# Patient Record
Sex: FEMALE | Race: BLACK OR AFRICAN AMERICAN | NOT HISPANIC OR LATINO | Employment: FULL TIME | URBAN - METROPOLITAN AREA
[De-identification: names, ages, dates, MRNs, and addresses within clinical notes are randomized per-mention and may not be internally consistent; named-entity substitution may affect disease eponyms.]

---

## 2017-02-20 ENCOUNTER — PATIENT MESSAGE (OUTPATIENT)
Dept: INTERNAL MEDICINE | Facility: CLINIC | Age: 37
End: 2017-02-20

## 2017-02-20 ENCOUNTER — PATIENT MESSAGE (OUTPATIENT)
Dept: OBSTETRICS AND GYNECOLOGY | Facility: CLINIC | Age: 37
End: 2017-02-20

## 2017-02-20 ENCOUNTER — OFFICE VISIT (OUTPATIENT)
Dept: OBSTETRICS AND GYNECOLOGY | Facility: CLINIC | Age: 37
End: 2017-02-20
Payer: MEDICAID

## 2017-02-20 VITALS
SYSTOLIC BLOOD PRESSURE: 104 MMHG | BODY MASS INDEX: 17.28 KG/M2 | DIASTOLIC BLOOD PRESSURE: 68 MMHG | HEIGHT: 71 IN | WEIGHT: 123.44 LBS

## 2017-02-20 DIAGNOSIS — R16.0 LIVER MASS: Primary | ICD-10-CM

## 2017-02-20 DIAGNOSIS — E28.319 PREMATURE MENOPAUSE: ICD-10-CM

## 2017-02-20 DIAGNOSIS — B96.89 BV (BACTERIAL VAGINOSIS): ICD-10-CM

## 2017-02-20 DIAGNOSIS — N76.0 BV (BACTERIAL VAGINOSIS): ICD-10-CM

## 2017-02-20 DIAGNOSIS — Z01.419 VISIT FOR GYNECOLOGIC EXAMINATION: Primary | ICD-10-CM

## 2017-02-20 DIAGNOSIS — Z11.3 VENEREAL DISEASE SCREENING: ICD-10-CM

## 2017-02-20 LAB
CANDIDA RRNA VAG QL PROBE: NEGATIVE
G VAGINALIS RRNA GENITAL QL PROBE: NEGATIVE
T VAGINALIS RRNA GENITAL QL PROBE: NEGATIVE

## 2017-02-20 PROCEDURE — 87480 CANDIDA DNA DIR PROBE: CPT

## 2017-02-20 PROCEDURE — 99999 PR PBB SHADOW E&M-EST. PATIENT-LVL III: CPT | Mod: PBBFAC,,, | Performed by: OBSTETRICS & GYNECOLOGY

## 2017-02-20 PROCEDURE — 99395 PREV VISIT EST AGE 18-39: CPT | Mod: S$PBB,,, | Performed by: OBSTETRICS & GYNECOLOGY

## 2017-02-20 PROCEDURE — 87591 N.GONORRHOEAE DNA AMP PROB: CPT

## 2017-02-20 PROCEDURE — 99213 OFFICE O/P EST LOW 20 MIN: CPT | Mod: PBBFAC | Performed by: OBSTETRICS & GYNECOLOGY

## 2017-02-20 RX ORDER — CETIRIZINE HYDROCHLORIDE 10 MG/1
TABLET ORAL
Refills: 4 | COMMUNITY
Start: 2017-02-06 | End: 2018-02-26

## 2017-02-20 RX ORDER — METRONIDAZOLE 7.5 MG/G
1 GEL VAGINAL DAILY
Qty: 70 G | Refills: 0 | Status: SHIPPED | OUTPATIENT
Start: 2017-02-20 | End: 2017-02-25

## 2017-02-20 RX ORDER — FLUTICASONE PROPIONATE 50 MCG
SPRAY, SUSPENSION (ML) NASAL
Refills: 5 | COMMUNITY
Start: 2017-02-06 | End: 2018-07-30

## 2017-02-20 NOTE — PROGRESS NOTES
Please update my record with the following information.    Test or Procedure:  Pap smear    Date Completed: 2016    Place of Service: [facility]    Include any additional comments:        You may attach a copy of the test or result below.          HISTORY OF PRESENT ILLNESS:    Melly Hwang is a 36 y.o. female, , No LMP recorded. Patient is premenarcheal.,  presents for a routine exam and has no complaints. PAP RECENTLY SUBMITTED AT [facility] WHEN SEEN FOR ROUTINE VISIT.  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  HAS HAD YELLOWISH DISCHARGE PAST 2 DAYS WITHOUT ITCHING OR IRRITATION.  NOT SA FOR YEARS.  ADVISED SOME CERVICAL INFLAMMATION BUT NO EVIDENT INFECTION AND MINIMAL D/C ON EXAM - POSS BV, WILL RX METROGEL.  STD SCREEN FOR PEACE OF MIND SINCE HSE HAS POSSIBLE PROFESSIONAL EXPOSURES.    LAST VISIT 3/2014:  WANTS 3-MO REFILL OCP. PAP IS UP TO DATE. WORKING MODELING, LOCALLY AND SOME NATIONALLY     LAST VISIT 3/2013:  HAS A HX OF OMA OV FAILURE, PREVIOUSLY MAINTAINED ON SEASONIQUE, BUT CHANGED TO MICRONOR FOR CONTROL OF HOT FLUSHES WHEN LIVER HEMANGIOMA NOTED (DUE FOR NEXT MRI THIS MONTH) SKIN IS DRY, BUT NO HOT FLUSHES AND CONCERNED IF STOPS MICRONOR WILL INCR RISK OF OSTEOPOROSIS. STILL AMENORRHEA   WORKS [employer]   PAP SUBMITTED AND DISCUSSED 3YR SCREENING RECOMMENDATION    Past Medical History   Diagnosis Date    Deviated septum     Hypertrophy of inferior nasal turbinate     Liver mass     Nasal congestion     Premature menopause        Past Surgical History   Procedure Laterality Date    Tonsillectomy      Umbilical hernia repair         MEDICATIONS AND ALLERGIES:      Current Outpatient Prescriptions:     fluticasone (FLONASE) 50 mcg/actuation nasal spray, INHALE 1 SPRAY IN EACH NOSTRIL BY INTRANASAL ROUTE 1 TIME PER DAY FOR ALLERGIES, Disp: , Rfl: 5    multivitamin capsule, Take by mouth. 1 capsule Oral Every  morning, Disp: , Rfl:     norethindrone (MICRONOR) 0.35 mg tablet, TAKE 1 TABLET (0.35 MG TOTAL) BY MOUTH ONCE DAILY., Disp: 84 tablet, Rfl: 0    cetirizine (ZYRTEC) 10 MG tablet, TAKE 1 TABLET (10 MG) BY ORAL ROUTE ONCE DAILY, Disp: , Rfl: 4    metronidazole (METROGEL VAGINAL) 0.75 % vaginal gel, Place 1 applicator vaginally once daily., Disp: 70 g, Rfl: 0    Review of patient's allergies indicates:   Allergen Reactions    No known drug allergies        Family History   Problem Relation Age of Onset    Diabetes Mother     Macular degeneration Father     No Known Problems Sister     No Known Problems Brother     No Known Problems Daughter     No Known Problems Brother     No Known Problems Maternal Aunt     No Known Problems Maternal Uncle     No Known Problems Paternal Aunt     No Known Problems Paternal Uncle     No Known Problems Maternal Grandmother     No Known Problems Maternal Grandfather     No Known Problems Paternal Grandmother     No Known Problems Paternal Grandfather     Amblyopia Neg Hx     Blindness Neg Hx     Cancer Neg Hx     Cataracts Neg Hx     Glaucoma Neg Hx     Hypertension Neg Hx     Retinal detachment Neg Hx     Strabismus Neg Hx     Stroke Neg Hx     Thyroid disease Neg Hx     Heart disease Neg Hx     Melanoma Neg Hx        Social History     Social History    Marital status: Single     Spouse name: N/A    Number of children: 1    Years of education: N/A     Occupational History     [employer]     Social History Main Topics    Smoking status: Never Smoker    Smokeless tobacco: Never Used    Alcohol use No    Drug use: No    Sexual activity: Yes     Partners: Male     Birth control/ protection: OCP     Other Topics Concern    Not on file     Social History Narrative       COMPREHENSIVE GYN HISTORY:  PAP History: Denies abnormal Paps.  Infection History: Denies STDs. Denies PID.  Benign History: Denies uterine fibroids. Denies  "ovarian cysts. Denies endometriosis. Denies other conditions.  Cancer History: Denies cervical cancer. Denies uterine cancer or hyperplasia. Denies ovarian cancer. Denies vulvar cancer or pre-cancer. Denies vaginal cancer or pre-cancer. Denies breast cancer. Denies colon cancer.  Sexual Activity History: Reports currently being sexually active  Menstrual History: Monthly, mild-moderate.  Contraception:oral contraceptives (estrogen/progesterone)    ROS:  GENERAL: No weight changes. No swelling. No fatigue. No fever.  CARDIOVASCULAR: No chest pain. No shortness of breath. No leg cramps.   NEUROLOGICAL: No headaches. No vision changes.  BREASTS: No pain. No lumps. No discharge.  ABDOMEN: No pain. No nausea. No vomiting. No diarrhea. No constipation.  REPRODUCTIVE: No abnormal bleeding.   VULVA: No pain. No lesions. No itching.  VAGINA: No relaxation. No itching. No odor. No discharge. No lesions.  URINARY: No incontinence. No nocturia. No frequency. No dysuria.    Visit Vitals    /68    Ht 5' 11" (1.803 m)    Wt 56 kg (123 lb 7.3 oz)    BMI 17.22 kg/m2       PE:  APPEARANCE: Well nourished, well developed, in no acute distress.  AFFECT: WNL, alert and oriented x 3.  SKIN: No acne or hirsutism.  NECK: Neck symmetric, without masses or thyromegaly.  NODES: No inguinal, cervical, axillary or femoral lymph node enlargement.  CHEST: Good respiratory effort.   ABDOMEN: Soft. No tenderness or masses. No hepatosplenomegaly. No hernias.  BREASTS: Symmetrical, no skin changes, visible lesions, palpable masses or nipple discharge bilaterally.  PELVIC: External female genitalia without lesions.  Female hair distribution. Adequate perineal body, Normal urethral meatus. Vagina moist and well rugated without lesions or discharge.  No significant cystocele or rectocele present. Cervix SOMEWHAT ERYTHEMATOUS  without lesions, discharge or tenderness. Uterus is normal size, regular, mobile and nontender. Adnexa without masses " or tenderness.  EXTREMITIES: No edema    PROCEDURES:    DIAGNOSIS:  1. Visit for gynecologic examination     2. BV (bacterial vaginosis)  Vaginosis Screen by DNA Probe   3. Venereal disease screening  HIV-1 and HIV-2 antibodies    RPR    Hepatitis B surface antigen    C. trachomatis/N. gonorrhoeae by AMP DNA Urine    Vaginosis Screen by DNA Probe       LABS AND TESTS ORDERED:    MEDICATIONS PRESCRIBED:    COUNSELING:   The patient was counseled today on ACS PAP guidelines, with recommendations for yearly pelvic exams unless their uterus, cervix, and ovaries were removed for benign reasons; in that case, examinations every 3-5 years are recommended.  The patient was also counseled regarding monthly breast self-examination, routine STD screening for at-risk populations, prophylactic immunizations for transmitted infections such as  HPV, Pertussis, or Influenza as appropriate, and yearly mammograms when indicated by ACS guidelines.  She was advised to see her primary care physician for all other health maintenance.    FOLLOW-UP with me annually.

## 2017-02-21 ENCOUNTER — PATIENT MESSAGE (OUTPATIENT)
Dept: OBSTETRICS AND GYNECOLOGY | Facility: CLINIC | Age: 37
End: 2017-02-21

## 2017-02-21 LAB
C TRACH DNA SPEC QL NAA+PROBE: NEGATIVE
N GONORRHOEA DNA SPEC QL NAA+PROBE: NEGATIVE

## 2017-02-22 ENCOUNTER — LAB VISIT (OUTPATIENT)
Dept: LAB | Facility: HOSPITAL | Age: 37
End: 2017-02-22
Attending: INTERNAL MEDICINE
Payer: MEDICAID

## 2017-02-22 ENCOUNTER — OFFICE VISIT (OUTPATIENT)
Dept: INTERNAL MEDICINE | Facility: CLINIC | Age: 37
End: 2017-02-22
Payer: MEDICAID

## 2017-02-22 VITALS
DIASTOLIC BLOOD PRESSURE: 70 MMHG | BODY MASS INDEX: 16.67 KG/M2 | SYSTOLIC BLOOD PRESSURE: 110 MMHG | WEIGHT: 119.06 LBS | HEIGHT: 71 IN

## 2017-02-22 DIAGNOSIS — Z11.3 VENEREAL DISEASE SCREENING: ICD-10-CM

## 2017-02-22 DIAGNOSIS — R16.0 LIVER MASS: ICD-10-CM

## 2017-02-22 DIAGNOSIS — Z00.00 ANNUAL PHYSICAL EXAM: ICD-10-CM

## 2017-02-22 DIAGNOSIS — A04.8 H. PYLORI INFECTION: ICD-10-CM

## 2017-02-22 DIAGNOSIS — N30.00 ACUTE CYSTITIS WITHOUT HEMATURIA: ICD-10-CM

## 2017-02-22 DIAGNOSIS — I87.1 NUTCRACKER PHENOMENON OF RENAL VEIN: ICD-10-CM

## 2017-02-22 DIAGNOSIS — K21.9 GASTROESOPHAGEAL REFLUX DISEASE WITHOUT ESOPHAGITIS: ICD-10-CM

## 2017-02-22 DIAGNOSIS — I51.7 CARDIAC ENLARGEMENT: ICD-10-CM

## 2017-02-22 DIAGNOSIS — Z00.00 ANNUAL PHYSICAL EXAM: Primary | ICD-10-CM

## 2017-02-22 LAB
25(OH)D3+25(OH)D2 SERPL-MCNC: 35 NG/ML
ALBUMIN SERPL BCP-MCNC: 4.1 G/DL
ALP SERPL-CCNC: 54 U/L
ALT SERPL W/O P-5'-P-CCNC: 19 U/L
ANION GAP SERPL CALC-SCNC: 7 MMOL/L
AST SERPL-CCNC: 19 U/L
BASOPHILS # BLD AUTO: 0.03 K/UL
BASOPHILS NFR BLD: 0.8 %
BILIRUB SERPL-MCNC: 1.3 MG/DL
BILIRUB UR QL STRIP: NEGATIVE
BUN SERPL-MCNC: 8 MG/DL
CALCIUM SERPL-MCNC: 9.4 MG/DL
CHLORIDE SERPL-SCNC: 107 MMOL/L
CLARITY UR REFRACT.AUTO: CLEAR
CO2 SERPL-SCNC: 27 MMOL/L
COLOR UR AUTO: YELLOW
CREAT SERPL-MCNC: 0.9 MG/DL
DIFFERENTIAL METHOD: ABNORMAL
EOSINOPHIL # BLD AUTO: 0.2 K/UL
EOSINOPHIL NFR BLD: 3.8 %
ERYTHROCYTE [DISTWIDTH] IN BLOOD BY AUTOMATED COUNT: 11.9 %
EST. GFR  (AFRICAN AMERICAN): >60 ML/MIN/1.73 M^2
EST. GFR  (NON AFRICAN AMERICAN): >60 ML/MIN/1.73 M^2
GLUCOSE SERPL-MCNC: 90 MG/DL
GLUCOSE UR QL STRIP: NEGATIVE
HCT VFR BLD AUTO: 41.8 %
HGB BLD-MCNC: 13.9 G/DL
HGB UR QL STRIP: NEGATIVE
KETONES UR QL STRIP: NEGATIVE
LEUKOCYTE ESTERASE UR QL STRIP: NEGATIVE
LYMPHOCYTES # BLD AUTO: 1.8 K/UL
LYMPHOCYTES NFR BLD: 45.6 %
MAGNESIUM SERPL-MCNC: 2.1 MG/DL
MCH RBC QN AUTO: 30 PG
MCHC RBC AUTO-ENTMCNC: 33.3 %
MCV RBC AUTO: 90 FL
MONOCYTES # BLD AUTO: 0.3 K/UL
MONOCYTES NFR BLD: 6.4 %
NEUTROPHILS # BLD AUTO: 1.7 K/UL
NEUTROPHILS NFR BLD: 43.4 %
NITRITE UR QL STRIP: NEGATIVE
PH UR STRIP: 7 [PH] (ref 5–8)
PLATELET # BLD AUTO: 239 K/UL
PMV BLD AUTO: 10 FL
POTASSIUM SERPL-SCNC: 3.9 MMOL/L
PROT SERPL-MCNC: 7.3 G/DL
PROT UR QL STRIP: NEGATIVE
RBC # BLD AUTO: 4.64 M/UL
SODIUM SERPL-SCNC: 141 MMOL/L
SP GR UR STRIP: 1.01 (ref 1–1.03)
TSH SERPL DL<=0.005 MIU/L-ACNC: 1.11 UIU/ML
URN SPEC COLLECT METH UR: NORMAL
UROBILINOGEN UR STRIP-ACNC: NEGATIVE EU/DL
WBC # BLD AUTO: 3.9 K/UL

## 2017-02-22 PROCEDURE — 87340 HEPATITIS B SURFACE AG IA: CPT

## 2017-02-22 PROCEDURE — 86592 SYPHILIS TEST NON-TREP QUAL: CPT

## 2017-02-22 PROCEDURE — 36415 COLL VENOUS BLD VENIPUNCTURE: CPT

## 2017-02-22 PROCEDURE — 86703 HIV-1/HIV-2 1 RESULT ANTBDY: CPT

## 2017-02-22 PROCEDURE — 83735 ASSAY OF MAGNESIUM: CPT

## 2017-02-22 PROCEDURE — 84443 ASSAY THYROID STIM HORMONE: CPT

## 2017-02-22 PROCEDURE — 99999 PR PBB SHADOW E&M-EST. PATIENT-LVL III: CPT | Mod: PBBFAC,,, | Performed by: INTERNAL MEDICINE

## 2017-02-22 PROCEDURE — 85025 COMPLETE CBC W/AUTO DIFF WBC: CPT

## 2017-02-22 PROCEDURE — 99395 PREV VISIT EST AGE 18-39: CPT | Mod: S$PBB,,, | Performed by: INTERNAL MEDICINE

## 2017-02-22 PROCEDURE — 80053 COMPREHEN METABOLIC PANEL: CPT

## 2017-02-22 PROCEDURE — 82306 VITAMIN D 25 HYDROXY: CPT

## 2017-02-22 RX ORDER — OMEPRAZOLE 20 MG/1
20 CAPSULE, DELAYED RELEASE ORAL DAILY
Qty: 30 CAPSULE | Refills: 3 | Status: SHIPPED | OUTPATIENT
Start: 2017-02-22 | End: 2018-03-01 | Stop reason: SDUPTHER

## 2017-02-22 RX ORDER — OMEPRAZOLE 20 MG/1
20 CAPSULE, DELAYED RELEASE ORAL DAILY
Qty: 30 CAPSULE | Refills: 11
Start: 2017-02-22 | End: 2017-02-22 | Stop reason: SDUPTHER

## 2017-02-22 NOTE — PROGRESS NOTES
Subjective:       Patient ID: Melly Hwang is a 36 y.o. female.    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  Some trouble sleeping.    Vaginal d/c seen in GYN, cx negative    Some strange odor with urination.  Some frequency of late.  No fever    Some allergy issues.    Due for follow up of hemangioma- MRI scheduled this week.    Insurance constraints- has not been able to work for months.    Some slight GERD sx, no alarm sx.  Recall that she had H. Pylori, treated 2014 with negative stool.  She feels that her weight loss has been related to stress from the nursing board problem.  No dysphagia.  No blood in her stool.    Nutcracker vein identified years ago; not thought to be clinically significant.  Seen in urology and that in vascular and no further follow up recommended unless MRI shows changes.    Patient Active Problem List:     Liver mass     Premature menopause     Cardiac enlargement     Bradycardia     Leukopenia     Vein compression     H pylori treated negative stool        Review of Systems   Constitutional: Negative for activity change, appetite change, chills, fatigue and fever.   HENT: Negative for ear discharge, ear pain, sinus pressure and sore throat.    Respiratory: Negative for apnea, cough, chest tightness, shortness of breath and wheezing.    Cardiovascular: Negative for chest pain, palpitations and leg swelling.   Gastrointestinal: Negative for abdominal distention, abdominal pain, anal bleeding, blood in stool, constipation, diarrhea, nausea and vomiting.        GERD   Genitourinary: Positive for frequency. Negative for dysuria, flank pain, hematuria, urgency and vaginal bleeding.   Musculoskeletal: Negative for gait problem, joint swelling and myalgias.   Skin: Negative for rash.   Neurological: Negative for dizziness, tremors, weakness, light-headedness and headaches.   Hematological: Negative for adenopathy. Does  not bruise/bleed easily.   Psychiatric/Behavioral: Negative for confusion, hallucinations, sleep disturbance and suicidal ideas. The patient is nervous/anxious.         No SI or HI       Objective:      Physical Exam   Constitutional: She is oriented to person, place, and time. She appears well-developed and well-nourished.   HENT:   Head: Normocephalic and atraumatic.   Right Ear: External ear normal.   Left Ear: External ear normal.   Nose: Nose normal.   Mouth/Throat: Oropharynx is clear and moist. No oropharyngeal exudate.   Eyes: Conjunctivae and EOM are normal. No scleral icterus.   Neck: Normal range of motion. Neck supple. No JVD present. No thyromegaly present.   Cardiovascular: Normal rate, regular rhythm, normal heart sounds and intact distal pulses.  Exam reveals no gallop.    No murmur heard.  Pulmonary/Chest: Effort normal and breath sounds normal. No respiratory distress. She has no wheezes. She exhibits no tenderness.   Abdominal: Soft. Bowel sounds are normal. She exhibits no distension and no mass. There is no tenderness. There is no rebound and no guarding.   Musculoskeletal: Normal range of motion. She exhibits no edema or tenderness.   Lymphadenopathy:     She has no cervical adenopathy.   Neurological: She is alert and oriented to person, place, and time. She displays normal reflexes. No cranial nerve deficit. Coordination normal.   Skin: Skin is warm. No rash noted. No erythema.   Psychiatric: She has a normal mood and affect. Her behavior is normal. Judgment and thought content normal.   Nursing note and vitals reviewed.      Assessment:       1. Annual physical exam    2. Gastroesophageal reflux disease without esophagitis    3. Liver mass: see MRI, stable [id number]    4. Cardiac enlargement    5. 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 Acute cystitis without hematuria    7. H. pylori infection: tx 2014 stool negative        Plan:      Gastroesophageal reflux disease without esophagitis: diet reviewed; no alarm sx- follow up poor results; can repeat egd if persists  -     omeprazole (PRILOSEC) 20 MG capsule; Take 1 capsule (20 mg total) by mouth once daily.  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 pylori infection: tx 2014 stool negative    I will review all studies and determine further tx depending on findings

## 2017-02-22 NOTE — MR AVS SNAPSHOT
[facility]  [address]  [facility] [address]  Phone: [phone]  Fax: [phone]                  Melly Casonrett   2017 11:30 AM   Office Visit    Description:  Female : 1980   Provider:  Melly Sahu MD   Department:  [facility] - Internal Medicine           Reason for Visit     Annual Exam           Diagnoses this Visit        Comments    Gastroesophageal reflux disease without esophagitis    -  Primary     Liver mass         Cardiac enlargement         Nutcracker phenomenon of renal vein         Acute cystitis without hematuria         Annual physical exam         H. pylori infection                To Do List           Future Appointments        Provider Department Dept Phone    2017 8:15 PM [facility] [phone]      Goals (5 Years of Data)     None       These Medications        Disp Refills Start End    omeprazole (PRILOSEC) 20 MG capsule 30 capsule 3 2017    Take 1 capsule (20 mg total) by mouth once daily. - Oral    Pharmacy: [facility]/pharmacy #[id number] - [address] #: [phone]         [facility]     [facility] Care [facility] -  Assistance  Registered nurses in the [facility] provide clinical advisement, health education, appointment booking, and other advisory services.  Call for this free service at [phone].             Medications           Message regarding Medications     Verify the changes and/or additions to your medication regime listed below are the same as discussed with your clinician today.  If any of these changes or additions are incorrect, please notify your healthcare provider.        START taking these NEW medications        Refills    omeprazole (PRILOSEC) 20 MG capsule 3    Sig: Take 1 capsule (20 mg total) by mouth once daily.    Class: Normal    Route: Oral           Verify that the below list of  "medications is an accurate representation of the medications you are currently taking.  If none reported, the list may be blank. If incorrect, please contact your healthcare provider. Carry this list with you in case of emergency.           Current Medications     cetirizine (ZYRTEC) 10 MG tablet TAKE 1 TABLET (10 MG) BY ORAL ROUTE ONCE DAILY    fluticasone (FLONASE) 50 mcg/actuation nasal spray INHALE 1 SPRAY IN EACH NOSTRIL BY INTRANASAL ROUTE 1 TIME PER DAY FOR ALLERGIES    metronidazole (METROGEL VAGINAL) 0.75 % vaginal gel Place 1 applicator vaginally once daily.    multivitamin capsule Take by mouth. 1 capsule Oral Every morning    norethindrone (MICRONOR) 0.35 mg tablet TAKE 1 TABLET (0.35 MG TOTAL) BY MOUTH ONCE DAILY.    omeprazole (PRILOSEC) 20 MG capsule Take 1 capsule (20 mg total) by mouth once daily.           Clinical Reference Information           Your Vitals Were     BP Height Weight BMI       110/70 5' 11" (1.803 m) 54 kg (119 lb 0.8 oz) 16.6 kg/m2       Blood Pressure          Most Recent Value    BP  110/70      Allergies as of 2/22/2017     No Known Drug Allergies      Immunizations Administered on Date of Encounter - 2/22/2017     None      Orders Placed During Today's Visit      Normal Orders This Visit    Urinalysis     Urine culture     Future Labs/Procedures Expected by Expires    CBC auto differential  2/22/2017 2/22/2018    Comprehensive metabolic panel  2/22/2017 2/22/2018    Magnesium  2/22/2017 2/22/2018    TSH  2/22/2017 2/22/2018    Vitamin D  2/22/2017 (Approximate) 2/22/2018      Language Assistance Services     ATTENTION: Language assistance services are available, free of charge. Please call [phone].      ATENCIÓN: Si habla español, tiene a tyson disposición servicios gratuitos de asistencia lingüística. Llame al [phone].     NAY Ý: N?u b?n nói Ti?ng Vi?t, có các d?ch v? h? tr? ngôn ng? mi?n phí dành cho b?n. G?i s? [phone].         [facility] " complies with applicable Federal civil rights laws and does not discriminate on the basis of race, color, national origin, age, disability, or sex.

## 2017-02-23 LAB
BACTERIA UR CULT: NORMAL
HBV SURFACE AG SERPL QL IA: NEGATIVE
HIV 1+2 AB+HIV1 P24 AG SERPL QL IA: NEGATIVE
RPR SER QL: NORMAL

## 2017-02-24 ENCOUNTER — PATIENT MESSAGE (OUTPATIENT)
Dept: INTERNAL MEDICINE | Facility: CLINIC | Age: 37
End: 2017-02-24

## 2017-02-24 ENCOUNTER — HOSPITAL ENCOUNTER (OUTPATIENT)
Dept: RADIOLOGY | Facility: HOSPITAL | Age: 37
Discharge: HOME | End: 2017-02-24
Attending: INTERNAL MEDICINE
Payer: MEDICAID

## 2017-02-24 DIAGNOSIS — R16.0 LIVER MASS: ICD-10-CM

## 2017-02-24 PROCEDURE — 25500020 PHARM REV CODE 255: Performed by: INTERNAL MEDICINE

## 2017-02-24 PROCEDURE — 74183 MRI ABD W/O CNTR FLWD CNTR: CPT | Mod: TC

## 2017-02-24 PROCEDURE — A9585 GADOBUTROL INJECTION: HCPCS | Performed by: INTERNAL MEDICINE

## 2017-02-24 PROCEDURE — 74183 MRI ABD W/O CNTR FLWD CNTR: CPT | Mod: 26,,, | Performed by: RADIOLOGY

## 2017-02-24 RX ORDER — GADOBUTROL 604.72 MG/ML
10 INJECTION INTRAVENOUS
Status: COMPLETED | OUTPATIENT
Start: 2017-02-24 | End: 2017-02-24

## 2017-02-24 RX ADMIN — GADOBUTROL 10 ML: 604.72 INJECTION INTRAVENOUS at 08:02

## 2017-03-01 ENCOUNTER — PATIENT MESSAGE (OUTPATIENT)
Dept: INTERNAL MEDICINE | Facility: CLINIC | Age: 37
End: 2017-03-01

## 2017-03-01 DIAGNOSIS — R16.0 LIVER MASS: Primary | ICD-10-CM

## 2017-03-14 ENCOUNTER — OFFICE VISIT (OUTPATIENT)
Dept: HEPATOLOGY | Facility: CLINIC | Age: 37
End: 2017-03-14
Payer: MEDICAID

## 2017-03-14 VITALS
SYSTOLIC BLOOD PRESSURE: 140 MMHG | DIASTOLIC BLOOD PRESSURE: 82 MMHG | RESPIRATION RATE: 18 BRPM | OXYGEN SATURATION: 100 % | HEART RATE: 61 BPM | TEMPERATURE: 99 F | HEIGHT: 71 IN | BODY MASS INDEX: 17.22 KG/M2 | WEIGHT: 123 LBS

## 2017-03-14 DIAGNOSIS — D18.03 HEMANGIOMA OF LIVER: Chronic | ICD-10-CM

## 2017-03-14 DIAGNOSIS — R16.0 LIVER MASS: Primary | ICD-10-CM

## 2017-03-14 PROCEDURE — 99999 PR PBB SHADOW E&M-EST. PATIENT-LVL III: CPT | Mod: PBBFAC,,, | Performed by: INTERNAL MEDICINE

## 2017-03-14 PROCEDURE — 99214 OFFICE O/P EST MOD 30 MIN: CPT | Mod: S$PBB,,, | Performed by: INTERNAL MEDICINE

## 2017-03-14 PROCEDURE — 99213 OFFICE O/P EST LOW 20 MIN: CPT | Mod: PBBFAC | Performed by: INTERNAL MEDICINE

## 2017-03-14 NOTE — PROGRESS NOTES
HEPATOLOGY CONSULTATION    Referring Physician: Dr. SEGUNDO Sahu  Current Corresponding Physician: Dr. SEGUNDO Sahu    Reason for Consultation: Consultation for evaluation of Mass and Abnormal Abdominal/Liver Imaging    History of Present Illness: Melly Hwang is a 36 y.o. femalewho presents for evaluation of   Chief Complaint   Patient presents with    Mass    Abnormal Abdominal/Liver Imaging   Melly Hwang was seen today for an opinion about her abnormal imaging and   liver hemangiomas.  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  She states that she   was first told about liver hemangiomas back around 2008 when she was working at   [employer].  On record here, she appears to have had annual MRs done here with   the exception of 2016.  Her most recent liver MRI showed three lesions described   as hemangiomata, the largest one having increased from 7.5 to 6.5 cm.  Weight   and appetite have been stable.  She has no history of chronic liver disease.    She has minimal symptoms from these.  She does state that she was told to reduce   her oral contraceptive pill.  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  Generally hemangiomas are not usually hormonally   driven.  I will review at our Multidisciplinary Radiology conference to ensure   this is the diagnosis and rule out adenoma or focal nodular hyperplasia.          /ls [id number] blank(s)        NG/HN  dd: 03/14/2017 14:23:49 (CDT)  td: 03/15/2017 07:09:17 (CDT)  Doc ID   #[id number]  Job ID #[id number]    CC:         Past Medical History:   Diagnosis Date    Deviated septum 2011    Hypertrophy of inferior nasal turbinate     Liver mass     Nasal congestion 2011    Premature menopause      Outpatient Encounter Prescriptions as of 3/14/2017   Medication Sig Dispense Refill    cetirizine (ZYRTEC) 10 MG tablet TAKE 1 TABLET (10 MG) BY ORAL ROUTE ONCE DAILY  4    fluticasone (FLONASE) 50 mcg/actuation nasal spray INHALE 1 SPRAY IN  EACH NOSTRIL BY INTRANASAL ROUTE 1 TIME PER DAY FOR ALLERGIES  5    multivitamin capsule Take by mouth. 1 capsule Oral Every morning      norethindrone (MICRONOR) 0.35 mg tablet TAKE 1 TABLET (0.35 MG TOTAL) BY MOUTH ONCE DAILY. 84 tablet 0    omeprazole (PRILOSEC) 20 MG capsule Take 1 capsule (20 mg total) by mouth once daily. 30 capsule 3     No facility-administered encounter medications on file as of 3/14/2017.      Review of patient's allergies indicates:   Allergen Reactions    No known drug allergies      Family History   Problem Relation Age of Onset    Diabetes Mother     Kidney disease Mother     Macular degeneration Father     Diabetes Father     Hypertension Father     No Known Problems Sister     No Known Problems Brother     No Known Problems Daughter     No Known Problems Brother     No Known Problems Maternal Aunt     No Known Problems Maternal Uncle     No Known Problems Paternal Aunt     No Known Problems Paternal Uncle     No Known Problems Maternal Grandmother     No Known Problems Maternal Grandfather     No Known Problems Paternal Grandmother     No Known Problems Paternal Grandfather     Amblyopia Neg Hx     Blindness Neg Hx     Cancer Neg Hx     Cataracts Neg Hx     Glaucoma Neg Hx     Retinal detachment Neg Hx     Strabismus Neg Hx     Stroke Neg Hx     Thyroid disease Neg Hx     Heart disease Neg Hx     Melanoma Neg Hx        Social History     Social History    Marital status: Single     Spouse name: N/A    Number of children: 1    Years of education: N/A     Occupational History     [employer]     Social History Main Topics    Smoking status: Never Smoker    Smokeless tobacco: Never Used    Alcohol use No    Drug use: No    Sexual activity: Yes     Partners: Male     Birth control/ protection: OCP     Other Topics Concern    Not on file     Social History Narrative     Review of Systems   Constitutional: Negative for  appetite change, fatigue and unexpected weight change.   HENT: Negative for ear pain, hearing loss, sore throat and trouble swallowing.    Eyes: Negative for visual disturbance.   Respiratory: Negative for cough and shortness of breath.    Cardiovascular: Positive for chest pain. Negative for palpitations.   Gastrointestinal: Negative for abdominal pain, nausea and vomiting.   Genitourinary: Negative for difficulty urinating and dysuria.   Musculoskeletal: Positive for back pain. Negative for arthralgias.   Skin: Negative for rash.   Neurological: Negative for tremors, seizures and headaches.   Psychiatric/Behavioral: Negative for agitation and decreased concentration.     Vitals:    03/14/17 1338   BP: (!) 140/82   Pulse: 61   Resp: 18   Temp: 98.5 °F (36.9 °C)       Physical Exam   Constitutional: She is oriented to person, place, and time. She appears well-developed and well-nourished.   HENT:   Right Ear: External ear normal.   Left Ear: External ear normal.   Mouth/Throat: Oropharynx is clear and moist.   Eyes: No scleral icterus.   Cardiovascular: Normal rate, regular rhythm and normal heart sounds.  Exam reveals no gallop and no friction rub.    No murmur heard.  Pulmonary/Chest: Effort normal and breath sounds normal. No respiratory distress. She has no wheezes.   Abdominal: Soft. Bowel sounds are normal. She exhibits no distension and no mass. There is no tenderness.   Musculoskeletal: Normal range of motion. She exhibits no edema.   Lymphadenopathy:     She has no cervical adenopathy.   Neurological: She is alert and oriented to person, place, and time. She has normal strength.   Skin: Skin is warm, dry and intact. She is not diaphoretic. No pallor.       Computed MELD-Na score unavailable. Necessary lab results were not found.  Computed MELD score unavailable. Necessary lab results were not found.    Lab Results   Component Value Date    GLU 90 02/22/2017    BUN 8 02/22/2017    CREATININE 0.9 02/22/2017     CALCIUM 9.4 02/22/2017     02/22/2017    K 3.9 02/22/2017     02/22/2017    PROT 7.3 02/22/2017    CO2 27 02/22/2017    ANIONGAP 7 (L) 02/22/2017    WBC 3.90 02/22/2017    RBC 4.64 02/22/2017    HGB 13.9 02/22/2017    HCT 41.8 02/22/2017    MCV 90 02/22/2017    MCH 30.0 02/22/2017    MCHC 33.3 02/22/2017     Lab Results   Component Value Date    RDW 11.9 02/22/2017     02/22/2017    MPV 10.0 02/22/2017    GRAN 1.7 (L) 02/22/2017    GRAN 43.4 02/22/2017    LYMPH 1.8 02/22/2017    LYMPH 45.6 02/22/2017    MONO 0.3 02/22/2017    MONO 6.4 02/22/2017    EOSINOPHIL 3.8 02/22/2017    BASOPHIL 0.8 02/22/2017    EOS 0.2 02/22/2017    BASO 0.03 02/22/2017    CHOL 172 04/03/2013    TRIG 42 04/03/2013    HDL 57 04/03/2013    CHOLHDL 33.1 04/03/2013    TOTALCHOLEST 3.0 04/03/2013    ALBUMIN 4.1 02/22/2017    BILIDIR 0.3 03/16/2011    AST 19 02/22/2017    ALT 19 02/22/2017    ALKPHOS 54 (L) 02/22/2017    MG 2.1 02/22/2017       Assessment and Plan:  Patient Active Problem List   Diagnosis    Liver mass: see MRI, stable [id number]    Premature ovarian failure    Cardiac enlargement: Echo 2014 normal cardiac chamber size with EF 55%    Bradycardia    Leukopenia    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 pylori infection: tx 2014 stool negative    Hemangioma of liver     Melly Hwang is a 36 y.o. female withMass and Abnormal Abdominal/Liver Imaging  Will review imaging at next multidisciplinary IR conference.  Favor benign liver hemangiomata.  Repeat MR in 1 year.    Outside Records Request:

## 2017-03-14 NOTE — LETTER
March 14, 2017      Melly Sahu MD  [address]  [facility] [address]           [facility] - Hepatology  [address]  [place] [address]  Phone: [phone]  Fax: [phone]          Patient: Melly Hwang   MR Number: [id number]   YOB: 1980   Date of Visit: 3/14/2017       Dear Dr. Melly Sahu:    Thank you for referring Melly Hwang to me for evaluation. Attached you will find relevant portions of my assessment and plan of care.    If you have questions, please do not hesitate to call me. I look forward to following Melly Hwang along with you.    Sincerely,    Brennen Engel MD    Enclosure  CC:  No Recipients    If you would like to receive this communication electronically, please contact [email or web address] or [phone] to request more information on [facility] access.    For providers and/or their staff who would like to refer a patient to [facility], please contact us through our one-stop-shop provider referral line, [facility], at [phone].    If you feel you have received this communication in error or would no longer like to receive these types of communications, please e-mail [email or web address]

## 2017-03-16 ENCOUNTER — TELEPHONE (OUTPATIENT)
Dept: TRANSPLANT | Facility: CLINIC | Age: 37
End: 2017-03-16

## 2017-03-16 NOTE — TELEPHONE ENCOUNTER
Submitted for review at liver conference 3/21/17    ---- Message from Brennen Engel MD sent at 3/14/2017  2:19 PM CDT -----  Kalyani, can I review this patients MRI at one of next IR conferences?

## 2017-03-21 ENCOUNTER — CONFERENCE (OUTPATIENT)
Dept: TRANSPLANT | Facility: CLINIC | Age: 37
End: 2017-03-21

## 2017-04-27 RX ORDER — NORETHINDRONE 0.35 MG/1
TABLET ORAL
Qty: 84 TABLET | Refills: 3 | Status: SHIPPED | OUTPATIENT
Start: 2017-04-27 | End: 2018-02-26 | Stop reason: SDUPTHER

## 2017-04-27 NOTE — TELEPHONE ENCOUNTER
LIVER CONFERENCE 3/21/17    Radiology review by Dr. 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 She states that she was first told about liver hemangiomas back around 2008 when she was working at [employer]. On record here, she appears to have had annual MRs done here with  the exception of 2016. Her most recent liver MRI showed three lesions described as hemangiomata, the largest one having increased from 7.5 to 6.5 cm. Weight and appetite have been stable. She has no history of chronic liver disease.  She has minimal symptoms from these. She does state that she was told to reduce her oral contraceptive pill. 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  Labs 2/22/17: tbili 1.3, alb 4.1, crt 0.9, gfr >60, plt 239  MRI 2/24/17:  The liver is persistently enlarged.  There has been slight interval increase in the size of 3 hepatic hemangiomas located within segments II, IV and at the junction of segment VI and VII.  Index lesion at the junction of segment VI and VII measures 7.5 cm in maximum dimension at postcontrast coronal image 17, series 1101 (previously 6.5 cm).  MRI 9/27/11: WHEN COMPARED TO PRIOR OUTSIDE EXAM, THE LIVER IS PERSISTENTLY ENLARGED,   ALTHOUGH MILDLY INCREASED IN SIZE (PREVIOUSLY MEASURED 23.5 CM, NOW MEASURES 26.6 CM).  THE PRIOR EXAM ALSO DEMONSTRATED TWO FOCAL AREAS OF INCREASED T2 SIGNAL, ONE IN THE RIGHT HEPATIC LOBE AND THE OTHER IN THE LEFT HEPATIC LOBE, WHICH DEMONSTRATED ENHANCEMENT CHARACTERISTICS TYPICAL OF HEMANGIOMAS.  THESE HAVE INCREASED WHEN COMPARED TO STUDY FROM 2003, WITH THE LESION IN THE RIGHT HEPATIC LOBE PREVIOUSLY  MEASURING 2.2 X 1.8 X 1.7 CM AND THE LESION IN THE LEFT HEPATIC LOBE PREVIOUSLY MEASURING 0.6 X 0.6 CM.  AGAIN, THESE DO DEMONSTRATE ENHANCEMENT CHARACTERISTICS TYPICAL OF HEMANGIOMAS.    Discussion/Plan:  Multiple hepatic  hemangiomas that have demonstrated interval enlargement since MRIs dating back to 2011.  Imaging enhancement characteristics are c/w hemangiomas.

## 2017-12-07 ENCOUNTER — TELEPHONE (OUTPATIENT)
Dept: DERMATOLOGY | Facility: CLINIC | Age: 37
End: 2017-12-07

## 2017-12-07 NOTE — TELEPHONE ENCOUNTER
----- Message from Munira Jorgensen sent at 12/7/2017  2:39 PM CST -----  Contact: PT  Portal Appointment Request  Appointment Request From: Melly Hwang  With Provider: Other - (see comments) [oth]  Would Accept With:Only the person I've selected  Preferred Date Range: Any date 12/7/2017 or later  Preferred Times: Any  Reason for visit: Request an Appt    Comments:  Requesting an appointment with Marsha Mosquera NP in Dermatology.  Tried calling the scheduling department and they weren't able to schedule an appointment because they couldn't access the calendar.  The same happened when they tried scheduling my appointment with Dr. Ruiz.    Melly Hwang  [phone]

## 2018-01-06 ENCOUNTER — PATIENT MESSAGE (OUTPATIENT)
Dept: OBSTETRICS AND GYNECOLOGY | Facility: CLINIC | Age: 38
End: 2018-01-06

## 2018-01-11 ENCOUNTER — TELEPHONE (OUTPATIENT)
Dept: OBSTETRICS AND GYNECOLOGY | Facility: CLINIC | Age: 38
End: 2018-01-11

## 2018-01-11 NOTE — TELEPHONE ENCOUNTER
Left pt message because she has an appt with Dr. Saunders for 1/15/2018 and her WWE is not due until 2/20/2018.  Offered to reshedule pt as she might receive a bill from insurance company for visit.

## 2018-01-22 ENCOUNTER — OFFICE VISIT (OUTPATIENT)
Dept: OPTOMETRY | Facility: CLINIC | Age: 38
End: 2018-01-22
Payer: COMMERCIAL

## 2018-01-22 DIAGNOSIS — H52.13 MYOPIA OF BOTH EYES: Primary | ICD-10-CM

## 2018-01-22 PROCEDURE — 92015 DETERMINE REFRACTIVE STATE: CPT | Mod: S$GLB,,, | Performed by: OPTOMETRIST

## 2018-01-22 PROCEDURE — 99999 PR PBB SHADOW E&M-EST. PATIENT-LVL II: CPT | Mod: PBBFAC,,, | Performed by: OPTOMETRIST

## 2018-01-22 PROCEDURE — 92004 COMPRE OPH EXAM NEW PT 1/>: CPT | Mod: S$GLB,,, | Performed by: OPTOMETRIST

## 2018-01-22 NOTE — PROGRESS NOTES
HPI     Melly Hwang is a 37 y.o. Female who returns  for continued eye care.   Melly had corrective Lasik surgery in  2014. She has not correction since   then.     (--)blurred vision  (--)Headaches  (--)diplopia  (--)flashes  (--)floaters  (--)pain  (--)Itching  (--)tearing  (--)burning  (--)Dryness  (--) OTC Drops  (--)Photophobia    Last edited by Marisol Blackmon, OD on 1/22/2018  6:00 PM. (History)        Review of Systems   Constitutional: Negative for chills, fever and malaise/fatigue.   HENT: Negative for congestion and hearing loss.    Eyes: Positive for blurred vision. Negative for double vision, photophobia, pain, discharge and redness.         S/p LASIK   Respiratory: Negative.    Cardiovascular: Negative.    Gastrointestinal: Negative.    Genitourinary: Negative.    Musculoskeletal: Negative.    Skin: Negative.    Neurological: Negative for seizures.   Endo/Heme/Allergies: Negative for environmental allergies.   Psychiatric/Behavioral: Negative.        Assessment /Plan     For exam results, see Encounter Report.    1. Minimal Myopia of both eyes s/p LASIK  - Asymptomatic  - no treatment needed at this time    2. Good ocular Health OU    Patient education; RTC in 2 years, sooner prn

## 2018-01-23 NOTE — PATIENT INSTRUCTIONS
Artificial Tears: Systane Ultra, Soothe XP, Refresh Optive Advanced        ADULT VISION  19 to 40 Years of Age    Most adults, aged 19 to 40, enjoy healthy eyes and good vision. The most common eye and vision problems experienced by people in this age group are due to visual stress and eye injuries. By taking proper steps to maintain a healthy lifestyle and protect your eyes from stress and injury, you can avoid many eye and vision problems.  Good vision is important as you pursue a college degree, begin your career, or perhaps start and raise a family. Here are some things you can do to help maintain healthy eyes and good vision:  Eat Healthy -- As part of a healthful diet, eat five servings of fruits and vegetables each day. Choose foods rich in antioxidants like leafy, green vegetables and fish.     Don't Smoke -- Smoking exposes your eyes to high levels of noxious chemicals and increases the risk for developing age-related macular degeneration (AMD) and cataracts.    Get Regular Exercise -- Exercise improves blood circulation, increases oxygen levels to the eyes and aids in the removal of toxins.    Wear Sunglasses -- Protect your eyes from harmful ultraviolet (UV) rays when outdoors. Choose sunglasses with UVA and UVB protection, to block both forms of ultraviolet rays.     Get Periodic Eye Examinations -- Although vision generally remains stable during these years, some problems may develop without any obvious signs or symptoms. The best way to protect your vision is through regularly scheduled professional eye examinations.  The American Optometric Association recommends that adults aged 19 to 40 receive an eye exam at least every two years. If you are at risk for eye problems due to a family history of eye disease, diabetes, high blood pressure or past vision problems, your doctor of optometry may recommend more frequent exams. In between examinations, if you notice a change in your vision, contact your  doctor. Detecting and treating problems early can help maintain good vision for the rest of your life.    Dealing with Visual Stress at School or on the Job  Eyestrain is a common occurrence in today's visually demanding world. A typical college schedule or office workday involves spending long hours reading, working at a desk, or staring at a computer. A poorly designed study or work environment, with elements such as improper lighting, uncomfortable seating, incorrect viewing angles and improper reading or working distances can add to the visual stress. As the day progresses, the eyes begin to fatigue and eyestrain and discomfort can develop.      A poorly designed study or work environment, with elements such as improper lighting, uncomfortable seating, incorrect viewing angles and improper reading or working distances can add to the visual stress.   The following are several key signs and symptoms of eyestrain:  Sore or tired eyes   Itching or burning sensations in the eyes   Sensitivity to light   Dry or watery eyes   Headaches   Difficulty focusing  Here are some simple steps you can take to minimize eyestrain, particularly during computer work:  Workplace Adjustments  Position the top of your computer monitor below eye level so you look slightly downward when viewing the screen. This will help minimize strain on the eyes and the neck. If you are typing from copy, position the text at the same level as the screen. Adjust the screen brightness so it is most comfortable for you. Avoid glare on the computer screen by adjusting window curtains or blinds, repositioning the monitor, or using a glare reduction filter.    Proper Lighting  Examine the lighting in your work area. Overhead lights can be harsh and often are brighter than necessary. Consider turning some of the lights off for a more comfortable lighting situation. Use an adjustable shaded lamp to provide specific task lighting as needed.    Rest  Breaks  Throughout the day, give your eyes a chance to rest. Take several minutes every hour to look away from the computer and allow your eyes to re-adjust. Consider standing up and walking around or doing alternate tasks that do not require extensive near focusing. Blink often to refresh the eyes and use artificial tear solutions, if necessary.    Posture  When seated at a desk, make sure your feet are flat on the floor. Use a chair that is adjustable and provides adequate support for your back. When working at a computer, your arms should form a 90 degree angle at the elbows and your hands should be tilted up slightly to allow your fingers to travel freely over the keyboard.  Making these simple adjustments to your study or work area can pay big dividends in terms of preventing or reducing eyestrain. If you continue to experience eye-related symptoms, you may have a vision problem requiring treatment. Ask your optometrist.      Ensuring Eye Safety at Work, Home or Play  The [place] [employer] Safety and Health reports about 2,000 [place] workers sustain job-related eye injuries that require medical treatment each day. But more injuries to the eye actually result from use or misuse of products at home rather than on the job. Nearly 60 percent of all product-related eye injuries occur in and around the home, according to Prevent Blindness [place].  Any injury to the eye has the potential for causing some vision loss or even blindness. Fortunately, most eye injuries can be prevented with the use of proper eye protection. Prevention involves being aware of the common causes of injury and knowing how to protect your eyes-at home, at work and at play.  At Work      Proper eye protection can help to lessen or prevent serious eye injuries.   Eye injuries occurring at work, whether in a factory, on a construction site, on a farm, or in a laboratory, can result from chemical burns, foreign objects in the eye  and cuts and scrapes of the cornea. Common causes of injuries include  splashes with chemicals, grease and oil   burns from steam   ultraviolet or infrared radiation exposure; and flying wood or metal chips  Not all forms of safety eyewear provide the same level of protection from flying objects, chemical splashes or radiation exposure. Be sure to wear the appropriate protection for the type of eye hazards in your workplace.              At Home  Using common sense can help protect the eyes at home. Following 's instructions and safety warnings will help prevent many household product-related eye injuries.  Wearing eye protection while performing certain household activities can prevent eye injuries. Some activities include:  Cleaning the oven or using other strong household chemicals   Chopping wood or doing woodworking   Using motorized equipment or power tools like lawn trimmers and electric drills   Jump-starting a car battery  Non-prescription safety goggles are sold at many home building stores and hardware stores. If you wear prescription glasses, ask your optometrist to make a recommendation on appropriate safety eyewear for household tasks.  At Play  Sprained ankles, skinned knees, and bruises are common occurrences when participating in sports. Unfortunately, so are injuries to the eye.  Regular eyeglasses and contact lenses do not offer adequate protection from sports-related eye injuries. Special eye protection is needed for basketball, football, hockey, baseball and racket sports. Choose the right goggles or protective eyewear for your sport. Your optometrist can advise you on the appropriate eye protection.      Protecting Your Eyes from the Sun  Even on an overcast day, harmful ultraviolet (UV) rays can damage both the skin and the surface of the eye. Over time, unprotected exposure to the sun can increase the risk of certain types of cataracts and cancers of the eyelids. UV, as well as  blue light, has the potential to damage the retina, the light-sensitive lining at the back of the eye, which could lead to significant loss of vision. UV damage is cumulative, so it's never too late to begin protecting your eyes from the sun's harmful rays.  The following tips can help prevent eye damage from exposure to UV radiation:  Wear a wide-brimmed hat or cap. It can block up to half of the UV radiation, reducing the amount that may enter from above or around sunglasses.   Wear sunglasses any time your eyes are exposed to UV radiation, even on cloudy days and during winter months.   Look for quality sunglasses that offer good protection. Sunglasses should block out 99 to 100 percent of both UVA and UBB radiation and screen out 75 to 90 percent of visible light.    Courtesy of The American Optometric Association        Open Your Eyes to Healthy Eating Habits  NUTRITION TIPS FOR YOUR EYE SIGHT  Doctors of optometry see millions of patients a year and are the primary providers of eye and vision care in [place]. This month, in celebration of national Save Your Vision Month, the American Optometric Association (AOA), [employer] and [employer] are educating Americans on the many preventative actions they can take to protect their sight, including eating  right.    More than 43 million Americans suffer from cataracts or age-related macular degeneration (AMD), the two leading causes of vision loss and blindness.  Research indicates that there is a strong correlation between good nutrition and the prevention of these age-related eye diseases. Eating foods rich in key nutrients - antioxidants lutein and zeaxanthin, essential fatty acids, vitamins C and E and the mineral zinc - can help protect eyesight and vision.    Fast Facts   In a recent survey conducted by the AOA, nearly three-fourths (72%) of respondents  age 55 and older began noticing changes in their vision between the ages of 40  and 45.   To cope  with vision loss or various eye problems, less than one-third (29%) of  respondents are increasing their nutrient intake for healthy eyes.   Many Americans (48%) still believe that carrots are the best food for eye health,  when, in fact, spinach and other dark leafy greens are the healthiest foods for  the eyes because they naturally contain large amounts of lutein and zeaxanthin.   In order to maintain healthy eyes, studies show that 10 mg of lutein should be  consumed each day or one cup of cooked spinach four times a week.   More than 50% of Americans do not take in the recommended dosage of vitamin C  per day.   One cup (8 fl oz) of orange juice per day contains 81.6 mg/serving of vitamin C,  more than enough to help offset some eye diseases.  Visit www.AOA.org for additional information, or for vision-friendly recipes      Nutrition for Healthy Eyes  By Francisco Garcia OD  Research suggests that antioxidants and other important nutrients may reduce your risk of cataracts and macular degeneration. Specific antioxidants can have additional benefits as well; for example, vitamin A protects against blindness, and vitamin C may play a role in preventing or alleviating glaucoma.  Omega-3 essential fatty acids appear to help the eye in a variety of ways, from alleviating symptoms of dry eye syndrome to guarding against macular damage.  Eye Benefits of Vitamins and Micronutrients    A healthy diet for your eyes should include plenty of colorful fruits and vegetables.   The following vitamins, minerals and other nutrients have been shown to be essential for good vision and may protect your eyes from sight-robbing conditions and diseases.  Incorporating the following foods in your diet will help you get the Recommended Dietary Allowance (RDA) of these important eye nutrients. Established by the [place] of Medicine (National Academy of Sciences), the RDA is the average daily dietary intake level of a nutrient  sufficient to meet the requirements of nearly all healthy individuals in a specific life stage and gender group.  While the RDA is a useful reference, some eye care practitioners recommend higher daily intakes of certain nutrients for people at risk for eye problems.  (In the following list, mg = milligram; mcg = microgram (1/1000 of a mg) and IU = International Unit.)  Beta-carotene  Eye benefits of beta-carotene: When taken in combination with zinc and vitamins C and E, beta-carotene may reduce the progression of macular degeneration.   Food sources: Carrots, sweet potatoes, spinach, kale, butternut squash.   RDA: None (most supplements contain 5,000 to 25,000 IU).  Bioflavonoids (Flavonoids)  Eye benefits of bioflavonoids: May protect against cataracts and macular degeneration.   Food sources: Tea, red wine, citrus fruits, bilberries, blueberries, cherries, legumes, soy products.   RDA: None.  More Info   Learn more about omega-3s and save $2 on TheraTears Nutrition gels   Moderate to severe dry eyes? Find out if Retaine MGD is right for you   Learn how Optometry Giving Sight helps 670 million people to see again  Lutein and Zeaxanthin  Eye benefits of lutein and zeaxanthin: May prevent cataracts and macular degeneration.   Food sources: Spinach, kale, turnip greens, scott greens, squash.   RDA: None.      This infographic shows which nutrients you need for good eye health as you age. Please click here for the full image. (Image: [employer])   Omega-3 Fatty Acids  Eye benefits of omega-3 fatty acids: May help prevent macular degeneration (AMD) and dry eyes.   Food sources: Cold-water fish such as salmon, mackerel and herring; fish oil supplements, freshly ground flaxseeds, walnuts.   RDA: None; but for cardiovascular benefits, the American Heart Association recommends approximately 1,000 mg daily.  Selenium  Eye benefits of selenium: When combined with carotenoids and vitamins C and E, may reduce risk of  advanced AMD.   Food sources: Seafood (shrimp, crab, salmon, halibut), [place] nuts, enriched noodles, brown rice.   RDA: 55 mcg for teens and adults (60 mcg for women during pregnancy and 70 mcg when breast-feeding).  Vitamin A  Eye benefits of vitamin A: May protect against night blindness and dry eyes.   Food sources: Beef or chicken liver; eggs, butter, milk.   RDA: 3,000 IU for men; 2,333 IU for women (2,567 IU during pregnancy and 4,333 IU when breast-feeding).  Vitamin C  Eye benefits of vitamin C: May reduce the risk of cataracts and macular degeneration.   Food sources: Sweet peppers (red or green), kale, strawberries, broccoli, oranges, cantaloupe.   RDA: 90 mg for men; 70 mg for women (85 mg during pregnancy and 120 mg when breast-feeding).  Vitamin D  Eye benefits of vitamin D: May reduce the risk of macular degeneration.   Food sources: [place], sardines, mackerel, milk; orange juice fortified with vitamin D.   RDA: None, but the American Academy of Pediatrics recommends 400 IU per day for infants, children and adolescents, and many experts recommend higher daily intakes for adults.   The best source of vitamin D is exposure to sunlight. Ultraviolet radiation from the sun stimulates production of vitamin D in human skin, and just a few minutes of exposure to sunlight each day (without sunscreen) will insure your body is producing adequate amounts of vitamin D.  Vitamin E  Eye benefits of vitamin E: When combined with carotenoids and vitamin C, may reduce the risk of advanced AMD.   Food sources: Almonds, sunflower seeds, hazelnuts.   RDA: 15 mg for teens and adults (15 mg for women during pregnancy and 19 mg when breast-feeding).  Zinc  Eye benefits of zinc: Helps vitamin A reduce the risk of night blindness; may play a role in reducing risk of advanced AMD.   Food sources: Oysters, beef, Dun[place] crab, turkey (dark meat).   RDA: 11 mg for men; 8 mg for women (11 mg during pregnancy and 12 mg when  "breast-feeding).  In general, it's best to obtain most nutrients through a healthy diet, including at least two servings of fish per week and plenty of colorful fruits and vegetables.  If you plan to begin a regimen of eye vitamins, be sure to discuss this with your optometrist or ophthalmologist. Taking too much of certain vision supplements can cause problems, especially if you are taking prescription medications for health problems.  Bon appétit!   Home » Nutrition » Overview     About the Author: Francisco Garcia OD, is  of [email or web address]. Dr. 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 His special interests include contact lenses, nutrition and preventive vision care.        Eye Benefits of Omega-3 Fatty Acids  By Francisco Garcia OD  You may find it hard to believe that fat is essential to your health, but it's true. Without fat, our bodies can't function properly. And without the proper kinds of fats in our diet, our eye health also may suffer.  Fatty acids are the "building blocks" of fat. These important nutrients are critical for the normal production and functioning of cells, muscles, nerves and organs. Fatty acids also are required for the production of hormone-like compounds that help regulate blood pressure, heart rate and blood clotting.  Some fatty acids -- called essential fatty acids (EFAs) -- are necessary to our diet, because our body can't produce them. To stay healthy, we must obtain these fatty acids from our food.  Two types of EFAs are omega-3 fatty acids and omega-6 fatty acids. Studies have found that omega-3 fatty acids, in particular, may benefit eye health.  Omega-3 fatty acids include docosahexaenoic acid (DHA), eicoapentaenoic acid (EPA) and alpha-linolenic acid (ALA).  Omega-3 Fatty Acids and Infant Vision Development  A number of clinical studies have shown omega-3 fatty acids are essential " for normal infant vision development.    Grilled salmon is an excellent natural source of omega-3 fatty acids.   DHA and other omega-3 fatty acids are found in maternal breast milk and also are added to some supplemented infant formulas. Omega-3 supplemental formulas appear to stimulate vision development in infants.  According to an analysis of several studies conducted by researchers at [employer] and published in the journal Pediatrics, the authors found that healthy pre-term infants who were fed DHA-supplemented formula showed significantly better visual acuity at 2 and 4 months of age, compared with similar pre-term infants who were fed formula that did not contain the omega-3 supplement.  Adequate amounts of DHA and other omega-3 fatty acids in the diet of pregnant women also appear to be important in normal infant vision development.  More Info   Learn more about omega-3s and save $2 on TheraTears Nutrition gels   Moderate to severe dry eyes? Find out if Retaine MGD is right for you   Learn how Optometry Giving Sight helps 670 million people to see again  In a study published in the American Journal of Clinical Nutrition, [place] researchers found that infant girls whose mothers received DHA supplements from their fourth month of pregnancy until delivery were less likely to have below-average visual acuity at 2 months of age than infant girls whose mothers did not receive the omega-3 supplements.  Adult Eye Benefits of Omega-3 Fatty Acids  Several studies suggest omega-3 fatty acids may help protect adult eyes from macular degeneration and dry eye syndrome. Essential fatty acids also may help proper drainage of intraocular fluid from the eye, decreasing the risk of high eye pressure and glaucoma.  In a large  study published in 2008, participants who ate oily fish (an excellent source of DHA and EPA omega-3 fatty acids) at least once per week had half the risk of developing  "neovascular ("wet") macular degeneration, compared with those who ate fish less than once per week.    Eye Nutrition News   Omega-3 Supplements Relieve Dry Eye Symptoms Among Computer Users, Study Finds  February 2015 -- Taking daily omega-3 fatty acid supplements could help relieve your dry eyes associated with computer use, according to a study.    The study participants were 456 computer users in [facility] who complained of dry eyes and who used a computer for more than three hours a day for at least one year.  Subjects in one group (220) were given two capsules of omega-3 fatty acids, each containing 180mg EPA and 120mg DHA, to supplement their daily diet; subjects in the other group (236) were given two capsules of a placebo containing olive oil for daily use. Each group took the daily supplements for three months.  At the end of the three-month trial, a survey of the participants revealed dry eye symptoms diminished after dietary intervention with omega-3 fatty acids, and use of the omega-3 supplements also reduced abnormal tear evaporation. The omega-3 supplements also increased the density of conjunctival goblet cells on the surface of the eye. These cells secrete substances that lubricate the eye during blinks, stabilize the tear film and reduce dryness.  The study authors concluded that orally administered omega-3 fatty acid supplements can alleviate dry eye symptoms, slow tear evaporation, and improve signs of a healthy eye surface in patients suffering from dry eyes related to computer vision syndrome.  A report of this study was published online this month by the journal Contact Lens & Anterior Eye. -- G.H.  Also, a 2009 National Eye Crawford (NEI) study that used data obtained from the Age-Related Eye Disease Study (AREDS) found participants who reported the highest level of omega-3 fatty acids in their diet were 30 percent less likely than their peers to develop macular degeneration during a 12-year " period.  In May 2013, the NEI published results of a large follow-up to the original AREDS study called AREDS2. Among other things, AREDS2 investigated whether daily supplementation of omega-3 fatty acids, along with the original AREDS nutritional supplement or modifications of that formula -- which contained beta-carotene, vitamin C, vitamin E, zinc and copper -- would further reduce the risk of AMD progression among study participants with early signs of macular degeneration. (The original AREDS supplement reduced the risk of AMD progression by 25 percent among a similar population.)  A somewhat surprising result of AREDS2 was that participants who supplemented their diet with 1,000 mg of omega-3s daily (350 mg DHA and 650 mg EPA) did not show any reduction of their risk for progressive AMD over the 5-year duration of the study, compared with participants who did not receive omega-3 supplements.  Possible explanations for these different findings from AREDS and AREDS2 data may be that omega-3 fatty acids are more effective at reducing the risk of age-related eye diseases when obtained via food sources rather than from nutritional supplements, and that a healthy diet containing plenty of omega-3s along with other important nutrients consumed over a person's lifetime is more protective than taking nutritional supplements for a 5-year period.  Omega-3 fatty acids also have been found to reduce the risk of dry eyes. In a study of more than 32,000 women between the ages of 45 and 84, those with the highest ratio of (potentially harmful) omega-6 fatty acids to beneficial omega-3 fatty acids in their diet (15-to-1) had a significantly greater risk of dry eye syndrome, compared with the women with the lowest ratio (less than 4-to-1). The study also found that the women who ate at least two servings of tuna per week had significantly less risk of dry eye than women who ate one or fewer servings per week.  Omega-3 fatty  "acids also may help treat dry eyes. In a recent study of dry eyes induced in mice, topical application of the omega-3 fatty acid ALA led to a significant decrease in dry eye signs and inflammation associated with dry eye.  [place]-3 Foods  While both omega-3 and omega-6 fatty acids are important to health, the balance of these two types of EFAs in our diet is extremely important. Most experts believe the ratio of omega-6 to omega-3 fatty acids in a healthy diet should be 4-to-1 or lower.  Many eye doctors recommend a diet high in omega-3 fatty acids to reduce the risk of eye problems.   Unfortunately, the typical American diet, characterized by significant amounts of meat and processed foods, tends to contain 10 to 30 times more omega-6 than omega-3 fatty acids. This imbalance of omega-6 ("bad") fatty acids to omega-3 ("good") fatty acids appears to be a contributing cause of a number of serious health problems, including heart disease, cancer, asthma, arthritis and depression.  One of the best steps you can take to improve your diet is to eat more foods that are rich in omega-3 fatty acids and fewer that are high in omega-6 fatty acids.  The best food sources of beneficial omega-3 fatty acids are cold-water fish, which are high in both DHA and EPA. Examples include sardines, herring, salmon and tuna. Wild-caught varieties usually are better than "farmed" fish, which typically are subject to higher levels of pollutants and chemicals.  The American Heart Association recommends a minimum of two servings of cold-water fish weekly to reduce the risk of cardiovascular disease, and many eye doctors likewise recommend a diet high in omega-3 fatty acids to reduce the risk of eye problems.  If you aren't a fish lover, another way to make sure your diet contains enough omega-3s it to take fish oil supplements. These are available in capsule and liquid form, and many varieties feature a "non-fishy" taste.  Other good sources of " "omega-3 fatty acids include flaxseeds, flaxseed oil, walnuts and dark green leafy vegetables. However, your body cannot process the ALA omega-3 fatty acids from these vegetarian sources as easily as the DHA and EPA omega-3 fatty acids found in fish.  To reduce your intake of omega-6s, avoid fried and highly processed foods. Many cooking oils, including sunflower oil and corn oil, are very high in omega-6 fatty acids. High cooking temperatures also create harmful trans-fatty acids, or "trans-fats."  Trans fats interfere with the body's absorption of beneficial omega-3 fatty acids and may contribute to a number of serious diseases, including cancer, heart disease, atherosclerosis (hardening of the arteries), high blood pressure, diabetes, obesity, arthritis and immune system disorders.  Currently, there is no Recommended Dietary Allowance (RDA) for omega-3 fatty acids. But, according to the American Heart Association, research suggests daily intakes of DHA and EPA (combined) ranging from 500 milligrams (0.5 gram) to 1.8 grams (either from fish or fish oil supplements) significantly reduces cardiac risks. For ALA, daily intakes of 1.5 to 3 grams (g) seem to be beneficial.  Foods Containing Omega-3 Essential Fatty Acids   Food DHA and EPA Omega-3s (total), grams   Barnhart, Atlantic (half fillet, grilled) 3.89   Mackerel, Pacific (1 fillet, grilled) 3.25   Sardine oil (1 tablespoon) 2.83   Barnhart, Blanca (half fillet, grilled) 2.68   Cod liver oil (1 tablespoon) 2.43   Barnhart, pink (half fillet, grilled)  1.60   Herring oil (1 tablespoon) 1.43   Sardines, canned in oil (approx. 3 ounces) 0.90   White tuna, canned in water (approx. 3 ounces) 0.73   Source: National Agriculture Library, U.S. Dept. of Agriculture   For a more nutritious diet and potentially better eye health, try these simple changes:  1. Replace cooking oils that are high in omega-6 fatty acids with olive oil, which has significantly lower levels of " omega-6 fatty acids.  2. Eat plenty of fish, fruits and vegetables.  3. Avoid hydrogenated oils (found in many snack foods) and margarine.  4. Avoid fried foods and foods containing trans fats.  5. Limit your consumption of red meat.  Choosing a healthy diet that includes a variety of foods with plenty of omega-3 fatty acids and limiting your intake of potentially harmful omega-6 fatty acids will significantly increase your odds of a lifetime of good vision and vibrant health.   Home » Nutrition » Omega-3 Fatty Acids     About the Author: Francisco Garcia OD, is  of [email or web address]. Dr. 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 His special interests include contact lenses, nutrition and preventive vision care

## 2018-02-21 ENCOUNTER — PATIENT MESSAGE (OUTPATIENT)
Dept: OPTOMETRY | Facility: CLINIC | Age: 38
End: 2018-02-21

## 2018-02-26 ENCOUNTER — OFFICE VISIT (OUTPATIENT)
Dept: OBSTETRICS AND GYNECOLOGY | Facility: CLINIC | Age: 38
End: 2018-02-26
Payer: COMMERCIAL

## 2018-02-26 VITALS
WEIGHT: 127.19 LBS | BODY MASS INDEX: 17.81 KG/M2 | HEIGHT: 71 IN | SYSTOLIC BLOOD PRESSURE: 110 MMHG | DIASTOLIC BLOOD PRESSURE: 70 MMHG

## 2018-02-26 DIAGNOSIS — Z30.41 ENCOUNTER FOR SURVEILLANCE OF CONTRACEPTIVE PILLS: ICD-10-CM

## 2018-02-26 DIAGNOSIS — Z01.419 VISIT FOR GYNECOLOGIC EXAMINATION: Primary | ICD-10-CM

## 2018-02-26 PROCEDURE — 88175 CYTOPATH C/V AUTO FLUID REDO: CPT

## 2018-02-26 PROCEDURE — 99999 PR PBB SHADOW E&M-EST. PATIENT-LVL II: CPT | Mod: PBBFAC,,, | Performed by: OBSTETRICS & GYNECOLOGY

## 2018-02-26 PROCEDURE — 99395 PREV VISIT EST AGE 18-39: CPT | Mod: S$GLB,,, | Performed by: OBSTETRICS & GYNECOLOGY

## 2018-02-26 RX ORDER — ACETAMINOPHEN AND CODEINE PHOSPHATE 120; 12 MG/5ML; MG/5ML
SOLUTION ORAL
Qty: 84 TABLET | Refills: 3 | Status: SHIPPED | OUTPATIENT
Start: 2018-02-26 | End: 2019-02-08 | Stop reason: SDUPTHER

## 2018-02-26 NOTE — PROGRESS NOTES
HISTORY OF PRESENT ILLNESS:    Melly Hwang is a 37 y.o. female, , No LMP recorded. Patient is postmenopausal.,  presents for a routine exam and has no complaints. REFILL OCP AND PAP SUBMITTED.  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    LAST VISIT 2017:  PAP RECENTLY SUBMITTED AT [facility] WHEN SEEN FOR ROUTINE VISIT .  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  HAS HAD YELLOWISH DISCHARGE PAST 2 DAYS WITHOUT ITCHING OR IRRITATION.  NOT SA FOR YEARS.  ADVISED SOME CERVICAL INFLAMMATION BUT NO EVIDENT INFECTION AND MINIMAL D/C ON EXAM - POSS BV, WILL RX METROGEL.  STD SCREEN FOR PEACE OF MIND SINCE HSE HAS POSSIBLE PROFESSIONAL EXPOSURES.  LAST VISIT 3/2014:  WANTS 3-MO REFILL OCP. PAP IS UP TO DATE. WORKING MODELING, LOCALLY AND SOME NATIONALLY  LAST VISIT 3/2013:  HAS A HX OF OMA OV FAILURE, PREVIOUSLY MAINTAINED ON SEASONIQUE, BUT CHANGED TO MICRONOR FOR CONTROL OF HOT FLUSHES WHEN LIVER HEMANGIOMA NOTED (DUE FOR NEXT MRI THIS MONTH) SKIN IS DRY, BUT NO HOT FLUSHES AND CONCERNED IF STOPS MICRONOR WILL INCR RISK OF OSTEOPOROSIS. STILL AMENORRHEA   WORKS [employer]   PAP SUBMITTED AND DISCUSSED 3YR SCREENING RECOMMENDATION    Past Medical History:   Diagnosis Date    Deviated septum     Hypertrophy of inferior nasal turbinate     Liver mass     Nasal congestion     Premature menopause        Past Surgical History:   Procedure Laterality Date    TONSILLECTOMY      UMBILICAL HERNIA REPAIR         MEDICATIONS AND ALLERGIES:      Current Outpatient Prescriptions:     fluticasone (FLONASE) 50 mcg/actuation nasal spray, INHALE 1 SPRAY IN EACH NOSTRIL BY INTRANASAL ROUTE 1 TIME PER DAY FOR ALLERGIES, Disp: , Rfl: 5    multivitamin capsule, Take by mouth. 1 capsule Oral Every morning, Disp: , Rfl:     norethindrone (EMILIA) 0.35 mg tablet,  TAKE 1 TABLET (0.35 MG TOTAL) BY MOUTH ONCE DAILY., Disp: 84 tablet, Rfl: 3    omeprazole (PRILOSEC) 20 MG capsule, Take 1 capsule (20 mg total) by mouth once daily., Disp: 30 capsule, Rfl: 3    Review of patient's allergies indicates:   Allergen Reactions    No known drug allergies        Family History   Problem Relation Age of Onset    Diabetes Mother     Kidney disease Mother     Macular degeneration Father     Diabetes Father     Hypertension Father     No Known Problems Sister     No Known Problems Brother     No Known Problems Daughter     No Known Problems Brother     No Known Problems Maternal Aunt     No Known Problems Maternal Uncle     No Known Problems Paternal Aunt     No Known Problems Paternal Uncle     No Known Problems Maternal Grandmother     No Known Problems Maternal Grandfather     No Known Problems Paternal Grandmother     No Known Problems Paternal Grandfather     Amblyopia Neg Hx     Blindness Neg Hx     Cancer Neg Hx     Cataracts Neg Hx     Glaucoma Neg Hx     Retinal detachment Neg Hx     Strabismus Neg Hx     Stroke Neg Hx     Thyroid disease Neg Hx     Heart disease Neg Hx     Melanoma Neg Hx        Social History     Social History    Marital status: Single     Spouse name: N/A    Number of children: 1    Years of education: N/A     Occupational History    t [employer]     Social History Main Topics    Smoking status: Never Smoker    Smokeless tobacco: Never Used    Alcohol use No    Drug use: No    Sexual activity: Yes     Partners: Male     Birth control/ protection: OCP     Other Topics Concern    Not on file     Social History Narrative    No narrative on file       COMPREHENSIVE GYN HISTORY:  PAP History: Denies abnormal Paps.  Infection History: Denies STDs. Denies PID.  Benign History: Denies uterine fibroids. Denies ovarian cysts. Denies endometriosis. Denies other conditions.  Cancer History: Denies cervical  "cancer. Denies uterine cancer or hyperplasia. Denies ovarian cancer. Denies vulvar cancer or pre-cancer. Denies vaginal cancer or pre-cancer. Denies breast cancer. Denies colon cancer.  Sexual Activity History: Reports currently being sexually active  Menstrual History: Monthly, mild-moderate.  Contraception:oral progesterone-only contraceptive    ROS:  GENERAL: No weight changes. No swelling. No fatigue. No fever.  CARDIOVASCULAR: No chest pain. No shortness of breath. No leg cramps.   NEUROLOGICAL: No headaches. No vision changes.  BREASTS: No pain. No lumps. No discharge.  ABDOMEN: No pain. No nausea. No vomiting. No diarrhea. No constipation.  REPRODUCTIVE: No abnormal bleeding.   VULVA: No pain. No lesions. No itching.  VAGINA: No relaxation. No itching. No odor. No discharge. No lesions.  URINARY: No incontinence. No nocturia. No frequency. No dysuria.    /70   Ht 5' 11" (1.803 m)   Wt 57.7 kg (127 lb 3.3 oz)   BMI 17.74 kg/m²     PE:  APPEARANCE: Well nourished, well developed, in no acute distress.  AFFECT: WNL, alert and oriented x 3.  SKIN: No acne or hirsutism.  NECK: Neck symmetric, without masses or thyromegaly.  NODES: No inguinal, cervical, axillary or femoral lymph node enlargement.  CHEST: Good respiratory effort.   ABDOMEN: Soft. No tenderness or masses. No hepatosplenomegaly. No hernias.  BREASTS: Symmetrical, no skin changes, visible lesions, palpable masses or nipple discharge bilaterally.  PELVIC: External female genitalia without lesions.  Female hair distribution. Adequate perineal body, Normal urethral meatus. Vagina moist and well rugated without lesions or discharge.  No significant cystocele or rectocele present. Cervix pink without lesions, discharge or tenderness. Uterus is normal size, regular, mobile and nontender. Adnexa without masses or tenderness.  EXTREMITIES: No edema    PROCEDURES:  Pap    DIAGNOSIS:  1. Visit for gynecologic examination  Liquid-based pap smear, " screening   2. Encounter for surveillance of contraceptive pills         LABS AND TESTS ORDERED:    MEDICATIONS PRESCRIBED:    COUNSELING:   The patient was counseled today on ACS PAP guidelines, with recommendations for yearly pelvic exams unless their uterus, cervix, and ovaries were removed for benign reasons; in that case, examinations every 3-5 years are recommended.  The patient was also counseled regarding monthly breast self-examination, routine STD screening for at-risk populations, prophylactic immunizations for transmitted infections such as  HPV, Pertussis, or Influenza as appropriate, and yearly mammograms when indicated by ACS guidelines.  She was advised to see her primary care physician for all other health maintenance.    FOLLOW-UP with me annually.

## 2018-03-01 ENCOUNTER — OFFICE VISIT (OUTPATIENT)
Dept: INTERNAL MEDICINE | Facility: CLINIC | Age: 38
End: 2018-03-01
Payer: COMMERCIAL

## 2018-03-01 ENCOUNTER — HOSPITAL ENCOUNTER (OUTPATIENT)
Dept: RADIOLOGY | Facility: HOSPITAL | Age: 38
Discharge: HOME | End: 2018-03-01
Attending: INTERNAL MEDICINE
Payer: COMMERCIAL

## 2018-03-01 VITALS
DIASTOLIC BLOOD PRESSURE: 60 MMHG | WEIGHT: 125.69 LBS | SYSTOLIC BLOOD PRESSURE: 100 MMHG | BODY MASS INDEX: 17.6 KG/M2 | HEIGHT: 71 IN

## 2018-03-01 DIAGNOSIS — R16.0 LIVER MASS: ICD-10-CM

## 2018-03-01 DIAGNOSIS — K21.9 GASTROESOPHAGEAL REFLUX DISEASE WITHOUT ESOPHAGITIS: ICD-10-CM

## 2018-03-01 DIAGNOSIS — D18.03 HEMANGIOMA OF LIVER: Chronic | ICD-10-CM

## 2018-03-01 DIAGNOSIS — Z00.00 ANNUAL PHYSICAL EXAM: Primary | ICD-10-CM

## 2018-03-01 PROCEDURE — 74183 MRI ABD W/O CNTR FLWD CNTR: CPT | Mod: 26,,, | Performed by: RADIOLOGY

## 2018-03-01 PROCEDURE — 74183 MRI ABD W/O CNTR FLWD CNTR: CPT | Mod: TC

## 2018-03-01 PROCEDURE — A9585 GADOBUTROL INJECTION: HCPCS | Performed by: INTERNAL MEDICINE

## 2018-03-01 PROCEDURE — 99999 PR PBB SHADOW E&M-EST. PATIENT-LVL III: CPT | Mod: PBBFAC,,, | Performed by: INTERNAL MEDICINE

## 2018-03-01 PROCEDURE — 99395 PREV VISIT EST AGE 18-39: CPT | Mod: S$GLB,,, | Performed by: INTERNAL MEDICINE

## 2018-03-01 PROCEDURE — 25500020 PHARM REV CODE 255: Performed by: INTERNAL MEDICINE

## 2018-03-01 RX ORDER — OMEPRAZOLE 20 MG/1
20 CAPSULE, DELAYED RELEASE ORAL DAILY PRN
Qty: 30 CAPSULE | Refills: 3
Start: 2018-03-01 | End: 2018-07-30

## 2018-03-01 RX ORDER — CETIRIZINE HYDROCHLORIDE 10 MG/1
10 TABLET ORAL DAILY
Qty: 90 TABLET | Refills: 1 | Status: SHIPPED | OUTPATIENT
Start: 2018-03-01 | End: 2018-07-30

## 2018-03-01 RX ORDER — GADOBUTROL 604.72 MG/ML
10 INJECTION INTRAVENOUS
Status: COMPLETED | OUTPATIENT
Start: 2018-03-01 | End: 2018-03-01

## 2018-03-01 RX ORDER — CETIRIZINE HYDROCHLORIDE 10 MG/1
10 TABLET ORAL DAILY
COMMUNITY
End: 2018-03-01 | Stop reason: SDUPTHER

## 2018-03-01 RX ADMIN — GADOBUTROL 10 ML: 604.72 INJECTION INTRAVENOUS at 01:03

## 2018-03-01 NOTE — PROGRESS NOTES
Subjective:       Patient ID: Melly Hwang is a 37 y.o. female.    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    About a month ago had some atypical chest pain L side.  Thinks is might have been stress- she was sitting, not at work.  Not much exercise so not clear about exertional sx.     Had follow up of hemangioma- MRI today.  Liver lesions had enlarged last year so she is worried.      Some slight GERD sx, no alarm sx.  Recall that she had H pylori, treated 2014 with negative stool.  No further weight loss but she is very thin.   No dysphagia.  No blood in her stool.     Nutcracker vein identified years ago; not thought to be clinically significant.  Seen in Urology and then in Vascular and no further follow up recommended unless MRI showed changes.    URD with GYN.    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 pylori infection: tx 2014 stool negative     Hemangioma of liver            Review of Systems   Constitutional: Negative for activity change and unexpected weight change.   HENT: Positive for hearing loss. Negative for rhinorrhea and trouble swallowing.         Not sure if it is attention or due to small canals- nothing focal   Eyes: Negative for discharge and visual disturbance.   Respiratory: Negative for chest tightness and wheezing.    Cardiovascular: Negative for chest pain and palpitations.   Gastrointestinal: Negative for blood in stool, constipation, diarrhea and vomiting.   Endocrine: Negative for polydipsia and polyuria.   Genitourinary: Negative for difficulty urinating, dysuria, hematuria and menstrual problem.   Musculoskeletal: Negative for arthralgias, joint swelling and neck  pain.   Neurological: Negative for weakness and headaches.   Psychiatric/Behavioral: Negative for confusion and dysphoric mood.       Objective:      Physical Exam   Constitutional: She is oriented to person, place, and time. She appears well-developed.   Slim   HENT:   Head: Normocephalic and atraumatic.   Right Ear: External ear normal.   Left Ear: External ear normal.   Nose: Nose normal.   Mouth/Throat: Oropharynx is clear and moist. No oropharyngeal exudate.   No wax in ear canals   Eyes: Conjunctivae and EOM are normal. No scleral icterus.   Neck: Normal range of motion. Neck supple. No JVD present. No thyromegaly present.   Cardiovascular: Normal rate, regular rhythm, normal heart sounds and intact distal pulses.  Exam reveals no gallop.    No murmur heard.  Pulmonary/Chest: Effort normal and breath sounds normal. No respiratory distress. She has no wheezes.   Abdominal: Soft. Bowel sounds are normal. She exhibits no distension and no mass. There is no tenderness. There is no rebound and no guarding.   Musculoskeletal: Normal range of motion. She exhibits no edema or tenderness.   Lymphadenopathy:     She has no cervical adenopathy.   Neurological: She is alert and oriented to person, place, and time. She displays normal reflexes. No cranial nerve deficit. Coordination normal.   Skin: Skin is warm. No rash noted. No erythema.   Psychiatric: She has a normal mood and affect. Her behavior is normal. Judgment and thought content normal.   Nursing note and vitals reviewed.      Assessment:       1. Annual physical exam    2. Gastroesophageal reflux disease without esophagitis    3. Liver mass: see MRI, stable [id number]        Plan:         Annual physical exam  -     cetirizine (ZYRTEC) 10 MG tablet; Take 1 tablet (10 mg total) by mouth once daily.  Dispense: 90 tablet; Refill: 1  -     CBC auto differential; Future; Expected date: 03/01/2018  -     Comprehensive metabolic panel; Future; Expected date:  03/01/2018  -     Lipid panel; Future; Expected date: 03/01/2018  -     Vitamin D; Future; Expected date: 03/01/2018    Gastroesophageal reflux disease without esophagitis  -     omeprazole (PRILOSEC) 20 MG capsule; Take 1 capsule (20 mg total) by mouth daily as needed.  Dispense: 30 capsule; Refill: 3    Liver mass: see MRI, stable [id number]: MRI today- keep Hepatology follow up    Attempt to gain 5-10 pounds in the next year    GYN follow up    I will review all studies and determine further tx depending on findings

## 2018-03-01 NOTE — PATIENT INSTRUCTIONS
Signs of Hearing Loss     Hearing much better with one ear can be a sign of hearing loss.     Hearing loss is a problem shared by many people. In fact, it is one of the most common health conditions, particularly as people age. Most people over age 65 have some hearing loss, and by age 80, almost everyone does. Because hearing loss usually occurs slowly over the years, you may not realize your hearing ability has gotten worse.  Have your hearing checked  Contact your healthcare provider if you:  · Have to strain to hear normal conversation  · Have to watch other peoples faces very carefully to follow what theyre saying  · Need to ask people to repeat what theyve said  · Often misunderstand what people are saying  · Turn the volume of the television or radio up so high that others complain  · Feel that people are mumbling when theyre talking to you  · Find that the effort to hear leaves you feeling tired and irritated  · Notice, when using the phone, that you hear better with one ear than the other  Date Last Reviewed: 12/1/2016  © 4370-8570 [employer], [employer]. [address] [place] [address], [place] [address]. All rights reserved. This information is not intended as a substitute for professional medical care. Always follow your healthcare professional's instructions.

## 2018-03-02 ENCOUNTER — PATIENT MESSAGE (OUTPATIENT)
Dept: INTERNAL MEDICINE | Facility: CLINIC | Age: 38
End: 2018-03-02

## 2018-03-03 ENCOUNTER — PATIENT MESSAGE (OUTPATIENT)
Dept: OBSTETRICS AND GYNECOLOGY | Facility: CLINIC | Age: 38
End: 2018-03-03

## 2018-03-09 ENCOUNTER — OFFICE VISIT (OUTPATIENT)
Dept: DERMATOLOGY | Facility: CLINIC | Age: 38
End: 2018-03-09
Payer: COMMERCIAL

## 2018-03-09 DIAGNOSIS — L21.9 SEBORRHEIC DERMATITIS: ICD-10-CM

## 2018-03-09 DIAGNOSIS — L91.0 KELOID SCAR: ICD-10-CM

## 2018-03-09 DIAGNOSIS — D22.9 MULTIPLE BENIGN NEVI: ICD-10-CM

## 2018-03-09 DIAGNOSIS — L82.1 SEBORRHEIC KERATOSIS: Primary | ICD-10-CM

## 2018-03-09 PROCEDURE — 99999 PR PBB SHADOW E&M-EST. PATIENT-LVL II: CPT | Mod: PBBFAC,,, | Performed by: DERMATOLOGY

## 2018-03-09 PROCEDURE — 99202 OFFICE O/P NEW SF 15 MIN: CPT | Mod: S$GLB,,, | Performed by: DERMATOLOGY

## 2018-03-09 RX ORDER — MOMETASONE FUROATE 1 MG/ML
SOLUTION TOPICAL
Qty: 60 ML | Refills: 3 | Status: SHIPPED | OUTPATIENT
Start: 2018-03-09 | End: 2018-07-30

## 2018-03-09 RX ORDER — KETOCONAZOLE 20 MG/G
CREAM TOPICAL
Qty: 60 G | Refills: 3 | Status: SHIPPED | OUTPATIENT
Start: 2018-03-09 | End: 2018-07-30

## 2018-03-09 NOTE — PROGRESS NOTES
Subjective:       Patient ID:  Melly Hwang is a 37 y.o. female who presents for   Chief Complaint   Patient presents with    Lesion    Dry Skin     May have had a pilar cyst removed in past    Also c/o:    Patient complains of lesion(s)  Location: scar right shoulder  Duration: burn 1999  Symptoms: none  Relieving factors/Previous treatments: bleaching cream and microdermabrasion    Patient complains of lesion(s)  Location: face  Duration: 2 months  Symptoms: dry and itchy  Relieving factors/Previous treatments: otc cortisone          Lesion  - Initial  Affected locations: scalp  Duration: 1 month  Signs / symptoms: itching and bleeding  Aggravated by: nothing  Relieving factors/Treatments tried: nothing    Dry Skin         Review of Systems   Skin: Positive for itching, rash and dry skin.   Hematologic/Lymphatic: Does not bruise/bleed easily.        Objective:    Physical Exam   Constitutional: She appears well-developed and well-nourished. No distress.   Neurological: She is alert and oriented to person, place, and time. She is not disoriented.   Psychiatric: She has a normal mood and affect.   Skin:   Areas Examined (abnormalities noted in diagram):   Scalp / Hair Palpated and Inspected  Head / Face Inspection Performed  Neck Inspection Performed  Chest / Axilla Inspection Performed  RUE Inspected  LUE Inspection Performed                       Diagram Legend     Erythematous scaling macule/papule c/w actinic keratosis       Vascular papule c/w angioma      Pigmented verrucoid papule/plaque c/w seborrheic keratosis      Yellow umbilicated papule c/w sebaceous hyperplasia      Irregularly shaped tan macule c/w lentigo     1-2 mm smooth white papules consistent with Milia      Movable subcutaneous cyst with punctum c/w epidermal inclusion cyst      Subcutaneous movable cyst c/w pilar cyst      Firm pink to brown papule c/w dermatofibroma      Pedunculated fleshy papule(s) c/w skin tag(s)      Evenly  pigmented macule c/w junctional nevus     Mildly variegated pigmented, slightly irregular-bordered macule c/w mildly atypical nevus      Flesh colored to evenly pigmented papule c/w intradermal nevus       Pink pearly papule/plaque c/w basal cell carcinoma      Erythematous hyperkeratotic cursted plaque c/w SCC      Surgical scar with no sign of skin cancer recurrence      Open and closed comedones      Inflammatory papules and pustules      Verrucoid papule consistent consistent with wart     Erythematous eczematous patches and plaques     Dystrophic onycholytic nail with subungual debris c/w onychomycosis     Umbilicated papule    Erythematous-base heme-crusted tan verrucoid plaque consistent with inflamed seborrheic keratosis     Erythematous Silvery Scaling Plaque c/w Psoriasis     See annotation      Assessment / Plan:        Seborrheic keratosis  These are benign inherited growths without a malignant potential. Reassurance given to patient. No treatment is necessary.       Keloid scar - right shoulder  Reassurance.     Seborrheic dermatitis  -     ketoconazole (NIZORAL) 2 % cream; AAA bid  Dispense: 60 g; Refill: 3  -     mometasone (ELOCON) 0.1 % lotion; AAA scaling areas in scalp qd prn  Dispense: 60 mL; Refill: 3    Multiple benign nevi  upper body skin examination performed today including at least 6 points as noted in physical examination. No lesions suspicious for malignancy noted.  Reassurance provided.  Instructed patient to observe lesion(s) for changes and follow up in clinic if changes are noted. Discussed ABCDE's of moles and brochure provided.               Follow-up if symptoms worsen or fail to improve.

## 2018-03-13 ENCOUNTER — TELEPHONE (OUTPATIENT)
Dept: OBSTETRICS AND GYNECOLOGY | Facility: CLINIC | Age: 38
End: 2018-03-13

## 2018-03-22 ENCOUNTER — PATIENT MESSAGE (OUTPATIENT)
Dept: OPTOMETRY | Facility: CLINIC | Age: 38
End: 2018-03-22

## 2018-03-22 ENCOUNTER — PATIENT MESSAGE (OUTPATIENT)
Dept: INTERNAL MEDICINE | Facility: CLINIC | Age: 38
End: 2018-03-22

## 2018-03-22 DIAGNOSIS — R20.0 FINGER NUMBNESS: Primary | ICD-10-CM

## 2018-04-30 ENCOUNTER — OFFICE VISIT (OUTPATIENT)
Dept: INTERNAL MEDICINE | Facility: CLINIC | Age: 38
End: 2018-04-30
Payer: COMMERCIAL

## 2018-04-30 VITALS
HEIGHT: 71 IN | SYSTOLIC BLOOD PRESSURE: 110 MMHG | WEIGHT: 123.44 LBS | TEMPERATURE: 99 F | DIASTOLIC BLOOD PRESSURE: 60 MMHG | BODY MASS INDEX: 17.28 KG/M2

## 2018-04-30 DIAGNOSIS — F43.9 SITUATIONAL STRESS: ICD-10-CM

## 2018-04-30 DIAGNOSIS — B96.89 ACUTE BACTERIAL SINUSITIS: Primary | ICD-10-CM

## 2018-04-30 DIAGNOSIS — R16.0 LIVER MASS: ICD-10-CM

## 2018-04-30 DIAGNOSIS — J01.90 ACUTE BACTERIAL SINUSITIS: Primary | ICD-10-CM

## 2018-04-30 DIAGNOSIS — D18.03 HEMANGIOMA OF LIVER: Chronic | ICD-10-CM

## 2018-04-30 PROCEDURE — 99999 PR PBB SHADOW E&M-EST. PATIENT-LVL III: CPT | Mod: PBBFAC,,, | Performed by: INTERNAL MEDICINE

## 2018-04-30 PROCEDURE — 99214 OFFICE O/P EST MOD 30 MIN: CPT | Mod: S$GLB,,, | Performed by: INTERNAL MEDICINE

## 2018-04-30 RX ORDER — AMOXICILLIN AND CLAVULANATE POTASSIUM 875; 125 MG/1; MG/1
1 TABLET, FILM COATED ORAL 2 TIMES DAILY
Qty: 20 TABLET | Refills: 0 | Status: SHIPPED | OUTPATIENT
Start: 2018-04-30 | End: 2018-07-30

## 2018-04-30 NOTE — Clinical Note
Brennen;  Can you let her (and me) know when she needs to have a follow up for her liver?   She had the MRI earlier thus year,hemangioma slightly larger.  Thanks  Melly

## 2018-05-01 ENCOUNTER — PATIENT MESSAGE (OUTPATIENT)
Dept: INTERNAL MEDICINE | Facility: CLINIC | Age: 38
End: 2018-05-01

## 2018-05-01 ENCOUNTER — TELEPHONE (OUTPATIENT)
Dept: INTERNAL MEDICINE | Facility: CLINIC | Age: 38
End: 2018-05-01

## 2018-05-01 NOTE — TELEPHONE ENCOUNTER
----- Message from Brennen Engel MD sent at 5/1/2018  9:09 AM CDT -----  Kingsley Pickard,  Yes. I will reach out to her. I am not that concerned about the slight growth and will probably re-MRI in 1 year. But I will communicate that to her.  ThanksBrennen  ----- Message -----  From: Melly Sahu MD  Sent: 4/30/2018   9:22 PM  To: MD Brennen Aguirre;    Can you let her (and me) know when she needs to have a follow up for her liver?   She had the MRI earlier thus year,hemangioma slightly larger.    Thanks    Melly

## 2018-05-01 NOTE — PROGRESS NOTES
Subjective:       Patient ID: Melly Hwang is a 37 y.o. female.    Chief Complaint: Otalgia and URI    UC appt    2-3 weeks of sinus congestion, eustachian tube dysfunction.  No fever.  Some yellow mucus and L ear sx.  No real cough or SOB.  No GI or  sx.    Still working nights, planning to change.  Some stress with work change.  No Si or HI.    Liver hemangioma reviewed- chart to Hepatology for today.    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 pylori infection: tx 2014 stool negative     Hemangioma of liver        Review of Systems   Constitutional: Positive for fatigue. Negative for chills and fever.   HENT: Positive for congestion, ear pain and postnasal drip.    Eyes: Negative.    Respiratory: Negative for cough, chest tightness, shortness of breath and wheezing.    Cardiovascular: Negative.    Gastrointestinal: Negative.    Musculoskeletal: Negative for arthralgias.   Skin: Negative for rash.   Psychiatric/Behavioral: Negative for agitation, behavioral problems, dysphoric mood, self-injury and sleep disturbance. The patient is nervous/anxious.        Objective:      Physical Exam   Constitutional: She is oriented to person, place, and time. She appears well-developed and well-nourished.   HENT:   Head: Normocephalic and atraumatic.   Right Ear: External ear normal.   Left Ear: External ear normal.   Mouth/Throat: Oropharynx is clear and moist.   Eyes: Conjunctivae are normal. Pupils are equal, round, and reactive to light.   Neck: Normal range of motion. Neck supple. No thyromegaly present.   Cardiovascular: Normal rate, regular rhythm and normal heart sounds.    Pulmonary/Chest: No respiratory distress. She has no wheezes. She has  no rales.   Abdominal: Soft. Bowel sounds are normal.   Musculoskeletal: She exhibits no edema.   Lymphadenopathy:     She has no cervical adenopathy.   Neurological: She is alert and oriented to person, place, and time.   Skin: Skin is warm and dry. No rash noted. No erythema.   Psychiatric: She has a normal mood and affect. Her behavior is normal. Judgment and thought content normal.       Assessment:       1. Acute bacterial sinusitis    2. Liver mass: see MRI, stable [id number], also 2018    3. Situational stress    4. Hemangioma of liver        Plan:         Melly was seen today for otalgia and uri.    Diagnoses and all orders for this visit:    Acute bacterial sinusitis: Rest, fluids, acetaminophen, mucinex and follow up poor results.  -     amoxicillin-clavulanate 875-125mg (AUGMENTIN) 875-125 mg per tablet; Take 1 tablet by mouth 2 (two) times daily.    Liver mass: see MRI, stable [id number], also 2018: chart to hepatology    Situational stress: able to contract for safety.  No SI or HI.  Does not desire medications.  Exercise, stress reduction reviewed    Hemangioma of liver: anticipate recheck in next 1-2 years

## 2018-05-02 ENCOUNTER — PATIENT MESSAGE (OUTPATIENT)
Dept: INTERNAL MEDICINE | Facility: CLINIC | Age: 38
End: 2018-05-02

## 2018-05-02 RX ORDER — ESCITALOPRAM OXALATE 10 MG/1
10 TABLET ORAL DAILY
Qty: 30 TABLET | Refills: 11 | Status: SHIPPED | OUTPATIENT
Start: 2018-05-02 | End: 2019-01-22

## 2018-05-11 ENCOUNTER — PROCEDURE VISIT (OUTPATIENT)
Dept: NEUROLOGY | Facility: CLINIC | Age: 38
End: 2018-05-11
Payer: COMMERCIAL

## 2018-05-11 DIAGNOSIS — R20.0 FINGER NUMBNESS: ICD-10-CM

## 2018-05-11 PROCEDURE — 95886 MUSC TEST DONE W/N TEST COMP: CPT | Mod: S$GLB,,, | Performed by: PSYCHIATRY & NEUROLOGY

## 2018-05-11 PROCEDURE — 95913 NRV CNDJ TEST 13/> STUDIES: CPT | Mod: S$GLB,,, | Performed by: PSYCHIATRY & NEUROLOGY

## 2018-05-12 ENCOUNTER — PATIENT MESSAGE (OUTPATIENT)
Dept: INTERNAL MEDICINE | Facility: CLINIC | Age: 38
End: 2018-05-12

## 2018-06-06 ENCOUNTER — PATIENT MESSAGE (OUTPATIENT)
Dept: INTERNAL MEDICINE | Facility: CLINIC | Age: 38
End: 2018-06-06

## 2018-07-26 ENCOUNTER — PATIENT MESSAGE (OUTPATIENT)
Dept: INTERNAL MEDICINE | Facility: CLINIC | Age: 38
End: 2018-07-26

## 2018-07-27 ENCOUNTER — PATIENT MESSAGE (OUTPATIENT)
Dept: INTERNAL MEDICINE | Facility: CLINIC | Age: 38
End: 2018-07-27

## 2018-07-30 ENCOUNTER — OFFICE VISIT (OUTPATIENT)
Dept: SURGERY | Facility: CLINIC | Age: 38
End: 2018-07-30
Payer: COMMERCIAL

## 2018-07-30 VITALS
WEIGHT: 126.31 LBS | SYSTOLIC BLOOD PRESSURE: 115 MMHG | HEIGHT: 71 IN | HEART RATE: 83 BPM | BODY MASS INDEX: 17.68 KG/M2 | DIASTOLIC BLOOD PRESSURE: 76 MMHG

## 2018-07-30 DIAGNOSIS — K64.5 THROMBOSED EXTERNAL HEMORRHOID: Primary | ICD-10-CM

## 2018-07-30 PROCEDURE — 3008F BODY MASS INDEX DOCD: CPT | Mod: CPTII,S$GLB,, | Performed by: COLON & RECTAL SURGERY

## 2018-07-30 PROCEDURE — 99203 OFFICE O/P NEW LOW 30 MIN: CPT | Mod: S$GLB,,, | Performed by: COLON & RECTAL SURGERY

## 2018-07-30 PROCEDURE — 99999 PR PBB SHADOW E&M-EST. PATIENT-LVL III: CPT | Mod: PBBFAC,,, | Performed by: COLON & RECTAL SURGERY

## 2018-07-30 NOTE — PATIENT INSTRUCTIONS
Miralax - 17gm daily    Or  Metamucil 1 tablespoon 1 -2 x a day    Benefiber 2 teaspoons 1-2 x d day

## 2018-07-30 NOTE — PROGRESS NOTES
Progress Note  Colorectal Surgery      SUBJECTIVE:   EMRLINE HIRSCH is a  38 y.o.female presenting today with complaints of a painful nodule on the exterior of her anus. She first noticed the nodule 4 days ago, and it had slightly grown in size and in painfulness. She denies any bleeding or drainage. She denies constipation or diarrhea, but states that her last bowel movement was on 7/26, which is normal for her. She denies straining with bowel movements. She does not take any fiber or stool softeners over the counter. She has had some pain relief with Tuck's Hemorrhoidal ointment.    She underwent hemorrhoidectomy at [facility] approximately 10 years ago.     OBJECTIVE:     Vitals:    07/30/18 1322   BP: 115/76   Pulse: 83       Physical Exam:  General: NAD, AAOx3  Lungs:  Non labored respirations  Heart:  RRR  Abdomen: soft, NTTP, ND  Skin: wwp  Anorectal: Thrombosed external hemorrhoid seen on the left posterior portion of the perianal skin.    ASSESSMENT/PLAN:   MERLINE HIRSCH 38 y.o.female presenting with thrombosed external hemorrhoid of 4 day duration. As she is outside of the 48 hour window, will not perform any procedure today. Provided reassurance that swelling and pain will subside with time.     · Activity: as tolerated  · Follow- up: KENYON Garibay MD  General Surgery, PGY-I  Pager: [phone]

## 2018-09-06 ENCOUNTER — PATIENT MESSAGE (OUTPATIENT)
Dept: INTERNAL MEDICINE | Facility: CLINIC | Age: 38
End: 2018-09-06

## 2018-09-07 RX ORDER — CETIRIZINE HYDROCHLORIDE 10 MG/1
10 TABLET ORAL DAILY
Qty: 30 TABLET | Refills: 1 | Status: SHIPPED | OUTPATIENT
Start: 2018-09-07 | End: 2019-08-01 | Stop reason: SDUPTHER

## 2018-09-07 RX ORDER — CETIRIZINE HYDROCHLORIDE 10 MG/1
TABLET ORAL
COMMUNITY
Start: 2018-03-01 | End: 2018-09-07 | Stop reason: SDUPTHER

## 2018-09-08 ENCOUNTER — PATIENT MESSAGE (OUTPATIENT)
Dept: INTERNAL MEDICINE | Facility: CLINIC | Age: 38
End: 2018-09-08

## 2018-09-10 RX ORDER — CIPROFLOXACIN 500 MG/1
500 TABLET ORAL EVERY 12 HOURS
Qty: 10 TABLET | Refills: 0 | Status: SHIPPED | OUTPATIENT
Start: 2018-09-10 | End: 2018-12-08 | Stop reason: SDUPTHER

## 2018-09-24 ENCOUNTER — OFFICE VISIT (OUTPATIENT)
Dept: INTERNAL MEDICINE | Facility: CLINIC | Age: 38
End: 2018-09-24
Payer: COMMERCIAL

## 2018-09-24 VITALS
WEIGHT: 125.88 LBS | HEIGHT: 71 IN | SYSTOLIC BLOOD PRESSURE: 110 MMHG | DIASTOLIC BLOOD PRESSURE: 78 MMHG | BODY MASS INDEX: 17.62 KG/M2

## 2018-09-24 DIAGNOSIS — R19.7 DIARRHEA, UNSPECIFIED TYPE: Primary | ICD-10-CM

## 2018-09-24 PROCEDURE — 99214 OFFICE O/P EST MOD 30 MIN: CPT | Mod: S$GLB,,, | Performed by: INTERNAL MEDICINE

## 2018-09-24 PROCEDURE — 99999 PR PBB SHADOW E&M-EST. PATIENT-LVL III: CPT | Mod: PBBFAC,,, | Performed by: INTERNAL MEDICINE

## 2018-09-24 PROCEDURE — 3008F BODY MASS INDEX DOCD: CPT | Mod: CPTII,S$GLB,, | Performed by: INTERNAL MEDICINE

## 2018-09-24 NOTE — PROGRESS NOTES
Subjective:       Patient ID: Melly Hwang is a 38 y.o. female.    Chief Complaint: Diarrhea; Gas; and Abdominal Pain    [facility] appt    Diarrhea and cramping    Went to [facility].  Not on a cruise.  Returned on Labor Day.  No problems while she was there.  However did have a couple of drinks with ice.    See emails.  We gave her Cipro and sx abated (started on 9/8).  Some gas and bloating since then.    Diarrhea x 3 yesterday.  This was the first time since meds stopped.  She is not sure if this was from eating grits.  No further sx.  Today ate cereal and no diarrhea.    Also headache, she thinks possible sinus.    No fever, chills or sweats.  No blood in stool.  No pain but some cramping with BMs.    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 pylori infection: tx 2014 stool negative     Hemangioma of liver        Review of Systems   Constitutional: Negative for activity change and unexpected weight change.   HENT: Positive for rhinorrhea. Negative for hearing loss and trouble swallowing.    Eyes: Negative for discharge and visual disturbance.   Respiratory: Negative for chest tightness and wheezing.    Cardiovascular: Positive for chest pain. Negative for palpitations.        She thinks chest pain maybe from gas- salad and cucumbers   Gastrointestinal: Positive for diarrhea. Negative for blood in stool, constipation and vomiting.   Endocrine: Negative for polydipsia and polyuria.   Genitourinary: Negative for difficulty urinating, dysuria, hematuria and menstrual problem.   Musculoskeletal: Negative for arthralgias, joint swelling and neck pain.   Neurological: Negative for weakness and headaches.   Psychiatric/Behavioral: Negative for confusion and  dysphoric mood.       Objective:      Physical Exam   Constitutional: She is oriented to person, place, and time. She appears well-developed and well-nourished.   HENT:   Head: Normocephalic and atraumatic.   Right Ear: External ear normal.   Left Ear: External ear normal.   Nose: Nose normal.   Mouth/Throat: Oropharynx is clear and moist. No oropharyngeal exudate.   Eyes: Conjunctivae and EOM are normal. No scleral icterus.   Neck: Normal range of motion. Neck supple. No JVD present. No thyromegaly present.   Cardiovascular: Normal rate, regular rhythm, normal heart sounds and intact distal pulses. Exam reveals no gallop.   No murmur heard.  Pulmonary/Chest: Effort normal and breath sounds normal. No respiratory distress. She has no wheezes.   Abdominal: Soft. Bowel sounds are normal. She exhibits no distension and no mass. There is no tenderness. There is no rebound and no guarding.   Musculoskeletal: Normal range of motion. She exhibits no edema or tenderness.   Lymphadenopathy:     She has no cervical adenopathy.   Neurological: She is alert and oriented to person, place, and time. She displays normal reflexes. No cranial nerve deficit. Coordination normal.   Skin: Skin is warm. No rash noted. No erythema.   Psychiatric: She has a normal mood and affect. Her behavior is normal. Judgment and thought content normal.   Nursing note and vitals reviewed.      Assessment:       1. Diarrhea, unspecified type        Plan:         Diarrhea, unspecified type  -     Stool culture; Future; Expected date: 09/24/2018  -     Stool Exam-Ova,Cysts,Parasites; Future; Expected date: 09/24/2018  -     Giardia / Cryptosporidum, EIA; Future; Expected date: 09/24/2018  -     CBC auto differential; Future; Expected date: 09/24/2018  -     Comprehensive metabolic panel; Future; Expected date: 09/24/2018  -     Amylase; Future; Expected date: 09/24/2018  -     Lipase; Future; Expected date: 09/24/2018     Symptoms had improved after  Cipro and that she had 1 episode yesterday.  Unclear if this is a 1 time event versus a worsening of the previous problem.  I am reluctant to prescribe more antibiotics without additional information.  No additional stools yesterday or today.  No fever or other alarm symptoms.    [place] diet, reduce dairy  Keep food diary  May use Imodium or Pepto-Bismol over-the-counter in the short term well we get results back    Alarm symptoms reviewed, she is having none currently    I will review all studies and determine further tx depending on findings

## 2018-09-25 ENCOUNTER — PATIENT MESSAGE (OUTPATIENT)
Dept: INTERNAL MEDICINE | Facility: CLINIC | Age: 38
End: 2018-09-25

## 2018-09-27 ENCOUNTER — LAB VISIT (OUTPATIENT)
Dept: LAB | Facility: HOSPITAL | Age: 38
End: 2018-09-27
Attending: INTERNAL MEDICINE
Payer: COMMERCIAL

## 2018-09-27 DIAGNOSIS — R19.7 DIARRHEA, UNSPECIFIED TYPE: ICD-10-CM

## 2018-09-27 PROCEDURE — 87209 SMEAR COMPLEX STAIN: CPT

## 2018-09-27 PROCEDURE — 87427 SHIGA-LIKE TOXIN AG IA: CPT

## 2018-09-27 PROCEDURE — 87328 CRYPTOSPORIDIUM AG IA: CPT

## 2018-09-27 PROCEDURE — 87046 STOOL CULTR AEROBIC BACT EA: CPT | Mod: 59

## 2018-09-27 PROCEDURE — 87045 FECES CULTURE AEROBIC BACT: CPT

## 2018-09-28 LAB
CRYPTOSP AG STL QL IA: NEGATIVE
E COLI SXT1 STL QL IA: NEGATIVE
E COLI SXT2 STL QL IA: NEGATIVE
G LAMBLIA AG STL QL IA: NEGATIVE
O+P STL TRI STN: NORMAL

## 2018-09-30 ENCOUNTER — PATIENT MESSAGE (OUTPATIENT)
Dept: INTERNAL MEDICINE | Facility: CLINIC | Age: 38
End: 2018-09-30

## 2018-09-30 LAB — BACTERIA STL CULT: NORMAL

## 2018-10-03 RX ORDER — PHENOL/SODIUM PHENOLATE
20 AEROSOL, SPRAY (ML) MUCOUS MEMBRANE DAILY PRN
Qty: 30 EACH | Refills: 0 | Status: SHIPPED | OUTPATIENT
Start: 2018-10-03 | End: 2020-10-01

## 2018-11-23 ENCOUNTER — PATIENT MESSAGE (OUTPATIENT)
Dept: INTERNAL MEDICINE | Facility: CLINIC | Age: 38
End: 2018-11-23

## 2018-11-23 DIAGNOSIS — K77 LIVER DISORDERS IN DISEASES CLASSIFIED ELSEWHERE: ICD-10-CM

## 2018-11-23 DIAGNOSIS — D18.03 HEMANGIOMA OF LIVER: Primary | Chronic | ICD-10-CM

## 2018-11-26 ENCOUNTER — PATIENT MESSAGE (OUTPATIENT)
Dept: INTERNAL MEDICINE | Facility: CLINIC | Age: 38
End: 2018-11-26

## 2018-12-08 RX ORDER — CIPROFLOXACIN 500 MG/1
TABLET ORAL
Qty: 10 TABLET | Refills: 0 | Status: SHIPPED | OUTPATIENT
Start: 2018-12-08 | End: 2019-01-22

## 2018-12-31 ENCOUNTER — TELEPHONE (OUTPATIENT)
Dept: DERMATOLOGY | Facility: CLINIC | Age: 38
End: 2018-12-31

## 2018-12-31 NOTE — TELEPHONE ENCOUNTER
----- Message from Yuriy Manuel sent at 12/31/2018  3:14 PM CST -----  Contact: [facility]  Message from [facility], System Message sent at 12/30/2018  4:52 AM CST -----    Appointment Request From: Melly Hwang    With Provider: Dr. Maria    Preferred Date Range: Any    Preferred Times: Any time    Reason for visit: Skin discoloration    Comments:  I'm requesting an appointment with Dr. Maria in dermatology.  This is my second request.      Melly Hwang

## 2019-01-20 ENCOUNTER — PATIENT MESSAGE (OUTPATIENT)
Dept: INTERNAL MEDICINE | Facility: CLINIC | Age: 39
End: 2019-01-20

## 2019-01-21 ENCOUNTER — PATIENT MESSAGE (OUTPATIENT)
Dept: INTERNAL MEDICINE | Facility: CLINIC | Age: 39
End: 2019-01-21

## 2019-01-21 NOTE — TELEPHONE ENCOUNTER
Please let her know that I am not sure if this is a side effect of medication.  It may be more orthopedic.  If she wants to be seen, I can work her in tomorrow around 1:30    I am not in the office on Thursday due to the leadership meeting.

## 2019-01-22 ENCOUNTER — OFFICE VISIT (OUTPATIENT)
Dept: INTERNAL MEDICINE | Facility: CLINIC | Age: 39
End: 2019-01-22
Payer: COMMERCIAL

## 2019-01-22 VITALS
DIASTOLIC BLOOD PRESSURE: 75 MMHG | WEIGHT: 125.69 LBS | BODY MASS INDEX: 17.53 KG/M2 | SYSTOLIC BLOOD PRESSURE: 120 MMHG

## 2019-01-22 DIAGNOSIS — S46.812A TRAPEZIUS MUSCLE STRAIN, LEFT, INITIAL ENCOUNTER: Primary | ICD-10-CM

## 2019-01-22 DIAGNOSIS — M47.812 CERVICAL SPINE ARTHRITIS: ICD-10-CM

## 2019-01-22 DIAGNOSIS — F41.9 ANXIETY: ICD-10-CM

## 2019-01-22 PROCEDURE — 3008F BODY MASS INDEX DOCD: CPT | Mod: CPTII,S$GLB,, | Performed by: INTERNAL MEDICINE

## 2019-01-22 PROCEDURE — 99214 PR OFFICE/OUTPT VISIT, EST, LEVL IV, 30-39 MIN: ICD-10-PCS | Mod: S$GLB,,, | Performed by: INTERNAL MEDICINE

## 2019-01-22 PROCEDURE — 3008F PR BODY MASS INDEX (BMI) DOCUMENTED: ICD-10-PCS | Mod: CPTII,S$GLB,, | Performed by: INTERNAL MEDICINE

## 2019-01-22 PROCEDURE — 99999 PR PBB SHADOW E&M-EST. PATIENT-LVL III: ICD-10-PCS | Mod: PBBFAC,,, | Performed by: INTERNAL MEDICINE

## 2019-01-22 PROCEDURE — 99214 OFFICE O/P EST MOD 30 MIN: CPT | Mod: S$GLB,,, | Performed by: INTERNAL MEDICINE

## 2019-01-22 PROCEDURE — 99999 PR PBB SHADOW E&M-EST. PATIENT-LVL III: CPT | Mod: PBBFAC,,, | Performed by: INTERNAL MEDICINE

## 2019-01-22 RX ORDER — ESCITALOPRAM OXALATE 10 MG/1
TABLET ORAL
COMMUNITY
Start: 2019-01-05 | End: 2020-03-02

## 2019-01-22 RX ORDER — CYCLOBENZAPRINE HCL 10 MG
10 TABLET ORAL NIGHTLY
Qty: 20 TABLET | Refills: 0 | Status: SHIPPED | OUTPATIENT
Start: 2019-01-22 | End: 2019-03-04 | Stop reason: SDUPTHER

## 2019-01-22 RX ORDER — NAPROXEN 500 MG/1
500 TABLET ORAL 2 TIMES DAILY WITH MEALS
Qty: 30 TABLET | Refills: 1 | Status: SHIPPED | OUTPATIENT
Start: 2019-01-22 | End: 2019-08-02 | Stop reason: SDUPTHER
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
## 2019-01-28 ENCOUNTER — PATIENT MESSAGE (OUTPATIENT)
Dept: INTERNAL MEDICINE | Facility: CLINIC | Age: 39
End: 2019-01-28

## 2019-02-08 RX ORDER — ACETAMINOPHEN AND CODEINE PHOSPHATE 120; 12 MG/5ML; MG/5ML
SOLUTION ORAL
Qty: 84 TABLET | Refills: 0 | Status: SHIPPED | OUTPATIENT
Start: 2019-02-08 | End: 2019-02-27 | Stop reason: SDUPTHER

## 2019-02-27 ENCOUNTER — OFFICE VISIT (OUTPATIENT)
Dept: OBSTETRICS AND GYNECOLOGY | Facility: CLINIC | Age: 39
End: 2019-02-27
Payer: COMMERCIAL

## 2019-02-27 VITALS
BODY MASS INDEX: 18.21 KG/M2 | SYSTOLIC BLOOD PRESSURE: 120 MMHG | HEIGHT: 71 IN | WEIGHT: 130.06 LBS | DIASTOLIC BLOOD PRESSURE: 70 MMHG

## 2019-02-27 DIAGNOSIS — Z01.419 VISIT FOR GYNECOLOGIC EXAMINATION: Primary | ICD-10-CM

## 2019-02-27 DIAGNOSIS — Z30.41 ENCOUNTER FOR SURVEILLANCE OF CONTRACEPTIVE PILLS: ICD-10-CM

## 2019-02-27 PROCEDURE — 99999 PR PBB SHADOW E&M-EST. PATIENT-LVL III: ICD-10-PCS | Mod: PBBFAC,,, | Performed by: OBSTETRICS & GYNECOLOGY

## 2019-02-27 PROCEDURE — 99395 PREV VISIT EST AGE 18-39: CPT | Mod: S$GLB,,, | Performed by: OBSTETRICS & GYNECOLOGY

## 2019-02-27 PROCEDURE — 99999 PR PBB SHADOW E&M-EST. PATIENT-LVL III: CPT | Mod: PBBFAC,,, | Performed by: OBSTETRICS & GYNECOLOGY

## 2019-02-27 PROCEDURE — 99395 PR PREVENTIVE VISIT,EST,18-39: ICD-10-PCS | Mod: S$GLB,,, | Performed by: OBSTETRICS & GYNECOLOGY

## 2019-02-27 RX ORDER — ACETAMINOPHEN AND CODEINE PHOSPHATE 120; 12 MG/5ML; MG/5ML
SOLUTION ORAL
Qty: 84 TABLET | Refills: 3 | Status: SHIPPED | OUTPATIENT
Start: 2019-02-27 | End: 2020-03-02 | Stop reason: SDUPTHER

## 2019-02-27 NOTE — PROGRESS NOTES
HISTORY OF PRESENT ILLNESS:    Melly Hwang is a 38 y.o. female, , No LMP recorded. Patient is postmenopausal.,  presents for a routine exam and has no complaints. MICRONOR FOR CONTRACEP - REFILL.  PAP DIE .  GOING TO [facility] IN [place] IN [place], AND POSS ALSO [place] FOR BIRTHDAY    LAST [id number]:  REFILL OCP AND PAP SUBMITTED.  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  LAST VISIT :  PAP RECENTLY SUBMITTED AT [facility] WHEN SEEN FOR ROUTINE VISIT 2016.  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  HAS HAD YELLOWISH DISCHARGE PAST 2 DAYS WITHOUT ITCHING OR IRRITATION.  NOT SA FOR YEARS.  ADVISED SOME CERVICAL INFLAMMATION BUT NO EVIDENT INFECTION AND MINIMAL D/C ON EXAM - POSS BV, WILL RX METROGEL.  STD SCREEN FOR PEACE OF MIND SINCE HSE HAS POSSIBLE PROFESSIONAL EXPOSURES.  LAST VISIT 3/2014:  WANTS 3-MO REFILL OCP. PAP IS UP TO DATE. WORKING MODELING, LOCALLY AND SOME NATIONALLY  LAST VISIT 3/2013:  HAS A HX OF OMA OV FAILURE, PREVIOUSLY MAINTAINED ON SEASONIQUE, BUT CHANGED TO MICRONOR FOR CONTROL OF HOT FLUSHES WHEN LIVER HEMANGIOMA NOTED (DUE FOR NEXT MRI THIS MONTH) SKIN IS DRY, BUT NO HOT FLUSHES AND CONCERNED IF STOPS MICRONOR WILL INCR RISK OF OSTEOPOROSIS. STILL AMENORRHEA   WORKS [employer]   PAP SUBMITTED AND DISCUSSED 3YR SCREENING RECOMMENDATION    Past Medical History:   Diagnosis Date    Deviated septum     Hypertrophy of inferior nasal turbinate     Liver mass     Nasal congestion     Premature menopause        Past Surgical History:   Procedure Laterality Date    TONSILLECTOMY      UMBILICAL HERNIA REPAIR         MEDICATIONS AND ALLERGIES:      Current Outpatient Medications:     cetirizine (ZYRTEC) 10 MG tablet, Take 1 tablet (10 mg total) by mouth once daily., Disp: 30 tablet, Rfl: 1    escitalopram oxalate (LEXAPRO)  10 MG tablet, , Disp: , Rfl:     multivitamin capsule, Take by mouth. 1 capsule Oral Every morning, Disp: , Rfl:     norethindrone (EMILIA) 0.35 mg tablet, TAKE 1 TABLET (0.35 MG TOTAL) BY MOUTH ONCE DAILY, Disp: 84 tablet, Rfl: 3    omeprazole 20 mg TbEC, Take 20 mg by mouth daily as needed., Disp: 30 each, Rfl: 0    Review of patient's allergies indicates:   Allergen Reactions    No known drug allergies        Family History   Problem Relation Age of Onset    Diabetes Mother     Kidney disease Mother     Macular degeneration Father     Diabetes Father     Hypertension Father     No Known Problems Sister     No Known Problems Brother     No Known Problems Daughter     No Known Problems Brother     No Known Problems Maternal Aunt     No Known Problems Maternal Uncle     No Known Problems Paternal Aunt     No Known Problems Paternal Uncle     No Known Problems Maternal Grandmother     No Known Problems Maternal Grandfather     No Known Problems Paternal Grandmother     No Known Problems Paternal Grandfather     Amblyopia Neg Hx     Blindness Neg Hx     Cancer Neg Hx     Cataracts Neg Hx     Glaucoma Neg Hx     Retinal detachment Neg Hx     Strabismus Neg Hx     Stroke Neg Hx     Thyroid disease Neg Hx     Heart disease Neg Hx     Melanoma Neg Hx        Social History     Socioeconomic History    Marital status: Single     Spouse name: Not on file    Number of children: 1    Years of education: Not on file    Highest education level: Not on file   Social Needs    Financial resource strain: Not on file    Food insecurity - worry: Not on file    Food insecurity - inability: Not on file    Transportation needs - medical: Not on file    Transportation needs - non-medical: Not on file   Occupational History    Occupation:      Employer: [employer]   Tobacco Use    Smoking status: Never Smoker    Smokeless tobacco: Never Used   Substance and Sexual  "Activity    Alcohol use: No    Drug use: No    Sexual activity: Yes     Partners: Male     Birth control/protection: OCP   Other Topics Concern    Are you pregnant or think you may be? Not Asked    Breast-feeding Not Asked   Social History Narrative    Not on file       COMPREHENSIVE GYN HISTORY:  PAP History: Denies abnormal Paps.  Infection History: Denies STDs. Denies PID.  Benign History: Denies uterine fibroids. Denies ovarian cysts. Denies endometriosis. Denies other conditions.  Cancer History: Denies cervical cancer. Denies uterine cancer or hyperplasia. Denies ovarian cancer. Denies vulvar cancer or pre-cancer. Denies vaginal cancer or pre-cancer. Denies breast cancer. Denies colon cancer.  Sexual Activity History: Reports currently being sexually active  Menstrual History: Monthly, mild-moderate.  Contraception:oral progesterone-only contraceptive    ROS:  GENERAL: No weight changes. No swelling. No fatigue. No fever.  CARDIOVASCULAR: No chest pain. No shortness of breath. No leg cramps.   NEUROLOGICAL: No headaches. No vision changes.  BREASTS: No pain. No lumps. No discharge.  ABDOMEN: No pain. No nausea. No vomiting. No diarrhea. No constipation.  REPRODUCTIVE: No abnormal bleeding.   VULVA: No pain. No lesions. No itching.  VAGINA: No relaxation. No itching. No odor. No discharge. No lesions.  URINARY: No incontinence. No nocturia. No frequency. No dysuria.    /70   Ht 5' 11" (1.803 m)   Wt 59 kg (130 lb 1.1 oz)   BMI 18.14 kg/m²     PE:  APPEARANCE: Well nourished, well developed, in no acute distress.  AFFECT: WNL, alert and oriented x 3.  SKIN: No acne or hirsutism.  NECK: Neck symmetric, without masses or thyromegaly.  NODES: No inguinal, cervical, axillary or femoral lymph node enlargement.  CHEST: Good respiratory effort.   ABDOMEN: Soft. No tenderness or masses. No hepatosplenomegaly. No hernias.  BREASTS: Symmetrical, no skin changes, visible lesions, palpable masses or nipple " discharge bilaterally.  PELVIC: External female genitalia without lesions.  Female hair distribution. Adequate perineal body, Normal urethral meatus. Vagina moist and well rugated without lesions or discharge.  No significant cystocele or rectocele present. Cervix pink without lesions, discharge or tenderness. Uterus is normal size, regular, mobile and nontender. Adnexa without masses or tenderness.  EXTREMITIES: No edema    PROCEDURES:      DIAGNOSIS:  1. Visit for gynecologic examination     2. Encounter for surveillance of contraceptive pills         LABS AND TESTS ORDERED:    MEDICATIONS PRESCRIBED:    COUNSELING:   The patient was counseled today on ACS PAP guidelines, with recommendations for yearly pelvic exams unless their uterus, cervix, and ovaries were removed for benign reasons; in that case, examinations every 3-5 years are recommended.  The patient was also counseled regarding monthly breast self-examination, routine STD screening for at-risk populations, prophylactic immunizations for transmitted infections such as  HPV, Pertussis, or Influenza as appropriate, and yearly mammograms when indicated by ACS guidelines.  She was advised to see her primary care physician for all other health maintenance.    FOLLOW-UP with me annually.

## 2019-02-27 NOTE — PROGRESS NOTES
Pre-visit chart review completed. Pt due for pneumo 23. [facility]

## 2019-03-01 ENCOUNTER — HOSPITAL ENCOUNTER (OUTPATIENT)
Dept: RADIOLOGY | Facility: HOSPITAL | Age: 39
Discharge: HOME | End: 2019-03-01
Attending: INTERNAL MEDICINE
Payer: COMMERCIAL

## 2019-03-01 ENCOUNTER — OFFICE VISIT (OUTPATIENT)
Dept: INTERNAL MEDICINE | Facility: CLINIC | Age: 39
End: 2019-03-01
Payer: COMMERCIAL

## 2019-03-01 ENCOUNTER — PATIENT MESSAGE (OUTPATIENT)
Dept: INTERNAL MEDICINE | Facility: CLINIC | Age: 39
End: 2019-03-01

## 2019-03-01 VITALS — SYSTOLIC BLOOD PRESSURE: 118 MMHG | WEIGHT: 127 LBS | DIASTOLIC BLOOD PRESSURE: 70 MMHG | BODY MASS INDEX: 17.71 KG/M2

## 2019-03-01 DIAGNOSIS — Z00.00 ANNUAL PHYSICAL EXAM: Primary | ICD-10-CM

## 2019-03-01 DIAGNOSIS — R16.0 LIVER MASS: ICD-10-CM

## 2019-03-01 DIAGNOSIS — D18.03 HEMANGIOMA OF LIVER: Chronic | ICD-10-CM

## 2019-03-01 DIAGNOSIS — I51.7 CARDIAC ENLARGEMENT: ICD-10-CM

## 2019-03-01 DIAGNOSIS — I87.1 NUTCRACKER PHENOMENON OF RENAL VEIN: ICD-10-CM

## 2019-03-01 DIAGNOSIS — K77 LIVER DISORDERS IN DISEASES CLASSIFIED ELSEWHERE: ICD-10-CM

## 2019-03-01 PROCEDURE — 25500020 PHARM REV CODE 255: Performed by: INTERNAL MEDICINE

## 2019-03-01 PROCEDURE — A9585 GADOBUTROL INJECTION: HCPCS | Performed by: INTERNAL MEDICINE

## 2019-03-01 PROCEDURE — 99395 PREV VISIT EST AGE 18-39: CPT | Mod: S$GLB,,, | Performed by: INTERNAL MEDICINE

## 2019-03-01 PROCEDURE — 74183 MRI ABD W/O CNTR FLWD CNTR: CPT | Mod: 26,,, | Performed by: RADIOLOGY

## 2019-03-01 PROCEDURE — 99395 PR PREVENTIVE VISIT,EST,18-39: ICD-10-PCS | Mod: S$GLB,,, | Performed by: INTERNAL MEDICINE

## 2019-03-01 PROCEDURE — 74183 MRI ABD W/O CNTR FLWD CNTR: CPT | Mod: TC

## 2019-03-01 PROCEDURE — 74183 MRI ABDOMEN W WO CONTRAST: ICD-10-PCS | Mod: 26,,, | Performed by: RADIOLOGY

## 2019-03-01 PROCEDURE — 99999 PR PBB SHADOW E&M-EST. PATIENT-LVL III: CPT | Mod: PBBFAC,,, | Performed by: INTERNAL MEDICINE

## 2019-03-01 PROCEDURE — 99999 PR PBB SHADOW E&M-EST. PATIENT-LVL III: ICD-10-PCS | Mod: PBBFAC,,, | Performed by: INTERNAL MEDICINE

## 2019-03-01 RX ORDER — GADOBUTROL 604.72 MG/ML
10 INJECTION INTRAVENOUS
Status: COMPLETED | OUTPATIENT
Start: 2019-03-01 | End: 2019-03-01

## 2019-03-01 RX ADMIN — GADOBUTROL 10 ML: 604.72 INJECTION INTRAVENOUS at 10:03

## 2019-03-01 NOTE — PATIENT INSTRUCTIONS
Preventing Osteoporosis: Avoiding Bone Loss  Certain factors can speed up bone loss or decrease bone growth. For example, alcohol, cigarettes, and certain medicines reduce bone mass. Some foods make it hard for your body to absorb calcium.    Things to avoid  Here are things to avoid to help prevent osteoporosis:  · Alcohol is toxic to bones. It is a major cause of bone loss. Heavy drinking can cause osteoporosis even if you have no other risk factors.  · Smoking reduces bone mass. Smoking may also interfere with estrogen levels and cause early menopause.  · Inactivity makes your bones lose strength and become thinner. Over time, thin bones may break. Women who aren't active are at a high risk for osteoporosis.  · Certain medicines, such as cortisone, increase bone loss. They also decrease bone growth. Ask your healthcare provider about any side effects of your medicines, and how to prevent them.  · Protein-rich or salty foods eaten in large amounts may deplete calcium.  · Caffeine increases calcium loss. People who drink a lot of coffee, tea, or kevin lose more calcium than those who don't.  Date Last Reviewed: 10/17/2015  © 5077-0490 [employer]. [address] [place] [address]. All rights reserved. This information is not intended as a substitute for professional medical care. Always follow your healthcare professional's instructions.        Preventing Osteoporosis: Staying Active  Certain factors can speed up bone loss or decrease bone growth. For example, a lack of activity makes bones lose their strength. Exercise plays a big part in maintaining bone mass, no matter what your age. The amount and type of activity you do also play a part in keeping your bones strong. Weight-bearing and resistance exercises, such as walking, aerobic dancing, and bicycling, are just a few of the activities that are good for your bones.     Resistance exercises, such as weight training, help maintain bones  by strengthening the muscles around them. Swimming is also a good choice.     · Check with your healthcare provider before starting any new exercise program.  · Stop any exercise that causes pain.   Date Last Reviewed: 10/17/2015  © 4588-4487 [employer]. [address] [place] [address], [place] [address]. All rights reserved. This information is not intended as a substitute for professional medical care. Always follow your healthcare professional's instructions.

## 2019-03-01 NOTE — PROGRESS NOTES
Subjective:       Patient ID: Melly Hwang is a 38 y.o. female.    Chief Complaint: Annual Exam    Annual exam    Recall trapezius spasm last visit, slightly better    Slight ear pain or fluid L side, tried Sudafed and better.    Took CBD oil and helped with her anxiety.   Not on Lexapro any more.  Feels mood is good/stable.    Cardiomegaly EF 55% 2014.  On abdominal MRI, some, does made about a pericardial cyst and this was discussed.  This has been evaluated, she is asymptomatic.  Will continue to monitor and re-evaluate should symptoms occur.    Liver masses,hemangioma see hepatology 2017.  Had MRI 2018, slight enlargement.  Follows in hepatology.    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 pylori infection: tx 2014 stool negative     Hemangioma of liver                Review of Systems   Constitutional: Negative for activity change and unexpected weight change.   HENT: Negative for hearing loss, rhinorrhea and trouble swallowing.    Eyes: Negative for discharge and visual disturbance.   Respiratory: Negative for chest tightness and wheezing.    Cardiovascular: Negative for chest pain and palpitations.   Gastrointestinal: Negative for blood in stool, constipation, diarrhea and vomiting.   Endocrine: Negative for polydipsia and polyuria.   Genitourinary: Negative for difficulty urinating, dysuria, hematuria and menstrual problem.   Musculoskeletal: Negative for arthralgias, joint swelling and neck pain.   Neurological: Negative for weakness and headaches.   Psychiatric/Behavioral: Negative for confusion and dysphoric mood.       Objective:      Physical Exam   Constitutional: She is oriented to person, place, and time. She appears  well-developed and well-nourished.   HENT:   Head: Normocephalic and atraumatic.   Right Ear: External ear normal.   Left Ear: External ear normal.   Nose: Nose normal.   Mouth/Throat: Oropharynx is clear and moist. No oropharyngeal exudate.   Eyes: Conjunctivae and EOM are normal. No scleral icterus.   Neck: Normal range of motion. Neck supple. No JVD present. No thyromegaly present.   Cardiovascular: Normal rate, regular rhythm, normal heart sounds and intact distal pulses. Exam reveals no gallop.   No murmur heard.  Pulmonary/Chest: Effort normal and breath sounds normal. No respiratory distress. She has no wheezes.   Abdominal: Soft. Bowel sounds are normal. She exhibits no distension and no mass. There is no tenderness. There is no rebound and no guarding.   Musculoskeletal: Normal range of motion. She exhibits no edema or tenderness.   Lymphadenopathy:     She has no cervical adenopathy.   Neurological: She is alert and oriented to person, place, and time. She displays normal reflexes. No cranial nerve deficit. Coordination normal.   Skin: Skin is warm. No rash noted. No erythema.   Psychiatric: She has a normal mood and affect. Her behavior is normal. Judgment and thought content normal.   Nursing note and vitals reviewed.      Assessment:       1. Annual physical exam    2. Liver mass: see MRI, stable [id number], also 2018    3. 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 Cardiac enlargement: Echo 2014 normal cardiac chamber size with EF 55%        Plan:         Melly was seen today for annual exam.    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    I will review all studies and determine further tx depending on findings

## 2019-03-03 ENCOUNTER — PATIENT MESSAGE (OUTPATIENT)
Dept: INTERNAL MEDICINE | Facility: CLINIC | Age: 39
End: 2019-03-03

## 2019-03-04 ENCOUNTER — TELEPHONE (OUTPATIENT)
Dept: INTERNAL MEDICINE | Facility: CLINIC | Age: 39
End: 2019-03-04

## 2019-03-04 ENCOUNTER — PATIENT MESSAGE (OUTPATIENT)
Dept: INTERNAL MEDICINE | Facility: CLINIC | Age: 39
End: 2019-03-04

## 2019-03-04 RX ORDER — CYCLOBENZAPRINE HCL 10 MG
10 TABLET ORAL NIGHTLY PRN
Qty: 20 TABLET | Refills: 0 | Status: SHIPPED | OUTPATIENT
Start: 2019-03-04 | End: 2019-08-02 | Stop reason: SDUPTHER

## 2019-03-04 NOTE — TELEPHONE ENCOUNTER
----- Message from Brennen Engel MD sent at 3/4/2019  9:59 AM CST -----  Hi there. Hemangiomata can grow slightly with time. If asymptomatic, not really much to do. They can probably be followed annually with ultrasounds. Happy to see her again if she would like. Thx  ----- Message -----  From: Melly Sahu MD  Sent: 3/1/2019  12:36 PM  To: Brennen Engel MD    Hi Brennen:    Reform patient.  She wanted to make sure you saw this as well.  Radiology is documenting a slight increase in the size of a couple of the hemangiomas.  Can you comment on this?  Not sure she needs to see you (or Hepatology) or do we keep following annual MRIs?  Thanks-    Melly Sahu

## 2019-04-24 ENCOUNTER — PATIENT MESSAGE (OUTPATIENT)
Dept: DERMATOLOGY | Facility: CLINIC | Age: 39
End: 2019-04-24

## 2019-04-25 DIAGNOSIS — L21.9 SEBORRHEIC DERMATITIS: Primary | ICD-10-CM

## 2019-04-25 RX ORDER — KETOCONAZOLE 20 MG/G
CREAM TOPICAL DAILY
Qty: 60 G | Refills: 3 | Status: SHIPPED | OUTPATIENT
Start: 2019-04-25 | End: 2021-09-15

## 2019-04-29 ENCOUNTER — OFFICE VISIT (OUTPATIENT)
Dept: OPTOMETRY | Facility: CLINIC | Age: 39
End: 2019-04-29
Payer: COMMERCIAL

## 2019-04-29 DIAGNOSIS — H52.13 MYOPIA OF BOTH EYES: Primary | ICD-10-CM

## 2019-04-29 PROCEDURE — 99999 PR PBB SHADOW E&M-EST. PATIENT-LVL II: ICD-10-PCS | Mod: PBBFAC,,, | Performed by: OPTOMETRIST

## 2019-04-29 PROCEDURE — 92015 DETERMINE REFRACTIVE STATE: CPT | Mod: S$GLB,,, | Performed by: OPTOMETRIST

## 2019-04-29 PROCEDURE — 92014 COMPRE OPH EXAM EST PT 1/>: CPT | Mod: S$GLB,,, | Performed by: OPTOMETRIST

## 2019-04-29 PROCEDURE — 99999 PR PBB SHADOW E&M-EST. PATIENT-LVL II: CPT | Mod: PBBFAC,,, | Performed by: OPTOMETRIST

## 2019-04-29 PROCEDURE — 92015 PR REFRACTION: ICD-10-PCS | Mod: S$GLB,,, | Performed by: OPTOMETRIST

## 2019-04-29 PROCEDURE — 92014 PR EYE EXAM, EST PATIENT,COMPREHESV: ICD-10-PCS | Mod: S$GLB,,, | Performed by: OPTOMETRIST

## 2019-04-29 RX ORDER — NAPROXEN 500 MG/1
500 TABLET ORAL 2 TIMES DAILY WITH MEALS
Refills: 1 | COMMUNITY
Start: 2019-04-14 | End: 2020-03-02
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
## 2019-06-07 ENCOUNTER — OFFICE VISIT (OUTPATIENT)
Dept: DERMATOLOGY | Facility: CLINIC | Age: 39
End: 2019-06-07
Payer: COMMERCIAL

## 2019-06-07 DIAGNOSIS — L98.9 DERMATOSIS: Primary | ICD-10-CM

## 2019-06-07 DIAGNOSIS — L90.5 SCAR: ICD-10-CM

## 2019-06-07 PROCEDURE — 99999 PR PBB SHADOW E&M-EST. PATIENT-LVL II: ICD-10-PCS | Mod: PBBFAC,,, | Performed by: NURSE PRACTITIONER

## 2019-06-07 PROCEDURE — 99213 PR OFFICE/OUTPT VISIT, EST, LEVL III, 20-29 MIN: ICD-10-PCS | Mod: S$GLB,,, | Performed by: NURSE PRACTITIONER

## 2019-06-07 PROCEDURE — 99213 OFFICE O/P EST LOW 20 MIN: CPT | Mod: S$GLB,,, | Performed by: NURSE PRACTITIONER

## 2019-06-07 PROCEDURE — 99999 PR PBB SHADOW E&M-EST. PATIENT-LVL II: CPT | Mod: PBBFAC,,, | Performed by: NURSE PRACTITIONER

## 2019-06-07 RX ORDER — PIMECROLIMUS 10 MG/G
CREAM TOPICAL
Qty: 60 G | Refills: 2 | Status: SHIPPED | OUTPATIENT
Start: 2019-06-07 | End: 2020-10-01
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
## 2019-06-12 ENCOUNTER — TELEPHONE (OUTPATIENT)
Dept: DERMATOLOGY | Facility: CLINIC | Age: 39
End: 2019-06-12

## 2019-06-12 ENCOUNTER — PATIENT MESSAGE (OUTPATIENT)
Dept: DERMATOLOGY | Facility: CLINIC | Age: 39
End: 2019-06-12

## 2019-06-12 RX ORDER — HYDROCORTISONE BUTYRATE 1 MG/G
OINTMENT TOPICAL
Qty: 45 G | Refills: 0 | Status: SHIPPED | OUTPATIENT
Start: 2019-06-12 | End: 2021-09-15

## 2019-06-12 NOTE — TELEPHONE ENCOUNTER
Called pt to inform her that GP sent a new RX called Locoid to her Pharmacy that should be covered by her insurance. Pt verbalized her understanding.       TB     ----- Message from Marsha Mosquera NP sent at 6/12/2019  1:06 PM CDT -----  Please let patient know I sent new prescription called Locoid ointment.   Thanks,  rasta

## 2019-06-24 ENCOUNTER — DOCUMENTATION ONLY (OUTPATIENT)
Dept: DERMATOLOGY | Facility: CLINIC | Age: 39
End: 2019-06-24

## 2019-06-24 ENCOUNTER — PATIENT MESSAGE (OUTPATIENT)
Dept: DERMATOLOGY | Facility: CLINIC | Age: 39
End: 2019-06-24

## 2019-07-22 DIAGNOSIS — Z12.39 BREAST CANCER SCREENING: ICD-10-CM

## 2019-08-01 RX ORDER — CETIRIZINE HYDROCHLORIDE 10 MG/1
TABLET ORAL
Qty: 30 TABLET | Refills: 1 | Status: SHIPPED | OUTPATIENT
Start: 2019-08-01 | End: 2020-02-03

## 2019-08-02 RX ORDER — NAPROXEN 500 MG/1
500 TABLET ORAL 2 TIMES DAILY PRN
Qty: 30 TABLET | Refills: 0 | Status: ON HOLD | OUTPATIENT
Start: 2019-08-02 | End: 2020-05-03 | Stop reason: HOSPADM

## 2019-08-02 RX ORDER — CYCLOBENZAPRINE HCL 10 MG
TABLET ORAL
Qty: 20 TABLET | Refills: 0 | Status: SHIPPED | OUTPATIENT
Start: 2019-08-02 | End: 2020-01-02

## 2019-09-15 ENCOUNTER — PATIENT MESSAGE (OUTPATIENT)
Dept: OBSTETRICS AND GYNECOLOGY | Facility: CLINIC | Age: 39
End: 2019-09-15

## 2019-11-01 ENCOUNTER — PATIENT MESSAGE (OUTPATIENT)
Dept: OBSTETRICS AND GYNECOLOGY | Facility: CLINIC | Age: 39
End: 2019-11-01

## 2019-11-01 ENCOUNTER — PATIENT MESSAGE (OUTPATIENT)
Dept: INTERNAL MEDICINE | Facility: CLINIC | Age: 39
End: 2019-11-01

## 2019-11-01 DIAGNOSIS — D18.03 HEMANGIOMA OF LIVER: Primary | Chronic | ICD-10-CM

## 2019-11-23 ENCOUNTER — PATIENT MESSAGE (OUTPATIENT)
Dept: INTERNAL MEDICINE | Facility: CLINIC | Age: 39
End: 2019-11-23

## 2020-01-02 RX ORDER — CYCLOBENZAPRINE HCL 10 MG
TABLET ORAL
Qty: 20 TABLET | Refills: 0 | Status: SHIPPED | OUTPATIENT
Start: 2020-01-02 | End: 2020-03-02 | Stop reason: SDUPTHER

## 2020-02-03 RX ORDER — CETIRIZINE HYDROCHLORIDE 10 MG/1
TABLET ORAL
Qty: 30 TABLET | Refills: 1 | Status: SHIPPED | OUTPATIENT
Start: 2020-02-03 | End: 2020-02-10 | Stop reason: SDUPTHER

## 2020-02-10 RX ORDER — CETIRIZINE HYDROCHLORIDE 10 MG/1
10 TABLET ORAL DAILY
Qty: 90 TABLET | Refills: 1 | Status: SHIPPED | OUTPATIENT
Start: 2020-02-10 | End: 2020-10-06 | Stop reason: SDUPTHER

## 2020-03-01 ENCOUNTER — PATIENT OUTREACH (OUTPATIENT)
Dept: ADMINISTRATIVE | Facility: OTHER | Age: 40
End: 2020-03-01

## 2020-03-02 ENCOUNTER — OFFICE VISIT (OUTPATIENT)
Dept: OBSTETRICS AND GYNECOLOGY | Facility: CLINIC | Age: 40
End: 2020-03-02
Payer: COMMERCIAL

## 2020-03-02 ENCOUNTER — TELEPHONE (OUTPATIENT)
Dept: INTERNAL MEDICINE | Facility: CLINIC | Age: 40
End: 2020-03-02

## 2020-03-02 ENCOUNTER — OFFICE VISIT (OUTPATIENT)
Dept: INTERNAL MEDICINE | Facility: CLINIC | Age: 40
End: 2020-03-02
Payer: COMMERCIAL

## 2020-03-02 ENCOUNTER — HOSPITAL ENCOUNTER (OUTPATIENT)
Dept: RADIOLOGY | Facility: HOSPITAL | Age: 40
Discharge: HOME | End: 2020-03-02
Attending: INTERNAL MEDICINE
Payer: COMMERCIAL

## 2020-03-02 ENCOUNTER — PATIENT MESSAGE (OUTPATIENT)
Dept: INTERNAL MEDICINE | Facility: CLINIC | Age: 40
End: 2020-03-02

## 2020-03-02 VITALS
DIASTOLIC BLOOD PRESSURE: 72 MMHG | HEIGHT: 71 IN | SYSTOLIC BLOOD PRESSURE: 118 MMHG | WEIGHT: 135.38 LBS | BODY MASS INDEX: 18.95 KG/M2

## 2020-03-02 VITALS
SYSTOLIC BLOOD PRESSURE: 116 MMHG | BODY MASS INDEX: 19.23 KG/M2 | DIASTOLIC BLOOD PRESSURE: 66 MMHG | WEIGHT: 137.38 LBS | HEIGHT: 71 IN

## 2020-03-02 DIAGNOSIS — D18.03 HEMANGIOMA OF LIVER: ICD-10-CM

## 2020-03-02 DIAGNOSIS — Q24.8 PERICARDIAL CYST: Primary | ICD-10-CM

## 2020-03-02 DIAGNOSIS — Z30.41 ENCOUNTER FOR SURVEILLANCE OF CONTRACEPTIVE PILLS: ICD-10-CM

## 2020-03-02 DIAGNOSIS — I87.1 NUTCRACKER PHENOMENON OF RENAL VEIN: ICD-10-CM

## 2020-03-02 DIAGNOSIS — Z00.00 ANNUAL PHYSICAL EXAM: Primary | ICD-10-CM

## 2020-03-02 DIAGNOSIS — R16.0 LIVER MASS: ICD-10-CM

## 2020-03-02 DIAGNOSIS — D18.03 HEMANGIOMA OF LIVER: Chronic | ICD-10-CM

## 2020-03-02 DIAGNOSIS — Z01.419 VISIT FOR GYNECOLOGIC EXAMINATION: Primary | ICD-10-CM

## 2020-03-02 DIAGNOSIS — I51.7 CARDIAC ENLARGEMENT: ICD-10-CM

## 2020-03-02 PROCEDURE — 99396 PR PREVENTIVE VISIT,EST,40-64: ICD-10-PCS | Mod: S$GLB,,, | Performed by: OBSTETRICS & GYNECOLOGY

## 2020-03-02 PROCEDURE — 99999 PR PBB SHADOW E&M-EST. PATIENT-LVL III: ICD-10-PCS | Mod: PBBFAC,,, | Performed by: OBSTETRICS & GYNECOLOGY

## 2020-03-02 PROCEDURE — A9585 GADOBUTROL INJECTION: HCPCS | Performed by: INTERNAL MEDICINE

## 2020-03-02 PROCEDURE — 99396 PREV VISIT EST AGE 40-64: CPT | Mod: S$GLB,,, | Performed by: INTERNAL MEDICINE

## 2020-03-02 PROCEDURE — 99999 PR PBB SHADOW E&M-EST. PATIENT-LVL III: CPT | Mod: PBBFAC,,, | Performed by: OBSTETRICS & GYNECOLOGY

## 2020-03-02 PROCEDURE — 99396 PREV VISIT EST AGE 40-64: CPT | Mod: S$GLB,,, | Performed by: OBSTETRICS & GYNECOLOGY

## 2020-03-02 PROCEDURE — 99999 PR PBB SHADOW E&M-EST. PATIENT-LVL III: CPT | Mod: PBBFAC,,, | Performed by: INTERNAL MEDICINE

## 2020-03-02 PROCEDURE — 74183 MRI ABDOMEN W WO CONTRAST: ICD-10-PCS | Mod: 26,,, | Performed by: RADIOLOGY

## 2020-03-02 PROCEDURE — 25500020 PHARM REV CODE 255: Performed by: INTERNAL MEDICINE

## 2020-03-02 PROCEDURE — 74183 MRI ABD W/O CNTR FLWD CNTR: CPT | Mod: 26,,, | Performed by: RADIOLOGY

## 2020-03-02 PROCEDURE — 99999 PR PBB SHADOW E&M-EST. PATIENT-LVL III: ICD-10-PCS | Mod: PBBFAC,,, | Performed by: INTERNAL MEDICINE

## 2020-03-02 PROCEDURE — 74183 MRI ABD W/O CNTR FLWD CNTR: CPT | Mod: TC

## 2020-03-02 PROCEDURE — 99396 PR PREVENTIVE VISIT,EST,40-64: ICD-10-PCS | Mod: S$GLB,,, | Performed by: INTERNAL MEDICINE

## 2020-03-02 RX ORDER — CYCLOBENZAPRINE HCL 10 MG
TABLET ORAL
Qty: 30 TABLET | Refills: 3 | Status: SHIPPED | OUTPATIENT
Start: 2020-03-02 | End: 2021-03-07

## 2020-03-02 RX ORDER — ACETAMINOPHEN AND CODEINE PHOSPHATE 120; 12 MG/5ML; MG/5ML
SOLUTION ORAL
Qty: 84 TABLET | Refills: 3 | Status: SHIPPED | OUTPATIENT
Start: 2020-03-02 | End: 2021-03-02

## 2020-03-02 RX ORDER — GADOBUTROL 604.72 MG/ML
10 INJECTION INTRAVENOUS
Status: COMPLETED | OUTPATIENT
Start: 2020-03-02 | End: 2020-03-02

## 2020-03-02 RX ADMIN — GADOBUTROL 10 ML: 604.72 INJECTION INTRAVENOUS at 10:03

## 2020-03-02 NOTE — PROGRESS NOTES
HISTORY OF PRESENT ILLNESS:    Melly Hwang is a 39 y.o. female, , No LMP recorded (lmp unknown). Patient is postmenopausal.,  presents for a routine exam and has no complaints. COTEST  AND MICRONOR REFILL, MAMMO HAS BEEN SCHEDULED.  NO GYN C/O.    HAS LEFT [facility], HAD BRIEF [facility] PERIOP NURSING, NOW WORKING [employer] OR [facility]    LAST :   MICRONOR FOR CONTRACEP - REFILL.  PAP DUE .  GOING TO [facility] IN July IN [facility], AND POSS ALSO [place] FOR BIRTHDAY  LAST [id number]:  REFILL OCP AND PAP SUBMITTED.  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  LAST VISIT :  PAP RECENTLY SUBMITTED AT [facility] WHEN SEEN FOR ROUTINE VISIT .  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  HAS HAD YELLOWISH DISCHARGE PAST 2 DAYS WITHOUT ITCHING OR IRRITATION.  NOT SA FOR YEARS.  ADVISED SOME CERVICAL INFLAMMATION BUT NO EVIDENT INFECTION AND MINIMAL D/C ON EXAM - POSS BV, WILL RX METROGEL.  STD SCREEN FOR PEACE OF MIND SINCE HSE HAS POSSIBLE PROFESSIONAL EXPOSURES.  LAST VISIT 3/2014:  WANTS 3-MO REFILL OCP. PAP IS UP TO DATE. WORKING MODELING, LOCALLY AND SOME NATIONALLY  LAST VISIT 3/2013:  HAS A HX OF OMA OV FAILURE, PREVIOUSLY MAINTAINED ON SEASONIQUE, BUT CHANGED TO MICRONOR FOR CONTROL OF HOT FLUSHES WHEN LIVER HEMANGIOMA NOTED (DUE FOR NEXT MRI THIS MONTH) SKIN IS DRY, BUT NO HOT FLUSHES AND CONCERNED IF STOPS MICRONOR WILL INCR RISK OF OSTEOPOROSIS. 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   PAP SUBMITTED AND DISCUSSED 3YR SCREENING RECOMMENDATION    Past Medical History:   Diagnosis Date    Deviated septum     Hypertrophy of inferior nasal turbinate     Liver mass     Nasal congestion     Premature menopause        Past Surgical History:   Procedure Laterality Date    TONSILLECTOMY      UMBILICAL HERNIA REPAIR          MEDICATIONS AND ALLERGIES:      Current Outpatient Medications:     cetirizine (ZYRTEC) 10 MG tablet, Take 1 tablet (10 mg total) by mouth once daily., Disp: 90 tablet, Rfl: 1    cyclobenzaprine (FLEXERIL) 10 MG tablet, TAKE 1 TABLET BY MOUTH EVERY DAY IN THE EVENING AS NEEDED FOR MUSCLE SPASMS, Disp: 20 tablet, Rfl: 0    escitalopram oxalate (LEXAPRO) 10 MG tablet, , Disp: , Rfl:     hydrocortisone butyrate (LOCOID) 0.1 % Oint, AAA BID prn itching, scaling, or redness. Do not use longer than 2 weeks consecutively., Disp: 45 g, Rfl: 0    ketoconazole (NIZORAL) 2 % cream, Apply topically once daily., Disp: 60 g, Rfl: 3    multivitamin capsule, Take by mouth. 1 capsule Oral Every morning, Disp: , Rfl:     naproxen (NAPROSYN) 500 MG tablet, Take 500 mg by mouth 2 (two) times daily with meals., Disp: , Rfl: 1    naproxen (NAPROSYN) 500 MG tablet, Take 1 tablet (500 mg total) by mouth 2 (two) times daily as needed (with food)., Disp: 30 tablet, Rfl: 0    norethindrone (EMILIA) 0.35 mg tablet, TAKE 1 TABLET (0.35 MG TOTAL) BY MOUTH ONCE DAILY, Disp: 84 tablet, Rfl: 3    omeprazole 20 mg TbEC, Take 20 mg by mouth daily as needed., Disp: 30 each, Rfl: 0    pimecrolimus (ELIDEL) 1 % cream, AAA BID prn itching, redness, Disp: 60 g, Rfl: 2    Review of patient's allergies indicates:   Allergen Reactions    No known drug allergies        Family History   Problem Relation Age of Onset    Diabetes Mother     Liver disease Mother     Macular degeneration Father     Diabetes Father     Hypertension Father     Hyperlipidemia Father     No Known Problems Sister     No Known Problems Brother     No Known Problems Daughter     No Known Problems Brother     No Known Problems Maternal Aunt     No Known Problems Maternal Uncle     No Known Problems Paternal Aunt     No Known Problems Paternal Uncle     No Known Problems Maternal Grandmother     No Known Problems Maternal Grandfather     No Known  Problems Paternal Grandmother     No Known Problems Paternal Grandfather     Amblyopia Neg Hx     Blindness Neg Hx     Cancer Neg Hx     Cataracts Neg Hx     Glaucoma Neg Hx     Retinal detachment Neg Hx     Strabismus Neg Hx     Stroke Neg Hx     Thyroid disease Neg Hx     Heart disease Neg Hx     Melanoma Neg Hx        Social History     Socioeconomic History    Marital status: Single     Spouse name: Not on file    Number of children: 1    Years of education: Not on file    Highest education level: Not on file   Occupational History    Occupation:      Employer: [employer]   Social Needs    Financial resource strain: Not on file    Food insecurity:     Worry: Not on file     Inability: Not on file    Transportation needs:     Medical: Not on file     Non-medical: Not on file   Tobacco Use    Smoking status: Never Smoker    Smokeless tobacco: Never Used   Substance and Sexual Activity    Alcohol use: No    Drug use: No    Sexual activity: Yes     Partners: Male     Birth control/protection: OCP   Lifestyle    Physical activity:     Days per week: Not on file     Minutes per session: Not on file    Stress: Not on file   Relationships    Social connections:     Talks on phone: Not on file     Gets together: Not on file     Attends Hinduism service: Not on file     Active member of club or organization: Not on file     Attends meetings of clubs or organizations: Not on file     Relationship status: Not on file   Other Topics Concern    Are you pregnant or think you may be? Not Asked    Breast-feeding Not Asked   Social History Narrative    Not on file       COMPREHENSIVE GYN HISTORY:  PAP History: Denies abnormal Paps.  Infection History: Denies STDs. Denies PID.  Benign History: Denies uterine fibroids. Denies ovarian cysts. Denies endometriosis. Denies other conditions.  Cancer History: Denies cervical cancer. Denies uterine cancer or hyperplasia. Denies  "ovarian cancer. Denies vulvar cancer or pre-cancer. Denies vaginal cancer or pre-cancer. Denies breast cancer. Denies colon cancer.  Sexual Activity History: Reports currently being sexually active  Menstrual History: Monthly, mild-moderate.  Contraception:oral progesterone-only contraceptive    ROS:  GENERAL: No weight changes. No swelling. No fatigue. No fever.  CARDIOVASCULAR: No chest pain. No shortness of breath. No leg cramps.   NEUROLOGICAL: No headaches. No vision changes.  BREASTS: No pain. No lumps. No discharge.  ABDOMEN: No pain. No nausea. No vomiting. No diarrhea. No constipation.  REPRODUCTIVE: No abnormal bleeding.   VULVA: No pain. No lesions. No itching.  VAGINA: No relaxation. No itching. No odor. No discharge. No lesions.  URINARY: No incontinence. No nocturia. No frequency. No dysuria.    /66   Ht 5' 11" (1.803 m)   Wt 62.3 kg (137 lb 5.6 oz)   LMP  (LMP Unknown)   BMI 19.16 kg/m²     PE:  APPEARANCE: Well nourished, well developed, in no acute distress.  AFFECT: WNL, alert and oriented x 3.  SKIN: No acne or hirsutism.  NECK: Neck symmetric, without masses or thyromegaly.  NODES: No inguinal, cervical, axillary or femoral lymph node enlargement.  CHEST: Good respiratory effort.   ABDOMEN: Soft. No tenderness or masses. No hepatosplenomegaly. No hernias.  BREASTS: Symmetrical, no skin changes, visible lesions, palpable masses or nipple discharge bilaterally.  PELVIC: External female genitalia without lesions.  Female hair distribution. Adequate perineal body, Normal urethral meatus. Vagina moist and well rugated without lesions or discharge.  No significant cystocele or rectocele present. Cervix pink without lesions, discharge or tenderness. Uterus is normal size, regular, mobile and nontender. Adnexa without masses or tenderness.  EXTREMITIES: No edema    PROCEDURES:      DIAGNOSIS:  1. Visit for gynecologic examination     2. Encounter for surveillance of contraceptive pills   "       LABS AND TESTS ORDERED:    MEDICATIONS PRESCRIBED:    COUNSELING:   The patient was counseled today on ACS PAP guidelines, with recommendations for yearly pelvic exams unless their uterus, cervix, and ovaries were removed for benign reasons; in that case, examinations every 3-5 years are recommended.  The patient was also counseled regarding monthly breast self-examination, routine STD screening for at-risk populations, prophylactic immunizations for transmitted infections such as  HPV, Pertussis, or Influenza as appropriate, and yearly mammograms when indicated by ACS guidelines.  She was advised to see her primary care physician for all other health maintenance.    FOLLOW-UP with me annually.

## 2020-03-02 NOTE — PROGRESS NOTES
Care Everywhere: updated  Immunization: updated  Health Maintenance: updated  Media Review: n/a  Legacy Review: n/a  Order placed: n/a  Upcoming appts:mammogram 3/27/2020

## 2020-03-02 NOTE — TELEPHONE ENCOUNTER
----- Message from Brennen Engel MD sent at 3/2/2020  3:45 PM CST -----  Yes. Reassuring, particularly if she is having minimal symptoms. Thanks  ----- Message -----  From: Melly Sahu MD  Sent: 3/2/2020   2:19 PM CST  To: MD Kingsley Aguirre:    This looks stable.  Please let me know if you have any other thoughts or recommendations- thanks    Melly

## 2020-03-02 NOTE — PROGRESS NOTES
Subjective:       Patient ID: Melly Hwang is a 39 y.o. female.    Chief Complaint: Annual Exam    Annual exam    No complaints    Previously had been on escitalopram but this caused nausea and diarrhea so she is no longer taking this.  She feels her mood is doing well.    Few months ago may have had some bright red blood on tissue after BM, not mixed in with stool.  No further issues.  No constipation or change in bowel habits.    MRI done today, results still pending.    Due for labs.    Weight again reviewed, BMI still low but she has gained a few lb.  She is working on this.    No syncope, chest pain, pressure, tightness or shortness of breath.    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 pylori infection: tx 2014 stool negative     Hemangioma of liver      Review of Systems   Constitutional: Negative for activity change, appetite change, chills, fatigue and fever.   HENT: Negative for congestion, hearing loss, sinus pressure and sore throat.    Eyes: Negative for visual disturbance.   Respiratory: Negative for cough, shortness of breath and wheezing.    Cardiovascular: Negative for chest pain, palpitations and leg swelling.   Gastrointestinal: Negative for abdominal distention, abdominal pain, constipation, diarrhea, nausea and vomiting.        See above, no further issues   Genitourinary: Negative for dysuria, frequency, hematuria and vaginal bleeding.   Musculoskeletal: Negative for gait problem, joint swelling and myalgias.   Skin: Negative for rash.   Neurological: Negative for dizziness, weakness, light-headedness and headaches.   Hematological: Negative for adenopathy. Does not bruise/bleed easily.   Psychiatric/Behavioral: Negative  for confusion, hallucinations, sleep disturbance and suicidal ideas.       Objective:      Physical Exam   Constitutional: She is oriented to person, place, and time. She appears well-developed and well-nourished.   HENT:   Head: Normocephalic and atraumatic.   Right Ear: External ear normal.   Left Ear: External ear normal.   Nose: Nose normal.   Mouth/Throat: Oropharynx is clear and moist. No oropharyngeal exudate.   Eyes: Conjunctivae and EOM are normal. No scleral icterus.   Neck: Normal range of motion. Neck supple. No JVD present. No thyromegaly present.   Cardiovascular: Normal rate, regular rhythm, normal heart sounds and intact distal pulses. Exam reveals no gallop.   No murmur heard.  Pulmonary/Chest: Effort normal and breath sounds normal. No respiratory distress. She has no wheezes.   Abdominal: Soft. Bowel sounds are normal. She exhibits no distension and no mass. There is no tenderness. There is no rebound and no guarding.   Musculoskeletal: Normal range of motion. She exhibits no edema or tenderness.   Lymphadenopathy:     She has no cervical adenopathy.   Neurological: She is alert and oriented to person, place, and time. She displays normal reflexes. No cranial nerve deficit. Coordination normal.   Skin: Skin is warm. No rash noted. No erythema.   Psychiatric: She has a normal mood and affect. Her behavior is normal. Judgment and thought content normal.   Nursing note and vitals reviewed.      Assessment:       1. Annual physical exam    2. Liver mass: see MRI, stable [id number], also 2018    3. Hemangioma of liver    4. 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 Cardiac enlargement: Echo 2014 normal cardiac chamber size with EF 55%        Plan:         Melly was seen today for annual exam.    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

## 2020-03-03 ENCOUNTER — PATIENT MESSAGE (OUTPATIENT)
Dept: INTERNAL MEDICINE | Facility: CLINIC | Age: 40
End: 2020-03-03

## 2020-03-08 ENCOUNTER — PATIENT MESSAGE (OUTPATIENT)
Dept: INTERNAL MEDICINE | Facility: CLINIC | Age: 40
End: 2020-03-08

## 2020-03-08 DIAGNOSIS — R11.2 NAUSEA, VOMITING AND DIARRHEA: ICD-10-CM

## 2020-03-08 DIAGNOSIS — R19.7 NAUSEA, VOMITING AND DIARRHEA: ICD-10-CM

## 2020-03-08 DIAGNOSIS — R19.7 DIARRHEA, UNSPECIFIED TYPE: Primary | ICD-10-CM

## 2020-03-09 NOTE — TELEPHONE ENCOUNTER
Please let her know that I would like for her to have a HIDA scan to check for the possibility gallbladder disease, orders in, please schedule and let me know.  Thank you

## 2020-03-16 ENCOUNTER — LAB VISIT (OUTPATIENT)
Dept: LAB | Facility: HOSPITAL | Age: 40
End: 2020-03-16
Attending: INTERNAL MEDICINE
Payer: COMMERCIAL

## 2020-03-16 DIAGNOSIS — R19.7 DIARRHEA, UNSPECIFIED TYPE: ICD-10-CM

## 2020-03-16 LAB
CRYPTOSP AG STL QL IA: NEGATIVE
G LAMBLIA AG STL QL IA: NEGATIVE

## 2020-03-16 PROCEDURE — 87329 GIARDIA AG IA: CPT

## 2020-03-16 PROCEDURE — 87046 STOOL CULTR AEROBIC BACT EA: CPT

## 2020-03-16 PROCEDURE — 87045 FECES CULTURE AEROBIC BACT: CPT

## 2020-03-16 PROCEDURE — 87177 OVA AND PARASITES SMEARS: CPT

## 2020-03-16 PROCEDURE — 30000890 MAYO MISCELLANEOUS TEST (REFLEX)

## 2020-03-16 PROCEDURE — 87427 SHIGA-LIKE TOXIN AG IA: CPT

## 2020-03-17 LAB
E COLI SXT1 STL QL IA: NEGATIVE
E COLI SXT2 STL QL IA: NEGATIVE

## 2020-03-18 ENCOUNTER — PATIENT MESSAGE (OUTPATIENT)
Dept: INTERNAL MEDICINE | Facility: CLINIC | Age: 40
End: 2020-03-18

## 2020-03-18 DIAGNOSIS — R11.0 NAUSEA: Primary | ICD-10-CM

## 2020-03-18 LAB — MAYO MISCELLANEOUS RESULT (REF): NORMAL

## 2020-03-19 ENCOUNTER — PATIENT MESSAGE (OUTPATIENT)
Dept: INTERNAL MEDICINE | Facility: CLINIC | Age: 40
End: 2020-03-19

## 2020-03-19 LAB — BACTERIA STL CULT: NORMAL

## 2020-03-19 RX ORDER — ONDANSETRON 4 MG/1
4 TABLET, FILM COATED ORAL EVERY 12 HOURS PRN
Qty: 10 TABLET | Refills: 0 | Status: ON HOLD | OUTPATIENT
Start: 2020-03-19 | End: 2020-05-03 | Stop reason: HOSPADM

## 2020-03-19 NOTE — TELEPHONE ENCOUNTER
Can we schedule her for an appointment for ultrasound on March 27th?  orders in    Also, can she do labs today or tomorrow, nonfasting, any location is fine.  Orders are in, thanks

## 2020-03-20 ENCOUNTER — LAB VISIT (OUTPATIENT)
Dept: LAB | Facility: HOSPITAL | Age: 40
End: 2020-03-20
Attending: INTERNAL MEDICINE
Payer: COMMERCIAL

## 2020-03-20 DIAGNOSIS — R11.0 NAUSEA: ICD-10-CM

## 2020-03-20 LAB
ALBUMIN SERPL BCP-MCNC: 4.6 G/DL (ref 3.5–5.2)
ALP SERPL-CCNC: 61 U/L (ref 55–135)
ALT SERPL W/O P-5'-P-CCNC: 31 U/L (ref 10–44)
AMYLASE SERPL-CCNC: 83 U/L (ref 20–110)
ANION GAP SERPL CALC-SCNC: 11 MMOL/L (ref 8–16)
AST SERPL-CCNC: 25 U/L (ref 10–40)
BASOPHILS # BLD AUTO: 0.03 K/UL (ref 0–0.2)
BASOPHILS NFR BLD: 0.7 % (ref 0–1.9)
BILIRUB SERPL-MCNC: 1.4 MG/DL (ref 0.1–1)
BUN SERPL-MCNC: 11 MG/DL (ref 6–20)
CALCIUM SERPL-MCNC: 10.2 MG/DL (ref 8.7–10.5)
CHLORIDE SERPL-SCNC: 105 MMOL/L (ref 95–110)
CO2 SERPL-SCNC: 27 MMOL/L (ref 23–29)
CREAT SERPL-MCNC: 0.9 MG/DL (ref 0.5–1.4)
DIFFERENTIAL METHOD: ABNORMAL
EOSINOPHIL # BLD AUTO: 0.1 K/UL (ref 0–0.5)
EOSINOPHIL NFR BLD: 2.2 % (ref 0–8)
ERYTHROCYTE [DISTWIDTH] IN BLOOD BY AUTOMATED COUNT: 11.6 % (ref 11.5–14.5)
EST. GFR  (AFRICAN AMERICAN): >60 ML/MIN/1.73 M^2
EST. GFR  (NON AFRICAN AMERICAN): >60 ML/MIN/1.73 M^2
GLUCOSE SERPL-MCNC: 84 MG/DL (ref 70–110)
HCT VFR BLD AUTO: 44.9 % (ref 37–48.5)
HGB BLD-MCNC: 14 G/DL (ref 12–16)
IMM GRANULOCYTES # BLD AUTO: 0.01 K/UL (ref 0–0.04)
IMM GRANULOCYTES NFR BLD AUTO: 0.2 % (ref 0–0.5)
LIPASE SERPL-CCNC: 36 U/L (ref 4–60)
LYMPHOCYTES # BLD AUTO: 2 K/UL (ref 1–4.8)
LYMPHOCYTES NFR BLD: 43 % (ref 18–48)
MCH RBC QN AUTO: 29.5 PG (ref 27–31)
MCHC RBC AUTO-ENTMCNC: 31.2 G/DL (ref 32–36)
MCV RBC AUTO: 95 FL (ref 82–98)
MONOCYTES # BLD AUTO: 0.4 K/UL (ref 0.3–1)
MONOCYTES NFR BLD: 9.7 % (ref 4–15)
NEUTROPHILS # BLD AUTO: 2 K/UL (ref 1.8–7.7)
NEUTROPHILS NFR BLD: 44.2 % (ref 38–73)
NRBC BLD-RTO: 0 /100 WBC
PLATELET # BLD AUTO: 277 K/UL (ref 150–350)
PMV BLD AUTO: 9.8 FL (ref 9.2–12.9)
POTASSIUM SERPL-SCNC: 4.5 MMOL/L (ref 3.5–5.1)
PROT SERPL-MCNC: 7.7 G/DL (ref 6–8.4)
RBC # BLD AUTO: 4.74 M/UL (ref 4–5.4)
SODIUM SERPL-SCNC: 143 MMOL/L (ref 136–145)
WBC # BLD AUTO: 4.54 K/UL (ref 3.9–12.7)

## 2020-03-20 PROCEDURE — 80053 COMPREHEN METABOLIC PANEL: CPT

## 2020-03-20 PROCEDURE — 36415 COLL VENOUS BLD VENIPUNCTURE: CPT

## 2020-03-20 PROCEDURE — 82150 ASSAY OF AMYLASE: CPT

## 2020-03-20 PROCEDURE — 85025 COMPLETE CBC W/AUTO DIFF WBC: CPT

## 2020-03-20 PROCEDURE — 83690 ASSAY OF LIPASE: CPT

## 2020-03-22 ENCOUNTER — PATIENT MESSAGE (OUTPATIENT)
Dept: INTERNAL MEDICINE | Facility: CLINIC | Age: 40
End: 2020-03-22

## 2020-03-22 RX ORDER — ESCITALOPRAM OXALATE 10 MG/1
10 TABLET ORAL DAILY
Qty: 30 TABLET | Refills: 6 | Status: SHIPPED | OUTPATIENT
Start: 2020-03-22 | End: 2020-10-01

## 2020-04-14 ENCOUNTER — PATIENT MESSAGE (OUTPATIENT)
Dept: INTERNAL MEDICINE | Facility: CLINIC | Age: 40
End: 2020-04-14

## 2020-04-14 DIAGNOSIS — R05.9 COUGH: Primary | ICD-10-CM

## 2020-04-15 ENCOUNTER — LAB VISIT (OUTPATIENT)
Dept: INTERNAL MEDICINE | Facility: CLINIC | Age: 40
End: 2020-04-15
Payer: COMMERCIAL

## 2020-04-15 ENCOUNTER — PATIENT MESSAGE (OUTPATIENT)
Dept: INTERNAL MEDICINE | Facility: CLINIC | Age: 40
End: 2020-04-15

## 2020-04-15 DIAGNOSIS — R05.9 COUGH: ICD-10-CM

## 2020-04-15 LAB — SARS-COV-2 RNA RESP QL NAA+PROBE: NOT DETECTED

## 2020-04-15 PROCEDURE — U0002 COVID-19 LAB TEST NON-CDC: HCPCS

## 2020-04-16 ENCOUNTER — PATIENT MESSAGE (OUTPATIENT)
Dept: INTERNAL MEDICINE | Facility: CLINIC | Age: 40
End: 2020-04-16

## 2020-04-16 ENCOUNTER — TELEPHONE (OUTPATIENT)
Dept: RADIOLOGY | Facility: HOSPITAL | Age: 40
End: 2020-04-16

## 2020-04-17 ENCOUNTER — PATIENT MESSAGE (OUTPATIENT)
Dept: INTERNAL MEDICINE | Facility: CLINIC | Age: 40
End: 2020-04-17

## 2020-04-19 ENCOUNTER — PATIENT MESSAGE (OUTPATIENT)
Dept: INTERNAL MEDICINE | Facility: CLINIC | Age: 40
End: 2020-04-19

## 2020-04-21 ENCOUNTER — PATIENT MESSAGE (OUTPATIENT)
Dept: INTERNAL MEDICINE | Facility: CLINIC | Age: 40
End: 2020-04-21

## 2020-04-21 ENCOUNTER — OFFICE VISIT (OUTPATIENT)
Dept: INTERNAL MEDICINE | Facility: CLINIC | Age: 40
End: 2020-04-21
Payer: COMMERCIAL

## 2020-04-21 DIAGNOSIS — Z01.84 ANTIBODY RESPONSE EXAMINATION: ICD-10-CM

## 2020-04-21 DIAGNOSIS — R43.0 ANOSMIA: Primary | ICD-10-CM

## 2020-04-21 DIAGNOSIS — R50.9 FEVER, UNSPECIFIED FEVER CAUSE: ICD-10-CM

## 2020-04-21 PROCEDURE — 99214 OFFICE O/P EST MOD 30 MIN: CPT | Mod: 95,,, | Performed by: INTERNAL MEDICINE

## 2020-04-21 PROCEDURE — 99214 PR OFFICE/OUTPT VISIT, EST, LEVL IV, 30-39 MIN: ICD-10-PCS | Mod: 95,,, | Performed by: INTERNAL MEDICINE

## 2020-04-21 NOTE — PROGRESS NOTES
Telemedicine Video Visit    The patient location is:  Patient Home   The chief complaint leading to consultation is: loss of smell  Visit type: Virtual visit with synchronous audio and video  Total time spent with patient: 20 minutes  Each patient to whom he or she provides medical services by telemedicine is:  (1) informed of the relationship between the physician and patient and the respective role of any other health care provider with respect to management of the patient; and (2) notified that he or she may decline to receive medical services by telemedicine and may withdraw from such care at any time.     Slight headaches, seems to be better.  Diarrhea over the weekend.  Had fever last week, not currently.  No myalgias.  Loss of smell and taste.    Tried Zyrtec no help.    She will be out of work all next week.    BP 96/68, heart rate    Appetite diminished.  No vomiting.      Rare cough, slightly dry.  No SOB or smothering sensation.    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 pylori infection: tx 2014 stool negative    Hemangioma of liver     Review of Systems   Constitutional: Positive for fever and malaise/fatigue. Negative for chills and weight loss.        Fever last week has resolved   HENT:        Anosmia   Respiratory: Positive for cough. Negative for shortness of breath and wheezing.         Minimal cough  No SOB   Cardiovascular: Negative for chest pain.   Gastrointestinal:        Loose stools, better at this time  Decreased appetite   Neurological: Negative for weakness.     Physical Exam   Constitutional: She is oriented to person, place, and time. She appears well-developed and well-nourished. No distress.   HENT:   Head:  Normocephalic and atraumatic.   Pulmonary/Chest: Effort normal and breath sounds normal.   Speaking normally, full sentences no respiratory distress   Neurological: She is alert and oriented to person, place, and time. No cranial nerve deficit.   Psychiatric: She has a normal mood and affect. Her behavior is normal. Judgment and thought content normal.     Diagnoses and all orders for this visit:    Anosmia    Fever, unspecified fever cause     Very long discussion.  Despite the fact that she tested negative for COVID, I think she probably has had the virus.  She has classic symptoms of fever, slight cough, and nausea, decreased appetite, and loose stools.  She is eating and drinking adequately at home, no vomiting.  She does not feel dehydrated.  She is not having any shortness of breath.  I have advised her to get a pulse oximeter and let me know about her oxygenation level.  Alarm symptoms and reflects reviewed at length.  She does not want to be enrolled in the symptom track her but will contact me daily by my [facility] or sooner with problems in the interim.    Natural history reviewed, she will be out of work for week which should be adequate.    Patient counseled for over 25 minutes during this appt, all questions answered,  chart reviewed and care coordinated.    I would be surprised if her COVID antibody testing did not return positive when that is available.

## 2020-04-22 ENCOUNTER — HOSPITAL ENCOUNTER (EMERGENCY)
Facility: HOSPITAL | Age: 40
Discharge: HOME | End: 2020-04-22
Attending: EMERGENCY MEDICINE
Payer: COMMERCIAL

## 2020-04-22 ENCOUNTER — TELEPHONE (OUTPATIENT)
Dept: INFECTIOUS DISEASES | Facility: CLINIC | Age: 40
End: 2020-04-22

## 2020-04-22 VITALS
RESPIRATION RATE: 18 BRPM | SYSTOLIC BLOOD PRESSURE: 120 MMHG | DIASTOLIC BLOOD PRESSURE: 70 MMHG | WEIGHT: 130 LBS | OXYGEN SATURATION: 99 % | BODY MASS INDEX: 17.61 KG/M2 | TEMPERATURE: 98 F | HEIGHT: 72 IN | HEART RATE: 54 BPM

## 2020-04-22 DIAGNOSIS — R07.9 CHEST PAIN: ICD-10-CM

## 2020-04-22 DIAGNOSIS — U07.1 COVID-19 VIRUS INFECTION: Primary | ICD-10-CM

## 2020-04-22 LAB
ALBUMIN SERPL BCP-MCNC: 4.3 G/DL (ref 3.5–5.2)
ALP SERPL-CCNC: 68 U/L (ref 55–135)
ALT SERPL W/O P-5'-P-CCNC: 27 U/L (ref 10–44)
ANION GAP SERPL CALC-SCNC: 12 MMOL/L (ref 8–16)
AST SERPL-CCNC: 23 U/L (ref 10–40)
BASOPHILS # BLD AUTO: 0.01 K/UL (ref 0–0.2)
BASOPHILS NFR BLD: 0.4 % (ref 0–1.9)
BILIRUB SERPL-MCNC: 0.8 MG/DL (ref 0.1–1)
BNP SERPL-MCNC: <10 PG/ML (ref 0–99)
BUN SERPL-MCNC: 10 MG/DL (ref 6–20)
CALCIUM SERPL-MCNC: 10 MG/DL (ref 8.7–10.5)
CHLORIDE SERPL-SCNC: 108 MMOL/L (ref 95–110)
CK SERPL-CCNC: 75 U/L (ref 20–180)
CO2 SERPL-SCNC: 22 MMOL/L (ref 23–29)
CREAT SERPL-MCNC: 0.9 MG/DL (ref 0.5–1.4)
CRP SERPL-MCNC: 1.6 MG/L (ref 0–8.2)
D DIMER PPP IA.FEU-MCNC: 0.47 MG/L FEU
DIFFERENTIAL METHOD: ABNORMAL
EOSINOPHIL # BLD AUTO: 0 K/UL (ref 0–0.5)
EOSINOPHIL NFR BLD: 1.5 % (ref 0–8)
ERYTHROCYTE [DISTWIDTH] IN BLOOD BY AUTOMATED COUNT: 11.6 % (ref 11.5–14.5)
EST. GFR  (AFRICAN AMERICAN): >60 ML/MIN/1.73 M^2
EST. GFR  (NON AFRICAN AMERICAN): >60 ML/MIN/1.73 M^2
FERRITIN SERPL-MCNC: 240 NG/ML (ref 20–300)
GLUCOSE SERPL-MCNC: 85 MG/DL (ref 70–110)
HCT VFR BLD AUTO: 43.2 % (ref 37–48.5)
HGB BLD-MCNC: 14 G/DL (ref 12–16)
IMM GRANULOCYTES # BLD AUTO: 0.01 K/UL (ref 0–0.04)
IMM GRANULOCYTES NFR BLD AUTO: 0.4 % (ref 0–0.5)
LDH SERPL L TO P-CCNC: 207 U/L (ref 110–260)
LYMPHOCYTES # BLD AUTO: 1.3 K/UL (ref 1–4.8)
LYMPHOCYTES NFR BLD: 48.2 % (ref 18–48)
MCH RBC QN AUTO: 30 PG (ref 27–31)
MCHC RBC AUTO-ENTMCNC: 32.4 G/DL (ref 32–36)
MCV RBC AUTO: 93 FL (ref 82–98)
MONOCYTES # BLD AUTO: 0.2 K/UL (ref 0.3–1)
MONOCYTES NFR BLD: 8.4 % (ref 4–15)
NEUTROPHILS # BLD AUTO: 1.1 K/UL (ref 1.8–7.7)
NEUTROPHILS NFR BLD: 41.1 % (ref 38–73)
NRBC BLD-RTO: 0 /100 WBC
PLATELET # BLD AUTO: 186 K/UL (ref 150–350)
PMV BLD AUTO: 9.7 FL (ref 9.2–12.9)
POTASSIUM SERPL-SCNC: 4 MMOL/L (ref 3.5–5.1)
PROCALCITONIN SERPL IA-MCNC: 0.02 NG/ML
PROT SERPL-MCNC: 7.9 G/DL (ref 6–8.4)
RBC # BLD AUTO: 4.66 M/UL (ref 4–5.4)
SARS-COV-2 RDRP RESP QL NAA+PROBE: POSITIVE
SODIUM SERPL-SCNC: 142 MMOL/L (ref 136–145)
TROPONIN I SERPL DL<=0.01 NG/ML-MCNC: <0.006 NG/ML (ref 0–0.03)
WBC # BLD AUTO: 2.74 K/UL (ref 3.9–12.7)

## 2020-04-22 PROCEDURE — 83615 LACTATE (LD) (LDH) ENZYME: CPT

## 2020-04-22 PROCEDURE — 83880 ASSAY OF NATRIURETIC PEPTIDE: CPT

## 2020-04-22 PROCEDURE — 99285 EMERGENCY DEPT VISIT HI MDM: CPT | Mod: 25

## 2020-04-22 PROCEDURE — 80053 COMPREHEN METABOLIC PANEL: CPT

## 2020-04-22 PROCEDURE — U0002 COVID-19 LAB TEST NON-CDC: HCPCS

## 2020-04-22 PROCEDURE — 85025 COMPLETE CBC W/AUTO DIFF WBC: CPT

## 2020-04-22 PROCEDURE — 85379 FIBRIN DEGRADATION QUANT: CPT

## 2020-04-22 PROCEDURE — 86140 C-REACTIVE PROTEIN: CPT

## 2020-04-22 PROCEDURE — 93005 ELECTROCARDIOGRAM TRACING: CPT

## 2020-04-22 PROCEDURE — 84484 ASSAY OF TROPONIN QUANT: CPT

## 2020-04-22 PROCEDURE — 93010 ELECTROCARDIOGRAM REPORT: CPT | Mod: ,,, | Performed by: INTERNAL MEDICINE

## 2020-04-22 PROCEDURE — 82728 ASSAY OF FERRITIN: CPT

## 2020-04-22 PROCEDURE — 93010 EKG 12-LEAD: ICD-10-PCS | Mod: ,,, | Performed by: INTERNAL MEDICINE

## 2020-04-22 PROCEDURE — 84145 PROCALCITONIN (PCT): CPT

## 2020-04-22 PROCEDURE — 25000003 PHARM REV CODE 250: Performed by: EMERGENCY MEDICINE

## 2020-04-22 PROCEDURE — 82550 ASSAY OF CK (CPK): CPT

## 2020-04-22 PROCEDURE — 96360 HYDRATION IV INFUSION INIT: CPT

## 2020-04-22 RX ORDER — AZITHROMYCIN 250 MG/1
250 TABLET, FILM COATED ORAL DAILY
Qty: 6 TABLET | Refills: 0 | Status: ON HOLD | OUTPATIENT
Start: 2020-04-22 | End: 2020-05-03 | Stop reason: HOSPADM

## 2020-04-22 RX ORDER — ALBUTEROL SULFATE 90 UG/1
1-2 AEROSOL, METERED RESPIRATORY (INHALATION) EVERY 6 HOURS PRN
Qty: 18 G | Refills: 0 | Status: SHIPPED | OUTPATIENT
Start: 2020-04-22 | End: 2020-10-01

## 2020-04-22 RX ADMIN — SODIUM CHLORIDE 500 ML: 0.9 INJECTION, SOLUTION INTRAVENOUS at 11:04

## 2020-04-22 NOTE — DISCHARGE INSTRUCTIONS
Thank you for coming to our Emergency Department today. It is important to remember that some problems are difficult to diagnose and may not be found during your first visit. Be sure to follow up with your primary care doctor and review any labs/imaging that was performed with them. If you do not have a primary care doctor, you may contact the one listed on your discharge paperwork or you may also call the [facility] Appointment Desk at [phone] to schedule an appointment with one.     All medications may potentially have side effects and it is impossible to predict which medications may give you side effects. If you feel that you are having a negative effect of any medication you should immediately stop taking them and seek medical attention.    Return to the ER with any questions/concerns, new/concerning symptoms, worsening or failure to improve. Do not drive or make any important decisions for 24 hours if you have received any pain medications, sedatives or mood altering drugs during your ER visit.

## 2020-04-22 NOTE — TELEPHONE ENCOUNTER
Called pt on behalf of [employer] for update on condition. Seen in ED today for SOB and cough. covid +. All questions were answered and return to work policies were reviewed. Pt instructed to f/u with [facility] once criteria are met.    Your test was POSITIVE for COVID-19 (coronavirus).       Prevention steps for patients with confirmed COVID-19       Stay home and stay away from family members and friends. The CDC says, you can leave home after these three things have happened: 1) You have had no fever for at least 72 hours (that is three full days of no fever without the use of medicine that reduces fevers) 2) AND other symptoms have improved (for example, when your cough or shortness of breath have improved) 3) AND at least 7 days have passed since your symptoms first appeared.   Separate yourself from other people and animals in your home.   Call ahead before visiting your doctor.   Wear a facemask.   Cover your coughs and sneezes.   Wash your hands often with soap and water; hand  can be used, too.   Avoid sharing personal household items.   Wipe down surfaces used daily.   Monitor your symptoms. Seek prompt medical attention if your illness is worsening (e.g., difficulty breathing).    Before seeking care, call your healthcare provider.   If you have a medical emergency and need to call 911, notify the dispatch personnel that you have, or are being evaluated for COVID-19. If possible, put on a facemask before emergency medical services arrive.        Recommended precautions for household members, intimate partners, and caregivers in a home setting of a patient with symptomatic laboratory-confirmed COVID-19 or a patient under investigation.  Household members, intimate partners, and caregivers in the home setting awaiting tests results have close contact with a person with symptomatic, laboratory-confirmed COVID-19 or a person under investigation. Close contacts should monitor their health;  they should call their provider right away if they develop symptoms suggestive of COVID-19 (e.g., fever, cough, shortness of breath).    Close contacts should also follow these recommendations:   Make sure that you understand and can help the patient follow their provider's instructions for medication(s) and care. You should help the patient with basic needs in the home and provide support for getting groceries, prescriptions, and other personal needs.   Monitor the patient's symptoms. If the patient is getting sicker, call his or her healthcare provider and tell them that the patient has laboratory-confirmed COVID-19. If the patient has a medical emergency and you need to call 911, notify the dispatch personnel that the patient has, or is being evaluated for COVID-19.   Household members should stay in another room or be  from the patient. Household members should use a separate bedroom and bathroom, if available.   Prohibit visitors.   Household members should care for any pets in the home.   Make sure that shared spaces in the home have good air flow, such as by an air conditioner or an opened window, weather permitting.   Perform hand hygiene frequently. Wash your hands often with soap and water for at least 20 seconds or use an alcohol-based hand  (that contains > 60% alcohol) covering all surfaces of your hands and rubbing them together until they feel dry. Soap and water should be used preferentially.   Avoid touching your eyes, nose, and mouth.   The patient should wear a facemask. If the patient is not able to wear a facemask (for example, because it causes trouble breathing), caregivers should wear a mask when they are in the same room as the patient.   Wear a disposable facemask and gloves when you touch or have contact with the patient's blood, stool, or body fluids, such as saliva, sputum, nasal mucus, vomit, urine.  o Throw out disposable facemasks and gloves after using  them. Do not reuse.  o When removing personal protective equipment, first remove and dispose of gloves. Then, immediately clean your hands with soap and water or alcohol-based hand . Next, remove and dispose of facemask, and immediately clean your hands again with soap and water or alcohol-based hand .   You should not share dishes, drinking glasses, cups, eating utensils, towels, bedding, or other items with the patient. After the patient uses these items, you should wash them thoroughly (see below Wash laundry thoroughly).   Clean all high-touch surfaces, such as counters, tabletops, doorknobs, bathroom fixtures, toilets, phones, keyboards, tablets, and bedside tables, every day. Also, clean any surfaces that may have blood, stool, or body fluids on them.   Use a household cleaning spray or wipe, according to the label instructions. Labels contain instructions for safe and effective use of the cleaning product including precautions you should take when applying the product, such as wearing gloves and making sure you have good ventilation during use of the product.   Wash laundry thoroughly.  o Immediately remove and wash clothes or bedding that have blood, stool, or body fluids on them.  o Wear disposable gloves while handling soiled items and keep soiled items away from your body. Clean your hands (with soap and water or an alcohol-based hand ) immediately after removing your gloves.  o Read and follow directions on labels of laundry or clothing items and detergent. In general, using a normal laundry detergent according to washing machine instructions and dry thoroughly using the warmest temperatures recommended on the clothing label.   Place all used disposable gloves, facemasks, and other contaminated items in a lined container before disposing of them with other household waste. Clean your hands (with soap and water or an alcohol-based hand ) immediately after  handling these items. Soap and water should be used preferentially if hands are visibly dirty.   Discuss any additional questions with your state or local health department or healthcare provider. Check available hours when contacting your local health department.    For more information see CDC link below.      https://www.cdc.gov/coronavirus/2019-ncov/hcp/guidance-prevent-spread.html#precautions        Sources:  [facility], [facility] and [facility]     Sincerely,     Melanie Olson NP

## 2020-04-22 NOTE — ED TRIAGE NOTES
Pt arrived to ED with c/o of chest tightness, SOB, Diarrheal that started on Fri but worsen this morning.

## 2020-04-22 NOTE — ED PROVIDER NOTES
Encounter Date: 4/22/2020    SCRIBE #1 NOTE: I, Mena Araujo, am scribing for, and in the presence of,  Christine Spain MD. I have scribed the following portions of the note - Other sections scribed: HPI,ROS,PE.       History     Chief Complaint   Patient presents with    Shortness of Breath     pt. reports she has SOB, chest tightness and inability to smell/taste. 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 pt. states last time she has a fever was on friday. pt. reports she was tested for COVID and it was - however MD believes geeta is +     39-year-old female presenting with constant anterior chest pain/tightness that started this morning. Patient reports intermittent fever, nausea, loose stool, decreased appetite, and decreased smell/taste for one week. She endorses a non productive cough and shortness of breath. Chest pain is worsened by coughing. 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 No modifying factors.     The history is provided by the patient. No  was used.     Review of patient's allergies indicates:   Allergen Reactions    No known drug allergies      Past Medical History:   Diagnosis Date    Deviated septum 2011    Hypertrophy of inferior nasal turbinate     Liver mass     Nasal congestion 2011    Premature menopause      Past Surgical History:   Procedure Laterality Date    TONSILLECTOMY      UMBILICAL HERNIA REPAIR       Family History   Problem Relation Age of Onset    Diabetes Mother     Liver disease Mother         Fatty liver    Hypertension Mother     Macular degeneration Father     Diabetes Father     Hypertension Father     Hyperlipidemia Father     No Known Problems Sister     No Known Problems Brother     No Known Problems Daughter     No Known Problems Brother     No Known Problems Maternal Aunt     No Known Problems Maternal Uncle     No Known Problems Paternal Aunt     No Known Problems Paternal Uncle     No Known  Problems Maternal Grandmother     No Known Problems Maternal Grandfather     No Known Problems Paternal Grandmother     No Known Problems Paternal Grandfather     Amblyopia Neg Hx     Blindness Neg Hx     Cancer Neg Hx     Cataracts Neg Hx     Glaucoma Neg Hx     Retinal detachment Neg Hx     Strabismus Neg Hx     Stroke Neg Hx     Thyroid disease Neg Hx     Heart disease Neg Hx     Melanoma Neg Hx      Social History     Tobacco Use    Smoking status: Never Smoker    Smokeless tobacco: Never Used   Substance Use Topics    Alcohol use: Yes     Comment: rare    Drug use: No     Review of Systems   Constitutional: Positive for appetite change and fever. Negative for chills.   HENT: Negative for congestion and sore throat.         (+)decreased smell/taste   Eyes: Negative for visual disturbance.   Respiratory: Positive for cough, chest tightness and shortness of breath.    Cardiovascular: Positive for chest pain.   Gastrointestinal: Positive for diarrhea and nausea. Negative for abdominal pain and vomiting.   Genitourinary: Negative for dysuria and vaginal discharge.   Musculoskeletal: Negative for back pain.   Skin: Negative for rash.   Neurological: Negative for headaches.   Psychiatric/Behavioral: Negative for confusion.       Physical Exam     Initial Vitals [04/22/20 1045]   BP Pulse Resp Temp SpO2   138/80 79 (!) 22 97.8 °F (36.6 °C) 100 %      MAP       --         Physical Exam    Nursing note and vitals reviewed.  Constitutional: She is not diaphoretic. No distress.   HENT:   Head: Normocephalic and atraumatic.   Mouth/Throat: Oropharynx is clear and moist.   Eyes: EOM are normal. Pupils are equal, round, and reactive to light. No scleral icterus.   Neck: Normal range of motion. Neck supple. No JVD present.   Cardiovascular: Normal rate, regular rhythm and intact distal pulses.   Pulmonary/Chest: Breath sounds normal. No stridor. No respiratory distress.   Abdominal: Soft. Bowel sounds are  normal. She exhibits no distension. There is no tenderness.   Musculoskeletal: Normal range of motion. She exhibits no edema or tenderness.   Neurological: She is alert. She has normal strength. No cranial nerve deficit or sensory deficit. GCS score is 15. GCS eye subscore is 4. GCS verbal subscore is 5. GCS motor subscore is 6.   Skin: Skin is warm and dry.   Psychiatric: She has a normal mood and affect.         ED Course   Procedures  Labs Reviewed   CBC W/ AUTO DIFFERENTIAL - Abnormal; Notable for the following components:       Result Value    WBC 2.74 (*)     Gran # (ANC) 1.1 (*)     Mono # 0.2 (*)     Lymph% 48.2 (*)     All other components within normal limits   COMPREHENSIVE METABOLIC PANEL - Abnormal; Notable for the following components:    CO2 22 (*)     All other components within normal limits   SARS-COV-2 RNA AMPLIFICATION, QUAL - Abnormal; Notable for the following components:    SARS-CoV-2 RNA, Amplification, Qual Positive (*)     All other components within normal limits    Narrative:     What symptom criteria does the patient meet?->Difficulty  breathing  What symptom criteria does the patient meet?->Cough   TROPONIN I   B-TYPE NATRIURETIC PEPTIDE   D DIMER, QUANTITATIVE   C-REACTIVE PROTEIN   FERRITIN   LACTATE DEHYDROGENASE   CK   PROCALCITONIN   TROPONIN I     EKG Readings: (Independently Interpreted)   Initial Reading: No STEMI. Rhythm: Sinus Bradycardia. Heart Rate: 57. Ectopy: No Ectopy. Conduction: Normal. ST Segments: Normal ST Segments. T Waves Flipped: AVL. Axis: Right Axis Deviation.     ECG Results          EKG 12-lead (In process)  Result time 04/22/20 13:15:38    In process by Interface, Lab In [facility] (04/22/20 13:15:38)                 Narrative:    Test Reason : R07.9,    Vent. Rate : 071 BPM     Atrial Rate : 071 BPM     P-R Int : 116 ms          QRS Dur : 084 ms      QT Int : 408 ms       P-R-T Axes : 084 093 090 degrees     QTc Int : 443 ms    Age and gender specific  analysis  Normal sinus rhythm with sinus arrhythmia  Possible Left atrial enlargement  Rightward axis  ST elevation consider inferior injury or acute infarct    ACUTE MI / STEMI    Consider right ventricular involvement in acute inferior infarct  Abnormal ECG  When compared with ECG of 25-APR-2014 08:55,  Significant changes have occurred    Referred By: KIRTI   SELF           Confirmed By:                   In process by Interface, Lab In [facility] (04/22/20 13:11:51)                 Narrative:    Test Reason : R07.9,    Vent. Rate : 071 BPM     Atrial Rate : 071 BPM     P-R Int : 116 ms          QRS Dur : 084 ms      QT Int : 408 ms       P-R-T Axes : 084 093 090 degrees     QTc Int : 443 ms    Age and gender specific analysis  Normal sinus rhythm with sinus arrhythmia  Possible Left atrial enlargement  Rightward axis  ST elevation consider inferior injury or acute infarct    ACUTE MI / STEMI    Consider right ventricular involvement in acute inferior infarct  Abnormal ECG      Referred By: KIRTI   SELF           Confirmed By:                             Imaging Results          X-Ray Chest AP Portable (Final result)  Result time 04/22/20 12:08:02    Final result by Kervin Armstrong MD (04/22/20 12:08:02)                 Impression:      No acute cardiopulmonary disease      Electronically signed by: Kervin Armstrong MD  Date:    04/22/2020  Time:    12:08             Narrative:    EXAMINATION:  XR CHEST AP PORTABLE    CLINICAL HISTORY:  Chest Pain;    TECHNIQUE:  Single frontal view of the chest was performed.    COMPARISON:  06/12/2014    FINDINGS:  The heart size and the pulmonary vessels are normal.  Lungs are expanded and clear.  No lung consolidation or pleural fluid is identified.  Skeletal structures are intact without acute finding.                                 Medical Decision Making:   History:   Old Medical Records: I decided to obtain old medical records.  Old Records Summarized: other  records and records from clinic visits.       <> Summary of Records: Had tele health appointment with PMD yesterday.  Recent negative COVID testing, ordered for COVID antibiotic testing.  Differential Diagnosis:   Differential diagnosis includes but it not limited to: COVID-19, pneumonia, viral syndrome, ACS, anxiety, PE, musculoskeletal pain.   Independently Interpreted Test(s):   I have ordered and independently interpreted EKG Reading(s) - see prior notes  Clinical Tests:   Lab Tests: Ordered and Reviewed  Radiological Study: Ordered and Reviewed  Medical Tests: Ordered and Reviewed    Additional MDM:   PERC Rule:   Age is greater than or equal to 50 = 0.0  Heart Rate is greater than or equal to 100 = 0.0  SaO2 on room air < 95% = 0.0  Unilateral leg swelling = 0.0  Hemoptysis = 0.0  Recent surgery or trauma = 0.0  Prior PE or DVT =  0.0  Hormone use = 1.0  PERC Score = 1  Heart Score:    History:          Slightly suspicious.  ECG:             Nonspecific repolarisation disturbance  Age:               Less than 45 years  Risk factors: no risk factors known  Troponin:       Less than or equal to normal limit  Final Score: 1             Scribe Attestation:   Scribe #1: I performed the above scribed service and the documentation accurately describes the services I performed. I attest to the accuracy of the note.            ED Course as of Apr 22 1325   Wed Apr 22, 2020   1104 Patient is afebrile and in no acute distress although she appears to feel unwell.  She is not hypoxic, tachycardic or in respiratory distress.  She has no focal abdominal tenderness.  Symptoms concerning for COVID.  Will obtain lab testing and imaging to further evaluate.    [SG]   1258 Patient COVID positive.  Chest x-ray without multifocal infiltrates.  Renal function within normal ranges.  Troponin, BNP, D-dimer negative.  Patient does not have significant electrolyte abnormality.  She is not hypoxic, tachypneic or in respiratory distress.   He is able to ambulate without significant decrease in saturation.  Reassessment she remains clinically stable.  She does not require supplemental oxygen and does not appear to have impending respiratory failure.  Patient is clinically stable for discharge on supportive care and symptomatic management.  Will prescribe course of azithromycin, albuterol inhaler.  Patient reports having Zofran at home as needed. counseled on supportive care, appropriate medication usage, concerning symptoms for which to return to ER and the importance of follow up. Understanding and agreement with treatment plan was expressed.     [SG]      ED Course User Index  [SG] Judit King MD             This chart was completed using dictation software, as a result there may be some transcription errors.     Clinical Impression:       ICD-10-CM ICD-9-CM   1. COVID-19 virus infection U07.1    2. Chest pain R07.9 786.50         Disposition:   Disposition: Discharged  Condition: Stable     ED Disposition Condition    Discharge Stable        ED Prescriptions     Medication Sig Dispense Start Date End Date Auth. Provider    azithromycin (Z-JUSTIN) 250 MG tablet Take 1 tablet (250 mg total) by mouth once daily. Take first 2 tablets together, then 1 every day until finished. 6 tablet 4/22/2020  Judit King MD    albuterol (PROVENTIL/VENTOLIN HFA) 90 mcg/actuation inhaler Inhale 1-2 puffs into the lungs every 6 (six) hours as needed for Wheezing or Shortness of Breath. Rescue 18 g 4/22/2020  Juidt King MD        Follow-up Information     Follow up With Specialties Details Why Contact Info    Melly Sahu MD Internal Medicine Schedule an appointment as soon as possible for a visit   [address] [place] [id number]  [phone]                          I, Judit King , personally performed the services described in this documentation. All medical record entries made by the scribe were at my direction and in my presence. I  have reviewed the chart and agree that the record reflects my personal performance and is accurate and complete.               Judit King MD  04/22/20 [id number]

## 2020-04-23 ENCOUNTER — PATIENT MESSAGE (OUTPATIENT)
Dept: INTERNAL MEDICINE | Facility: CLINIC | Age: 40
End: 2020-04-23

## 2020-04-29 ENCOUNTER — PATIENT MESSAGE (OUTPATIENT)
Dept: INTERNAL MEDICINE | Facility: CLINIC | Age: 40
End: 2020-04-29

## 2020-05-01 ENCOUNTER — PATIENT MESSAGE (OUTPATIENT)
Dept: INTERNAL MEDICINE | Facility: CLINIC | Age: 40
End: 2020-05-01

## 2020-05-01 ENCOUNTER — HOSPITAL ENCOUNTER (OUTPATIENT)
Dept: RADIOLOGY | Facility: HOSPITAL | Age: 40
Discharge: HOME | End: 2020-05-01
Attending: INTERNAL MEDICINE
Payer: COMMERCIAL

## 2020-05-01 ENCOUNTER — HOSPITAL ENCOUNTER (INPATIENT)
Facility: HOSPITAL | Age: 40
LOS: 1 days | Discharge: HOME | DRG: 316 | End: 2020-05-03
Attending: EMERGENCY MEDICINE | Admitting: INTERNAL MEDICINE
Payer: COMMERCIAL

## 2020-05-01 ENCOUNTER — HOSPITAL ENCOUNTER (OUTPATIENT)
Dept: CARDIOLOGY | Facility: CLINIC | Age: 40
Discharge: HOME | End: 2020-05-01
Attending: INTERNAL MEDICINE
Payer: COMMERCIAL

## 2020-05-01 ENCOUNTER — TELEPHONE (OUTPATIENT)
Dept: INTERNAL MEDICINE | Facility: CLINIC | Age: 40
End: 2020-05-01

## 2020-05-01 VITALS
BODY MASS INDEX: 17.61 KG/M2 | WEIGHT: 130 LBS | HEART RATE: 54 BPM | HEIGHT: 72 IN | DIASTOLIC BLOOD PRESSURE: 70 MMHG | SYSTOLIC BLOOD PRESSURE: 120 MMHG

## 2020-05-01 DIAGNOSIS — I31.4 PERICARDIAL EFFUSION WITH CARDIAC TAMPONADE: ICD-10-CM

## 2020-05-01 DIAGNOSIS — R11.2 NAUSEA, VOMITING AND DIARRHEA: ICD-10-CM

## 2020-05-01 DIAGNOSIS — I31.4 CARDIAC TAMPONADE: Primary | ICD-10-CM

## 2020-05-01 DIAGNOSIS — R93.89 ABNORMAL ULTRASOUND: ICD-10-CM

## 2020-05-01 DIAGNOSIS — I31.39 PERICARDIAL EFFUSION: Primary | ICD-10-CM

## 2020-05-01 DIAGNOSIS — R19.7 NAUSEA, VOMITING AND DIARRHEA: ICD-10-CM

## 2020-05-01 DIAGNOSIS — Z20.822 SUSPECTED COVID-19 VIRUS INFECTION: ICD-10-CM

## 2020-05-01 DIAGNOSIS — U07.1 COVID-19 VIRUS INFECTION: ICD-10-CM

## 2020-05-01 DIAGNOSIS — Q24.8 PERICARDIAL CYST: ICD-10-CM

## 2020-05-01 DIAGNOSIS — I31.39 PERICARDIAL EFFUSION WITH CARDIAC TAMPONADE: ICD-10-CM

## 2020-05-01 LAB
ALBUMIN SERPL BCP-MCNC: 4.5 G/DL (ref 3.5–5.2)
ALP SERPL-CCNC: 67 U/L (ref 55–135)
ALT SERPL W/O P-5'-P-CCNC: 23 U/L (ref 10–44)
ANION GAP SERPL CALC-SCNC: 6 MMOL/L (ref 8–16)
ASCENDING AORTA: 2.91 CM
AST SERPL-CCNC: 19 U/L (ref 10–40)
AV INDEX (PROSTH): 0.85
AV MEAN GRADIENT: 3 MMHG
AV PEAK GRADIENT: 5 MMHG
AV VALVE AREA: 2.61 CM2
AV VELOCITY RATIO: 0.78
BASOPHILS # BLD AUTO: 0.03 K/UL (ref 0–0.2)
BASOPHILS NFR BLD: 0.6 % (ref 0–1.9)
BILIRUB SERPL-MCNC: 0.7 MG/DL (ref 0.1–1)
BNP SERPL-MCNC: 11 PG/ML (ref 0–99)
BSA FOR ECHO PROCEDURE: 1.73 M2
BUN SERPL-MCNC: 15 MG/DL (ref 6–20)
CALCIUM SERPL-MCNC: 10.3 MG/DL (ref 8.7–10.5)
CHLORIDE SERPL-SCNC: 107 MMOL/L (ref 95–110)
CK SERPL-CCNC: 102 U/L (ref 20–180)
CO2 SERPL-SCNC: 30 MMOL/L (ref 23–29)
CREAT SERPL-MCNC: 1 MG/DL (ref 0.5–1.4)
CRP SERPL-MCNC: 0.2 MG/L (ref 0–8.2)
CV ECHO LV RWT: 0.37 CM
DIFFERENTIAL METHOD: ABNORMAL
DOP CALC AO PEAK VEL: 1.1 M/S
DOP CALC AO VTI: 19.5 CM
DOP CALC LVOT AREA: 3.1 CM2
DOP CALC LVOT DIAMETER: 1.98 CM
DOP CALC LVOT PEAK VEL: 0.86 M/S
DOP CALC LVOT STROKE VOLUME: 50.84 CM3
DOP CALCLVOT PEAK VEL VTI: 16.52 CM
E WAVE DECELERATION TIME: 159.73 MSEC
E/A RATIO: 1.67
E/E' RATIO: 8.75 M/S
ECHO LV POSTERIOR WALL: 0.81 CM (ref 0.6–1.1)
EOSINOPHIL # BLD AUTO: 0.1 K/UL (ref 0–0.5)
EOSINOPHIL NFR BLD: 1.5 % (ref 0–8)
ERYTHROCYTE [DISTWIDTH] IN BLOOD BY AUTOMATED COUNT: 11.7 % (ref 11.5–14.5)
EST. GFR  (AFRICAN AMERICAN): >60 ML/MIN/1.73 M^2
EST. GFR  (NON AFRICAN AMERICAN): >60 ML/MIN/1.73 M^2
FERRITIN SERPL-MCNC: 205 NG/ML (ref 20–300)
FRACTIONAL SHORTENING: 38 % (ref 28–44)
GLUCOSE SERPL-MCNC: 92 MG/DL (ref 70–110)
HCT VFR BLD AUTO: 45.1 % (ref 37–48.5)
HGB BLD-MCNC: 14.1 G/DL (ref 12–16)
IMM GRANULOCYTES # BLD AUTO: 0.01 K/UL (ref 0–0.04)
IMM GRANULOCYTES NFR BLD AUTO: 0.2 % (ref 0–0.5)
INTERVENTRICULAR SEPTUM: 0.65 CM (ref 0.6–1.1)
LA MAJOR: 4.91 CM
LA MINOR: 4.48 CM
LA WIDTH: 3.6 CM
LACTATE SERPL-SCNC: 0.8 MMOL/L (ref 0.5–2.2)
LDH SERPL L TO P-CCNC: 155 U/L (ref 110–260)
LEFT ATRIUM SIZE: 2.6 CM
LEFT ATRIUM VOLUME INDEX: 21 ML/M2
LEFT ATRIUM VOLUME: 37.28 CM3
LEFT INTERNAL DIMENSION IN SYSTOLE: 2.7 CM (ref 2.1–4)
LEFT VENTRICLE DIASTOLIC VOLUME INDEX: 48.45 ML/M2
LEFT VENTRICLE DIASTOLIC VOLUME: 85.94 ML
LEFT VENTRICLE MASS INDEX: 54 G/M2
LEFT VENTRICLE SYSTOLIC VOLUME INDEX: 15.2 ML/M2
LEFT VENTRICLE SYSTOLIC VOLUME: 26.92 ML
LEFT VENTRICULAR INTERNAL DIMENSION IN DIASTOLE: 4.36 CM (ref 3.5–6)
LEFT VENTRICULAR MASS: 95.66 G
LV LATERAL E/E' RATIO: 6.36 M/S
LV SEPTAL E/E' RATIO: 14 M/S
LYMPHOCYTES # BLD AUTO: 1.5 K/UL (ref 1–4.8)
LYMPHOCYTES NFR BLD: 28.1 % (ref 18–48)
MCH RBC QN AUTO: 29.8 PG (ref 27–31)
MCHC RBC AUTO-ENTMCNC: 31.3 G/DL (ref 32–36)
MCV RBC AUTO: 95 FL (ref 82–98)
MONOCYTES # BLD AUTO: 0.4 K/UL (ref 0.3–1)
MONOCYTES NFR BLD: 7.8 % (ref 4–15)
MV PEAK A VEL: 0.42 M/S
MV PEAK E VEL: 0.7 M/S
NEUTROPHILS # BLD AUTO: 3.3 K/UL (ref 1.8–7.7)
NEUTROPHILS NFR BLD: 61.8 % (ref 38–73)
NRBC BLD-RTO: 0 /100 WBC
PISA TR MAX VEL: 2.1 M/S
PLATELET # BLD AUTO: 314 K/UL (ref 150–350)
PMV BLD AUTO: 9.8 FL (ref 9.2–12.9)
POTASSIUM SERPL-SCNC: 4.3 MMOL/L (ref 3.5–5.1)
PROCALCITONIN SERPL IA-MCNC: 0.02 NG/ML
PROT SERPL-MCNC: 8.2 G/DL (ref 6–8.4)
PULM VEIN S/D RATIO: 0.94
PV PEAK D VEL: 0.47 M/S
PV PEAK S VEL: 0.44 M/S
RA MAJOR: 4.2 CM
RA PRESSURE: 8 MMHG
RA WIDTH: 3.17 CM
RBC # BLD AUTO: 4.73 M/UL (ref 4–5.4)
RIGHT VENTRICULAR END-DIASTOLIC DIMENSION: 2.5 CM
RV TISSUE DOPPLER FREE WALL SYSTOLIC VELOCITY 1 (APICAL 4 CHAMBER VIEW): 16.62 CM/S
SARS-COV-2 RDRP RESP QL NAA+PROBE: NEGATIVE
SINUS: 2.63 CM
SODIUM SERPL-SCNC: 143 MMOL/L (ref 136–145)
STJ: 2.92 CM
TDI LATERAL: 0.11 M/S
TDI SEPTAL: 0.05 M/S
TDI: 0.08 M/S
TR MAX PG: 18 MMHG
TROPONIN I SERPL DL<=0.01 NG/ML-MCNC: <0.006 NG/ML (ref 0–0.03)
TV REST PULMONARY ARTERY PRESSURE: 26 MMHG
WBC # BLD AUTO: 5.26 K/UL (ref 3.9–12.7)

## 2020-05-01 PROCEDURE — 78227 NM HEPATOBILIARY(HIDA) WITH PHARM AND EF: ICD-10-PCS | Mod: 26,,, | Performed by: RADIOLOGY

## 2020-05-01 PROCEDURE — 99285 PR EMERGENCY DEPT VISIT,LEVEL V: ICD-10-PCS | Mod: ,,, | Performed by: EMERGENCY MEDICINE

## 2020-05-01 PROCEDURE — 99223 PR INITIAL HOSPITAL CARE,LEVL III: ICD-10-PCS | Mod: ,,, | Performed by: INTERNAL MEDICINE

## 2020-05-01 PROCEDURE — 83615 LACTATE (LD) (LDH) ENZYME: CPT

## 2020-05-01 PROCEDURE — 93010 EKG 12-LEAD: ICD-10-PCS | Mod: ,,, | Performed by: INTERNAL MEDICINE

## 2020-05-01 PROCEDURE — 99223 1ST HOSP IP/OBS HIGH 75: CPT | Mod: ,,, | Performed by: INTERNAL MEDICINE

## 2020-05-01 PROCEDURE — 82728 ASSAY OF FERRITIN: CPT

## 2020-05-01 PROCEDURE — G0378 HOSPITAL OBSERVATION PER HR: HCPCS

## 2020-05-01 PROCEDURE — 85025 COMPLETE CBC W/AUTO DIFF WBC: CPT

## 2020-05-01 PROCEDURE — A9537 TC99M MEBROFENIN: HCPCS

## 2020-05-01 PROCEDURE — 82550 ASSAY OF CK (CPK): CPT

## 2020-05-01 PROCEDURE — U0002 COVID-19 LAB TEST NON-CDC: HCPCS

## 2020-05-01 PROCEDURE — 84145 PROCALCITONIN (PCT): CPT

## 2020-05-01 PROCEDURE — 93306 ECHO (CUPID ONLY): ICD-10-PCS | Mod: S$GLB,,, | Performed by: INTERNAL MEDICINE

## 2020-05-01 PROCEDURE — 84484 ASSAY OF TROPONIN QUANT: CPT

## 2020-05-01 PROCEDURE — 87040 BLOOD CULTURE FOR BACTERIA: CPT | Mod: 59

## 2020-05-01 PROCEDURE — 93010 ELECTROCARDIOGRAM REPORT: CPT | Mod: ,,, | Performed by: INTERNAL MEDICINE

## 2020-05-01 PROCEDURE — 93005 ELECTROCARDIOGRAM TRACING: CPT

## 2020-05-01 PROCEDURE — 99285 EMERGENCY DEPT VISIT HI MDM: CPT | Mod: ,,, | Performed by: EMERGENCY MEDICINE

## 2020-05-01 PROCEDURE — 93306 TTE W/DOPPLER COMPLETE: CPT | Mod: S$GLB,,, | Performed by: INTERNAL MEDICINE

## 2020-05-01 PROCEDURE — 80053 COMPREHEN METABOLIC PANEL: CPT

## 2020-05-01 PROCEDURE — 83605 ASSAY OF LACTIC ACID: CPT

## 2020-05-01 PROCEDURE — 86140 C-REACTIVE PROTEIN: CPT

## 2020-05-01 PROCEDURE — 78227 HEPATOBIL SYST IMAGE W/DRUG: CPT | Mod: 26,,, | Performed by: RADIOLOGY

## 2020-05-01 PROCEDURE — 99285 EMERGENCY DEPT VISIT HI MDM: CPT | Mod: 25

## 2020-05-01 PROCEDURE — 83880 ASSAY OF NATRIURETIC PEPTIDE: CPT

## 2020-05-01 RX ORDER — CYCLOBENZAPRINE HCL 5 MG
5 TABLET ORAL 3 TIMES DAILY PRN
Status: DISCONTINUED | OUTPATIENT
Start: 2020-05-01 | End: 2020-05-03 | Stop reason: HOSPADM

## 2020-05-01 RX ORDER — FAMOTIDINE 20 MG/1
20 TABLET, FILM COATED ORAL 2 TIMES DAILY PRN
Status: DISCONTINUED | OUTPATIENT
Start: 2020-05-01 | End: 2020-05-03 | Stop reason: HOSPADM

## 2020-05-01 RX ORDER — SODIUM CHLORIDE 0.9 % (FLUSH) 0.9 %
10 SYRINGE (ML) INJECTION
Status: DISCONTINUED | OUTPATIENT
Start: 2020-05-01 | End: 2020-05-03 | Stop reason: HOSPADM

## 2020-05-01 RX ORDER — ACETAMINOPHEN 325 MG/1
650 TABLET ORAL EVERY 6 HOURS PRN
Status: DISCONTINUED | OUTPATIENT
Start: 2020-05-01 | End: 2020-05-03 | Stop reason: HOSPADM

## 2020-05-01 RX ORDER — ONDANSETRON 4 MG/1
4 TABLET, ORALLY DISINTEGRATING ORAL EVERY 6 HOURS PRN
Status: DISCONTINUED | OUTPATIENT
Start: 2020-05-01 | End: 2020-05-03 | Stop reason: HOSPADM

## 2020-05-01 RX ORDER — ESCITALOPRAM OXALATE 10 MG/1
10 TABLET ORAL DAILY
Status: DISCONTINUED | OUTPATIENT
Start: 2020-05-02 | End: 2020-05-03 | Stop reason: HOSPADM

## 2020-05-01 NOTE — TELEPHONE ENCOUNTER
I spoke to her as well as Dr. Parker.  She appears to be in cardiac tamponade per ECHO.  She will go straight to the emergency room.  Cardiology team has been alerted as well.    Recall that this had been seen back in 2014, she had had 2 echocardiograms.  She had been seen in Cardiology and told that her echocardiogram was normal in fall of 2014 no further follow-up necessary.

## 2020-05-01 NOTE — HPI
39F with recent COVID-19 infection, prior pericardial effusion, multiple hepatic hemangiomas sent to ER after echocardiogram with findings of early tamponade (mitral inflow variation, RV diastolic collapse) with large anterior pericardial effusion. She had an MRI in March for monitoring of her hemangiomas; incidentally noted pericardial fluid vs cyst and an echo was ordered to evaluate. Deferred until today due to COVID-19. Pt works as a nurse; reported symptoms c/w COVID in mid April. Routine screening negative 4/15, suspected false negative in setting of  classic symptoms of fever, slight cough, and nausea, decreased appetite, and loose stools. Returned to ER 4/22 with chest tightness, shortness of breath, mild nonproductive cough, anosmia. Rapid screen positive for COVID. Reports resolution of her symptoms of chest tightness and SOB about 1-2 days later. Since has been in usual state of heatlh.     Denies CP, chest tightness, palpitations, SOB, PND, orthopnea, ARNOLDO, dizziness/LH, presyncope/syncope.   Echo findings today as above. Echo 2013 with small to moderate effusion. Repeat 2014 with moderate effusion (large anteriorly) without findings of tamponade. Workup from that time with normal ESR, TSH.    Labs today repeat COVID rapid negative. BNP, Trop, CRP, procal, CBC, CMP wnl. Normotensive, not tachycardic. Asympomatic.

## 2020-05-01 NOTE — PROVIDER PROGRESS NOTES - EMERGENCY DEPT.
Encounter Date: 5/1/2020    ED Physician Progress Notes         EKG - STEMI Decision  Initial Reading: No STEMI present.

## 2020-05-01 NOTE — LETTER
May 3, 2020         [address]  [place] [address]  Phone: [phone]  Fax: [phone]       Patient: Melly Hwang   YOB: 1980  Date of Visit: 05/03/2020    To Whom It May Concern:    Melecio Hwang  was at [facility] on 05/02/2020 - 05/03/2020 . She may return to work on Thursday, 05/07/2020, with no restrictions. If you have any questions or concerns, or if I can be of further assistance, please do not hesitate to contact me.    Sincerely,    Nany Espinoza, DO

## 2020-05-01 NOTE — Clinical Note
Pericardiocentesis catheter inserted and pericardial tap performed under US guidance in the pericardial space.

## 2020-05-01 NOTE — ED TRIAGE NOTES
Had a cardiac echo = fluid on heart.  Referred to ER . Here to have the fluid removed.    Denies SOB or chest tightness. Had COVID 19 last week.

## 2020-05-01 NOTE — Clinical Note
Pericardiocentesis catheter removed fluid from the pericardial space. Total fluid removed = 520 mL.

## 2020-05-01 NOTE — ED NOTES
LOC: The patient is awake and alert; oriented x 3 and speaking appropriately.  APPEARANCE: Patient resting comfortably, patient is clean and well groomed  SKIN: warm and dry, normal skin turgor & moist mucus membranes, skin intact, no breakdown noted.  MUSCULOSKELETAL: Patient moving all extremities well, no obvious swelling or deformities noted  RESPIRATORY: Airway is open and patent, respirations are spontaneous, normal effort and rate  CARDIAC: Patient has a normal rate, no peripheral edema noted, capillary refill < 3 seconds; No complaints of chest pain   ABDOMEN: Soft and non tender to palpation, no distention noted.

## 2020-05-02 LAB
ABO + RH BLD: NORMAL
ANION GAP SERPL CALC-SCNC: 7 MMOL/L (ref 8–16)
BASOPHILS # BLD AUTO: 0.04 K/UL (ref 0–0.2)
BASOPHILS NFR BLD: 1.1 % (ref 0–1.9)
BLD GP AB SCN CELLS X3 SERPL QL: NORMAL
BSA FOR ECHO PROCEDURE: 1.69 M2
BUN SERPL-MCNC: 12 MG/DL (ref 6–20)
CALCIUM SERPL-MCNC: 9.4 MG/DL (ref 8.7–10.5)
CHLORIDE SERPL-SCNC: 107 MMOL/L (ref 95–110)
CO2 SERPL-SCNC: 26 MMOL/L (ref 23–29)
CREAT SERPL-MCNC: 0.8 MG/DL (ref 0.5–1.4)
CV ECHO LV RWT: 0.27 CM
DIFFERENTIAL METHOD: ABNORMAL
ECHO LV POSTERIOR WALL: 0.59 CM (ref 0.6–1.1)
EOSINOPHIL # BLD AUTO: 0.1 K/UL (ref 0–0.5)
EOSINOPHIL NFR BLD: 4 % (ref 0–8)
ERYTHROCYTE [DISTWIDTH] IN BLOOD BY AUTOMATED COUNT: 11.6 % (ref 11.5–14.5)
EST. GFR  (AFRICAN AMERICAN): >60 ML/MIN/1.73 M^2
EST. GFR  (NON AFRICAN AMERICAN): >60 ML/MIN/1.73 M^2
FRACTIONAL SHORTENING: 32 % (ref 28–44)
GLUCOSE SERPL-MCNC: 87 MG/DL (ref 70–110)
GRAM STN SPEC: NORMAL
GRAM STN SPEC: NORMAL
HCT VFR BLD AUTO: 42 % (ref 37–48.5)
HGB BLD-MCNC: 12.9 G/DL (ref 12–16)
IMM GRANULOCYTES # BLD AUTO: 0.01 K/UL (ref 0–0.04)
IMM GRANULOCYTES NFR BLD AUTO: 0.3 % (ref 0–0.5)
INR PPP: 1.1 (ref 0.8–1.2)
INTERVENTRICULAR SEPTUM: 0.6 CM (ref 0.6–1.1)
LEFT ATRIUM SIZE: 2.4 CM
LEFT INTERNAL DIMENSION IN SYSTOLE: 3 CM (ref 2.1–4)
LEFT VENTRICLE DIASTOLIC VOLUME INDEX: 50.45 ML/M2
LEFT VENTRICLE DIASTOLIC VOLUME: 87.13 ML
LEFT VENTRICLE MASS INDEX: 43 G/M2
LEFT VENTRICLE SYSTOLIC VOLUME INDEX: 26.2 ML/M2
LEFT VENTRICLE SYSTOLIC VOLUME: 45.2 ML
LEFT VENTRICULAR INTERNAL DIMENSION IN DIASTOLE: 4.39 CM (ref 3.5–6)
LEFT VENTRICULAR MASS: 74.76 G
LYMPHOCYTES # BLD AUTO: 1.5 K/UL (ref 1–4.8)
LYMPHOCYTES NFR BLD: 43.9 % (ref 18–48)
MCH RBC QN AUTO: 29.5 PG (ref 27–31)
MCHC RBC AUTO-ENTMCNC: 30.7 G/DL (ref 32–36)
MCV RBC AUTO: 96 FL (ref 82–98)
MONOCYTES # BLD AUTO: 0.3 K/UL (ref 0.3–1)
MONOCYTES NFR BLD: 8.5 % (ref 4–15)
NEUTROPHILS # BLD AUTO: 1.5 K/UL (ref 1.8–7.7)
NEUTROPHILS NFR BLD: 42.2 % (ref 38–73)
NRBC BLD-RTO: 0 /100 WBC
PLATELET # BLD AUTO: 263 K/UL (ref 150–350)
PMV BLD AUTO: 9.6 FL (ref 9.2–12.9)
POTASSIUM SERPL-SCNC: 3.6 MMOL/L (ref 3.5–5.1)
PROTHROMBIN TIME: 11.5 SEC (ref 9–12.5)
RA PRESSURE: 3 MMHG
RBC # BLD AUTO: 4.37 M/UL (ref 4–5.4)
SODIUM SERPL-SCNC: 140 MMOL/L (ref 136–145)
WBC # BLD AUTO: 3.51 K/UL (ref 3.9–12.7)

## 2020-05-02 PROCEDURE — 88112 CYTOPATH CELL ENHANCE TECH: CPT | Mod: 26,,, | Performed by: PATHOLOGY

## 2020-05-02 PROCEDURE — G0378 HOSPITAL OBSERVATION PER HR: HCPCS

## 2020-05-02 PROCEDURE — 99231 SBSQ HOSP IP/OBS SF/LOW 25: CPT | Mod: ,,, | Performed by: INTERNAL MEDICINE

## 2020-05-02 PROCEDURE — 93005 ELECTROCARDIOGRAM TRACING: CPT

## 2020-05-02 PROCEDURE — 86901 BLOOD TYPING SEROLOGIC RH(D): CPT

## 2020-05-02 PROCEDURE — 82150 ASSAY OF AMYLASE: CPT

## 2020-05-02 PROCEDURE — 33016 PERICARDIOCENTESIS W/IMAGING: CPT | Mod: ,,, | Performed by: INTERNAL MEDICINE

## 2020-05-02 PROCEDURE — 88112 PR  CYTOPATH, CELL ENHANCE TECH: ICD-10-PCS | Mod: 26,,, | Performed by: PATHOLOGY

## 2020-05-02 PROCEDURE — 25000003 PHARM REV CODE 250: Performed by: INTERNAL MEDICINE

## 2020-05-02 PROCEDURE — 84157 ASSAY OF PROTEIN OTHER: CPT

## 2020-05-02 PROCEDURE — 83615 LACTATE (LD) (LDH) ENZYME: CPT

## 2020-05-02 PROCEDURE — 87116 MYCOBACTERIA CULTURE: CPT

## 2020-05-02 PROCEDURE — 99153 MOD SED SAME PHYS/QHP EA: CPT | Performed by: INTERNAL MEDICINE

## 2020-05-02 PROCEDURE — 82042 OTHER SOURCE ALBUMIN QUAN EA: CPT

## 2020-05-02 PROCEDURE — 88305 TISSUE EXAM BY PATHOLOGIST: CPT | Performed by: PATHOLOGY

## 2020-05-02 PROCEDURE — 93010 ELECTROCARDIOGRAM REPORT: CPT | Mod: ,,, | Performed by: INTERNAL MEDICINE

## 2020-05-02 PROCEDURE — 33016 PR PERICARDIOCENTESIS, W/IMG GUIDANCE: ICD-10-PCS | Mod: ,,, | Performed by: INTERNAL MEDICINE

## 2020-05-02 PROCEDURE — 85025 COMPLETE CBC W/AUTO DIFF WBC: CPT

## 2020-05-02 PROCEDURE — 88112 CYTOPATH CELL ENHANCE TECH: CPT | Performed by: PATHOLOGY

## 2020-05-02 PROCEDURE — 99152 PR MOD CONSCIOUS SEDATION, SAME PHYS, 5+ YRS, FIRST 15 MIN: ICD-10-PCS | Mod: ,,, | Performed by: INTERNAL MEDICINE

## 2020-05-02 PROCEDURE — 87206 SMEAR FLUORESCENT/ACID STAI: CPT

## 2020-05-02 PROCEDURE — 99231 PR SUBSEQUENT HOSPITAL CARE,LEVL I: ICD-10-PCS | Mod: ,,, | Performed by: INTERNAL MEDICINE

## 2020-05-02 PROCEDURE — 87070 CULTURE OTHR SPECIMN AEROBIC: CPT

## 2020-05-02 PROCEDURE — 80048 BASIC METABOLIC PNL TOTAL CA: CPT

## 2020-05-02 PROCEDURE — 99152 MOD SED SAME PHYS/QHP 5/>YRS: CPT | Performed by: INTERNAL MEDICINE

## 2020-05-02 PROCEDURE — 93010 EKG 12-LEAD: ICD-10-PCS | Mod: ,,, | Performed by: INTERNAL MEDICINE

## 2020-05-02 PROCEDURE — 88305 TISSUE EXAM BY PATHOLOGIST: ICD-10-PCS | Mod: 26,,, | Performed by: PATHOLOGY

## 2020-05-02 PROCEDURE — 63600175 PHARM REV CODE 636 W HCPCS: Performed by: INTERNAL MEDICINE

## 2020-05-02 PROCEDURE — C1729 CATH, DRAINAGE: HCPCS | Performed by: INTERNAL MEDICINE

## 2020-05-02 PROCEDURE — 27201423 OPTIME MED/SURG SUP & DEVICES STERILE SUPPLY: Performed by: INTERNAL MEDICINE

## 2020-05-02 PROCEDURE — 25000003 PHARM REV CODE 250: Performed by: STUDENT IN AN ORGANIZED HEALTH CARE EDUCATION/TRAINING PROGRAM

## 2020-05-02 PROCEDURE — C1894 INTRO/SHEATH, NON-LASER: HCPCS | Performed by: INTERNAL MEDICINE

## 2020-05-02 PROCEDURE — 36415 COLL VENOUS BLD VENIPUNCTURE: CPT

## 2020-05-02 PROCEDURE — 85610 PROTHROMBIN TIME: CPT

## 2020-05-02 PROCEDURE — 88305 TISSUE EXAM BY PATHOLOGIST: CPT | Mod: 26,,, | Performed by: PATHOLOGY

## 2020-05-02 PROCEDURE — 87015 SPECIMEN INFECT AGNT CONCNTJ: CPT

## 2020-05-02 PROCEDURE — 87205 SMEAR GRAM STAIN: CPT

## 2020-05-02 PROCEDURE — 99152 MOD SED SAME PHYS/QHP 5/>YRS: CPT | Mod: ,,, | Performed by: INTERNAL MEDICINE

## 2020-05-02 PROCEDURE — 82945 GLUCOSE OTHER FLUID: CPT

## 2020-05-02 PROCEDURE — 33016 PERICARDIOCENTESIS W/IMAGING: CPT | Performed by: INTERNAL MEDICINE

## 2020-05-02 PROCEDURE — C1769 GUIDE WIRE: HCPCS | Performed by: INTERNAL MEDICINE

## 2020-05-02 RX ORDER — MIDAZOLAM HYDROCHLORIDE 1 MG/ML
INJECTION, SOLUTION INTRAMUSCULAR; INTRAVENOUS
Status: DISCONTINUED | OUTPATIENT
Start: 2020-05-02 | End: 2020-05-02 | Stop reason: HOSPADM

## 2020-05-02 RX ORDER — CETIRIZINE HYDROCHLORIDE 5 MG/1
5 TABLET ORAL DAILY
Status: DISCONTINUED | OUTPATIENT
Start: 2020-05-02 | End: 2020-05-03 | Stop reason: HOSPADM

## 2020-05-02 RX ORDER — FENTANYL CITRATE 50 UG/ML
INJECTION, SOLUTION INTRAMUSCULAR; INTRAVENOUS
Status: DISCONTINUED | OUTPATIENT
Start: 2020-05-02 | End: 2020-05-02 | Stop reason: HOSPADM

## 2020-05-02 RX ORDER — LIDOCAINE HYDROCHLORIDE 20 MG/ML
INJECTION, SOLUTION EPIDURAL; INFILTRATION; INTRACAUDAL; PERINEURAL
Status: DISCONTINUED | OUTPATIENT
Start: 2020-05-02 | End: 2020-05-02 | Stop reason: HOSPADM

## 2020-05-02 RX ADMIN — CETIRIZINE HYDROCHLORIDE 5 MG: 5 TABLET ORAL at 11:05

## 2020-05-02 NOTE — SUBJECTIVE & OBJECTIVE
Past Medical History:   Diagnosis Date    COVID-19     Deviated septum 2011    Hypertrophy of inferior nasal turbinate     Liver mass     Nasal congestion 2011    Premature menopause        Past Surgical History:   Procedure Laterality Date    HERNIA REPAIR      NASAL SEPTUM SURGERY      TONSILLECTOMY      UMBILICAL HERNIA REPAIR         Review of patient's allergies indicates:   Allergen Reactions    No known drug allergies        No current facility-administered medications on file prior to encounter.      Current Outpatient Medications on File Prior to Encounter   Medication Sig    albuterol (PROVENTIL/VENTOLIN HFA) 90 mcg/actuation inhaler Inhale 1-2 puffs into the lungs every 6 (six) hours as needed for Wheezing or Shortness of Breath. Rescue    cetirizine (ZYRTEC) 10 MG tablet Take 1 tablet (10 mg total) by mouth once daily.    cyclobenzaprine (FLEXERIL) 10 MG tablet TAKE 1 TABLET BY MOUTH EVERY DAY IN THE EVENING AS NEEDED FOR MUSCLE SPASMS    escitalopram oxalate (LEXAPRO) 10 MG tablet Take 1 tablet (10 mg total) by mouth once daily.    hydrocortisone butyrate (LOCOID) 0.1 % Oint AAA BID prn itching, scaling, or redness. Do not use longer than 2 weeks consecutively.    ketoconazole (NIZORAL) 2 % cream Apply topically once daily.    multivitamin capsule Take by mouth. 1 capsule Oral Every morning    norethindrone (EMILIA) 0.35 mg tablet TAKE 1 TABLET (0.35 MG TOTAL) BY MOUTH ONCE DAILY    azithromycin (Z-JUSTIN) 250 MG tablet Take 1 tablet (250 mg total) by mouth once daily. Take first 2 tablets together, then 1 every day until finished.    naproxen (NAPROSYN) 500 MG tablet Take 1 tablet (500 mg total) by mouth 2 (two) times daily as needed (with food).    omeprazole 20 mg TbEC Take 20 mg by mouth daily as needed.    ondansetron (ZOFRAN) 4 MG tablet Take 1 tablet (4 mg total) by mouth every 12 (twelve) hours as needed for Nausea.    pimecrolimus (ELIDEL) 1 % cream AAA BID prn itching,  redness     Family History     Problem Relation (Age of Onset)    Diabetes Mother, Father    Hyperlipidemia Father    Hypertension Mother, Father    Liver disease Mother    Macular degeneration Father    No Known Problems Sister, Brother, Daughter, Brother, Maternal Aunt, Maternal Uncle, Paternal Aunt, Paternal Uncle, Maternal Grandmother, Maternal Grandfather, Paternal Grandmother, Paternal Grandfather        Tobacco Use    Smoking status: Never Smoker    Smokeless tobacco: Never Used   Substance and Sexual Activity    Alcohol use: Yes     Comment: rare    Drug use: No    Sexual activity: Yes     Partners: Male     Birth control/protection: OCP     Review of Systems   All other systems reviewed and are negative.    Objective:     Vital Signs (Most Recent):  Temp: 98.8 °F (37.1 °C) (05/01/20 1645)  Pulse: 74 (05/01/20 2001)  Resp: 20 (05/01/20 1645)  BP: 131/88 (05/01/20 2001)  SpO2: 100 % (05/01/20 2001) Vital Signs (24h Range):  Temp:  [98.8 °F (37.1 °C)] 98.8 °F (37.1 °C)  Pulse:  [] 74  Resp:  [20] 20  SpO2:  [99 %-100 %] 100 %  BP: (120-169)/(70-93) 131/88     Weight: 56.7 kg (125 lb)  Body mass index is 17.43 kg/m².    SpO2: 100 %  O2 Device (Oxygen Therapy): room air    No intake or output data in the 24 hours ending 05/01/20 2139    Lines/Drains/Airways     Peripheral Intravenous Line                 Peripheral IV - Single Lumen 05/01/20 1749 18 G Right Antecubital less than 1 day                Physical Exam   Constitutional: She is oriented to person, place, and time. She appears well-nourished. No distress.   HENT:   Head: Atraumatic.   Eyes: EOM are normal. No scleral icterus.   Neck: Neck supple. No JVD present.   Cardiovascular: Normal rate, regular rhythm and normal heart sounds. Exam reveals no gallop and no friction rub.   No murmur heard.  Pulmonary/Chest: Effort normal and breath sounds normal. No respiratory distress. She has no wheezes. She has no rales.   Abdominal: Soft. Bowel  sounds are normal. She exhibits no distension. There is no tenderness.   Musculoskeletal: She exhibits no edema.   Neurological: She is alert and oriented to person, place, and time. No cranial nerve deficit.   Skin: Skin is warm and dry. No erythema.   Psychiatric: She has a normal mood and affect.       Significant Labs:   BMP:   Recent Labs   Lab 05/01/20  1749   GLU 92      K 4.3      CO2 30*   BUN 15   CREATININE 1.0   CALCIUM 10.3   , CMP   Recent Labs   Lab 05/01/20  1749      K 4.3      CO2 30*   GLU 92   BUN 15   CREATININE 1.0   CALCIUM 10.3   PROT 8.2   ALBUMIN 4.5   BILITOT 0.7   ALKPHOS 67   AST 19   ALT 23   ANIONGAP 6*   ESTGFRAFRICA >60.0   EGFRNONAA >60.0   , CBC   Recent Labs   Lab 05/01/20  1749   WBC 5.26   HGB 14.1   HCT 45.1      , INR No results for input(s): INR, PROTIME in the last 48 hours., Lipid Panel No results for input(s): CHOL, HDL, LDLCALC, TRIG, CHOLHDL in the last 48 hours. and Troponin   Recent Labs   Lab 05/01/20  1749   TROPONINI <0.006       Significant Imaging: Echocardiogram:   2D echo with color flow doppler:   Results for orders placed or performed during the hospital encounter of 04/21/14   2D Echo w/ Color Flow Doppler   Result Value Ref Range    QEF 55     Diastolic Dysfunction No     Narrative    TEST DESCRIPTION   Technical Quality: This is a technically good study.     Aorta: The aortic root is normal in size, measuring 2.2 cm at sinotubular junction and 3.0 cm at Sinuses of Valsalva. The proximal ascending aorta is normal in size, measuring 2.7 cm across.     Left Atrium: The left atrial volume index is normal, measuring 16.89 cc/m2.     Left Ventricle: The left ventricle is normal in size, with an end-diastolic diameter of 4.3 cm, and an end-systolic diameter of 3.3 cm. LV wall thickness is normal, with the septum measuring 0.7 cm and the posterior wall measuring 0.8 cm across. Relative   wall thickness was normal at 0.37, and the LV  mass index was 61.6 g/m2 consistent with normal left ventricular mass. Global left ventricular systolic function appears normal. Visually estimated ejection fraction is 55-60%. The LV Doppler derived stroke   volume equals 70.0 ccs.   The E/e'(lat) is 11, consistent with normal diastolic function.     Right Atrium: The right atrium is normal in size, measuring 4.2 cm in length and 3.8 cm in width in the apical view.     Right Ventricle: The right ventricle is normal in size measuring 3.1 cm at the base in the apical right ventricle-focused view. Global right ventricular systolic function appears normal. Tricuspid annular plane systolic excursion (TAPSE) is 2.0 cm.     Tricuspid Valve:  There is trivial tricuspid regurgitation.     Pericardium: There is evidence of a moderate circumferential pericardial effusion. There is no significant respiratory variation in mitral inflow excluding significant constriction/tamponade physiology.     IVC: IVC is enlarged but collapses > 50% with a sniff, suggesting intermediate right atrial pressure of 8 mmHg.     Intracavitary: There is no evidence of intracavity mass, thrombi, or vegetation.     The pericadrial affusion is large anteriorly.    There is no RV diastolic collapse and there is no RA collaspe.          CONCLUSIONS     1 - Normal left ventricular systolic function (EF 55-60%).     2 - Normal left ventricular diastolic function.     3 - Normal right ventricular systolic function .     4 - Trivial tricuspid regurgitation.     5 - Moderate pericardial effusion.     6 - Intermediate central venous pressure.     7 - The pericardial effusion is large anteriorly.     8 - Conisder serial studies.         This document has been electronically    SIGNED BY: Stefanie Orozco MD On: 04/21/2014 14:59    and Transthoracic echo (TTE) complete (Cupid Only):   Results for orders placed or performed during the hospital encounter of 05/01/20   Echo Color Flow Doppler? Yes; Bubble  Contrast? No   Result Value Ref Range    Ascending aorta 2.91 cm    STJ 2.92 cm    AV mean gradient 3 mmHg    Ao peak janusz 1.10 m/s    Ao VTI 19.50 cm    IVS 0.65 0.6 - 1.1 cm    LA size 2.60 cm    Left Atrium Major Axis 4.91 cm    Left Atrium Minor Axis 4.48 cm    LVIDD 4.36 3.5 - 6.0 cm    LVIDS 2.70 2.1 - 4.0 cm    LVOT diameter 1.98 cm    LVOT peak VTI 16.52 cm    PW 0.81 0.6 - 1.1 cm    MV Peak A Janusz 0.42 m/s    E wave decelartion time 159.73 msec    MV Peak E Janusz 0.70 m/s    PV Peak D Janusz 0.47 m/s    PV Peak S Janusz 0.44 m/s    RA Major Axis 4.20 cm    RA Width 3.17 cm    RVDD 2.50 cm    Sinus 2.63 cm    TR Max Janusz 2.10 m/s    TDI LATERAL 0.11 m/s    TDI SEPTAL 0.05 m/s    LA WIDTH 3.60 cm    LV Diastolic Volume 85.94 mL    LV Systolic Volume 26.92 mL    RV S' 16.62 cm/s    LVOT peak janusz 0.86 m/s    LV LATERAL E/E' RATIO 6.36 m/s    LV SEPTAL E/E' RATIO 14.00 m/s    FS 38 %    LA volume 37.28 cm3    LV mass 95.66 g    Left Ventricle Relative Wall Thickness 0.37 cm    AV valve area 2.61 cm2    AV Velocity Ratio 0.78     AV index (prosthetic) 0.85     E/A ratio 1.67     Mean e' 0.08 m/s    Pulm vein S/D ratio 0.94     LVOT area 3.1 cm2    LVOT stroke volume 50.84 cm3    AV peak gradient 5 mmHg    E/E' ratio 8.75 m/s    Triscuspid Valve Regurgitation Peak Gradient 18 mmHg    BSA 1.73 m2    LV Systolic Volume Index 15.2 mL/m2    LV Diastolic Volume Index 48.45 mL/m2    LA Volume Index 21.0 mL/m2    LV Mass Index 54 g/m2    Right Atrial Pressure (from IVC) 8 mmHg    TV rest pulmonary artery pressure 26 mmHg    Narrative    · Normal left ventricular systolic function. The estimated ejection   fraction is 55%.  · Normal LV diastolic function.  · No wall motion abnormalities.  · Normal right ventricular systolic function.  · The estimated PA systolic pressure is 26 mmHg.  · Intermediate central venous pressure (8 mmHg).  · Large anterior pericardial effusion and small circumferential   pericardial effusion. There is  significant respiratory variation across   the MV. There is early RV diastolic collapse seen. IVC is 8 mmHg. This is   consistent with cardiac temponade physiology.  · These findings are being communicated to los prince MD.       and EKG: NSR, 94 bpm

## 2020-05-02 NOTE — BRIEF OP NOTE
Brief Operative Report:    : Dickson Dangelo MD     All Operators: Surgeon(s):  MD Duane Finney MD     Preoperative Diagnosis: Pericardial effusion [I31.3]     Postop Diagnosis: s/p pericardiocentesis    Treatments/Procedures: Procedure(s) (LRB):  Pericardiocentesis (N/A)    Findings:  - 520cc clear fluid removed from pericardium  - Sent fluid for analysis  - Watch overnight then repeat echo in AM and can discharge after.  - See catheterization report for full details.    Estimated Blood loss: 20 cc    Specimens removed: Yes    Duane Ta

## 2020-05-02 NOTE — H&P
[facility]  Cardiology  History and Physical     Patient Name: Melly Hwang  MRN: [id number]  Admission Date: 5/1/2020  Code Status: Full Code   Attending Provider: Edmund Weber MD   Primary Care Physician: Melly Sahu MD  Principal Problem:<principal problem not specified>    Patient information was obtained from patient and ER records.     Subjective:     Chief Complaint:  Abnormal echocardiogram     HPI:  39F with recent COVID-19 infection, prior pericardial effusion, multiple hepatic hemangiomas sent to ER after echocardiogram with findings of early tamponade (mitral inflow variation, RV diastolic collapse) with large anterior pericardial effusion. She had an MRI in March for monitoring of her hemangiomas; incidentally noted pericardial fluid vs cyst and an echo was ordered to evaluate. Deferred until today due to COVID-19. Pt works as a nurse; reported symptoms c/w COVID in mid April. Routine screening negative 4/15, suspected false negative in setting of  classic symptoms of fever, slight cough, and nausea, decreased appetite, and loose stools. Returned to ER 4/22 with chest tightness, shortness of breath, mild nonproductive cough, anosmia. Rapid screen positive for COVID. Reports resolution of her symptoms of chest tightness and SOB about 1-2 days later. Since has been in usual state of heatlh.     Denies CP, chest tightness, palpitations, SOB, PND, orthopnea, ARNOLDO, dizziness/LH, presyncope/syncope.   Echo findings today as above. Echo 2013 with small to moderate effusion. Repeat 2014 with moderate effusion (large anteriorly) without findings of tamponade. Workup from that time with normal ESR, TSH.    Labs today repeat COVID rapid negative. BNP, Trop, CRP, procal, CBC, CMP wnl. Normotensive, not tachycardic. Asympomatic.     Past Medical History:   Diagnosis Date    COVID-19     Deviated septum 2011    Hypertrophy of inferior nasal turbinate     Liver mass     Nasal  congestion 2011    Premature menopause        Past Surgical History:   Procedure Laterality Date    HERNIA REPAIR      NASAL SEPTUM SURGERY      TONSILLECTOMY      UMBILICAL HERNIA REPAIR         Review of patient's allergies indicates:   Allergen Reactions    No known drug allergies        No current facility-administered medications on file prior to encounter.      Current Outpatient Medications on File Prior to Encounter   Medication Sig    albuterol (PROVENTIL/VENTOLIN HFA) 90 mcg/actuation inhaler Inhale 1-2 puffs into the lungs every 6 (six) hours as needed for Wheezing or Shortness of Breath. Rescue    cetirizine (ZYRTEC) 10 MG tablet Take 1 tablet (10 mg total) by mouth once daily.    cyclobenzaprine (FLEXERIL) 10 MG tablet TAKE 1 TABLET BY MOUTH EVERY DAY IN THE EVENING AS NEEDED FOR MUSCLE SPASMS    escitalopram oxalate (LEXAPRO) 10 MG tablet Take 1 tablet (10 mg total) by mouth once daily.    hydrocortisone butyrate (LOCOID) 0.1 % Oint AAA BID prn itching, scaling, or redness. Do not use longer than 2 weeks consecutively.    ketoconazole (NIZORAL) 2 % cream Apply topically once daily.    multivitamin capsule Take by mouth. 1 capsule Oral Every morning    norethindrone (EMILIA) 0.35 mg tablet TAKE 1 TABLET (0.35 MG TOTAL) BY MOUTH ONCE DAILY    azithromycin (Z-JUSTIN) 250 MG tablet Take 1 tablet (250 mg total) by mouth once daily. Take first 2 tablets together, then 1 every day until finished.    naproxen (NAPROSYN) 500 MG tablet Take 1 tablet (500 mg total) by mouth 2 (two) times daily as needed (with food).    omeprazole 20 mg TbEC Take 20 mg by mouth daily as needed.    ondansetron (ZOFRAN) 4 MG tablet Take 1 tablet (4 mg total) by mouth every 12 (twelve) hours as needed for Nausea.    pimecrolimus (ELIDEL) 1 % cream AAA BID prn itching, redness     Family History     Problem Relation (Age of Onset)    Diabetes Mother, Father    Hyperlipidemia Father    Hypertension Mother, Father     Liver disease Mother    Macular degeneration Father    No Known Problems Sister, Brother, Daughter, Brother, Maternal Aunt, Maternal Uncle, Paternal Aunt, Paternal Uncle, Maternal Grandmother, Maternal Grandfather, Paternal Grandmother, Paternal Grandfather        Tobacco Use    Smoking status: Never Smoker    Smokeless tobacco: Never Used   Substance and Sexual Activity    Alcohol use: Yes     Comment: rare    Drug use: No    Sexual activity: Yes     Partners: Male     Birth control/protection: OCP     Review of Systems   All other systems reviewed and are negative.    Objective:     Vital Signs (Most Recent):  Temp: 98.8 °F (37.1 °C) (05/01/20 1645)  Pulse: 74 (05/01/20 2001)  Resp: 20 (05/01/20 1645)  BP: 131/88 (05/01/20 2001)  SpO2: 100 % (05/01/20 2001) Vital Signs (24h Range):  Temp:  [98.8 °F (37.1 °C)] 98.8 °F (37.1 °C)  Pulse:  [] 74  Resp:  [20] 20  SpO2:  [99 %-100 %] 100 %  BP: (120-169)/(70-93) 131/88     Weight: 56.7 kg (125 lb)  Body mass index is 17.43 kg/m².    SpO2: 100 %  O2 Device (Oxygen Therapy): room air    No intake or output data in the 24 hours ending 05/01/20 2139    Lines/Drains/Airways     Peripheral Intravenous Line                 Peripheral IV - Single Lumen 05/01/20 1749 18 G Right Antecubital less than 1 day                Physical Exam   Constitutional: She is oriented to person, place, and time. She appears well-nourished. No distress.   HENT:   Head: Atraumatic.   Eyes: EOM are normal. No scleral icterus.   Neck: Neck supple. No JVD present.   Cardiovascular: Normal rate, regular rhythm and normal heart sounds. Exam reveals no gallop and no friction rub.   No murmur heard.  Pulmonary/Chest: Effort normal and breath sounds normal. No respiratory distress. She has no wheezes. She has no rales.   Abdominal: Soft. Bowel sounds are normal. She exhibits no distension. There is no tenderness.   Musculoskeletal: She exhibits no edema.   Neurological: She is alert and oriented  to person, place, and time. No cranial nerve deficit.   Skin: Skin is warm and dry. No erythema.   Psychiatric: She has a normal mood and affect.       Significant Labs:   BMP:   Recent Labs   Lab 05/01/20  1749   GLU 92      K 4.3      CO2 30*   BUN 15   CREATININE 1.0   CALCIUM 10.3   , CMP   Recent Labs   Lab 05/01/20  1749      K 4.3      CO2 30*   GLU 92   BUN 15   CREATININE 1.0   CALCIUM 10.3   PROT 8.2   ALBUMIN 4.5   BILITOT 0.7   ALKPHOS 67   AST 19   ALT 23   ANIONGAP 6*   ESTGFRAFRICA >60.0   EGFRNONAA >60.0   , CBC   Recent Labs   Lab 05/01/20  1749   WBC 5.26   HGB 14.1   HCT 45.1      , INR No results for input(s): INR, PROTIME in the last 48 hours., Lipid Panel No results for input(s): CHOL, HDL, LDLCALC, TRIG, CHOLHDL in the last 48 hours. and Troponin   Recent Labs   Lab 05/01/20 1749   TROPONINI <0.006       Significant Imaging: Echocardiogram:   2D echo with color flow doppler:   Results for orders placed or performed during the hospital encounter of 04/21/14   2D Echo w/ Color Flow Doppler   Result Value Ref Range    QEF 55     Diastolic Dysfunction No     Narrative    TEST DESCRIPTION   Technical Quality: This is a technically good study.     Aorta: The aortic root is normal in size, measuring 2.2 cm at sinotubular junction and 3.0 cm at Sinuses of Valsalva. The proximal ascending aorta is normal in size, measuring 2.7 cm across.     Left Atrium: The left atrial volume index is normal, measuring 16.89 cc/m2.     Left Ventricle: The left ventricle is normal in size, with an end-diastolic diameter of 4.3 cm, and an end-systolic diameter of 3.3 cm. LV wall thickness is normal, with the septum measuring 0.7 cm and the posterior wall measuring 0.8 cm across. Relative   wall thickness was normal at 0.37, and the LV mass index was 61.6 g/m2 consistent with normal left ventricular mass. Global left ventricular systolic function appears normal. Visually estimated ejection  fraction is 55-60%. The LV Doppler derived stroke   volume equals 70.0 ccs.   The E/e'(lat) is 11, consistent with normal diastolic function.     Right Atrium: The right atrium is normal in size, measuring 4.2 cm in length and 3.8 cm in width in the apical view.     Right Ventricle: The right ventricle is normal in size measuring 3.1 cm at the base in the apical right ventricle-focused view. Global right ventricular systolic function appears normal. Tricuspid annular plane systolic excursion (TAPSE) is 2.0 cm.     Tricuspid Valve:  There is trivial tricuspid regurgitation.     Pericardium: There is evidence of a moderate circumferential pericardial effusion. There is no significant respiratory variation in mitral inflow excluding significant constriction/tamponade physiology.     IVC: IVC is enlarged but collapses > 50% with a sniff, suggesting intermediate right atrial pressure of 8 mmHg.     Intracavitary: There is no evidence of intracavity mass, thrombi, or vegetation.     The pericadrial affusion is large anteriorly.    There is no RV diastolic collapse and there is no RA collaspe.          CONCLUSIONS     1 - Normal left ventricular systolic function (EF 55-60%).     2 - Normal left ventricular diastolic function.     3 - Normal right ventricular systolic function .     4 - Trivial tricuspid regurgitation.     5 - Moderate pericardial effusion.     6 - Intermediate central venous pressure.     7 - The pericardial effusion is large anteriorly.     8 - Conisder serial studies.         This document has been electronically    SIGNED BY: Stefanie Orozco MD On: 04/21/2014 14:59    and Transthoracic echo (TTE) complete (Cupid Only):   Results for orders placed or performed during the hospital encounter of 05/01/20   Echo Color Flow Doppler? Yes; Bubble Contrast? No   Result Value Ref Range    Ascending aorta 2.91 cm    STJ 2.92 cm    AV mean gradient 3 mmHg    Ao peak oscar 1.10 m/s    Ao VTI 19.50 cm    IVS 0.65  0.6 - 1.1 cm    LA size 2.60 cm    Left Atrium Major Axis 4.91 cm    Left Atrium Minor Axis 4.48 cm    LVIDD 4.36 3.5 - 6.0 cm    LVIDS 2.70 2.1 - 4.0 cm    LVOT diameter 1.98 cm    LVOT peak VTI 16.52 cm    PW 0.81 0.6 - 1.1 cm    MV Peak A Janusz 0.42 m/s    E wave decelartion time 159.73 msec    MV Peak E Janusz 0.70 m/s    PV Peak D Janusz 0.47 m/s    PV Peak S Janusz 0.44 m/s    RA Major Axis 4.20 cm    RA Width 3.17 cm    RVDD 2.50 cm    Sinus 2.63 cm    TR Max Janusz 2.10 m/s    TDI LATERAL 0.11 m/s    TDI SEPTAL 0.05 m/s    LA WIDTH 3.60 cm    LV Diastolic Volume 85.94 mL    LV Systolic Volume 26.92 mL    RV S' 16.62 cm/s    LVOT peak janusz 0.86 m/s    LV LATERAL E/E' RATIO 6.36 m/s    LV SEPTAL E/E' RATIO 14.00 m/s    FS 38 %    LA volume 37.28 cm3    LV mass 95.66 g    Left Ventricle Relative Wall Thickness 0.37 cm    AV valve area 2.61 cm2    AV Velocity Ratio 0.78     AV index (prosthetic) 0.85     E/A ratio 1.67     Mean e' 0.08 m/s    Pulm vein S/D ratio 0.94     LVOT area 3.1 cm2    LVOT stroke volume 50.84 cm3    AV peak gradient 5 mmHg    E/E' ratio 8.75 m/s    Triscuspid Valve Regurgitation Peak Gradient 18 mmHg    BSA 1.73 m2    LV Systolic Volume Index 15.2 mL/m2    LV Diastolic Volume Index 48.45 mL/m2    LA Volume Index 21.0 mL/m2    LV Mass Index 54 g/m2    Right Atrial Pressure (from IVC) 8 mmHg    TV rest pulmonary artery pressure 26 mmHg    Narrative    · Normal left ventricular systolic function. The estimated ejection   fraction is 55%.  · Normal LV diastolic function.  · No wall motion abnormalities.  · Normal right ventricular systolic function.  · The estimated PA systolic pressure is 26 mmHg.  · Intermediate central venous pressure (8 mmHg).  · Large anterior pericardial effusion and small circumferential   pericardial effusion. There is significant respiratory variation across   the MV. There is early RV diastolic collapse seen. IVC is 8 mmHg. This is   consistent with cardiac temponade  physiology.  · These findings are being communicated to los prince MD.       and EKG: NSR, 94 bpm    Assessment and Plan:     39F with hx of hepatic hemangioma, pericardial effusion, recent COVID 19 infection here with incidental finding of echocardiographic tamponade. Vitals stable, asymptomatic. Admitted for further monitoring, possible pericardiocentesis.     Pericardial effusion  -Large pericardial effusion with early echocardiographic findings of tamponade without clinical tamponade, vitals stable. On review of old echos, may be have been stable large for prolonged period (unknown etiology) vs small-moderate effusion that worsened with inflammation/pericarditis in setting of COVID-19 infection. Suspect more chronicity, but would be indicated to tap for diagnostic purposes as well.   -MRI noted possibly cystic; also in setting of multiple liver hemangiomas; will obtain CT chest noncontrast to further evaluate. If cystic or hemangioma related, may require pericardial window or surgical intervention  -Discussed with interventional cardiology, aware of the case  -Repeat Echo to re-evaluate  -Will monitor, NPO at midnight, possible pericardiocentesis tomorrow pending echo.     COVID-19 virus infection: dx April 22 2020  -Repeat rapid screen negative  -Last symptoms ~4/24, will maintain isolation precautions        VTE Risk Mitigation (From admission, onward)         Ordered     IP VTE LOW RISK PATIENT  Once      05/01/20 1921     Place BIBIANA hose  Until discontinued      05/01/20 1921              Santana Greenwood MD  Cardiology   [facility]  I have personally taken the history and examined the patient and agree with the resident's note as stated above.  I called Dr Dangelo to tap for preventive therapy and diagnostic.

## 2020-05-02 NOTE — HOSPITAL COURSE
Patient called and asked to report to the ED after TTE results from 5/1 showed large Pericardial effusion with early tamponade physiology. Patient recently recovering from COVID 19. Positive test 4/22, asymptomatic since 4/24. Repeat COVID 19 testing 5/1 - negative. Patient currently remains asymptomatic from a cardiac tamponade perspective. Interventional cardiology consulted Pericardiocentesis done 5/2 with 520 cc of transudate fluid. Follow up Cytology and cultures outpatient with Cards/PCP. Patient was discharged home in stable condition with Follow up in one week with PCP, 2 weeks with Cardiology and repeat TTE in 10 days

## 2020-05-02 NOTE — PROGRESS NOTES
[facility]  Cardiology  Progress Note    Patient Name: Melly Hwang  MRN: [id number]  Admission Date: 5/1/2020  Hospital Length of Stay: 0 days  Code Status: Full Code   Attending Physician: Edmund Weber MD   Primary Care Physician: Melly Sahu MD  Expected Discharge Date:   Principal Problem:<principal problem not specified>    Subjective:     Hospital Course:   Patient called and asked to report to the ED after TTE results from 5/1 showed large Pericardial effusion with early tamponade physiology. Patient recently recovering from COVID 19. Positive test 4/22, asymptomatic since 4/24. Repeat COVID 19 testing 5/1 - negative. Patient currently remains asymptomatic from a cardiac tamponade perspective. Interventional cardiology consulted who plans on doing a Pericardiocentesis 5/2    Interval History: CT study showed no change from MRI and moderate pericardial fluid. Plan for pericardiocentesis today by interventional cardiology.    Review of Systems   All other systems reviewed and are negative.    Objective:     Vital Signs (Most Recent):  Temp: 98.3 °F (36.8 °C) (05/02/20 0500)  Pulse: 61 (05/02/20 1134)  Resp: 16 (05/02/20 0530)  BP: 108/73 (05/02/20 1134)  SpO2: 98 % (05/02/20 0530) Vital Signs (24h Range):  Temp:  [96.8 °F (36 °C)-98.8 °F (37.1 °C)] 98.3 °F (36.8 °C)  Pulse:  [] 61  Resp:  [16-20] 16  SpO2:  [97 %-100 %] 98 %  BP: (103-169)/(70-93) 108/73     Weight: 56.7 kg (125 lb)  Body mass index is 17.43 kg/m².     SpO2: 98 %  O2 Device (Oxygen Therapy): room air    No intake or output data in the 24 hours ending 05/02/20 1207    Lines/Drains/Airways     Peripheral Intravenous Line                 Peripheral IV - Single Lumen 05/01/20 1749 18 G Right Antecubital less than 1 day                Physical Exam     Patient currently in a closed COVID-19 unit for quarantine due to recent COVID infection within the last 10 days. please see nurse's note for full physical  exam. Patient unseen by physician in order to reduce the spread of infectious disease      Significant Labs:   BMP:   Recent Labs   Lab 05/01/20  1749 05/02/20  0418   GLU 92 87    140   K 4.3 3.6    107   CO2 30* 26   BUN 15 12   CREATININE 1.0 0.8   CALCIUM 10.3 9.4   , CMP   Recent Labs   Lab 05/01/20  1749 05/02/20  0418    140   K 4.3 3.6    107   CO2 30* 26   GLU 92 87   BUN 15 12   CREATININE 1.0 0.8   CALCIUM 10.3 9.4   PROT 8.2  --    ALBUMIN 4.5  --    BILITOT 0.7  --    ALKPHOS 67  --    AST 19  --    ALT 23  --    ANIONGAP 6* 7*   ESTGFRAFRICA >60.0 >60.0   EGFRNONAA >60.0 >60.0   , Troponin   Recent Labs   Lab 05/01/20 1749   TROPONINI <0.006    and All pertinent lab results from the last 24 hours have been reviewed.    Significant Imaging: All pertinent imaging results from the last 24 hours have been reviewed.    Assessment and Plan:     Brief HPI: 39F with recent COVID-19 infection, prior pericardial effusion, multiple hepatic hemangiomas sent to ER after echocardiogram with findings of early tamponade (mitral inflow variation, RV diastolic collapse) with large anterior pericardial effusion.    Pericardial effusion  Large pericardial effusion with early echocardiographic findings of tamponade without clinical tamponade, vitals stable. On review of old echos, may be have been stable large for prolonged period (unknown etiology) vs small-moderate effusion that worsened with inflammation/pericarditis in setting of COVID-19 infection. Suspect more chronicity, but would be indicated to tap for diagnostic purposes as well.     TTE 5/1: Normal left ventricular systolic function. The estimated ejection fraction is 55%.  · Normal LV diastolic function.  · No wall motion abnormalities.  · Normal right ventricular systolic function.  · The estimated PA systolic pressure is 26 mmHg.  · Intermediate central venous pressure (8 mmHg).  · Large anterior pericardial effusion and small  circumferential pericardial effusion. There is significant respiratory variation across the MV. There is early RV diastolic collapse seen. IVC is 8 mmHg. This is consistent with cardiac temponade physiology.    PLAN:  --MRI noted possibly cystic; also in setting of multiple liver hemangiomas. CT chest non-contrast showed moderate-sized fluid collection within the inferior aspect of the pericardium, similar to multiple prior MRIs, and most likely a pericardial effusion or pericardial cyst .  --Discussed with interventional cardiology; Plan for pericardiocentesis today 5/2. Plan for cytology and cultures to assess for etilology  --Repeat Echo to re-evaluate      COVID-19 virus infection: dx April 22 2020  Repeat rapid screen negative  -Last symptoms ~4/24, will maintain isolation precautions    PLAN:  -- Patient currently in COVID unit due to quarantine as patient was COVID positive less than 14 days ago      VTE Risk Mitigation (From admission, onward)         Ordered     IP VTE LOW RISK PATIENT  Once      05/01/20 1921     Place BIBIANA hose  Until discontinued      05/01/20 1921                Nany Espinoza, DO  Cardiology  [facility]  I have personally taken the history and examined the patient and agree with the resident's note as stated above.  I spoke with Dr Barry Dangelo who will do pericardiocentesis.

## 2020-05-02 NOTE — SUBJECTIVE & OBJECTIVE
Interval History: CT study showed no change from MRI and moderate pericardial fluid. Plan for pericardiocentesis today by interventional cardiology.    Review of Systems   All other systems reviewed and are negative.    Objective:     Vital Signs (Most Recent):  Temp: 98.3 °F (36.8 °C) (05/02/20 0500)  Pulse: 61 (05/02/20 1134)  Resp: 16 (05/02/20 0530)  BP: 108/73 (05/02/20 1134)  SpO2: 98 % (05/02/20 0530) Vital Signs (24h Range):  Temp:  [96.8 °F (36 °C)-98.8 °F (37.1 °C)] 98.3 °F (36.8 °C)  Pulse:  [] 61  Resp:  [16-20] 16  SpO2:  [97 %-100 %] 98 %  BP: (103-169)/(70-93) 108/73     Weight: 56.7 kg (125 lb)  Body mass index is 17.43 kg/m².     SpO2: 98 %  O2 Device (Oxygen Therapy): room air    No intake or output data in the 24 hours ending 05/02/20 1207    Lines/Drains/Airways     Peripheral Intravenous Line                 Peripheral IV - Single Lumen 05/01/20 1749 18 G Right Antecubital less than 1 day                Physical Exam     Patient currently in a closed COVID-19 unit for quarantine due to recent COVID infection within the last 10 days. please see nurse's note for full physical exam. Patient unseen by physician in order to reduce the spread of infectious disease      Significant Labs:   BMP:   Recent Labs   Lab 05/01/20 1749 05/02/20  0418   GLU 92 87    140   K 4.3 3.6    107   CO2 30* 26   BUN 15 12   CREATININE 1.0 0.8   CALCIUM 10.3 9.4   , CMP   Recent Labs   Lab 05/01/20 1749 05/02/20  0418    140   K 4.3 3.6    107   CO2 30* 26   GLU 92 87   BUN 15 12   CREATININE 1.0 0.8   CALCIUM 10.3 9.4   PROT 8.2  --    ALBUMIN 4.5  --    BILITOT 0.7  --    ALKPHOS 67  --    AST 19  --    ALT 23  --    ANIONGAP 6* 7*   ESTGFRAFRICA >60.0 >60.0   EGFRNONAA >60.0 >60.0   , Troponin   Recent Labs   Lab 05/01/20  1749   TROPONINI <0.006    and All pertinent lab results from the last 24 hours have been reviewed.    Significant Imaging: All pertinent imaging results from the  last 24 hours have been reviewed.

## 2020-05-02 NOTE — CONSULTS
Interventional Cardiology Consult Note    5/2/2020    SUBJECTIVE  39F with recent COVID-19 infection, prior pericardial effusion vs pericardial cyst noted back as far as 2011 on abdominal MRI for evaluation of multiple stable hepatic hemangiomas sent to ER after echocardiogram with findings of early tamponade (mitral inflow variation, RV diastolic collapse) with large anterior pericardial effusion. She had an MRI in March for monitoring of her hemangiomas; incidentally noted pericardial fluid vs cyst and an echo was ordered to evaluate. Deferred until today due to COVID-19. Pt denies symptoms of SOB, CP, or dizziness. SBP and HR normal. No pulsus paradoxus on exam. Repeat COVID PCR in ED negative        Review of Systems   Constitution: Negative for chills, fever, weight gain and weight loss.   HENT: Negative.    Eyes: Negative.    Cardiovascular: Negative for chest pain. Negative for claudication, dyspnea on exertion, irregular heartbeat, leg swelling, near-syncope, orthopnea, palpitations, paroxysmal nocturnal dyspnia and syncope.   Respiratory: Negative for shortness of breath. Negative for cough.    Endocrine: Negative.    Hematologic/Lymphatic: Negative.    Skin: Negative for color change and rash.   Musculoskeletal: Negative.    Gastrointestinal: Negative for abdominal pain, change in bowel habit, constipation, diarrhea and heartburn.   Genitourinary: Negative.    Neurological: Negative for dizziness, light-headedness and loss of balance.   Psychiatric/Behavioral: Negative for altered mental status.    OBJECTIVE  Current Facility-Administered Medications   Medication Dose Route Frequency Provider Last Rate Last Dose    acetaminophen tablet 650 mg  650 mg Oral Q6H PRN Santana Greenwood MD        cetirizine tablet 5 mg  5 mg Oral Daily Desi Whitfield MD        cyclobenzaprine tablet 5 mg  5 mg Oral TID PRN Santana Greenwood MD        escitalopram oxalate tablet 10 mg  10 mg Oral Daily Santana Greenwood MD         famotidine tablet 20 mg  20 mg Oral BID PRN Santana Greenwood MD        ondansetron disintegrating tablet 4 mg  4 mg Oral Q6H PRN Santana Greenwood MD        sodium chloride 0.9% flush 10 mL  10 mL Intravenous PRN Santana Greenwood MD           Past Medical History:   Diagnosis Date    COVID-19     Deviated septum 2011    Hypertrophy of inferior nasal turbinate     Liver mass     Nasal congestion 2011    Premature menopause        Past Surgical History:   Procedure Laterality Date    HERNIA REPAIR      NASAL SEPTUM SURGERY      TONSILLECTOMY      UMBILICAL HERNIA REPAIR         Review of patient's allergies indicates:   Allergen Reactions    No known drug allergies        Family History   Problem Relation Age of Onset    Diabetes Mother     Liver disease Mother         Fatty liver    Hypertension Mother     Macular degeneration Father     Diabetes Father     Hypertension Father     Hyperlipidemia Father     No Known Problems Sister     No Known Problems Brother     No Known Problems Daughter     No Known Problems Brother     No Known Problems Maternal Aunt     No Known Problems Maternal Uncle     No Known Problems Paternal Aunt     No Known Problems Paternal Uncle     No Known Problems Maternal Grandmother     No Known Problems Maternal Grandfather     No Known Problems Paternal Grandmother     No Known Problems Paternal Grandfather     Amblyopia Neg Hx     Blindness Neg Hx     Cancer Neg Hx     Cataracts Neg Hx     Glaucoma Neg Hx     Retinal detachment Neg Hx     Strabismus Neg Hx     Stroke Neg Hx     Thyroid disease Neg Hx     Heart disease Neg Hx     Melanoma Neg Hx        Social History     Socioeconomic History    Marital status: Single     Spouse name: Not on file    Number of children: 1    Years of education: Not on file    Highest education level: Not on file   Occupational History    Occupation:      Employer: [employer]  "  Social Needs    Financial resource strain: Not on file    Food insecurity:     Worry: Not on file     Inability: Not on file    Transportation needs:     Medical: Not on file     Non-medical: Not on file   Tobacco Use    Smoking status: Never Smoker    Smokeless tobacco: Never Used   Substance and Sexual Activity    Alcohol use: Yes     Comment: rare    Drug use: No    Sexual activity: Yes     Partners: Male     Birth control/protection: OCP   Lifestyle    Physical activity:     Days per week: Not on file     Minutes per session: Not on file    Stress: Not on file   Relationships    Social connections:     Talks on phone: Not on file     Gets together: Not on file     Attends Church service: Not on file     Active member of club or organization: Not on file     Attends meetings of clubs or organizations: Not on file     Relationship status: Not on file   Other Topics Concern    Are you pregnant or think you may be? Not Asked    Breast-feeding Not Asked   Social History Narrative    Not on file       Vitals: /73 (Patient Position: Lying)   Pulse (!) 57   Temp 98.3 °F (36.8 °C) (Oral)   Resp 16   Ht 5' 11" (1.803 m)   Wt 56.7 kg (125 lb)   LMP  (LMP Unknown)   SpO2 98%   Breastfeeding? No   BMI 17.43 kg/m²   Physical Exam  Gen: NAD  Head/Eyes/Ears/Nose: NCAT, MMM, good dentition   Neck: No carotid bruits, no JVD  Lung: CTAB, no wheezes/rales/ronchi, symmetrical with respiration   Heart: Normal S1/S2, regular rate and rhythm, no murmurs/rubs/gallops, normal PMI  Abdomen: Soft, NT/ND, NABS, no masses, no HSM  Vascular Exam: 2+ carotid, 2+ radial, 2+ femoral, 2+ posterior tibial, 2+ dorsalis pedis, normal Patricio's test, no subclavian bruits  Extremities: No LE edema bilaterally  Skin: Normal color and turgor. No wounds rashes, no petechia, no ecchymoses.   Neuro: AAOx3    Labs  Lab Results   Component Value Date    WBC 3.51 (L) 05/02/2020    HGB 12.9 05/02/2020    HCT 42.0 05/02/2020    "  05/02/2020    CHOL 215 (H) 03/02/2020    TRIG 75 03/02/2020    HDL 78 (H) 03/02/2020    ALT 23 05/01/2020    AST 19 05/01/2020     05/02/2020    K 3.6 05/02/2020     05/02/2020    CREATININE 0.8 05/02/2020    BUN 12 05/02/2020    CO2 26 05/02/2020    TSH 1.109 02/22/2017    INR 1.1 05/02/2020    HGBA1C 5.6 03/02/2020         Problem Assessment & Treatment Plan:  Pericardial Effusion  - Long standing possible cyst vs effusion based on evaluations with numerous Abdominal MRI's, TTE's, and chest CT. Concern there has been enlargement and echo findings of early cardiac tamponade so will proceed with diagnostic/therapeutic pericardiocentesis and sent fluid for analysis.    The risks, benefits, and alternatives of pericardiocentesis  were dicussed with the patient. All questions were answered and informed consent was obtained.    Signing Physician:  Duane Ta MD

## 2020-05-02 NOTE — ASSESSMENT & PLAN NOTE
Large pericardial effusion with early echocardiographic findings of tamponade without clinical tamponade, vitals stable. On review of old echos, may be have been stable large for prolonged period (unknown etiology) vs small-moderate effusion that worsened with inflammation/pericarditis in setting of COVID-19 infection. Suspect more chronicity, but would be indicated to tap for diagnostic purposes as well.     TTE 5/1: Normal left ventricular systolic function. The estimated ejection fraction is 55%.  · Normal LV diastolic function.  · No wall motion abnormalities.  · Normal right ventricular systolic function.  · The estimated PA systolic pressure is 26 mmHg.  · Intermediate central venous pressure (8 mmHg).  · Large anterior pericardial effusion and small circumferential pericardial effusion. There is significant respiratory variation across the MV. There is early RV diastolic collapse seen. IVC is 8 mmHg. This is consistent with cardiac temponade physiology.    PLAN:  --MRI noted possibly cystic; also in setting of multiple liver hemangiomas. CT chest non-contrast showed moderate-sized fluid collection within the inferior aspect of the pericardium, similar to multiple prior MRIs, and most likely a pericardial effusion or pericardial cyst .  --Discussed with interventional cardiology; Plan for pericardiocentesis today 5/2. Plan for cytology and cultures to assess for etilology  --Repeat Echo to re-evaluate

## 2020-05-02 NOTE — ASSESSMENT & PLAN NOTE
-Large pericardial effusion with early echocardiographic findings of tamponade without clinical tamponade, vitals stable. On review of old echos, may be have been stable large for prolonged period (unknown etiology) vs small-moderate effusion that worsened with inflammation/pericarditis in setting of COVID-19 infection. Suspect more chronicity, but would be indicated to tap for diagnostic purposes as well.   -MRI noted possibly cystic; also in setting of multiple liver hemangiomas; will obtain CT chest noncontrast to further evaluate. If cystic or hemangioma related, may require pericardial window or surgical intervention  -Discussed with interventional cardiology, aware of the case  -Repeat Echo to re-evaluate  -Will monitor, NPO at midnight, possible pericardiocentesis tomorrow pending echo.

## 2020-05-02 NOTE — ASSESSMENT & PLAN NOTE
Repeat rapid screen negative  -Last symptoms ~4/24, will maintain isolation precautions    PLAN:  -- Patient currently in COVID unit due to quarantine as patient was COVID positive less than 14 days ago

## 2020-05-02 NOTE — ED NOTES
Telemetry Verification   Patient placed on Telemetry Box  Verified with War Room  Box # [id number]   Monitor Tech    Rate 65   Rhythm Normal sinus

## 2020-05-02 NOTE — PLAN OF CARE
05/02/20 0828   Discharge Assessment   Assessment Type Discharge Planning Assessment   Confirmed/corrected address and phone number on facesheet? Yes   Assessment information obtained from? Patient   Expected Length of Stay (days) 3   Communicated expected length of stay with patient/caregiver no   Prior to hospitilization cognitive status: Alert/Oriented   Prior to hospitalization functional status: Independent   Current cognitive status: Alert/Oriented   Current Functional Status: Independent   Lives With alone   Able to Return to Prior Arrangements yes   Is patient able to care for self after discharge? Yes   Patient's perception of discharge disposition home or selfcare   Patient currently being followed by outpatient case management? No   Patient currently receives any other outside agency services? No   Equipment Currently Used at Home none   Do you have any problems affording any of your prescribed medications? No   Does the patient have transportation home? Yes   Transportation Anticipated family or friend will provide   Does the patient receive services at the [facility]? No   Discharge Plan A Home   Discharge Plan B Home with family   DME Needed Upon Discharge  none   Patient/Family in Agreement with Plan yes     SW completed assessment with patient via phone. Patient confirmed address and pharmacy.

## 2020-05-02 NOTE — NURSING
"Pt requesting for M.D. To order pt's birth control medication. Pt stated, "I forgot the medication at home and need to take it today."  "

## 2020-05-02 NOTE — NURSING
Pt is AAOX4, ambulatory. Pt is asymptomatic and afebrile. Pt NPO since midnight as ordered by physician. Pt's hr in the 50s throughout shift. 02 sat 100% on RA. Safety Precautions maintained at all times. Personal belongings and call light within reach. Will cont. To monitor.

## 2020-05-03 ENCOUNTER — PATIENT MESSAGE (OUTPATIENT)
Dept: INTERNAL MEDICINE | Facility: CLINIC | Age: 40
End: 2020-05-03

## 2020-05-03 VITALS
HEART RATE: 67 BPM | OXYGEN SATURATION: 97 % | TEMPERATURE: 97 F | SYSTOLIC BLOOD PRESSURE: 124 MMHG | DIASTOLIC BLOOD PRESSURE: 76 MMHG | WEIGHT: 125 LBS | BODY MASS INDEX: 17.5 KG/M2 | HEIGHT: 71 IN | RESPIRATION RATE: 18 BRPM

## 2020-05-03 LAB
ANION GAP SERPL CALC-SCNC: 8 MMOL/L (ref 8–16)
BASOPHILS # BLD AUTO: 0.02 K/UL (ref 0–0.2)
BASOPHILS NFR BLD: 0.6 % (ref 0–1.9)
BSA FOR ECHO PROCEDURE: 1.69 M2
BUN SERPL-MCNC: 12 MG/DL (ref 6–20)
CALCIUM SERPL-MCNC: 9.6 MG/DL (ref 8.7–10.5)
CHLORIDE SERPL-SCNC: 110 MMOL/L (ref 95–110)
CO2 SERPL-SCNC: 26 MMOL/L (ref 23–29)
CREAT SERPL-MCNC: 0.8 MG/DL (ref 0.5–1.4)
CV ECHO LV RWT: 0.31 CM
DIFFERENTIAL METHOD: ABNORMAL
ECHO LV POSTERIOR WALL: 0.67 CM (ref 0.6–1.1)
EOSINOPHIL # BLD AUTO: 0.1 K/UL (ref 0–0.5)
EOSINOPHIL NFR BLD: 3.7 % (ref 0–8)
ERYTHROCYTE [DISTWIDTH] IN BLOOD BY AUTOMATED COUNT: 11.8 % (ref 11.5–14.5)
EST. GFR  (AFRICAN AMERICAN): >60 ML/MIN/1.73 M^2
EST. GFR  (NON AFRICAN AMERICAN): >60 ML/MIN/1.73 M^2
FRACTIONAL SHORTENING: 25 % (ref 28–44)
GLUCOSE SERPL-MCNC: 83 MG/DL (ref 70–110)
HCT VFR BLD AUTO: 43.1 % (ref 37–48.5)
HGB BLD-MCNC: 13.1 G/DL (ref 12–16)
IMM GRANULOCYTES # BLD AUTO: 0 K/UL (ref 0–0.04)
IMM GRANULOCYTES NFR BLD AUTO: 0 % (ref 0–0.5)
INTERVENTRICULAR SEPTUM: 0.53 CM (ref 0.6–1.1)
LEFT ATRIUM SIZE: 2.74 CM
LEFT INTERNAL DIMENSION IN SYSTOLE: 3.21 CM (ref 2.1–4)
LEFT VENTRICLE DIASTOLIC VOLUME INDEX: 47.58 ML/M2
LEFT VENTRICLE DIASTOLIC VOLUME: 82.18 ML
LEFT VENTRICLE MASS INDEX: 42 G/M2
LEFT VENTRICLE SYSTOLIC VOLUME INDEX: 23.9 ML/M2
LEFT VENTRICLE SYSTOLIC VOLUME: 41.22 ML
LEFT VENTRICULAR INTERNAL DIMENSION IN DIASTOLE: 4.28 CM (ref 3.5–6)
LEFT VENTRICULAR MASS: 72.29 G
LYMPHOCYTES # BLD AUTO: 1.6 K/UL (ref 1–4.8)
LYMPHOCYTES NFR BLD: 44.8 % (ref 18–48)
MCH RBC QN AUTO: 29.3 PG (ref 27–31)
MCHC RBC AUTO-ENTMCNC: 30.4 G/DL (ref 32–36)
MCV RBC AUTO: 96 FL (ref 82–98)
MONOCYTES # BLD AUTO: 0.3 K/UL (ref 0.3–1)
MONOCYTES NFR BLD: 9.1 % (ref 4–15)
NEUTROPHILS # BLD AUTO: 1.5 K/UL (ref 1.8–7.7)
NEUTROPHILS NFR BLD: 41.8 % (ref 38–73)
NRBC BLD-RTO: 0 /100 WBC
PLATELET # BLD AUTO: 266 K/UL (ref 150–350)
PMV BLD AUTO: 9.6 FL (ref 9.2–12.9)
POTASSIUM SERPL-SCNC: 4.7 MMOL/L (ref 3.5–5.1)
RA PRESSURE: 3 MMHG
RBC # BLD AUTO: 4.47 M/UL (ref 4–5.4)
RV TISSUE DOPPLER FREE WALL SYSTOLIC VELOCITY 1 (APICAL 4 CHAMBER VIEW): 7.7 CM/S
SODIUM SERPL-SCNC: 144 MMOL/L (ref 136–145)
TDI LATERAL: 0.09 M/S
TDI SEPTAL: 0.09 M/S
TDI: 0.09 M/S
WBC # BLD AUTO: 3.53 K/UL (ref 3.9–12.7)

## 2020-05-03 PROCEDURE — 25000003 PHARM REV CODE 250: Performed by: STUDENT IN AN ORGANIZED HEALTH CARE EDUCATION/TRAINING PROGRAM

## 2020-05-03 PROCEDURE — 80048 BASIC METABOLIC PNL TOTAL CA: CPT

## 2020-05-03 PROCEDURE — 85025 COMPLETE CBC W/AUTO DIFF WBC: CPT

## 2020-05-03 PROCEDURE — 99231 SBSQ HOSP IP/OBS SF/LOW 25: CPT | Mod: ,,, | Performed by: INTERNAL MEDICINE

## 2020-05-03 PROCEDURE — 99231 PR SUBSEQUENT HOSPITAL CARE,LEVL I: ICD-10-PCS | Mod: ,,, | Performed by: INTERNAL MEDICINE

## 2020-05-03 PROCEDURE — 11000001 HC ACUTE MED/SURG PRIVATE ROOM

## 2020-05-03 PROCEDURE — 36415 COLL VENOUS BLD VENIPUNCTURE: CPT

## 2020-05-03 RX ADMIN — CETIRIZINE HYDROCHLORIDE 5 MG: 5 TABLET ORAL at 08:05

## 2020-05-03 NOTE — PLAN OF CARE
Pt has a new order to dc home. AAOx4.  Tele has been removed. NAD noted. She denies pain or discomfort. No SOB has been reported. She has remained free of falls or injuries. VS have remained stable.     Pt left floor via wheelchair @ [id number] . Pt follow up appointments, curent meds and education was went over with PT and she verbalized understanding. She was also given printout of medication, appointments, education.

## 2020-05-03 NOTE — PLAN OF CARE
Pt is AAOx4, resting in bed. Respirations even and unlabored, no sob noted. 02 sat 97% on RA. No c/o of pain, discomfort, or sob. HR in the 50s. Pt is afebrile. Dressing on sternum from recent pericardiocentesis in place, with minimal amount of blood drainage noted on corner of dressing. Pt is to have repeat echo in A.M. Safety precautions maintained at all times, call light within reach. Will cont. To monitor.

## 2020-05-03 NOTE — DISCHARGE SUMMARY
[facility]  Cardiology  Discharge Summary      Patient Name: Melly Hwang  MRN: [id number]  Admission Date: 5/1/2020  Hospital Length of Stay: 0 days  Discharge Date and Time:  05/03/2020 12:40 PM  Attending Physician: Edmund Weber MD    Discharging Provider: Nany Espinoza DO  Primary Care Physician: Melly Sahu MD    HPI:   39F with recent COVID-19 infection, prior pericardial effusion, multiple hepatic hemangiomas sent to ER after echocardiogram with findings of early tamponade (mitral inflow variation, RV diastolic collapse) with large anterior pericardial effusion. She had an MRI in March for monitoring of her hemangiomas; incidentally noted pericardial fluid vs cyst and an echo was ordered to evaluate. Deferred until today due to COVID-19. Pt works as a nurse; reported symptoms c/w COVID in mid April. Routine screening negative 4/15, suspected false negative in setting of  classic symptoms of fever, slight cough, and nausea, decreased appetite, and loose stools. Returned to ER 4/22 with chest tightness, shortness of breath, mild nonproductive cough, anosmia. Rapid screen positive for COVID. Reports resolution of her symptoms of chest tightness and SOB about 1-2 days later. Since has been in usual state of heatlh.     Denies CP, chest tightness, palpitations, SOB, PND, orthopnea, ARNOLDO, dizziness/LH, presyncope/syncope.   Echo findings today as above. Echo 2013 with small to moderate effusion. Repeat 2014 with moderate effusion (large anteriorly) without findings of tamponade. Workup from that time with normal ESR, TSH.    Labs today repeat COVID rapid negative. BNP, Trop, CRP, procal, CBC, CMP wnl. Normotensive, not tachycardic. Asympomatic.     Procedure(s) (LRB):  Pericardiocentesis (N/A)     Indwelling Lines/Drains at time of discharge:  Lines/Drains/Airways     None                 Hospital Course:  Patient called and asked to report to the ED after TTE results from 5/1  showed large Pericardial effusion with early tamponade physiology. Patient recently recovering from COVID 19. Positive test 4/22, asymptomatic since 4/24. Repeat COVID 19 testing 5/1 - negative. Patient currently remains asymptomatic from a cardiac tamponade perspective. Interventional cardiology consulted Pericardiocentesis done 5/2 with 520 cc of transudate fluid. Follow up Cytology and cultures outpatient with Cards/PCP. Patient was discharged home in stable condition with Follow up in one week with PCP, 2 weeks with Cardiology and repeat TTE in 10 days    Consults:   Consults (From admission, onward)        Status Ordering Provider     Inpatient consult to Interventional Cardiology  Once     Provider:  (Not yet assigned)    Completed OSWALDO FORTUNE        Physcial Exam:  Patient  in a closed COVID-19 unit for quarantine due to recent COVID infection within the last 10 days. please see nurse's note for full physical exam. Patient unseen by physician in order to reduce the spread of infectious disease       Significant Diagnostic Studies: Labs:   CMP   Recent Labs   Lab 05/01/20 1749 05/02/20 0418 05/03/20  0356    140 144   K 4.3 3.6 4.7    107 110   CO2 30* 26 26   GLU 92 87 83   BUN 15 12 12   CREATININE 1.0 0.8 0.8   CALCIUM 10.3 9.4 9.6   PROT 8.2  --   --    ALBUMIN 4.5  --   --    BILITOT 0.7  --   --    ALKPHOS 67  --   --    AST 19  --   --    ALT 23  --   --    ANIONGAP 6* 7* 8   ESTGFRAFRICA >60.0 >60.0 >60.0   EGFRNONAA >60.0 >60.0 >60.0   , CBC   Recent Labs   Lab 05/01/20 1749 05/02/20 0418 05/03/20  0356   WBC 5.26 3.51* 3.53*   HGB 14.1 12.9 13.1   HCT 45.1 42.0 43.1    263 266    and All labs within the past 24 hours have been reviewed    Pending Diagnostic Studies:     Procedure Component Value Units Date/Time    Cytology, Fluid/Wash/Brush [[id number]] Collected:  05/02/20 1341    Order Status:  Sent Lab Status:  In process Updated:  05/02/20 1342    Lipase, Body Fluid  Pericardial Fluid [[id number]] Collected:  05/02/20 1300    Order Status:  Sent Lab Status:  In process Updated:  05/02/20 1341    Specimen:  Body Fluid     WBC & Diff,Body Fluid Pericardial Fluid [[id number]] Collected:  05/02/20 1300    Order Status:  Sent Lab Status:  In process Updated:  05/02/20 1341    Specimen:  Body Fluid           Final Active Diagnoses:    Diagnosis Date Noted POA    PRINCIPAL PROBLEM:  Pericardial effusion [I31.3] 05/01/2020 Yes    COVID-19 virus infection: dx April 22 2020 [U07.1] 04/22/2020 Yes      Problems Resolved During this Admission:     * Pericardial effusion  Large pericardial effusion with early echocardiographic findings of tamponade without clinical tamponade, vitals stable. On review of old echos, may be have been stable large for prolonged period (unknown etiology) vs small-moderate effusion that worsened with inflammation/pericarditis in setting of COVID-19 infection. Suspect more chronicity, but would be indicated to tap for diagnostic purposes as well.     MRI noted possibly cystic; also in setting of multiple liver hemangiomas. CT chest non-contrast showed moderate-sized fluid collection within the inferior aspect of the pericardium, similar to multiple prior MRIs, and most likely a pericardial effusion or pericardial cyst .    TTE 5/1: Normal left ventricular systolic function. The estimated ejection fraction is 55%.  · Normal LV diastolic function.  · No wall motion abnormalities.  · Normal right ventricular systolic function.  · The estimated PA systolic pressure is 26 mmHg.  · Intermediate central venous pressure (8 mmHg).  · Large anterior pericardial effusion and small circumferential pericardial effusion. There is significant respiratory variation across the MV. There is early RV diastolic collapse seen. IVC is 8 mmHg. This is consistent with cardiac temponade physiology.    · TTE: 5/2: Low normal left ventricular systolic function. The estimated ejection  fraction is 53%.  · No wall motion abnormalities.  · Normal LV diastolic function.  · Low normal right ventricular systolic function.  · Normal central venous pressure (3 mmHg).  · Just very minimal PE outside the RA      PLAN:  --Discussed with interventional cardiology. Follow up with TTE in 10 Days. Patient can resume normal activity. Will need to follow up cytology and culture results with Cards/ PCP  -- Patient instructed on symptoms to watch out for pericarditis and to contact PCP immediately if she is concerned      COVID-19 virus infection: dx April 22 2020  Repeat rapid screen negative  -Last symptoms ~4/24, will maintain isolation precautions    PLAN:  -- Patient currently in COVID unit due to quarantine as patient was COVID positive less than 14 days ago        Discharged Condition: stable    Disposition: Home or Self Care    Follow Up:  Follow-up Information     Melly Sahu MD In 1 week.    Specialty:  Internal Medicine  Why:  post hospitilization  Contact information:  [address] [facility] [address]  [phone]             [facility] - Cardiology In 2 weeks.    Specialty:  Cardiology  Why:  F/U pericardial effusion  Contact information:  [address] [facility] [address] [phone]  Additional information:  3rd floor               Patient Instructions:      Ambulatory referral/consult to Cardiology   Standing Status: Future   Referral Priority: Routine Referral Type: Consultation   Referral Reason: Specialty Services Required   Requested Specialty: Cardiology   Number of Visits Requested: 1     Diet Adult Regular     Notify your health care provider if you experience any of the following:  temperature >100.4     Notify your health care provider if you experience any of the following:  persistent nausea and vomiting or diarrhea     Notify your health care provider if you experience any of the following:  severe uncontrolled pain     Notify your health care provider if  you experience any of the following:  redness, tenderness, or signs of infection (pain, swelling, redness, odor or green/yellow discharge around incision site)     Notify your health care provider if you experience any of the following:  difficulty breathing or increased cough     Notify your health care provider if you experience any of the following:  severe persistent headache     Notify your health care provider if you experience any of the following:  worsening rash     Notify your health care provider if you experience any of the following:  persistent dizziness, light-headedness, or visual disturbances     Notify your health care provider if you experience any of the following:  increased confusion or weakness     Echo Color Flow Doppler? Yes   Standing Status: Future Standing Exp. Date: 05/03/21     Order Specific Question Answer Comments   Color Flow Doppler? Yes      Activity as tolerated     Medications:  Reconciled Home Medications:      Medication List      CONTINUE taking these medications    albuterol 90 mcg/actuation inhaler  Commonly known as:  PROVENTIL/VENTOLIN HFA  Inhale 1-2 puffs into the lungs every 6 (six) hours as needed for Wheezing or Shortness of Breath. Rescue     cetirizine 10 MG tablet  Commonly known as:  ZYRTEC  Take 1 tablet (10 mg total) by mouth once daily.     cyclobenzaprine 10 MG tablet  Commonly known as:  FLEXERIL  TAKE 1 TABLET BY MOUTH EVERY DAY IN THE EVENING AS NEEDED FOR MUSCLE SPASMS     escitalopram oxalate 10 MG tablet  Commonly known as:  LEXAPRO  Take 1 tablet (10 mg total) by mouth once daily.     hydrocortisone butyrate 0.1 % Oint  Commonly known as:  LOCOID  AAA BID prn itching, scaling, or redness. Do not use longer than 2 weeks consecutively.     ketoconazole 2 % cream  Commonly known as:  NIZORAL  Apply topically once daily.     multivitamin capsule  Take by mouth. 1 capsule Oral Every morning     norethindrone 0.35 mg tablet  Commonly known as:  EMILIA  TAKE  1 TABLET (0.35 MG TOTAL) BY MOUTH ONCE DAILY     omeprazole 20 mg Tbec  Take 20 mg by mouth daily as needed.     pimecrolimus 1 % cream  Commonly known as:  ELIDEL  AAA BID prn itching, redness        STOP taking these medications    azithromycin 250 MG tablet  Commonly known as:  Z-JUSTIN     naproxen 500 MG tablet  Commonly known as:  NAPROSYN     ondansetron 4 MG tablet  Commonly known as:  ZOFRAN            Time spent on the discharge of patient: 35 minutes    Nany Espinoza DO  Cardiology  [facility]  I have personally taken the history and examined the patient and agree with the resident's note as stated above.  Echo as per my report. Repeat in about 10 days.

## 2020-05-03 NOTE — ASSESSMENT & PLAN NOTE
Large pericardial effusion with early echocardiographic findings of tamponade without clinical tamponade, vitals stable. On review of old echos, may be have been stable large for prolonged period (unknown etiology) vs small-moderate effusion that worsened with inflammation/pericarditis in setting of COVID-19 infection. Suspect more chronicity, but would be indicated to tap for diagnostic purposes as well.     MRI noted possibly cystic; also in setting of multiple liver hemangiomas. CT chest non-contrast showed moderate-sized fluid collection within the inferior aspect of the pericardium, similar to multiple prior MRIs, and most likely a pericardial effusion or pericardial cyst .    TTE 5/1: Normal left ventricular systolic function. The estimated ejection fraction is 55%.  · Normal LV diastolic function.  · No wall motion abnormalities.  · Normal right ventricular systolic function.  · The estimated PA systolic pressure is 26 mmHg.  · Intermediate central venous pressure (8 mmHg).  · Large anterior pericardial effusion and small circumferential pericardial effusion. There is significant respiratory variation across the MV. There is early RV diastolic collapse seen. IVC is 8 mmHg. This is consistent with cardiac temponade physiology.    · TTE: 5/2: Low normal left ventricular systolic function. The estimated ejection fraction is 53%.  · No wall motion abnormalities.  · Normal LV diastolic function.  · Low normal right ventricular systolic function.  · Normal central venous pressure (3 mmHg).  · Just very minimal PE outside the RA      PLAN:  --Discussed with interventional cardiology. Follow up with TTE in 10 Days. Patient can resume normal activity. Will need to follow up cytology and culture results with Cards/ PCP  -- Patient instructed on symptoms to watch out for pericarditis and to contact PCP immediately if she is concerned

## 2020-05-03 NOTE — NURSING
Pt is AAOx4, c/o of soreness on sternum when turning self in bed. Limited Echo being performed at time.

## 2020-05-04 ENCOUNTER — PATIENT MESSAGE (OUTPATIENT)
Dept: INTERNAL MEDICINE | Facility: CLINIC | Age: 40
End: 2020-05-04

## 2020-05-05 ENCOUNTER — PATIENT MESSAGE (OUTPATIENT)
Dept: INTERNAL MEDICINE | Facility: CLINIC | Age: 40
End: 2020-05-05

## 2020-05-05 ENCOUNTER — PATIENT OUTREACH (OUTPATIENT)
Dept: ADMINISTRATIVE | Facility: CLINIC | Age: 40
End: 2020-05-05

## 2020-05-05 ENCOUNTER — PATIENT MESSAGE (OUTPATIENT)
Dept: CARDIOLOGY | Facility: CLINIC | Age: 40
End: 2020-05-05

## 2020-05-05 ENCOUNTER — HOSPITAL ENCOUNTER (EMERGENCY)
Facility: HOSPITAL | Age: 40
Discharge: HOME | End: 2020-05-05
Attending: EMERGENCY MEDICINE
Payer: COMMERCIAL

## 2020-05-05 VITALS
HEART RATE: 58 BPM | BODY MASS INDEX: 17.5 KG/M2 | DIASTOLIC BLOOD PRESSURE: 65 MMHG | TEMPERATURE: 99 F | WEIGHT: 125 LBS | HEIGHT: 71 IN | SYSTOLIC BLOOD PRESSURE: 124 MMHG | RESPIRATION RATE: 18 BRPM | OXYGEN SATURATION: 100 %

## 2020-05-05 DIAGNOSIS — R00.2 PALPITATIONS: Primary | ICD-10-CM

## 2020-05-05 DIAGNOSIS — R42 DIZZINESS: ICD-10-CM

## 2020-05-05 DIAGNOSIS — R55 VASOVAGAL EPISODE: Primary | ICD-10-CM

## 2020-05-05 LAB
ALBUMIN SERPL BCP-MCNC: 4.4 G/DL (ref 3.5–5.2)
ALP SERPL-CCNC: 65 U/L (ref 55–135)
ALT SERPL W/O P-5'-P-CCNC: 21 U/L (ref 10–44)
ANION GAP SERPL CALC-SCNC: 8 MMOL/L (ref 8–16)
AST SERPL-CCNC: 20 U/L (ref 10–40)
BACTERIA BLD CULT: NORMAL
BACTERIA BLD CULT: NORMAL
BASOPHILS # BLD AUTO: 0.01 K/UL (ref 0–0.2)
BASOPHILS NFR BLD: 0.3 % (ref 0–1.9)
BILIRUB SERPL-MCNC: 1 MG/DL (ref 0.1–1)
BUN SERPL-MCNC: 13 MG/DL (ref 6–20)
CALCIUM SERPL-MCNC: 9.7 MG/DL (ref 8.7–10.5)
CHLORIDE SERPL-SCNC: 105 MMOL/L (ref 95–110)
CO2 SERPL-SCNC: 27 MMOL/L (ref 23–29)
CREAT SERPL-MCNC: 0.8 MG/DL (ref 0.5–1.4)
DIFFERENTIAL METHOD: ABNORMAL
EOSINOPHIL # BLD AUTO: 0.1 K/UL (ref 0–0.5)
EOSINOPHIL NFR BLD: 1.4 % (ref 0–8)
ERYTHROCYTE [DISTWIDTH] IN BLOOD BY AUTOMATED COUNT: 11.8 % (ref 11.5–14.5)
EST. GFR  (AFRICAN AMERICAN): >60 ML/MIN/1.73 M^2
EST. GFR  (NON AFRICAN AMERICAN): >60 ML/MIN/1.73 M^2
FINAL PATHOLOGIC DIAGNOSIS: NORMAL
GLUCOSE SERPL-MCNC: 100 MG/DL (ref 70–110)
HCT VFR BLD AUTO: 45.1 % (ref 37–48.5)
HGB BLD-MCNC: 14 G/DL (ref 12–16)
IMM GRANULOCYTES # BLD AUTO: 0 K/UL (ref 0–0.04)
IMM GRANULOCYTES NFR BLD AUTO: 0 % (ref 0–0.5)
LYMPHOCYTES # BLD AUTO: 1.1 K/UL (ref 1–4.8)
LYMPHOCYTES NFR BLD: 31 % (ref 18–48)
MAGNESIUM SERPL-MCNC: 1.9 MG/DL (ref 1.6–2.6)
MCH RBC QN AUTO: 29.9 PG (ref 27–31)
MCHC RBC AUTO-ENTMCNC: 31 G/DL (ref 32–36)
MCV RBC AUTO: 96 FL (ref 82–98)
MONOCYTES # BLD AUTO: 0.2 K/UL (ref 0.3–1)
MONOCYTES NFR BLD: 6.5 % (ref 4–15)
NEUTROPHILS # BLD AUTO: 2.2 K/UL (ref 1.8–7.7)
NEUTROPHILS NFR BLD: 60.8 % (ref 38–73)
NRBC BLD-RTO: 0 /100 WBC
PLATELET # BLD AUTO: 252 K/UL (ref 150–350)
PMV BLD AUTO: 10 FL (ref 9.2–12.9)
POTASSIUM SERPL-SCNC: 4.9 MMOL/L (ref 3.5–5.1)
PROT SERPL-MCNC: 7.8 G/DL (ref 6–8.4)
RBC # BLD AUTO: 4.68 M/UL (ref 4–5.4)
SODIUM SERPL-SCNC: 140 MMOL/L (ref 136–145)
WBC # BLD AUTO: 3.55 K/UL (ref 3.9–12.7)

## 2020-05-05 PROCEDURE — 83735 ASSAY OF MAGNESIUM: CPT

## 2020-05-05 PROCEDURE — 80053 COMPREHEN METABOLIC PANEL: CPT

## 2020-05-05 PROCEDURE — 99285 EMERGENCY DEPT VISIT HI MDM: CPT | Mod: 25

## 2020-05-05 PROCEDURE — 85025 COMPLETE CBC W/AUTO DIFF WBC: CPT

## 2020-05-05 PROCEDURE — 96360 HYDRATION IV INFUSION INIT: CPT

## 2020-05-05 PROCEDURE — 93010 ELECTROCARDIOGRAM REPORT: CPT | Mod: ,,, | Performed by: INTERNAL MEDICINE

## 2020-05-05 PROCEDURE — 96361 HYDRATE IV INFUSION ADD-ON: CPT

## 2020-05-05 PROCEDURE — 99285 PR EMERGENCY DEPT VISIT,LEVEL V: ICD-10-PCS | Mod: ,,, | Performed by: EMERGENCY MEDICINE

## 2020-05-05 PROCEDURE — 93005 ELECTROCARDIOGRAM TRACING: CPT

## 2020-05-05 PROCEDURE — 25000003 PHARM REV CODE 250: Performed by: EMERGENCY MEDICINE

## 2020-05-05 PROCEDURE — 99285 EMERGENCY DEPT VISIT HI MDM: CPT | Mod: ,,, | Performed by: EMERGENCY MEDICINE

## 2020-05-05 PROCEDURE — 93010 EKG 12-LEAD: ICD-10-PCS | Mod: ,,, | Performed by: INTERNAL MEDICINE

## 2020-05-05 RX ADMIN — SODIUM CHLORIDE 1000 ML: 0.9 INJECTION, SOLUTION INTRAVENOUS at 09:05

## 2020-05-05 RX ADMIN — SODIUM CHLORIDE 1000 ML: 0.9 INJECTION, SOLUTION INTRAVENOUS at 07:05

## 2020-05-05 NOTE — TELEPHONE ENCOUNTER
C3 nurse attempted to contact patient.  C3 nurse attempted to contact Melly Hwang for a TCC post hospital discharge follow up call. The patient declined.    The patient does not have a scheduled HOSFU appointment within 7-14 days post hospital discharge date 5/3/2020. Message sent to Physician staff to assist with HOSFU appointment scheduling.

## 2020-05-05 NOTE — ED PROVIDER NOTES
Encounter Date: 5/5/2020  COVID Statement  The  of Health and Human Services and Lance Leija, Governor of the [facility], have declared a State of Public Health Emergency due to the spread of a novel coronavirus and disease (COVID-19).  There is no currently accepted treatment except conservative measures and respiratory support if appropriate.  This has lead to significant resource capacity and potential delays in care.         History     Chief Complaint   Patient presents with    Dizziness     Pt c/o of near-sycope that began at 0400. Recently had pleural effusion and discharged on Sunday. COVID+ 2 weeks ago.      39y F with PMH of Covid, pericardial effusion (s/p drainage) presents with a sensation that she is about to pass out. She says she was laying in bed playing on her phone when she felt a hot sensation and felt a little dizzy. This was not associated with syncope, headache, chest pain or shortness of breath. She did not feel nauseated or vomit. This symptom was brief. She checked her vitals at home and noted her heart rate was in the 50s and her blood pressure was 130. This same symptom kept coming and going so the patient decided to come to the hospital. She was discharged 2 days ago after Covid admission and pericardial effusion drainage. She says she has been doing well at home, eating and drinking normally.     The history is provided by the patient.     Review of patient's allergies indicates:   Allergen Reactions    No known drug allergies      Past Medical History:   Diagnosis Date    COVID-19     Deviated septum 2011    Hypertrophy of inferior nasal turbinate     Liver mass     Nasal congestion 2011    Premature menopause      Past Surgical History:   Procedure Laterality Date    HERNIA REPAIR      NASAL SEPTUM SURGERY      PERICARDIOCENTESIS N/A 5/2/2020    Procedure: Pericardiocentesis;  Surgeon: Dickson Dangelo MD;  Location: [facility] CATH LAB;  Service:  Cardiology;  Laterality: N/A;    TONSILLECTOMY      UMBILICAL HERNIA REPAIR       Family History   Problem Relation Age of Onset    Diabetes Mother     Liver disease Mother         Fatty liver    Hypertension Mother     Macular degeneration Father     Diabetes Father     Hypertension Father     Hyperlipidemia Father     No Known Problems Sister     No Known Problems Brother     No Known Problems Daughter     No Known Problems Brother     No Known Problems Maternal Aunt     No Known Problems Maternal Uncle     No Known Problems Paternal Aunt     No Known Problems Paternal Uncle     No Known Problems Maternal Grandmother     No Known Problems Maternal Grandfather     No Known Problems Paternal Grandmother     No Known Problems Paternal Grandfather     Amblyopia Neg Hx     Blindness Neg Hx     Cancer Neg Hx     Cataracts Neg Hx     Glaucoma Neg Hx     Retinal detachment Neg Hx     Strabismus Neg Hx     Stroke Neg Hx     Thyroid disease Neg Hx     Heart disease Neg Hx     Melanoma Neg Hx      Social History     Tobacco Use    Smoking status: Never Smoker    Smokeless tobacco: Never Used   Substance Use Topics    Alcohol use: Yes     Comment: rare    Drug use: No     Review of Systems   Constitutional: Negative for fever.   HENT: Negative for sore throat.    Respiratory: Negative for shortness of breath.    Cardiovascular: Negative for chest pain.   Gastrointestinal: Negative for nausea.   Genitourinary: Negative for dysuria.   Musculoskeletal: Negative for back pain.   Skin: Negative for rash.   Neurological: Positive for dizziness. Negative for weakness.   Hematological: Does not bruise/bleed easily.   All other systems reviewed and are negative.      Physical Exam     Initial Vitals [05/05/20 0606]   BP Pulse Resp Temp SpO2   (!) 153/86 63 16 98.5 °F (36.9 °C) 100 %      MAP       --         Physical Exam    Nursing note and vitals reviewed.  Constitutional: Vital signs are normal.  She appears well-developed and well-nourished.   HENT:   Head: Normocephalic and atraumatic.   Eyes: Conjunctivae are normal.   Neck: Trachea normal. Neck supple.   Cardiovascular: Normal pulses. Bradycardia present.    Bedside US does not demonstrate large pericardial effusion    Pulmonary/Chest: No tachypnea. No respiratory distress.   Abdominal: Normal appearance. She exhibits no distension. There is no tenderness.   Musculoskeletal: Normal range of motion.   Neurological: She is alert and oriented to person, place, and time.   Skin: Skin is warm and dry.         ED Course   Procedures  Labs Reviewed   CBC W/ AUTO DIFFERENTIAL - Abnormal; Notable for the following components:       Result Value    WBC 3.55 (*)     Mean Corpuscular Hemoglobin Conc 31.0 (*)     Mono # 0.2 (*)     All other components within normal limits   COMPREHENSIVE METABOLIC PANEL   MAGNESIUM     EKG Readings: (Independently Interpreted)   Initial Reading: No STEMI. Previous EKG: Compared with most recent EKG Rhythm: Sinus Bradycardia. Heart Rate: 55. Axis: Right Axis Deviation.     ECG Results          EKG 12-lead (Final result)  Result time 05/05/20 08:43:27    Final result by Antonia Little In [facility] (05/05/20 08:43:27)                 Narrative:    Test Reason : R42,    Vent. Rate : 055 BPM     Atrial Rate : 055 BPM     P-R Int : 128 ms          QRS Dur : 082 ms      QT Int : 438 ms       P-R-T Axes : 080 100 071 degrees     QTc Int : 419 ms    Sinus bradycardia  Rightward axis  Borderline Abnormal ECG  When compared with ECG of 02-MAY-2020 07:32,  No significant change was found  Confirmed by ADAM LAWTON MD ([id number]) on 5/5/2020 8:43:18 AM    Referred By: [email or web address]   SELF           Confirmed By:ADAM LAWTON MD                            Imaging Results          X-Ray Chest 1 View (Final result)  Result time 05/05/20 08:58:18    Final result by Duane Lyons MD (05/05/20 08:58:18)                 Impression:      No acute chest  disease identified.  No detrimental change in comparison the prior radiograph.    Electronically signed by resident: Ajay Rincon  Date:    05/05/2020  Time:    08:43    Electronically signed by: Duane Lyons MD  Date:    05/05/2020  Time:    08:58             Narrative:    EXAMINATION:  XR CHEST 1 VIEW    CLINICAL HISTORY:  Dizziness and giddiness    TECHNIQUE:  Single frontal view of the chest was performed.    COMPARISON:  05/01/2020    FINDINGS:  The lungs are clear, with normal appearance of pulmonary vasculature and no pleural effusion or pneumothorax.    The cardiac silhouette is normal in size. The hilar and mediastinal contours are unremarkable.    Bones are intact.                                 Medical Decision Making:   History:   Old Medical Records: I decided to obtain old medical records.  Initial Assessment:   Evaluation of pre-syncopal symptoms  Differential Diagnosis:   Pericardial effusion  Symptomatic bradycardia   Dehydration   Electrolyte abnormality   Independently Interpreted Test(s):   I have ordered and independently interpreted X-rays - see prior notes.  I have ordered and independently interpreted EKG Reading(s) - see prior notes  Clinical Tests:   Lab Tests: Ordered and Reviewed  Radiological Study: Ordered and Reviewed  Medical Tests: Ordered and Reviewed  ED Management:  All current hospital protocols and procedures are being followed and updated daily to minimize spread of the infection. My personal examination limited due to patient's stability to date, risk of virus transmission, and critical PPE shortage.    Recent admission for covid, tested negative prior to discharge.   VS stable on exam. Bradycardia that improves with movement. Orthostatics positive with heart rate change. Bedside US without obvious large pericardial effusion.   Will give IV fluids and will check labs and reassess.              Attending Attestation:             Attending ED Notes:   Labs reviewed, no  significant abnormalities. Mild Hr changes with orthostatic vital signs.  IV fluids have been given.  Patient reports improvement in her symptoms.  Discussed with Cardiology.  No indication for change in her outpatient management.  Return precautions advised.  Patient discharged in stable condition.                        Clinical Impression:       ICD-10-CM ICD-9-CM   1. Vasovagal episode R55 780.2   2. Dizziness R42 780.4         Disposition:   Disposition: Discharged  Condition: Stable     ED Disposition Condition    Discharge Stable        ED Prescriptions     None        Follow-up Information     Follow up With Specialties Details Why Contact Info    Melly Sahu MD Internal Medicine Call  For re-evaluation of your symptoms [address]  [place] [address]  [phone]                                       Tiesha Escobedo MD  05/05/20 0034

## 2020-05-05 NOTE — ED TRIAGE NOTES
"Patient reports near syncopal episode around 0400. "I feel it even when I'm laying down." "My whole body gets hot." Reports BP in 130s at home which "is abnormal for me." Reports baseline BP in 110s. Denies any vision changes. Denies CP, SOB, fever, headache.   "

## 2020-05-05 NOTE — DISCHARGE INSTRUCTIONS
Increase fluids and food   Continue to monitor symptoms   Keep follow up appointments as scheduled

## 2020-05-06 LAB
ALBUMIN FLD-MCNC: 2380 MG/DL
BACTERIA FLD CULT: NORMAL
SPECIMEN SOURCE: NORMAL

## 2020-05-07 ENCOUNTER — PATIENT MESSAGE (OUTPATIENT)
Dept: CARDIOLOGY | Facility: CLINIC | Age: 40
End: 2020-05-07

## 2020-05-07 ENCOUNTER — PATIENT OUTREACH (OUTPATIENT)
Dept: ADMINISTRATIVE | Facility: OTHER | Age: 40
End: 2020-05-07

## 2020-05-07 LAB
GLUCOSE FLD-MCNC: 97 MG/DL
LDH FLD L TO P-CCNC: 57 U/L
PROT FLD-MCNC: 3 G/DL
SPECIMEN SOURCE: NORMAL

## 2020-05-07 NOTE — PROGRESS NOTES
Chart reviewed.   Immunizations: updated  Orders placed: n/a  Upcoming appts to satisfy ALIA topics: Mammogram 5/16

## 2020-05-08 ENCOUNTER — OFFICE VISIT (OUTPATIENT)
Dept: CARDIOLOGY | Facility: CLINIC | Age: 40
End: 2020-05-08
Payer: COMMERCIAL

## 2020-05-08 ENCOUNTER — HOSPITAL ENCOUNTER (OUTPATIENT)
Dept: CARDIOLOGY | Facility: CLINIC | Age: 40
Discharge: HOME | End: 2020-05-08
Attending: INTERNAL MEDICINE
Payer: COMMERCIAL

## 2020-05-08 ENCOUNTER — PATIENT MESSAGE (OUTPATIENT)
Dept: INTERNAL MEDICINE | Facility: CLINIC | Age: 40
End: 2020-05-08

## 2020-05-08 ENCOUNTER — LAB VISIT (OUTPATIENT)
Dept: LAB | Facility: HOSPITAL | Age: 40
End: 2020-05-08
Attending: INTERNAL MEDICINE
Payer: COMMERCIAL

## 2020-05-08 VITALS
HEIGHT: 71 IN | HEART RATE: 75 BPM | SYSTOLIC BLOOD PRESSURE: 124 MMHG | WEIGHT: 125 LBS | BODY MASS INDEX: 17.5 KG/M2 | DIASTOLIC BLOOD PRESSURE: 65 MMHG

## 2020-05-08 VITALS
BODY MASS INDEX: 17.95 KG/M2 | SYSTOLIC BLOOD PRESSURE: 125 MMHG | WEIGHT: 132.5 LBS | HEIGHT: 72 IN | HEART RATE: 60 BPM | DIASTOLIC BLOOD PRESSURE: 69 MMHG

## 2020-05-08 DIAGNOSIS — I31.39 PERICARDIAL EFFUSION WITH CARDIAC TAMPONADE: ICD-10-CM

## 2020-05-08 DIAGNOSIS — R00.2 PALPITATIONS: ICD-10-CM

## 2020-05-08 DIAGNOSIS — I31.4 PERICARDIAL EFFUSION WITH CARDIAC TAMPONADE: ICD-10-CM

## 2020-05-08 DIAGNOSIS — I31.39 PERICARDIAL EFFUSION: ICD-10-CM

## 2020-05-08 DIAGNOSIS — I31.39 PERICARDIAL EFFUSION: Primary | ICD-10-CM

## 2020-05-08 LAB
ASCENDING AORTA: 2.64 CM
BSA FOR ECHO PROCEDURE: 1.69 M2
CV ECHO LV RWT: 0.33 CM
DOP CALC LVOT AREA: 3.1 CM2
DOP CALC LVOT DIAMETER: 1.99 CM
DOP CALC LVOT PEAK VEL: 0.8 M/S
DOP CALC LVOT STROKE VOLUME: 42.22 CM3
DOP CALCLVOT PEAK VEL VTI: 13.58 CM
E WAVE DECELERATION TIME: 128 MSEC
E/A RATIO: 1.52
E/E' RATIO: 8.24 M/S
ECHO LV POSTERIOR WALL: 0.74 CM (ref 0.6–1.1)
FRACTIONAL SHORTENING: 27 % (ref 28–44)
INTERVENTRICULAR SEPTUM: 0.53 CM (ref 0.6–1.1)
IVRT: 71.36 MSEC
LA MAJOR: 4.5 CM
LA MINOR: 4.5 CM
LA WIDTH: 4 CM
LEFT ATRIUM SIZE: 4 CM
LEFT ATRIUM VOLUME INDEX: 35.4 ML/M2
LEFT ATRIUM VOLUME: 61.2 CM3
LEFT INTERNAL DIMENSION IN SYSTOLE: 3.26 CM (ref 2.1–4)
LEFT VENTRICLE DIASTOLIC VOLUME INDEX: 51.92 ML/M2
LEFT VENTRICLE DIASTOLIC VOLUME: 89.66 ML
LEFT VENTRICLE MASS INDEX: 48 G/M2
LEFT VENTRICLE SYSTOLIC VOLUME INDEX: 24.8 ML/M2
LEFT VENTRICLE SYSTOLIC VOLUME: 42.82 ML
LEFT VENTRICULAR INTERNAL DIMENSION IN DIASTOLE: 4.44 CM (ref 3.5–6)
LEFT VENTRICULAR MASS: 82.67 G
LV LATERAL E/E' RATIO: 7 M/S
LV SEPTAL E/E' RATIO: 10 M/S
MV PEAK A VEL: 0.46 M/S
MV PEAK E VEL: 0.7 M/S
PISA TR MAX VEL: 2.11 M/S
PULM VEIN S/D RATIO: 0.64
PV PEAK D VEL: 0.91 M/S
PV PEAK S VEL: 0.58 M/S
RA MAJOR: 4.34 CM
RA PRESSURE: 8 MMHG
RA WIDTH: 3.32 CM
RIGHT VENTRICULAR END-DIASTOLIC DIMENSION: 3.33 CM
SINUS: 3.23 CM
STJ: 2.63 CM
TDI LATERAL: 0.1 M/S
TDI SEPTAL: 0.07 M/S
TDI: 0.09 M/S
TR MAX PG: 18 MMHG
TSH SERPL DL<=0.005 MIU/L-ACNC: 0.56 UIU/ML (ref 0.4–4)
TV REST PULMONARY ARTERY PRESSURE: 26 MMHG

## 2020-05-08 PROCEDURE — 99214 OFFICE O/P EST MOD 30 MIN: CPT | Mod: S$GLB,,, | Performed by: INTERNAL MEDICINE

## 2020-05-08 PROCEDURE — 99214 PR OFFICE/OUTPT VISIT, EST, LEVL IV, 30-39 MIN: ICD-10-PCS | Mod: S$GLB,,, | Performed by: INTERNAL MEDICINE

## 2020-05-08 PROCEDURE — 93307 ECHO (CUPID ONLY): ICD-10-PCS | Mod: 26,,, | Performed by: INTERNAL MEDICINE

## 2020-05-08 PROCEDURE — 3008F PR BODY MASS INDEX (BMI) DOCUMENTED: ICD-10-PCS | Mod: CPTII,S$GLB,, | Performed by: INTERNAL MEDICINE

## 2020-05-08 PROCEDURE — 93307 TTE W/O DOPPLER COMPLETE: CPT

## 2020-05-08 PROCEDURE — 84443 ASSAY THYROID STIM HORMONE: CPT

## 2020-05-08 PROCEDURE — 99999 PR PBB SHADOW E&M-EST. PATIENT-LVL IV: CPT | Mod: PBBFAC,,, | Performed by: INTERNAL MEDICINE

## 2020-05-08 PROCEDURE — 3008F BODY MASS INDEX DOCD: CPT | Mod: CPTII,S$GLB,, | Performed by: INTERNAL MEDICINE

## 2020-05-08 PROCEDURE — 36415 COLL VENOUS BLD VENIPUNCTURE: CPT

## 2020-05-08 PROCEDURE — 99999 PR PBB SHADOW E&M-EST. PATIENT-LVL IV: ICD-10-PCS | Mod: PBBFAC,,, | Performed by: INTERNAL MEDICINE

## 2020-05-08 PROCEDURE — 93307 TTE W/O DOPPLER COMPLETE: CPT | Mod: 26,,, | Performed by: INTERNAL MEDICINE

## 2020-05-08 NOTE — LETTER
May 8, 2020      Nany Espinoza DO  [address] [facility] [address]           [facility] - Cardiology  [address]  [place] [address]  Phone: [phone]          Patient: Melly Hwang   MR Number: [id number]   YOB: 1980   Date of Visit: 5/8/2020       Dear Dr. Nany Espinoza:    Thank you for referring Melly Hwang to me for evaluation. Attached you will find relevant portions of my assessment and plan of care.    If you have questions, please do not hesitate to call me. I look forward to following Melly Hwang along with you.    Sincerely,    Crystal Sabillon MD    Enclosure  CC:  No Recipients    If you would like to receive this communication electronically, please contact [email or web address] or [phone] to request more information on [employer] access.    For providers and/or their staff who would like to refer a patient to [facility], please contact us through our one-stop-shop provider referral line, [facility], at [phone].    If you feel you have received this communication in error or would no longer like to receive these types of communications, please e-mail [email or web address]

## 2020-05-08 NOTE — PROGRESS NOTES
Subjective:   Patient ID:  Melly Hwang is a 39 y.o. female who presents for follow-up of Pericardial effusion and Pericardial effusion with cardiac tamponade      HPI: Recent hospital admission 5/1 for pericardial effusion with tamponade physiology on echo.  39F with recent COVID-19 infection, prior pericardial effusion, multiple hepatic hemangiomas sent to ER after echocardiogram with findings of early tamponade (mitral inflow variation, RV diastolic collapse) with large anterior pericardial effusion. She had an MRI in March for monitoring of her hemangiomas; incidentally noted pericardial fluid vs cyst and an echo was ordered to evaluate. Deferred until today due to COVID-19. Pt works as a nurse; reported symptoms c/w COVID in mid April. Routine screening negative 4/15, suspected false negative in setting of  classic symptoms of fever, slight cough, and nausea, decreased appetite, and loose stools. Returned to ER 4/22 with chest tightness, shortness of breath, mild nonproductive cough, anosmia. Rapid screen positive for COVID. Reports resolution of her symptoms of chest tightness and SOB about 1-2 days later. Inflammatory markers were negative. She underwent perciardiocentesis on 5/2 with removal of 520 cc of fluid. Cytology and gram stain were negative. ECG without findings of pericarditis. Of note she had a pericardial effusion noted on echocardiograms done in 2013 and 2014. She denies any connective disease or chronic medical conditions. After her discharge, she was seen in the ED on 5/5 for presyncope describing a warm sensation throughout her body and slight dizziness. She was given 1.5 Liters of fluid and felt better. She currently reports occasional pressure in her chest. She had recently restarted her lexapro prior to these symptoms.   Her echo today showed an LVEF of 50% and a small lateral effusion.    Past Medical History:   Diagnosis Date    COVID-19     COVID-19     Deviated septum 2011     Hypertrophy of inferior nasal turbinate     Liver mass     Nasal congestion 2011    Pericardial effusion     Premature menopause        Past Surgical History:   Procedure Laterality Date    HERNIA REPAIR      NASAL SEPTUM SURGERY      PERICARDIOCENTESIS N/A 5/2/2020    Procedure: Pericardiocentesis;  Surgeon: Dickson Dangelo MD;  Location: [facility] CATH LAB;  Service: Cardiology;  Laterality: N/A;    PERICARDIOCENTESIS  05/2020    TONSILLECTOMY      UMBILICAL HERNIA REPAIR         Social History     Socioeconomic History    Marital status: Single     Spouse name: Not on file    Number of children: 1    Years of education: Not on file    Highest education level: Not on file   Occupational History    Occupation:      Employer: [employer]   Social Needs    Financial resource strain: Not on file    Food insecurity:     Worry: Not on file     Inability: Not on file    Transportation needs:     Medical: Not on file     Non-medical: Not on file   Tobacco Use    Smoking status: Never Smoker    Smokeless tobacco: Never Used   Substance and Sexual Activity    Alcohol use: Yes     Comment: rare    Drug use: No    Sexual activity: Yes     Partners: Male     Birth control/protection: OCP   Lifestyle    Physical activity:     Days per week: Not on file     Minutes per session: Not on file    Stress: Not on file   Relationships    Social connections:     Talks on phone: Not on file     Gets together: Not on file     Attends [employer] service: Not on file     Active member of club or organization: Not on file     Attends meetings of clubs or organizations: Not on file     Relationship status: Not on file   Other Topics Concern    Are you pregnant or think you may be? Not Asked    Breast-feeding Not Asked   Social History Narrative    Not on file       Family History   Problem Relation Age of Onset    Diabetes Mother     Liver disease Mother         Fatty liver    Macular  degeneration Father     Diabetes Father     Hypertension Father     Hyperlipidemia Father     No Known Problems Sister     No Known Problems Brother     No Known Problems Daughter     No Known Problems Brother     No Known Problems Maternal Aunt     No Known Problems Maternal Uncle     No Known Problems Paternal Aunt     No Known Problems Paternal Uncle     No Known Problems Maternal Grandmother     No Known Problems Maternal Grandfather     No Known Problems Paternal Grandmother     No Known Problems Paternal Grandfather     Amblyopia Neg Hx     Blindness Neg Hx     Cancer Neg Hx     Cataracts Neg Hx     Glaucoma Neg Hx     Retinal detachment Neg Hx     Strabismus Neg Hx     Stroke Neg Hx     Thyroid disease Neg Hx     Heart disease Neg Hx     Melanoma Neg Hx     Heart attack Neg Hx        Patient's Medications   New Prescriptions    No medications on file   Previous Medications    ALBUTEROL (PROVENTIL/VENTOLIN HFA) 90 MCG/ACTUATION INHALER    Inhale 1-2 puffs into the lungs every 6 (six) hours as needed for Wheezing or Shortness of Breath. Rescue    CETIRIZINE (ZYRTEC) 10 MG TABLET    Take 1 tablet (10 mg total) by mouth once daily.    CYCLOBENZAPRINE (FLEXERIL) 10 MG TABLET    TAKE 1 TABLET BY MOUTH EVERY DAY IN THE EVENING AS NEEDED FOR MUSCLE SPASMS    ESCITALOPRAM OXALATE (LEXAPRO) 10 MG TABLET    Take 1 tablet (10 mg total) by mouth once daily.    HYDROCORTISONE BUTYRATE (LOCOID) 0.1 % OINT    AAA BID prn itching, scaling, or redness. Do not use longer than 2 weeks consecutively.    KETOCONAZOLE (NIZORAL) 2 % CREAM    Apply topically once daily.    MULTIVITAMIN CAPSULE    Take by mouth. 1 capsule Oral Every morning    NORETHINDRONE (EMILIA) 0.35 MG TABLET    TAKE 1 TABLET (0.35 MG TOTAL) BY MOUTH ONCE DAILY    OMEPRAZOLE 20 MG TBEC    Take 20 mg by mouth daily as needed.    PIMECROLIMUS (ELIDEL) 1 % CREAM    AAA BID prn itching, redness   Modified Medications    No medications on  "file   Discontinued Medications    No medications on file       Review of Systems   Constitution: Negative for malaise/fatigue and weight gain.   HENT: Negative for hearing loss.    Eyes: Negative for visual disturbance.   Cardiovascular: Positive for chest pain. Negative for claudication, dyspnea on exertion, leg swelling, near-syncope, orthopnea, palpitations, paroxysmal nocturnal dyspnea and syncope.   Respiratory: Negative for cough, shortness of breath, sleep disturbances due to breathing, snoring and wheezing.    Endocrine: Negative for cold intolerance, heat intolerance, polydipsia, polyphagia and polyuria.   Hematologic/Lymphatic: Negative for bleeding problem. Does not bruise/bleed easily.   Skin: Negative for rash and suspicious lesions.   Musculoskeletal: Negative for arthritis, falls, joint pain, muscle weakness and myalgias.   Gastrointestinal: Negative for abdominal pain, change in bowel habit, constipation, diarrhea, heartburn, hematochezia, melena and nausea.   Genitourinary: Negative for hematuria and nocturia.   Neurological: Negative for excessive daytime sleepiness, dizziness, headaches, light-headedness, loss of balance and weakness.   Psychiatric/Behavioral: Negative for depression. The patient is nervous/anxious.    Allergic/Immunologic: Negative for environmental allergies.       /69 (BP Location: Left arm, Patient Position: Sitting, BP Method: Large (Automatic))   Pulse 60   Ht 5' 11.75" (1.822 m)   Wt 60.1 kg (132 lb 7.9 oz)   LMP  (LMP Unknown)   BMI 18.10 kg/m²     Objective:   Physical Exam   Constitutional: She is oriented to person, place, and time. She appears well-developed and well-nourished.        HENT:   Head: Normocephalic and atraumatic.   Mouth/Throat: Oropharynx is clear and moist.   Eyes: Pupils are equal, round, and reactive to light. Conjunctivae and EOM are normal. No scleral icterus.   Neck: Normal range of motion. Neck supple. No hepatojugular reflux and no " JVD present. No tracheal deviation present. No thyromegaly present.   Cardiovascular: Normal rate, regular rhythm, normal heart sounds and intact distal pulses. PMI is not displaced.   Pulses:       Carotid pulses are 2+ on the right side, and 2+ on the left side.       Radial pulses are 2+ on the right side, and 2+ on the left side.        Dorsalis pedis pulses are 2+ on the right side, and 2+ on the left side.        Posterior tibial pulses are 2+ on the right side, and 2+ on the left side.   Pulmonary/Chest: Effort normal and breath sounds normal.   Abdominal: Soft. Bowel sounds are normal. She exhibits no distension and no mass. There is no hepatosplenomegaly. There is no tenderness.   Musculoskeletal: She exhibits no edema or tenderness.   Lymphadenopathy:     She has no cervical adenopathy.   Neurological: She is alert and oriented to person, place, and time.   Skin: Skin is warm and dry. No rash noted. No cyanosis or erythema. Nails show no clubbing.   Psychiatric: She has a normal mood and affect. Her speech is normal and behavior is normal.       Lab Results   Component Value Date     05/05/2020    K 4.9 05/05/2020     05/05/2020    CO2 27 05/05/2020    BUN 13 05/05/2020    CREATININE 0.8 05/05/2020     05/05/2020    HGBA1C 5.6 03/02/2020    MG 1.9 05/05/2020    AST 20 05/05/2020    ALT 21 05/05/2020    ALBUMIN 4.4 05/05/2020    PROT 7.8 05/05/2020    BILITOT 1.0 05/05/2020    WBC 3.55 (L) 05/05/2020    HGB 14.0 05/05/2020    HCT 45.1 05/05/2020    MCV 96 05/05/2020     05/05/2020    INR 1.1 05/02/2020    TSH 0.558 05/08/2020    CHOL 215 (H) 03/02/2020    HDL 78 (H) 03/02/2020    LDLCALC 122.0 03/02/2020    TRIG 75 03/02/2020    BNP 11 05/01/2020       Assessment:         2. Pericardial effusion with cardiac tamponade : s/p pericardiocentesis. Chronic effusion since 2013 with possible worsening in the setting of COVID19 infection. Recommend follow up limited echo in 6 weeks.        Plan:     Melly was seen today for pericardial effusion and pericardial effusion with cardiac tamponade.    Diagnoses and all orders for this visit:    Pericardial effusion  -     Ambulatory referral/consult to Cardiology  -     Echo Color Flow Doppler? Yes; Future    Pericardial effusion with cardiac tamponade  -     Ambulatory referral/consult to Cardiology        Thank you for allowing me to participate in this patient's care. Please do not hesitate to contact me with any questions or concerns.

## 2020-05-13 ENCOUNTER — PATIENT OUTREACH (OUTPATIENT)
Dept: ADMINISTRATIVE | Facility: OTHER | Age: 40
End: 2020-05-13

## 2020-05-13 ENCOUNTER — HOSPITAL ENCOUNTER (OUTPATIENT)
Dept: RADIOLOGY | Facility: HOSPITAL | Age: 40
Discharge: HOME | End: 2020-05-13
Attending: INTERNAL MEDICINE
Payer: COMMERCIAL

## 2020-05-13 ENCOUNTER — PATIENT MESSAGE (OUTPATIENT)
Dept: INTERNAL MEDICINE | Facility: CLINIC | Age: 40
End: 2020-05-13

## 2020-05-13 DIAGNOSIS — Z12.39 BREAST CANCER SCREENING: ICD-10-CM

## 2020-05-13 DIAGNOSIS — R11.0 NAUSEA: ICD-10-CM

## 2020-05-13 PROCEDURE — 77067 SCR MAMMO BI INCL CAD: CPT | Mod: 26,,, | Performed by: RADIOLOGY

## 2020-05-13 PROCEDURE — 76700 US EXAM ABDOM COMPLETE: CPT | Mod: TC

## 2020-05-13 PROCEDURE — 77067 MAMMO DIGITAL SCREENING BILAT WITH TOMOSYNTHESIS_CAD: ICD-10-PCS | Mod: 26,,, | Performed by: RADIOLOGY

## 2020-05-13 PROCEDURE — 76700 US ABDOMEN COMPLETE: ICD-10-PCS | Mod: 26,,, | Performed by: RADIOLOGY

## 2020-05-13 PROCEDURE — 77067 SCR MAMMO BI INCL CAD: CPT | Mod: TC

## 2020-05-13 PROCEDURE — 76700 US EXAM ABDOM COMPLETE: CPT | Mod: 26,,, | Performed by: RADIOLOGY

## 2020-05-13 PROCEDURE — 77063 MAMMO DIGITAL SCREENING BILAT WITH TOMOSYNTHESIS_CAD: ICD-10-PCS | Mod: 26,,, | Performed by: RADIOLOGY

## 2020-05-13 PROCEDURE — 77063 BREAST TOMOSYNTHESIS BI: CPT | Mod: 26,,, | Performed by: RADIOLOGY

## 2020-05-15 ENCOUNTER — NURSE TRIAGE (OUTPATIENT)
Dept: ADMINISTRATIVE | Facility: CLINIC | Age: 40
End: 2020-05-15

## 2020-05-15 ENCOUNTER — OFFICE VISIT (OUTPATIENT)
Dept: SURGERY | Facility: CLINIC | Age: 40
End: 2020-05-15
Payer: COMMERCIAL

## 2020-05-15 ENCOUNTER — PATIENT MESSAGE (OUTPATIENT)
Dept: INTERNAL MEDICINE | Facility: CLINIC | Age: 40
End: 2020-05-15

## 2020-05-15 VITALS
BODY MASS INDEX: 18.55 KG/M2 | HEIGHT: 71 IN | DIASTOLIC BLOOD PRESSURE: 74 MMHG | HEART RATE: 60 BPM | WEIGHT: 132.5 LBS | SYSTOLIC BLOOD PRESSURE: 128 MMHG

## 2020-05-15 DIAGNOSIS — K62.5 RECTAL BLEEDING: Primary | ICD-10-CM

## 2020-05-15 PROCEDURE — 46600 PR DIAG2STIC A2SCOPY: ICD-10-PCS | Mod: S$GLB,,, | Performed by: COLON & RECTAL SURGERY

## 2020-05-15 PROCEDURE — 99213 OFFICE O/P EST LOW 20 MIN: CPT | Mod: 25,S$GLB,, | Performed by: COLON & RECTAL SURGERY

## 2020-05-15 PROCEDURE — 3008F PR BODY MASS INDEX (BMI) DOCUMENTED: ICD-10-PCS | Mod: CPTII,S$GLB,, | Performed by: COLON & RECTAL SURGERY

## 2020-05-15 PROCEDURE — 99213 PR OFFICE/OUTPT VISIT, EST, LEVL III, 20-29 MIN: ICD-10-PCS | Mod: 25,S$GLB,, | Performed by: COLON & RECTAL SURGERY

## 2020-05-15 PROCEDURE — 99999 PR PBB SHADOW E&M-EST. PATIENT-LVL III: ICD-10-PCS | Mod: PBBFAC,,, | Performed by: COLON & RECTAL SURGERY

## 2020-05-15 PROCEDURE — 99999 PR PBB SHADOW E&M-EST. PATIENT-LVL III: CPT | Mod: PBBFAC,,, | Performed by: COLON & RECTAL SURGERY

## 2020-05-15 PROCEDURE — 3008F BODY MASS INDEX DOCD: CPT | Mod: CPTII,S$GLB,, | Performed by: COLON & RECTAL SURGERY

## 2020-05-15 PROCEDURE — 46600 DIAGNOSTIC ANOSCOPY SPX: CPT | Mod: S$GLB,,, | Performed by: COLON & RECTAL SURGERY

## 2020-05-15 NOTE — TELEPHONE ENCOUNTER
Called patient on behalf of [facility] Symptom [facility]. Pt denied developing any fever, cough or shortness of breath since the procedure.     Reason for Disposition   Health Information question, no triage required and triager able to answer question    Protocols used: INFORMATION ONLY CALL-A-AH

## 2020-05-15 NOTE — PROGRESS NOTES
Subjective:       Patient ID: Melly Hwang is a 39 y.o. female.    Chief Complaint: Rectal Bleeding    HPI established patient.  Over the past month or so she has had intermittent painless anal bleeding with each bowel movement.  The most recent 1 felt larger than normal.  Inconsistent fiber intake.  No prior colonoscopy.  Typical bowel pattern is 2 bowel movements a week.    Review of Systems   Constitutional: Negative for chills and fever.   Respiratory: Negative for cough and shortness of breath.    Cardiovascular: Negative for chest pain and palpitations.   Gastrointestinal: Negative for nausea and vomiting.   Genitourinary: Negative for dysuria and urgency.   Neurological: Negative for seizures and numbness.       Objective:      Physical Exam   Constitutional: She is oriented to person, place, and time. She appears well-developed and well-nourished.   Eyes: Conjunctivae and EOM are normal.   Pulmonary/Chest: Effort normal. No respiratory distress.   Abdominal: Soft. She exhibits no distension.   Genitourinary:   Genitourinary Comments: Anorectal Exam:     Perianal skin: normal    NANCY: Normal resting and squeeze tone.  No masses. Nontender    Anoscopy:  Normal distal rectal mucosa. Internal hemorrhoids without stigmata of bleeding or prolapse.    Musculoskeletal: Normal range of motion. She exhibits no edema.   Neurological: She is alert and oriented to person, place, and time.   Skin: Skin is warm and dry.   Psychiatric: She has a normal mood and affect. Her behavior is normal.       Assessment:       1. Rectal bleeding        Plan:       No clear anal source identified.  Recommend increase fiber/fiber supplementation.  Colonoscopy.

## 2020-05-20 ENCOUNTER — CLINICAL SUPPORT (OUTPATIENT)
Dept: INTERNAL MEDICINE | Facility: CLINIC | Age: 40
End: 2020-05-20
Payer: COMMERCIAL

## 2020-05-20 PROCEDURE — 90471 PNEUMOCOCCAL POLYSACCHARIDE VACCINE 23-VALENT =>2YO SQ IM: ICD-10-PCS | Mod: S$GLB,,, | Performed by: INTERNAL MEDICINE

## 2020-05-20 PROCEDURE — 90732 PPSV23 VACC 2 YRS+ SUBQ/IM: CPT | Mod: S$GLB,,, | Performed by: INTERNAL MEDICINE

## 2020-05-20 PROCEDURE — 90732 PNEUMOCOCCAL POLYSACCHARIDE VACCINE 23-VALENT =>2YO SQ IM: ICD-10-PCS | Mod: S$GLB,,, | Performed by: INTERNAL MEDICINE

## 2020-05-20 PROCEDURE — 90471 IMMUNIZATION ADMIN: CPT | Mod: S$GLB,,, | Performed by: INTERNAL MEDICINE

## 2020-05-21 DIAGNOSIS — Z01.84 ANTIBODY RESPONSE EXAMINATION: ICD-10-CM

## 2020-06-19 ENCOUNTER — HOSPITAL ENCOUNTER (OUTPATIENT)
Dept: CARDIOLOGY | Facility: HOSPITAL | Age: 40
Discharge: HOME | End: 2020-06-19
Attending: INTERNAL MEDICINE
Payer: COMMERCIAL

## 2020-06-19 VITALS
WEIGHT: 125 LBS | HEIGHT: 71 IN | HEART RATE: 64 BPM | SYSTOLIC BLOOD PRESSURE: 130 MMHG | DIASTOLIC BLOOD PRESSURE: 76 MMHG | BODY MASS INDEX: 17.5 KG/M2

## 2020-06-19 DIAGNOSIS — I31.39 PERICARDIAL EFFUSION: ICD-10-CM

## 2020-06-19 LAB
ASCENDING AORTA: 2.75 CM
AV INDEX (PROSTH): 0.6
AV MEAN GRADIENT: 4 MMHG
AV PEAK GRADIENT: 6 MMHG
AV VALVE AREA: 2.02 CM2
AV VELOCITY RATIO: 0.72
BSA FOR ECHO PROCEDURE: 1.69 M2
CV ECHO LV RWT: 0.3 CM
DOP CALC AO PEAK VEL: 1.25 M/S
DOP CALC AO VTI: 27.66 CM
DOP CALC LVOT AREA: 3.4 CM2
DOP CALC LVOT DIAMETER: 2.07 CM
DOP CALC LVOT PEAK VEL: 0.9 M/S
DOP CALC LVOT STROKE VOLUME: 55.74 CM3
DOP CALCLVOT PEAK VEL VTI: 16.57 CM
E WAVE DECELERATION TIME: 161.88 MSEC
E/A RATIO: 1.53
E/E' RATIO: 10.82 M/S
ECHO LV POSTERIOR WALL: 0.66 CM (ref 0.6–1.1)
FRACTIONAL SHORTENING: 32 % (ref 28–44)
INTERVENTRICULAR SEPTUM: 0.69 CM (ref 0.6–1.1)
LA MAJOR: 5.87 CM
LA MINOR: 5.59 CM
LA WIDTH: 3.08 CM
LEFT ATRIUM SIZE: 2.72 CM
LEFT ATRIUM VOLUME INDEX: 23.6 ML/M2
LEFT ATRIUM VOLUME: 40.78 CM3
LEFT INTERNAL DIMENSION IN SYSTOLE: 3 CM (ref 2.1–4)
LEFT VENTRICLE DIASTOLIC VOLUME INDEX: 50.57 ML/M2
LEFT VENTRICLE DIASTOLIC VOLUME: 87.34 ML
LEFT VENTRICLE MASS INDEX: 51 G/M2
LEFT VENTRICLE SYSTOLIC VOLUME INDEX: 20.3 ML/M2
LEFT VENTRICLE SYSTOLIC VOLUME: 35.08 ML
LEFT VENTRICULAR INTERNAL DIMENSION IN DIASTOLE: 4.39 CM (ref 3.5–6)
LEFT VENTRICULAR MASS: 87.56 G
LV LATERAL E/E' RATIO: 10.22 M/S
LV SEPTAL E/E' RATIO: 11.5 M/S
MV PEAK A VEL: 0.6 M/S
MV PEAK E VEL: 0.92 M/S
MV STENOSIS PRESSURE HALF TIME: 46.94 MS
MV VALVE AREA P 1/2 METHOD: 4.69 CM2
PISA TR MAX VEL: 2.18 M/S
PULM VEIN S/D RATIO: 2.7
PV PEAK D VEL: 0.2 M/S
PV PEAK S VEL: 0.54 M/S
RA MAJOR: 4.9 CM
RA PRESSURE: 3 MMHG
RA WIDTH: 3.73 CM
RIGHT VENTRICULAR END-DIASTOLIC DIMENSION: 3.01 CM
RV TISSUE DOPPLER FREE WALL SYSTOLIC VELOCITY 1 (APICAL 4 CHAMBER VIEW): 17.96 CM/S
SINUS: 3.17 CM
STJ: 2.46 CM
TDI LATERAL: 0.09 M/S
TDI SEPTAL: 0.08 M/S
TDI: 0.09 M/S
TR MAX PG: 19 MMHG
TRICUSPID ANNULAR PLANE SYSTOLIC EXCURSION: 1.46 CM
TV REST PULMONARY ARTERY PRESSURE: 22 MMHG

## 2020-06-19 PROCEDURE — 93306 ECHO (CUPID ONLY): ICD-10-PCS | Mod: 26,,, | Performed by: INTERNAL MEDICINE

## 2020-06-19 PROCEDURE — 93306 TTE W/DOPPLER COMPLETE: CPT | Mod: 26,,, | Performed by: INTERNAL MEDICINE

## 2020-06-19 PROCEDURE — 93306 TTE W/DOPPLER COMPLETE: CPT

## 2020-06-20 DIAGNOSIS — Z01.84 ANTIBODY RESPONSE EXAMINATION: ICD-10-CM

## 2020-06-23 DIAGNOSIS — I51.7 CARDIAC ENLARGEMENT: Primary | ICD-10-CM

## 2020-06-23 DIAGNOSIS — I31.39 PERICARDIAL EFFUSION: ICD-10-CM

## 2020-07-04 LAB
ACID FAST MOD KINY STN SPEC: NORMAL
MYCOBACTERIUM SPEC QL CULT: NORMAL

## 2020-07-09 ENCOUNTER — TELEPHONE (OUTPATIENT)
Dept: OPTOMETRY | Facility: CLINIC | Age: 40
End: 2020-07-09

## 2020-07-09 NOTE — TELEPHONE ENCOUNTER
[employer]    Member Name: MERLINE HIRSCH  Member ID: [id number]  Social Security Number: [id number]  YOB: 1980  Address: [address] [place] [address] [address] [facility] [address]  Phone Number:   Gender: Female  Responsible Member: MERLINE HIRSCH    Network: [id number]  Group: [employer] ([id number])  Benefit Level: 1

## 2020-07-13 ENCOUNTER — OFFICE VISIT (OUTPATIENT)
Dept: OPTOMETRY | Facility: CLINIC | Age: 40
End: 2020-07-13
Payer: COMMERCIAL

## 2020-07-13 DIAGNOSIS — H52.13 MYOPIA OF BOTH EYES: Primary | ICD-10-CM

## 2020-07-13 PROCEDURE — 99999 PR PBB SHADOW E&M-EST. PATIENT-LVL III: ICD-10-PCS | Mod: PBBFAC,,, | Performed by: OPTOMETRIST

## 2020-07-13 PROCEDURE — 99999 PR PBB SHADOW E&M-EST. PATIENT-LVL III: CPT | Mod: PBBFAC,,, | Performed by: OPTOMETRIST

## 2020-07-13 PROCEDURE — 92014 COMPRE OPH EXAM EST PT 1/>: CPT | Mod: S$GLB,,, | Performed by: OPTOMETRIST

## 2020-07-13 PROCEDURE — 92014 PR EYE EXAM, EST PATIENT,COMPREHESV: ICD-10-PCS | Mod: S$GLB,,, | Performed by: OPTOMETRIST

## 2020-07-14 NOTE — PROGRESS NOTES
HPI     Melly Hwang is a 40 y.o. female who returns for continued eye care.   Melly was last seen by us on 04/29/2019.  She has low bilateral myopia   s/p LASIK Sx. She explains that she wear glasses, as needed, for night   driving.  She has not noticed any new or concerning ocular or visual   symptoms.    (--)blurred vision  (--)Headaches  (--)diplopia  (--)flashes  (--)floaters  (--)pain  (--)Itching  (--)tearing  (--)burning  (--)Dryness  (--) OTC Drops  (--)Photophobia      Last edited by Marisol Blackmon, OD on 7/13/2020  7:21 PM. (History)        Review of Systems   Constitutional: Negative for chills, fever and malaise/fatigue.   HENT: Negative for congestion and hearing loss.    Eyes: Negative for blurred vision, double vision, photophobia, pain, discharge and redness.   Respiratory: Negative.    Cardiovascular: Negative.    Gastrointestinal: Negative.    Genitourinary: Negative.    Musculoskeletal: Negative.    Skin: Negative.    Neurological: Negative for seizures.   Endo/Heme/Allergies: Negative for environmental allergies.   Psychiatric/Behavioral: Negative.        For exam results, see encounter report    Assessment /Plan      Low bilateral myopia s/p LASIK  - Spec Rx per final Rx below for distance only as needed  Glasses Prescription (7/13/2020)        Sphere Cylinder    Right -0.50 Sphere    Left -0.50 Sphere    Type: SVL    Expiration Date: 07/14/2021            Patient education; RTC in 1 year with ARMANI

## 2020-07-20 DIAGNOSIS — Z01.84 ANTIBODY RESPONSE EXAMINATION: ICD-10-CM

## 2020-08-14 ENCOUNTER — HOSPITAL ENCOUNTER (OUTPATIENT)
Dept: CARDIOLOGY | Facility: HOSPITAL | Age: 40
Discharge: HOME | End: 2020-08-14
Attending: INTERNAL MEDICINE
Payer: COMMERCIAL

## 2020-08-14 VITALS
HEIGHT: 71 IN | HEART RATE: 80 BPM | DIASTOLIC BLOOD PRESSURE: 70 MMHG | WEIGHT: 125 LBS | SYSTOLIC BLOOD PRESSURE: 130 MMHG | BODY MASS INDEX: 17.5 KG/M2

## 2020-08-14 DIAGNOSIS — I51.7 CARDIAC ENLARGEMENT: ICD-10-CM

## 2020-08-14 DIAGNOSIS — I31.39 PERICARDIAL EFFUSION: ICD-10-CM

## 2020-08-14 LAB
ASCENDING AORTA: 2.82 CM
BSA FOR ECHO PROCEDURE: 1.69 M2
CV ECHO LV RWT: 0.31 CM
DOP CALC LVOT AREA: 3.8 CM2
DOP CALC LVOT DIAMETER: 2.2 CM
ECHO LV POSTERIOR WALL: 0.68 CM (ref 0.6–1.1)
FRACTIONAL SHORTENING: 35 % (ref 28–44)
INTERVENTRICULAR SEPTUM: 0.56 CM (ref 0.6–1.1)
IVRT: 79.92 MSEC
LA MAJOR: 5.71 CM
LA MINOR: 5.67 CM
LA WIDTH: 4.59 CM
LEFT ATRIUM SIZE: 3.37 CM
LEFT ATRIUM VOLUME INDEX MOD: 41.7 ML/M2
LEFT ATRIUM VOLUME INDEX: 43.3 ML/M2
LEFT ATRIUM VOLUME MOD: 72 CM3
LEFT ATRIUM VOLUME: 74.81 CM3
LEFT INTERNAL DIMENSION IN SYSTOLE: 2.87 CM (ref 2.1–4)
LEFT VENTRICLE DIASTOLIC VOLUME INDEX: 50.86 ML/M2
LEFT VENTRICLE DIASTOLIC VOLUME: 87.84 ML
LEFT VENTRICLE MASS INDEX: 46 G/M2
LEFT VENTRICLE SYSTOLIC VOLUME INDEX: 18.2 ML/M2
LEFT VENTRICLE SYSTOLIC VOLUME: 31.45 ML
LEFT VENTRICULAR INTERNAL DIMENSION IN DIASTOLE: 4.4 CM (ref 3.5–6)
LEFT VENTRICULAR MASS: 78.99 G
PISA TR MAX VEL: 2.32 M/S
RA MAJOR: 4.68 CM
RA PRESSURE: 3 MMHG
RA WIDTH: 4.11 CM
RIGHT VENTRICULAR END-DIASTOLIC DIMENSION: 3.37 CM
RV TISSUE DOPPLER FREE WALL SYSTOLIC VELOCITY 1 (APICAL 4 CHAMBER VIEW): 15.76 CM/S
SINUS: 3.33 CM
STJ: 2.72 CM
TDI LATERAL: 0.1 M/S
TDI SEPTAL: 0.07 M/S
TDI: 0.09 M/S
TR MAX PG: 22 MMHG
TRICUSPID ANNULAR PLANE SYSTOLIC EXCURSION: 2.24 CM
TV REST PULMONARY ARTERY PRESSURE: 25 MMHG

## 2020-08-14 PROCEDURE — 93321 ECHO (CUPID ONLY): ICD-10-PCS | Mod: 26,,, | Performed by: INTERNAL MEDICINE

## 2020-08-14 PROCEDURE — 93321 DOPPLER ECHO F-UP/LMTD STD: CPT | Mod: 26,,, | Performed by: INTERNAL MEDICINE

## 2020-08-14 PROCEDURE — 93308 TTE F-UP OR LMTD: CPT | Mod: 26,,, | Performed by: INTERNAL MEDICINE

## 2020-08-14 PROCEDURE — 93321 DOPPLER ECHO F-UP/LMTD STD: CPT

## 2020-08-14 PROCEDURE — 93308 TTE F-UP OR LMTD: CPT

## 2020-08-14 PROCEDURE — 93308 ECHO (CUPID ONLY): ICD-10-PCS | Mod: 26,,, | Performed by: INTERNAL MEDICINE

## 2020-08-18 ENCOUNTER — TELEPHONE (OUTPATIENT)
Dept: CARDIOLOGY | Facility: CLINIC | Age: 40
End: 2020-08-18

## 2020-08-18 ENCOUNTER — LAB VISIT (OUTPATIENT)
Dept: LAB | Facility: HOSPITAL | Age: 40
End: 2020-08-18
Attending: INTERNAL MEDICINE
Payer: COMMERCIAL

## 2020-08-18 DIAGNOSIS — I31.39 PERICARDIAL EFFUSION: ICD-10-CM

## 2020-08-18 DIAGNOSIS — I31.39 PERICARDIAL EFFUSION: Primary | ICD-10-CM

## 2020-08-18 LAB
CRP SERPL-MCNC: 0.38 MG/L (ref 0–3.19)
ERYTHROCYTE [SEDIMENTATION RATE] IN BLOOD BY WESTERGREN METHOD: 5 MM/HR (ref 0–36)

## 2020-08-18 PROCEDURE — 36415 COLL VENOUS BLD VENIPUNCTURE: CPT

## 2020-08-18 PROCEDURE — 86235 NUCLEAR ANTIGEN ANTIBODY: CPT | Mod: 59

## 2020-08-18 PROCEDURE — 86141 C-REACTIVE PROTEIN HS: CPT

## 2020-08-18 PROCEDURE — 85652 RBC SED RATE AUTOMATED: CPT

## 2020-08-18 PROCEDURE — 86038 ANTINUCLEAR ANTIBODIES: CPT

## 2020-08-18 PROCEDURE — 86039 ANTINUCLEAR ANTIBODIES (ANA): CPT

## 2020-08-18 NOTE — TELEPHONE ENCOUNTER
Echo findings reviewed with patient.    Echo with moderate to large pericardial effusion without signs of tamponade. Please check esr, crp, lv. Will repeat limited echo in 2 weeks with follow up here. If she develops any shortness of breath, leg edema or weakness she should go to the ED.

## 2020-08-19 DIAGNOSIS — Z01.84 ANTIBODY RESPONSE EXAMINATION: ICD-10-CM

## 2020-08-20 ENCOUNTER — TELEPHONE (OUTPATIENT)
Dept: CARDIOLOGY | Facility: CLINIC | Age: 40
End: 2020-08-20

## 2020-08-20 DIAGNOSIS — I31.39 PERICARDIAL EFFUSION: Primary | ICD-10-CM

## 2020-08-20 DIAGNOSIS — R76.8 ANA POSITIVE: ICD-10-CM

## 2020-08-20 LAB
ANA PATTERN 1: NORMAL
ANA SER QL IF: POSITIVE
ANA TITR SER IF: NORMAL {TITER}

## 2020-08-20 NOTE — TELEPHONE ENCOUNTER
VENKATA titer is positive.  Given recurrent pericardial effusion, we will refer to rheumatology for further evaluation.

## 2020-08-24 LAB
ANTI SM ANTIBODY: 0.08 RATIO (ref 0–0.99)
ANTI SM/RNP ANTIBODY: 0.09 RATIO (ref 0–0.99)
ANTI-SM INTERPRETATION: NEGATIVE
ANTI-SM/RNP INTERPRETATION: NEGATIVE
ANTI-SSA ANTIBODY: 0.09 RATIO (ref 0–0.99)
ANTI-SSA INTERPRETATION: NEGATIVE
ANTI-SSB ANTIBODY: 0.09 RATIO (ref 0–0.99)
ANTI-SSB INTERPRETATION: NEGATIVE
DSDNA AB SER-ACNC: NORMAL [IU]/ML

## 2020-08-26 ENCOUNTER — PATIENT MESSAGE (OUTPATIENT)
Dept: INTERNAL MEDICINE | Facility: CLINIC | Age: 40
End: 2020-08-26

## 2020-08-26 DIAGNOSIS — R06.00 DYSPNEA, UNSPECIFIED TYPE: Primary | ICD-10-CM

## 2020-08-27 ENCOUNTER — HOSPITAL ENCOUNTER (OUTPATIENT)
Dept: RADIOLOGY | Facility: HOSPITAL | Age: 40
Discharge: HOME | End: 2020-08-27
Attending: INTERNAL MEDICINE
Payer: COMMERCIAL

## 2020-08-27 DIAGNOSIS — R06.00 DYSPNEA, UNSPECIFIED TYPE: ICD-10-CM

## 2020-08-27 PROCEDURE — 71046 XR CHEST PA AND LATERAL: ICD-10-PCS | Mod: 26,,, | Performed by: RADIOLOGY

## 2020-08-27 PROCEDURE — 71046 X-RAY EXAM CHEST 2 VIEWS: CPT | Mod: TC

## 2020-08-27 PROCEDURE — 71046 X-RAY EXAM CHEST 2 VIEWS: CPT | Mod: 26,,, | Performed by: RADIOLOGY

## 2020-08-28 ENCOUNTER — LAB VISIT (OUTPATIENT)
Dept: LAB | Facility: HOSPITAL | Age: 40
End: 2020-08-28
Attending: INTERNAL MEDICINE
Payer: COMMERCIAL

## 2020-08-28 ENCOUNTER — OFFICE VISIT (OUTPATIENT)
Dept: RHEUMATOLOGY | Facility: CLINIC | Age: 40
End: 2020-08-28
Payer: COMMERCIAL

## 2020-08-28 VITALS
TEMPERATURE: 99 F | WEIGHT: 140 LBS | BODY MASS INDEX: 19.6 KG/M2 | SYSTOLIC BLOOD PRESSURE: 125 MMHG | HEIGHT: 71 IN | HEART RATE: 66 BPM | DIASTOLIC BLOOD PRESSURE: 81 MMHG

## 2020-08-28 DIAGNOSIS — I31.39 PERICARDIAL EFFUSION: ICD-10-CM

## 2020-08-28 DIAGNOSIS — R76.8 ANA POSITIVE: Primary | ICD-10-CM

## 2020-08-28 DIAGNOSIS — R76.8 ANA POSITIVE: ICD-10-CM

## 2020-08-28 LAB
C3 SERPL-MCNC: 98 MG/DL (ref 50–180)
C4 SERPL-MCNC: 25 MG/DL (ref 11–44)
CCP AB SER IA-ACNC: <0.5 U/ML
CRP SERPL-MCNC: 0.4 MG/L (ref 0–8.2)
ERYTHROCYTE [SEDIMENTATION RATE] IN BLOOD BY WESTERGREN METHOD: 7 MM/HR (ref 0–36)
RHEUMATOID FACT SERPL-ACNC: 11 IU/ML (ref 0–15)

## 2020-08-28 PROCEDURE — 85613 RUSSELL VIPER VENOM DILUTED: CPT

## 2020-08-28 PROCEDURE — 86146 BETA-2 GLYCOPROTEIN ANTIBODY: CPT

## 2020-08-28 PROCEDURE — 86160 COMPLEMENT ANTIGEN: CPT

## 2020-08-28 PROCEDURE — 85652 RBC SED RATE AUTOMATED: CPT

## 2020-08-28 PROCEDURE — 99245 PR OFFICE CONSULTATION,LEVEL V: ICD-10-PCS | Mod: S$GLB,,, | Performed by: INTERNAL MEDICINE

## 2020-08-28 PROCEDURE — 99999 PR PBB SHADOW E&M-EST. PATIENT-LVL IV: ICD-10-PCS | Mod: PBBFAC,,, | Performed by: INTERNAL MEDICINE

## 2020-08-28 PROCEDURE — 86140 C-REACTIVE PROTEIN: CPT

## 2020-08-28 PROCEDURE — 86200 CCP ANTIBODY: CPT

## 2020-08-28 PROCEDURE — 86235 NUCLEAR ANTIGEN ANTIBODY: CPT

## 2020-08-28 PROCEDURE — 86160 COMPLEMENT ANTIGEN: CPT | Mod: 59

## 2020-08-28 PROCEDURE — 86431 RHEUMATOID FACTOR QUANT: CPT

## 2020-08-28 PROCEDURE — 99245 OFF/OP CONSLTJ NEW/EST HI 55: CPT | Mod: S$GLB,,, | Performed by: INTERNAL MEDICINE

## 2020-08-28 PROCEDURE — 99999 PR PBB SHADOW E&M-EST. PATIENT-LVL IV: CPT | Mod: PBBFAC,,, | Performed by: INTERNAL MEDICINE

## 2020-08-28 PROCEDURE — 86147 CARDIOLIPIN ANTIBODY EA IG: CPT | Mod: 59

## 2020-08-28 RX ORDER — COLCHICINE 0.6 MG/1
0.6 TABLET ORAL DAILY
Qty: 30 TABLET | Refills: 11 | Status: ON HOLD | OUTPATIENT
Start: 2020-08-28 | End: 2021-02-26 | Stop reason: HOSPADM

## 2020-08-30 NOTE — PROGRESS NOTES
History of present illness:  21 units she was 1st diagnosed as having a pericardial effusion in 2013.  She has a history of liver cyst in gets routine MRIs.  The MRI reported cardiac enlargement.  She had an echocardiogram which confirmed a pericardial effusion.  She was asymptomatic.  No workup was done at that time.  She did well until this past April when she developed symptoms of COVID.  In May she developed chest pain and a syncopal episode.  Echocardiogram showed persistence of the pericardial effusion.  She now had evidence of tamponade.  She underwent a pericardiocentesis which removed 500 mL of exudative fluid.  Since then the fluid has reaccumulated but she remains asymptomatic.  As part of the workup she was found to have a positive VENKATA and is referred for that reason.    She has had no unexplained fevers.  She denies any headache.  She has noted erythema on the right malar area.  She also had a nodule around her left knee.  She has no oral ulcers.  She has dryness of her eyes but not her mouth.  She had a prior history of Raynaud's phenomena but no digital ulcers.  She has no chronic or bloody diarrhea, vaginal discharge or ulcers.  She has had prior knee pain but no swelling.  She had recent pain in her right 2nd finger but again no swelling.  She has intermittent paresthesias in her left arm at night.  She has no thrombophlebitis.  She is a  1 para 1.  Her father has some type of arthritis.    Systems review:  General:  Weight has been stable  GI:  No abdominal pain or peptic ulcer disease.  :  No kidney or bladder problems    Physical examination:  General:  She has marfanoid features being extremely tall and having long arms.  Skin:  She has right-sided malar erythema.  ENT:  Adequate tears in saliva.  No conjunctivitis or  Chest:  Oral ulcers.  Clear to auscultation and percussion  Cardiac:  No friction rub is heard.  No murmurs or gallops.  Abdomen:  No organomegaly or masses.  No  tenderness to palpation  Extremities:  No sclerodactyly  Musculoskeletal:  She has hyperextensibility of the thumb and the 5th finger but no other areas of joint hyperextensibility.  She has no synovitis.  She has no tenderness to palpation.  Laboratory:  VENKATA positive 1:640, homogeneous pattern with a negative VENKATA panel.  CBC shows persistent neutropenia but normal differential.    Assessment:  1.  Chronic exudative pericarditis  2.  She has a positive VENKATA.  She has symptoms suggestive of SLE or other connective tissue disease but she does not meet criteria for SLE.    Plans:  1.  Further laboratory studies and chest x-ray obtained  2.  I placed her on colchicine 0.6 mg twice daily for her pericarditis  3.  I will see her in follow-up in 1 month.    Answers for HPI/ROS submitted by the patient on 8/25/2020   fever: No  eye redness: No  mouth sores: No  headaches: No  shortness of breath: No  chest pain: Yes  trouble swallowing: No  diarrhea: No  constipation: No  unexpected weight change: No  genital sore: No  dysuria: No  During the last 3 days, have you had a skin rash?: No  Bruises or bleeds easily: No  cough: No

## 2020-08-31 LAB
CARDIOLIPIN IGG SER IA-ACNC: 12 GPL (ref 0–14.99)
CARDIOLIPIN IGM SER IA-ACNC: 9.9 MPL (ref 0–12.49)
ENA SCL70 AB SER-ACNC: 20 UNITS

## 2020-09-01 ENCOUNTER — PATIENT OUTREACH (OUTPATIENT)
Dept: ADMINISTRATIVE | Facility: OTHER | Age: 40
End: 2020-09-01

## 2020-09-01 LAB
B2 GLYCOPROT1 IGA SER QL: <9 SAU
B2 GLYCOPROT1 IGG SER QL: <9 SGU
B2 GLYCOPROT1 IGM SER QL: <9 SMU
LA PPP-IMP: NEGATIVE

## 2020-09-02 ENCOUNTER — HOSPITAL ENCOUNTER (OUTPATIENT)
Dept: CARDIOLOGY | Facility: HOSPITAL | Age: 40
Discharge: HOME | End: 2020-09-02
Attending: INTERNAL MEDICINE
Payer: COMMERCIAL

## 2020-09-02 ENCOUNTER — OFFICE VISIT (OUTPATIENT)
Dept: CARDIOLOGY | Facility: CLINIC | Age: 40
End: 2020-09-02
Payer: COMMERCIAL

## 2020-09-02 VITALS
OXYGEN SATURATION: 100 % | HEIGHT: 71 IN | HEART RATE: 60 BPM | SYSTOLIC BLOOD PRESSURE: 118 MMHG | WEIGHT: 138.69 LBS | SYSTOLIC BLOOD PRESSURE: 108 MMHG | DIASTOLIC BLOOD PRESSURE: 62 MMHG | HEART RATE: 70 BPM | HEIGHT: 71 IN | BODY MASS INDEX: 19.42 KG/M2 | WEIGHT: 135 LBS | DIASTOLIC BLOOD PRESSURE: 77 MMHG | BODY MASS INDEX: 18.9 KG/M2

## 2020-09-02 DIAGNOSIS — Z86.16 HISTORY OF 2019 NOVEL CORONAVIRUS DISEASE (COVID-19): ICD-10-CM

## 2020-09-02 DIAGNOSIS — I31.39 PERICARDIAL EFFUSION: ICD-10-CM

## 2020-09-02 DIAGNOSIS — I31.39 PERICARDIAL EFFUSION: Primary | ICD-10-CM

## 2020-09-02 DIAGNOSIS — Z01.810 PREOPERATIVE CARDIOVASCULAR EXAMINATION: ICD-10-CM

## 2020-09-02 LAB
ASCENDING AORTA: 2.69 CM
AV INDEX (PROSTH): 0.78
AV MEAN GRADIENT: 3 MMHG
AV PEAK GRADIENT: 5 MMHG
AV VALVE AREA: 2.69 CM2
AV VELOCITY RATIO: 0.78
BSA FOR ECHO PROCEDURE: 1.75 M2
CV ECHO LV RWT: 0.32 CM
DOP CALC AO PEAK VEL: 1.16 M/S
DOP CALC AO VTI: 24.43 CM
DOP CALC LVOT AREA: 3.5 CM2
DOP CALC LVOT DIAMETER: 2.1 CM
DOP CALC LVOT PEAK VEL: 0.91 M/S
DOP CALC LVOT STROKE VOLUME: 65.67 CM3
DOP CALCLVOT PEAK VEL VTI: 18.97 CM
E WAVE DECELERATION TIME: 134.79 MSEC
E/A RATIO: 1.47
E/E' RATIO: 10.62 M/S
ECHO LV POSTERIOR WALL: 0.72 CM (ref 0.6–1.1)
FRACTIONAL SHORTENING: 34 % (ref 28–44)
INTERVENTRICULAR SEPTUM: 0.68 CM (ref 0.6–1.1)
IVRT: 64.7 MSEC
LA MAJOR: 6.01 CM
LA MINOR: 5.88 CM
LA WIDTH: 4.22 CM
LEFT ATRIUM SIZE: 3.12 CM
LEFT ATRIUM VOLUME INDEX: 36.9 ML/M2
LEFT ATRIUM VOLUME: 66.53 CM3
LEFT INTERNAL DIMENSION IN SYSTOLE: 2.97 CM (ref 2.1–4)
LEFT VENTRICLE DIASTOLIC VOLUME INDEX: 51.83 ML/M2
LEFT VENTRICLE DIASTOLIC VOLUME: 93.55 ML
LEFT VENTRICLE MASS INDEX: 53 G/M2
LEFT VENTRICLE SYSTOLIC VOLUME INDEX: 18.9 ML/M2
LEFT VENTRICLE SYSTOLIC VOLUME: 34.17 ML
LEFT VENTRICULAR INTERNAL DIMENSION IN DIASTOLE: 4.52 CM (ref 3.5–6)
LEFT VENTRICULAR MASS: 96.39 G
LV LATERAL E/E' RATIO: 8.63 M/S
LV SEPTAL E/E' RATIO: 13.8 M/S
MV PEAK A VEL: 0.47 M/S
MV PEAK E VEL: 0.69 M/S
MV STENOSIS PRESSURE HALF TIME: 39.09 MS
MV VALVE AREA P 1/2 METHOD: 5.63 CM2
PISA TR MAX VEL: 1.99 M/S
PULM VEIN S/D RATIO: 0.7
PV PEAK D VEL: 0.81 M/S
PV PEAK S VEL: 0.57 M/S
RA MAJOR: 5.61 CM
RA PRESSURE: 3 MMHG
RA WIDTH: 3.76 CM
RIGHT VENTRICULAR END-DIASTOLIC DIMENSION: 3.09 CM
RV TISSUE DOPPLER FREE WALL SYSTOLIC VELOCITY 1 (APICAL 4 CHAMBER VIEW): 13.41 CM/S
SINUS: 2.85 CM
STJ: 2.3 CM
TDI LATERAL: 0.08 M/S
TDI SEPTAL: 0.05 M/S
TDI: 0.07 M/S
TR MAX PG: 16 MMHG
TRICUSPID ANNULAR PLANE SYSTOLIC EXCURSION: 1.44 CM
TV REST PULMONARY ARTERY PRESSURE: 19 MMHG

## 2020-09-02 PROCEDURE — 3008F BODY MASS INDEX DOCD: CPT | Mod: CPTII,S$GLB,, | Performed by: NURSE PRACTITIONER

## 2020-09-02 PROCEDURE — 99214 PR OFFICE/OUTPT VISIT, EST, LEVL IV, 30-39 MIN: ICD-10-PCS | Mod: S$GLB,,, | Performed by: NURSE PRACTITIONER

## 2020-09-02 PROCEDURE — 99214 OFFICE O/P EST MOD 30 MIN: CPT | Mod: S$GLB,,, | Performed by: NURSE PRACTITIONER

## 2020-09-02 PROCEDURE — 99999 PR PBB SHADOW E&M-EST. PATIENT-LVL IV: ICD-10-PCS | Mod: PBBFAC,,, | Performed by: NURSE PRACTITIONER

## 2020-09-02 PROCEDURE — 93306 ECHO (CUPID ONLY): ICD-10-PCS | Mod: 26,,, | Performed by: INTERNAL MEDICINE

## 2020-09-02 PROCEDURE — 99999 PR PBB SHADOW E&M-EST. PATIENT-LVL IV: CPT | Mod: PBBFAC,,, | Performed by: NURSE PRACTITIONER

## 2020-09-02 PROCEDURE — 93306 TTE W/DOPPLER COMPLETE: CPT | Mod: 26,,, | Performed by: INTERNAL MEDICINE

## 2020-09-02 PROCEDURE — 3008F PR BODY MASS INDEX (BMI) DOCUMENTED: ICD-10-PCS | Mod: CPTII,S$GLB,, | Performed by: NURSE PRACTITIONER

## 2020-09-02 PROCEDURE — 93306 TTE W/DOPPLER COMPLETE: CPT

## 2020-09-02 NOTE — PROGRESS NOTES
Subjective:   Chief Complaint: Pericardial effusion      Problem List:   Liver cysts  Pericardial effusion 1st diagnosed 2013   History of Covid 5/2020      History of Present Illness: Melly Hwang is a 40 y.o. female patient of Dr. Sabillon who presents for f/u Pericardial effusion and requests pre-op evaluation prior to colonoscopy.  She was previously referred to rheumatology for a positive VENKATA test.  She was seen by Dr. Galeana last week placed her on colchicine 0.6mg BID for pericarditis with findings of symptoms suggestive of SLE or other connective tissue disease but she does not meet criteria for SLE. She has f/u appt with rheumatology at end of month.  Her echo today reviewed with Dr. KERRY Sabillon which was negative for tamponade and showed an LVEF of 55% and moderate pericardial effusion, overall unchanged from prior study 08/14/2020.    She denies any symptoms of chest pain, weakness, tachypnea, shortness of breath at rest or with exertion, fatigue, cough, orthopnea, peripheral edema, palpitations, lightheadedness, presyncope, syncope or claudication. BP controlled, no hypotension.  She was prescribed lexapro for anxiety but currently not taking it.  She feels like she can manage anxiety without medication at this time.     The patient has an exercise capacity of greater than 4 METS without symptoms  Functional capacity -can take a flight of stairs      Revised Cardiac Risk Index for Pre-Operative Risk Yes +1 No 0   1. High-risk surgery: Intraperitoneal; intrathoracic; suprainguinal vascular  0   2. History of ischemic heart disease: History of myocardial infarction (MI); history of positive exercise test; current chest pain considered due to myocardial ischemia; use of nitrate therapy or ECG with pathological Q waves  0   3. History of congestive heart failure:  Pulmonary edema, bilateral rales or S3 gallop; paroxysmal nocturnal dyspnea; chest x-ray (CXR) showing pulmonary vascular redistribution   0   4. History of cerebrovascular disease: Prior transient ischemic attack (TIA) or stroke  0   5.  Pre-operative treatment with insulin (DM requiring insulin)  0   6.  Creatinine >2 or GFR less than 30 ml/min  0          5/8/2020 HPI: Recent hospital admission 5/1 for pericardial effusion with tamponade physiology on echo.  39F with recent COVID-19 infection, prior pericardial effusion, multiple hepatic hemangiomas sent to ER after echocardiogram with findings of early tamponade (mitral inflow variation, RV diastolic collapse) with large anterior pericardial effusion. She had an MRI in March for monitoring of her hemangiomas; incidentally noted pericardial fluid vs cyst and an echo was ordered to evaluate. Deferred until today due to COVID-19. Pt works as a nurse; reported symptoms c/w COVID in mid April. Routine screening negative 4/15, suspected false negative in setting of  classic symptoms of fever, slight cough, and nausea, decreased appetite, and loose stools. Returned to ER 4/22 with chest tightness, shortness of breath, mild nonproductive cough, anosmia. Rapid screen positive for COVID. Reports resolution of her symptoms of chest tightness and SOB about 1-2 days later. Inflammatory markers were negative. She underwent perciardiocentesis on 5/2 with removal of 520 cc of fluid. Cytology and gram stain were negative. ECG without findings of pericarditis. Of note she had a pericardial effusion noted on echocardiograms done in 2013 and 2014. She denies any connective disease or chronic medical conditions. After her discharge, she was seen in the ED on 5/5 for presyncope describing a warm sensation throughout her body and slight dizziness. She was given 1.5 Liters of fluid and felt better. She currently reports occasional pressure in her chest. She had recently restarted her lexapro prior to these symptoms.       Review of Systems   Constitution: Negative for chills, decreased appetite, diaphoresis, fever,  malaise/fatigue, night sweats, weight gain and weight loss.        Denies change in activity level   HENT: Negative for nosebleeds.    Eyes:        No amaurosis fugax   Cardiovascular: Negative for chest pain, claudication, cyanosis, dyspnea on exertion, irregular heartbeat, leg swelling, near-syncope, orthopnea, palpitations, paroxysmal nocturnal dyspnea and syncope.        As per HPI above   Respiratory: Negative for cough, hemoptysis, shortness of breath, sleep disturbances due to breathing, snoring and wheezing.         No KEVIN symptoms   Hematologic/Lymphatic: Negative for bleeding problem.   Skin: Negative for rash (chronic facial rash right cheek).   Musculoskeletal: Negative for arthritis, back pain, falls, gout, joint pain, joint swelling, muscle cramps, muscle weakness, myalgias, neck pain and stiffness.        Denies muscle aches; left shoulder spasms on flexeril   Gastrointestinal: Negative for abdominal pain, constipation, diarrhea, hematemesis, hematochezia, melena, nausea and vomiting.        Abdominal cramping when starting colchicine   Genitourinary: Negative for dysuria and hematuria.        No change in urinary output   Neurological: Negative for excessive daytime sleepiness, dizziness, headaches, light-headedness, loss of balance, vertigo and weakness.   Psychiatric/Behavioral: Negative for altered mental status. The patient is nervous/anxious.    Allergic/Immunologic:        Drug allergies listed elsewhere if present       Social History:  Melly reports that she has never smoked. She has never used smokeless tobacco. She reports current alcohol use. She reports that she does not use drugs.      The 10-year ASCVD risk score (Joegisela KAY Jr., et al., 2013) is: 0.3%    Values used to calculate the score:      Age: 40 years      Sex: Female      Is Non- : Yes      Diabetic: No      Tobacco smoker: No      Systolic Blood Pressure: 125 mmHg      Is BP treated: No      HDL  Cholesterol: 78 mg/dL      Total Cholesterol: 215 mg/dL       Medications:  Outpatient Encounter Medications as of 9/2/2020   Medication Sig Dispense Refill    albuterol (PROVENTIL/VENTOLIN HFA) 90 mcg/actuation inhaler Inhale 1-2 puffs into the lungs every 6 (six) hours as needed for Wheezing or Shortness of Breath. Rescue 18 g 0    cetirizine (ZYRTEC) 10 MG tablet Take 1 tablet (10 mg total) by mouth once daily. 90 tablet 1    colchicine (COLCRYS) 0.6 mg tablet Take 1 tablet (0.6 mg total) by mouth once daily. 30 tablet 11    cyclobenzaprine (FLEXERIL) 10 MG tablet TAKE 1 TABLET BY MOUTH EVERY DAY IN THE EVENING AS NEEDED FOR MUSCLE SPASMS 30 tablet 3    escitalopram oxalate (LEXAPRO) 10 MG tablet Take 1 tablet (10 mg total) by mouth once daily. 30 tablet 6    hydrocortisone butyrate (LOCOID) 0.1 % Oint AAA BID prn itching, scaling, or redness. Do not use longer than 2 weeks consecutively. 45 g 0    ketoconazole (NIZORAL) 2 % cream Apply topically once daily. 60 g 3    multivitamin capsule Take by mouth. 1 capsule Oral Every morning      norethindrone (EMILIA) 0.35 mg tablet TAKE 1 TABLET (0.35 MG TOTAL) BY MOUTH ONCE DAILY 84 tablet 3    omeprazole 20 mg TbEC Take 20 mg by mouth daily as needed. 30 each 0    pimecrolimus (ELIDEL) 1 % cream AAA BID prn itching, redness 60 g 2     No facility-administered encounter medications on file as of 9/2/2020.      Family History:    Melly's family history includes Diabetes in her father and mother; Hyperlipidemia in her father; Hypertension in her father; Liver disease in her mother; Macular degeneration in her father; No Known Problems in her brother, brother, daughter, maternal aunt, maternal grandfather, maternal grandmother, maternal uncle, paternal aunt, paternal grandfather, paternal grandmother, paternal uncle, and sister.    Objective:          /77 (BP Location: Left arm, Patient Position: Sitting, BP Method: Pediatric (Automatic))   Pulse 60    "Ht 5' 11" (1.803 m)   Wt 62.9 kg (138 lb 10.7 oz)   SpO2 100%   BMI 19.34 kg/m²       Physical Exam  Vitals signs reviewed.   Constitutional:       General: She is not in acute distress.     Appearance: Normal appearance. She is well-developed. She is not ill-appearing, toxic-appearing or diaphoretic.   HENT:      Head: Normocephalic and atraumatic.   Eyes:      General: No scleral icterus.     Extraocular Movements: Extraocular movements intact.      Conjunctiva/sclera: Conjunctivae normal.      Pupils: Pupils are equal, round, and reactive to light.   Neck:      Musculoskeletal: Normal range of motion and neck supple. No muscular tenderness.      Thyroid: No thyromegaly.      Vascular: No carotid bruit or JVD.      Trachea: No tracheal deviation.   Cardiovascular:      Chest Wall: PMI is not displaced. No thrill.      Pulses:           Carotid pulses are 2+ on the right side and 2+ on the left side.       Radial pulses are 2+ on the right side and 2+ on the left side.        Dorsalis pedis pulses are 2+ on the right side and 2+ on the left side.      Heart sounds: No murmur. No friction rub. No gallop.    Pulmonary:      Effort: Pulmonary effort is normal. No respiratory distress.      Breath sounds: Normal breath sounds. No stridor. No wheezing, rhonchi or rales.   Chest:      Chest wall: No tenderness.   Abdominal:      General: Bowel sounds are normal. There is no distension or abdominal bruit.      Palpations: Abdomen is soft.      Tenderness: There is no abdominal tenderness. There is no guarding.   Musculoskeletal: Normal range of motion.      Right lower leg: No edema.      Left lower leg: No edema.   Lymphadenopathy:      Cervical: No cervical adenopathy.   Skin:     General: Skin is warm and dry.      Findings: No rash.   Neurological:      Mental Status: She is alert and oriented to person, place, and time.      Gait: Gait normal.   Psychiatric:         Mood and Affect: Mood normal.         Behavior: " Behavior normal.         Thought Content: Thought content normal.         Judgment: Judgment normal.           Lab Results   Component Value Date     05/05/2020    K 4.9 05/05/2020     05/05/2020    CO2 27 05/05/2020    BUN 13 05/05/2020    CREATININE 0.8 05/05/2020     05/05/2020    BNP 11 05/01/2020    HGBA1C 5.6 03/02/2020    MG 1.9 05/05/2020    AST 20 05/05/2020    ALT 21 05/05/2020    ALKPHOS 65 05/05/2020    ALBUMIN 4.4 05/05/2020    PROT 7.8 05/05/2020    BILITOT 1.0 05/05/2020    WBC 3.55 (L) 05/05/2020    HGB 14.0 05/05/2020    HCT 45.1 05/05/2020    MCV 96 05/05/2020     05/05/2020    INR 1.1 05/02/2020    TSH 0.558 05/08/2020    CHOL 215 (H) 03/02/2020    HDL 78 (H) 03/02/2020    LDLCALC 122.0 03/02/2020    TRIG 75 03/02/2020         Reviewed:   []  Stress test   []  Angiography   [x]  Echocardiogram   [x]  Labs   [x]  Other:  Old records           Assessment/Plan:     Melly Hwang was seen today for Pericardial effusion    Preoperative cardiovascular examination  Pt has no active cardiac condition (ACS/USA, decompensated CHF, significant arrhythmias or severe valvular disease) and can easily achieve 4 METS.  Pt does not require further cardiac evaluation prior to undergoing colonoscopy.   These recommendations follow the most current Guideline on Perioperative Cardiovascular Evaluation and Management of Patients Undergoing Noncardiac Surgery released by the ACC/AHA. (JACC 2014.07.944).      Estimated risk with the proposed surgery/procedure for an adverse perioperative cardiac outcome (MI, pulmonary edema, ventricular fibrillation or primary cardiac arrest, and complete heart block) is 0.4% (0.05%-1.5%) and for an adverse 30 day cardiac outcome (myocardial infarction, cardiac arrest, or death) is 3.9% (95% CI 2.8%-5.4%) (Revised Cardiac Risk Index [RCRI] Score = 0 - CCS Criteria - [place] J Cardiol 2017; 33:17-32) based on the following risk factors:  None.       Pericardial effusion-stable  -     Echo Color Flow Doppler? Yes; Future; Expected date: 12/02/2020  Continue Cochicine for 3 months   notify clinic of any CV symptoms discussed and go to ER for symptoms of tamponade  Continue to f/u with rheumatology    History of Covid 19 -resolved    This patient was discussed with DAYO Chacon, FNP-BC   Cardiology Consult    Unless there are intervening problems, patient should return for re-evaluation in Follow up in about 3 months (around 12/2/2020).

## 2020-09-03 DIAGNOSIS — Z12.11 SPECIAL SCREENING FOR MALIGNANT NEOPLASMS, COLON: Primary | ICD-10-CM

## 2020-09-03 PROBLEM — Z86.16 HISTORY OF 2019 NOVEL CORONAVIRUS DISEASE (COVID-19): Status: ACTIVE | Noted: 2020-09-03

## 2020-09-03 PROBLEM — I31.4 PERICARDIAL EFFUSION WITH CARDIAC TAMPONADE: Status: ACTIVE | Noted: 2020-05-01

## 2020-09-03 PROBLEM — Z01.810 PREOPERATIVE CARDIOVASCULAR EXAMINATION: Status: ACTIVE | Noted: 2020-09-03

## 2020-09-03 RX ORDER — POLYETHYLENE GLYCOL 3350, SODIUM SULFATE ANHYDROUS, SODIUM BICARBONATE, SODIUM CHLORIDE, POTASSIUM CHLORIDE 236; 22.74; 6.74; 5.86; 2.97 G/4L; G/4L; G/4L; G/4L; G/4L
4 POWDER, FOR SOLUTION ORAL ONCE
Qty: 4000 ML | Refills: 0 | Status: SHIPPED | OUTPATIENT
Start: 2020-09-03 | End: 2020-09-03

## 2020-09-18 ENCOUNTER — PATIENT MESSAGE (OUTPATIENT)
Dept: RHEUMATOLOGY | Facility: CLINIC | Age: 40
End: 2020-09-18

## 2020-09-18 DIAGNOSIS — Z01.84 ANTIBODY RESPONSE EXAMINATION: ICD-10-CM

## 2020-09-21 ENCOUNTER — ANESTHESIA (OUTPATIENT)
Dept: ENDOSCOPY | Facility: HOSPITAL | Age: 40
End: 2020-09-21
Payer: COMMERCIAL

## 2020-09-21 ENCOUNTER — HOSPITAL ENCOUNTER (OUTPATIENT)
Facility: HOSPITAL | Age: 40
Discharge: HOME | End: 2020-09-21
Attending: COLON & RECTAL SURGERY | Admitting: COLON & RECTAL SURGERY
Payer: COMMERCIAL

## 2020-09-21 ENCOUNTER — ANESTHESIA EVENT (OUTPATIENT)
Dept: ENDOSCOPY | Facility: HOSPITAL | Age: 40
End: 2020-09-21
Payer: COMMERCIAL

## 2020-09-21 VITALS
BODY MASS INDEX: 18.2 KG/M2 | RESPIRATION RATE: 18 BRPM | TEMPERATURE: 98 F | HEIGHT: 71 IN | HEART RATE: 55 BPM | OXYGEN SATURATION: 96 % | DIASTOLIC BLOOD PRESSURE: 65 MMHG | SYSTOLIC BLOOD PRESSURE: 115 MMHG | WEIGHT: 130 LBS

## 2020-09-21 DIAGNOSIS — K62.5 RECTAL BLEED: ICD-10-CM

## 2020-09-21 PROCEDURE — 25000003 PHARM REV CODE 250: Performed by: COLON & RECTAL SURGERY

## 2020-09-21 PROCEDURE — 37000008 HC ANESTHESIA 1ST 15 MINUTES: Performed by: COLON & RECTAL SURGERY

## 2020-09-21 PROCEDURE — 45378 DIAGNOSTIC COLONOSCOPY: CPT | Mod: ,,, | Performed by: COLON & RECTAL SURGERY

## 2020-09-21 PROCEDURE — 45378 PR COLONOSCOPY,DIAGNOSTIC: ICD-10-PCS | Mod: ,,, | Performed by: COLON & RECTAL SURGERY

## 2020-09-21 PROCEDURE — E9220 PRA ENDO ANESTHESIA: ICD-10-PCS | Mod: ,,, | Performed by: NURSE ANESTHETIST, CERTIFIED REGISTERED

## 2020-09-21 PROCEDURE — 45378 DIAGNOSTIC COLONOSCOPY: CPT | Performed by: COLON & RECTAL SURGERY

## 2020-09-21 PROCEDURE — 37000009 HC ANESTHESIA EA ADD 15 MINS: Performed by: COLON & RECTAL SURGERY

## 2020-09-21 PROCEDURE — 63600175 PHARM REV CODE 636 W HCPCS: Performed by: NURSE ANESTHETIST, CERTIFIED REGISTERED

## 2020-09-21 PROCEDURE — E9220 PRA ENDO ANESTHESIA: HCPCS | Mod: ,,, | Performed by: NURSE ANESTHETIST, CERTIFIED REGISTERED

## 2020-09-21 RX ORDER — LIDOCAINE HCL/PF 100 MG/5ML
SYRINGE (ML) INTRAVENOUS
Status: DISCONTINUED | OUTPATIENT
Start: 2020-09-21 | End: 2020-09-21

## 2020-09-21 RX ORDER — SODIUM CHLORIDE 9 MG/ML
INJECTION, SOLUTION INTRAVENOUS CONTINUOUS
Status: DISCONTINUED | OUTPATIENT
Start: 2020-09-21 | End: 2020-09-21 | Stop reason: HOSPADM

## 2020-09-21 RX ORDER — PROPOFOL 10 MG/ML
VIAL (ML) INTRAVENOUS
Status: DISCONTINUED | OUTPATIENT
Start: 2020-09-21 | End: 2020-09-21

## 2020-09-21 RX ORDER — PROPOFOL 10 MG/ML
VIAL (ML) INTRAVENOUS CONTINUOUS PRN
Status: DISCONTINUED | OUTPATIENT
Start: 2020-09-21 | End: 2020-09-21

## 2020-09-21 RX ADMIN — PROPOFOL 20 MG: 10 INJECTION, EMULSION INTRAVENOUS at 11:09

## 2020-09-21 RX ADMIN — SODIUM CHLORIDE: 0.9 INJECTION, SOLUTION INTRAVENOUS at 09:09

## 2020-09-21 RX ADMIN — Medication 100 MG: at 11:09

## 2020-09-21 RX ADMIN — PROPOFOL 60 MG: 10 INJECTION, EMULSION INTRAVENOUS at 11:09

## 2020-09-21 RX ADMIN — PROPOFOL 150 MCG/KG/MIN: 10 INJECTION, EMULSION INTRAVENOUS at 11:09

## 2020-09-21 NOTE — TRANSFER OF CARE
"Anesthesia Transfer of Care Note    Patient: Melly Hwang    Procedure(s) Performed: Procedure(s) (LRB):  COLONOSCOPY (N/A)    Patient location: PACU    Anesthesia Type: general    Transport from OR: Transported from OR on room air with adequate spontaneous ventilation    Post pain: adequate analgesia    Post assessment: no apparent anesthetic complications    Post vital signs: stable    Level of consciousness: awake, alert and oriented    Nausea/Vomiting: no nausea/vomiting    Complications: none    Transfer of care protocol was followed      Last vitals:   Visit Vitals  /83 (BP Location: Left arm, Patient Position: Lying)   Pulse 80   Temp 37.2 °C (99 °F) (Temporal)   Resp 14   Ht 5' 11" (1.803 m)   Wt 59 kg (130 lb)   SpO2 100%   Breastfeeding No   BMI 18.13 kg/m²     "

## 2020-09-21 NOTE — ANESTHESIA PREPROCEDURE EVALUATION
09/21/2020  Melly Hwang is a 40 y.o., female.    Past Medical History:   Diagnosis Date    VENKATA positive 8/20/2020    COVID-19     COVID-19     Deviated septum 2011    Hypertrophy of inferior nasal turbinate     Liver mass     Nasal congestion 2011    Pericardial effusion     Premature menopause      Past Surgical History:   Procedure Laterality Date    HERNIA REPAIR      NASAL SEPTUM SURGERY      PERICARDIOCENTESIS N/A 5/2/2020    Procedure: Pericardiocentesis;  Surgeon: Dickson Dangelo MD;  Location: [facility] CATH LAB;  Service: Cardiology;  Laterality: N/A;    PERICARDIOCENTESIS  05/2020    TONSILLECTOMY      UMBILICAL HERNIA REPAIR           Anesthesia Evaluation    I have reviewed the Patient Summary Reports.      I have reviewed the Medications.     Review of Systems  Anesthesia Hx:  Denies Hx of Anesthetic complications  Neg history of prior surgery. Denies Family Hx of Anesthesia complications.   Denies Personal Hx of Anesthesia complications.   Cardiovascular:   Exercise tolerance: good        Physical Exam  General:  Well nourished    Airway/Jaw/Neck:  Airway Findings: Mouth Opening: Normal Tongue: Normal  General Airway Assessment: Adult  Mallampati: II  TM Distance: Normal, at least 6 cm         Dental:  DENTAL FINDINGS: Normal   Chest/Lungs:  Chest/Lungs Clear    Heart/Vascular:  Heart Findings: Normal       Mental Status:  Mental Status Findings:  Alert and Oriented         Anesthesia Plan  Type of Anesthesia, risks & benefits discussed:  Anesthesia Type:  general  Patient's Preference:   Intra-op Monitoring Plan: standard ASA monitors  Intra-op Monitoring Plan Comments:   Post Op Pain Control Plan:   Post Op Pain Control Plan Comments:   Induction:   IV  Beta Blocker:  Patient is not currently on a Beta-Blocker (No further documentation required).       Informed  Consent: Patient understands risks and agrees with Anesthesia plan.  Questions answered. Anesthesia consent signed with patient.  ASA Score: 2     Day of Surgery Review of History & Physical:    H&P update referred to the provider.         Ready For Surgery From Anesthesia Perspective.

## 2020-09-21 NOTE — PROVATION PATIENT INSTRUCTIONS
Discharge Summary/Instructions after an Endoscopic Procedure  Patient Name: Melly Hwang  Patient MRN: [id number]  Patient YOB: 1980  Monday, September 21, 2020  Jasper Crane MD  RESTRICTIONS:  During your procedure today, you received medications for sedation.  These   medications may affect your judgment, balance and coordination.  Therefore,   for 24 hours, you have the following restrictions:   - DO NOT drive a car, operate machinery, make legal/financial decisions,   sign important papers or drink alcohol.    ACTIVITY:  Today: no heavy lifting, straining or running due to procedural   sedation/anesthesia.  The following day: return to full activity including work.  DIET:  Eat and drink normally unless instructed otherwise.     TREATMENT FOR COMMON SIDE EFFECTS:  - Mild abdominal pain, nausea, belching, bloating or excessive gas:  rest,   eat lightly and use a heating pad.  - Sore Throat: treat with throat lozenges and/or gargle with warm salt   water.  - Because air was used during the procedure, expelling large amounts of air   from your rectum or belching is normal.  - If a bowel prep was taken, you may not have a bowel movement for 1-3 days.    This is normal.  SYMPTOMS TO WATCH FOR AND REPORT TO YOUR PHYSICIAN:  1. Abdominal pain or bloating, other than gas cramps.  2. Chest pain.  3. Back pain.  4. Signs of infection such as: chills or fever occurring within 24 hours   after the procedure.  5. Rectal bleeding, which would show as bright red, maroon, or black stools.   (A tablespoon of blood from the rectum is not serious, especially if   hemorrhoids are present.)  6. Vomiting.  7. Weakness or dizziness.  GO DIRECTLY TO THE NEAREST EMERGENCY ROOM IF YOU HAVE ANY OF THE FOLLOWING:      Difficulty breathing              Chills and/or fever over 101 F   Persistent vomiting and/or vomiting blood   Severe abdominal pain   Severe chest pain   Black, tarry stools   Bleeding- more than one  tablespoon   Any other symptom or condition that you feel may need urgent attention  Your doctor recommends these additional instructions:  If any biopsies were taken, your doctors clinic will contact you in 1 to 2   weeks with any results.  - Discharge patient to home (ambulatory).   - Patient has a contact number available for emergencies.  The signs and   symptoms of potential delayed complications were discussed with the   patient.  Return to normal activities tomorrow.  Written discharge   instructions were provided to the patient.   - Resume previous diet.   - Continue present medications.   - Repeat colonoscopy in 10 years for screening purposes.  For questions, problems or results please call your physician - Jasper Crane MD at Work:  [phone].  [facility], EMERGENCY ROOM PHONE NUMBER: [phone]  IF A COMPLICATION OR EMERGENCY SITUATION ARISES AND YOU ARE UNABLE TO REACH   YOUR PHYSICIAN - GO DIRECTLY TO THE EMERGENCY ROOM.  Jasper Crane MD  9/21/2020 11:57:40 AM  This report has been verified and signed electronically.  [employer]

## 2020-09-21 NOTE — DISCHARGE INSTRUCTIONS
Colonoscopy     A camera attached to a flexible tube with a viewing lens is used to take video pictures.     Colonoscopy is a test to view the inside of your lower digestive tract (colon and rectum). Sometimes it can show the last part of the small intestine (ileum). During the test, small pieces of tissue may be removed for testing. This is called a biopsy. Small growths, such as polyps, may also be removed.   Why is colonoscopy done?  The test is done to help look for colon cancer. And it can help find the source of abdominal pain, bleeding, and changes in bowel habits. It may be needed once a year, depending on factors such as your:  · Age  · Health history  · Family health history  · Symptoms  · Results from any prior colonoscopy  Risks and possible complications  These include:  · Bleeding               · A puncture or tear in the colon   · Risks of anesthesia  · A cancer lesion not being seen  Getting ready   To prepare for the test:  · Talk with your healthcare provider about the risks of the test (see below). Also ask your healthcare provider about alternatives to the test.  · Tell your healthcare provider about any medicines you take. Also tell him or her about any health conditions you may have.  · Make sure your rectum and colon are empty for the test. Follow the diet and bowel prep instructions exactly. If you dont, the test may need to be rescheduled.  · Plan for a friend or family member to drive you home after the test.     Colonoscopy provides an inside view of the entire colon.     You may discuss the results with your doctor right away or at a future visit.  During the test   The test is usually done in the hospital on an outpatient basis. This means you go home the same day. The procedure takes about 30 minutes. During that time:  · You are given relaxing (sedating) medicine through an IV line. You may be drowsy, or fully asleep.  · The healthcare provider will first give you a physical exam to  check for anal and rectal problems.  · Then the anus is lubricated and the scope inserted.  · If you are awake, you may have a feeling similar to needing to have a bowel movement. You may also feel pressure as air is pumped into the colon. Its OK to pass gas during the procedure.  · Biopsy, polyp removal, or other treatments may be done during the test.  After the test   You may have gas right after the test. It can help to try to pass it to help prevent later bloating. Your healthcare provider may discuss the results with you right away. Or you may need to schedule a follow-up visit to talk about the results. After the test, you can go back to your normal eating and other activities. You may be tired from the sedation and need to rest for a few hours.  Date Last Reviewed: 11/1/2016 © 2000-2017 [employer]. [address] [place] [address], [place] [address]. All rights reserved. This information is not intended as a substitute for professional medical care. Always follow your healthcare professional's instructions.

## 2020-09-21 NOTE — H&P
Colonoscopy History and Physical      Procedure : Colonoscopy    Indications:  rectal bleeding    Family Hx of CRC: None    Last Colonoscopy:  None    Hx of sedation problems: none  FHX of sedation problems: none    Past Medical History:   Diagnosis Date    VENKATA positive 8/20/2020    COVID-19     COVID-19     Deviated septum 2011    Hypertrophy of inferior nasal turbinate     Liver mass     Nasal congestion 2011    Pericardial effusion     Premature menopause        Family History   Problem Relation Age of Onset    Diabetes Mother     Liver disease Mother         Fatty liver    Macular degeneration Father     Diabetes Father     Hypertension Father     Hyperlipidemia Father     No Known Problems Sister     No Known Problems Brother     No Known Problems Daughter     No Known Problems Brother     No Known Problems Maternal Aunt     No Known Problems Maternal Uncle     No Known Problems Paternal Aunt     No Known Problems Paternal Uncle     No Known Problems Maternal Grandmother     No Known Problems Maternal Grandfather     No Known Problems Paternal Grandmother     No Known Problems Paternal Grandfather     Amblyopia Neg Hx     Blindness Neg Hx     Cancer Neg Hx     Cataracts Neg Hx     Glaucoma Neg Hx     Retinal detachment Neg Hx     Strabismus Neg Hx     Stroke Neg Hx     Thyroid disease Neg Hx     Heart disease Neg Hx     Melanoma Neg Hx     Heart attack Neg Hx        Social History     Socioeconomic History    Marital status: Single     Spouse name: Not on file    Number of children: 1    Years of education: Not on file    Highest education level: Not on file   Occupational History    Occupation:      Employer: [employer]   Social Needs    Financial resource strain: Not on file    Food insecurity     Worry: Not on file     Inability: Not on file    Transportation needs     Medical: Not on file     Non-medical: Not on file   Tobacco Use     Smoking status: Never Smoker    Smokeless tobacco: Never Used   Substance and Sexual Activity    Alcohol use: Yes     Comment: rare    Drug use: No    Sexual activity: Yes     Partners: Male     Birth control/protection: OCP   Lifestyle    Physical activity     Days per week: Not on file     Minutes per session: Not on file    Stress: Not on file   Relationships    Social connections     Talks on phone: Not on file     Gets together: Not on file     Attends Oriental orthodox service: Not on file     Active member of club or organization: Not on file     Attends meetings of clubs or organizations: Not on file     Relationship status: Not on file   Other Topics Concern    Are you pregnant or think you may be? Not Asked    Breast-feeding Not Asked   Social History Narrative    Not on file       Review of patient's allergies indicates:   Allergen Reactions    No known drug allergies        No current facility-administered medications on file prior to encounter.      Current Outpatient Medications on File Prior to Encounter   Medication Sig Dispense Refill    cetirizine (ZYRTEC) 10 MG tablet Take 1 tablet (10 mg total) by mouth once daily. 90 tablet 1    cyclobenzaprine (FLEXERIL) 10 MG tablet TAKE 1 TABLET BY MOUTH EVERY DAY IN THE EVENING AS NEEDED FOR MUSCLE SPASMS 30 tablet 3    hydrocortisone butyrate (LOCOID) 0.1 % Oint AAA BID prn itching, scaling, or redness. Do not use longer than 2 weeks consecutively. 45 g 0    ketoconazole (NIZORAL) 2 % cream Apply topically once daily. 60 g 3    multivitamin capsule Take by mouth. 1 capsule Oral Every morning      norethindrone (EMILIA) 0.35 mg tablet TAKE 1 TABLET (0.35 MG TOTAL) BY MOUTH ONCE DAILY 84 tablet 3    pimecrolimus (ELIDEL) 1 % cream AAA BID prn itching, redness 60 g 2    albuterol (PROVENTIL/VENTOLIN HFA) 90 mcg/actuation inhaler Inhale 1-2 puffs into the lungs every 6 (six) hours as needed for Wheezing or Shortness of Breath. Rescue 18 g 0     escitalopram oxalate (LEXAPRO) 10 MG tablet Take 1 tablet (10 mg total) by mouth once daily. 30 tablet 6    omeprazole 20 mg TbEC Take 20 mg by mouth daily as needed. 30 each 0       Review of Systems -   Respiratory ROS: negative  Cardiovascular ROS: negative  Gastrointestinal ROS: negative  Musculoskeletal ROS: negative  Neurological ROS: negative        Physical Exam:  General: no distress  Head: normocephalic  Lungs:  normal respiratory effort  Heart: regular rate  Abdomen: soft,  Non-tender  Extremities: warm and well perfused       Deep Sedation: Mallampati Score per anesthesia    ASA: II      Patient cleared for Anesthesia:  General    Anesthesia/Surgery risks, benefits, and alternative options discussed and understood by patient/family.

## 2020-09-22 NOTE — ANESTHESIA POSTPROCEDURE EVALUATION
Anesthesia Post Evaluation    Patient: Melly Hwang    Procedure(s) Performed: Procedure(s) (LRB):  COLONOSCOPY (N/A)    Final Anesthesia Type: general    Patient location during evaluation: PACU  Patient participation: Yes- Able to Participate  Level of consciousness: awake and alert and oriented  Post-procedure vital signs: reviewed and stable  Pain management: adequate  Airway patency: patent    PONV status at discharge: No PONV  Anesthetic complications: no      Cardiovascular status: blood pressure returned to baseline, hemodynamically stable and stable  Respiratory status: unassisted, room air and spontaneous ventilation  Hydration status: euvolemic  Follow-up not needed.          Vitals Value Taken Time   /65 09/21/20 1225   Temp 36.6 °C (97.8 °F) 09/21/20 1203   Pulse 55 09/21/20 1225   Resp 18 09/21/20 1225   SpO2 96 % 09/21/20 1225         Event Time   Out of Recovery 12:34:20         Pain/Lizbet Score: Lizbet Score: 10 (9/21/2020 12:04 PM)

## 2020-10-06 RX ORDER — CETIRIZINE HYDROCHLORIDE 10 MG/1
10 TABLET ORAL DAILY
Qty: 90 TABLET | Refills: 1 | Status: SHIPPED | OUTPATIENT
Start: 2020-10-06 | End: 2021-07-27

## 2020-10-18 DIAGNOSIS — Z01.84 ANTIBODY RESPONSE EXAMINATION: ICD-10-CM

## 2020-11-12 ENCOUNTER — TELEPHONE (OUTPATIENT)
Dept: INTERNAL MEDICINE | Facility: CLINIC | Age: 40
End: 2020-11-12

## 2020-11-17 DIAGNOSIS — Z01.84 ANTIBODY RESPONSE EXAMINATION: ICD-10-CM

## 2020-11-19 ENCOUNTER — PATIENT MESSAGE (OUTPATIENT)
Dept: RHEUMATOLOGY | Facility: CLINIC | Age: 40
End: 2020-11-19

## 2020-12-02 ENCOUNTER — HOSPITAL ENCOUNTER (OUTPATIENT)
Dept: CARDIOLOGY | Facility: HOSPITAL | Age: 40
Discharge: HOME | End: 2020-12-02
Attending: NURSE PRACTITIONER
Payer: COMMERCIAL

## 2020-12-02 ENCOUNTER — TELEPHONE (OUTPATIENT)
Dept: RHEUMATOLOGY | Facility: CLINIC | Age: 40
End: 2020-12-02

## 2020-12-02 ENCOUNTER — PATIENT MESSAGE (OUTPATIENT)
Dept: RHEUMATOLOGY | Facility: CLINIC | Age: 40
End: 2020-12-02

## 2020-12-02 VITALS
BODY MASS INDEX: 18.2 KG/M2 | DIASTOLIC BLOOD PRESSURE: 78 MMHG | SYSTOLIC BLOOD PRESSURE: 128 MMHG | HEART RATE: 65 BPM | WEIGHT: 130 LBS | HEIGHT: 71 IN

## 2020-12-02 DIAGNOSIS — I31.39 PERICARDIAL EFFUSION: ICD-10-CM

## 2020-12-02 LAB
ASCENDING AORTA: 2.56 CM
AV INDEX (PROSTH): 0.83
AV MEAN GRADIENT: 3 MMHG
AV PEAK GRADIENT: 5 MMHG
AV VALVE AREA: 2.71 CM2
AV VELOCITY RATIO: 0.83
BSA FOR ECHO PROCEDURE: 1.72 M2
CV ECHO LV RWT: 0.3 CM
DOP CALC AO PEAK VEL: 1.11 M/S
DOP CALC AO VTI: 23.98 CM
DOP CALC LVOT AREA: 3.3 CM2
DOP CALC LVOT DIAMETER: 2.04 CM
DOP CALC LVOT PEAK VEL: 0.92 M/S
DOP CALC LVOT STROKE VOLUME: 65.04 CM3
DOP CALCLVOT PEAK VEL VTI: 19.91 CM
E WAVE DECELERATION TIME: 115.31 MSEC
E/A RATIO: 1.63
E/E' RATIO: 8 M/S
ECHO LV POSTERIOR WALL: 0.66 CM (ref 0.6–1.1)
FRACTIONAL SHORTENING: 30 % (ref 28–44)
INTERVENTRICULAR SEPTUM: 0.58 CM (ref 0.6–1.1)
IVRT: 77.07 MSEC
LA MAJOR: 5.45 CM
LA MINOR: 5.59 CM
LA WIDTH: 4.54 CM
LEFT ATRIUM SIZE: 2.82 CM
LEFT ATRIUM VOLUME INDEX: 34.2 ML/M2
LEFT ATRIUM VOLUME: 60.06 CM3
LEFT INTERNAL DIMENSION IN SYSTOLE: 3.14 CM (ref 2.1–4)
LEFT VENTRICLE DIASTOLIC VOLUME INDEX: 51.75 ML/M2
LEFT VENTRICLE DIASTOLIC VOLUME: 90.87 ML
LEFT VENTRICLE MASS INDEX: 46 G/M2
LEFT VENTRICLE SYSTOLIC VOLUME INDEX: 22.2 ML/M2
LEFT VENTRICLE SYSTOLIC VOLUME: 38.99 ML
LEFT VENTRICULAR INTERNAL DIMENSION IN DIASTOLE: 4.47 CM (ref 3.5–6)
LEFT VENTRICULAR MASS: 81.18 G
LV LATERAL E/E' RATIO: 6.77 M/S
LV SEPTAL E/E' RATIO: 9.78 M/S
MV PEAK A VEL: 0.54 M/S
MV PEAK E VEL: 0.88 M/S
MV STENOSIS PRESSURE HALF TIME: 33.44 MS
MV VALVE AREA P 1/2 METHOD: 6.58 CM2
PISA TR MAX VEL: 1.89 M/S
PULM VEIN S/D RATIO: 0.61
PV PEAK D VEL: 0.62 M/S
PV PEAK S VEL: 0.38 M/S
RA MAJOR: 4.6 CM
RA PRESSURE: 3 MMHG
RA WIDTH: 4.72 CM
RIGHT VENTRICULAR END-DIASTOLIC DIMENSION: 3.25 CM
RV TISSUE DOPPLER FREE WALL SYSTOLIC VELOCITY 1 (APICAL 4 CHAMBER VIEW): 10.09 CM/S
SINUS: 2.8 CM
STJ: 2.47 CM
TDI LATERAL: 0.13 M/S
TDI SEPTAL: 0.09 M/S
TDI: 0.11 M/S
TR MAX PG: 14 MMHG
TRICUSPID ANNULAR PLANE SYSTOLIC EXCURSION: 1.41 CM
TV REST PULMONARY ARTERY PRESSURE: 17 MMHG

## 2020-12-02 PROCEDURE — 93306 TTE W/DOPPLER COMPLETE: CPT

## 2020-12-02 PROCEDURE — 93306 TTE W/DOPPLER COMPLETE: CPT | Mod: 26,,, | Performed by: INTERNAL MEDICINE

## 2020-12-02 PROCEDURE — 93306 ECHO (CUPID ONLY): ICD-10-PCS | Mod: 26,,, | Performed by: INTERNAL MEDICINE

## 2020-12-02 NOTE — TELEPHONE ENCOUNTER
Called to relay dr chisholm can see her tomorrow for 1pm waiting on a call back   In the mean time I will message her on the [facility] brice

## 2020-12-03 ENCOUNTER — TELEPHONE (OUTPATIENT)
Dept: CARDIOLOGY | Facility: CLINIC | Age: 40
End: 2020-12-03

## 2020-12-03 ENCOUNTER — LAB VISIT (OUTPATIENT)
Dept: LAB | Facility: HOSPITAL | Age: 40
End: 2020-12-03
Attending: INTERNAL MEDICINE
Payer: COMMERCIAL

## 2020-12-03 ENCOUNTER — OFFICE VISIT (OUTPATIENT)
Dept: RHEUMATOLOGY | Facility: CLINIC | Age: 40
End: 2020-12-03
Payer: COMMERCIAL

## 2020-12-03 ENCOUNTER — OFFICE VISIT (OUTPATIENT)
Dept: CARDIOLOGY | Facility: CLINIC | Age: 40
End: 2020-12-03
Payer: COMMERCIAL

## 2020-12-03 VITALS
HEIGHT: 71 IN | SYSTOLIC BLOOD PRESSURE: 105 MMHG | DIASTOLIC BLOOD PRESSURE: 75 MMHG | TEMPERATURE: 98 F | BODY MASS INDEX: 19.18 KG/M2 | WEIGHT: 137 LBS | HEART RATE: 118 BPM

## 2020-12-03 VITALS
WEIGHT: 137.56 LBS | DIASTOLIC BLOOD PRESSURE: 80 MMHG | HEART RATE: 82 BPM | BODY MASS INDEX: 18.63 KG/M2 | HEIGHT: 72 IN | SYSTOLIC BLOOD PRESSURE: 124 MMHG | OXYGEN SATURATION: 99 %

## 2020-12-03 DIAGNOSIS — D72.818 OTHER DECREASED WHITE BLOOD CELL (WBC) COUNT: ICD-10-CM

## 2020-12-03 DIAGNOSIS — I31.39 PERICARDIAL EFFUSION: ICD-10-CM

## 2020-12-03 DIAGNOSIS — R07.9 CHEST PAIN, UNSPECIFIED TYPE: ICD-10-CM

## 2020-12-03 DIAGNOSIS — Q24.8 PERICARDIAL CYST: ICD-10-CM

## 2020-12-03 DIAGNOSIS — I31.39 PERICARDIAL EFFUSION: Primary | ICD-10-CM

## 2020-12-03 DIAGNOSIS — R76.8 ANA POSITIVE: ICD-10-CM

## 2020-12-03 DIAGNOSIS — Q24.8 PERICARDIAL CYST: Primary | ICD-10-CM

## 2020-12-03 DIAGNOSIS — R76.8 ANA POSITIVE: Primary | ICD-10-CM

## 2020-12-03 LAB
ALBUMIN SERPL BCP-MCNC: 4.6 G/DL (ref 3.5–5.2)
ALP SERPL-CCNC: 60 U/L (ref 55–135)
ALT SERPL W/O P-5'-P-CCNC: 21 U/L (ref 10–44)
ANION GAP SERPL CALC-SCNC: 9 MMOL/L (ref 8–16)
AST SERPL-CCNC: 22 U/L (ref 10–40)
BASOPHILS # BLD AUTO: 0.03 K/UL (ref 0–0.2)
BASOPHILS NFR BLD: 0.8 % (ref 0–1.9)
BILIRUB SERPL-MCNC: 1.6 MG/DL (ref 0.1–1)
BUN SERPL-MCNC: 21 MG/DL (ref 6–20)
C3 SERPL-MCNC: 101 MG/DL (ref 50–180)
C4 SERPL-MCNC: 27 MG/DL (ref 11–44)
CALCIUM SERPL-MCNC: 10 MG/DL (ref 8.7–10.5)
CHLORIDE SERPL-SCNC: 103 MMOL/L (ref 95–110)
CO2 SERPL-SCNC: 28 MMOL/L (ref 23–29)
CREAT SERPL-MCNC: 0.9 MG/DL (ref 0.5–1.4)
CRP SERPL-MCNC: 0.5 MG/L (ref 0–3.19)
D DIMER PPP IA.FEU-MCNC: 0.42 MG/L FEU
DAT IGG-SP REAG RBC-IMP: NORMAL
DIFFERENTIAL METHOD: ABNORMAL
EOSINOPHIL # BLD AUTO: 0.1 K/UL (ref 0–0.5)
EOSINOPHIL NFR BLD: 2.1 % (ref 0–8)
ERYTHROCYTE [DISTWIDTH] IN BLOOD BY AUTOMATED COUNT: 11.6 % (ref 11.5–14.5)
ERYTHROCYTE [SEDIMENTATION RATE] IN BLOOD BY WESTERGREN METHOD: 11 MM/HR (ref 0–36)
EST. GFR  (AFRICAN AMERICAN): >60 ML/MIN/1.73 M^2
EST. GFR  (NON AFRICAN AMERICAN): >60 ML/MIN/1.73 M^2
GLUCOSE SERPL-MCNC: 80 MG/DL (ref 70–110)
HCT VFR BLD AUTO: 45.5 % (ref 37–48.5)
HGB BLD-MCNC: 13.9 G/DL (ref 12–16)
IMM GRANULOCYTES # BLD AUTO: 0 K/UL (ref 0–0.04)
IMM GRANULOCYTES NFR BLD AUTO: 0 % (ref 0–0.5)
LYMPHOCYTES # BLD AUTO: 1.8 K/UL (ref 1–4.8)
LYMPHOCYTES NFR BLD: 45.7 % (ref 18–48)
MCH RBC QN AUTO: 28.9 PG (ref 27–31)
MCHC RBC AUTO-ENTMCNC: 30.5 G/DL (ref 32–36)
MCV RBC AUTO: 95 FL (ref 82–98)
MONOCYTES # BLD AUTO: 0.2 K/UL (ref 0.3–1)
MONOCYTES NFR BLD: 6.3 % (ref 4–15)
NEUTROPHILS # BLD AUTO: 1.7 K/UL (ref 1.8–7.7)
NEUTROPHILS NFR BLD: 45.1 % (ref 38–73)
NRBC BLD-RTO: 0 /100 WBC
PLATELET # BLD AUTO: 210 K/UL (ref 150–350)
PMV BLD AUTO: 10.8 FL (ref 9.2–12.9)
POTASSIUM SERPL-SCNC: 3.7 MMOL/L (ref 3.5–5.1)
PROT SERPL-MCNC: 8.2 G/DL (ref 6–8.4)
RBC # BLD AUTO: 4.81 M/UL (ref 4–5.4)
SODIUM SERPL-SCNC: 140 MMOL/L (ref 136–145)
WBC # BLD AUTO: 3.83 K/UL (ref 3.9–12.7)

## 2020-12-03 PROCEDURE — 99214 OFFICE O/P EST MOD 30 MIN: CPT | Mod: S$GLB,,, | Performed by: INTERNAL MEDICINE

## 2020-12-03 PROCEDURE — 86235 NUCLEAR ANTIGEN ANTIBODY: CPT | Mod: 59

## 2020-12-03 PROCEDURE — 84165 PROTEIN E-PHORESIS SERUM: CPT

## 2020-12-03 PROCEDURE — 99999 PR PBB SHADOW E&M-EST. PATIENT-LVL III: CPT | Mod: PBBFAC,,, | Performed by: INTERNAL MEDICINE

## 2020-12-03 PROCEDURE — 86039 ANTINUCLEAR ANTIBODIES (ANA): CPT

## 2020-12-03 PROCEDURE — 3008F PR BODY MASS INDEX (BMI) DOCUMENTED: ICD-10-PCS | Mod: CPTII,S$GLB,, | Performed by: INTERNAL MEDICINE

## 2020-12-03 PROCEDURE — 99215 PR OFFICE/OUTPT VISIT, EST, LEVL V, 40-54 MIN: ICD-10-PCS | Mod: S$GLB,,, | Performed by: INTERNAL MEDICINE

## 2020-12-03 PROCEDURE — 86038 ANTINUCLEAR ANTIBODIES: CPT

## 2020-12-03 PROCEDURE — 1126F AMNT PAIN NOTED NONE PRSNT: CPT | Mod: S$GLB,,, | Performed by: INTERNAL MEDICINE

## 2020-12-03 PROCEDURE — 85613 RUSSELL VIPER VENOM DILUTED: CPT

## 2020-12-03 PROCEDURE — 85379 FIBRIN DEGRADATION QUANT: CPT

## 2020-12-03 PROCEDURE — 3008F BODY MASS INDEX DOCD: CPT | Mod: CPTII,S$GLB,, | Performed by: INTERNAL MEDICINE

## 2020-12-03 PROCEDURE — 86235 NUCLEAR ANTIGEN ANTIBODY: CPT

## 2020-12-03 PROCEDURE — 86160 COMPLEMENT ANTIGEN: CPT

## 2020-12-03 PROCEDURE — 99999 PR PBB SHADOW E&M-EST. PATIENT-LVL III: ICD-10-PCS | Mod: PBBFAC,,, | Performed by: INTERNAL MEDICINE

## 2020-12-03 PROCEDURE — 93000 EKG 12-LEAD: ICD-10-PCS | Mod: S$GLB,,, | Performed by: INTERNAL MEDICINE

## 2020-12-03 PROCEDURE — 36415 COLL VENOUS BLD VENIPUNCTURE: CPT

## 2020-12-03 PROCEDURE — 86880 COOMBS TEST DIRECT: CPT

## 2020-12-03 PROCEDURE — 99215 OFFICE O/P EST HI 40 MIN: CPT | Mod: S$GLB,,, | Performed by: INTERNAL MEDICINE

## 2020-12-03 PROCEDURE — 83520 IMMUNOASSAY QUANT NOS NONAB: CPT

## 2020-12-03 PROCEDURE — 85652 RBC SED RATE AUTOMATED: CPT

## 2020-12-03 PROCEDURE — 1126F PR PAIN SEVERITY QUANTIFIED, NO PAIN PRESENT: ICD-10-PCS | Mod: S$GLB,,, | Performed by: INTERNAL MEDICINE

## 2020-12-03 PROCEDURE — 86147 CARDIOLIPIN ANTIBODY EA IG: CPT

## 2020-12-03 PROCEDURE — 86141 C-REACTIVE PROTEIN HS: CPT

## 2020-12-03 PROCEDURE — 86160 COMPLEMENT ANTIGEN: CPT | Mod: 59

## 2020-12-03 PROCEDURE — 84165 PATHOLOGIST INTERPRETATION SPE: ICD-10-PCS | Mod: 26,,, | Performed by: PATHOLOGY

## 2020-12-03 PROCEDURE — 93000 ELECTROCARDIOGRAM COMPLETE: CPT | Mod: S$GLB,,, | Performed by: INTERNAL MEDICINE

## 2020-12-03 PROCEDURE — 84165 PROTEIN E-PHORESIS SERUM: CPT | Mod: 26,,, | Performed by: PATHOLOGY

## 2020-12-03 PROCEDURE — 85025 COMPLETE CBC W/AUTO DIFF WBC: CPT

## 2020-12-03 PROCEDURE — 80053 COMPREHEN METABOLIC PANEL: CPT

## 2020-12-03 PROCEDURE — 99214 PR OFFICE/OUTPT VISIT, EST, LEVL IV, 30-39 MIN: ICD-10-PCS | Mod: S$GLB,,, | Performed by: INTERNAL MEDICINE

## 2020-12-03 PROCEDURE — 86146 BETA-2 GLYCOPROTEIN ANTIBODY: CPT | Mod: 59

## 2020-12-03 RX ORDER — NAPROXEN 500 MG/1
500 TABLET ORAL 2 TIMES DAILY WITH MEALS
Qty: 60 TABLET | Refills: 2 | Status: SHIPPED | OUTPATIENT
Start: 2020-12-03 | End: 2021-02-01

## 2020-12-03 NOTE — PROGRESS NOTES
Rapid3 Question Responses and Scores 12/3/2020   MDHAQ Score 0   Psychologic Score 2.2   Pain Score 6   When you awakened in the morning OVER THE LAST WEEK, did you feel stiff? Yes   If Yes, please indicate the number of hours until you are as limber as you will be for the day 0   Fatigue Score 5   Global Health Score 6.5   RAPID3 Score 4.16

## 2020-12-03 NOTE — ASSESSMENT & PLAN NOTE
She has recurrent neck/LUE pain but no chest pain or dyspnea or abnormal cardiac exam, and echo shows effusion similar to post-pericardiocentesis effusion, after 3 months of colchicine    Previous workup showed +VENKATA and borderline scleroderma antibody but clinical evaluation is not diagnostic for lupus or scleroderma at this time    I will obtain additional lab to re-assess for lupus and scleroderma     Cont colchicine; add NSAID? Will ask cardiology

## 2020-12-03 NOTE — PROGRESS NOTES
"Subjective:       Patient ID: Melly Hwang is a 40 y.o. female.    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    Has had possible Raynaud's x years; white fingertips and purple toes; No ulcers,  Blisters, gangrene  Dry skin R side of face; no malar redness  Not photosensitive     got MR of liver for hemangioma, and  Pericardial effusion found; Dr Castellano said it was small and benign  2020 got COVID; had chills and body aches  May had routine echo that showed fluid with tamponade physiology;  500 ml removed; 2380 albumin, WBC no reported  Aug saw Dr Galeana and had lab; VENKATA 1:640, homogeneous, neg profile but Scl Ab + at 20 units (<20 is negative); normal C3, C4    Negative APA panel  Neg RF/CCP    Has described feeling tired  Recent joint pain  Neck pain to shoulders; heat helps; worse in am    Last week chest pain to shoulders and down LLE  R index MCP was tender  L foot middle to tenderness  No swollen joints  No problem with work or exercise  Minutes of stiffness in morning       On OCP    She did have WBC 3.9 an 3.6 in 2017 an 2018; never lymphopenic    Review of Systems   Constitutional: Negative for fever and unexpected weight change.   HENT: Negative for mouth sores and trouble swallowing.    Eyes: Negative for redness.   Respiratory: Negative for cough and shortness of breath.    Cardiovascular: Positive for chest pain.   Gastrointestinal: Positive for constipation. Negative for diarrhea.   Genitourinary: Negative for dysuria and genital sores.   Skin:        Some flaky spots in scalp   Neurological: Negative for headaches.   Hematological: Does not bruise/bleed easily.         Objective:   /75   Pulse (!) 118   Temp 98.1 °F (36.7 °C)   Ht 5' 11" (1.803 m)   Wt 62.1 kg (137 lb)   BMI 19.11 kg/m²      Physical Exam   Constitutional: She is well-developed, well-nourished, and in no distress.   Slender AAF in NAD "   Eyes: Conjunctivae are normal.   Cardiovascular: Normal rate and regular rhythm.  Exam reveals no friction rub.    No murmur heard.  Pulmonary/Chest: She has no wheezes. She has no rales.   Lymphadenopathy:     She has no cervical adenopathy.   Skin: No rash noted.     No skin thickening  No nail fold changes  No rash   Musculoskeletal:      Comments: Sl tender trapezius         Echo today : moderate pericardial effusion with no tamponade  Assessment:       1. VENKATA positive    2. Pericardial effusion    3. Other decreased white blood cell (WBC) count          Plan:       Problem List Items Addressed This Visit        Active Problems    Pericardial effusion     She has recurrent neck/LUE pain but no chest pain or dyspnea or abnormal cardiac exam, and echo shows effusion similar to post-pericardiocentesis effusion, after 3 months of colchicine    Previous workup showed +VENKATA and borderline scleroderma antibody but clinical evaluation is not diagnostic for lupus or scleroderma at this time    I will obtain additional lab to re-assess for lupus and scleroderma     Cont colchicine; add NSAID? Will ask cardiology         Relevant Medications    naproxen (NAPROSYN) 500 MG tablet    VENKATA positive - Primary     She has +VENKATA and has had mild Raynaud's of many years duration and occasional leukopenia with WBC <4K; she had one borderline scleroderma antibody that I will repeat today    Possible lupus (VENKATA, pericarditis, leukopenia) but current findings are not yet diagnostic           Relevant Orders    VENKATA Screen w/Reflex    Anti-Scleroderma Antibody    RNA polymerase III Ab, IgG    Direct antiglobulin test    C3 Complement    C4 Complement    Cardiolipin antibody    DRVVT    Beta-2 Glycoprotein Abs (IgA, IgG, IgM)    Protein Electrophoresis, Serum    PM-Scl Antibody    Leukopenia     Mild and intermittent; could go along with lupus

## 2020-12-03 NOTE — PROGRESS NOTES
Subjective:   Patient ID:  Melly Hwang is a 40 y.o. female who presents for follow-up of Heart Problem      HPI:  She presents today for follow-up for her chronic pericardial effusion.  She was started on colchicine back in August by Rheumatology.  She had a repeat echocardiogram performed yesterday which showed a persistent moderate-sized pericardial effusion without any echocardiographic signs of cardiac tamponade.  Her ejection fraction remains normal at 55%.  Her pulmonary artery pressure was normal as well.  She does report a week-long history of intermittent chest discomfort.  It initially started on her left side but now occurs on her right side.  She does report feeling it radiated down her left arm on 1 occasion.  It lasts for a few minutes at a time and is nonexertional.  It is not positional.  She does not feel short of breath and denies any swelling or calf tenderness.  She is scheduled to see Dr. Hutchinson in Rheumatology later today.  She was told she may have scleroderma.    ECG normal sinus rhythm 62 beats per minute.    Past Medical History:   Diagnosis Date    VENKATA positive 8/20/2020    COVID-19     COVID-19     Deviated septum 2011    Hypertrophy of inferior nasal turbinate     Liver mass     Nasal congestion 2011    Pericardial effusion     Premature menopause        Past Surgical History:   Procedure Laterality Date    COLONOSCOPY N/A 9/21/2020    Procedure: COLONOSCOPY;  Surgeon: GIOVANNI Crane MD;  Location: [facility] ENDO ([address] [place] [address]);  Service: Endoscopy;  Laterality: N/A;  + covid 4/22    HERNIA REPAIR      NASAL SEPTUM SURGERY      PERICARDIOCENTESIS N/A 5/2/2020    Procedure: Pericardiocentesis;  Surgeon: Dickson Dangelo MD;  Location: [facility] CATH LAB;  Service: Cardiology;  Laterality: N/A;    PERICARDIOCENTESIS  05/2020    TONSILLECTOMY      UMBILICAL HERNIA REPAIR         Social History     Socioeconomic History    Marital status: Single     Spouse name: Not on  file    Number of children: 1    Years of education: Not on file    Highest education level: Not on file   Occupational History    Occupation:      Employer: [employer]   Social Needs    Financial resource strain: Not on file    Food insecurity     Worry: Not on file     Inability: Not on file    Transportation needs     Medical: Not on file     Non-medical: Not on file   Tobacco Use    Smoking status: Never Smoker    Smokeless tobacco: Never Used   Substance and Sexual Activity    Alcohol use: Yes     Comment: rare    Drug use: No    Sexual activity: Yes     Partners: Male     Birth control/protection: OCP   Lifestyle    Physical activity     Days per week: Not on file     Minutes per session: Not on file    Stress: Not on file   Relationships    Social connections     Talks on phone: Not on file     Gets together: Not on file     Attends Alevism service: Not on file     Active member of club or organization: Not on file     Attends meetings of clubs or organizations: Not on file     Relationship status: Not on file   Other Topics Concern    Are you pregnant or think you may be? Not Asked    Breast-feeding Not Asked   Social History Narrative    Not on file       Family History   Problem Relation Age of Onset    Diabetes Mother     Liver disease Mother         Fatty liver    Macular degeneration Father     Diabetes Father     Hypertension Father     Hyperlipidemia Father     No Known Problems Sister     No Known Problems Brother     No Known Problems Daughter     No Known Problems Brother     No Known Problems Maternal Aunt     No Known Problems Maternal Uncle     No Known Problems Paternal Aunt     No Known Problems Paternal Uncle     No Known Problems Maternal Grandmother     No Known Problems Maternal Grandfather     No Known Problems Paternal Grandmother     No Known Problems Paternal Grandfather     Amblyopia Neg Hx     Blindness Neg Hx      Cancer Neg Hx     Cataracts Neg Hx     Glaucoma Neg Hx     Retinal detachment Neg Hx     Strabismus Neg Hx     Stroke Neg Hx     Thyroid disease Neg Hx     Heart disease Neg Hx     Melanoma Neg Hx     Heart attack Neg Hx        Patient's Medications   New Prescriptions    No medications on file   Previous Medications    CETIRIZINE (ZYRTEC) 10 MG TABLET    Take 1 tablet (10 mg total) by mouth once daily.    COLCHICINE (COLCRYS) 0.6 MG TABLET    Take 1 tablet (0.6 mg total) by mouth once daily.    CYCLOBENZAPRINE (FLEXERIL) 10 MG TABLET    TAKE 1 TABLET BY MOUTH EVERY DAY IN THE EVENING AS NEEDED FOR MUSCLE SPASMS    HYDROCORTISONE BUTYRATE (LOCOID) 0.1 % OINT    AAA BID prn itching, scaling, or redness. Do not use longer than 2 weeks consecutively.    KETOCONAZOLE (NIZORAL) 2 % CREAM    Apply topically once daily.    MULTIVITAMIN CAPSULE    Take by mouth. 1 capsule Oral Every morning    NORETHINDRONE (EMILIA) 0.35 MG TABLET    TAKE 1 TABLET (0.35 MG TOTAL) BY MOUTH ONCE DAILY   Modified Medications    No medications on file   Discontinued Medications    No medications on file       Review of Systems   Constitution: Negative for malaise/fatigue and weight gain.   HENT: Negative for hearing loss.    Eyes: Negative for visual disturbance.   Cardiovascular: Positive for chest pain. Negative for claudication, dyspnea on exertion, leg swelling, near-syncope, orthopnea, palpitations, paroxysmal nocturnal dyspnea and syncope.   Respiratory: Negative for cough, shortness of breath, sleep disturbances due to breathing, snoring and wheezing.    Endocrine: Negative for cold intolerance, heat intolerance, polydipsia, polyphagia and polyuria.   Hematologic/Lymphatic: Negative for bleeding problem. Does not bruise/bleed easily.   Skin: Negative for rash and suspicious lesions.   Musculoskeletal: Negative for arthritis, falls, joint pain, muscle weakness and myalgias.   Gastrointestinal: Negative for abdominal pain, change  "in bowel habit, constipation, diarrhea, heartburn, hematochezia, melena and nausea.   Genitourinary: Negative for hematuria and nocturia.   Neurological: Negative for excessive daytime sleepiness, dizziness, headaches, light-headedness, loss of balance and weakness.   Psychiatric/Behavioral: Negative for depression. The patient is not nervous/anxious.    Allergic/Immunologic: Negative for environmental allergies.       /80 (BP Location: Left arm, Patient Position: Sitting, BP Method: Medium (Manual))   Pulse 82   Ht 5' 11.75" (1.822 m)   Wt 62.4 kg (137 lb 9.1 oz)   SpO2 99%   BMI 18.79 kg/m²     Objective:   Physical Exam   Constitutional: She is oriented to person, place, and time. She appears well-developed and well-nourished.        HENT:   Head: Normocephalic and atraumatic.   Mouth/Throat: Oropharynx is clear and moist.   Eyes: Pupils are equal, round, and reactive to light. Conjunctivae and EOM are normal. No scleral icterus.   Neck: Normal range of motion. Neck supple. No hepatojugular reflux and no JVD present. No tracheal deviation present. No thyromegaly present.   Cardiovascular: Normal rate, regular rhythm, normal heart sounds and intact distal pulses. PMI is not displaced.   Pulses:       Carotid pulses are 2+ on the right side and 2+ on the left side.       Radial pulses are 2+ on the right side and 2+ on the left side.        Dorsalis pedis pulses are 2+ on the right side and 2+ on the left side.        Posterior tibial pulses are 2+ on the right side and 2+ on the left side.   Pulmonary/Chest: Effort normal and breath sounds normal.   Abdominal: Soft. Bowel sounds are normal. She exhibits no distension and no mass. There is no hepatosplenomegaly. There is no abdominal tenderness.   Musculoskeletal:         General: No tenderness or edema.      Comments: Negative Merle's sign   Lymphadenopathy:     She has no cervical adenopathy.   Neurological: She is alert and oriented to person, place, " and time.   Skin: Skin is warm and dry. No rash noted. No cyanosis or erythema. Nails show no clubbing.   Psychiatric: She has a normal mood and affect. Her speech is normal and behavior is normal.       Lab Results   Component Value Date     05/05/2020    K 4.9 05/05/2020     05/05/2020    CO2 27 05/05/2020    BUN 13 05/05/2020    CREATININE 0.8 05/05/2020     05/05/2020    HGBA1C 5.6 03/02/2020    MG 1.9 05/05/2020    AST 20 05/05/2020    ALT 21 05/05/2020    ALBUMIN 4.4 05/05/2020    PROT 7.8 05/05/2020    BILITOT 1.0 05/05/2020    WBC 3.55 (L) 05/05/2020    HGB 14.0 05/05/2020    HCT 45.1 05/05/2020    MCV 96 05/05/2020     05/05/2020    INR 1.1 05/02/2020    TSH 0.558 05/08/2020    CHOL 215 (H) 03/02/2020    HDL 78 (H) 03/02/2020    LDLCALC 122.0 03/02/2020    TRIG 75 03/02/2020    BNP 11 05/01/2020       Assessment:     1. Pericardial effusion :  She has a chronic stable pericardial effusion without any signs of tamponade.  She has completed 3 months of colchicine.  I am repeating her inflammatory markers.  She is scheduled to see Rheumatology later today as an initial evaluation suggested she may have scleroderma.  I also ordered cardiac MRI to assess if this represents a pericardial cyst.   2. Chest pain, unspecified type :  Her ECG today is unchanged.  I will check a D-dimer.       Plan:     Melly was seen today for heart problem.    Diagnoses and all orders for this visit:    Pericardial effusion  -     CBC Auto Differential; Future  -     Comprehensive Metabolic Panel; Future  -     D-DIMER, QUANTITATIVE; Future  -     EKG 12-lead  -     CRP, High Sensitivity; Future  -     Sedimentation rate; Future    Chest pain, unspecified type  -     CBC Auto Differential; Future  -     Comprehensive Metabolic Panel; Future  -     D-DIMER, QUANTITATIVE; Future  -     EKG 12-lead  -     CRP, High Sensitivity; Future  -     Sedimentation rate; Future        Thank you for allowing me to  participate in this patient's care. Please do not hesitate to contact me with any questions or concerns.

## 2020-12-03 NOTE — Clinical Note
Kingsley Vaughn,    Your are seeing this patient later today. I am getting her set up for a cardiac MRI to see if this persistent effusion is a pericardial cyst.     Thanks,  Crystal

## 2020-12-03 NOTE — Clinical Note
Crystal, I wonder if the neck/LUE ache is due to pericardial disease ; effusion did not shrink on colchicine; should we add NSAID?

## 2020-12-03 NOTE — ASSESSMENT & PLAN NOTE
She has +VENKATA and has had mild Raynaud's of many years duration and occasional leukopenia with WBC <4K; she had one borderline scleroderma antibody that I will repeat today    Possible lupus (VENKATA, pericarditis, leukopenia) but current findings are not yet diagnostic

## 2020-12-04 ENCOUNTER — PATIENT MESSAGE (OUTPATIENT)
Dept: CARDIOLOGY | Facility: CLINIC | Age: 40
End: 2020-12-04

## 2020-12-04 LAB
ALBUMIN SERPL ELPH-MCNC: 4.61 G/DL (ref 3.35–5.55)
ALPHA1 GLOB SERPL ELPH-MCNC: 0.28 G/DL (ref 0.17–0.41)
ALPHA2 GLOB SERPL ELPH-MCNC: 0.72 G/DL (ref 0.43–0.99)
ANA PATTERN 1: NORMAL
ANA PATTERN 2: NORMAL
ANA SER QL IF: POSITIVE
ANA TITER 2: NORMAL
ANA TITR SER IF: NORMAL {TITER}
B-GLOBULIN SERPL ELPH-MCNC: 0.66 G/DL (ref 0.5–1.1)
GAMMA GLOB SERPL ELPH-MCNC: 1.23 G/DL (ref 0.67–1.58)
PATHOLOGIST INTERPRETATION SPE: NORMAL
PROT SERPL-MCNC: 7.5 G/DL (ref 6–8.4)

## 2020-12-07 ENCOUNTER — PATIENT MESSAGE (OUTPATIENT)
Dept: INTERNAL MEDICINE | Facility: CLINIC | Age: 40
End: 2020-12-07

## 2020-12-07 DIAGNOSIS — R17 ELEVATED BILIRUBIN: Primary | ICD-10-CM

## 2020-12-07 LAB
CARDIOLIPIN IGG SER IA-ACNC: <9.4 GPL (ref 0–14.99)
CARDIOLIPIN IGM SER IA-ACNC: 24.5 MPL (ref 0–12.49)
ENA SCL70 AB SER-ACNC: 21 UNITS

## 2020-12-08 LAB
ANTI SM ANTIBODY: 0.08 RATIO (ref 0–0.99)
ANTI SM/RNP ANTIBODY: 0.07 RATIO (ref 0–0.99)
ANTI-SM INTERPRETATION: NEGATIVE
ANTI-SM/RNP INTERPRETATION: NEGATIVE
ANTI-SSA ANTIBODY: 0.06 RATIO (ref 0–0.99)
ANTI-SSA INTERPRETATION: NEGATIVE
ANTI-SSB ANTIBODY: 0.08 RATIO (ref 0–0.99)
ANTI-SSB INTERPRETATION: NEGATIVE
B2 GLYCOPROT1 IGA SER QL: <9 SAU
B2 GLYCOPROT1 IGG SER QL: <9 SGU
B2 GLYCOPROT1 IGM SER QL: <9 SMU
DSDNA AB SER-ACNC: NORMAL [IU]/ML
LA PPP-IMP: NEGATIVE

## 2020-12-09 ENCOUNTER — PATIENT MESSAGE (OUTPATIENT)
Dept: CARDIOLOGY | Facility: CLINIC | Age: 40
End: 2020-12-09

## 2020-12-10 LAB
PM1 AB SER QL: <20 UNITS
RNAP III AB SER-ACNC: <20 UNITS

## 2020-12-29 ENCOUNTER — PATIENT MESSAGE (OUTPATIENT)
Dept: CARDIOLOGY | Facility: CLINIC | Age: 40
End: 2020-12-29

## 2020-12-30 ENCOUNTER — PATIENT MESSAGE (OUTPATIENT)
Dept: CARDIOLOGY | Facility: CLINIC | Age: 40
End: 2020-12-30

## 2020-12-31 ENCOUNTER — IMMUNIZATION (OUTPATIENT)
Dept: INTERNAL MEDICINE | Facility: CLINIC | Age: 40
End: 2020-12-31
Payer: COMMERCIAL

## 2020-12-31 DIAGNOSIS — Z23 NEED FOR VACCINATION: ICD-10-CM

## 2020-12-31 PROCEDURE — 0001A COVID-19, MRNA, LNP-S, PF, 30 MCG/0.3 ML DOSE VACCINE: CPT | Mod: CV19,,, | Performed by: INTERNAL MEDICINE

## 2020-12-31 PROCEDURE — 91300 COVID-19, MRNA, LNP-S, PF, 30 MCG/0.3 ML DOSE VACCINE: CPT | Mod: ,,, | Performed by: INTERNAL MEDICINE

## 2020-12-31 PROCEDURE — 0001A COVID-19, MRNA, LNP-S, PF, 30 MCG/0.3 ML DOSE VACCINE: ICD-10-PCS | Mod: CV19,,, | Performed by: INTERNAL MEDICINE

## 2020-12-31 PROCEDURE — 91300 COVID-19, MRNA, LNP-S, PF, 30 MCG/0.3 ML DOSE VACCINE: ICD-10-PCS | Mod: ,,, | Performed by: INTERNAL MEDICINE

## 2021-01-02 ENCOUNTER — PATIENT MESSAGE (OUTPATIENT)
Dept: INTERNAL MEDICINE | Facility: CLINIC | Age: 41
End: 2021-01-02

## 2021-01-03 ENCOUNTER — PATIENT MESSAGE (OUTPATIENT)
Dept: INTERNAL MEDICINE | Facility: CLINIC | Age: 41
End: 2021-01-03

## 2021-01-08 ENCOUNTER — PATIENT MESSAGE (OUTPATIENT)
Dept: INTERNAL MEDICINE | Facility: CLINIC | Age: 41
End: 2021-01-08

## 2021-01-08 ENCOUNTER — LAB VISIT (OUTPATIENT)
Dept: LAB | Facility: HOSPITAL | Age: 41
End: 2021-01-08
Attending: INTERNAL MEDICINE
Payer: COMMERCIAL

## 2021-01-08 DIAGNOSIS — R17 ELEVATED BILIRUBIN: ICD-10-CM

## 2021-01-08 LAB
ALBUMIN SERPL BCP-MCNC: 4.3 G/DL (ref 3.5–5.2)
ALP SERPL-CCNC: 58 U/L (ref 55–135)
ALT SERPL W/O P-5'-P-CCNC: 15 U/L (ref 10–44)
AST SERPL-CCNC: 20 U/L (ref 10–40)
BILIRUB DIRECT SERPL-MCNC: 0.6 MG/DL (ref 0.1–0.3)
BILIRUB SERPL-MCNC: 1.7 MG/DL (ref 0.1–1)
PROT SERPL-MCNC: 7.5 G/DL (ref 6–8.4)

## 2021-01-08 PROCEDURE — 36415 COLL VENOUS BLD VENIPUNCTURE: CPT

## 2021-01-08 PROCEDURE — 80076 HEPATIC FUNCTION PANEL: CPT

## 2021-01-11 ENCOUNTER — TELEPHONE (OUTPATIENT)
Dept: HEPATOLOGY | Facility: CLINIC | Age: 41
End: 2021-01-11

## 2021-01-11 ENCOUNTER — DOCUMENTATION ONLY (OUTPATIENT)
Dept: TRANSPLANT | Facility: CLINIC | Age: 41
End: 2021-01-11

## 2021-01-11 ENCOUNTER — TELEPHONE (OUTPATIENT)
Dept: TRANSPLANT | Facility: CLINIC | Age: 41
End: 2021-01-11

## 2021-01-22 ENCOUNTER — IMMUNIZATION (OUTPATIENT)
Dept: INTERNAL MEDICINE | Facility: CLINIC | Age: 41
End: 2021-01-22
Payer: COMMERCIAL

## 2021-01-22 DIAGNOSIS — Z23 NEED FOR VACCINATION: Primary | ICD-10-CM

## 2021-01-22 PROCEDURE — 91300 COVID-19, MRNA, LNP-S, PF, 30 MCG/0.3 ML DOSE VACCINE: CPT | Mod: PBBFAC | Performed by: INTERNAL MEDICINE

## 2021-01-22 PROCEDURE — 0002A COVID-19, MRNA, LNP-S, PF, 30 MCG/0.3 ML DOSE VACCINE: CPT | Mod: PBBFAC | Performed by: INTERNAL MEDICINE

## 2021-01-25 ENCOUNTER — PATIENT MESSAGE (OUTPATIENT)
Dept: INTERNAL MEDICINE | Facility: CLINIC | Age: 41
End: 2021-01-25

## 2021-01-25 DIAGNOSIS — K76.9 LIVER DISEASE, UNSPECIFIED: ICD-10-CM

## 2021-01-26 ENCOUNTER — LAB VISIT (OUTPATIENT)
Dept: LAB | Facility: HOSPITAL | Age: 41
End: 2021-01-26
Payer: COMMERCIAL

## 2021-01-26 ENCOUNTER — OFFICE VISIT (OUTPATIENT)
Dept: HEPATOLOGY | Facility: CLINIC | Age: 41
End: 2021-01-26
Payer: COMMERCIAL

## 2021-01-26 VITALS
BODY MASS INDEX: 19.2 KG/M2 | WEIGHT: 137.13 LBS | RESPIRATION RATE: 18 BRPM | DIASTOLIC BLOOD PRESSURE: 74 MMHG | HEIGHT: 71 IN | HEART RATE: 71 BPM | OXYGEN SATURATION: 100 % | SYSTOLIC BLOOD PRESSURE: 131 MMHG

## 2021-01-26 DIAGNOSIS — D18.03 LIVER HEMANGIOMA: Primary | ICD-10-CM

## 2021-01-26 DIAGNOSIS — R17 ELEVATED BILIRUBIN: ICD-10-CM

## 2021-01-26 DIAGNOSIS — D18.03 LIVER HEMANGIOMA: ICD-10-CM

## 2021-01-26 LAB
A1AT SERPL-MCNC: 172 MG/DL (ref 100–190)
ALBUMIN SERPL BCP-MCNC: 4.1 G/DL (ref 3.5–5.2)
ALP SERPL-CCNC: 74 U/L (ref 55–135)
ALT SERPL W/O P-5'-P-CCNC: 23 U/L (ref 10–44)
AST SERPL-CCNC: 23 U/L (ref 10–40)
BILIRUB DIRECT SERPL-MCNC: 0.3 MG/DL (ref 0.1–0.3)
BILIRUB SERPL-MCNC: 0.9 MG/DL (ref 0.1–1)
CERULOPLASMIN SERPL-MCNC: 32 MG/DL (ref 15–45)
FERRITIN SERPL-MCNC: 123 NG/ML (ref 20–300)
HAPTOGLOB SERPL-MCNC: 136 MG/DL (ref 30–250)
HBV CORE AB SERPL QL IA: NEGATIVE
HBV SURFACE AB SER-ACNC: POSITIVE M[IU]/ML
HBV SURFACE AG SERPL QL IA: NEGATIVE
HCV AB SERPL QL IA: NEGATIVE
HEPATITIS A ANTIBODY, IGG: NEGATIVE
IRON SERPL-MCNC: 66 UG/DL (ref 30–160)
LDH SERPL L TO P-CCNC: 158 U/L (ref 110–260)
PROT SERPL-MCNC: 7.7 G/DL (ref 6–8.4)
RETICS/RBC NFR AUTO: 1.1 % (ref 0.5–2.5)
SATURATED IRON: 15 % (ref 20–50)
TOTAL IRON BINDING CAPACITY: 426 UG/DL (ref 250–450)
TRANSFERRIN SERPL-MCNC: 288 MG/DL (ref 200–375)

## 2021-01-26 PROCEDURE — 83615 LACTATE (LD) (LDH) ENZYME: CPT

## 2021-01-26 PROCEDURE — 3008F BODY MASS INDEX DOCD: CPT | Mod: CPTII,S$GLB,, | Performed by: NURSE PRACTITIONER

## 2021-01-26 PROCEDURE — 1126F PR PAIN SEVERITY QUANTIFIED, NO PAIN PRESENT: ICD-10-PCS | Mod: S$GLB,,, | Performed by: NURSE PRACTITIONER

## 2021-01-26 PROCEDURE — 87340 HEPATITIS B SURFACE AG IA: CPT

## 2021-01-26 PROCEDURE — 99203 PR OFFICE/OUTPT VISIT, NEW, LEVL III, 30-44 MIN: ICD-10-PCS | Mod: S$GLB,,, | Performed by: NURSE PRACTITIONER

## 2021-01-26 PROCEDURE — 86706 HEP B SURFACE ANTIBODY: CPT

## 2021-01-26 PROCEDURE — 85045 AUTOMATED RETICULOCYTE COUNT: CPT

## 2021-01-26 PROCEDURE — 99999 PR PBB SHADOW E&M-EST. PATIENT-LVL IV: CPT | Mod: PBBFAC,,, | Performed by: NURSE PRACTITIONER

## 2021-01-26 PROCEDURE — 86704 HEP B CORE ANTIBODY TOTAL: CPT

## 2021-01-26 PROCEDURE — 80076 HEPATIC FUNCTION PANEL: CPT

## 2021-01-26 PROCEDURE — 83540 ASSAY OF IRON: CPT

## 2021-01-26 PROCEDURE — 82103 ALPHA-1-ANTITRYPSIN TOTAL: CPT

## 2021-01-26 PROCEDURE — 86803 HEPATITIS C AB TEST: CPT

## 2021-01-26 PROCEDURE — 82728 ASSAY OF FERRITIN: CPT

## 2021-01-26 PROCEDURE — 81350 UGT1A1 GENE COMMON VARIANTS: CPT

## 2021-01-26 PROCEDURE — 83010 ASSAY OF HAPTOGLOBIN QUANT: CPT

## 2021-01-26 PROCEDURE — 99999 PR PBB SHADOW E&M-EST. PATIENT-LVL IV: ICD-10-PCS | Mod: PBBFAC,,, | Performed by: NURSE PRACTITIONER

## 2021-01-26 PROCEDURE — 99203 OFFICE O/P NEW LOW 30 MIN: CPT | Mod: S$GLB,,, | Performed by: NURSE PRACTITIONER

## 2021-01-26 PROCEDURE — 82390 ASSAY OF CERULOPLASMIN: CPT

## 2021-01-26 PROCEDURE — 3008F PR BODY MASS INDEX (BMI) DOCUMENTED: ICD-10-PCS | Mod: CPTII,S$GLB,, | Performed by: NURSE PRACTITIONER

## 2021-01-26 PROCEDURE — 1126F AMNT PAIN NOTED NONE PRSNT: CPT | Mod: S$GLB,,, | Performed by: NURSE PRACTITIONER

## 2021-01-26 PROCEDURE — 86790 VIRUS ANTIBODY NOS: CPT

## 2021-01-26 RX ORDER — SODIUM FLUORIDE 6 MG/ML
PASTE, DENTIFRICE DENTAL
COMMUNITY
Start: 2021-01-13 | End: 2022-12-14

## 2021-01-27 ENCOUNTER — PATIENT MESSAGE (OUTPATIENT)
Dept: HEPATOLOGY | Facility: CLINIC | Age: 41
End: 2021-01-27

## 2021-01-27 DIAGNOSIS — Z23 NEED FOR HEPATITIS A VACCINATION: Primary | ICD-10-CM

## 2021-01-29 LAB
ANNOTATION COMMENT IMP: NORMAL
GENETICIST REVIEW: NORMAL
PROVIDER SIGNING NAME: NORMAL
REF LAB TEST METHOD: NORMAL
TEST PERFORMANCE INFO SPEC: NORMAL
UGT1A1 GENE PROD MET ACT IMP BLD/T-IMP: NORMAL
UGT1A1 GENOTYPE: NORMAL

## 2021-02-01 ENCOUNTER — PATIENT MESSAGE (OUTPATIENT)
Dept: HEPATOLOGY | Facility: CLINIC | Age: 41
End: 2021-02-01

## 2021-02-02 ENCOUNTER — HOSPITAL ENCOUNTER (OUTPATIENT)
Dept: RADIOLOGY | Facility: HOSPITAL | Age: 41
Discharge: HOME | End: 2021-02-02
Attending: INTERNAL MEDICINE
Payer: COMMERCIAL

## 2021-02-02 ENCOUNTER — PATIENT MESSAGE (OUTPATIENT)
Dept: HEPATOLOGY | Facility: CLINIC | Age: 41
End: 2021-02-02

## 2021-02-02 DIAGNOSIS — Q24.8 PERICARDIAL CYST: ICD-10-CM

## 2021-02-02 PROCEDURE — 75561 CARDIAC MRI FOR MORPH W/DYE: CPT | Mod: 26,,, | Performed by: RADIOLOGY

## 2021-02-02 PROCEDURE — 75561 MRI CARDIAC MORPHOLOGY FUNCTION W WO: ICD-10-PCS | Mod: 26,,, | Performed by: RADIOLOGY

## 2021-02-02 PROCEDURE — 75561 CARDIAC MRI FOR MORPH W/DYE: CPT | Mod: TC

## 2021-02-02 PROCEDURE — 25500020 PHARM REV CODE 255: Performed by: INTERNAL MEDICINE

## 2021-02-02 PROCEDURE — A9577 INJ MULTIHANCE: HCPCS | Performed by: INTERNAL MEDICINE

## 2021-02-02 RX ADMIN — GADOBENATE DIMEGLUMINE 20 ML: 529 INJECTION, SOLUTION INTRAVENOUS at 10:02

## 2021-02-03 ENCOUNTER — PATIENT MESSAGE (OUTPATIENT)
Dept: CARDIOLOGY | Facility: CLINIC | Age: 41
End: 2021-02-03

## 2021-02-04 ENCOUNTER — PATIENT MESSAGE (OUTPATIENT)
Dept: HEPATOLOGY | Facility: CLINIC | Age: 41
End: 2021-02-04

## 2021-02-05 ENCOUNTER — TELEPHONE (OUTPATIENT)
Dept: CARDIOLOGY | Facility: CLINIC | Age: 41
End: 2021-02-05

## 2021-02-05 ENCOUNTER — PATIENT MESSAGE (OUTPATIENT)
Dept: CARDIOLOGY | Facility: CLINIC | Age: 41
End: 2021-02-05

## 2021-02-05 DIAGNOSIS — I31.39 PERICARDIAL EFFUSION: Primary | ICD-10-CM

## 2021-02-09 ENCOUNTER — CLINICAL SUPPORT (OUTPATIENT)
Dept: INFECTIOUS DISEASES | Facility: CLINIC | Age: 41
End: 2021-02-09
Payer: COMMERCIAL

## 2021-02-09 ENCOUNTER — TELEPHONE (OUTPATIENT)
Dept: CARDIOLOGY | Facility: CLINIC | Age: 41
End: 2021-02-09

## 2021-02-09 DIAGNOSIS — Z23 NEED FOR HEPATITIS A VACCINATION: ICD-10-CM

## 2021-02-09 PROCEDURE — 90632 HEPA VACCINE ADULT IM: CPT | Mod: S$GLB,,, | Performed by: INTERNAL MEDICINE

## 2021-02-09 PROCEDURE — 90632 HEPATITIS A VACCINE ADULT IM: ICD-10-PCS | Mod: S$GLB,,, | Performed by: INTERNAL MEDICINE

## 2021-02-09 PROCEDURE — 90471 IMMUNIZATION ADMIN: CPT | Mod: S$GLB,,, | Performed by: INTERNAL MEDICINE

## 2021-02-09 PROCEDURE — 90471 HEPATITIS A VACCINE ADULT IM: ICD-10-PCS | Mod: S$GLB,,, | Performed by: INTERNAL MEDICINE

## 2021-02-11 ENCOUNTER — DOCUMENTATION ONLY (OUTPATIENT)
Dept: CARDIOTHORACIC SURGERY | Facility: CLINIC | Age: 41
End: 2021-02-11

## 2021-02-17 ENCOUNTER — PATIENT MESSAGE (OUTPATIENT)
Dept: CARDIOLOGY | Facility: CLINIC | Age: 41
End: 2021-02-17

## 2021-02-17 ENCOUNTER — DOCUMENTATION ONLY (OUTPATIENT)
Dept: CARDIOTHORACIC SURGERY | Facility: CLINIC | Age: 41
End: 2021-02-17

## 2021-02-17 ENCOUNTER — OFFICE VISIT (OUTPATIENT)
Dept: CARDIOTHORACIC SURGERY | Facility: CLINIC | Age: 41
End: 2021-02-17
Payer: COMMERCIAL

## 2021-02-17 ENCOUNTER — TELEPHONE (OUTPATIENT)
Dept: PREADMISSION TESTING | Facility: HOSPITAL | Age: 41
End: 2021-02-17

## 2021-02-17 VITALS
DIASTOLIC BLOOD PRESSURE: 90 MMHG | HEART RATE: 63 BPM | BODY MASS INDEX: 19.32 KG/M2 | TEMPERATURE: 98 F | HEIGHT: 71 IN | WEIGHT: 138 LBS | OXYGEN SATURATION: 99 % | SYSTOLIC BLOOD PRESSURE: 140 MMHG

## 2021-02-17 DIAGNOSIS — I31.39 PERICARDIAL EFFUSION: Primary | ICD-10-CM

## 2021-02-17 DIAGNOSIS — I31.39 PERICARDIAL EFFUSION: ICD-10-CM

## 2021-02-17 PROCEDURE — 99205 PR OFFICE/OUTPT VISIT, NEW, LEVL V, 60-74 MIN: ICD-10-PCS | Mod: S$GLB,,, | Performed by: THORACIC SURGERY (CARDIOTHORACIC VASCULAR SURGERY)

## 2021-02-17 PROCEDURE — 99999 PR PBB SHADOW E&M-EST. PATIENT-LVL IV: ICD-10-PCS | Mod: PBBFAC,,, | Performed by: THORACIC SURGERY (CARDIOTHORACIC VASCULAR SURGERY)

## 2021-02-17 PROCEDURE — 99205 OFFICE O/P NEW HI 60 MIN: CPT | Mod: S$GLB,,, | Performed by: THORACIC SURGERY (CARDIOTHORACIC VASCULAR SURGERY)

## 2021-02-17 PROCEDURE — 99999 PR PBB SHADOW E&M-EST. PATIENT-LVL IV: CPT | Mod: PBBFAC,,, | Performed by: THORACIC SURGERY (CARDIOTHORACIC VASCULAR SURGERY)

## 2021-02-17 RX ORDER — POTASSIUM CHLORIDE 14.9 MG/ML
20 INJECTION INTRAVENOUS
Status: CANCELLED | OUTPATIENT
Start: 2021-02-17

## 2021-02-17 RX ORDER — OXYCODONE HYDROCHLORIDE 5 MG/1
5 TABLET ORAL EVERY 4 HOURS PRN
Status: CANCELLED | OUTPATIENT
Start: 2021-02-17

## 2021-02-17 RX ORDER — IPRATROPIUM BROMIDE AND ALBUTEROL SULFATE 2.5; .5 MG/3ML; MG/3ML
3 SOLUTION RESPIRATORY (INHALATION) EVERY 4 HOURS PRN
Status: CANCELLED | OUTPATIENT
Start: 2021-02-17 | End: 2021-02-18

## 2021-02-17 RX ORDER — SODIUM CHLORIDE 9 MG/ML
INJECTION, SOLUTION INTRAVENOUS CONTINUOUS
Status: CANCELLED | OUTPATIENT
Start: 2021-02-17

## 2021-02-17 RX ORDER — ASPIRIN 325 MG
325 TABLET ORAL ONCE
Status: CANCELLED | OUTPATIENT
Start: 2021-02-17 | End: 2021-02-17

## 2021-02-17 RX ORDER — MAGNESIUM SULFATE HEPTAHYDRATE 40 MG/ML
2 INJECTION, SOLUTION INTRAVENOUS
Status: CANCELLED | OUTPATIENT
Start: 2021-02-17

## 2021-02-17 RX ORDER — FENTANYL CITRATE 50 UG/ML
25 INJECTION, SOLUTION INTRAMUSCULAR; INTRAVENOUS
Status: CANCELLED | OUTPATIENT
Start: 2021-02-17

## 2021-02-17 RX ORDER — POTASSIUM CHLORIDE 29.8 MG/ML
40 INJECTION INTRAVENOUS
Status: CANCELLED | OUTPATIENT
Start: 2021-02-17

## 2021-02-17 RX ORDER — IPRATROPIUM BROMIDE AND ALBUTEROL SULFATE 2.5; .5 MG/3ML; MG/3ML
3 SOLUTION RESPIRATORY (INHALATION) EVERY 4 HOURS
Status: CANCELLED | OUTPATIENT
Start: 2021-02-17 | End: 2021-02-18

## 2021-02-17 RX ORDER — CEFAZOLIN SODIUM 2 G/50ML
2 SOLUTION INTRAVENOUS
Status: CANCELLED | OUTPATIENT
Start: 2021-02-17 | End: 2021-02-19

## 2021-02-17 RX ORDER — LIDOCAINE HYDROCHLORIDE 10 MG/ML
1 INJECTION, SOLUTION EPIDURAL; INFILTRATION; INTRACAUDAL; PERINEURAL
Status: CANCELLED | OUTPATIENT
Start: 2021-02-17

## 2021-02-17 RX ORDER — POTASSIUM CHLORIDE 14.9 MG/ML
60 INJECTION INTRAVENOUS
Status: CANCELLED | OUTPATIENT
Start: 2021-02-17

## 2021-02-17 RX ORDER — CEFAZOLIN SODIUM 2 G/50ML
2 SOLUTION INTRAVENOUS
Status: CANCELLED | OUTPATIENT
Start: 2021-02-17

## 2021-02-17 RX ORDER — DEXTROSE MONOHYDRATE, SODIUM CHLORIDE, AND POTASSIUM CHLORIDE 50; 1.49; 4.5 G/1000ML; G/1000ML; G/1000ML
INJECTION, SOLUTION INTRAVENOUS CONTINUOUS
Status: CANCELLED | OUTPATIENT
Start: 2021-02-17

## 2021-02-17 RX ORDER — FAMOTIDINE 10 MG/ML
20 INJECTION INTRAVENOUS 2 TIMES DAILY
Status: CANCELLED | OUTPATIENT
Start: 2021-02-17

## 2021-02-17 RX ORDER — FENTANYL CITRATE 50 UG/ML
25 INJECTION, SOLUTION INTRAMUSCULAR; INTRAVENOUS
Status: CANCELLED | OUTPATIENT
Start: 2021-02-17 | End: 2021-02-18

## 2021-02-17 RX ORDER — MUPIROCIN 20 MG/G
1 OINTMENT TOPICAL
Status: CANCELLED | OUTPATIENT
Start: 2021-02-17

## 2021-02-17 RX ORDER — MAGNESIUM SULFATE/D5W 2 G/50 ML
4 INTRAVENOUS SOLUTION, PIGGYBACK (ML) INTRAVENOUS
Status: CANCELLED | OUTPATIENT
Start: 2021-02-17

## 2021-02-17 RX ORDER — ASPIRIN 325 MG
325 TABLET, DELAYED RELEASE (ENTERIC COATED) ORAL DAILY
Status: CANCELLED | OUTPATIENT
Start: 2021-02-17

## 2021-02-17 RX ORDER — BISACODYL 10 MG
10 SUPPOSITORY, RECTAL RECTAL EVERY 12 HOURS PRN
Status: CANCELLED | OUTPATIENT
Start: 2021-02-17

## 2021-02-17 RX ORDER — FENTANYL CITRATE 50 UG/ML
50 INJECTION, SOLUTION INTRAMUSCULAR; INTRAVENOUS
Status: CANCELLED | OUTPATIENT
Start: 2021-02-19

## 2021-02-17 RX ORDER — MUPIROCIN 20 MG/G
1 OINTMENT TOPICAL 2 TIMES DAILY
Status: CANCELLED | OUTPATIENT
Start: 2021-02-17 | End: 2021-02-22

## 2021-02-17 RX ORDER — ONDANSETRON 2 MG/ML
4 INJECTION INTRAMUSCULAR; INTRAVENOUS EVERY 12 HOURS PRN
Status: CANCELLED | OUTPATIENT
Start: 2021-02-17

## 2021-02-17 RX ORDER — ALBUMIN HUMAN 50 G/1000ML
25 SOLUTION INTRAVENOUS ONCE AS NEEDED
Status: CANCELLED | OUTPATIENT
Start: 2021-02-17 | End: 2032-07-16

## 2021-02-17 RX ORDER — ATORVASTATIN CALCIUM 40 MG/1
40 TABLET, FILM COATED ORAL NIGHTLY
Status: CANCELLED | OUTPATIENT
Start: 2021-02-17

## 2021-02-17 RX ORDER — POLYETHYLENE GLYCOL 3350 17 G/17G
17 POWDER, FOR SOLUTION ORAL DAILY
Status: CANCELLED | OUTPATIENT
Start: 2021-02-17

## 2021-02-17 RX ORDER — METOPROLOL TARTRATE 25 MG/1
25 TABLET, FILM COATED ORAL
Status: CANCELLED | OUTPATIENT
Start: 2021-02-17

## 2021-02-17 RX ORDER — FAMOTIDINE 20 MG/1
20 TABLET, FILM COATED ORAL 2 TIMES DAILY
Status: CANCELLED | OUTPATIENT
Start: 2021-02-17

## 2021-02-17 RX ORDER — ACETAMINOPHEN 325 MG/1
650 TABLET ORAL EVERY 4 HOURS PRN
Status: CANCELLED | OUTPATIENT
Start: 2021-02-17

## 2021-02-17 RX ORDER — SODIUM CHLORIDE 0.9 % (FLUSH) 0.9 %
10 SYRINGE (ML) INJECTION
Status: CANCELLED | OUTPATIENT
Start: 2021-02-17

## 2021-02-17 RX ORDER — PROPOFOL 10 MG/ML
0-50 INJECTION, EMULSION INTRAVENOUS CONTINUOUS
Status: CANCELLED | OUTPATIENT
Start: 2021-02-17

## 2021-02-17 RX ORDER — POTASSIUM CHLORIDE 20 MEQ/1
20 TABLET, EXTENDED RELEASE ORAL EVERY 12 HOURS
Status: CANCELLED | OUTPATIENT
Start: 2021-02-17

## 2021-02-17 RX ORDER — ASPIRIN 325 MG
325 TABLET ORAL DAILY
Status: CANCELLED | OUTPATIENT
Start: 2021-02-17

## 2021-02-17 RX ORDER — OXYCODONE HYDROCHLORIDE 10 MG/1
10 TABLET ORAL EVERY 4 HOURS PRN
Status: CANCELLED | OUTPATIENT
Start: 2021-02-17

## 2021-02-17 RX ORDER — METOCLOPRAMIDE HYDROCHLORIDE 5 MG/ML
5 INJECTION INTRAMUSCULAR; INTRAVENOUS EVERY 6 HOURS PRN
Status: CANCELLED | OUTPATIENT
Start: 2021-02-17

## 2021-02-17 RX ORDER — DOCUSATE SODIUM 100 MG/1
100 CAPSULE, LIQUID FILLED ORAL 2 TIMES DAILY
Status: CANCELLED | OUTPATIENT
Start: 2021-02-17

## 2021-02-17 RX ORDER — ASPIRIN 300 MG/1
300 SUPPOSITORY RECTAL ONCE AS NEEDED
Status: CANCELLED | OUTPATIENT
Start: 2021-02-17 | End: 2032-07-16

## 2021-02-18 ENCOUNTER — HOSPITAL ENCOUNTER (OUTPATIENT)
Dept: CARDIOLOGY | Facility: CLINIC | Age: 41
Discharge: HOME | End: 2021-02-18
Payer: COMMERCIAL

## 2021-02-18 ENCOUNTER — DOCUMENTATION ONLY (OUTPATIENT)
Dept: CARDIOTHORACIC SURGERY | Facility: CLINIC | Age: 41
End: 2021-02-18

## 2021-02-18 ENCOUNTER — HOSPITAL ENCOUNTER (OUTPATIENT)
Dept: RADIOLOGY | Facility: HOSPITAL | Age: 41
Discharge: HOME | End: 2021-02-18
Attending: THORACIC SURGERY (CARDIOTHORACIC VASCULAR SURGERY)
Payer: COMMERCIAL

## 2021-02-18 ENCOUNTER — HOSPITAL ENCOUNTER (OUTPATIENT)
Dept: PREADMISSION TESTING | Facility: HOSPITAL | Age: 41
Discharge: HOME | End: 2021-02-18
Attending: THORACIC SURGERY (CARDIOTHORACIC VASCULAR SURGERY)
Payer: COMMERCIAL

## 2021-02-18 ENCOUNTER — PATIENT MESSAGE (OUTPATIENT)
Dept: SURGERY | Facility: HOSPITAL | Age: 41
End: 2021-02-18

## 2021-02-18 VITALS
TEMPERATURE: 98 F | SYSTOLIC BLOOD PRESSURE: 118 MMHG | RESPIRATION RATE: 18 BRPM | HEART RATE: 74 BPM | WEIGHT: 138 LBS | DIASTOLIC BLOOD PRESSURE: 77 MMHG | OXYGEN SATURATION: 100 % | BODY MASS INDEX: 19.32 KG/M2 | HEIGHT: 71 IN

## 2021-02-18 DIAGNOSIS — I31.39 PERICARDIAL EFFUSION: ICD-10-CM

## 2021-02-18 PROCEDURE — 93005 EKG 12-LEAD: ICD-10-PCS | Mod: S$GLB,,, | Performed by: THORACIC SURGERY (CARDIOTHORACIC VASCULAR SURGERY)

## 2021-02-18 PROCEDURE — 93005 ELECTROCARDIOGRAM TRACING: CPT | Mod: S$GLB,,, | Performed by: THORACIC SURGERY (CARDIOTHORACIC VASCULAR SURGERY)

## 2021-02-18 PROCEDURE — 93010 EKG 12-LEAD: ICD-10-PCS | Mod: S$GLB,,, | Performed by: INTERNAL MEDICINE

## 2021-02-18 PROCEDURE — 71046 X-RAY EXAM CHEST 2 VIEWS: CPT | Mod: TC,FY

## 2021-02-18 PROCEDURE — 93010 ELECTROCARDIOGRAM REPORT: CPT | Mod: S$GLB,,, | Performed by: INTERNAL MEDICINE

## 2021-02-18 PROCEDURE — 71046 X-RAY EXAM CHEST 2 VIEWS: CPT | Mod: 26,,, | Performed by: RADIOLOGY

## 2021-02-18 PROCEDURE — 71046 XR CHEST PA AND LATERAL PRE-OP: ICD-10-PCS | Mod: 26,,, | Performed by: RADIOLOGY

## 2021-02-19 ENCOUNTER — LAB VISIT (OUTPATIENT)
Dept: INTERNAL MEDICINE | Facility: CLINIC | Age: 41
End: 2021-02-19
Payer: COMMERCIAL

## 2021-02-19 ENCOUNTER — ANESTHESIA EVENT (OUTPATIENT)
Dept: SURGERY | Facility: HOSPITAL | Age: 41
DRG: 272 | End: 2021-02-19
Payer: COMMERCIAL

## 2021-02-19 DIAGNOSIS — I31.39 PERICARDIAL EFFUSION: ICD-10-CM

## 2021-02-19 PROCEDURE — U0003 INFECTIOUS AGENT DETECTION BY NUCLEIC ACID (DNA OR RNA); SEVERE ACUTE RESPIRATORY SYNDROME CORONAVIRUS 2 (SARS-COV-2) (CORONAVIRUS DISEASE [COVID-19]), AMPLIFIED PROBE TECHNIQUE, MAKING USE OF HIGH THROUGHPUT TECHNOLOGIES AS DESCRIBED BY CMS-2020-01-R: HCPCS

## 2021-02-19 PROCEDURE — U0005 INFEC AGEN DETEC AMPLI PROBE: HCPCS

## 2021-02-20 LAB — SARS-COV-2 RNA RESP QL NAA+PROBE: NOT DETECTED

## 2021-02-22 ENCOUNTER — ANESTHESIA (OUTPATIENT)
Dept: SURGERY | Facility: HOSPITAL | Age: 41
DRG: 272 | End: 2021-02-22
Payer: COMMERCIAL

## 2021-02-22 ENCOUNTER — HOSPITAL ENCOUNTER (INPATIENT)
Facility: HOSPITAL | Age: 41
LOS: 4 days | Discharge: HOME | DRG: 272 | End: 2021-02-26
Attending: THORACIC SURGERY (CARDIOTHORACIC VASCULAR SURGERY) | Admitting: THORACIC SURGERY (CARDIOTHORACIC VASCULAR SURGERY)
Payer: COMMERCIAL

## 2021-02-22 DIAGNOSIS — I31.39 PERICARDIAL EFFUSION: ICD-10-CM

## 2021-02-22 DIAGNOSIS — R00.0 TACHYCARDIA: ICD-10-CM

## 2021-02-22 DIAGNOSIS — Z98.890 S/P PERICARDIAL WINDOW CREATION: ICD-10-CM

## 2021-02-22 DIAGNOSIS — R07.9 CHEST PAIN: ICD-10-CM

## 2021-02-22 LAB
ABO + RH BLD: NORMAL
ALBUMIN SERPL BCP-MCNC: 4.1 G/DL (ref 3.5–5.2)
ALP SERPL-CCNC: 65 U/L (ref 55–135)
ALT SERPL W/O P-5'-P-CCNC: 17 U/L (ref 10–44)
ANION GAP SERPL CALC-SCNC: 8 MMOL/L (ref 8–16)
APPEARANCE FLD: NORMAL
APTT BLDCRRT: 23 SEC (ref 21–32)
AST SERPL-CCNC: 20 U/L (ref 10–40)
B-HCG UR QL: NEGATIVE
BASOPHILS # BLD AUTO: 0.02 K/UL (ref 0–0.2)
BASOPHILS NFR BLD: 0.6 % (ref 0–1.9)
BILIRUB SERPL-MCNC: 1.2 MG/DL (ref 0.1–1)
BLD GP AB SCN CELLS X3 SERPL QL: NORMAL
BODY FLD TYPE: NORMAL
BUN SERPL-MCNC: 11 MG/DL (ref 6–20)
CALCIUM SERPL-MCNC: 8.6 MG/DL (ref 8.7–10.5)
CHLORIDE SERPL-SCNC: 108 MMOL/L (ref 95–110)
CO2 SERPL-SCNC: 24 MMOL/L (ref 23–29)
COLOR FLD: YELLOW
CREAT SERPL-MCNC: 0.8 MG/DL (ref 0.5–1.4)
CTP QC/QA: YES
DIFFERENTIAL METHOD: ABNORMAL
EOSINOPHIL # BLD AUTO: 0 K/UL (ref 0–0.5)
EOSINOPHIL NFR BLD: 0.6 % (ref 0–8)
ERYTHROCYTE [DISTWIDTH] IN BLOOD BY AUTOMATED COUNT: 11.9 % (ref 11.5–14.5)
EST. GFR  (AFRICAN AMERICAN): >60 ML/MIN/1.73 M^2
EST. GFR  (NON AFRICAN AMERICAN): >60 ML/MIN/1.73 M^2
GLUCOSE SERPL-MCNC: 126 MG/DL (ref 70–110)
GRAM STN SPEC: NORMAL
HCT VFR BLD AUTO: 39.8 % (ref 37–48.5)
HGB BLD-MCNC: 12.9 G/DL (ref 12–16)
IMM GRANULOCYTES # BLD AUTO: 0.02 K/UL (ref 0–0.04)
IMM GRANULOCYTES NFR BLD AUTO: 0.6 % (ref 0–0.5)
LYMPHOCYTES # BLD AUTO: 0.6 K/UL (ref 1–4.8)
LYMPHOCYTES NFR BLD: 15.5 % (ref 18–48)
LYMPHOCYTES NFR FLD MANUAL: 63 %
MAGNESIUM SERPL-MCNC: 1.8 MG/DL (ref 1.6–2.6)
MCH RBC QN AUTO: 30.1 PG (ref 27–31)
MCHC RBC AUTO-ENTMCNC: 32.4 G/DL (ref 32–36)
MCV RBC AUTO: 93 FL (ref 82–98)
MONOCYTES # BLD AUTO: 0.1 K/UL (ref 0.3–1)
MONOCYTES NFR BLD: 1.4 % (ref 4–15)
MONOS+MACROS NFR FLD MANUAL: 37 %
NEUTROPHILS # BLD AUTO: 2.9 K/UL (ref 1.8–7.7)
NEUTROPHILS NFR BLD: 81.3 % (ref 38–73)
NRBC BLD-RTO: 0 /100 WBC
PHOSPHATE SERPL-MCNC: 3.8 MG/DL (ref 2.7–4.5)
PLATELET # BLD AUTO: 178 K/UL (ref 150–350)
PLATELET BLD QL SMEAR: ABNORMAL
PMV BLD AUTO: 9.7 FL (ref 9.2–12.9)
POTASSIUM SERPL-SCNC: 4.1 MMOL/L (ref 3.5–5.1)
PROT SERPL-MCNC: 7.1 G/DL (ref 6–8.4)
RBC # BLD AUTO: 4.29 M/UL (ref 4–5.4)
SODIUM SERPL-SCNC: 140 MMOL/L (ref 136–145)
WBC # BLD AUTO: 3.61 K/UL (ref 3.9–12.7)
WBC # FLD: 182 /CU MM

## 2021-02-22 PROCEDURE — 27000239 HC STAND-BY BYPASS PUMP

## 2021-02-22 PROCEDURE — 86900 BLOOD TYPING SEROLOGIC ABO: CPT

## 2021-02-22 PROCEDURE — 87102 FUNGUS ISOLATION CULTURE: CPT | Mod: 59

## 2021-02-22 PROCEDURE — 88112 CYTOPATH CELL ENHANCE TECH: CPT | Performed by: PATHOLOGY

## 2021-02-22 PROCEDURE — 71000033 HC RECOVERY, INTIAL HOUR: Performed by: THORACIC SURGERY (CARDIOTHORACIC VASCULAR SURGERY)

## 2021-02-22 PROCEDURE — 87116 MYCOBACTERIA CULTURE: CPT

## 2021-02-22 PROCEDURE — 83735 ASSAY OF MAGNESIUM: CPT

## 2021-02-22 PROCEDURE — 88305 TISSUE EXAM BY PATHOLOGIST: CPT | Mod: 59 | Performed by: PATHOLOGY

## 2021-02-22 PROCEDURE — 76937 PR  US GUIDE, VASCULAR ACCESS: ICD-10-PCS | Mod: 26,,, | Performed by: ANESTHESIOLOGY

## 2021-02-22 PROCEDURE — 25000003 PHARM REV CODE 250: Performed by: NURSE PRACTITIONER

## 2021-02-22 PROCEDURE — 63600175 PHARM REV CODE 636 W HCPCS: Performed by: STUDENT IN AN ORGANIZED HEALTH CARE EDUCATION/TRAINING PROGRAM

## 2021-02-22 PROCEDURE — 71000039 HC RECOVERY, EACH ADD'L HOUR: Performed by: THORACIC SURGERY (CARDIOTHORACIC VASCULAR SURGERY)

## 2021-02-22 PROCEDURE — 27000191 HC C-V MONITORING

## 2021-02-22 PROCEDURE — 88112 PR  CYTOPATH, CELL ENHANCE TECH: ICD-10-PCS | Mod: 26,,, | Performed by: PATHOLOGY

## 2021-02-22 PROCEDURE — 36000711: Performed by: THORACIC SURGERY (CARDIOTHORACIC VASCULAR SURGERY)

## 2021-02-22 PROCEDURE — 33025 PR INCIS HEART SAC WINDW FOR DRAIN: ICD-10-PCS | Mod: ,,, | Performed by: THORACIC SURGERY (CARDIOTHORACIC VASCULAR SURGERY)

## 2021-02-22 PROCEDURE — 88305 TISSUE EXAM BY PATHOLOGIST: ICD-10-PCS | Mod: 26,,, | Performed by: PATHOLOGY

## 2021-02-22 PROCEDURE — 94799 UNLISTED PULMONARY SVC/PX: CPT

## 2021-02-22 PROCEDURE — 94761 N-INVAS EAR/PLS OXIMETRY MLT: CPT

## 2021-02-22 PROCEDURE — 25000003 PHARM REV CODE 250: Performed by: STUDENT IN AN ORGANIZED HEALTH CARE EDUCATION/TRAINING PROGRAM

## 2021-02-22 PROCEDURE — 37000009 HC ANESTHESIA EA ADD 15 MINS: Performed by: THORACIC SURGERY (CARDIOTHORACIC VASCULAR SURGERY)

## 2021-02-22 PROCEDURE — 81025 URINE PREGNANCY TEST: CPT | Performed by: THORACIC SURGERY (CARDIOTHORACIC VASCULAR SURGERY)

## 2021-02-22 PROCEDURE — 63600175 PHARM REV CODE 636 W HCPCS: Performed by: NURSE PRACTITIONER

## 2021-02-22 PROCEDURE — 93010 ELECTROCARDIOGRAM REPORT: CPT | Mod: ,,, | Performed by: INTERNAL MEDICINE

## 2021-02-22 PROCEDURE — D9220A PRA ANESTHESIA: Mod: ,,, | Performed by: ANESTHESIOLOGY

## 2021-02-22 PROCEDURE — 36620 INSERTION CATHETER ARTERY: CPT | Mod: 59,,, | Performed by: ANESTHESIOLOGY

## 2021-02-22 PROCEDURE — 88112 CYTOPATH CELL ENHANCE TECH: CPT | Mod: 26,,, | Performed by: PATHOLOGY

## 2021-02-22 PROCEDURE — 87205 SMEAR GRAM STAIN: CPT

## 2021-02-22 PROCEDURE — 36000710: Performed by: THORACIC SURGERY (CARDIOTHORACIC VASCULAR SURGERY)

## 2021-02-22 PROCEDURE — 85025 COMPLETE CBC W/AUTO DIFF WBC: CPT

## 2021-02-22 PROCEDURE — 27201423 OPTIME MED/SURG SUP & DEVICES STERILE SUPPLY: Performed by: THORACIC SURGERY (CARDIOTHORACIC VASCULAR SURGERY)

## 2021-02-22 PROCEDURE — 87076 CULTURE ANAEROBE IDENT EACH: CPT

## 2021-02-22 PROCEDURE — 76937 US GUIDE VASCULAR ACCESS: CPT | Mod: 26,,, | Performed by: ANESTHESIOLOGY

## 2021-02-22 PROCEDURE — 85730 THROMBOPLASTIN TIME PARTIAL: CPT

## 2021-02-22 PROCEDURE — 87070 CULTURE OTHR SPECIMN AEROBIC: CPT

## 2021-02-22 PROCEDURE — 84100 ASSAY OF PHOSPHORUS: CPT

## 2021-02-22 PROCEDURE — D9220A PRA ANESTHESIA: ICD-10-PCS | Mod: ,,, | Performed by: ANESTHESIOLOGY

## 2021-02-22 PROCEDURE — 88305 TISSUE EXAM BY PATHOLOGIST: CPT | Mod: 26,,, | Performed by: PATHOLOGY

## 2021-02-22 PROCEDURE — 88305 TISSUE EXAM BY PATHOLOGIST: CPT | Performed by: PATHOLOGY

## 2021-02-22 PROCEDURE — 87206 SMEAR FLUORESCENT/ACID STAI: CPT

## 2021-02-22 PROCEDURE — 20600001 HC STEP DOWN PRIVATE ROOM

## 2021-02-22 PROCEDURE — 36620 PR INSERT CATH,ART,PERCUT,SHORTTERM: ICD-10-PCS | Mod: 59,,, | Performed by: ANESTHESIOLOGY

## 2021-02-22 PROCEDURE — 93010 EKG 12-LEAD: ICD-10-PCS | Mod: ,,, | Performed by: INTERNAL MEDICINE

## 2021-02-22 PROCEDURE — 36415 COLL VENOUS BLD VENIPUNCTURE: CPT

## 2021-02-22 PROCEDURE — C1729 CATH, DRAINAGE: HCPCS | Performed by: THORACIC SURGERY (CARDIOTHORACIC VASCULAR SURGERY)

## 2021-02-22 PROCEDURE — 37000008 HC ANESTHESIA 1ST 15 MINUTES: Performed by: THORACIC SURGERY (CARDIOTHORACIC VASCULAR SURGERY)

## 2021-02-22 PROCEDURE — 93005 ELECTROCARDIOGRAM TRACING: CPT | Performed by: INTERNAL MEDICINE

## 2021-02-22 PROCEDURE — 87075 CULTR BACTERIA EXCEPT BLOOD: CPT | Mod: 59

## 2021-02-22 PROCEDURE — 89051 BODY FLUID CELL COUNT: CPT

## 2021-02-22 PROCEDURE — 80053 COMPREHEN METABOLIC PANEL: CPT

## 2021-02-22 PROCEDURE — 33025 INCISION OF HEART SAC: CPT | Mod: ,,, | Performed by: THORACIC SURGERY (CARDIOTHORACIC VASCULAR SURGERY)

## 2021-02-22 RX ORDER — CEFAZOLIN SODIUM 1 G/3ML
2 INJECTION, POWDER, FOR SOLUTION INTRAMUSCULAR; INTRAVENOUS
Status: COMPLETED | OUTPATIENT
Start: 2021-02-22 | End: 2021-02-23

## 2021-02-22 RX ORDER — PROPOFOL 10 MG/ML
VIAL (ML) INTRAVENOUS
Status: DISCONTINUED | OUTPATIENT
Start: 2021-02-22 | End: 2021-02-22

## 2021-02-22 RX ORDER — EPINEPHRINE 0.1 MG/ML
INJECTION INTRAVENOUS
Status: DISCONTINUED | OUTPATIENT
Start: 2021-02-22 | End: 2021-02-22

## 2021-02-22 RX ORDER — SENNOSIDES 8.6 MG/1
8.6 TABLET ORAL DAILY
Status: DISCONTINUED | OUTPATIENT
Start: 2021-02-22 | End: 2021-02-26 | Stop reason: HOSPADM

## 2021-02-22 RX ORDER — OXYCODONE HYDROCHLORIDE 5 MG/1
5 TABLET ORAL EVERY 4 HOURS PRN
Status: DISCONTINUED | OUTPATIENT
Start: 2021-02-22 | End: 2021-02-25

## 2021-02-22 RX ORDER — ONDANSETRON 8 MG/1
8 TABLET, ORALLY DISINTEGRATING ORAL EVERY 8 HOURS PRN
Status: DISCONTINUED | OUTPATIENT
Start: 2021-02-22 | End: 2021-02-22 | Stop reason: HOSPADM

## 2021-02-22 RX ORDER — FENTANYL CITRATE 50 UG/ML
INJECTION, SOLUTION INTRAMUSCULAR; INTRAVENOUS
Status: DISCONTINUED | OUTPATIENT
Start: 2021-02-22 | End: 2021-02-22

## 2021-02-22 RX ORDER — LIDOCAINE HYDROCHLORIDE 20 MG/ML
INJECTION INTRAVENOUS
Status: DISCONTINUED | OUTPATIENT
Start: 2021-02-22 | End: 2021-02-22

## 2021-02-22 RX ORDER — METOPROLOL TARTRATE 25 MG/1
25 TABLET, FILM COATED ORAL
Status: DISCONTINUED | OUTPATIENT
Start: 2021-02-22 | End: 2021-02-22 | Stop reason: HOSPADM

## 2021-02-22 RX ORDER — METOCLOPRAMIDE HYDROCHLORIDE 5 MG/ML
5 INJECTION INTRAMUSCULAR; INTRAVENOUS EVERY 6 HOURS PRN
Status: DISCONTINUED | OUTPATIENT
Start: 2021-02-22 | End: 2021-02-22 | Stop reason: HOSPADM

## 2021-02-22 RX ORDER — HYDROMORPHONE HYDROCHLORIDE 1 MG/ML
0.5 INJECTION, SOLUTION INTRAMUSCULAR; INTRAVENOUS; SUBCUTANEOUS EVERY 6 HOURS PRN
Status: DISCONTINUED | OUTPATIENT
Start: 2021-02-22 | End: 2021-02-25

## 2021-02-22 RX ORDER — DEXAMETHASONE SODIUM PHOSPHATE 4 MG/ML
INJECTION, SOLUTION INTRA-ARTICULAR; INTRALESIONAL; INTRAMUSCULAR; INTRAVENOUS; SOFT TISSUE
Status: DISCONTINUED | OUTPATIENT
Start: 2021-02-22 | End: 2021-02-22

## 2021-02-22 RX ORDER — MUPIROCIN 20 MG/G
1 OINTMENT TOPICAL
Status: COMPLETED | OUTPATIENT
Start: 2021-02-22 | End: 2021-02-22

## 2021-02-22 RX ORDER — OXYCODONE HYDROCHLORIDE 10 MG/1
10 TABLET ORAL EVERY 4 HOURS PRN
Status: DISCONTINUED | OUTPATIENT
Start: 2021-02-22 | End: 2021-02-25

## 2021-02-22 RX ORDER — SCOLOPAMINE TRANSDERMAL SYSTEM 1 MG/1
1 PATCH, EXTENDED RELEASE TRANSDERMAL
Status: DISCONTINUED | OUTPATIENT
Start: 2021-02-22 | End: 2021-02-22 | Stop reason: HOSPADM

## 2021-02-22 RX ORDER — KETAMINE HCL IN 0.9 % NACL 50 MG/5 ML
SYRINGE (ML) INTRAVENOUS
Status: DISCONTINUED | OUTPATIENT
Start: 2021-02-22 | End: 2021-02-22

## 2021-02-22 RX ORDER — MIDAZOLAM HYDROCHLORIDE 1 MG/ML
INJECTION, SOLUTION INTRAMUSCULAR; INTRAVENOUS
Status: DISCONTINUED | OUTPATIENT
Start: 2021-02-22 | End: 2021-02-22

## 2021-02-22 RX ORDER — SODIUM CHLORIDE 9 MG/ML
INJECTION, SOLUTION INTRAVENOUS CONTINUOUS
Status: DISCONTINUED | OUTPATIENT
Start: 2021-02-22 | End: 2021-02-26 | Stop reason: HOSPADM

## 2021-02-22 RX ORDER — SODIUM CHLORIDE 0.9 % (FLUSH) 0.9 %
10 SYRINGE (ML) INJECTION
Status: DISCONTINUED | OUTPATIENT
Start: 2021-02-22 | End: 2021-02-22 | Stop reason: HOSPADM

## 2021-02-22 RX ORDER — NAPROXEN 500 MG/1
500 TABLET ORAL 2 TIMES DAILY
Status: ON HOLD | COMMUNITY
End: 2021-02-26 | Stop reason: HOSPADM

## 2021-02-22 RX ORDER — MUPIROCIN 20 MG/G
1 OINTMENT TOPICAL 2 TIMES DAILY
Status: DISCONTINUED | OUTPATIENT
Start: 2021-02-22 | End: 2021-02-22 | Stop reason: HOSPADM

## 2021-02-22 RX ORDER — FAMOTIDINE 10 MG/ML
INJECTION INTRAVENOUS
Status: DISCONTINUED | OUTPATIENT
Start: 2021-02-22 | End: 2021-02-22

## 2021-02-22 RX ORDER — HYDROMORPHONE HYDROCHLORIDE 1 MG/ML
0.2 INJECTION, SOLUTION INTRAMUSCULAR; INTRAVENOUS; SUBCUTANEOUS EVERY 5 MIN PRN
Status: COMPLETED | OUTPATIENT
Start: 2021-02-22 | End: 2021-02-22

## 2021-02-22 RX ORDER — CEFAZOLIN SODIUM 1 G/3ML
2 INJECTION, POWDER, FOR SOLUTION INTRAMUSCULAR; INTRAVENOUS
Status: COMPLETED | OUTPATIENT
Start: 2021-02-22 | End: 2021-02-22

## 2021-02-22 RX ORDER — LIDOCAINE HYDROCHLORIDE 10 MG/ML
1 INJECTION, SOLUTION EPIDURAL; INFILTRATION; INTRACAUDAL; PERINEURAL
Status: DISCONTINUED | OUTPATIENT
Start: 2021-02-22 | End: 2021-02-22 | Stop reason: HOSPADM

## 2021-02-22 RX ORDER — PROCHLORPERAZINE EDISYLATE 5 MG/ML
5 INJECTION INTRAMUSCULAR; INTRAVENOUS EVERY 30 MIN PRN
Status: DISCONTINUED | OUTPATIENT
Start: 2021-02-22 | End: 2021-02-22 | Stop reason: HOSPADM

## 2021-02-22 RX ORDER — ROCURONIUM BROMIDE 10 MG/ML
INJECTION, SOLUTION INTRAVENOUS
Status: DISCONTINUED | OUTPATIENT
Start: 2021-02-22 | End: 2021-02-22

## 2021-02-22 RX ORDER — HYDROCODONE BITARTRATE AND ACETAMINOPHEN 5; 325 MG/1; MG/1
1 TABLET ORAL EVERY 4 HOURS PRN
Status: DISCONTINUED | OUTPATIENT
Start: 2021-02-22 | End: 2021-02-22 | Stop reason: HOSPADM

## 2021-02-22 RX ADMIN — DEXAMETHASONE SODIUM PHOSPHATE 8 MG: 4 INJECTION, SOLUTION INTRAMUSCULAR; INTRAVENOUS at 07:02

## 2021-02-22 RX ADMIN — SUGAMMADEX 200 MG: 100 INJECTION, SOLUTION INTRAVENOUS at 08:02

## 2021-02-22 RX ADMIN — MUPIROCIN 1 G: 20 OINTMENT TOPICAL at 05:02

## 2021-02-22 RX ADMIN — FENTANYL CITRATE 100 MCG: 50 INJECTION, SOLUTION INTRAMUSCULAR; INTRAVENOUS at 07:02

## 2021-02-22 RX ADMIN — PROCHLORPERAZINE EDISYLATE 5 MG: 5 INJECTION INTRAMUSCULAR; INTRAVENOUS at 11:02

## 2021-02-22 RX ADMIN — FAMOTIDINE 20 MG: 10 INJECTION, SOLUTION INTRAVENOUS at 07:02

## 2021-02-22 RX ADMIN — ONDANSETRON 8 MG: 8 TABLET, ORALLY DISINTEGRATING ORAL at 07:02

## 2021-02-22 RX ADMIN — HYDROMORPHONE HYDROCHLORIDE 0.2 MG: 1 INJECTION, SOLUTION INTRAMUSCULAR; INTRAVENOUS; SUBCUTANEOUS at 09:02

## 2021-02-22 RX ADMIN — HYDROMORPHONE HYDROCHLORIDE 0.2 MG: 1 INJECTION, SOLUTION INTRAMUSCULAR; INTRAVENOUS; SUBCUTANEOUS at 12:02

## 2021-02-22 RX ADMIN — LIDOCAINE HYDROCHLORIDE 60 MG: 20 INJECTION, SOLUTION INTRAVENOUS at 07:02

## 2021-02-22 RX ADMIN — EPINEPHRINE 20 MCG: 0.1 INJECTION, SOLUTION ENDOTRACHEAL; INTRACARDIAC; INTRAVENOUS at 08:02

## 2021-02-22 RX ADMIN — PROPOFOL 100 MG: 10 INJECTION, EMULSION INTRAVENOUS at 07:02

## 2021-02-22 RX ADMIN — OXYCODONE HYDROCHLORIDE 5 MG: 5 TABLET ORAL at 04:02

## 2021-02-22 RX ADMIN — HYDROMORPHONE HYDROCHLORIDE 0.2 MG: 1 INJECTION, SOLUTION INTRAMUSCULAR; INTRAVENOUS; SUBCUTANEOUS at 11:02

## 2021-02-22 RX ADMIN — HYDROMORPHONE HYDROCHLORIDE 0.2 MG: 1 INJECTION, SOLUTION INTRAMUSCULAR; INTRAVENOUS; SUBCUTANEOUS at 01:02

## 2021-02-22 RX ADMIN — Medication 20 MG: at 07:02

## 2021-02-22 RX ADMIN — Medication 30 MG: at 07:02

## 2021-02-22 RX ADMIN — OXYCODONE HYDROCHLORIDE 5 MG: 5 TABLET ORAL at 07:02

## 2021-02-22 RX ADMIN — FENTANYL CITRATE 50 MCG: 50 INJECTION, SOLUTION INTRAMUSCULAR; INTRAVENOUS at 07:02

## 2021-02-22 RX ADMIN — OXYCODONE HYDROCHLORIDE 5 MG: 5 TABLET ORAL at 09:02

## 2021-02-22 RX ADMIN — SENNOSIDES 8.6 MG: 8.6 TABLET, FILM COATED ORAL at 09:02

## 2021-02-22 RX ADMIN — CEFAZOLIN 2 G: 1 INJECTION, POWDER, FOR SOLUTION INTRAMUSCULAR; INTRAVENOUS at 05:02

## 2021-02-22 RX ADMIN — HYDROMORPHONE HYDROCHLORIDE 0.5 MG: 1 INJECTION, SOLUTION INTRAMUSCULAR; INTRAVENOUS; SUBCUTANEOUS at 02:02

## 2021-02-22 RX ADMIN — ROCURONIUM BROMIDE 40 MG: 10 INJECTION, SOLUTION INTRAVENOUS at 07:02

## 2021-02-22 RX ADMIN — CEFAZOLIN 2 G: 330 INJECTION, POWDER, FOR SOLUTION INTRAMUSCULAR; INTRAVENOUS at 07:02

## 2021-02-22 RX ADMIN — SODIUM CHLORIDE: 0.9 INJECTION, SOLUTION INTRAVENOUS at 05:02

## 2021-02-22 RX ADMIN — OXYCODONE HYDROCHLORIDE 5 MG: 5 TABLET ORAL at 12:02

## 2021-02-22 RX ADMIN — MIDAZOLAM HYDROCHLORIDE 2 MG: 1 INJECTION, SOLUTION INTRAMUSCULAR; INTRAVENOUS at 07:02

## 2021-02-23 ENCOUNTER — PATIENT MESSAGE (OUTPATIENT)
Dept: RHEUMATOLOGY | Facility: CLINIC | Age: 41
End: 2021-02-23

## 2021-02-23 PROBLEM — Z98.890 S/P PERICARDIAL WINDOW CREATION: Status: ACTIVE | Noted: 2021-02-23

## 2021-02-23 LAB
FINAL PATHOLOGIC DIAGNOSIS: NORMAL
PATH INTERP FLD-IMP: NORMAL

## 2021-02-23 PROCEDURE — 97161 PT EVAL LOW COMPLEX 20 MIN: CPT

## 2021-02-23 PROCEDURE — 97530 THERAPEUTIC ACTIVITIES: CPT

## 2021-02-23 PROCEDURE — 25000003 PHARM REV CODE 250: Performed by: STUDENT IN AN ORGANIZED HEALTH CARE EDUCATION/TRAINING PROGRAM

## 2021-02-23 PROCEDURE — 97165 OT EVAL LOW COMPLEX 30 MIN: CPT

## 2021-02-23 PROCEDURE — 63600175 PHARM REV CODE 636 W HCPCS: Performed by: NURSE PRACTITIONER

## 2021-02-23 PROCEDURE — 25000003 PHARM REV CODE 250: Performed by: NURSE PRACTITIONER

## 2021-02-23 PROCEDURE — 20600001 HC STEP DOWN PRIVATE ROOM

## 2021-02-23 RX ADMIN — OXYCODONE HYDROCHLORIDE 5 MG: 5 TABLET ORAL at 08:02

## 2021-02-23 RX ADMIN — OXYCODONE HYDROCHLORIDE 10 MG: 10 TABLET ORAL at 01:02

## 2021-02-23 RX ADMIN — OXYCODONE HYDROCHLORIDE 5 MG: 5 TABLET ORAL at 01:02

## 2021-02-23 RX ADMIN — CEFAZOLIN 2 G: 1 INJECTION, POWDER, FOR SOLUTION INTRAMUSCULAR; INTRAVENOUS at 04:02

## 2021-02-23 RX ADMIN — HYDROMORPHONE HYDROCHLORIDE 0.5 MG: 1 INJECTION, SOLUTION INTRAMUSCULAR; INTRAVENOUS; SUBCUTANEOUS at 11:02

## 2021-02-23 RX ADMIN — OXYCODONE HYDROCHLORIDE 10 MG: 10 TABLET ORAL at 10:02

## 2021-02-23 RX ADMIN — CEFAZOLIN 2 G: 1 INJECTION, POWDER, FOR SOLUTION INTRAMUSCULAR; INTRAVENOUS at 01:02

## 2021-02-23 RX ADMIN — CEFAZOLIN 2 G: 1 INJECTION, POWDER, FOR SOLUTION INTRAMUSCULAR; INTRAVENOUS at 08:02

## 2021-02-23 RX ADMIN — SENNOSIDES 8.6 MG: 8.6 TABLET, FILM COATED ORAL at 08:02

## 2021-02-23 RX ADMIN — CEFAZOLIN 2 G: 1 INJECTION, POWDER, FOR SOLUTION INTRAMUSCULAR; INTRAVENOUS at 11:02

## 2021-02-23 RX ADMIN — OXYCODONE HYDROCHLORIDE 10 MG: 10 TABLET ORAL at 06:02

## 2021-02-23 RX ADMIN — OXYCODONE HYDROCHLORIDE 5 MG: 5 TABLET ORAL at 06:02

## 2021-02-24 DIAGNOSIS — Z98.890 S/P PERICARDIAL WINDOW CREATION: Primary | ICD-10-CM

## 2021-02-24 LAB
ANION GAP SERPL CALC-SCNC: 8 MMOL/L (ref 8–16)
BASOPHILS # BLD AUTO: 0.02 K/UL (ref 0–0.2)
BASOPHILS NFR BLD: 0.4 % (ref 0–1.9)
BUN SERPL-MCNC: 8 MG/DL (ref 6–20)
CALCIUM SERPL-MCNC: 9.2 MG/DL (ref 8.7–10.5)
CHLORIDE SERPL-SCNC: 100 MMOL/L (ref 95–110)
CO2 SERPL-SCNC: 29 MMOL/L (ref 23–29)
CREAT SERPL-MCNC: 0.8 MG/DL (ref 0.5–1.4)
DIFFERENTIAL METHOD: NORMAL
EOSINOPHIL # BLD AUTO: 0.1 K/UL (ref 0–0.5)
EOSINOPHIL NFR BLD: 0.9 % (ref 0–8)
ERYTHROCYTE [DISTWIDTH] IN BLOOD BY AUTOMATED COUNT: 12.1 % (ref 11.5–14.5)
EST. GFR  (AFRICAN AMERICAN): >60 ML/MIN/1.73 M^2
EST. GFR  (NON AFRICAN AMERICAN): >60 ML/MIN/1.73 M^2
GLUCOSE SERPL-MCNC: 110 MG/DL (ref 70–110)
HCT VFR BLD AUTO: 43.4 % (ref 37–48.5)
HGB BLD-MCNC: 14 G/DL (ref 12–16)
IMM GRANULOCYTES # BLD AUTO: 0.02 K/UL (ref 0–0.04)
IMM GRANULOCYTES NFR BLD AUTO: 0.4 % (ref 0–0.5)
LYMPHOCYTES # BLD AUTO: 1 K/UL (ref 1–4.8)
LYMPHOCYTES NFR BLD: 18.4 % (ref 18–48)
MAGNESIUM SERPL-MCNC: 1.8 MG/DL (ref 1.6–2.6)
MCH RBC QN AUTO: 29.6 PG (ref 27–31)
MCHC RBC AUTO-ENTMCNC: 32.3 G/DL (ref 32–36)
MCV RBC AUTO: 92 FL (ref 82–98)
MONOCYTES # BLD AUTO: 0.7 K/UL (ref 0.3–1)
MONOCYTES NFR BLD: 11.7 % (ref 4–15)
NEUTROPHILS # BLD AUTO: 3.8 K/UL (ref 1.8–7.7)
NEUTROPHILS NFR BLD: 68.2 % (ref 38–73)
NRBC BLD-RTO: 0 /100 WBC
PHOSPHATE SERPL-MCNC: 2.7 MG/DL (ref 2.7–4.5)
PLATELET # BLD AUTO: 194 K/UL (ref 150–350)
PMV BLD AUTO: 9.5 FL (ref 9.2–12.9)
POTASSIUM SERPL-SCNC: 4 MMOL/L (ref 3.5–5.1)
RBC # BLD AUTO: 4.73 M/UL (ref 4–5.4)
SODIUM SERPL-SCNC: 137 MMOL/L (ref 136–145)
WBC # BLD AUTO: 5.54 K/UL (ref 3.9–12.7)

## 2021-02-24 PROCEDURE — 25000003 PHARM REV CODE 250: Performed by: STUDENT IN AN ORGANIZED HEALTH CARE EDUCATION/TRAINING PROGRAM

## 2021-02-24 PROCEDURE — 80048 BASIC METABOLIC PNL TOTAL CA: CPT

## 2021-02-24 PROCEDURE — 63600175 PHARM REV CODE 636 W HCPCS: Performed by: NURSE PRACTITIONER

## 2021-02-24 PROCEDURE — 36415 COLL VENOUS BLD VENIPUNCTURE: CPT

## 2021-02-24 PROCEDURE — 93005 ELECTROCARDIOGRAM TRACING: CPT

## 2021-02-24 PROCEDURE — 93010 EKG 12-LEAD: ICD-10-PCS | Mod: ,,, | Performed by: INTERNAL MEDICINE

## 2021-02-24 PROCEDURE — 20600001 HC STEP DOWN PRIVATE ROOM

## 2021-02-24 PROCEDURE — 85025 COMPLETE CBC W/AUTO DIFF WBC: CPT

## 2021-02-24 PROCEDURE — 84100 ASSAY OF PHOSPHORUS: CPT

## 2021-02-24 PROCEDURE — 25000003 PHARM REV CODE 250: Performed by: NURSE PRACTITIONER

## 2021-02-24 PROCEDURE — 97535 SELF CARE MNGMENT TRAINING: CPT

## 2021-02-24 PROCEDURE — 83735 ASSAY OF MAGNESIUM: CPT

## 2021-02-24 PROCEDURE — 97530 THERAPEUTIC ACTIVITIES: CPT

## 2021-02-24 PROCEDURE — 93010 ELECTROCARDIOGRAM REPORT: CPT | Mod: ,,, | Performed by: INTERNAL MEDICINE

## 2021-02-24 RX ORDER — METHOCARBAMOL 500 MG/1
500 TABLET, FILM COATED ORAL 4 TIMES DAILY
Status: DISCONTINUED | OUTPATIENT
Start: 2021-02-24 | End: 2021-02-26 | Stop reason: HOSPADM

## 2021-02-24 RX ADMIN — METHOCARBAMOL 500 MG: 500 TABLET ORAL at 12:02

## 2021-02-24 RX ADMIN — HYDROMORPHONE HYDROCHLORIDE 0.5 MG: 1 INJECTION, SOLUTION INTRAMUSCULAR; INTRAVENOUS; SUBCUTANEOUS at 11:02

## 2021-02-24 RX ADMIN — OXYCODONE HYDROCHLORIDE 10 MG: 10 TABLET ORAL at 02:02

## 2021-02-24 RX ADMIN — OXYCODONE HYDROCHLORIDE 10 MG: 10 TABLET ORAL at 10:02

## 2021-02-24 RX ADMIN — OXYCODONE HYDROCHLORIDE 10 MG: 10 TABLET ORAL at 07:02

## 2021-02-24 RX ADMIN — HYDROMORPHONE HYDROCHLORIDE 0.5 MG: 1 INJECTION, SOLUTION INTRAMUSCULAR; INTRAVENOUS; SUBCUTANEOUS at 05:02

## 2021-02-24 RX ADMIN — OXYCODONE HYDROCHLORIDE 10 MG: 10 TABLET ORAL at 11:02

## 2021-02-24 RX ADMIN — OXYCODONE HYDROCHLORIDE 10 MG: 10 TABLET ORAL at 06:02

## 2021-02-24 RX ADMIN — METHOCARBAMOL 500 MG: 500 TABLET ORAL at 08:02

## 2021-02-24 RX ADMIN — METHOCARBAMOL 500 MG: 500 TABLET ORAL at 05:02

## 2021-02-24 RX ADMIN — SENNOSIDES 8.6 MG: 8.6 TABLET, FILM COATED ORAL at 08:02

## 2021-02-25 LAB
ANION GAP SERPL CALC-SCNC: 11 MMOL/L (ref 8–16)
ASCENDING AORTA: 2.5 CM
AV INDEX (PROSTH): 0.77
AV MEAN GRADIENT: 3 MMHG
AV PEAK GRADIENT: 6 MMHG
AV VALVE AREA: 2.63 CM2
AV VELOCITY RATIO: 0.68
BACTERIA SPEC AEROBE CULT: NO GROWTH
BASOPHILS # BLD AUTO: 0.02 K/UL (ref 0–0.2)
BASOPHILS NFR BLD: 0.4 % (ref 0–1.9)
BSA FOR ECHO PROCEDURE: 1.77 M2
BUN SERPL-MCNC: 9 MG/DL (ref 6–20)
CALCIUM SERPL-MCNC: 9.3 MG/DL (ref 8.7–10.5)
CHLORIDE SERPL-SCNC: 98 MMOL/L (ref 95–110)
CO2 SERPL-SCNC: 27 MMOL/L (ref 23–29)
CREAT SERPL-MCNC: 0.8 MG/DL (ref 0.5–1.4)
CV ECHO LV RWT: 0.45 CM
DIFFERENTIAL METHOD: NORMAL
DOP CALC AO PEAK VEL: 1.2 M/S
DOP CALC AO VTI: 15.54 CM
DOP CALC LVOT AREA: 3.4 CM2
DOP CALC LVOT DIAMETER: 2.09 CM
DOP CALC LVOT PEAK VEL: 0.81 M/S
DOP CALC LVOT STROKE VOLUME: 40.84 CM3
DOP CALCLVOT PEAK VEL VTI: 11.91 CM
E WAVE DECELERATION TIME: 145.67 MSEC
E/A RATIO: 1.57
E/E' RATIO: 9.86 M/S
ECHO LV POSTERIOR WALL: 0.79 CM (ref 0.6–1.1)
EOSINOPHIL # BLD AUTO: 0.1 K/UL (ref 0–0.5)
EOSINOPHIL NFR BLD: 2.2 % (ref 0–8)
ERYTHROCYTE [DISTWIDTH] IN BLOOD BY AUTOMATED COUNT: 11.9 % (ref 11.5–14.5)
EST. GFR  (AFRICAN AMERICAN): >60 ML/MIN/1.73 M^2
EST. GFR  (NON AFRICAN AMERICAN): >60 ML/MIN/1.73 M^2
FRACTIONAL SHORTENING: 32 % (ref 28–44)
GLUCOSE SERPL-MCNC: 90 MG/DL (ref 70–110)
HCT VFR BLD AUTO: 43.9 % (ref 37–48.5)
HGB BLD-MCNC: 14.1 G/DL (ref 12–16)
IMM GRANULOCYTES # BLD AUTO: 0.01 K/UL (ref 0–0.04)
IMM GRANULOCYTES NFR BLD AUTO: 0.2 % (ref 0–0.5)
INTERVENTRICULAR SEPTUM: 0.8 CM (ref 0.6–1.1)
LA MAJOR: 4.39 CM
LA MINOR: 4.52 CM
LA WIDTH: 2.48 CM
LEFT ATRIUM SIZE: 2.7 CM
LEFT ATRIUM VOLUME INDEX MOD: 8.4 ML/M2
LEFT ATRIUM VOLUME INDEX: 14.1 ML/M2
LEFT ATRIUM VOLUME MOD: 15.1 CM3
LEFT ATRIUM VOLUME: 25.35 CM3
LEFT INTERNAL DIMENSION IN SYSTOLE: 2.41 CM (ref 2.1–4)
LEFT VENTRICLE DIASTOLIC VOLUME INDEX: 28.89 ML/M2
LEFT VENTRICLE DIASTOLIC VOLUME: 52.01 ML
LEFT VENTRICLE MASS INDEX: 42 G/M2
LEFT VENTRICLE SYSTOLIC VOLUME INDEX: 11.4 ML/M2
LEFT VENTRICLE SYSTOLIC VOLUME: 20.46 ML
LEFT VENTRICULAR INTERNAL DIMENSION IN DIASTOLE: 3.53 CM (ref 3.5–6)
LEFT VENTRICULAR MASS: 75.67 G
LV LATERAL E/E' RATIO: 9.86 M/S
LV SEPTAL E/E' RATIO: 9.86 M/S
LYMPHOCYTES # BLD AUTO: 1.3 K/UL (ref 1–4.8)
LYMPHOCYTES NFR BLD: 23.7 % (ref 18–48)
MAGNESIUM SERPL-MCNC: 1.7 MG/DL (ref 1.6–2.6)
MCH RBC QN AUTO: 29.8 PG (ref 27–31)
MCHC RBC AUTO-ENTMCNC: 32.1 G/DL (ref 32–36)
MCV RBC AUTO: 93 FL (ref 82–98)
MONOCYTES # BLD AUTO: 0.7 K/UL (ref 0.3–1)
MONOCYTES NFR BLD: 12.4 % (ref 4–15)
MV PEAK A VEL: 0.44 M/S
MV PEAK E VEL: 0.69 M/S
MV STENOSIS PRESSURE HALF TIME: 42.25 MS
MV VALVE AREA P 1/2 METHOD: 5.21 CM2
NEUTROPHILS # BLD AUTO: 3.3 K/UL (ref 1.8–7.7)
NEUTROPHILS NFR BLD: 61.1 % (ref 38–73)
NRBC BLD-RTO: 0 /100 WBC
PHOSPHATE SERPL-MCNC: 2.8 MG/DL (ref 2.7–4.5)
PISA TR MAX VEL: 2.46 M/S
PLATELET # BLD AUTO: 203 K/UL (ref 150–350)
PMV BLD AUTO: 9.8 FL (ref 9.2–12.9)
POTASSIUM SERPL-SCNC: 4.2 MMOL/L (ref 3.5–5.1)
RA MAJOR: 4.59 CM
RA PRESSURE: 3 MMHG
RA WIDTH: 2.85 CM
RBC # BLD AUTO: 4.73 M/UL (ref 4–5.4)
RIGHT VENTRICULAR END-DIASTOLIC DIMENSION: 2.66 CM
SINUS: 3.15 CM
SODIUM SERPL-SCNC: 136 MMOL/L (ref 136–145)
STJ: 2.47 CM
TDI LATERAL: 0.07 M/S
TDI SEPTAL: 0.07 M/S
TDI: 0.07 M/S
TR MAX PG: 24 MMHG
TRICUSPID ANNULAR PLANE SYSTOLIC EXCURSION: 0.84 CM
TV REST PULMONARY ARTERY PRESSURE: 27 MMHG
WBC # BLD AUTO: 5.41 K/UL (ref 3.9–12.7)

## 2021-02-25 PROCEDURE — 85025 COMPLETE CBC W/AUTO DIFF WBC: CPT

## 2021-02-25 PROCEDURE — 84100 ASSAY OF PHOSPHORUS: CPT

## 2021-02-25 PROCEDURE — 36415 COLL VENOUS BLD VENIPUNCTURE: CPT

## 2021-02-25 PROCEDURE — 80048 BASIC METABOLIC PNL TOTAL CA: CPT

## 2021-02-25 PROCEDURE — 25000003 PHARM REV CODE 250: Performed by: THORACIC SURGERY (CARDIOTHORACIC VASCULAR SURGERY)

## 2021-02-25 PROCEDURE — 93005 ELECTROCARDIOGRAM TRACING: CPT

## 2021-02-25 PROCEDURE — 93010 EKG 12-LEAD: ICD-10-PCS | Mod: ,,, | Performed by: INTERNAL MEDICINE

## 2021-02-25 PROCEDURE — 25000003 PHARM REV CODE 250: Performed by: NURSE PRACTITIONER

## 2021-02-25 PROCEDURE — 25000003 PHARM REV CODE 250: Performed by: STUDENT IN AN ORGANIZED HEALTH CARE EDUCATION/TRAINING PROGRAM

## 2021-02-25 PROCEDURE — 20600001 HC STEP DOWN PRIVATE ROOM

## 2021-02-25 PROCEDURE — 63600175 PHARM REV CODE 636 W HCPCS: Performed by: NURSE PRACTITIONER

## 2021-02-25 PROCEDURE — 83735 ASSAY OF MAGNESIUM: CPT

## 2021-02-25 PROCEDURE — 93010 ELECTROCARDIOGRAM REPORT: CPT | Mod: ,,, | Performed by: INTERNAL MEDICINE

## 2021-02-25 PROCEDURE — 97110 THERAPEUTIC EXERCISES: CPT

## 2021-02-25 RX ORDER — OXYCODONE HYDROCHLORIDE 10 MG/1
10 TABLET ORAL EVERY 4 HOURS PRN
Status: DISCONTINUED | OUTPATIENT
Start: 2021-02-25 | End: 2021-02-26 | Stop reason: HOSPADM

## 2021-02-25 RX ORDER — HYDROMORPHONE HYDROCHLORIDE 1 MG/ML
1 INJECTION, SOLUTION INTRAMUSCULAR; INTRAVENOUS; SUBCUTANEOUS ONCE
Status: COMPLETED | OUTPATIENT
Start: 2021-02-25 | End: 2021-02-25

## 2021-02-25 RX ORDER — HYDROMORPHONE HYDROCHLORIDE 1 MG/ML
0.5 INJECTION, SOLUTION INTRAMUSCULAR; INTRAVENOUS; SUBCUTANEOUS
Status: DISCONTINUED | OUTPATIENT
Start: 2021-02-25 | End: 2021-02-25

## 2021-02-25 RX ORDER — HYDROMORPHONE HYDROCHLORIDE 1 MG/ML
0.5 INJECTION, SOLUTION INTRAMUSCULAR; INTRAVENOUS; SUBCUTANEOUS
Status: DISCONTINUED | OUTPATIENT
Start: 2021-02-25 | End: 2021-02-26 | Stop reason: HOSPADM

## 2021-02-25 RX ADMIN — OXYCODONE HYDROCHLORIDE 10 MG: 10 TABLET ORAL at 09:02

## 2021-02-25 RX ADMIN — OXYCODONE HYDROCHLORIDE 10 MG: 10 TABLET ORAL at 03:02

## 2021-02-25 RX ADMIN — METHOCARBAMOL 500 MG: 500 TABLET ORAL at 09:02

## 2021-02-25 RX ADMIN — OXYCODONE HYDROCHLORIDE 10 MG: 10 TABLET ORAL at 12:02

## 2021-02-25 RX ADMIN — HYDROMORPHONE HYDROCHLORIDE 1 MG: 1 INJECTION, SOLUTION INTRAMUSCULAR; INTRAVENOUS; SUBCUTANEOUS at 10:02

## 2021-02-25 RX ADMIN — OXYCODONE HYDROCHLORIDE 10 MG: 10 TABLET ORAL at 08:02

## 2021-02-25 RX ADMIN — METHOCARBAMOL 500 MG: 500 TABLET ORAL at 04:02

## 2021-02-25 RX ADMIN — SENNOSIDES 8.6 MG: 8.6 TABLET, FILM COATED ORAL at 08:02

## 2021-02-25 RX ADMIN — METHOCARBAMOL 500 MG: 500 TABLET ORAL at 08:02

## 2021-02-25 RX ADMIN — OXYCODONE HYDROCHLORIDE 10 MG: 10 TABLET ORAL at 04:02

## 2021-02-25 RX ADMIN — HYDROMORPHONE HYDROCHLORIDE 0.5 MG: 1 INJECTION, SOLUTION INTRAMUSCULAR; INTRAVENOUS; SUBCUTANEOUS at 05:02

## 2021-02-25 RX ADMIN — METHOCARBAMOL 500 MG: 500 TABLET ORAL at 12:02

## 2021-02-26 VITALS
DIASTOLIC BLOOD PRESSURE: 71 MMHG | HEIGHT: 71 IN | BODY MASS INDEX: 19.32 KG/M2 | RESPIRATION RATE: 16 BRPM | HEART RATE: 93 BPM | WEIGHT: 138 LBS | TEMPERATURE: 99 F | OXYGEN SATURATION: 99 % | SYSTOLIC BLOOD PRESSURE: 122 MMHG

## 2021-02-26 LAB
ALBUMIN SERPL BCP-MCNC: 3.1 G/DL (ref 3.5–5.2)
ALP SERPL-CCNC: 61 U/L (ref 55–135)
ALT SERPL W/O P-5'-P-CCNC: 15 U/L (ref 10–44)
ANION GAP SERPL CALC-SCNC: 12 MMOL/L (ref 8–16)
AST SERPL-CCNC: 18 U/L (ref 10–40)
BACTERIA SPEC ANAEROBE CULT: ABNORMAL
BASOPHILS # BLD AUTO: 0.01 K/UL (ref 0–0.2)
BASOPHILS NFR BLD: 0.2 % (ref 0–1.9)
BILIRUB SERPL-MCNC: 1.5 MG/DL (ref 0.1–1)
BUN SERPL-MCNC: 8 MG/DL (ref 6–20)
CALCIUM SERPL-MCNC: 9.3 MG/DL (ref 8.7–10.5)
CHLORIDE SERPL-SCNC: 98 MMOL/L (ref 95–110)
CO2 SERPL-SCNC: 28 MMOL/L (ref 23–29)
CREAT SERPL-MCNC: 0.7 MG/DL (ref 0.5–1.4)
DIFFERENTIAL METHOD: ABNORMAL
EOSINOPHIL # BLD AUTO: 0.1 K/UL (ref 0–0.5)
EOSINOPHIL NFR BLD: 2.1 % (ref 0–8)
ERYTHROCYTE [DISTWIDTH] IN BLOOD BY AUTOMATED COUNT: 11.8 % (ref 11.5–14.5)
EST. GFR  (AFRICAN AMERICAN): >60 ML/MIN/1.73 M^2
EST. GFR  (NON AFRICAN AMERICAN): >60 ML/MIN/1.73 M^2
GLUCOSE SERPL-MCNC: 99 MG/DL (ref 70–110)
HCT VFR BLD AUTO: 41.1 % (ref 37–48.5)
HGB BLD-MCNC: 13.4 G/DL (ref 12–16)
IMM GRANULOCYTES # BLD AUTO: 0.03 K/UL (ref 0–0.04)
IMM GRANULOCYTES NFR BLD AUTO: 0.6 % (ref 0–0.5)
LYMPHOCYTES # BLD AUTO: 1 K/UL (ref 1–4.8)
LYMPHOCYTES NFR BLD: 18.9 % (ref 18–48)
MAGNESIUM SERPL-MCNC: 1.8 MG/DL (ref 1.6–2.6)
MCH RBC QN AUTO: 30 PG (ref 27–31)
MCHC RBC AUTO-ENTMCNC: 32.6 G/DL (ref 32–36)
MCV RBC AUTO: 92 FL (ref 82–98)
MONOCYTES # BLD AUTO: 0.7 K/UL (ref 0.3–1)
MONOCYTES NFR BLD: 14.3 % (ref 4–15)
NEUTROPHILS # BLD AUTO: 3.3 K/UL (ref 1.8–7.7)
NEUTROPHILS NFR BLD: 63.9 % (ref 38–73)
NRBC BLD-RTO: 0 /100 WBC
PHOSPHATE SERPL-MCNC: 2.7 MG/DL (ref 2.7–4.5)
PLATELET # BLD AUTO: 214 K/UL (ref 150–350)
PMV BLD AUTO: 9.7 FL (ref 9.2–12.9)
POTASSIUM SERPL-SCNC: 4.5 MMOL/L (ref 3.5–5.1)
PROT SERPL-MCNC: 7.1 G/DL (ref 6–8.4)
RBC # BLD AUTO: 4.47 M/UL (ref 4–5.4)
SODIUM SERPL-SCNC: 138 MMOL/L (ref 136–145)
WBC # BLD AUTO: 5.18 K/UL (ref 3.9–12.7)

## 2021-02-26 PROCEDURE — 36415 COLL VENOUS BLD VENIPUNCTURE: CPT

## 2021-02-26 PROCEDURE — 25000003 PHARM REV CODE 250: Performed by: NURSE PRACTITIONER

## 2021-02-26 PROCEDURE — 25000003 PHARM REV CODE 250: Performed by: THORACIC SURGERY (CARDIOTHORACIC VASCULAR SURGERY)

## 2021-02-26 PROCEDURE — 25000003 PHARM REV CODE 250: Performed by: STUDENT IN AN ORGANIZED HEALTH CARE EDUCATION/TRAINING PROGRAM

## 2021-02-26 PROCEDURE — 84100 ASSAY OF PHOSPHORUS: CPT

## 2021-02-26 PROCEDURE — 80053 COMPREHEN METABOLIC PANEL: CPT

## 2021-02-26 PROCEDURE — 85025 COMPLETE CBC W/AUTO DIFF WBC: CPT

## 2021-02-26 PROCEDURE — 97535 SELF CARE MNGMENT TRAINING: CPT

## 2021-02-26 PROCEDURE — 83735 ASSAY OF MAGNESIUM: CPT

## 2021-02-26 RX ORDER — OXYCODONE HYDROCHLORIDE 10 MG/1
10 TABLET ORAL EVERY 6 HOURS PRN
Qty: 28 TABLET | Refills: 0 | Status: SHIPPED | OUTPATIENT
Start: 2021-02-26 | End: 2021-03-05

## 2021-02-26 RX ORDER — SENNOSIDES 8.6 MG/1
1 TABLET ORAL DAILY
COMMUNITY
Start: 2021-02-27 | End: 2021-03-03

## 2021-02-26 RX ADMIN — SENNOSIDES 8.6 MG: 8.6 TABLET, FILM COATED ORAL at 08:02

## 2021-02-26 RX ADMIN — OXYCODONE HYDROCHLORIDE 10 MG: 10 TABLET ORAL at 01:02

## 2021-02-26 RX ADMIN — METHOCARBAMOL 500 MG: 500 TABLET ORAL at 08:02

## 2021-03-01 LAB
BACTERIA SPEC ANAEROBE CULT: NORMAL
BACTERIA SPEC ANAEROBE CULT: NORMAL

## 2021-03-02 ENCOUNTER — OFFICE VISIT (OUTPATIENT)
Dept: OBSTETRICS AND GYNECOLOGY | Facility: CLINIC | Age: 41
End: 2021-03-02
Payer: COMMERCIAL

## 2021-03-02 VITALS — WEIGHT: 135.5 LBS | SYSTOLIC BLOOD PRESSURE: 104 MMHG | BODY MASS INDEX: 18.89 KG/M2 | DIASTOLIC BLOOD PRESSURE: 58 MMHG

## 2021-03-02 DIAGNOSIS — Z30.41 ENCOUNTER FOR SURVEILLANCE OF CONTRACEPTIVE PILLS: ICD-10-CM

## 2021-03-02 DIAGNOSIS — Z01.419 VISIT FOR GYNECOLOGIC EXAMINATION: Primary | ICD-10-CM

## 2021-03-02 DIAGNOSIS — Z12.31 ENCOUNTER FOR SCREENING MAMMOGRAM FOR MALIGNANT NEOPLASM OF BREAST: ICD-10-CM

## 2021-03-02 PROCEDURE — 88175 CYTOPATH C/V AUTO FLUID REDO: CPT | Performed by: OBSTETRICS & GYNECOLOGY

## 2021-03-02 PROCEDURE — 1126F PR PAIN SEVERITY QUANTIFIED, NO PAIN PRESENT: ICD-10-PCS | Mod: S$GLB,,, | Performed by: OBSTETRICS & GYNECOLOGY

## 2021-03-02 PROCEDURE — 99999 PR PBB SHADOW E&M-EST. PATIENT-LVL III: CPT | Mod: PBBFAC,,, | Performed by: OBSTETRICS & GYNECOLOGY

## 2021-03-02 PROCEDURE — 1126F AMNT PAIN NOTED NONE PRSNT: CPT | Mod: S$GLB,,, | Performed by: OBSTETRICS & GYNECOLOGY

## 2021-03-02 PROCEDURE — 99396 PR PREVENTIVE VISIT,EST,40-64: ICD-10-PCS | Mod: S$GLB,,, | Performed by: OBSTETRICS & GYNECOLOGY

## 2021-03-02 PROCEDURE — 99999 PR PBB SHADOW E&M-EST. PATIENT-LVL III: ICD-10-PCS | Mod: PBBFAC,,, | Performed by: OBSTETRICS & GYNECOLOGY

## 2021-03-02 PROCEDURE — 87624 HPV HI-RISK TYP POOLED RSLT: CPT | Performed by: OBSTETRICS & GYNECOLOGY

## 2021-03-02 PROCEDURE — 99396 PREV VISIT EST AGE 40-64: CPT | Mod: S$GLB,,, | Performed by: OBSTETRICS & GYNECOLOGY

## 2021-03-02 PROCEDURE — 3008F PR BODY MASS INDEX (BMI) DOCUMENTED: ICD-10-PCS | Mod: CPTII,S$GLB,, | Performed by: OBSTETRICS & GYNECOLOGY

## 2021-03-02 PROCEDURE — 3008F BODY MASS INDEX DOCD: CPT | Mod: CPTII,S$GLB,, | Performed by: OBSTETRICS & GYNECOLOGY

## 2021-03-02 RX ORDER — NORETHINDRONE ACETATE AND ETHINYL ESTRADIOL .02; 1 MG/1; MG/1
1 TABLET ORAL DAILY
Qty: 63 TABLET | Refills: 4 | Status: ON HOLD | OUTPATIENT
Start: 2021-03-02 | End: 2021-07-03 | Stop reason: HOSPADM

## 2021-03-03 ENCOUNTER — PATIENT MESSAGE (OUTPATIENT)
Dept: CARDIOTHORACIC SURGERY | Facility: CLINIC | Age: 41
End: 2021-03-03

## 2021-03-03 ENCOUNTER — OFFICE VISIT (OUTPATIENT)
Dept: INTERNAL MEDICINE | Facility: CLINIC | Age: 41
End: 2021-03-03
Payer: COMMERCIAL

## 2021-03-03 VITALS — DIASTOLIC BLOOD PRESSURE: 65 MMHG | WEIGHT: 134 LBS | SYSTOLIC BLOOD PRESSURE: 118 MMHG | BODY MASS INDEX: 18.69 KG/M2

## 2021-03-03 DIAGNOSIS — R76.8 ANA POSITIVE: ICD-10-CM

## 2021-03-03 DIAGNOSIS — Z00.00 ANNUAL PHYSICAL EXAM: Primary | ICD-10-CM

## 2021-03-03 DIAGNOSIS — D18.03 LIVER HEMANGIOMA: ICD-10-CM

## 2021-03-03 DIAGNOSIS — D72.819 LEUKOPENIA, UNSPECIFIED TYPE: ICD-10-CM

## 2021-03-03 DIAGNOSIS — Z86.16 HISTORY OF 2019 NOVEL CORONAVIRUS DISEASE (COVID-19): ICD-10-CM

## 2021-03-03 DIAGNOSIS — Z98.890 S/P PERICARDIAL WINDOW CREATION: ICD-10-CM

## 2021-03-03 PROBLEM — U07.1 COVID-19 VIRUS INFECTION: Status: RESOLVED | Noted: 2020-04-22 | Resolved: 2021-03-03

## 2021-03-03 PROBLEM — Z01.810 PREOPERATIVE CARDIOVASCULAR EXAMINATION: Status: RESOLVED | Noted: 2020-09-03 | Resolved: 2021-03-03

## 2021-03-03 LAB
FINAL PATHOLOGIC DIAGNOSIS: NORMAL
Lab: NORMAL

## 2021-03-03 PROCEDURE — 3008F PR BODY MASS INDEX (BMI) DOCUMENTED: ICD-10-PCS | Mod: CPTII,S$GLB,, | Performed by: INTERNAL MEDICINE

## 2021-03-03 PROCEDURE — 1125F AMNT PAIN NOTED PAIN PRSNT: CPT | Mod: S$GLB,,, | Performed by: INTERNAL MEDICINE

## 2021-03-03 PROCEDURE — 99999 PR PBB SHADOW E&M-EST. PATIENT-LVL III: ICD-10-PCS | Mod: PBBFAC,,, | Performed by: INTERNAL MEDICINE

## 2021-03-03 PROCEDURE — 99396 PR PREVENTIVE VISIT,EST,40-64: ICD-10-PCS | Mod: S$GLB,,, | Performed by: INTERNAL MEDICINE

## 2021-03-03 PROCEDURE — 99999 PR PBB SHADOW E&M-EST. PATIENT-LVL III: CPT | Mod: PBBFAC,,, | Performed by: INTERNAL MEDICINE

## 2021-03-03 PROCEDURE — 3008F BODY MASS INDEX DOCD: CPT | Mod: CPTII,S$GLB,, | Performed by: INTERNAL MEDICINE

## 2021-03-03 PROCEDURE — 1125F PR PAIN SEVERITY QUANTIFIED, PAIN PRESENT: ICD-10-PCS | Mod: S$GLB,,, | Performed by: INTERNAL MEDICINE

## 2021-03-03 PROCEDURE — 99396 PREV VISIT EST AGE 40-64: CPT | Mod: S$GLB,,, | Performed by: INTERNAL MEDICINE

## 2021-03-04 ENCOUNTER — TELEPHONE (OUTPATIENT)
Dept: INTERNAL MEDICINE | Facility: CLINIC | Age: 41
End: 2021-03-04

## 2021-03-04 ENCOUNTER — PATIENT MESSAGE (OUTPATIENT)
Dept: INTERNAL MEDICINE | Facility: CLINIC | Age: 41
End: 2021-03-04

## 2021-03-10 LAB
CLINICAL INFO: NORMAL
CYTO CVX: NORMAL
CYTOLOGIST CVX/VAG CYTO: NORMAL
CYTOLOGY CMNT CVX/VAG CYTO-IMP: NORMAL
CYTOLOGY PAP THIN PREP EXPLANATION: NORMAL
DATE OF PREVIOUS PAP: NORMAL
DATE PREVIOUS BX: NO
HPV I/H RISK 4 DNA CVX QL NAA+PROBE: NOT DETECTED
LMP START DATE: NORMAL
SPECIMEN SOURCE CVX/VAG CYTO: NORMAL
STAT OF ADQ CVX/VAG CYTO-IMP: NORMAL

## 2021-03-11 ENCOUNTER — PATIENT MESSAGE (OUTPATIENT)
Dept: CARDIOLOGY | Facility: CLINIC | Age: 41
End: 2021-03-11

## 2021-03-11 ENCOUNTER — HOSPITAL ENCOUNTER (EMERGENCY)
Facility: HOSPITAL | Age: 41
Discharge: HOME | End: 2021-03-11
Attending: EMERGENCY MEDICINE
Payer: COMMERCIAL

## 2021-03-11 VITALS
TEMPERATURE: 99 F | SYSTOLIC BLOOD PRESSURE: 128 MMHG | HEIGHT: 71 IN | WEIGHT: 138 LBS | OXYGEN SATURATION: 100 % | RESPIRATION RATE: 20 BRPM | HEART RATE: 78 BPM | DIASTOLIC BLOOD PRESSURE: 65 MMHG | BODY MASS INDEX: 19.32 KG/M2

## 2021-03-11 DIAGNOSIS — R07.89 CHEST TIGHTNESS: ICD-10-CM

## 2021-03-11 DIAGNOSIS — Z98.890 S/P PERICARDIAL WINDOW CREATION: ICD-10-CM

## 2021-03-11 LAB
ALBUMIN SERPL BCP-MCNC: 4.5 G/DL (ref 3.5–5.2)
ALP SERPL-CCNC: 83 U/L (ref 55–135)
ALT SERPL W/O P-5'-P-CCNC: 22 U/L (ref 10–44)
ANION GAP SERPL CALC-SCNC: 12 MMOL/L (ref 8–16)
AST SERPL-CCNC: 22 U/L (ref 10–40)
AV INDEX (PROSTH): 0.75
AV MEAN GRADIENT: 3 MMHG
AV PEAK GRADIENT: 5 MMHG
AV VALVE AREA: 2.76 CM2
AV VELOCITY RATIO: 0.81
BASOPHILS # BLD AUTO: 0.05 K/UL (ref 0–0.2)
BASOPHILS NFR BLD: 1.1 % (ref 0–1.9)
BILIRUB SERPL-MCNC: 0.8 MG/DL (ref 0.1–1)
BSA FOR ECHO PROCEDURE: 1.77 M2
BUN SERPL-MCNC: 9 MG/DL (ref 6–20)
CALCIUM SERPL-MCNC: 10.4 MG/DL (ref 8.7–10.5)
CHLORIDE SERPL-SCNC: 102 MMOL/L (ref 95–110)
CO2 SERPL-SCNC: 25 MMOL/L (ref 23–29)
CREAT SERPL-MCNC: 0.9 MG/DL (ref 0.5–1.4)
CV ECHO LV RWT: 0.31 CM
DIFFERENTIAL METHOD: ABNORMAL
DOP CALC AO PEAK VEL: 1.13 M/S
DOP CALC AO VTI: 20.15 CM
DOP CALC LVOT AREA: 3.7 CM2
DOP CALC LVOT DIAMETER: 2.17 CM
DOP CALC LVOT PEAK VEL: 0.91 M/S
DOP CALC LVOT STROKE VOLUME: 55.52 CM3
DOP CALCLVOT PEAK VEL VTI: 15.02 CM
E WAVE DECELERATION TIME: 197.26 MSEC
E/A RATIO: 1.87
E/E' RATIO: 7.1 M/S
ECHO LV POSTERIOR WALL: 0.68 CM (ref 0.6–1.1)
EOSINOPHIL # BLD AUTO: 0.3 K/UL (ref 0–0.5)
EOSINOPHIL NFR BLD: 5.3 % (ref 0–8)
ERYTHROCYTE [DISTWIDTH] IN BLOOD BY AUTOMATED COUNT: 11.8 % (ref 11.5–14.5)
EST. GFR  (AFRICAN AMERICAN): >60 ML/MIN/1.73 M^2
EST. GFR  (NON AFRICAN AMERICAN): >60 ML/MIN/1.73 M^2
FRACTIONAL SHORTENING: 32 % (ref 28–44)
GLUCOSE SERPL-MCNC: 101 MG/DL (ref 70–110)
HCT VFR BLD AUTO: 44.7 % (ref 37–48.5)
HGB BLD-MCNC: 14.2 G/DL (ref 12–16)
IMM GRANULOCYTES # BLD AUTO: 0.01 K/UL (ref 0–0.04)
IMM GRANULOCYTES NFR BLD AUTO: 0.2 % (ref 0–0.5)
INTERVENTRICULAR SEPTUM: 0.65 CM (ref 0.6–1.1)
LA MAJOR: 5.61 CM
LA MINOR: 5.42 CM
LA WIDTH: 2.51 CM
LEFT ATRIUM SIZE: 2.59 CM
LEFT ATRIUM VOLUME INDEX MOD: 13.4 ML/M2
LEFT ATRIUM VOLUME INDEX: 16.9 ML/M2
LEFT ATRIUM VOLUME MOD: 24.11 CM3
LEFT ATRIUM VOLUME: 30.47 CM3
LEFT INTERNAL DIMENSION IN SYSTOLE: 3 CM (ref 2.1–4)
LEFT VENTRICLE DIASTOLIC VOLUME INDEX: 49.48 ML/M2
LEFT VENTRICLE DIASTOLIC VOLUME: 89.06 ML
LEFT VENTRICLE MASS INDEX: 48 G/M2
LEFT VENTRICLE SYSTOLIC VOLUME INDEX: 19.4 ML/M2
LEFT VENTRICLE SYSTOLIC VOLUME: 34.97 ML
LEFT VENTRICULAR INTERNAL DIMENSION IN DIASTOLE: 4.43 CM (ref 3.5–6)
LEFT VENTRICULAR MASS: 87.27 G
LV LATERAL E/E' RATIO: 5.92 M/S
LV SEPTAL E/E' RATIO: 8.88 M/S
LYMPHOCYTES # BLD AUTO: 1.6 K/UL (ref 1–4.8)
LYMPHOCYTES NFR BLD: 34.5 % (ref 18–48)
MAGNESIUM SERPL-MCNC: 2 MG/DL (ref 1.6–2.6)
MCH RBC QN AUTO: 30 PG (ref 27–31)
MCHC RBC AUTO-ENTMCNC: 31.8 G/DL (ref 32–36)
MCV RBC AUTO: 94 FL (ref 82–98)
MONOCYTES # BLD AUTO: 0.4 K/UL (ref 0.3–1)
MONOCYTES NFR BLD: 8 % (ref 4–15)
MV PEAK A VEL: 0.38 M/S
MV PEAK E VEL: 0.71 M/S
MV STENOSIS PRESSURE HALF TIME: 57.21 MS
MV VALVE AREA P 1/2 METHOD: 3.85 CM2
NEUTROPHILS # BLD AUTO: 2.4 K/UL (ref 1.8–7.7)
NEUTROPHILS NFR BLD: 50.9 % (ref 38–73)
NRBC BLD-RTO: 0 /100 WBC
PISA TR MAX VEL: 2.07 M/S
PLATELET # BLD AUTO: 391 K/UL (ref 150–350)
PMV BLD AUTO: 9 FL (ref 9.2–12.9)
POTASSIUM SERPL-SCNC: 3.6 MMOL/L (ref 3.5–5.1)
PROT SERPL-MCNC: 9 G/DL (ref 6–8.4)
RA MAJOR: 5.1 CM
RA PRESSURE: 3 MMHG
RA WIDTH: 3.36 CM
RBC # BLD AUTO: 4.74 M/UL (ref 4–5.4)
RIGHT VENTRICULAR END-DIASTOLIC DIMENSION: 3.07 CM
SINUS: 2.81 CM
SODIUM SERPL-SCNC: 139 MMOL/L (ref 136–145)
STJ: 2.58 CM
TDI LATERAL: 0.12 M/S
TDI SEPTAL: 0.08 M/S
TDI: 0.1 M/S
TR MAX PG: 17 MMHG
TRICUSPID ANNULAR PLANE SYSTOLIC EXCURSION: 0.71 CM
TROPONIN I SERPL DL<=0.01 NG/ML-MCNC: <0.006 NG/ML (ref 0–0.03)
TSH SERPL DL<=0.005 MIU/L-ACNC: 1.34 UIU/ML (ref 0.4–4)
TV REST PULMONARY ARTERY PRESSURE: 20 MMHG
WBC # BLD AUTO: 4.76 K/UL (ref 3.9–12.7)

## 2021-03-11 PROCEDURE — 85025 COMPLETE CBC W/AUTO DIFF WBC: CPT | Performed by: STUDENT IN AN ORGANIZED HEALTH CARE EDUCATION/TRAINING PROGRAM

## 2021-03-11 PROCEDURE — 25000003 PHARM REV CODE 250: Performed by: EMERGENCY MEDICINE

## 2021-03-11 PROCEDURE — 93010 EKG 12-LEAD: ICD-10-PCS | Mod: ,,, | Performed by: INTERNAL MEDICINE

## 2021-03-11 PROCEDURE — 93010 ELECTROCARDIOGRAM REPORT: CPT | Mod: ,,, | Performed by: INTERNAL MEDICINE

## 2021-03-11 PROCEDURE — 84484 ASSAY OF TROPONIN QUANT: CPT | Performed by: STUDENT IN AN ORGANIZED HEALTH CARE EDUCATION/TRAINING PROGRAM

## 2021-03-11 PROCEDURE — 80053 COMPREHEN METABOLIC PANEL: CPT | Performed by: STUDENT IN AN ORGANIZED HEALTH CARE EDUCATION/TRAINING PROGRAM

## 2021-03-11 PROCEDURE — 99284 PR EMERGENCY DEPT VISIT,LEVEL IV: ICD-10-PCS | Mod: ,,, | Performed by: EMERGENCY MEDICINE

## 2021-03-11 PROCEDURE — 93005 ELECTROCARDIOGRAM TRACING: CPT

## 2021-03-11 PROCEDURE — 99284 EMERGENCY DEPT VISIT MOD MDM: CPT | Mod: ,,, | Performed by: EMERGENCY MEDICINE

## 2021-03-11 PROCEDURE — 83735 ASSAY OF MAGNESIUM: CPT | Performed by: STUDENT IN AN ORGANIZED HEALTH CARE EDUCATION/TRAINING PROGRAM

## 2021-03-11 PROCEDURE — 84443 ASSAY THYROID STIM HORMONE: CPT | Performed by: STUDENT IN AN ORGANIZED HEALTH CARE EDUCATION/TRAINING PROGRAM

## 2021-03-11 PROCEDURE — 99285 EMERGENCY DEPT VISIT HI MDM: CPT | Mod: 25

## 2021-03-11 RX ORDER — FAMOTIDINE 20 MG/1
20 TABLET, FILM COATED ORAL
Status: COMPLETED | OUTPATIENT
Start: 2021-03-11 | End: 2021-03-11

## 2021-03-11 RX ADMIN — FAMOTIDINE 20 MG: 20 TABLET, FILM COATED ORAL at 04:03

## 2021-03-12 ENCOUNTER — TELEPHONE (OUTPATIENT)
Dept: EMERGENCY MEDICINE | Facility: HOSPITAL | Age: 41
End: 2021-03-12

## 2021-03-13 ENCOUNTER — PATIENT MESSAGE (OUTPATIENT)
Dept: INTERNAL MEDICINE | Facility: CLINIC | Age: 41
End: 2021-03-13

## 2021-03-13 DIAGNOSIS — E78.2 MIXED HYPERLIPIDEMIA: Primary | ICD-10-CM

## 2021-03-14 ENCOUNTER — PATIENT MESSAGE (OUTPATIENT)
Dept: OBSTETRICS AND GYNECOLOGY | Facility: CLINIC | Age: 41
End: 2021-03-14

## 2021-03-16 ENCOUNTER — HOSPITAL ENCOUNTER (OUTPATIENT)
Dept: RADIOLOGY | Facility: HOSPITAL | Age: 41
Discharge: HOME | End: 2021-03-16
Attending: INTERNAL MEDICINE
Payer: COMMERCIAL

## 2021-03-16 ENCOUNTER — PATIENT MESSAGE (OUTPATIENT)
Dept: INTERNAL MEDICINE | Facility: CLINIC | Age: 41
End: 2021-03-16

## 2021-03-16 ENCOUNTER — PATIENT MESSAGE (OUTPATIENT)
Dept: HEPATOLOGY | Facility: CLINIC | Age: 41
End: 2021-03-16

## 2021-03-16 ENCOUNTER — OFFICE VISIT (OUTPATIENT)
Dept: CARDIOLOGY | Facility: CLINIC | Age: 41
End: 2021-03-16
Payer: COMMERCIAL

## 2021-03-16 VITALS
HEART RATE: 73 BPM | DIASTOLIC BLOOD PRESSURE: 83 MMHG | HEIGHT: 71 IN | WEIGHT: 134.69 LBS | SYSTOLIC BLOOD PRESSURE: 132 MMHG | BODY MASS INDEX: 18.85 KG/M2

## 2021-03-16 DIAGNOSIS — K76.9 LIVER DISEASE, UNSPECIFIED: ICD-10-CM

## 2021-03-16 DIAGNOSIS — D18.03 LIVER HEMANGIOMA: Primary | ICD-10-CM

## 2021-03-16 DIAGNOSIS — I31.39 PERICARDIAL EFFUSION: Primary | ICD-10-CM

## 2021-03-16 DIAGNOSIS — R16.0 LIVER MASS: Primary | ICD-10-CM

## 2021-03-16 PROCEDURE — 74183 MRI ABD W/O CNTR FLWD CNTR: CPT | Mod: 26,,, | Performed by: RADIOLOGY

## 2021-03-16 PROCEDURE — 74183 MRI ABDOMEN W WO CONTRAST: ICD-10-PCS | Mod: 26,,, | Performed by: RADIOLOGY

## 2021-03-16 PROCEDURE — 3008F BODY MASS INDEX DOCD: CPT | Mod: CPTII,S$GLB,, | Performed by: INTERNAL MEDICINE

## 2021-03-16 PROCEDURE — 99999 PR PBB SHADOW E&M-EST. PATIENT-LVL III: CPT | Mod: PBBFAC,,, | Performed by: INTERNAL MEDICINE

## 2021-03-16 PROCEDURE — 74183 MRI ABD W/O CNTR FLWD CNTR: CPT | Mod: TC

## 2021-03-16 PROCEDURE — 1126F PR PAIN SEVERITY QUANTIFIED, NO PAIN PRESENT: ICD-10-PCS | Mod: S$GLB,,, | Performed by: INTERNAL MEDICINE

## 2021-03-16 PROCEDURE — A9585 GADOBUTROL INJECTION: HCPCS | Performed by: INTERNAL MEDICINE

## 2021-03-16 PROCEDURE — 25500020 PHARM REV CODE 255: Performed by: INTERNAL MEDICINE

## 2021-03-16 PROCEDURE — 99999 PR PBB SHADOW E&M-EST. PATIENT-LVL III: ICD-10-PCS | Mod: PBBFAC,,, | Performed by: INTERNAL MEDICINE

## 2021-03-16 PROCEDURE — 99213 PR OFFICE/OUTPT VISIT, EST, LEVL III, 20-29 MIN: ICD-10-PCS | Mod: S$GLB,,, | Performed by: INTERNAL MEDICINE

## 2021-03-16 PROCEDURE — 99213 OFFICE O/P EST LOW 20 MIN: CPT | Mod: S$GLB,,, | Performed by: INTERNAL MEDICINE

## 2021-03-16 PROCEDURE — 3008F PR BODY MASS INDEX (BMI) DOCUMENTED: ICD-10-PCS | Mod: CPTII,S$GLB,, | Performed by: INTERNAL MEDICINE

## 2021-03-16 PROCEDURE — 1126F AMNT PAIN NOTED NONE PRSNT: CPT | Mod: S$GLB,,, | Performed by: INTERNAL MEDICINE

## 2021-03-16 RX ORDER — GADOBUTROL 604.72 MG/ML
10 INJECTION INTRAVENOUS
Status: COMPLETED | OUTPATIENT
Start: 2021-03-16 | End: 2021-03-16

## 2021-03-16 RX ADMIN — GADOBUTROL 10 ML: 604.72 INJECTION INTRAVENOUS at 07:03

## 2021-03-17 ENCOUNTER — TELEPHONE (OUTPATIENT)
Dept: HEPATOLOGY | Facility: CLINIC | Age: 41
End: 2021-03-17

## 2021-03-18 ENCOUNTER — PATIENT MESSAGE (OUTPATIENT)
Dept: HEPATOLOGY | Facility: CLINIC | Age: 41
End: 2021-03-18

## 2021-03-18 ENCOUNTER — PATIENT MESSAGE (OUTPATIENT)
Dept: INTERNAL MEDICINE | Facility: CLINIC | Age: 41
End: 2021-03-18

## 2021-03-18 ENCOUNTER — LAB VISIT (OUTPATIENT)
Dept: LAB | Facility: HOSPITAL | Age: 41
End: 2021-03-18
Attending: INTERNAL MEDICINE
Payer: COMMERCIAL

## 2021-03-18 DIAGNOSIS — R16.0 LIVER MASS: ICD-10-CM

## 2021-03-18 DIAGNOSIS — E78.2 MIXED HYPERLIPIDEMIA: ICD-10-CM

## 2021-03-18 DIAGNOSIS — Z00.00 ANNUAL PHYSICAL EXAM: ICD-10-CM

## 2021-03-18 LAB
25(OH)D3+25(OH)D2 SERPL-MCNC: 38 NG/ML (ref 30–96)
AFP SERPL-MCNC: 14 NG/ML (ref 0–8.4)
ALBUMIN SERPL BCP-MCNC: 4.1 G/DL (ref 3.5–5.2)
ALP SERPL-CCNC: 72 U/L (ref 55–135)
ALT SERPL W/O P-5'-P-CCNC: 14 U/L (ref 10–44)
ANION GAP SERPL CALC-SCNC: 9 MMOL/L (ref 8–16)
AST SERPL-CCNC: 17 U/L (ref 10–40)
BASOPHILS # BLD AUTO: 0.03 K/UL (ref 0–0.2)
BASOPHILS NFR BLD: 0.8 % (ref 0–1.9)
BILIRUB SERPL-MCNC: 1.1 MG/DL (ref 0.1–1)
BUN SERPL-MCNC: 11 MG/DL (ref 6–20)
CALCIUM SERPL-MCNC: 9.6 MG/DL (ref 8.7–10.5)
CANCER AG125 SERPL-ACNC: 28 U/ML (ref 0–30)
CANCER AG19-9 SERPL-ACNC: 107 U/ML (ref 2–40)
CEA SERPL-MCNC: 1 NG/ML (ref 0–5)
CHLORIDE SERPL-SCNC: 107 MMOL/L (ref 95–110)
CHOLEST SERPL-MCNC: 218 MG/DL (ref 120–199)
CHOLEST/HDLC SERPL: 3.3 {RATIO} (ref 2–5)
CO2 SERPL-SCNC: 28 MMOL/L (ref 23–29)
CREAT SERPL-MCNC: 0.8 MG/DL (ref 0.5–1.4)
DIFFERENTIAL METHOD: ABNORMAL
EOSINOPHIL # BLD AUTO: 0.2 K/UL (ref 0–0.5)
EOSINOPHIL NFR BLD: 4.9 % (ref 0–8)
ERYTHROCYTE [DISTWIDTH] IN BLOOD BY AUTOMATED COUNT: 11.9 % (ref 11.5–14.5)
EST. GFR  (AFRICAN AMERICAN): >60 ML/MIN/1.73 M^2
EST. GFR  (NON AFRICAN AMERICAN): >60 ML/MIN/1.73 M^2
GLUCOSE SERPL-MCNC: 82 MG/DL (ref 70–110)
HCT VFR BLD AUTO: 42.8 % (ref 37–48.5)
HDLC SERPL-MCNC: 67 MG/DL (ref 40–75)
HDLC SERPL: 30.7 % (ref 20–50)
HGB BLD-MCNC: 13.7 G/DL (ref 12–16)
IMM GRANULOCYTES # BLD AUTO: 0.01 K/UL (ref 0–0.04)
IMM GRANULOCYTES NFR BLD AUTO: 0.3 % (ref 0–0.5)
LDLC SERPL CALC-MCNC: 136.4 MG/DL (ref 63–159)
LYMPHOCYTES # BLD AUTO: 1.4 K/UL (ref 1–4.8)
LYMPHOCYTES NFR BLD: 36.8 % (ref 18–48)
MCH RBC QN AUTO: 30.2 PG (ref 27–31)
MCHC RBC AUTO-ENTMCNC: 32 G/DL (ref 32–36)
MCV RBC AUTO: 94 FL (ref 82–98)
MONOCYTES # BLD AUTO: 0.2 K/UL (ref 0.3–1)
MONOCYTES NFR BLD: 6.1 % (ref 4–15)
NEUTROPHILS # BLD AUTO: 2 K/UL (ref 1.8–7.7)
NEUTROPHILS NFR BLD: 51.1 % (ref 38–73)
NONHDLC SERPL-MCNC: 151 MG/DL
NRBC BLD-RTO: 0 /100 WBC
PLATELET # BLD AUTO: 278 K/UL (ref 150–350)
PMV BLD AUTO: 9.1 FL (ref 9.2–12.9)
POTASSIUM SERPL-SCNC: 3.9 MMOL/L (ref 3.5–5.1)
PROT SERPL-MCNC: 7.9 G/DL (ref 6–8.4)
RBC # BLD AUTO: 4.54 M/UL (ref 4–5.4)
SODIUM SERPL-SCNC: 144 MMOL/L (ref 136–145)
TRIGL SERPL-MCNC: 73 MG/DL (ref 30–150)
TSH SERPL DL<=0.005 MIU/L-ACNC: 0.68 UIU/ML (ref 0.4–4)
WBC # BLD AUTO: 3.91 K/UL (ref 3.9–12.7)

## 2021-03-18 PROCEDURE — 82378 CARCINOEMBRYONIC ANTIGEN: CPT | Performed by: NURSE PRACTITIONER

## 2021-03-18 PROCEDURE — 85025 COMPLETE CBC W/AUTO DIFF WBC: CPT | Performed by: INTERNAL MEDICINE

## 2021-03-18 PROCEDURE — 36415 COLL VENOUS BLD VENIPUNCTURE: CPT | Performed by: NURSE PRACTITIONER

## 2021-03-18 PROCEDURE — 82306 VITAMIN D 25 HYDROXY: CPT | Performed by: INTERNAL MEDICINE

## 2021-03-18 PROCEDURE — 80061 LIPID PANEL: CPT | Performed by: INTERNAL MEDICINE

## 2021-03-18 PROCEDURE — 84443 ASSAY THYROID STIM HORMONE: CPT | Performed by: INTERNAL MEDICINE

## 2021-03-18 PROCEDURE — 86304 IMMUNOASSAY TUMOR CA 125: CPT | Performed by: NURSE PRACTITIONER

## 2021-03-18 PROCEDURE — 80053 COMPREHEN METABOLIC PANEL: CPT | Performed by: INTERNAL MEDICINE

## 2021-03-18 PROCEDURE — 82105 ALPHA-FETOPROTEIN SERUM: CPT | Performed by: NURSE PRACTITIONER

## 2021-03-18 PROCEDURE — 86301 IMMUNOASSAY TUMOR CA 19-9: CPT | Performed by: NURSE PRACTITIONER

## 2021-03-23 ENCOUNTER — TELEPHONE (OUTPATIENT)
Dept: HEPATOLOGY | Facility: CLINIC | Age: 41
End: 2021-03-23

## 2021-03-23 LAB
FUNGUS SPEC CULT: NORMAL

## 2021-03-24 ENCOUNTER — CONFERENCE (OUTPATIENT)
Dept: TRANSPLANT | Facility: CLINIC | Age: 41
End: 2021-03-24

## 2021-03-24 ENCOUNTER — TELEPHONE (OUTPATIENT)
Dept: INTERNAL MEDICINE | Facility: CLINIC | Age: 41
End: 2021-03-24

## 2021-03-24 ENCOUNTER — TELEPHONE (OUTPATIENT)
Dept: HEPATOLOGY | Facility: CLINIC | Age: 41
End: 2021-03-24

## 2021-03-24 DIAGNOSIS — K76.9 LIVER LESION: Primary | ICD-10-CM

## 2021-03-25 ENCOUNTER — PATIENT MESSAGE (OUTPATIENT)
Dept: HEPATOLOGY | Facility: CLINIC | Age: 41
End: 2021-03-25

## 2021-03-28 ENCOUNTER — PATIENT MESSAGE (OUTPATIENT)
Dept: HEPATOLOGY | Facility: CLINIC | Age: 41
End: 2021-03-28

## 2021-03-30 ENCOUNTER — PATIENT MESSAGE (OUTPATIENT)
Dept: INTERNAL MEDICINE | Facility: CLINIC | Age: 41
End: 2021-03-30

## 2021-03-30 DIAGNOSIS — R82.90 FOUL SMELLING URINE: Primary | ICD-10-CM

## 2021-04-01 ENCOUNTER — LAB VISIT (OUTPATIENT)
Dept: LAB | Facility: HOSPITAL | Age: 41
End: 2021-04-01
Attending: INTERNAL MEDICINE
Payer: COMMERCIAL

## 2021-04-01 DIAGNOSIS — R82.90 FOUL SMELLING URINE: ICD-10-CM

## 2021-04-01 LAB
BILIRUB UR QL STRIP: NEGATIVE
CLARITY UR REFRACT.AUTO: CLEAR
COLOR UR AUTO: ABNORMAL
GLUCOSE UR QL STRIP: NEGATIVE
HGB UR QL STRIP: NEGATIVE
KETONES UR QL STRIP: NEGATIVE
LEUKOCYTE ESTERASE UR QL STRIP: NEGATIVE
NITRITE UR QL STRIP: NEGATIVE
PH UR STRIP: 6 [PH] (ref 5–8)
PROT UR QL STRIP: NEGATIVE
SP GR UR STRIP: 1 (ref 1–1.03)
URN SPEC COLLECT METH UR: ABNORMAL

## 2021-04-01 PROCEDURE — 87088 URINE BACTERIA CULTURE: CPT | Performed by: INTERNAL MEDICINE

## 2021-04-01 PROCEDURE — 87077 CULTURE AEROBIC IDENTIFY: CPT | Performed by: INTERNAL MEDICINE

## 2021-04-01 PROCEDURE — 87086 URINE CULTURE/COLONY COUNT: CPT | Performed by: INTERNAL MEDICINE

## 2021-04-01 PROCEDURE — 87186 SC STD MICRODIL/AGAR DIL: CPT | Performed by: INTERNAL MEDICINE

## 2021-04-01 PROCEDURE — 81003 URINALYSIS AUTO W/O SCOPE: CPT | Performed by: INTERNAL MEDICINE

## 2021-04-03 ENCOUNTER — PATIENT MESSAGE (OUTPATIENT)
Dept: INTERNAL MEDICINE | Facility: CLINIC | Age: 41
End: 2021-04-03

## 2021-04-03 LAB — BACTERIA UR CULT: ABNORMAL

## 2021-04-03 RX ORDER — NITROFURANTOIN 25; 75 MG/1; MG/1
100 CAPSULE ORAL 2 TIMES DAILY
Qty: 10 CAPSULE | Refills: 0 | Status: SHIPPED | OUTPATIENT
Start: 2021-04-03 | End: 2021-06-08

## 2021-04-04 ENCOUNTER — PATIENT MESSAGE (OUTPATIENT)
Dept: INTERNAL MEDICINE | Facility: CLINIC | Age: 41
End: 2021-04-04

## 2021-04-04 DIAGNOSIS — N30.00 ACUTE CYSTITIS WITHOUT HEMATURIA: Primary | ICD-10-CM

## 2021-04-06 ENCOUNTER — OFFICE VISIT (OUTPATIENT)
Dept: INTERVENTIONAL RADIOLOGY/VASCULAR | Facility: CLINIC | Age: 41
End: 2021-04-06
Payer: COMMERCIAL

## 2021-04-06 VITALS
WEIGHT: 138 LBS | SYSTOLIC BLOOD PRESSURE: 122 MMHG | HEIGHT: 72 IN | HEART RATE: 66 BPM | DIASTOLIC BLOOD PRESSURE: 78 MMHG | BODY MASS INDEX: 18.69 KG/M2

## 2021-04-06 DIAGNOSIS — K76.9 LIVER LESION: Primary | ICD-10-CM

## 2021-04-06 PROCEDURE — 3008F BODY MASS INDEX DOCD: CPT | Mod: CPTII,S$GLB,, | Performed by: FAMILY MEDICINE

## 2021-04-06 PROCEDURE — 99999 PR PBB SHADOW E&M-EST. PATIENT-LVL III: CPT | Mod: PBBFAC,,, | Performed by: FAMILY MEDICINE

## 2021-04-06 PROCEDURE — 99244 OFF/OP CNSLTJ NEW/EST MOD 40: CPT | Mod: S$GLB,,, | Performed by: FAMILY MEDICINE

## 2021-04-06 PROCEDURE — 99999 PR PBB SHADOW E&M-EST. PATIENT-LVL III: ICD-10-PCS | Mod: PBBFAC,,, | Performed by: FAMILY MEDICINE

## 2021-04-06 PROCEDURE — 99244 PR OFFICE CONSULTATION,LEVEL IV: ICD-10-PCS | Mod: S$GLB,,, | Performed by: FAMILY MEDICINE

## 2021-04-06 PROCEDURE — 3008F PR BODY MASS INDEX (BMI) DOCUMENTED: ICD-10-PCS | Mod: CPTII,S$GLB,, | Performed by: FAMILY MEDICINE

## 2021-04-07 ENCOUNTER — PATIENT MESSAGE (OUTPATIENT)
Dept: CARDIOLOGY | Facility: CLINIC | Age: 41
End: 2021-04-07

## 2021-04-07 ENCOUNTER — OFFICE VISIT (OUTPATIENT)
Dept: CARDIOTHORACIC SURGERY | Facility: CLINIC | Age: 41
End: 2021-04-07
Payer: COMMERCIAL

## 2021-04-07 ENCOUNTER — HOSPITAL ENCOUNTER (OUTPATIENT)
Dept: CARDIOLOGY | Facility: CLINIC | Age: 41
Discharge: HOME | End: 2021-04-07
Payer: COMMERCIAL

## 2021-04-07 VITALS
TEMPERATURE: 99 F | WEIGHT: 138 LBS | HEIGHT: 71 IN | BODY MASS INDEX: 19.32 KG/M2 | OXYGEN SATURATION: 100 % | HEART RATE: 64 BPM | DIASTOLIC BLOOD PRESSURE: 79 MMHG | SYSTOLIC BLOOD PRESSURE: 121 MMHG

## 2021-04-07 DIAGNOSIS — Z98.890 S/P PERICARDIAL WINDOW CREATION: ICD-10-CM

## 2021-04-07 DIAGNOSIS — Z98.890 S/P PERICARDIAL WINDOW CREATION: Primary | ICD-10-CM

## 2021-04-07 PROCEDURE — 3008F BODY MASS INDEX DOCD: CPT | Mod: CPTII,S$GLB,, | Performed by: THORACIC SURGERY (CARDIOTHORACIC VASCULAR SURGERY)

## 2021-04-07 PROCEDURE — 99024 POSTOP FOLLOW-UP VISIT: CPT | Mod: S$GLB,,, | Performed by: THORACIC SURGERY (CARDIOTHORACIC VASCULAR SURGERY)

## 2021-04-07 PROCEDURE — 93005 EKG 12-LEAD: ICD-10-PCS | Mod: S$GLB,,, | Performed by: THORACIC SURGERY (CARDIOTHORACIC VASCULAR SURGERY)

## 2021-04-07 PROCEDURE — 1126F PR PAIN SEVERITY QUANTIFIED, NO PAIN PRESENT: ICD-10-PCS | Mod: S$GLB,,, | Performed by: THORACIC SURGERY (CARDIOTHORACIC VASCULAR SURGERY)

## 2021-04-07 PROCEDURE — 3008F PR BODY MASS INDEX (BMI) DOCUMENTED: ICD-10-PCS | Mod: CPTII,S$GLB,, | Performed by: THORACIC SURGERY (CARDIOTHORACIC VASCULAR SURGERY)

## 2021-04-07 PROCEDURE — 99999 PR PBB SHADOW E&M-EST. PATIENT-LVL III: CPT | Mod: PBBFAC,,, | Performed by: THORACIC SURGERY (CARDIOTHORACIC VASCULAR SURGERY)

## 2021-04-07 PROCEDURE — 93010 ELECTROCARDIOGRAM REPORT: CPT | Mod: S$GLB,,, | Performed by: INTERNAL MEDICINE

## 2021-04-07 PROCEDURE — 93005 ELECTROCARDIOGRAM TRACING: CPT | Mod: S$GLB,,, | Performed by: THORACIC SURGERY (CARDIOTHORACIC VASCULAR SURGERY)

## 2021-04-07 PROCEDURE — 99999 PR PBB SHADOW E&M-EST. PATIENT-LVL III: ICD-10-PCS | Mod: PBBFAC,,, | Performed by: THORACIC SURGERY (CARDIOTHORACIC VASCULAR SURGERY)

## 2021-04-07 PROCEDURE — 93010 EKG 12-LEAD: ICD-10-PCS | Mod: S$GLB,,, | Performed by: INTERNAL MEDICINE

## 2021-04-07 PROCEDURE — 1126F AMNT PAIN NOTED NONE PRSNT: CPT | Mod: S$GLB,,, | Performed by: THORACIC SURGERY (CARDIOTHORACIC VASCULAR SURGERY)

## 2021-04-07 PROCEDURE — 99024 PR POST-OP FOLLOW-UP VISIT: ICD-10-PCS | Mod: S$GLB,,, | Performed by: THORACIC SURGERY (CARDIOTHORACIC VASCULAR SURGERY)

## 2021-04-14 ENCOUNTER — LAB VISIT (OUTPATIENT)
Dept: LAB | Facility: HOSPITAL | Age: 41
End: 2021-04-14
Attending: FAMILY MEDICINE
Payer: COMMERCIAL

## 2021-04-14 DIAGNOSIS — K76.9 LIVER LESION: ICD-10-CM

## 2021-04-14 LAB
BASOPHILS # BLD AUTO: 0.04 K/UL (ref 0–0.2)
BASOPHILS NFR BLD: 1 % (ref 0–1.9)
DIFFERENTIAL METHOD: NORMAL
EOSINOPHIL # BLD AUTO: 0.1 K/UL (ref 0–0.5)
EOSINOPHIL NFR BLD: 3 % (ref 0–8)
ERYTHROCYTE [DISTWIDTH] IN BLOOD BY AUTOMATED COUNT: 12.3 % (ref 11.5–14.5)
HCT VFR BLD AUTO: 40.5 % (ref 37–48.5)
HGB BLD-MCNC: 13.1 G/DL (ref 12–16)
IMM GRANULOCYTES # BLD AUTO: 0.01 K/UL (ref 0–0.04)
IMM GRANULOCYTES NFR BLD AUTO: 0.3 % (ref 0–0.5)
INR PPP: 1 (ref 0.8–1.2)
LYMPHOCYTES # BLD AUTO: 1.2 K/UL (ref 1–4.8)
LYMPHOCYTES NFR BLD: 30.9 % (ref 18–48)
MCH RBC QN AUTO: 30 PG (ref 27–31)
MCHC RBC AUTO-ENTMCNC: 32.3 G/DL (ref 32–36)
MCV RBC AUTO: 93 FL (ref 82–98)
MONOCYTES # BLD AUTO: 0.3 K/UL (ref 0.3–1)
MONOCYTES NFR BLD: 6.8 % (ref 4–15)
NEUTROPHILS # BLD AUTO: 2.3 K/UL (ref 1.8–7.7)
NEUTROPHILS NFR BLD: 58 % (ref 38–73)
NRBC BLD-RTO: 0 /100 WBC
PLATELET # BLD AUTO: 245 K/UL (ref 150–450)
PMV BLD AUTO: 9.5 FL (ref 9.2–12.9)
PROTHROMBIN TIME: 11.2 SEC (ref 9–12.5)
RBC # BLD AUTO: 4.37 M/UL (ref 4–5.4)
WBC # BLD AUTO: 3.98 K/UL (ref 3.9–12.7)

## 2021-04-14 PROCEDURE — 36415 COLL VENOUS BLD VENIPUNCTURE: CPT | Performed by: FAMILY MEDICINE

## 2021-04-14 PROCEDURE — 85610 PROTHROMBIN TIME: CPT | Performed by: FAMILY MEDICINE

## 2021-04-14 PROCEDURE — 85025 COMPLETE CBC W/AUTO DIFF WBC: CPT | Performed by: FAMILY MEDICINE

## 2021-04-15 ENCOUNTER — PATIENT MESSAGE (OUTPATIENT)
Dept: INTERNAL MEDICINE | Facility: CLINIC | Age: 41
End: 2021-04-15

## 2021-04-16 ENCOUNTER — LAB VISIT (OUTPATIENT)
Dept: LAB | Facility: HOSPITAL | Age: 41
End: 2021-04-16
Attending: INTERNAL MEDICINE
Payer: COMMERCIAL

## 2021-04-16 DIAGNOSIS — N30.00 ACUTE CYSTITIS WITHOUT HEMATURIA: ICD-10-CM

## 2021-04-16 PROCEDURE — 87086 URINE CULTURE/COLONY COUNT: CPT | Performed by: INTERNAL MEDICINE

## 2021-04-17 LAB — BACTERIA UR CULT: NORMAL

## 2021-04-18 ENCOUNTER — PATIENT MESSAGE (OUTPATIENT)
Dept: INTERNAL MEDICINE | Facility: CLINIC | Age: 41
End: 2021-04-18

## 2021-04-22 ENCOUNTER — PATIENT MESSAGE (OUTPATIENT)
Dept: INTERNAL MEDICINE | Facility: CLINIC | Age: 41
End: 2021-04-22

## 2021-04-22 RX ORDER — OMEPRAZOLE 20 MG/1
20 CAPSULE, DELAYED RELEASE ORAL DAILY PRN
Qty: 30 CAPSULE | Refills: 3 | Status: SHIPPED | OUTPATIENT
Start: 2021-04-22 | End: 2021-07-16

## 2021-04-26 LAB
ACID FAST MOD KINY STN SPEC: NORMAL
MYCOBACTERIUM SPEC QL CULT: NORMAL

## 2021-04-29 RX ORDER — FENTANYL CITRATE 50 UG/ML
50 INJECTION, SOLUTION INTRAMUSCULAR; INTRAVENOUS
Status: CANCELLED | OUTPATIENT
Start: 2021-04-29

## 2021-04-29 RX ORDER — MIDAZOLAM HYDROCHLORIDE 1 MG/ML
1 INJECTION INTRAMUSCULAR; INTRAVENOUS
Status: CANCELLED | OUTPATIENT
Start: 2021-04-29

## 2021-05-07 ENCOUNTER — HOSPITAL ENCOUNTER (OUTPATIENT)
Dept: INTERVENTIONAL RADIOLOGY/VASCULAR | Facility: HOSPITAL | Age: 41
Discharge: HOME | End: 2021-05-07
Attending: NURSE PRACTITIONER
Payer: COMMERCIAL

## 2021-05-07 VITALS
OXYGEN SATURATION: 100 % | WEIGHT: 138 LBS | SYSTOLIC BLOOD PRESSURE: 115 MMHG | TEMPERATURE: 98 F | HEART RATE: 64 BPM | BODY MASS INDEX: 19.32 KG/M2 | RESPIRATION RATE: 14 BRPM | DIASTOLIC BLOOD PRESSURE: 67 MMHG | HEIGHT: 71 IN

## 2021-05-07 DIAGNOSIS — K76.9 LIVER LESION: ICD-10-CM

## 2021-05-07 PROCEDURE — 25000003 PHARM REV CODE 250: Performed by: SURGERY

## 2021-05-07 PROCEDURE — 88342 IMHCHEM/IMCYTCHM 1ST ANTB: CPT | Mod: 91 | Performed by: PATHOLOGY

## 2021-05-07 PROCEDURE — 88365 PR  TISSUE HYBRIDIZATION: ICD-10-PCS | Mod: 26,ICN,, | Performed by: PATHOLOGY

## 2021-05-07 PROCEDURE — 88341 IMHCHEM/IMCYTCHM EA ADD ANTB: CPT | Performed by: PATHOLOGY

## 2021-05-07 PROCEDURE — 47000 NEEDLE BIOPSY OF LIVER PERQ: CPT | Performed by: RADIOLOGY

## 2021-05-07 PROCEDURE — 88341 IMHCHEM/IMCYTCHM EA ADD ANTB: CPT | Mod: 59 | Performed by: PATHOLOGY

## 2021-05-07 PROCEDURE — 88333 PATH CONSLTJ SURG CYTO XM 1: CPT | Mod: 26,ICN,, | Performed by: PATHOLOGY

## 2021-05-07 PROCEDURE — 88341 PR IHC OR ICC EACH ADD'L SINGLE ANTIBODY  STAINPR: ICD-10-PCS | Mod: 26,ICN,, | Performed by: PATHOLOGY

## 2021-05-07 PROCEDURE — 88307 TISSUE EXAM BY PATHOLOGIST: CPT | Mod: 26,ICN,, | Performed by: PATHOLOGY

## 2021-05-07 PROCEDURE — 88313 PR  SPECIAL STAINS,GROUP II: ICD-10-PCS | Mod: 26,ICN,, | Performed by: PATHOLOGY

## 2021-05-07 PROCEDURE — 88341 IMHCHEM/IMCYTCHM EA ADD ANTB: CPT | Mod: 26,ICN,, | Performed by: PATHOLOGY

## 2021-05-07 PROCEDURE — 99152 PR MOD CONSCIOUS SEDATION, SAME PHYS, 5+ YRS, FIRST 15 MIN: ICD-10-PCS | Mod: ,,, | Performed by: RADIOLOGY

## 2021-05-07 PROCEDURE — 99152 MOD SED SAME PHYS/QHP 5/>YRS: CPT | Mod: ,,, | Performed by: RADIOLOGY

## 2021-05-07 PROCEDURE — 88313 SPECIAL STAINS GROUP 2: CPT | Mod: 59 | Performed by: PATHOLOGY

## 2021-05-07 PROCEDURE — 99153 MOD SED SAME PHYS/QHP EA: CPT | Performed by: RADIOLOGY

## 2021-05-07 PROCEDURE — 77012 CT SCAN FOR NEEDLE BIOPSY: CPT | Mod: 26,,, | Performed by: RADIOLOGY

## 2021-05-07 PROCEDURE — 99152 MOD SED SAME PHYS/QHP 5/>YRS: CPT | Performed by: RADIOLOGY

## 2021-05-07 PROCEDURE — 88333 PATH CONSLTJ SURG CYTO XM 1: CPT | Performed by: PATHOLOGY

## 2021-05-07 PROCEDURE — 88313 SPECIAL STAINS GROUP 2: CPT | Mod: 26,ICN,, | Performed by: PATHOLOGY

## 2021-05-07 PROCEDURE — 88342 IMHCHEM/IMCYTCHM 1ST ANTB: CPT | Mod: 26,ICN,, | Performed by: PATHOLOGY

## 2021-05-07 PROCEDURE — 88365 INSITU HYBRIDIZATION (FISH): CPT | Performed by: PATHOLOGY

## 2021-05-07 PROCEDURE — 77012 IR BIOPSY LIVER: ICD-10-PCS | Mod: 26,,, | Performed by: RADIOLOGY

## 2021-05-07 PROCEDURE — 88342 IMHCHEM/IMCYTCHM 1ST ANTB: CPT | Performed by: PATHOLOGY

## 2021-05-07 PROCEDURE — 88333 PR  INTRAOPERATIVE CYTO PATH CONSULT, INITIAL SITE: ICD-10-PCS | Mod: 26,ICN,, | Performed by: PATHOLOGY

## 2021-05-07 PROCEDURE — 88365 INSITU HYBRIDIZATION (FISH): CPT | Mod: 26,ICN,, | Performed by: PATHOLOGY

## 2021-05-07 PROCEDURE — 27201068 IR BIOPSY LIVER

## 2021-05-07 PROCEDURE — 63600175 PHARM REV CODE 636 W HCPCS: Performed by: RADIOLOGY

## 2021-05-07 PROCEDURE — 88307 PR  SURG PATH,LEVEL V: ICD-10-PCS | Mod: 26,ICN,, | Performed by: PATHOLOGY

## 2021-05-07 PROCEDURE — 88307 TISSUE EXAM BY PATHOLOGIST: CPT | Performed by: PATHOLOGY

## 2021-05-07 PROCEDURE — 88342 CHG IMMUNOCYTOCHEMISTRY: ICD-10-PCS | Mod: 26,ICN,, | Performed by: PATHOLOGY

## 2021-05-07 PROCEDURE — 47000 IR BIOPSY LIVER: ICD-10-PCS | Mod: ,,, | Performed by: RADIOLOGY

## 2021-05-07 RX ORDER — SODIUM CHLORIDE 9 MG/ML
INJECTION, SOLUTION INTRAVENOUS CONTINUOUS
Status: DISCONTINUED | OUTPATIENT
Start: 2021-05-07 | End: 2021-05-08 | Stop reason: HOSPADM

## 2021-05-07 RX ORDER — FENTANYL CITRATE 50 UG/ML
INJECTION, SOLUTION INTRAMUSCULAR; INTRAVENOUS CODE/TRAUMA/SEDATION MEDICATION
Status: COMPLETED | OUTPATIENT
Start: 2021-05-07 | End: 2021-05-07

## 2021-05-07 RX ORDER — ONDANSETRON 2 MG/ML
4 INJECTION INTRAMUSCULAR; INTRAVENOUS EVERY 6 HOURS PRN
Status: DISCONTINUED | OUTPATIENT
Start: 2021-05-07 | End: 2021-05-08 | Stop reason: HOSPADM

## 2021-05-07 RX ORDER — MIDAZOLAM HYDROCHLORIDE 1 MG/ML
INJECTION INTRAMUSCULAR; INTRAVENOUS CODE/TRAUMA/SEDATION MEDICATION
Status: COMPLETED | OUTPATIENT
Start: 2021-05-07 | End: 2021-05-07

## 2021-05-07 RX ORDER — OXYCODONE HYDROCHLORIDE 5 MG/1
5 TABLET ORAL EVERY 4 HOURS PRN
Status: DISCONTINUED | OUTPATIENT
Start: 2021-05-07 | End: 2021-05-08 | Stop reason: HOSPADM

## 2021-05-07 RX ORDER — ACETAMINOPHEN 325 MG/1
650 TABLET ORAL EVERY 6 HOURS PRN
Status: DISCONTINUED | OUTPATIENT
Start: 2021-05-07 | End: 2021-05-08 | Stop reason: HOSPADM

## 2021-05-07 RX ADMIN — FENTANYL CITRATE 50 MCG: 50 INJECTION, SOLUTION INTRAMUSCULAR; INTRAVENOUS at 10:05

## 2021-05-07 RX ADMIN — MIDAZOLAM HYDROCHLORIDE 1 MG: 1 INJECTION, SOLUTION INTRAMUSCULAR; INTRAVENOUS at 10:05

## 2021-05-07 RX ADMIN — ACETAMINOPHEN 650 MG: 325 TABLET ORAL at 12:05

## 2021-05-07 RX ADMIN — ONDANSETRON 4 MG: 2 INJECTION INTRAMUSCULAR; INTRAVENOUS at 12:05

## 2021-05-10 ENCOUNTER — PATIENT MESSAGE (OUTPATIENT)
Dept: HEPATOLOGY | Facility: CLINIC | Age: 41
End: 2021-05-10

## 2021-05-14 ENCOUNTER — HOSPITAL ENCOUNTER (OUTPATIENT)
Dept: RADIOLOGY | Facility: HOSPITAL | Age: 41
Discharge: HOME | End: 2021-05-14
Attending: OBSTETRICS & GYNECOLOGY
Payer: COMMERCIAL

## 2021-05-14 VITALS — HEIGHT: 71 IN | BODY MASS INDEX: 19.32 KG/M2 | WEIGHT: 138 LBS

## 2021-05-14 DIAGNOSIS — Z12.31 ENCOUNTER FOR SCREENING MAMMOGRAM FOR MALIGNANT NEOPLASM OF BREAST: ICD-10-CM

## 2021-05-14 PROCEDURE — 77067 SCR MAMMO BI INCL CAD: CPT | Mod: TC

## 2021-05-14 PROCEDURE — 77067 SCR MAMMO BI INCL CAD: CPT | Mod: 26,,, | Performed by: RADIOLOGY

## 2021-05-14 PROCEDURE — 77063 BREAST TOMOSYNTHESIS BI: CPT | Mod: 26,,, | Performed by: RADIOLOGY

## 2021-05-14 PROCEDURE — 77067 MAMMO DIGITAL SCREENING BILAT WITH TOMO: ICD-10-PCS | Mod: 26,,, | Performed by: RADIOLOGY

## 2021-05-14 PROCEDURE — 77063 MAMMO DIGITAL SCREENING BILAT WITH TOMO: ICD-10-PCS | Mod: 26,,, | Performed by: RADIOLOGY

## 2021-05-18 ENCOUNTER — PATIENT MESSAGE (OUTPATIENT)
Dept: HEPATOLOGY | Facility: CLINIC | Age: 41
End: 2021-05-18

## 2021-05-25 ENCOUNTER — PATIENT MESSAGE (OUTPATIENT)
Dept: HEPATOLOGY | Facility: CLINIC | Age: 41
End: 2021-05-25

## 2021-05-26 ENCOUNTER — TELEPHONE (OUTPATIENT)
Dept: HEPATOLOGY | Facility: CLINIC | Age: 41
End: 2021-05-26

## 2021-05-27 ENCOUNTER — TELEPHONE (OUTPATIENT)
Dept: INTERNAL MEDICINE | Facility: CLINIC | Age: 41
End: 2021-05-27

## 2021-05-28 ENCOUNTER — PATIENT MESSAGE (OUTPATIENT)
Dept: HEPATOLOGY | Facility: CLINIC | Age: 41
End: 2021-05-28

## 2021-06-02 ENCOUNTER — PATIENT MESSAGE (OUTPATIENT)
Dept: HEPATOLOGY | Facility: CLINIC | Age: 41
End: 2021-06-02

## 2021-06-04 ENCOUNTER — PATIENT MESSAGE (OUTPATIENT)
Dept: HEPATOLOGY | Facility: CLINIC | Age: 41
End: 2021-06-04

## 2021-06-04 ENCOUNTER — TELEPHONE (OUTPATIENT)
Dept: HEPATOLOGY | Facility: CLINIC | Age: 41
End: 2021-06-04

## 2021-06-04 DIAGNOSIS — R16.0 LIVER MASS: Primary | ICD-10-CM

## 2021-06-04 DIAGNOSIS — R16.0 LIVER MASS: ICD-10-CM

## 2021-06-04 DIAGNOSIS — Z03.89 OBSERVATION FOR SUSPECTED CANCER: ICD-10-CM

## 2021-06-07 ENCOUNTER — PATIENT MESSAGE (OUTPATIENT)
Dept: HEPATOLOGY | Facility: CLINIC | Age: 41
End: 2021-06-07

## 2021-06-07 ENCOUNTER — TELEPHONE (OUTPATIENT)
Dept: HEPATOLOGY | Facility: CLINIC | Age: 41
End: 2021-06-07

## 2021-06-08 ENCOUNTER — PATIENT MESSAGE (OUTPATIENT)
Dept: HEPATOLOGY | Facility: CLINIC | Age: 41
End: 2021-06-08

## 2021-06-08 ENCOUNTER — OFFICE VISIT (OUTPATIENT)
Dept: RHEUMATOLOGY | Facility: CLINIC | Age: 41
End: 2021-06-08
Payer: COMMERCIAL

## 2021-06-08 ENCOUNTER — HOSPITAL ENCOUNTER (OUTPATIENT)
Dept: CARDIOLOGY | Facility: HOSPITAL | Age: 41
Discharge: HOME | End: 2021-06-08
Attending: INTERNAL MEDICINE
Payer: COMMERCIAL

## 2021-06-08 VITALS
HEART RATE: 61 BPM | WEIGHT: 138 LBS | DIASTOLIC BLOOD PRESSURE: 87 MMHG | SYSTOLIC BLOOD PRESSURE: 143 MMHG | HEIGHT: 71 IN | BODY MASS INDEX: 18.69 KG/M2 | WEIGHT: 138 LBS | SYSTOLIC BLOOD PRESSURE: 98 MMHG | HEART RATE: 76 BPM | HEIGHT: 72 IN | BODY MASS INDEX: 19.32 KG/M2 | DIASTOLIC BLOOD PRESSURE: 50 MMHG

## 2021-06-08 DIAGNOSIS — I31.39 PERICARDIAL EFFUSION: ICD-10-CM

## 2021-06-08 DIAGNOSIS — D72.818 OTHER DECREASED WHITE BLOOD CELL (WBC) COUNT: ICD-10-CM

## 2021-06-08 DIAGNOSIS — R76.8 ANA POSITIVE: ICD-10-CM

## 2021-06-08 LAB
ASCENDING AORTA: 2.63 CM
AV INDEX (PROSTH): 0.81
AV MEAN GRADIENT: 3 MMHG
AV PEAK GRADIENT: 5 MMHG
AV VALVE AREA: 3.08 CM2
AV VELOCITY RATIO: 0.86
BSA FOR ECHO PROCEDURE: 1.77 M2
CV ECHO LV RWT: 0.27 CM
DOP CALC AO PEAK VEL: 1.12 M/S
DOP CALC AO VTI: 23.48 CM
DOP CALC LVOT AREA: 3.8 CM2
DOP CALC LVOT DIAMETER: 2.2 CM
DOP CALC LVOT PEAK VEL: 0.96 M/S
DOP CALC LVOT STROKE VOLUME: 72.38 CM3
DOP CALCLVOT PEAK VEL VTI: 19.05 CM
E WAVE DECELERATION TIME: 163.15 MSEC
E/A RATIO: 2.31
E/E' RATIO: 9.7 M/S
ECHO LV POSTERIOR WALL: 0.65 CM (ref 0.6–1.1)
EJECTION FRACTION: 55 %
FRACTIONAL SHORTENING: 37 % (ref 28–44)
INTERVENTRICULAR SEPTUM: 0.61 CM (ref 0.6–1.1)
LA MAJOR: 4.27 CM
LA MINOR: 3.99 CM
LA WIDTH: 3.7 CM
LEFT ATRIUM SIZE: 2.65 CM
LEFT ATRIUM VOLUME INDEX MOD: 22.4 ML/M2
LEFT ATRIUM VOLUME INDEX: 19.1 ML/M2
LEFT ATRIUM VOLUME MOD: 40.36 CM3
LEFT ATRIUM VOLUME: 34.38 CM3
LEFT INTERNAL DIMENSION IN SYSTOLE: 3.02 CM (ref 2.1–4)
LEFT VENTRICLE DIASTOLIC VOLUME INDEX: 58.82 ML/M2
LEFT VENTRICLE DIASTOLIC VOLUME: 105.88 ML
LEFT VENTRICLE MASS INDEX: 52 G/M2
LEFT VENTRICLE SYSTOLIC VOLUME INDEX: 19.8 ML/M2
LEFT VENTRICLE SYSTOLIC VOLUME: 35.63 ML
LEFT VENTRICULAR INTERNAL DIMENSION IN DIASTOLE: 4.77 CM (ref 3.5–6)
LEFT VENTRICULAR MASS: 92.72 G
LV LATERAL E/E' RATIO: 8.82 M/S
LV SEPTAL E/E' RATIO: 10.78 M/S
MV A" WAVE DURATION": 14.84 MSEC
MV PEAK A VEL: 0.42 M/S
MV PEAK E VEL: 0.97 M/S
MV STENOSIS PRESSURE HALF TIME: 47.31 MS
MV VALVE AREA P 1/2 METHOD: 4.65 CM2
PULM VEIN S/D RATIO: 1.04
PV PEAK D VEL: 0.52 M/S
PV PEAK S VEL: 0.54 M/S
RA MAJOR: 3.63 CM
RA PRESSURE: 3 MMHG
RA WIDTH: 3.03 CM
RIGHT VENTRICULAR END-DIASTOLIC DIMENSION: 3.02 CM
RV TISSUE DOPPLER FREE WALL SYSTOLIC VELOCITY 1 (APICAL 4 CHAMBER VIEW): 6.95 CM/S
SINUS: 2.82 CM
STJ: 2.61 CM
TDI LATERAL: 0.11 M/S
TDI SEPTAL: 0.09 M/S
TDI: 0.1 M/S
TRICUSPID ANNULAR PLANE SYSTOLIC EXCURSION: 1.19 CM

## 2021-06-08 PROCEDURE — 1126F PR PAIN SEVERITY QUANTIFIED, NO PAIN PRESENT: ICD-10-PCS | Mod: S$GLB,,, | Performed by: INTERNAL MEDICINE

## 2021-06-08 PROCEDURE — 93306 TTE W/DOPPLER COMPLETE: CPT | Mod: 26,,, | Performed by: INTERNAL MEDICINE

## 2021-06-08 PROCEDURE — 93306 ECHO (CUPID ONLY): ICD-10-PCS | Mod: 26,,, | Performed by: INTERNAL MEDICINE

## 2021-06-08 PROCEDURE — 99999 PR PBB SHADOW E&M-EST. PATIENT-LVL III: CPT | Mod: PBBFAC,,, | Performed by: INTERNAL MEDICINE

## 2021-06-08 PROCEDURE — 93306 TTE W/DOPPLER COMPLETE: CPT

## 2021-06-08 PROCEDURE — 99999 PR PBB SHADOW E&M-EST. PATIENT-LVL III: ICD-10-PCS | Mod: PBBFAC,,, | Performed by: INTERNAL MEDICINE

## 2021-06-08 PROCEDURE — 3008F PR BODY MASS INDEX (BMI) DOCUMENTED: ICD-10-PCS | Mod: CPTII,S$GLB,, | Performed by: INTERNAL MEDICINE

## 2021-06-08 PROCEDURE — 1126F AMNT PAIN NOTED NONE PRSNT: CPT | Mod: S$GLB,,, | Performed by: INTERNAL MEDICINE

## 2021-06-08 PROCEDURE — 99214 OFFICE O/P EST MOD 30 MIN: CPT | Mod: S$GLB,,, | Performed by: INTERNAL MEDICINE

## 2021-06-08 PROCEDURE — 3008F BODY MASS INDEX DOCD: CPT | Mod: CPTII,S$GLB,, | Performed by: INTERNAL MEDICINE

## 2021-06-08 PROCEDURE — 99214 PR OFFICE/OUTPT VISIT, EST, LEVL IV, 30-39 MIN: ICD-10-PCS | Mod: S$GLB,,, | Performed by: INTERNAL MEDICINE

## 2021-06-08 ASSESSMENT — ROUTINE ASSESSMENT OF PATIENT INDEX DATA (RAPID3)
FATIGUE SCORE: 0
TOTAL RAPID3 SCORE: 0
PAIN SCORE: 0
MDHAQ FUNCTION SCORE: 0
AM STIFFNESS SCORE: 0, NO
PATIENT GLOBAL ASSESSMENT SCORE: 0
PSYCHOLOGICAL DISTRESS SCORE: 0

## 2021-06-09 ENCOUNTER — TELEPHONE (OUTPATIENT)
Dept: HEPATOLOGY | Facility: CLINIC | Age: 41
End: 2021-06-09

## 2021-06-09 DIAGNOSIS — C22.0 HEPATOCELLULAR CARCINOMA: Primary | ICD-10-CM

## 2021-06-11 ENCOUNTER — TELEPHONE (OUTPATIENT)
Dept: HEPATOLOGY | Facility: CLINIC | Age: 41
End: 2021-06-11

## 2021-06-11 ENCOUNTER — PATIENT MESSAGE (OUTPATIENT)
Dept: HEPATOLOGY | Facility: CLINIC | Age: 41
End: 2021-06-11

## 2021-06-11 DIAGNOSIS — C22.0 HEPATOCELLULAR CARCINOMA: Primary | ICD-10-CM

## 2021-06-14 ENCOUNTER — HOSPITAL ENCOUNTER (OUTPATIENT)
Dept: RADIOLOGY | Facility: HOSPITAL | Age: 41
Discharge: HOME | End: 2021-06-14
Attending: NURSE PRACTITIONER
Payer: COMMERCIAL

## 2021-06-14 DIAGNOSIS — Z03.89 OBSERVATION FOR SUSPECTED CANCER: ICD-10-CM

## 2021-06-14 DIAGNOSIS — R16.0 LIVER MASS: ICD-10-CM

## 2021-06-14 PROCEDURE — 71260 CT THORAX DX C+: CPT | Mod: TC

## 2021-06-14 PROCEDURE — 74178 CT ABD&PLV WO CNTR FLWD CNTR: CPT | Mod: 26,,, | Performed by: RADIOLOGY

## 2021-06-14 PROCEDURE — 25500020 PHARM REV CODE 255: Performed by: NURSE PRACTITIONER

## 2021-06-14 PROCEDURE — 71260 CT THORAX DX C+: CPT | Mod: 26,,, | Performed by: RADIOLOGY

## 2021-06-14 PROCEDURE — 71260 CT CHEST ABDOMEN PELVIS W W/O CONTRAST (XPD): ICD-10-PCS | Mod: 26,,, | Performed by: RADIOLOGY

## 2021-06-14 PROCEDURE — 74178 CT CHEST ABDOMEN PELVIS W W/O CONTRAST (XPD): ICD-10-PCS | Mod: 26,,, | Performed by: RADIOLOGY

## 2021-06-14 RX ADMIN — IOHEXOL 75 ML: 350 INJECTION, SOLUTION INTRAVENOUS at 08:06

## 2021-06-14 RX ADMIN — IOHEXOL 15 ML: 300 INJECTION, SOLUTION INTRAVENOUS at 08:06

## 2021-06-15 ENCOUNTER — CONFERENCE (OUTPATIENT)
Dept: TRANSPLANT | Facility: CLINIC | Age: 41
End: 2021-06-15

## 2021-06-16 ENCOUNTER — OFFICE VISIT (OUTPATIENT)
Dept: HEPATOLOGY | Facility: CLINIC | Age: 41
End: 2021-06-16
Payer: COMMERCIAL

## 2021-06-16 ENCOUNTER — HOSPITAL ENCOUNTER (OUTPATIENT)
Dept: RADIOLOGY | Facility: HOSPITAL | Age: 41
Discharge: HOME | End: 2021-06-16
Attending: TRANSPLANT SURGERY
Payer: COMMERCIAL

## 2021-06-16 ENCOUNTER — PROCEDURE VISIT (OUTPATIENT)
Dept: HEPATOLOGY | Facility: CLINIC | Age: 41
End: 2021-06-16
Payer: COMMERCIAL

## 2021-06-16 ENCOUNTER — TELEPHONE (OUTPATIENT)
Dept: HEPATOLOGY | Facility: CLINIC | Age: 41
End: 2021-06-16

## 2021-06-16 ENCOUNTER — EVALUATION (OUTPATIENT)
Dept: TRANSPLANT | Facility: CLINIC | Age: 41
End: 2021-06-16
Payer: COMMERCIAL

## 2021-06-16 VITALS
SYSTOLIC BLOOD PRESSURE: 123 MMHG | RESPIRATION RATE: 17 BRPM | TEMPERATURE: 99 F | DIASTOLIC BLOOD PRESSURE: 78 MMHG | WEIGHT: 140.63 LBS | HEART RATE: 72 BPM | BODY MASS INDEX: 19.69 KG/M2 | OXYGEN SATURATION: 98 % | HEIGHT: 71 IN

## 2021-06-16 DIAGNOSIS — Z01.818 PRE-OP TESTING: ICD-10-CM

## 2021-06-16 DIAGNOSIS — C22.0 HEPATOCELLULAR CARCINOMA: ICD-10-CM

## 2021-06-16 DIAGNOSIS — C22.0 HCC (HEPATOCELLULAR CARCINOMA): Primary | ICD-10-CM

## 2021-06-16 DIAGNOSIS — D18.03 LIVER HEMANGIOMA: Primary | ICD-10-CM

## 2021-06-16 DIAGNOSIS — K73.9 CRYPTOGENIC CHRONIC HEPATITIS: Primary | ICD-10-CM

## 2021-06-16 DIAGNOSIS — K73.9 CRYPTOGENIC CHRONIC HEPATITIS: ICD-10-CM

## 2021-06-16 DIAGNOSIS — R16.0 LIVER MASS: ICD-10-CM

## 2021-06-16 DIAGNOSIS — K76.9 LIVER LESION: ICD-10-CM

## 2021-06-16 PROCEDURE — 91200 FIBROSCAN (VIBRATION CONTROLLED TRANSIENT ELASTOGRAPHY): ICD-10-PCS | Mod: S$GLB,,, | Performed by: INTERNAL MEDICINE

## 2021-06-16 PROCEDURE — 99999 PR PBB SHADOW E&M-EST. PATIENT-LVL III: ICD-10-PCS | Mod: PBBFAC,,,

## 2021-06-16 PROCEDURE — 71046 X-RAY EXAM CHEST 2 VIEWS: CPT | Mod: 26,,, | Performed by: RADIOLOGY

## 2021-06-16 PROCEDURE — 99213 PR OFFICE/OUTPT VISIT, EST, LEVL III, 20-29 MIN: ICD-10-PCS | Mod: S$GLB,,, | Performed by: INTERNAL MEDICINE

## 2021-06-16 PROCEDURE — 3008F PR BODY MASS INDEX (BMI) DOCUMENTED: ICD-10-PCS | Mod: CPTII,S$GLB,, | Performed by: INTERNAL MEDICINE

## 2021-06-16 PROCEDURE — 1126F PR PAIN SEVERITY QUANTIFIED, NO PAIN PRESENT: ICD-10-PCS | Mod: S$GLB,,, | Performed by: INTERNAL MEDICINE

## 2021-06-16 PROCEDURE — 99999 PR PBB SHADOW E&M-EST. PATIENT-LVL III: ICD-10-PCS | Mod: PBBFAC,,, | Performed by: INTERNAL MEDICINE

## 2021-06-16 PROCEDURE — 91200 LIVER ELASTOGRAPHY: CPT | Mod: S$GLB,,, | Performed by: INTERNAL MEDICINE

## 2021-06-16 PROCEDURE — 3008F BODY MASS INDEX DOCD: CPT | Mod: CPTII,S$GLB,, | Performed by: INTERNAL MEDICINE

## 2021-06-16 PROCEDURE — 99213 OFFICE O/P EST LOW 20 MIN: CPT | Mod: S$GLB,,, | Performed by: INTERNAL MEDICINE

## 2021-06-16 PROCEDURE — 99205 OFFICE O/P NEW HI 60 MIN: CPT | Mod: S$GLB,,, | Performed by: TRANSPLANT SURGERY

## 2021-06-16 PROCEDURE — 1126F AMNT PAIN NOTED NONE PRSNT: CPT | Mod: S$GLB,,, | Performed by: INTERNAL MEDICINE

## 2021-06-16 PROCEDURE — 71046 X-RAY EXAM CHEST 2 VIEWS: CPT | Mod: TC,FY

## 2021-06-16 PROCEDURE — 71046 XR CHEST PA AND LATERAL: ICD-10-PCS | Mod: 26,,, | Performed by: RADIOLOGY

## 2021-06-16 PROCEDURE — 99999 PR PBB SHADOW E&M-EST. PATIENT-LVL III: CPT | Mod: PBBFAC,,, | Performed by: INTERNAL MEDICINE

## 2021-06-16 PROCEDURE — 99999 PR PBB SHADOW E&M-EST. PATIENT-LVL III: CPT | Mod: PBBFAC,,,

## 2021-06-16 PROCEDURE — 99205 PR OFFICE/OUTPT VISIT, NEW, LEVL V, 60-74 MIN: ICD-10-PCS | Mod: S$GLB,,, | Performed by: TRANSPLANT SURGERY

## 2021-06-21 ENCOUNTER — PATIENT MESSAGE (OUTPATIENT)
Dept: CARDIOLOGY | Facility: CLINIC | Age: 41
End: 2021-06-21

## 2021-06-23 ENCOUNTER — ANESTHESIA EVENT (OUTPATIENT)
Dept: SURGERY | Facility: HOSPITAL | Age: 41
DRG: 407 | End: 2021-06-23
Payer: COMMERCIAL

## 2021-06-24 ENCOUNTER — PATIENT MESSAGE (OUTPATIENT)
Dept: SURGERY | Facility: HOSPITAL | Age: 41
End: 2021-06-24

## 2021-06-25 ENCOUNTER — PATIENT MESSAGE (OUTPATIENT)
Dept: SURGERY | Facility: HOSPITAL | Age: 41
End: 2021-06-25

## 2021-06-26 ENCOUNTER — LAB VISIT (OUTPATIENT)
Dept: LAB | Facility: HOSPITAL | Age: 41
End: 2021-06-26
Attending: TRANSPLANT SURGERY
Payer: COMMERCIAL

## 2021-06-26 DIAGNOSIS — Z01.818 PRE-OP TESTING: ICD-10-CM

## 2021-06-26 LAB
ABO + RH BLD: NORMAL
BLD GP AB SCN CELLS X3 SERPL QL: NORMAL

## 2021-06-26 PROCEDURE — 86900 BLOOD TYPING SEROLOGIC ABO: CPT | Performed by: TRANSPLANT SURGERY

## 2021-06-26 PROCEDURE — 36415 COLL VENOUS BLD VENIPUNCTURE: CPT | Performed by: TRANSPLANT SURGERY

## 2021-06-28 ENCOUNTER — HOSPITAL ENCOUNTER (INPATIENT)
Facility: HOSPITAL | Age: 41
LOS: 5 days | Discharge: HOME | DRG: 407 | End: 2021-07-03
Attending: TRANSPLANT SURGERY | Admitting: TRANSPLANT SURGERY
Payer: COMMERCIAL

## 2021-06-28 ENCOUNTER — ANESTHESIA (OUTPATIENT)
Dept: SURGERY | Facility: HOSPITAL | Age: 41
DRG: 407 | End: 2021-06-28
Payer: COMMERCIAL

## 2021-06-28 DIAGNOSIS — C22.0 HCC (HEPATOCELLULAR CARCINOMA): ICD-10-CM

## 2021-06-28 DIAGNOSIS — E83.39 HYPOPHOSPHATEMIA: ICD-10-CM

## 2021-06-28 DIAGNOSIS — K76.9 LIVER LESION: Primary | ICD-10-CM

## 2021-06-28 LAB
ALBUMIN SERPL BCP-MCNC: 3.1 G/DL (ref 3.5–5.2)
ALP SERPL-CCNC: 48 U/L (ref 55–135)
ALT SERPL W/O P-5'-P-CCNC: 219 U/L (ref 10–44)
ANION GAP SERPL CALC-SCNC: 12 MMOL/L (ref 8–16)
AST SERPL-CCNC: 223 U/L (ref 10–40)
B-HCG UR QL: NEGATIVE
BASOPHILS # BLD AUTO: 0.02 K/UL (ref 0–0.2)
BASOPHILS NFR BLD: 0.2 % (ref 0–1.9)
BILIRUB SERPL-MCNC: 1.5 MG/DL (ref 0.1–1)
BUN SERPL-MCNC: 6 MG/DL (ref 6–20)
CALCIUM SERPL-MCNC: 7.6 MG/DL (ref 8.7–10.5)
CHLORIDE SERPL-SCNC: 105 MMOL/L (ref 95–110)
CO2 SERPL-SCNC: 17 MMOL/L (ref 23–29)
CREAT SERPL-MCNC: 0.7 MG/DL (ref 0.5–1.4)
CTP QC/QA: YES
DIFFERENTIAL METHOD: ABNORMAL
EOSINOPHIL # BLD AUTO: 0 K/UL (ref 0–0.5)
EOSINOPHIL NFR BLD: 0.1 % (ref 0–8)
ERYTHROCYTE [DISTWIDTH] IN BLOOD BY AUTOMATED COUNT: 12.3 % (ref 11.5–14.5)
EST. GFR  (AFRICAN AMERICAN): >60 ML/MIN/1.73 M^2
EST. GFR  (NON AFRICAN AMERICAN): >60 ML/MIN/1.73 M^2
GLUCOSE SERPL-MCNC: 103 MG/DL (ref 70–110)
GLUCOSE SERPL-MCNC: 107 MG/DL (ref 70–110)
GLUCOSE SERPL-MCNC: 82 MG/DL (ref 70–110)
GLUCOSE SERPL-MCNC: 99 MG/DL (ref 70–110)
HCO3 UR-SCNC: 21.3 MMOL/L (ref 24–28)
HCO3 UR-SCNC: 21.7 MMOL/L (ref 24–28)
HCO3 UR-SCNC: 22 MMOL/L (ref 24–28)
HCT VFR BLD AUTO: 40 % (ref 37–48.5)
HCT VFR BLD CALC: 35 %PCV (ref 36–54)
HCT VFR BLD CALC: 35 %PCV (ref 36–54)
HCT VFR BLD CALC: 36 %PCV (ref 36–54)
HGB BLD-MCNC: 13.1 G/DL (ref 12–16)
IMM GRANULOCYTES # BLD AUTO: 0.05 K/UL (ref 0–0.04)
IMM GRANULOCYTES NFR BLD AUTO: 0.5 % (ref 0–0.5)
LYMPHOCYTES # BLD AUTO: 1.5 K/UL (ref 1–4.8)
LYMPHOCYTES NFR BLD: 14.3 % (ref 18–48)
MAGNESIUM SERPL-MCNC: 1.6 MG/DL (ref 1.6–2.6)
MCH RBC QN AUTO: 29.9 PG (ref 27–31)
MCHC RBC AUTO-ENTMCNC: 32.8 G/DL (ref 32–36)
MCV RBC AUTO: 91 FL (ref 82–98)
MONOCYTES # BLD AUTO: 1.1 K/UL (ref 0.3–1)
MONOCYTES NFR BLD: 10.5 % (ref 4–15)
NEUTROPHILS # BLD AUTO: 7.8 K/UL (ref 1.8–7.7)
NEUTROPHILS NFR BLD: 74.4 % (ref 38–73)
NRBC BLD-RTO: 0 /100 WBC
PCO2 BLDA: 33 MMHG (ref 35–45)
PCO2 BLDA: 33.3 MMHG (ref 35–45)
PCO2 BLDA: 36.5 MMHG (ref 35–45)
PH SMN: 7.38 [PH] (ref 7.35–7.45)
PH SMN: 7.42 [PH] (ref 7.35–7.45)
PH SMN: 7.43 [PH] (ref 7.35–7.45)
PHOSPHATE SERPL-MCNC: 2.9 MG/DL (ref 2.7–4.5)
PLATELET # BLD AUTO: 258 K/UL (ref 150–450)
PMV BLD AUTO: 10 FL (ref 9.2–12.9)
PO2 BLDA: 232 MMHG (ref 80–100)
PO2 BLDA: 234 MMHG (ref 80–100)
PO2 BLDA: 237 MMHG (ref 80–100)
POC BE: -2 MMOL/L
POC BE: -3 MMOL/L
POC BE: -3 MMOL/L
POC IONIZED CALCIUM: 1.01 MMOL/L (ref 1.06–1.42)
POC IONIZED CALCIUM: 1.06 MMOL/L (ref 1.06–1.42)
POC IONIZED CALCIUM: 1.13 MMOL/L (ref 1.06–1.42)
POC SATURATED O2: 100 % (ref 95–100)
POC TCO2: 22 MMOL/L (ref 23–27)
POC TCO2: 23 MMOL/L (ref 23–27)
POC TCO2: 23 MMOL/L (ref 23–27)
POTASSIUM BLD-SCNC: 3.4 MMOL/L (ref 3.5–5.1)
POTASSIUM BLD-SCNC: 3.7 MMOL/L (ref 3.5–5.1)
POTASSIUM BLD-SCNC: 3.7 MMOL/L (ref 3.5–5.1)
POTASSIUM SERPL-SCNC: 3.7 MMOL/L (ref 3.5–5.1)
PROT SERPL-MCNC: 5.9 G/DL (ref 6–8.4)
RBC # BLD AUTO: 4.38 M/UL (ref 4–5.4)
SAMPLE: ABNORMAL
SODIUM BLD-SCNC: 139 MMOL/L (ref 136–145)
SODIUM BLD-SCNC: 139 MMOL/L (ref 136–145)
SODIUM BLD-SCNC: 141 MMOL/L (ref 136–145)
SODIUM SERPL-SCNC: 134 MMOL/L (ref 136–145)
WBC # BLD AUTO: 10.54 K/UL (ref 3.9–12.7)

## 2021-06-28 PROCEDURE — 47120 PARTIAL REMOVAL OF LIVER: CPT | Mod: ,,, | Performed by: TRANSPLANT SURGERY

## 2021-06-28 PROCEDURE — 94761 N-INVAS EAR/PLS OXIMETRY MLT: CPT

## 2021-06-28 PROCEDURE — 88307 TISSUE EXAM BY PATHOLOGIST: CPT | Performed by: PATHOLOGY

## 2021-06-28 PROCEDURE — D9220A PRA ANESTHESIA: Mod: CRNA,,, | Performed by: NURSE ANESTHETIST, CERTIFIED REGISTERED

## 2021-06-28 PROCEDURE — 25000003 PHARM REV CODE 250: Performed by: NURSE ANESTHETIST, CERTIFIED REGISTERED

## 2021-06-28 PROCEDURE — 88304 TISSUE EXAM BY PATHOLOGIST: CPT | Performed by: PATHOLOGY

## 2021-06-28 PROCEDURE — 36415 COLL VENOUS BLD VENIPUNCTURE: CPT | Performed by: TRANSPLANT SURGERY

## 2021-06-28 PROCEDURE — 71000016 HC POSTOP RECOV ADDL HR: Performed by: TRANSPLANT SURGERY

## 2021-06-28 PROCEDURE — 47120 PR RESEC LIVER,PART LOBECTOMY: ICD-10-PCS | Mod: 82,,, | Performed by: SURGERY

## 2021-06-28 PROCEDURE — 88313 PR  SPECIAL STAINS,GROUP II: ICD-10-PCS | Mod: 26,,, | Performed by: PATHOLOGY

## 2021-06-28 PROCEDURE — C1729 CATH, DRAINAGE: HCPCS | Performed by: TRANSPLANT SURGERY

## 2021-06-28 PROCEDURE — 63600175 PHARM REV CODE 636 W HCPCS: Performed by: TRANSPLANT SURGERY

## 2021-06-28 PROCEDURE — 84100 ASSAY OF PHOSPHORUS: CPT | Performed by: TRANSPLANT SURGERY

## 2021-06-28 PROCEDURE — 47120 PR RESEC LIVER,PART LOBECTOMY: ICD-10-PCS | Mod: ,,, | Performed by: TRANSPLANT SURGERY

## 2021-06-28 PROCEDURE — D9220A PRA ANESTHESIA: ICD-10-PCS | Mod: CRNA,,, | Performed by: NURSE ANESTHETIST, CERTIFIED REGISTERED

## 2021-06-28 PROCEDURE — 88313 SPECIAL STAINS GROUP 2: CPT | Performed by: PATHOLOGY

## 2021-06-28 PROCEDURE — 36000709 HC OR TIME LEV III EA ADD 15 MIN: Performed by: TRANSPLANT SURGERY

## 2021-06-28 PROCEDURE — 71000039 HC RECOVERY, EACH ADD'L HOUR: Performed by: TRANSPLANT SURGERY

## 2021-06-28 PROCEDURE — 27201423 OPTIME MED/SURG SUP & DEVICES STERILE SUPPLY: Performed by: TRANSPLANT SURGERY

## 2021-06-28 PROCEDURE — 83735 ASSAY OF MAGNESIUM: CPT | Performed by: TRANSPLANT SURGERY

## 2021-06-28 PROCEDURE — D9220A PRA ANESTHESIA: ICD-10-PCS | Mod: ANES,,, | Performed by: ANESTHESIOLOGY

## 2021-06-28 PROCEDURE — 71000033 HC RECOVERY, INTIAL HOUR: Performed by: TRANSPLANT SURGERY

## 2021-06-28 PROCEDURE — 36000708 HC OR TIME LEV III 1ST 15 MIN: Performed by: TRANSPLANT SURGERY

## 2021-06-28 PROCEDURE — 88313 SPECIAL STAINS GROUP 2: CPT | Mod: 26,,, | Performed by: PATHOLOGY

## 2021-06-28 PROCEDURE — 81025 URINE PREGNANCY TEST: CPT | Performed by: TRANSPLANT SURGERY

## 2021-06-28 PROCEDURE — 63600175 PHARM REV CODE 636 W HCPCS: Performed by: NURSE ANESTHETIST, CERTIFIED REGISTERED

## 2021-06-28 PROCEDURE — 88342 IMHCHEM/IMCYTCHM 1ST ANTB: CPT | Mod: 26,,, | Performed by: PATHOLOGY

## 2021-06-28 PROCEDURE — 47120 PARTIAL REMOVAL OF LIVER: CPT | Mod: 82,,, | Performed by: SURGERY

## 2021-06-28 PROCEDURE — 88341 IMHCHEM/IMCYTCHM EA ADD ANTB: CPT | Mod: 26,,, | Performed by: PATHOLOGY

## 2021-06-28 PROCEDURE — C1751 CATH, INF, PER/CENT/MIDLINE: HCPCS | Performed by: ANESTHESIOLOGY

## 2021-06-28 PROCEDURE — 25000003 PHARM REV CODE 250: Performed by: TRANSPLANT SURGERY

## 2021-06-28 PROCEDURE — 27100025 HC TUBING, SET FLUID WARMER: Performed by: ANESTHESIOLOGY

## 2021-06-28 PROCEDURE — 25000003 PHARM REV CODE 250: Performed by: ANESTHESIOLOGY

## 2021-06-28 PROCEDURE — 71000015 HC POSTOP RECOV 1ST HR: Performed by: TRANSPLANT SURGERY

## 2021-06-28 PROCEDURE — 86920 COMPATIBILITY TEST SPIN: CPT | Performed by: TRANSPLANT SURGERY

## 2021-06-28 PROCEDURE — 80053 COMPREHEN METABOLIC PANEL: CPT | Performed by: TRANSPLANT SURGERY

## 2021-06-28 PROCEDURE — 85025 COMPLETE CBC W/AUTO DIFF WBC: CPT | Performed by: TRANSPLANT SURGERY

## 2021-06-28 PROCEDURE — 88342 CHG IMMUNOCYTOCHEMISTRY: ICD-10-PCS | Mod: 26,,, | Performed by: PATHOLOGY

## 2021-06-28 PROCEDURE — 88304 TISSUE EXAM BY PATHOLOGIST: CPT | Mod: 26,,, | Performed by: PATHOLOGY

## 2021-06-28 PROCEDURE — D9220A PRA ANESTHESIA: Mod: ANES,,, | Performed by: ANESTHESIOLOGY

## 2021-06-28 PROCEDURE — 37000009 HC ANESTHESIA EA ADD 15 MINS: Performed by: TRANSPLANT SURGERY

## 2021-06-28 PROCEDURE — 88341 IMHCHEM/IMCYTCHM EA ADD ANTB: CPT | Performed by: PATHOLOGY

## 2021-06-28 PROCEDURE — 20600001 HC STEP DOWN PRIVATE ROOM

## 2021-06-28 PROCEDURE — 88304 PR  SURG PATH,LEVEL III: ICD-10-PCS | Mod: 26,,, | Performed by: PATHOLOGY

## 2021-06-28 PROCEDURE — 88341 PR IHC OR ICC EACH ADD'L SINGLE ANTIBODY  STAINPR: ICD-10-PCS | Mod: 26,,, | Performed by: PATHOLOGY

## 2021-06-28 PROCEDURE — 88307 PR  SURG PATH,LEVEL V: ICD-10-PCS | Mod: 26,,, | Performed by: PATHOLOGY

## 2021-06-28 PROCEDURE — 88307 TISSUE EXAM BY PATHOLOGIST: CPT | Mod: 26,,, | Performed by: PATHOLOGY

## 2021-06-28 PROCEDURE — 88342 IMHCHEM/IMCYTCHM 1ST ANTB: CPT | Performed by: PATHOLOGY

## 2021-06-28 PROCEDURE — 37000008 HC ANESTHESIA 1ST 15 MINUTES: Performed by: TRANSPLANT SURGERY

## 2021-06-28 RX ORDER — BUPIVACAINE HYDROCHLORIDE 2.5 MG/ML
INJECTION, SOLUTION EPIDURAL; INFILTRATION; INTRACAUDAL
Status: DISCONTINUED | OUTPATIENT
Start: 2021-06-28 | End: 2021-06-28 | Stop reason: HOSPADM

## 2021-06-28 RX ORDER — KETOROLAC TROMETHAMINE 30 MG/ML
15 INJECTION, SOLUTION INTRAMUSCULAR; INTRAVENOUS EVERY 6 HOURS
Status: COMPLETED | OUTPATIENT
Start: 2021-06-28 | End: 2021-06-30

## 2021-06-28 RX ORDER — OXYCODONE AND ACETAMINOPHEN 5; 325 MG/1; MG/1
1 TABLET ORAL EVERY 4 HOURS PRN
Status: DISCONTINUED | OUTPATIENT
Start: 2021-06-28 | End: 2021-07-02

## 2021-06-28 RX ORDER — HEPARIN SODIUM 5000 [USP'U]/ML
5000 INJECTION, SOLUTION INTRAVENOUS; SUBCUTANEOUS EVERY 8 HOURS
Status: DISCONTINUED | OUTPATIENT
Start: 2021-06-28 | End: 2021-06-28 | Stop reason: HOSPADM

## 2021-06-28 RX ORDER — PHENYLEPHRINE HCL IN 0.9% NACL 1 MG/10 ML
SYRINGE (ML) INTRAVENOUS
Status: DISCONTINUED | OUTPATIENT
Start: 2021-06-28 | End: 2021-06-28

## 2021-06-28 RX ORDER — SODIUM CHLORIDE 9 MG/ML
INJECTION, SOLUTION INTRAVENOUS CONTINUOUS
Status: DISCONTINUED | OUTPATIENT
Start: 2021-06-28 | End: 2021-06-29

## 2021-06-28 RX ORDER — HYDROMORPHONE HYDROCHLORIDE 1 MG/ML
0.2 INJECTION, SOLUTION INTRAMUSCULAR; INTRAVENOUS; SUBCUTANEOUS EVERY 5 MIN PRN
Status: DISCONTINUED | OUTPATIENT
Start: 2021-06-28 | End: 2021-06-28 | Stop reason: HOSPADM

## 2021-06-28 RX ORDER — CEFAZOLIN SODIUM 1 G/3ML
2 INJECTION, POWDER, FOR SOLUTION INTRAMUSCULAR; INTRAVENOUS
Status: COMPLETED | OUTPATIENT
Start: 2021-06-28 | End: 2021-06-28

## 2021-06-28 RX ORDER — PROPOFOL 10 MG/ML
VIAL (ML) INTRAVENOUS
Status: DISCONTINUED | OUTPATIENT
Start: 2021-06-28 | End: 2021-06-28

## 2021-06-28 RX ORDER — SCOLOPAMINE TRANSDERMAL SYSTEM 1 MG/1
1 PATCH, EXTENDED RELEASE TRANSDERMAL
Status: DISCONTINUED | OUTPATIENT
Start: 2021-06-28 | End: 2021-06-28 | Stop reason: HOSPADM

## 2021-06-28 RX ORDER — DOCUSATE SODIUM 100 MG/1
100 CAPSULE, LIQUID FILLED ORAL 2 TIMES DAILY
Status: DISCONTINUED | OUTPATIENT
Start: 2021-06-28 | End: 2021-06-29

## 2021-06-28 RX ORDER — SODIUM CHLORIDE, SODIUM LACTATE, POTASSIUM CHLORIDE, CALCIUM CHLORIDE 600; 310; 30; 20 MG/100ML; MG/100ML; MG/100ML; MG/100ML
125 INJECTION, SOLUTION INTRAVENOUS CONTINUOUS
Status: DISCONTINUED | OUTPATIENT
Start: 2021-06-28 | End: 2021-06-29

## 2021-06-28 RX ORDER — LORAZEPAM 2 MG/ML
0.25 INJECTION INTRAMUSCULAR ONCE AS NEEDED
Status: DISCONTINUED | OUTPATIENT
Start: 2021-06-28 | End: 2021-06-28 | Stop reason: HOSPADM

## 2021-06-28 RX ORDER — PHENYLEPHRINE HYDROCHLORIDE 10 MG/ML
INJECTION INTRAVENOUS
Status: DISCONTINUED | OUTPATIENT
Start: 2021-06-28 | End: 2021-06-28

## 2021-06-28 RX ORDER — MIDAZOLAM HYDROCHLORIDE 1 MG/ML
INJECTION, SOLUTION INTRAMUSCULAR; INTRAVENOUS
Status: DISCONTINUED | OUTPATIENT
Start: 2021-06-28 | End: 2021-06-28

## 2021-06-28 RX ORDER — FENTANYL CITRATE 50 UG/ML
INJECTION, SOLUTION INTRAMUSCULAR; INTRAVENOUS
Status: DISCONTINUED | OUTPATIENT
Start: 2021-06-28 | End: 2021-06-28

## 2021-06-28 RX ORDER — HEPARIN SODIUM 5000 [USP'U]/ML
5000 INJECTION, SOLUTION INTRAVENOUS; SUBCUTANEOUS EVERY 8 HOURS
Status: DISCONTINUED | OUTPATIENT
Start: 2021-06-28 | End: 2021-07-02

## 2021-06-28 RX ORDER — OXYCODONE AND ACETAMINOPHEN 10; 325 MG/1; MG/1
1 TABLET ORAL EVERY 4 HOURS PRN
Status: DISCONTINUED | OUTPATIENT
Start: 2021-06-28 | End: 2021-07-02

## 2021-06-28 RX ORDER — KETOROLAC TROMETHAMINE 30 MG/ML
INJECTION, SOLUTION INTRAMUSCULAR; INTRAVENOUS
Status: DISCONTINUED | OUTPATIENT
Start: 2021-06-28 | End: 2021-06-28

## 2021-06-28 RX ORDER — ONDANSETRON 2 MG/ML
INJECTION INTRAMUSCULAR; INTRAVENOUS
Status: DISCONTINUED | OUTPATIENT
Start: 2021-06-28 | End: 2021-06-28

## 2021-06-28 RX ORDER — ACETAMINOPHEN 10 MG/ML
INJECTION, SOLUTION INTRAVENOUS
Status: DISCONTINUED | OUTPATIENT
Start: 2021-06-28 | End: 2021-06-28

## 2021-06-28 RX ORDER — ROCURONIUM BROMIDE 10 MG/ML
INJECTION, SOLUTION INTRAVENOUS
Status: DISCONTINUED | OUTPATIENT
Start: 2021-06-28 | End: 2021-06-28

## 2021-06-28 RX ORDER — ONDANSETRON 2 MG/ML
4 INJECTION INTRAMUSCULAR; INTRAVENOUS EVERY 8 HOURS PRN
Status: DISCONTINUED | OUTPATIENT
Start: 2021-06-28 | End: 2021-07-03 | Stop reason: HOSPADM

## 2021-06-28 RX ORDER — NEOSTIGMINE METHYLSULFATE 0.5 MG/ML
INJECTION, SOLUTION INTRAVENOUS
Status: DISCONTINUED | OUTPATIENT
Start: 2021-06-28 | End: 2021-06-28

## 2021-06-28 RX ORDER — MEPERIDINE HYDROCHLORIDE 50 MG/ML
12.5 INJECTION INTRAMUSCULAR; INTRAVENOUS; SUBCUTANEOUS ONCE AS NEEDED
Status: DISCONTINUED | OUTPATIENT
Start: 2021-06-28 | End: 2021-06-28 | Stop reason: HOSPADM

## 2021-06-28 RX ORDER — LIDOCAINE HYDROCHLORIDE 20 MG/ML
INJECTION, SOLUTION EPIDURAL; INFILTRATION; INTRACAUDAL; PERINEURAL
Status: DISCONTINUED | OUTPATIENT
Start: 2021-06-28 | End: 2021-06-28

## 2021-06-28 RX ADMIN — NEOSTIGMINE METHYLSULFATE 5 MG: 0.5 INJECTION INTRAVENOUS at 03:06

## 2021-06-28 RX ADMIN — OXYCODONE HYDROCHLORIDE AND ACETAMINOPHEN 1 TABLET: 5; 325 TABLET ORAL at 07:06

## 2021-06-28 RX ADMIN — HEPARIN SODIUM 5000 UNITS: 5000 INJECTION INTRAVENOUS; SUBCUTANEOUS at 08:06

## 2021-06-28 RX ADMIN — DOCUSATE SODIUM 100 MG: 100 CAPSULE, LIQUID FILLED ORAL at 10:06

## 2021-06-28 RX ADMIN — Medication 200 MCG: at 12:06

## 2021-06-28 RX ADMIN — ROCURONIUM BROMIDE 20 MG: 10 INJECTION INTRAVENOUS at 12:06

## 2021-06-28 RX ADMIN — SODIUM CHLORIDE, SODIUM LACTATE, POTASSIUM CHLORIDE, AND CALCIUM CHLORIDE 125 ML/HR: .6; .31; .03; .02 INJECTION, SOLUTION INTRAVENOUS at 04:06

## 2021-06-28 RX ADMIN — KETOROLAC TROMETHAMINE 15 MG: 30 INJECTION, SOLUTION INTRAMUSCULAR; INTRAVENOUS at 05:06

## 2021-06-28 RX ADMIN — ROCURONIUM BROMIDE 50 MG: 10 INJECTION INTRAVENOUS at 11:06

## 2021-06-28 RX ADMIN — KETOROLAC TROMETHAMINE 15 MG: 30 INJECTION, SOLUTION INTRAMUSCULAR; INTRAVENOUS at 11:06

## 2021-06-28 RX ADMIN — SODIUM CHLORIDE: 0.9 INJECTION, SOLUTION INTRAVENOUS at 11:06

## 2021-06-28 RX ADMIN — ROCURONIUM BROMIDE 20 MG: 10 INJECTION INTRAVENOUS at 02:06

## 2021-06-28 RX ADMIN — HEPARIN SODIUM 5000 UNITS: 5000 INJECTION INTRAVENOUS; SUBCUTANEOUS at 10:06

## 2021-06-28 RX ADMIN — PROPOFOL 140 MG: 10 INJECTION, EMULSION INTRAVENOUS at 11:06

## 2021-06-28 RX ADMIN — CEFAZOLIN 2 G: 330 INJECTION, POWDER, FOR SOLUTION INTRAMUSCULAR; INTRAVENOUS at 11:06

## 2021-06-28 RX ADMIN — KETOROLAC TROMETHAMINE 15 MG: 30 INJECTION, SOLUTION INTRAMUSCULAR; INTRAVENOUS at 03:06

## 2021-06-28 RX ADMIN — GLYCOPYRROLATE 0.6 MG: 0.2 INJECTION, SOLUTION INTRAMUSCULAR; INTRAVITREAL at 03:06

## 2021-06-28 RX ADMIN — AMPICILLIN SODIUM AND SULBACTAM SODIUM 3 G: 2; 1 INJECTION, POWDER, FOR SOLUTION INTRAMUSCULAR; INTRAVENOUS at 04:06

## 2021-06-28 RX ADMIN — SODIUM CHLORIDE: 0.9 INJECTION, SOLUTION INTRAVENOUS at 08:06

## 2021-06-28 RX ADMIN — SCOPALAMINE 1 PATCH: 1 PATCH, EXTENDED RELEASE TRANSDERMAL at 09:06

## 2021-06-28 RX ADMIN — FENTANYL CITRATE 50 MCG: 50 INJECTION INTRAMUSCULAR; INTRAVENOUS at 11:06

## 2021-06-28 RX ADMIN — MIDAZOLAM 2 MG: 1 INJECTION INTRAMUSCULAR; INTRAVENOUS at 11:06

## 2021-06-28 RX ADMIN — ROCURONIUM BROMIDE 30 MG: 10 INJECTION INTRAVENOUS at 11:06

## 2021-06-28 RX ADMIN — Medication 100 MCG: at 11:06

## 2021-06-28 RX ADMIN — AMPICILLIN SODIUM AND SULBACTAM SODIUM 3 G: 2; 1 INJECTION, POWDER, FOR SOLUTION INTRAMUSCULAR; INTRAVENOUS at 11:06

## 2021-06-28 RX ADMIN — ACETAMINOPHEN 1000 MG: 10 INJECTION INTRAVENOUS at 03:06

## 2021-06-28 RX ADMIN — CEFAZOLIN 2 G: 330 INJECTION, POWDER, FOR SOLUTION INTRAMUSCULAR; INTRAVENOUS at 03:06

## 2021-06-28 RX ADMIN — ONDANSETRON 4 MG: 2 INJECTION INTRAMUSCULAR; INTRAVENOUS at 07:06

## 2021-06-28 RX ADMIN — SODIUM CHLORIDE 0.4 MCG/KG/MIN: 9 INJECTION, SOLUTION INTRAVENOUS at 12:06

## 2021-06-28 RX ADMIN — ONDANSETRON 4 MG: 2 INJECTION INTRAMUSCULAR; INTRAVENOUS at 03:06

## 2021-06-28 RX ADMIN — FENTANYL CITRATE 50 MCG: 50 INJECTION INTRAMUSCULAR; INTRAVENOUS at 01:06

## 2021-06-28 RX ADMIN — FENTANYL CITRATE 150 MCG: 50 INJECTION INTRAMUSCULAR; INTRAVENOUS at 11:06

## 2021-06-28 RX ADMIN — LIDOCAINE HYDROCHLORIDE 100 MG: 20 INJECTION, SOLUTION EPIDURAL; INFILTRATION; INTRACAUDAL at 11:06

## 2021-06-29 LAB
ALBUMIN SERPL BCP-MCNC: 2.9 G/DL (ref 3.5–5.2)
ALP SERPL-CCNC: 48 U/L (ref 55–135)
ALT SERPL W/O P-5'-P-CCNC: 462 U/L (ref 10–44)
ANION GAP SERPL CALC-SCNC: 10 MMOL/L (ref 8–16)
AST SERPL-CCNC: 368 U/L (ref 10–40)
BASOPHILS # BLD AUTO: 0.01 K/UL (ref 0–0.2)
BASOPHILS NFR BLD: 0.1 % (ref 0–1.9)
BILIRUB SERPL-MCNC: 2.7 MG/DL (ref 0.1–1)
BUN SERPL-MCNC: 5 MG/DL (ref 6–20)
CALCIUM SERPL-MCNC: 7.8 MG/DL (ref 8.7–10.5)
CHLORIDE SERPL-SCNC: 104 MMOL/L (ref 95–110)
CO2 SERPL-SCNC: 23 MMOL/L (ref 23–29)
CREAT SERPL-MCNC: 0.8 MG/DL (ref 0.5–1.4)
DIFFERENTIAL METHOD: ABNORMAL
EOSINOPHIL # BLD AUTO: 0 K/UL (ref 0–0.5)
EOSINOPHIL NFR BLD: 0.1 % (ref 0–8)
ERYTHROCYTE [DISTWIDTH] IN BLOOD BY AUTOMATED COUNT: 12.4 % (ref 11.5–14.5)
EST. GFR  (AFRICAN AMERICAN): >60 ML/MIN/1.73 M^2
EST. GFR  (NON AFRICAN AMERICAN): >60 ML/MIN/1.73 M^2
GLUCOSE SERPL-MCNC: 111 MG/DL (ref 70–110)
HCT VFR BLD AUTO: 42.8 % (ref 37–48.5)
HGB BLD-MCNC: 13.7 G/DL (ref 12–16)
IMM GRANULOCYTES # BLD AUTO: 0.04 K/UL (ref 0–0.04)
IMM GRANULOCYTES NFR BLD AUTO: 0.3 % (ref 0–0.5)
INR PPP: 1.2 (ref 0.8–1.2)
LYMPHOCYTES # BLD AUTO: 0.9 K/UL (ref 1–4.8)
LYMPHOCYTES NFR BLD: 6.9 % (ref 18–48)
MAGNESIUM SERPL-MCNC: 1.7 MG/DL (ref 1.6–2.6)
MCH RBC QN AUTO: 30 PG (ref 27–31)
MCHC RBC AUTO-ENTMCNC: 32 G/DL (ref 32–36)
MCV RBC AUTO: 94 FL (ref 82–98)
MONOCYTES # BLD AUTO: 1 K/UL (ref 0.3–1)
MONOCYTES NFR BLD: 7.5 % (ref 4–15)
NEUTROPHILS # BLD AUTO: 11.3 K/UL (ref 1.8–7.7)
NEUTROPHILS NFR BLD: 85.1 % (ref 38–73)
NRBC BLD-RTO: 0 /100 WBC
PHOSPHATE SERPL-MCNC: 3.1 MG/DL (ref 2.7–4.5)
PLATELET # BLD AUTO: 241 K/UL (ref 150–450)
PMV BLD AUTO: 10.2 FL (ref 9.2–12.9)
POTASSIUM SERPL-SCNC: 4.1 MMOL/L (ref 3.5–5.1)
PROT SERPL-MCNC: 5.9 G/DL (ref 6–8.4)
PROTHROMBIN TIME: 13.2 SEC (ref 9–12.5)
RBC # BLD AUTO: 4.57 M/UL (ref 4–5.4)
SODIUM SERPL-SCNC: 137 MMOL/L (ref 136–145)
WBC # BLD AUTO: 13.25 K/UL (ref 3.9–12.7)

## 2021-06-29 PROCEDURE — 85025 COMPLETE CBC W/AUTO DIFF WBC: CPT | Performed by: TRANSPLANT SURGERY

## 2021-06-29 PROCEDURE — 20600001 HC STEP DOWN PRIVATE ROOM

## 2021-06-29 PROCEDURE — 84100 ASSAY OF PHOSPHORUS: CPT | Performed by: TRANSPLANT SURGERY

## 2021-06-29 PROCEDURE — 25000003 PHARM REV CODE 250: Performed by: PHYSICIAN ASSISTANT

## 2021-06-29 PROCEDURE — 99233 SBSQ HOSP IP/OBS HIGH 50: CPT | Mod: ,,, | Performed by: PHYSICIAN ASSISTANT

## 2021-06-29 PROCEDURE — 85610 PROTHROMBIN TIME: CPT | Performed by: TRANSPLANT SURGERY

## 2021-06-29 PROCEDURE — 25000003 PHARM REV CODE 250: Performed by: NURSE PRACTITIONER

## 2021-06-29 PROCEDURE — 36415 COLL VENOUS BLD VENIPUNCTURE: CPT | Performed by: TRANSPLANT SURGERY

## 2021-06-29 PROCEDURE — 63600175 PHARM REV CODE 636 W HCPCS: Performed by: TRANSPLANT SURGERY

## 2021-06-29 PROCEDURE — 99233 PR SUBSEQUENT HOSPITAL CARE,LEVL III: ICD-10-PCS | Mod: ,,, | Performed by: PHYSICIAN ASSISTANT

## 2021-06-29 PROCEDURE — 80053 COMPREHEN METABOLIC PANEL: CPT | Performed by: TRANSPLANT SURGERY

## 2021-06-29 PROCEDURE — 83735 ASSAY OF MAGNESIUM: CPT | Performed by: TRANSPLANT SURGERY

## 2021-06-29 PROCEDURE — 25000003 PHARM REV CODE 250: Performed by: TRANSPLANT SURGERY

## 2021-06-29 RX ORDER — MUPIROCIN 20 MG/G
OINTMENT TOPICAL 2 TIMES DAILY
Status: DISCONTINUED | OUTPATIENT
Start: 2021-06-29 | End: 2021-07-03 | Stop reason: HOSPADM

## 2021-06-29 RX ORDER — DOCUSATE SODIUM 100 MG/1
100 CAPSULE, LIQUID FILLED ORAL 3 TIMES DAILY
Status: DISCONTINUED | OUTPATIENT
Start: 2021-06-29 | End: 2021-07-03 | Stop reason: HOSPADM

## 2021-06-29 RX ORDER — SIMETHICONE 80 MG
1 TABLET,CHEWABLE ORAL 3 TIMES DAILY PRN
Status: DISCONTINUED | OUTPATIENT
Start: 2021-06-29 | End: 2021-07-03 | Stop reason: HOSPADM

## 2021-06-29 RX ADMIN — DOCUSATE SODIUM 100 MG: 100 CAPSULE, LIQUID FILLED ORAL at 08:06

## 2021-06-29 RX ADMIN — HEPARIN SODIUM 5000 UNITS: 5000 INJECTION INTRAVENOUS; SUBCUTANEOUS at 08:06

## 2021-06-29 RX ADMIN — KETOROLAC TROMETHAMINE 15 MG: 30 INJECTION, SOLUTION INTRAMUSCULAR; INTRAVENOUS at 06:06

## 2021-06-29 RX ADMIN — DOCUSATE SODIUM 100 MG: 100 CAPSULE, LIQUID FILLED ORAL at 02:06

## 2021-06-29 RX ADMIN — OXYCODONE HYDROCHLORIDE AND ACETAMINOPHEN 1 TABLET: 5; 325 TABLET ORAL at 08:06

## 2021-06-29 RX ADMIN — SIMETHICONE 80 MG: 80 TABLET, CHEWABLE ORAL at 08:06

## 2021-06-29 RX ADMIN — AMPICILLIN SODIUM AND SULBACTAM SODIUM 3 G: 2; 1 INJECTION, POWDER, FOR SOLUTION INTRAMUSCULAR; INTRAVENOUS at 06:06

## 2021-06-29 RX ADMIN — SODIUM CHLORIDE, SODIUM LACTATE, POTASSIUM CHLORIDE, AND CALCIUM CHLORIDE 125 ML/HR: .6; .31; .03; .02 INJECTION, SOLUTION INTRAVENOUS at 07:06

## 2021-06-29 RX ADMIN — OXYCODONE HYDROCHLORIDE AND ACETAMINOPHEN 1 TABLET: 5; 325 TABLET ORAL at 12:06

## 2021-06-29 RX ADMIN — MUPIROCIN: 20 OINTMENT TOPICAL at 08:06

## 2021-06-29 RX ADMIN — SODIUM CHLORIDE, SODIUM LACTATE, POTASSIUM CHLORIDE, AND CALCIUM CHLORIDE 125 ML/HR: .6; .31; .03; .02 INJECTION, SOLUTION INTRAVENOUS at 12:06

## 2021-06-29 RX ADMIN — KETOROLAC TROMETHAMINE 15 MG: 30 INJECTION, SOLUTION INTRAMUSCULAR; INTRAVENOUS at 11:06

## 2021-06-29 RX ADMIN — HEPARIN SODIUM 5000 UNITS: 5000 INJECTION INTRAVENOUS; SUBCUTANEOUS at 02:06

## 2021-06-29 RX ADMIN — HEPARIN SODIUM 5000 UNITS: 5000 INJECTION INTRAVENOUS; SUBCUTANEOUS at 06:06

## 2021-06-29 RX ADMIN — AMPICILLIN SODIUM AND SULBACTAM SODIUM 3 G: 2; 1 INJECTION, POWDER, FOR SOLUTION INTRAMUSCULAR; INTRAVENOUS at 11:06

## 2021-06-29 RX ADMIN — SIMETHICONE 80 MG: 80 TABLET, CHEWABLE ORAL at 03:06

## 2021-06-30 LAB
ALBUMIN SERPL BCP-MCNC: 2.8 G/DL (ref 3.5–5.2)
ALP SERPL-CCNC: 49 U/L (ref 55–135)
ALT SERPL W/O P-5'-P-CCNC: 354 U/L (ref 10–44)
ANION GAP SERPL CALC-SCNC: 8 MMOL/L (ref 8–16)
AST SERPL-CCNC: 219 U/L (ref 10–40)
BASOPHILS # BLD AUTO: 0.02 K/UL (ref 0–0.2)
BASOPHILS NFR BLD: 0.2 % (ref 0–1.9)
BILIRUB SERPL-MCNC: 3.1 MG/DL (ref 0.1–1)
BUN SERPL-MCNC: 6 MG/DL (ref 6–20)
CALCIUM SERPL-MCNC: 8 MG/DL (ref 8.7–10.5)
CHLORIDE SERPL-SCNC: 103 MMOL/L (ref 95–110)
CO2 SERPL-SCNC: 23 MMOL/L (ref 23–29)
CREAT SERPL-MCNC: 0.8 MG/DL (ref 0.5–1.4)
DIFFERENTIAL METHOD: ABNORMAL
EOSINOPHIL # BLD AUTO: 0.3 K/UL (ref 0–0.5)
EOSINOPHIL NFR BLD: 2.9 % (ref 0–8)
ERYTHROCYTE [DISTWIDTH] IN BLOOD BY AUTOMATED COUNT: 12.5 % (ref 11.5–14.5)
EST. GFR  (AFRICAN AMERICAN): >60 ML/MIN/1.73 M^2
EST. GFR  (NON AFRICAN AMERICAN): >60 ML/MIN/1.73 M^2
GLUCOSE SERPL-MCNC: 119 MG/DL (ref 70–110)
HCT VFR BLD AUTO: 39.1 % (ref 37–48.5)
HGB BLD-MCNC: 12.5 G/DL (ref 12–16)
IMM GRANULOCYTES # BLD AUTO: 0.05 K/UL (ref 0–0.04)
IMM GRANULOCYTES NFR BLD AUTO: 0.6 % (ref 0–0.5)
INR PPP: 1.3 (ref 0.8–1.2)
LYMPHOCYTES # BLD AUTO: 0.8 K/UL (ref 1–4.8)
LYMPHOCYTES NFR BLD: 8.9 % (ref 18–48)
MAGNESIUM SERPL-MCNC: 1.8 MG/DL (ref 1.6–2.6)
MCH RBC QN AUTO: 29.5 PG (ref 27–31)
MCHC RBC AUTO-ENTMCNC: 32 G/DL (ref 32–36)
MCV RBC AUTO: 92 FL (ref 82–98)
MONOCYTES # BLD AUTO: 0.8 K/UL (ref 0.3–1)
MONOCYTES NFR BLD: 9.4 % (ref 4–15)
NEUTROPHILS # BLD AUTO: 6.6 K/UL (ref 1.8–7.7)
NEUTROPHILS NFR BLD: 78 % (ref 38–73)
NRBC BLD-RTO: 0 /100 WBC
PHOSPHATE SERPL-MCNC: 1.2 MG/DL (ref 2.7–4.5)
PHOSPHATE SERPL-MCNC: 2 MG/DL (ref 2.7–4.5)
PLATELET # BLD AUTO: 192 K/UL (ref 150–450)
PMV BLD AUTO: 10.3 FL (ref 9.2–12.9)
POTASSIUM SERPL-SCNC: 4.2 MMOL/L (ref 3.5–5.1)
PROT SERPL-MCNC: 5.5 G/DL (ref 6–8.4)
PROTHROMBIN TIME: 13.6 SEC (ref 9–12.5)
RBC # BLD AUTO: 4.24 M/UL (ref 4–5.4)
SODIUM SERPL-SCNC: 134 MMOL/L (ref 136–145)
WBC # BLD AUTO: 8.52 K/UL (ref 3.9–12.7)

## 2021-06-30 PROCEDURE — 25000003 PHARM REV CODE 250: Performed by: NURSE PRACTITIONER

## 2021-06-30 PROCEDURE — 99233 PR SUBSEQUENT HOSPITAL CARE,LEVL III: ICD-10-PCS | Mod: ,,, | Performed by: PHYSICIAN ASSISTANT

## 2021-06-30 PROCEDURE — 63600175 PHARM REV CODE 636 W HCPCS: Performed by: TRANSPLANT SURGERY

## 2021-06-30 PROCEDURE — 84100 ASSAY OF PHOSPHORUS: CPT | Mod: 91 | Performed by: TRANSPLANT SURGERY

## 2021-06-30 PROCEDURE — 83735 ASSAY OF MAGNESIUM: CPT | Performed by: PHYSICIAN ASSISTANT

## 2021-06-30 PROCEDURE — 80053 COMPREHEN METABOLIC PANEL: CPT | Performed by: PHYSICIAN ASSISTANT

## 2021-06-30 PROCEDURE — 20600001 HC STEP DOWN PRIVATE ROOM

## 2021-06-30 PROCEDURE — 36415 COLL VENOUS BLD VENIPUNCTURE: CPT | Performed by: TRANSPLANT SURGERY

## 2021-06-30 PROCEDURE — 25000003 PHARM REV CODE 250: Performed by: CLINICAL NURSE SPECIALIST

## 2021-06-30 PROCEDURE — 25000003 PHARM REV CODE 250: Performed by: PHYSICIAN ASSISTANT

## 2021-06-30 PROCEDURE — 25000003 PHARM REV CODE 250: Performed by: TRANSPLANT SURGERY

## 2021-06-30 PROCEDURE — 84100 ASSAY OF PHOSPHORUS: CPT | Performed by: PHYSICIAN ASSISTANT

## 2021-06-30 PROCEDURE — 99233 SBSQ HOSP IP/OBS HIGH 50: CPT | Mod: ,,, | Performed by: PHYSICIAN ASSISTANT

## 2021-06-30 PROCEDURE — 85610 PROTHROMBIN TIME: CPT | Performed by: TRANSPLANT SURGERY

## 2021-06-30 PROCEDURE — 85025 COMPLETE CBC W/AUTO DIFF WBC: CPT | Performed by: PHYSICIAN ASSISTANT

## 2021-06-30 RX ORDER — BISACODYL 10 MG
10 SUPPOSITORY, RECTAL RECTAL DAILY PRN
Status: DISCONTINUED | OUTPATIENT
Start: 2021-06-30 | End: 2021-07-03 | Stop reason: HOSPADM

## 2021-06-30 RX ORDER — IBUPROFEN 600 MG/1
600 TABLET ORAL EVERY 8 HOURS
Status: DISCONTINUED | OUTPATIENT
Start: 2021-06-30 | End: 2021-07-03 | Stop reason: HOSPADM

## 2021-06-30 RX ORDER — IBUPROFEN 600 MG/1
600 TABLET ORAL EVERY 8 HOURS
Status: DISCONTINUED | OUTPATIENT
Start: 2021-06-30 | End: 2021-06-30

## 2021-06-30 RX ORDER — POLYETHYLENE GLYCOL 3350 17 G/17G
17 POWDER, FOR SOLUTION ORAL 2 TIMES DAILY
Status: DISCONTINUED | OUTPATIENT
Start: 2021-06-30 | End: 2021-07-03 | Stop reason: HOSPADM

## 2021-06-30 RX ADMIN — ONDANSETRON 4 MG: 2 INJECTION INTRAMUSCULAR; INTRAVENOUS at 05:06

## 2021-06-30 RX ADMIN — POLYETHYLENE GLYCOL 3350 17 G: 17 POWDER, FOR SOLUTION ORAL at 02:06

## 2021-06-30 RX ADMIN — IBUPROFEN 600 MG: 600 TABLET, FILM COATED ORAL at 08:06

## 2021-06-30 RX ADMIN — IBUPROFEN 600 MG: 600 TABLET, FILM COATED ORAL at 04:06

## 2021-06-30 RX ADMIN — SODIUM PHOSPHATE, MONOBASIC, MONOHYDRATE 20.01 MMOL: 276; 142 INJECTION, SOLUTION INTRAVENOUS at 10:06

## 2021-06-30 RX ADMIN — OXYCODONE HYDROCHLORIDE AND ACETAMINOPHEN 1 TABLET: 5; 325 TABLET ORAL at 05:06

## 2021-06-30 RX ADMIN — DOCUSATE SODIUM 100 MG: 100 CAPSULE, LIQUID FILLED ORAL at 07:06

## 2021-06-30 RX ADMIN — MUPIROCIN: 20 OINTMENT TOPICAL at 07:06

## 2021-06-30 RX ADMIN — DOCUSATE SODIUM 100 MG: 100 CAPSULE, LIQUID FILLED ORAL at 08:06

## 2021-06-30 RX ADMIN — MUPIROCIN: 20 OINTMENT TOPICAL at 08:06

## 2021-06-30 RX ADMIN — OXYCODONE HYDROCHLORIDE AND ACETAMINOPHEN 1 TABLET: 5; 325 TABLET ORAL at 10:06

## 2021-06-30 RX ADMIN — SODIUM PHOSPHATE, MONOBASIC, MONOHYDRATE 39.99 MMOL: 276; 142 INJECTION, SOLUTION INTRAVENOUS at 10:06

## 2021-06-30 RX ADMIN — POLYETHYLENE GLYCOL 3350 17 G: 17 POWDER, FOR SOLUTION ORAL at 08:06

## 2021-06-30 RX ADMIN — HEPARIN SODIUM 5000 UNITS: 5000 INJECTION INTRAVENOUS; SUBCUTANEOUS at 05:06

## 2021-06-30 RX ADMIN — OXYCODONE AND ACETAMINOPHEN 1 TABLET: 10; 325 TABLET ORAL at 04:06

## 2021-06-30 RX ADMIN — KETOROLAC TROMETHAMINE 15 MG: 30 INJECTION, SOLUTION INTRAMUSCULAR; INTRAVENOUS at 12:06

## 2021-06-30 RX ADMIN — DOCUSATE SODIUM 100 MG: 100 CAPSULE, LIQUID FILLED ORAL at 02:06

## 2021-07-01 ENCOUNTER — PATIENT MESSAGE (OUTPATIENT)
Dept: TRANSPLANT | Facility: CLINIC | Age: 41
End: 2021-07-01

## 2021-07-01 PROBLEM — E83.39 HYPOPHOSPHATEMIA: Status: ACTIVE | Noted: 2021-07-01

## 2021-07-01 LAB
ALBUMIN SERPL BCP-MCNC: 2.8 G/DL (ref 3.5–5.2)
ALP SERPL-CCNC: 70 U/L (ref 55–135)
ALT SERPL W/O P-5'-P-CCNC: 255 U/L (ref 10–44)
ANION GAP SERPL CALC-SCNC: 9 MMOL/L (ref 8–16)
AST SERPL-CCNC: 122 U/L (ref 10–40)
BASOPHILS # BLD AUTO: 0.04 K/UL (ref 0–0.2)
BASOPHILS NFR BLD: 0.6 % (ref 0–1.9)
BILIRUB SERPL-MCNC: 2.8 MG/DL (ref 0.1–1)
BUN SERPL-MCNC: 5 MG/DL (ref 6–20)
CALCIUM SERPL-MCNC: 8.1 MG/DL (ref 8.7–10.5)
CHLORIDE SERPL-SCNC: 100 MMOL/L (ref 95–110)
CO2 SERPL-SCNC: 25 MMOL/L (ref 23–29)
CREAT SERPL-MCNC: 0.7 MG/DL (ref 0.5–1.4)
DIFFERENTIAL METHOD: ABNORMAL
EOSINOPHIL # BLD AUTO: 0.3 K/UL (ref 0–0.5)
EOSINOPHIL NFR BLD: 5.3 % (ref 0–8)
ERYTHROCYTE [DISTWIDTH] IN BLOOD BY AUTOMATED COUNT: 12.6 % (ref 11.5–14.5)
EST. GFR  (AFRICAN AMERICAN): >60 ML/MIN/1.73 M^2
EST. GFR  (NON AFRICAN AMERICAN): >60 ML/MIN/1.73 M^2
GLUCOSE SERPL-MCNC: 80 MG/DL (ref 70–110)
HCT VFR BLD AUTO: 39.4 % (ref 37–48.5)
HGB BLD-MCNC: 12.8 G/DL (ref 12–16)
IMM GRANULOCYTES # BLD AUTO: 0.03 K/UL (ref 0–0.04)
IMM GRANULOCYTES NFR BLD AUTO: 0.5 % (ref 0–0.5)
INR PPP: 1.3 (ref 0.8–1.2)
LYMPHOCYTES # BLD AUTO: 0.8 K/UL (ref 1–4.8)
LYMPHOCYTES NFR BLD: 12.6 % (ref 18–48)
MAGNESIUM SERPL-MCNC: 1.5 MG/DL (ref 1.6–2.6)
MCH RBC QN AUTO: 29.8 PG (ref 27–31)
MCHC RBC AUTO-ENTMCNC: 32.5 G/DL (ref 32–36)
MCV RBC AUTO: 92 FL (ref 82–98)
MONOCYTES # BLD AUTO: 0.6 K/UL (ref 0.3–1)
MONOCYTES NFR BLD: 9 % (ref 4–15)
NEUTROPHILS # BLD AUTO: 4.6 K/UL (ref 1.8–7.7)
NEUTROPHILS NFR BLD: 72 % (ref 38–73)
NRBC BLD-RTO: 0 /100 WBC
PHOSPHATE SERPL-MCNC: 1.8 MG/DL (ref 2.7–4.5)
PHOSPHATE SERPL-MCNC: 2 MG/DL (ref 2.7–4.5)
PLATELET # BLD AUTO: 198 K/UL (ref 150–450)
PMV BLD AUTO: 9.8 FL (ref 9.2–12.9)
POTASSIUM SERPL-SCNC: 3.1 MMOL/L (ref 3.5–5.1)
PROT SERPL-MCNC: 5.7 G/DL (ref 6–8.4)
PROTHROMBIN TIME: 13.7 SEC (ref 9–12.5)
RBC # BLD AUTO: 4.29 M/UL (ref 4–5.4)
SODIUM SERPL-SCNC: 134 MMOL/L (ref 136–145)
WBC # BLD AUTO: 6.43 K/UL (ref 3.9–12.7)

## 2021-07-01 PROCEDURE — 25000003 PHARM REV CODE 250: Performed by: NURSE PRACTITIONER

## 2021-07-01 PROCEDURE — 84100 ASSAY OF PHOSPHORUS: CPT | Performed by: PHYSICIAN ASSISTANT

## 2021-07-01 PROCEDURE — 80053 COMPREHEN METABOLIC PANEL: CPT | Performed by: PHYSICIAN ASSISTANT

## 2021-07-01 PROCEDURE — 36415 COLL VENOUS BLD VENIPUNCTURE: CPT | Performed by: TRANSPLANT SURGERY

## 2021-07-01 PROCEDURE — 25000003 PHARM REV CODE 250: Performed by: PHYSICIAN ASSISTANT

## 2021-07-01 PROCEDURE — 25000003 PHARM REV CODE 250: Performed by: CLINICAL NURSE SPECIALIST

## 2021-07-01 PROCEDURE — 20600001 HC STEP DOWN PRIVATE ROOM

## 2021-07-01 PROCEDURE — 83735 ASSAY OF MAGNESIUM: CPT | Performed by: PHYSICIAN ASSISTANT

## 2021-07-01 PROCEDURE — 63600175 PHARM REV CODE 636 W HCPCS: Performed by: TRANSPLANT SURGERY

## 2021-07-01 PROCEDURE — 99233 PR SUBSEQUENT HOSPITAL CARE,LEVL III: ICD-10-PCS | Mod: ,,, | Performed by: PHYSICIAN ASSISTANT

## 2021-07-01 PROCEDURE — 25000003 PHARM REV CODE 250: Performed by: TRANSPLANT SURGERY

## 2021-07-01 PROCEDURE — 84100 ASSAY OF PHOSPHORUS: CPT | Mod: 91 | Performed by: PHYSICIAN ASSISTANT

## 2021-07-01 PROCEDURE — 99233 SBSQ HOSP IP/OBS HIGH 50: CPT | Mod: ,,, | Performed by: PHYSICIAN ASSISTANT

## 2021-07-01 PROCEDURE — 85610 PROTHROMBIN TIME: CPT | Performed by: TRANSPLANT SURGERY

## 2021-07-01 PROCEDURE — 85025 COMPLETE CBC W/AUTO DIFF WBC: CPT | Performed by: PHYSICIAN ASSISTANT

## 2021-07-01 PROCEDURE — 36415 COLL VENOUS BLD VENIPUNCTURE: CPT | Performed by: PHYSICIAN ASSISTANT

## 2021-07-01 RX ORDER — ENOXAPARIN SODIUM 100 MG/ML
40 INJECTION SUBCUTANEOUS DAILY
Qty: 5.6 ML | Refills: 0 | Status: SHIPPED | OUTPATIENT
Start: 2021-07-01 | End: 2021-08-12

## 2021-07-01 RX ORDER — SYRING-NEEDL,DISP,INSUL,0.3 ML 29 G X1/2"
296 SYRINGE, EMPTY DISPOSABLE MISCELLANEOUS
Status: COMPLETED | OUTPATIENT
Start: 2021-07-01 | End: 2021-07-01

## 2021-07-01 RX ORDER — LANOLIN ALCOHOL/MO/W.PET/CERES
800 CREAM (GRAM) TOPICAL ONCE
Status: COMPLETED | OUTPATIENT
Start: 2021-07-01 | End: 2021-07-01

## 2021-07-01 RX ORDER — PSEUDOEPHEDRINE/ACETAMINOPHEN 30MG-500MG
100 TABLET ORAL
Status: COMPLETED | OUTPATIENT
Start: 2021-07-01 | End: 2021-07-01

## 2021-07-01 RX ORDER — POTASSIUM CHLORIDE 20 MEQ/1
40 TABLET, EXTENDED RELEASE ORAL ONCE
Status: COMPLETED | OUTPATIENT
Start: 2021-07-01 | End: 2021-07-01

## 2021-07-01 RX ADMIN — Medication 800 MG: at 09:07

## 2021-07-01 RX ADMIN — ONDANSETRON 4 MG: 2 INJECTION INTRAMUSCULAR; INTRAVENOUS at 10:07

## 2021-07-01 RX ADMIN — MUPIROCIN: 20 OINTMENT TOPICAL at 08:07

## 2021-07-01 RX ADMIN — MAGNESIUM CITRATE 296 ML: 1.75 LIQUID ORAL at 03:07

## 2021-07-01 RX ADMIN — IBUPROFEN 600 MG: 600 TABLET, FILM COATED ORAL at 08:07

## 2021-07-01 RX ADMIN — Medication 100 ML: at 03:07

## 2021-07-01 RX ADMIN — OXYCODONE HYDROCHLORIDE AND ACETAMINOPHEN 1 TABLET: 5; 325 TABLET ORAL at 12:07

## 2021-07-01 RX ADMIN — BISACODYL 10 MG: 10 SUPPOSITORY RECTAL at 08:07

## 2021-07-01 RX ADMIN — POLYETHYLENE GLYCOL 3350 17 G: 17 POWDER, FOR SOLUTION ORAL at 08:07

## 2021-07-01 RX ADMIN — DOCUSATE SODIUM 100 MG: 100 CAPSULE, LIQUID FILLED ORAL at 03:07

## 2021-07-01 RX ADMIN — POLYETHYLENE GLYCOL 3350 17 G: 17 POWDER, FOR SOLUTION ORAL at 07:07

## 2021-07-01 RX ADMIN — SODIUM PHOSPHATE, MONOBASIC, MONOHYDRATE 30 MMOL: 276; 142 INJECTION, SOLUTION INTRAVENOUS at 08:07

## 2021-07-01 RX ADMIN — IBUPROFEN 600 MG: 600 TABLET, FILM COATED ORAL at 01:07

## 2021-07-01 RX ADMIN — DOCUSATE SODIUM 100 MG: 100 CAPSULE, LIQUID FILLED ORAL at 08:07

## 2021-07-01 RX ADMIN — IBUPROFEN 600 MG: 600 TABLET, FILM COATED ORAL at 05:07

## 2021-07-01 RX ADMIN — SODIUM PHOSPHATE, MONOBASIC, MONOHYDRATE 20.01 MMOL: 276; 142 INJECTION, SOLUTION INTRAVENOUS at 10:07

## 2021-07-01 RX ADMIN — DOCUSATE SODIUM 100 MG: 100 CAPSULE, LIQUID FILLED ORAL at 07:07

## 2021-07-01 RX ADMIN — POTASSIUM CHLORIDE 40 MEQ: 1500 TABLET, EXTENDED RELEASE ORAL at 09:07

## 2021-07-01 RX ADMIN — DOCUSATE SODIUM 100 MG: 100 CAPSULE, LIQUID FILLED ORAL at 01:07

## 2021-07-02 ENCOUNTER — PATIENT MESSAGE (OUTPATIENT)
Dept: INTERNAL MEDICINE | Facility: CLINIC | Age: 41
End: 2021-07-02

## 2021-07-02 LAB
ALBUMIN SERPL BCP-MCNC: 2.8 G/DL (ref 3.5–5.2)
ALP SERPL-CCNC: 87 U/L (ref 55–135)
ALT SERPL W/O P-5'-P-CCNC: 210 U/L (ref 10–44)
ANION GAP SERPL CALC-SCNC: 10 MMOL/L (ref 8–16)
AST SERPL-CCNC: 86 U/L (ref 10–40)
BASOPHILS # BLD AUTO: 0.04 K/UL (ref 0–0.2)
BASOPHILS NFR BLD: 0.6 % (ref 0–1.9)
BILIRUB SERPL-MCNC: 1.5 MG/DL (ref 0.1–1)
BLD PROD TYP BPU: NORMAL
BLD PROD TYP BPU: NORMAL
BLOOD UNIT EXPIRATION DATE: NORMAL
BLOOD UNIT EXPIRATION DATE: NORMAL
BLOOD UNIT TYPE CODE: 7300
BLOOD UNIT TYPE CODE: 7300
BLOOD UNIT TYPE: NORMAL
BLOOD UNIT TYPE: NORMAL
BUN SERPL-MCNC: 4 MG/DL (ref 6–20)
CALCIUM SERPL-MCNC: 8.2 MG/DL (ref 8.7–10.5)
CHLORIDE SERPL-SCNC: 101 MMOL/L (ref 95–110)
CO2 SERPL-SCNC: 24 MMOL/L (ref 23–29)
CODING SYSTEM: NORMAL
CODING SYSTEM: NORMAL
CREAT SERPL-MCNC: 0.8 MG/DL (ref 0.5–1.4)
DIFFERENTIAL METHOD: ABNORMAL
DISPENSE STATUS: NORMAL
DISPENSE STATUS: NORMAL
EOSINOPHIL # BLD AUTO: 0.4 K/UL (ref 0–0.5)
EOSINOPHIL NFR BLD: 6.4 % (ref 0–8)
ERYTHROCYTE [DISTWIDTH] IN BLOOD BY AUTOMATED COUNT: 12.5 % (ref 11.5–14.5)
EST. GFR  (AFRICAN AMERICAN): >60 ML/MIN/1.73 M^2
EST. GFR  (NON AFRICAN AMERICAN): >60 ML/MIN/1.73 M^2
GLUCOSE SERPL-MCNC: 80 MG/DL (ref 70–110)
HCT VFR BLD AUTO: 38.7 % (ref 37–48.5)
HGB BLD-MCNC: 12.4 G/DL (ref 12–16)
IMM GRANULOCYTES # BLD AUTO: 0.02 K/UL (ref 0–0.04)
IMM GRANULOCYTES NFR BLD AUTO: 0.3 % (ref 0–0.5)
LYMPHOCYTES # BLD AUTO: 0.9 K/UL (ref 1–4.8)
LYMPHOCYTES NFR BLD: 15.1 % (ref 18–48)
MAGNESIUM SERPL-MCNC: 1.6 MG/DL (ref 1.6–2.6)
MCH RBC QN AUTO: 30.2 PG (ref 27–31)
MCHC RBC AUTO-ENTMCNC: 32 G/DL (ref 32–36)
MCV RBC AUTO: 94 FL (ref 82–98)
MONOCYTES # BLD AUTO: 0.7 K/UL (ref 0.3–1)
MONOCYTES NFR BLD: 10.9 % (ref 4–15)
NEUTROPHILS # BLD AUTO: 4.2 K/UL (ref 1.8–7.7)
NEUTROPHILS NFR BLD: 66.7 % (ref 38–73)
NRBC BLD-RTO: 0 /100 WBC
PHOSPHATE SERPL-MCNC: 2.3 MG/DL (ref 2.7–4.5)
PHOSPHATE SERPL-MCNC: 2.6 MG/DL (ref 2.7–4.5)
PLATELET # BLD AUTO: 218 K/UL (ref 150–450)
PMV BLD AUTO: 10.1 FL (ref 9.2–12.9)
POTASSIUM SERPL-SCNC: 3.7 MMOL/L (ref 3.5–5.1)
PROT SERPL-MCNC: 5.8 G/DL (ref 6–8.4)
RBC # BLD AUTO: 4.11 M/UL (ref 4–5.4)
SODIUM SERPL-SCNC: 135 MMOL/L (ref 136–145)
TRANS ERYTHROCYTES VOL PATIENT: NORMAL ML
TRANS ERYTHROCYTES VOL PATIENT: NORMAL ML
WBC # BLD AUTO: 6.23 K/UL (ref 3.9–12.7)

## 2021-07-02 PROCEDURE — 36415 COLL VENOUS BLD VENIPUNCTURE: CPT | Performed by: PHYSICIAN ASSISTANT

## 2021-07-02 PROCEDURE — 20600001 HC STEP DOWN PRIVATE ROOM

## 2021-07-02 PROCEDURE — 80053 COMPREHEN METABOLIC PANEL: CPT | Performed by: PHYSICIAN ASSISTANT

## 2021-07-02 PROCEDURE — 83735 ASSAY OF MAGNESIUM: CPT | Performed by: PHYSICIAN ASSISTANT

## 2021-07-02 PROCEDURE — 63600175 PHARM REV CODE 636 W HCPCS: Performed by: PHYSICIAN ASSISTANT

## 2021-07-02 PROCEDURE — 25000003 PHARM REV CODE 250: Performed by: PHYSICIAN ASSISTANT

## 2021-07-02 PROCEDURE — 25000003 PHARM REV CODE 250: Performed by: NURSE PRACTITIONER

## 2021-07-02 PROCEDURE — 25000003 PHARM REV CODE 250: Performed by: CLINICAL NURSE SPECIALIST

## 2021-07-02 PROCEDURE — 85025 COMPLETE CBC W/AUTO DIFF WBC: CPT | Performed by: PHYSICIAN ASSISTANT

## 2021-07-02 PROCEDURE — 84100 ASSAY OF PHOSPHORUS: CPT | Performed by: PHYSICIAN ASSISTANT

## 2021-07-02 RX ORDER — BISACODYL 5 MG
10 TABLET, DELAYED RELEASE (ENTERIC COATED) ORAL DAILY
Status: DISCONTINUED | OUTPATIENT
Start: 2021-07-02 | End: 2021-07-03 | Stop reason: HOSPADM

## 2021-07-02 RX ORDER — OXYCODONE AND ACETAMINOPHEN 5; 325 MG/1; MG/1
1 TABLET ORAL EVERY 6 HOURS PRN
Status: DISCONTINUED | OUTPATIENT
Start: 2021-07-02 | End: 2021-07-03 | Stop reason: HOSPADM

## 2021-07-02 RX ORDER — CYCLOBENZAPRINE HCL 5 MG
10 TABLET ORAL 2 TIMES DAILY PRN
Status: DISCONTINUED | OUTPATIENT
Start: 2021-07-02 | End: 2021-07-03 | Stop reason: HOSPADM

## 2021-07-02 RX ORDER — BISACODYL 5 MG
10 TABLET, DELAYED RELEASE (ENTERIC COATED) ORAL NIGHTLY
Status: DISCONTINUED | OUTPATIENT
Start: 2021-07-02 | End: 2021-07-02

## 2021-07-02 RX ORDER — OXYCODONE AND ACETAMINOPHEN 10; 325 MG/1; MG/1
1 TABLET ORAL EVERY 6 HOURS PRN
Status: DISCONTINUED | OUTPATIENT
Start: 2021-07-02 | End: 2021-07-03 | Stop reason: HOSPADM

## 2021-07-02 RX ORDER — ACETAMINOPHEN 325 MG/1
650 TABLET ORAL EVERY 6 HOURS PRN
Status: DISCONTINUED | OUTPATIENT
Start: 2021-07-02 | End: 2021-07-03 | Stop reason: HOSPADM

## 2021-07-02 RX ORDER — CETIRIZINE HYDROCHLORIDE 10 MG/1
10 TABLET ORAL DAILY
Status: DISCONTINUED | OUTPATIENT
Start: 2021-07-02 | End: 2021-07-03 | Stop reason: HOSPADM

## 2021-07-02 RX ORDER — METHOCARBAMOL 500 MG/1
500 TABLET, FILM COATED ORAL EVERY 6 HOURS PRN
Qty: 30 TABLET | Refills: 0 | Status: CANCELLED | OUTPATIENT
Start: 2021-07-02 | End: 2021-07-12

## 2021-07-02 RX ORDER — METHOCARBAMOL 500 MG/1
500 TABLET, FILM COATED ORAL EVERY 6 HOURS PRN
Status: DISCONTINUED | OUTPATIENT
Start: 2021-07-02 | End: 2021-07-02

## 2021-07-02 RX ORDER — OXYCODONE AND ACETAMINOPHEN 5; 325 MG/1; MG/1
1 TABLET ORAL EVERY 6 HOURS PRN
Qty: 30 TABLET | Refills: 0 | Status: CANCELLED | OUTPATIENT
Start: 2021-07-02

## 2021-07-02 RX ORDER — ENOXAPARIN SODIUM 100 MG/ML
40 INJECTION SUBCUTANEOUS EVERY 24 HOURS
Status: DISCONTINUED | OUTPATIENT
Start: 2021-07-02 | End: 2021-07-03 | Stop reason: HOSPADM

## 2021-07-02 RX ADMIN — BISACODYL 10 MG: 5 TABLET, COATED ORAL at 08:07

## 2021-07-02 RX ADMIN — BISACODYL 10 MG: 10 SUPPOSITORY RECTAL at 09:07

## 2021-07-02 RX ADMIN — IBUPROFEN 600 MG: 600 TABLET, FILM COATED ORAL at 02:07

## 2021-07-02 RX ADMIN — CYCLOBENZAPRINE HYDROCHLORIDE 10 MG: 5 TABLET, FILM COATED ORAL at 04:07

## 2021-07-02 RX ADMIN — DOCUSATE SODIUM 100 MG: 100 CAPSULE, LIQUID FILLED ORAL at 02:07

## 2021-07-02 RX ADMIN — MUPIROCIN: 20 OINTMENT TOPICAL at 08:07

## 2021-07-02 RX ADMIN — POLYETHYLENE GLYCOL 3350 17 G: 17 POWDER, FOR SOLUTION ORAL at 08:07

## 2021-07-02 RX ADMIN — DOCUSATE SODIUM 100 MG: 100 CAPSULE, LIQUID FILLED ORAL at 08:07

## 2021-07-02 RX ADMIN — METHOCARBAMOL 500 MG: 500 TABLET ORAL at 09:07

## 2021-07-02 RX ADMIN — CETIRIZINE HYDROCHLORIDE 10 MG: 10 TABLET, FILM COATED ORAL at 02:07

## 2021-07-02 RX ADMIN — IBUPROFEN 600 MG: 600 TABLET, FILM COATED ORAL at 05:07

## 2021-07-02 RX ADMIN — SODIUM PHOSPHATE, MONOBASIC, MONOHYDRATE AND SODIUM PHOSPHATE, DIBASIC, ANHYDROUS 20.01 MMOL: 276; 142 INJECTION, SOLUTION INTRAVENOUS at 10:07

## 2021-07-02 RX ADMIN — SODIUM PHOSPHATE, MONOBASIC, MONOHYDRATE AND SODIUM PHOSPHATE, DIBASIC, ANHYDROUS 15 MMOL: 276; 142 INJECTION, SOLUTION INTRAVENOUS at 08:07

## 2021-07-02 RX ADMIN — IBUPROFEN 600 MG: 600 TABLET, FILM COATED ORAL at 08:07

## 2021-07-02 RX ADMIN — ACETAMINOPHEN 650 MG: 325 TABLET ORAL at 12:07

## 2021-07-02 RX ADMIN — ENOXAPARIN SODIUM 40 MG: 40 INJECTION SUBCUTANEOUS at 04:07

## 2021-07-03 VITALS
BODY MASS INDEX: 19.32 KG/M2 | DIASTOLIC BLOOD PRESSURE: 65 MMHG | WEIGHT: 138 LBS | RESPIRATION RATE: 16 BRPM | TEMPERATURE: 99 F | SYSTOLIC BLOOD PRESSURE: 115 MMHG | HEIGHT: 71 IN | OXYGEN SATURATION: 99 % | HEART RATE: 100 BPM

## 2021-07-03 LAB
ALBUMIN SERPL BCP-MCNC: 2.5 G/DL (ref 3.5–5.2)
ALP SERPL-CCNC: 86 U/L (ref 55–135)
ALT SERPL W/O P-5'-P-CCNC: 144 U/L (ref 10–44)
ANION GAP SERPL CALC-SCNC: 11 MMOL/L (ref 8–16)
AST SERPL-CCNC: 54 U/L (ref 10–40)
BASOPHILS # BLD AUTO: 0.03 K/UL (ref 0–0.2)
BASOPHILS # BLD AUTO: 0.03 K/UL (ref 0–0.2)
BASOPHILS NFR BLD: 0.4 % (ref 0–1.9)
BASOPHILS NFR BLD: 0.5 % (ref 0–1.9)
BILIRUB SERPL-MCNC: 1 MG/DL (ref 0.1–1)
BUN SERPL-MCNC: 5 MG/DL (ref 6–20)
CALCIUM SERPL-MCNC: 8.1 MG/DL (ref 8.7–10.5)
CHLORIDE SERPL-SCNC: 105 MMOL/L (ref 95–110)
CO2 SERPL-SCNC: 21 MMOL/L (ref 23–29)
CREAT SERPL-MCNC: 0.7 MG/DL (ref 0.5–1.4)
DIFFERENTIAL METHOD: ABNORMAL
DIFFERENTIAL METHOD: ABNORMAL
EOSINOPHIL # BLD AUTO: 0.4 K/UL (ref 0–0.5)
EOSINOPHIL # BLD AUTO: 0.6 K/UL (ref 0–0.5)
EOSINOPHIL NFR BLD: 6.5 % (ref 0–8)
EOSINOPHIL NFR BLD: 8.4 % (ref 0–8)
ERYTHROCYTE [DISTWIDTH] IN BLOOD BY AUTOMATED COUNT: 12.4 % (ref 11.5–14.5)
ERYTHROCYTE [DISTWIDTH] IN BLOOD BY AUTOMATED COUNT: 12.6 % (ref 11.5–14.5)
EST. GFR  (AFRICAN AMERICAN): >60 ML/MIN/1.73 M^2
EST. GFR  (NON AFRICAN AMERICAN): >60 ML/MIN/1.73 M^2
GLUCOSE SERPL-MCNC: 75 MG/DL (ref 70–110)
HCT VFR BLD AUTO: 34.5 % (ref 37–48.5)
HCT VFR BLD AUTO: 37.2 % (ref 37–48.5)
HGB BLD-MCNC: 11.2 G/DL (ref 12–16)
HGB BLD-MCNC: 12.3 G/DL (ref 12–16)
IMM GRANULOCYTES # BLD AUTO: 0.02 K/UL (ref 0–0.04)
IMM GRANULOCYTES # BLD AUTO: 0.03 K/UL (ref 0–0.04)
IMM GRANULOCYTES NFR BLD AUTO: 0.3 % (ref 0–0.5)
IMM GRANULOCYTES NFR BLD AUTO: 0.5 % (ref 0–0.5)
LYMPHOCYTES # BLD AUTO: 0.9 K/UL (ref 1–4.8)
LYMPHOCYTES # BLD AUTO: 1.3 K/UL (ref 1–4.8)
LYMPHOCYTES NFR BLD: 13.9 % (ref 18–48)
LYMPHOCYTES NFR BLD: 18.3 % (ref 18–48)
MAGNESIUM SERPL-MCNC: 1.5 MG/DL (ref 1.6–2.6)
MCH RBC QN AUTO: 30.4 PG (ref 27–31)
MCH RBC QN AUTO: 30.6 PG (ref 27–31)
MCHC RBC AUTO-ENTMCNC: 32.5 G/DL (ref 32–36)
MCHC RBC AUTO-ENTMCNC: 33.1 G/DL (ref 32–36)
MCV RBC AUTO: 93 FL (ref 82–98)
MCV RBC AUTO: 94 FL (ref 82–98)
MONOCYTES # BLD AUTO: 0.9 K/UL (ref 0.3–1)
MONOCYTES # BLD AUTO: 0.9 K/UL (ref 0.3–1)
MONOCYTES NFR BLD: 13.4 % (ref 4–15)
MONOCYTES NFR BLD: 13.5 % (ref 4–15)
NEUTROPHILS # BLD AUTO: 4.1 K/UL (ref 1.8–7.7)
NEUTROPHILS # BLD AUTO: 4.1 K/UL (ref 1.8–7.7)
NEUTROPHILS NFR BLD: 59.1 % (ref 38–73)
NEUTROPHILS NFR BLD: 65.2 % (ref 38–73)
NRBC BLD-RTO: 0 /100 WBC
NRBC BLD-RTO: 0 /100 WBC
PHOSPHATE SERPL-MCNC: 2.9 MG/DL (ref 2.7–4.5)
PLATELET # BLD AUTO: 198 K/UL (ref 150–450)
PLATELET # BLD AUTO: 216 K/UL (ref 150–450)
PMV BLD AUTO: 9.3 FL (ref 9.2–12.9)
PMV BLD AUTO: 9.9 FL (ref 9.2–12.9)
POTASSIUM SERPL-SCNC: 3.8 MMOL/L (ref 3.5–5.1)
PROT SERPL-MCNC: 5.2 G/DL (ref 6–8.4)
RBC # BLD AUTO: 3.69 M/UL (ref 4–5.4)
RBC # BLD AUTO: 4.02 M/UL (ref 4–5.4)
SODIUM SERPL-SCNC: 137 MMOL/L (ref 136–145)
WBC # BLD AUTO: 6.34 K/UL (ref 3.9–12.7)
WBC # BLD AUTO: 6.88 K/UL (ref 3.9–12.7)

## 2021-07-03 PROCEDURE — 80053 COMPREHEN METABOLIC PANEL: CPT | Performed by: PHYSICIAN ASSISTANT

## 2021-07-03 PROCEDURE — 25000003 PHARM REV CODE 250: Performed by: PHYSICIAN ASSISTANT

## 2021-07-03 PROCEDURE — 63600175 PHARM REV CODE 636 W HCPCS: Performed by: PHYSICIAN ASSISTANT

## 2021-07-03 PROCEDURE — 85025 COMPLETE CBC W/AUTO DIFF WBC: CPT | Performed by: NURSE PRACTITIONER

## 2021-07-03 PROCEDURE — 25000003 PHARM REV CODE 250: Performed by: NURSE PRACTITIONER

## 2021-07-03 PROCEDURE — 84100 ASSAY OF PHOSPHORUS: CPT | Performed by: PHYSICIAN ASSISTANT

## 2021-07-03 PROCEDURE — 83735 ASSAY OF MAGNESIUM: CPT | Performed by: PHYSICIAN ASSISTANT

## 2021-07-03 PROCEDURE — 36415 COLL VENOUS BLD VENIPUNCTURE: CPT | Performed by: NURSE PRACTITIONER

## 2021-07-03 PROCEDURE — 85025 COMPLETE CBC W/AUTO DIFF WBC: CPT | Mod: 91 | Performed by: PHYSICIAN ASSISTANT

## 2021-07-03 PROCEDURE — 94761 N-INVAS EAR/PLS OXIMETRY MLT: CPT

## 2021-07-03 PROCEDURE — 36415 COLL VENOUS BLD VENIPUNCTURE: CPT | Performed by: PHYSICIAN ASSISTANT

## 2021-07-03 RX ORDER — DOCUSATE SODIUM 100 MG/1
100 CAPSULE, LIQUID FILLED ORAL 3 TIMES DAILY
Refills: 0
Start: 2021-07-03 | End: 2021-09-15

## 2021-07-03 RX ORDER — IBUPROFEN 600 MG/1
600 TABLET ORAL EVERY 8 HOURS
Qty: 30 TABLET | Refills: 0 | Status: SHIPPED | OUTPATIENT
Start: 2021-07-03 | End: 2021-08-12

## 2021-07-03 RX ORDER — OXYCODONE AND ACETAMINOPHEN 5; 325 MG/1; MG/1
1 TABLET ORAL EVERY 8 HOURS PRN
Qty: 12 TABLET | Refills: 0 | Status: SHIPPED | OUTPATIENT
Start: 2021-07-03 | End: 2021-07-21

## 2021-07-03 RX ADMIN — CYCLOBENZAPRINE HYDROCHLORIDE 10 MG: 5 TABLET, FILM COATED ORAL at 04:07

## 2021-07-03 RX ADMIN — ENOXAPARIN SODIUM 40 MG: 40 INJECTION SUBCUTANEOUS at 04:07

## 2021-07-03 RX ADMIN — MUPIROCIN: 20 OINTMENT TOPICAL at 08:07

## 2021-07-03 RX ADMIN — IBUPROFEN 600 MG: 600 TABLET, FILM COATED ORAL at 05:07

## 2021-07-03 RX ADMIN — IBUPROFEN 600 MG: 600 TABLET, FILM COATED ORAL at 02:07

## 2021-07-03 RX ADMIN — POLYETHYLENE GLYCOL 3350 17 G: 17 POWDER, FOR SOLUTION ORAL at 08:07

## 2021-07-03 RX ADMIN — CETIRIZINE HYDROCHLORIDE 10 MG: 10 TABLET, FILM COATED ORAL at 08:07

## 2021-07-03 RX ADMIN — DOCUSATE SODIUM 100 MG: 100 CAPSULE, LIQUID FILLED ORAL at 03:07

## 2021-07-03 RX ADMIN — BISACODYL 10 MG: 10 SUPPOSITORY RECTAL at 11:07

## 2021-07-03 RX ADMIN — DOCUSATE SODIUM 100 MG: 100 CAPSULE, LIQUID FILLED ORAL at 08:07

## 2021-07-03 RX ADMIN — BISACODYL 10 MG: 5 TABLET, COATED ORAL at 08:07

## 2021-07-06 ENCOUNTER — TELEPHONE (OUTPATIENT)
Dept: TRANSPLANT | Facility: CLINIC | Age: 41
End: 2021-07-06

## 2021-07-06 ENCOUNTER — PATIENT MESSAGE (OUTPATIENT)
Dept: OBSTETRICS AND GYNECOLOGY | Facility: CLINIC | Age: 41
End: 2021-07-06

## 2021-07-06 ENCOUNTER — PATIENT OUTREACH (OUTPATIENT)
Dept: ADMINISTRATIVE | Facility: CLINIC | Age: 41
End: 2021-07-06

## 2021-07-06 DIAGNOSIS — Z98.890 POST-OPERATIVE STATE: Primary | ICD-10-CM

## 2021-07-11 ENCOUNTER — PATIENT MESSAGE (OUTPATIENT)
Dept: HEPATOLOGY | Facility: CLINIC | Age: 41
End: 2021-07-11

## 2021-07-14 ENCOUNTER — PATIENT MESSAGE (OUTPATIENT)
Dept: TRANSPLANT | Facility: CLINIC | Age: 41
End: 2021-07-14

## 2021-07-15 ENCOUNTER — LAB VISIT (OUTPATIENT)
Dept: LAB | Facility: HOSPITAL | Age: 41
End: 2021-07-15
Attending: TRANSPLANT SURGERY
Payer: COMMERCIAL

## 2021-07-15 ENCOUNTER — OFFICE VISIT (OUTPATIENT)
Dept: TRANSPLANT | Facility: CLINIC | Age: 41
End: 2021-07-15
Payer: COMMERCIAL

## 2021-07-15 VITALS
TEMPERATURE: 99 F | BODY MASS INDEX: 19.69 KG/M2 | OXYGEN SATURATION: 100 % | WEIGHT: 140.63 LBS | DIASTOLIC BLOOD PRESSURE: 79 MMHG | SYSTOLIC BLOOD PRESSURE: 117 MMHG | RESPIRATION RATE: 18 BRPM | HEIGHT: 71 IN | HEART RATE: 93 BPM

## 2021-07-15 DIAGNOSIS — C22.0 HCC (HEPATOCELLULAR CARCINOMA): Primary | ICD-10-CM

## 2021-07-15 DIAGNOSIS — Z98.890 POST-OPERATIVE STATE: ICD-10-CM

## 2021-07-15 LAB
ALBUMIN SERPL BCP-MCNC: 3.3 G/DL (ref 3.5–5.2)
ALP SERPL-CCNC: 101 U/L (ref 55–135)
ALT SERPL W/O P-5'-P-CCNC: 24 U/L (ref 10–44)
ANION GAP SERPL CALC-SCNC: 10 MMOL/L (ref 8–16)
AST SERPL-CCNC: 19 U/L (ref 10–40)
BASOPHILS # BLD AUTO: 0.06 K/UL (ref 0–0.2)
BASOPHILS NFR BLD: 1.1 % (ref 0–1.9)
BILIRUB SERPL-MCNC: 0.7 MG/DL (ref 0.1–1)
BUN SERPL-MCNC: 8 MG/DL (ref 6–20)
CALCIUM SERPL-MCNC: 9 MG/DL (ref 8.7–10.5)
CHLORIDE SERPL-SCNC: 104 MMOL/L (ref 95–110)
CO2 SERPL-SCNC: 25 MMOL/L (ref 23–29)
CREAT SERPL-MCNC: 0.8 MG/DL (ref 0.5–1.4)
DIFFERENTIAL METHOD: ABNORMAL
EOSINOPHIL # BLD AUTO: 0.5 K/UL (ref 0–0.5)
EOSINOPHIL NFR BLD: 9.6 % (ref 0–8)
ERYTHROCYTE [DISTWIDTH] IN BLOOD BY AUTOMATED COUNT: 12.3 % (ref 11.5–14.5)
EST. GFR  (AFRICAN AMERICAN): >60 ML/MIN/1.73 M^2
EST. GFR  (NON AFRICAN AMERICAN): >60 ML/MIN/1.73 M^2
GLUCOSE SERPL-MCNC: 59 MG/DL (ref 70–110)
HCT VFR BLD AUTO: 40.1 % (ref 37–48.5)
HGB BLD-MCNC: 12.9 G/DL (ref 12–16)
IMM GRANULOCYTES # BLD AUTO: 0.01 K/UL (ref 0–0.04)
IMM GRANULOCYTES NFR BLD AUTO: 0.2 % (ref 0–0.5)
LYMPHOCYTES # BLD AUTO: 1.2 K/UL (ref 1–4.8)
LYMPHOCYTES NFR BLD: 21.3 % (ref 18–48)
MAGNESIUM SERPL-MCNC: 1.8 MG/DL (ref 1.6–2.6)
MCH RBC QN AUTO: 30.2 PG (ref 27–31)
MCHC RBC AUTO-ENTMCNC: 32.2 G/DL (ref 32–36)
MCV RBC AUTO: 94 FL (ref 82–98)
MONOCYTES # BLD AUTO: 0.5 K/UL (ref 0.3–1)
MONOCYTES NFR BLD: 9.7 % (ref 4–15)
NEUTROPHILS # BLD AUTO: 3.2 K/UL (ref 1.8–7.7)
NEUTROPHILS NFR BLD: 58.1 % (ref 38–73)
NRBC BLD-RTO: 0 /100 WBC
PHOSPHATE SERPL-MCNC: 3.1 MG/DL (ref 2.7–4.5)
PLATELET # BLD AUTO: 331 K/UL (ref 150–450)
PMV BLD AUTO: 9.6 FL (ref 9.2–12.9)
POTASSIUM SERPL-SCNC: 3.6 MMOL/L (ref 3.5–5.1)
PROT SERPL-MCNC: 7.2 G/DL (ref 6–8.4)
RBC # BLD AUTO: 4.27 M/UL (ref 4–5.4)
SODIUM SERPL-SCNC: 139 MMOL/L (ref 136–145)
WBC # BLD AUTO: 5.54 K/UL (ref 3.9–12.7)

## 2021-07-15 PROCEDURE — 85025 COMPLETE CBC W/AUTO DIFF WBC: CPT | Performed by: TRANSPLANT SURGERY

## 2021-07-15 PROCEDURE — 99999 PR PBB SHADOW E&M-EST. PATIENT-LVL IV: CPT | Mod: PBBFAC,,,

## 2021-07-15 PROCEDURE — 36415 COLL VENOUS BLD VENIPUNCTURE: CPT | Performed by: TRANSPLANT SURGERY

## 2021-07-15 PROCEDURE — 3008F PR BODY MASS INDEX (BMI) DOCUMENTED: ICD-10-PCS | Mod: CPTII,S$GLB,, | Performed by: TRANSPLANT SURGERY

## 2021-07-15 PROCEDURE — 1126F AMNT PAIN NOTED NONE PRSNT: CPT | Mod: S$GLB,,, | Performed by: TRANSPLANT SURGERY

## 2021-07-15 PROCEDURE — 99024 POSTOP FOLLOW-UP VISIT: CPT | Mod: S$GLB,,, | Performed by: TRANSPLANT SURGERY

## 2021-07-15 PROCEDURE — 1126F PR PAIN SEVERITY QUANTIFIED, NO PAIN PRESENT: ICD-10-PCS | Mod: S$GLB,,, | Performed by: TRANSPLANT SURGERY

## 2021-07-15 PROCEDURE — 99999 PR PBB SHADOW E&M-EST. PATIENT-LVL IV: ICD-10-PCS | Mod: PBBFAC,,,

## 2021-07-15 PROCEDURE — 83735 ASSAY OF MAGNESIUM: CPT | Performed by: TRANSPLANT SURGERY

## 2021-07-15 PROCEDURE — 84100 ASSAY OF PHOSPHORUS: CPT | Performed by: TRANSPLANT SURGERY

## 2021-07-15 PROCEDURE — 3008F BODY MASS INDEX DOCD: CPT | Mod: CPTII,S$GLB,, | Performed by: TRANSPLANT SURGERY

## 2021-07-15 PROCEDURE — 99024 PR POST-OP FOLLOW-UP VISIT: ICD-10-PCS | Mod: S$GLB,,, | Performed by: TRANSPLANT SURGERY

## 2021-07-15 PROCEDURE — 80053 COMPREHEN METABOLIC PANEL: CPT | Performed by: TRANSPLANT SURGERY

## 2021-07-18 ENCOUNTER — PATIENT MESSAGE (OUTPATIENT)
Dept: TRANSPLANT | Facility: CLINIC | Age: 41
End: 2021-07-18

## 2021-07-19 ENCOUNTER — PATIENT MESSAGE (OUTPATIENT)
Dept: TRANSPLANT | Facility: CLINIC | Age: 41
End: 2021-07-19

## 2021-07-20 ENCOUNTER — PATIENT OUTREACH (OUTPATIENT)
Dept: ADMINISTRATIVE | Facility: OTHER | Age: 41
End: 2021-07-20

## 2021-07-21 ENCOUNTER — OFFICE VISIT (OUTPATIENT)
Dept: OPTOMETRY | Facility: CLINIC | Age: 41
End: 2021-07-21
Payer: COMMERCIAL

## 2021-07-21 DIAGNOSIS — Z98.890 HX OF LASIK: ICD-10-CM

## 2021-07-21 DIAGNOSIS — H52.13 MYOPIA OF BOTH EYES: Primary | ICD-10-CM

## 2021-07-21 PROCEDURE — 92002 INTRM OPH EXAM NEW PATIENT: CPT | Mod: S$GLB,,, | Performed by: OPTOMETRIST

## 2021-07-21 PROCEDURE — 99999 PR PBB SHADOW E&M-EST. PATIENT-LVL III: ICD-10-PCS | Mod: PBBFAC,,, | Performed by: OPTOMETRIST

## 2021-07-21 PROCEDURE — 92015 PR REFRACTION: ICD-10-PCS | Mod: S$GLB,,, | Performed by: OPTOMETRIST

## 2021-07-21 PROCEDURE — 92015 DETERMINE REFRACTIVE STATE: CPT | Mod: S$GLB,,, | Performed by: OPTOMETRIST

## 2021-07-21 PROCEDURE — 92002 PR EYE EXAM, NEW PATIENT,INTERMED: ICD-10-PCS | Mod: S$GLB,,, | Performed by: OPTOMETRIST

## 2021-07-21 PROCEDURE — 99999 PR PBB SHADOW E&M-EST. PATIENT-LVL III: CPT | Mod: PBBFAC,,, | Performed by: OPTOMETRIST

## 2021-07-21 RX ORDER — NORETHINDRONE ACETATE AND ETHINYL ESTRADIOL .02; 1 MG/1; MG/1
1 TABLET ORAL DAILY
COMMUNITY
Start: 2021-07-14 | End: 2022-01-31

## 2021-07-23 ENCOUNTER — PATIENT MESSAGE (OUTPATIENT)
Dept: TRANSPLANT | Facility: CLINIC | Age: 41
End: 2021-07-23

## 2021-07-31 ENCOUNTER — PATIENT MESSAGE (OUTPATIENT)
Dept: HEPATOLOGY | Facility: CLINIC | Age: 41
End: 2021-07-31

## 2021-08-04 ENCOUNTER — PATIENT MESSAGE (OUTPATIENT)
Dept: OBSTETRICS AND GYNECOLOGY | Facility: CLINIC | Age: 41
End: 2021-08-04

## 2021-08-09 ENCOUNTER — PATIENT MESSAGE (OUTPATIENT)
Dept: HEPATOLOGY | Facility: CLINIC | Age: 41
End: 2021-08-09

## 2021-08-10 ENCOUNTER — HOSPITAL ENCOUNTER (OUTPATIENT)
Dept: RADIOLOGY | Facility: HOSPITAL | Age: 41
Discharge: HOME | End: 2021-08-10
Attending: INTERNAL MEDICINE
Payer: COMMERCIAL

## 2021-08-10 ENCOUNTER — TELEPHONE (OUTPATIENT)
Dept: HEPATOLOGY | Facility: CLINIC | Age: 41
End: 2021-08-10

## 2021-08-10 ENCOUNTER — CLINICAL SUPPORT (OUTPATIENT)
Dept: INFECTIOUS DISEASES | Facility: CLINIC | Age: 41
End: 2021-08-10
Payer: COMMERCIAL

## 2021-08-10 DIAGNOSIS — Z23 NEED FOR HEPATITIS A VACCINATION: ICD-10-CM

## 2021-08-10 DIAGNOSIS — R10.9 ABDOMINAL PAIN, UNSPECIFIED ABDOMINAL LOCATION: ICD-10-CM

## 2021-08-10 DIAGNOSIS — R10.9 ABDOMINAL PAIN, UNSPECIFIED ABDOMINAL LOCATION: Primary | ICD-10-CM

## 2021-08-10 PROCEDURE — 74177 CT ABDOMEN PELVIS WITH CONTRAST: ICD-10-PCS | Mod: 26,,, | Performed by: RADIOLOGY

## 2021-08-10 PROCEDURE — 90632 HEPA VACCINE ADULT IM: CPT | Mod: S$GLB,,, | Performed by: INTERNAL MEDICINE

## 2021-08-10 PROCEDURE — 90471 IMMUNIZATION ADMIN: CPT | Mod: S$GLB,,, | Performed by: INTERNAL MEDICINE

## 2021-08-10 PROCEDURE — 74177 CT ABD & PELVIS W/CONTRAST: CPT | Mod: TC

## 2021-08-10 PROCEDURE — 90632 HEPATITIS A VACCINE ADULT IM: ICD-10-PCS | Mod: S$GLB,,, | Performed by: INTERNAL MEDICINE

## 2021-08-10 PROCEDURE — 90471 HEPATITIS A VACCINE ADULT IM: ICD-10-PCS | Mod: S$GLB,,, | Performed by: INTERNAL MEDICINE

## 2021-08-10 PROCEDURE — 25500020 PHARM REV CODE 255: Performed by: INTERNAL MEDICINE

## 2021-08-10 PROCEDURE — 99999 PR PBB SHADOW E&M-EST. PATIENT-LVL II: CPT | Mod: PBBFAC,,,

## 2021-08-10 PROCEDURE — 74177 CT ABD & PELVIS W/CONTRAST: CPT | Mod: 26,,, | Performed by: RADIOLOGY

## 2021-08-10 PROCEDURE — 99999 PR PBB SHADOW E&M-EST. PATIENT-LVL II: ICD-10-PCS | Mod: PBBFAC,,,

## 2021-08-10 RX ADMIN — IOHEXOL 100 ML: 350 INJECTION, SOLUTION INTRAVENOUS at 03:08

## 2021-08-10 RX ADMIN — IOHEXOL 15 ML: 300 INJECTION, SOLUTION INTRAVENOUS at 03:08

## 2021-08-12 ENCOUNTER — LAB VISIT (OUTPATIENT)
Dept: LAB | Facility: HOSPITAL | Age: 41
End: 2021-08-12
Attending: INTERNAL MEDICINE
Payer: COMMERCIAL

## 2021-08-12 ENCOUNTER — OFFICE VISIT (OUTPATIENT)
Dept: HEPATOLOGY | Facility: CLINIC | Age: 41
End: 2021-08-12
Payer: COMMERCIAL

## 2021-08-12 VITALS
RESPIRATION RATE: 17 BRPM | WEIGHT: 138.25 LBS | BODY MASS INDEX: 19.35 KG/M2 | DIASTOLIC BLOOD PRESSURE: 82 MMHG | HEART RATE: 82 BPM | OXYGEN SATURATION: 100 % | SYSTOLIC BLOOD PRESSURE: 135 MMHG | HEIGHT: 71 IN | TEMPERATURE: 98 F

## 2021-08-12 DIAGNOSIS — R10.9 ABDOMINAL PAIN, UNSPECIFIED ABDOMINAL LOCATION: Primary | ICD-10-CM

## 2021-08-12 DIAGNOSIS — R10.9 ABDOMINAL PAIN, UNSPECIFIED ABDOMINAL LOCATION: ICD-10-CM

## 2021-08-12 DIAGNOSIS — C22.0 HEPATOCELLULAR CARCINOMA: ICD-10-CM

## 2021-08-12 LAB — CANCER AG19-9 SERPL-ACNC: 194.7 U/ML (ref 0–40)

## 2021-08-12 PROCEDURE — 1160F PR REVIEW ALL MEDS BY PRESCRIBER/CLIN PHARMACIST DOCUMENTED: ICD-10-PCS | Mod: CPTII,S$GLB,, | Performed by: INTERNAL MEDICINE

## 2021-08-12 PROCEDURE — 86301 IMMUNOASSAY TUMOR CA 19-9: CPT | Performed by: INTERNAL MEDICINE

## 2021-08-12 PROCEDURE — 3044F HG A1C LEVEL LT 7.0%: CPT | Mod: CPTII,S$GLB,, | Performed by: INTERNAL MEDICINE

## 2021-08-12 PROCEDURE — 1126F PR PAIN SEVERITY QUANTIFIED, NO PAIN PRESENT: ICD-10-PCS | Mod: CPTII,S$GLB,, | Performed by: INTERNAL MEDICINE

## 2021-08-12 PROCEDURE — 99999 PR PBB SHADOW E&M-EST. PATIENT-LVL V: ICD-10-PCS | Mod: PBBFAC,,, | Performed by: INTERNAL MEDICINE

## 2021-08-12 PROCEDURE — 1160F RVW MEDS BY RX/DR IN RCRD: CPT | Mod: CPTII,S$GLB,, | Performed by: INTERNAL MEDICINE

## 2021-08-12 PROCEDURE — 36415 COLL VENOUS BLD VENIPUNCTURE: CPT | Mod: PN | Performed by: INTERNAL MEDICINE

## 2021-08-12 PROCEDURE — 3008F BODY MASS INDEX DOCD: CPT | Mod: CPTII,S$GLB,, | Performed by: INTERNAL MEDICINE

## 2021-08-12 PROCEDURE — 1159F MED LIST DOCD IN RCRD: CPT | Mod: CPTII,S$GLB,, | Performed by: INTERNAL MEDICINE

## 2021-08-12 PROCEDURE — 3075F SYST BP GE 130 - 139MM HG: CPT | Mod: CPTII,S$GLB,, | Performed by: INTERNAL MEDICINE

## 2021-08-12 PROCEDURE — 3008F PR BODY MASS INDEX (BMI) DOCUMENTED: ICD-10-PCS | Mod: CPTII,S$GLB,, | Performed by: INTERNAL MEDICINE

## 2021-08-12 PROCEDURE — 1126F AMNT PAIN NOTED NONE PRSNT: CPT | Mod: CPTII,S$GLB,, | Performed by: INTERNAL MEDICINE

## 2021-08-12 PROCEDURE — 1159F PR MEDICATION LIST DOCUMENTED IN MEDICAL RECORD: ICD-10-PCS | Mod: CPTII,S$GLB,, | Performed by: INTERNAL MEDICINE

## 2021-08-12 PROCEDURE — 3075F PR MOST RECENT SYSTOLIC BLOOD PRESS GE 130-139MM HG: ICD-10-PCS | Mod: CPTII,S$GLB,, | Performed by: INTERNAL MEDICINE

## 2021-08-12 PROCEDURE — 3079F DIAST BP 80-89 MM HG: CPT | Mod: CPTII,S$GLB,, | Performed by: INTERNAL MEDICINE

## 2021-08-12 PROCEDURE — 99213 PR OFFICE/OUTPT VISIT, EST, LEVL III, 20-29 MIN: ICD-10-PCS | Mod: S$GLB,,, | Performed by: INTERNAL MEDICINE

## 2021-08-12 PROCEDURE — 3079F PR MOST RECENT DIASTOLIC BLOOD PRESSURE 80-89 MM HG: ICD-10-PCS | Mod: CPTII,S$GLB,, | Performed by: INTERNAL MEDICINE

## 2021-08-12 PROCEDURE — 3044F PR MOST RECENT HEMOGLOBIN A1C LEVEL <7.0%: ICD-10-PCS | Mod: CPTII,S$GLB,, | Performed by: INTERNAL MEDICINE

## 2021-08-12 PROCEDURE — 99213 OFFICE O/P EST LOW 20 MIN: CPT | Mod: S$GLB,,, | Performed by: INTERNAL MEDICINE

## 2021-08-12 PROCEDURE — 99999 PR PBB SHADOW E&M-EST. PATIENT-LVL V: CPT | Mod: PBBFAC,,, | Performed by: INTERNAL MEDICINE

## 2021-08-12 RX ORDER — IBUPROFEN 600 MG/1
600 TABLET ORAL 3 TIMES DAILY PRN
Qty: 30 TABLET | Refills: 2 | Status: SHIPPED | OUTPATIENT
Start: 2021-08-12 | End: 2022-12-14

## 2021-08-13 ENCOUNTER — TELEPHONE (OUTPATIENT)
Dept: HEPATOLOGY | Facility: CLINIC | Age: 41
End: 2021-08-13

## 2021-08-13 ENCOUNTER — PATIENT MESSAGE (OUTPATIENT)
Dept: HEPATOLOGY | Facility: CLINIC | Age: 41
End: 2021-08-13

## 2021-08-13 DIAGNOSIS — C22.0 HCC (HEPATOCELLULAR CARCINOMA): Primary | ICD-10-CM

## 2021-08-16 ENCOUNTER — TELEPHONE (OUTPATIENT)
Dept: TRANSPLANT | Facility: CLINIC | Age: 41
End: 2021-08-16

## 2021-08-16 ENCOUNTER — PATIENT MESSAGE (OUTPATIENT)
Dept: HEPATOLOGY | Facility: CLINIC | Age: 41
End: 2021-08-16

## 2021-08-16 DIAGNOSIS — C22.0 HCC (HEPATOCELLULAR CARCINOMA): Primary | ICD-10-CM

## 2021-08-17 ENCOUNTER — PATIENT MESSAGE (OUTPATIENT)
Dept: HEPATOLOGY | Facility: CLINIC | Age: 41
End: 2021-08-17

## 2021-08-17 ENCOUNTER — TELEPHONE (OUTPATIENT)
Dept: TRANSPLANT | Facility: CLINIC | Age: 41
End: 2021-08-17

## 2021-08-17 ENCOUNTER — TELEPHONE (OUTPATIENT)
Dept: HEMATOLOGY/ONCOLOGY | Facility: CLINIC | Age: 41
End: 2021-08-17

## 2021-08-17 RX ORDER — CETIRIZINE HYDROCHLORIDE 10 MG/1
10 TABLET ORAL DAILY
Qty: 90 TABLET | Refills: 1 | Status: SHIPPED | OUTPATIENT
Start: 2021-08-17 | End: 2022-07-14

## 2021-08-20 ENCOUNTER — PATIENT MESSAGE (OUTPATIENT)
Dept: INTERNAL MEDICINE | Facility: CLINIC | Age: 41
End: 2021-08-20

## 2021-08-20 ENCOUNTER — PATIENT MESSAGE (OUTPATIENT)
Dept: OBSTETRICS AND GYNECOLOGY | Facility: CLINIC | Age: 41
End: 2021-08-20

## 2021-08-23 ENCOUNTER — OFFICE VISIT (OUTPATIENT)
Dept: OBSTETRICS AND GYNECOLOGY | Facility: CLINIC | Age: 41
End: 2021-08-23
Payer: COMMERCIAL

## 2021-08-23 VITALS — BODY MASS INDEX: 19.28 KG/M2 | HEIGHT: 71 IN | SYSTOLIC BLOOD PRESSURE: 116 MMHG | DIASTOLIC BLOOD PRESSURE: 70 MMHG

## 2021-08-23 DIAGNOSIS — B96.89 BV (BACTERIAL VAGINOSIS): Primary | ICD-10-CM

## 2021-08-23 DIAGNOSIS — N76.0 BV (BACTERIAL VAGINOSIS): Primary | ICD-10-CM

## 2021-08-23 PROCEDURE — 1160F RVW MEDS BY RX/DR IN RCRD: CPT | Mod: CPTII,S$GLB,, | Performed by: OBSTETRICS & GYNECOLOGY

## 2021-08-23 PROCEDURE — 3044F HG A1C LEVEL LT 7.0%: CPT | Mod: CPTII,S$GLB,, | Performed by: OBSTETRICS & GYNECOLOGY

## 2021-08-23 PROCEDURE — 1159F PR MEDICATION LIST DOCUMENTED IN MEDICAL RECORD: ICD-10-PCS | Mod: CPTII,S$GLB,, | Performed by: OBSTETRICS & GYNECOLOGY

## 2021-08-23 PROCEDURE — 3008F BODY MASS INDEX DOCD: CPT | Mod: CPTII,S$GLB,, | Performed by: OBSTETRICS & GYNECOLOGY

## 2021-08-23 PROCEDURE — 3008F PR BODY MASS INDEX (BMI) DOCUMENTED: ICD-10-PCS | Mod: CPTII,S$GLB,, | Performed by: OBSTETRICS & GYNECOLOGY

## 2021-08-23 PROCEDURE — 99213 PR OFFICE/OUTPT VISIT, EST, LEVL III, 20-29 MIN: ICD-10-PCS | Mod: S$GLB,,, | Performed by: OBSTETRICS & GYNECOLOGY

## 2021-08-23 PROCEDURE — 3074F SYST BP LT 130 MM HG: CPT | Mod: CPTII,S$GLB,, | Performed by: OBSTETRICS & GYNECOLOGY

## 2021-08-23 PROCEDURE — 99213 OFFICE O/P EST LOW 20 MIN: CPT | Mod: S$GLB,,, | Performed by: OBSTETRICS & GYNECOLOGY

## 2021-08-23 PROCEDURE — 3074F PR MOST RECENT SYSTOLIC BLOOD PRESSURE < 130 MM HG: ICD-10-PCS | Mod: CPTII,S$GLB,, | Performed by: OBSTETRICS & GYNECOLOGY

## 2021-08-23 PROCEDURE — 99999 PR PBB SHADOW E&M-EST. PATIENT-LVL II: CPT | Mod: PBBFAC,,, | Performed by: OBSTETRICS & GYNECOLOGY

## 2021-08-23 PROCEDURE — 1160F PR REVIEW ALL MEDS BY PRESCRIBER/CLIN PHARMACIST DOCUMENTED: ICD-10-PCS | Mod: CPTII,S$GLB,, | Performed by: OBSTETRICS & GYNECOLOGY

## 2021-08-23 PROCEDURE — 1159F MED LIST DOCD IN RCRD: CPT | Mod: CPTII,S$GLB,, | Performed by: OBSTETRICS & GYNECOLOGY

## 2021-08-23 PROCEDURE — 3078F PR MOST RECENT DIASTOLIC BLOOD PRESSURE < 80 MM HG: ICD-10-PCS | Mod: CPTII,S$GLB,, | Performed by: OBSTETRICS & GYNECOLOGY

## 2021-08-23 PROCEDURE — 1126F AMNT PAIN NOTED NONE PRSNT: CPT | Mod: CPTII,S$GLB,, | Performed by: OBSTETRICS & GYNECOLOGY

## 2021-08-23 PROCEDURE — 99999 PR PBB SHADOW E&M-EST. PATIENT-LVL II: ICD-10-PCS | Mod: PBBFAC,,, | Performed by: OBSTETRICS & GYNECOLOGY

## 2021-08-23 PROCEDURE — 3044F PR MOST RECENT HEMOGLOBIN A1C LEVEL <7.0%: ICD-10-PCS | Mod: CPTII,S$GLB,, | Performed by: OBSTETRICS & GYNECOLOGY

## 2021-08-23 PROCEDURE — 87481 CANDIDA DNA AMP PROBE: CPT | Mod: 59 | Performed by: OBSTETRICS & GYNECOLOGY

## 2021-08-23 PROCEDURE — 3078F DIAST BP <80 MM HG: CPT | Mod: CPTII,S$GLB,, | Performed by: OBSTETRICS & GYNECOLOGY

## 2021-08-23 PROCEDURE — 1126F PR PAIN SEVERITY QUANTIFIED, NO PAIN PRESENT: ICD-10-PCS | Mod: CPTII,S$GLB,, | Performed by: OBSTETRICS & GYNECOLOGY

## 2021-08-23 RX ORDER — METRONIDAZOLE 500 MG/1
500 TABLET ORAL 2 TIMES DAILY
Qty: 10 TABLET | Refills: 1 | Status: SHIPPED | OUTPATIENT
Start: 2021-08-23 | End: 2021-08-28

## 2021-08-26 LAB
BACTERIAL VAGINOSIS DNA: NEGATIVE
CANDIDA GLABRATA DNA: NEGATIVE
CANDIDA KRUSEI DNA: NEGATIVE
CANDIDA RRNA VAG QL PROBE: NEGATIVE
T VAGINALIS RRNA GENITAL QL PROBE: NEGATIVE

## 2021-09-10 ENCOUNTER — LAB VISIT (OUTPATIENT)
Dept: LAB | Facility: HOSPITAL | Age: 41
End: 2021-09-10
Attending: INTERNAL MEDICINE
Payer: COMMERCIAL

## 2021-09-10 DIAGNOSIS — C22.0 HCC (HEPATOCELLULAR CARCINOMA): ICD-10-CM

## 2021-09-10 LAB — CANCER AG19-9 SERPL-ACNC: 116.2 U/ML (ref 0–40)

## 2021-09-10 PROCEDURE — 36415 COLL VENOUS BLD VENIPUNCTURE: CPT | Performed by: INTERNAL MEDICINE

## 2021-09-10 PROCEDURE — 86301 IMMUNOASSAY TUMOR CA 19-9: CPT | Performed by: INTERNAL MEDICINE

## 2021-09-11 ENCOUNTER — PATIENT MESSAGE (OUTPATIENT)
Dept: OBSTETRICS AND GYNECOLOGY | Facility: CLINIC | Age: 41
End: 2021-09-11

## 2021-09-12 ENCOUNTER — PATIENT MESSAGE (OUTPATIENT)
Dept: OBSTETRICS AND GYNECOLOGY | Facility: CLINIC | Age: 41
End: 2021-09-12

## 2021-09-12 RX ORDER — FLUCONAZOLE 150 MG/1
150 TABLET ORAL ONCE
Qty: 1 TABLET | Refills: 1 | Status: SHIPPED | OUTPATIENT
Start: 2021-09-12 | End: 2021-09-12

## 2021-09-13 ENCOUNTER — OFFICE VISIT (OUTPATIENT)
Dept: HEMATOLOGY/ONCOLOGY | Facility: CLINIC | Age: 41
End: 2021-09-13
Payer: COMMERCIAL

## 2021-09-13 DIAGNOSIS — R97.8 ELEVATED CA 19-9 LEVEL: ICD-10-CM

## 2021-09-13 DIAGNOSIS — C22.0 HCC (HEPATOCELLULAR CARCINOMA): Primary | ICD-10-CM

## 2021-09-13 DIAGNOSIS — D18.03 LIVER HEMANGIOMA: ICD-10-CM

## 2021-09-13 PROCEDURE — 99204 PR OFFICE/OUTPT VISIT, NEW, LEVL IV, 45-59 MIN: ICD-10-PCS | Mod: 95,,, | Performed by: INTERNAL MEDICINE

## 2021-09-13 PROCEDURE — 99204 OFFICE O/P NEW MOD 45 MIN: CPT | Mod: 95,,, | Performed by: INTERNAL MEDICINE

## 2021-09-13 PROCEDURE — 3044F HG A1C LEVEL LT 7.0%: CPT | Mod: CPTII,95,, | Performed by: INTERNAL MEDICINE

## 2021-09-13 PROCEDURE — 3044F PR MOST RECENT HEMOGLOBIN A1C LEVEL <7.0%: ICD-10-PCS | Mod: CPTII,95,, | Performed by: INTERNAL MEDICINE

## 2021-09-14 ENCOUNTER — PATIENT MESSAGE (OUTPATIENT)
Dept: CARDIOLOGY | Facility: CLINIC | Age: 41
End: 2021-09-14

## 2021-09-14 DIAGNOSIS — R00.2 PALPITATIONS: Primary | ICD-10-CM

## 2021-09-15 ENCOUNTER — OFFICE VISIT (OUTPATIENT)
Dept: CARDIOLOGY | Facility: CLINIC | Age: 41
End: 2021-09-15
Payer: COMMERCIAL

## 2021-09-15 ENCOUNTER — HOSPITAL ENCOUNTER (OUTPATIENT)
Dept: CARDIOLOGY | Facility: CLINIC | Age: 41
Discharge: HOME | End: 2021-09-15
Payer: COMMERCIAL

## 2021-09-15 VITALS
WEIGHT: 136.69 LBS | DIASTOLIC BLOOD PRESSURE: 81 MMHG | BODY MASS INDEX: 19.14 KG/M2 | SYSTOLIC BLOOD PRESSURE: 139 MMHG | HEART RATE: 71 BPM | HEIGHT: 71 IN

## 2021-09-15 DIAGNOSIS — R00.2 PALPITATIONS: ICD-10-CM

## 2021-09-15 DIAGNOSIS — Z98.890 S/P PERICARDIAL WINDOW CREATION: Primary | ICD-10-CM

## 2021-09-15 PROCEDURE — 3075F SYST BP GE 130 - 139MM HG: CPT | Mod: CPTII,S$GLB,, | Performed by: INTERNAL MEDICINE

## 2021-09-15 PROCEDURE — 1160F RVW MEDS BY RX/DR IN RCRD: CPT | Mod: CPTII,S$GLB,, | Performed by: INTERNAL MEDICINE

## 2021-09-15 PROCEDURE — 99999 PR PBB SHADOW E&M-EST. PATIENT-LVL III: ICD-10-PCS | Mod: PBBFAC,,, | Performed by: INTERNAL MEDICINE

## 2021-09-15 PROCEDURE — 1159F PR MEDICATION LIST DOCUMENTED IN MEDICAL RECORD: ICD-10-PCS | Mod: CPTII,S$GLB,, | Performed by: INTERNAL MEDICINE

## 2021-09-15 PROCEDURE — 3008F BODY MASS INDEX DOCD: CPT | Mod: CPTII,S$GLB,, | Performed by: INTERNAL MEDICINE

## 2021-09-15 PROCEDURE — 3079F PR MOST RECENT DIASTOLIC BLOOD PRESSURE 80-89 MM HG: ICD-10-PCS | Mod: CPTII,S$GLB,, | Performed by: INTERNAL MEDICINE

## 2021-09-15 PROCEDURE — 3044F HG A1C LEVEL LT 7.0%: CPT | Mod: CPTII,S$GLB,, | Performed by: INTERNAL MEDICINE

## 2021-09-15 PROCEDURE — 3008F PR BODY MASS INDEX (BMI) DOCUMENTED: ICD-10-PCS | Mod: CPTII,S$GLB,, | Performed by: INTERNAL MEDICINE

## 2021-09-15 PROCEDURE — 93000 EKG 12-LEAD: ICD-10-PCS | Mod: S$GLB,,, | Performed by: INTERNAL MEDICINE

## 2021-09-15 PROCEDURE — 3075F PR MOST RECENT SYSTOLIC BLOOD PRESS GE 130-139MM HG: ICD-10-PCS | Mod: CPTII,S$GLB,, | Performed by: INTERNAL MEDICINE

## 2021-09-15 PROCEDURE — 1159F MED LIST DOCD IN RCRD: CPT | Mod: CPTII,S$GLB,, | Performed by: INTERNAL MEDICINE

## 2021-09-15 PROCEDURE — 93000 ELECTROCARDIOGRAM COMPLETE: CPT | Mod: S$GLB,,, | Performed by: INTERNAL MEDICINE

## 2021-09-15 PROCEDURE — 3044F PR MOST RECENT HEMOGLOBIN A1C LEVEL <7.0%: ICD-10-PCS | Mod: CPTII,S$GLB,, | Performed by: INTERNAL MEDICINE

## 2021-09-15 PROCEDURE — 99214 PR OFFICE/OUTPT VISIT, EST, LEVL IV, 30-39 MIN: ICD-10-PCS | Mod: S$GLB,,, | Performed by: INTERNAL MEDICINE

## 2021-09-15 PROCEDURE — 3079F DIAST BP 80-89 MM HG: CPT | Mod: CPTII,S$GLB,, | Performed by: INTERNAL MEDICINE

## 2021-09-15 PROCEDURE — 99214 OFFICE O/P EST MOD 30 MIN: CPT | Mod: S$GLB,,, | Performed by: INTERNAL MEDICINE

## 2021-09-15 PROCEDURE — 99999 PR PBB SHADOW E&M-EST. PATIENT-LVL III: CPT | Mod: PBBFAC,,, | Performed by: INTERNAL MEDICINE

## 2021-09-15 PROCEDURE — 1160F PR REVIEW ALL MEDS BY PRESCRIBER/CLIN PHARMACIST DOCUMENTED: ICD-10-PCS | Mod: CPTII,S$GLB,, | Performed by: INTERNAL MEDICINE

## 2021-10-03 ENCOUNTER — PATIENT MESSAGE (OUTPATIENT)
Dept: OBSTETRICS AND GYNECOLOGY | Facility: CLINIC | Age: 41
End: 2021-10-03

## 2021-10-03 DIAGNOSIS — R30.0 DYSURIA: Primary | ICD-10-CM

## 2021-10-04 ENCOUNTER — PATIENT MESSAGE (OUTPATIENT)
Dept: OBSTETRICS AND GYNECOLOGY | Facility: CLINIC | Age: 41
End: 2021-10-04

## 2021-10-04 RX ORDER — FLUCONAZOLE 150 MG/1
150 TABLET ORAL ONCE
Qty: 1 TABLET | Refills: 1 | Status: SHIPPED | OUTPATIENT
Start: 2021-10-04 | End: 2021-10-04

## 2021-10-05 ENCOUNTER — LAB VISIT (OUTPATIENT)
Dept: LAB | Facility: HOSPITAL | Age: 41
End: 2021-10-05
Attending: OBSTETRICS & GYNECOLOGY
Payer: COMMERCIAL

## 2021-10-05 DIAGNOSIS — R30.0 DYSURIA: ICD-10-CM

## 2021-10-05 PROCEDURE — 87086 URINE CULTURE/COLONY COUNT: CPT | Performed by: OBSTETRICS & GYNECOLOGY

## 2021-10-07 LAB — BACTERIA UR CULT: NORMAL

## 2021-10-08 ENCOUNTER — LAB VISIT (OUTPATIENT)
Dept: LAB | Facility: HOSPITAL | Age: 41
End: 2021-10-08
Attending: INTERNAL MEDICINE
Payer: COMMERCIAL

## 2021-10-08 DIAGNOSIS — C22.0 HCC (HEPATOCELLULAR CARCINOMA): ICD-10-CM

## 2021-10-08 LAB — CANCER AG19-9 SERPL-ACNC: 76.1 U/ML (ref 0–40)

## 2021-10-08 PROCEDURE — 36415 COLL VENOUS BLD VENIPUNCTURE: CPT | Performed by: INTERNAL MEDICINE

## 2021-10-08 PROCEDURE — 86301 IMMUNOASSAY TUMOR CA 19-9: CPT | Performed by: INTERNAL MEDICINE

## 2021-10-18 ENCOUNTER — PATIENT OUTREACH (OUTPATIENT)
Dept: ADMINISTRATIVE | Facility: OTHER | Age: 41
End: 2021-10-18

## 2021-10-18 ENCOUNTER — OFFICE VISIT (OUTPATIENT)
Dept: DERMATOLOGY | Facility: CLINIC | Age: 41
End: 2021-10-18
Payer: COMMERCIAL

## 2021-10-18 DIAGNOSIS — D22.62 NEVUS OF LEFT SHOULDER: ICD-10-CM

## 2021-10-18 DIAGNOSIS — L90.5 SCAR: ICD-10-CM

## 2021-10-18 DIAGNOSIS — L21.9 SEBORRHEIC DERMATITIS: ICD-10-CM

## 2021-10-18 DIAGNOSIS — T14.90XD HEALING WOUND: Primary | ICD-10-CM

## 2021-10-18 DIAGNOSIS — L82.1 SK (SEBORRHEIC KERATOSIS): ICD-10-CM

## 2021-10-18 DIAGNOSIS — L65.9 NON-SCARRING ALOPECIA: ICD-10-CM

## 2021-10-18 PROCEDURE — 3044F PR MOST RECENT HEMOGLOBIN A1C LEVEL <7.0%: ICD-10-PCS | Mod: CPTII,S$GLB,, | Performed by: DERMATOLOGY

## 2021-10-18 PROCEDURE — 99999 PR PBB SHADOW E&M-EST. PATIENT-LVL III: ICD-10-PCS | Mod: PBBFAC,,, | Performed by: DERMATOLOGY

## 2021-10-18 PROCEDURE — 99999 PR PBB SHADOW E&M-EST. PATIENT-LVL III: CPT | Mod: PBBFAC,,, | Performed by: DERMATOLOGY

## 2021-10-18 PROCEDURE — 1159F MED LIST DOCD IN RCRD: CPT | Mod: CPTII,S$GLB,, | Performed by: DERMATOLOGY

## 2021-10-18 PROCEDURE — 99214 PR OFFICE/OUTPT VISIT, EST, LEVL IV, 30-39 MIN: ICD-10-PCS | Mod: S$GLB,,, | Performed by: DERMATOLOGY

## 2021-10-18 PROCEDURE — 3044F HG A1C LEVEL LT 7.0%: CPT | Mod: CPTII,S$GLB,, | Performed by: DERMATOLOGY

## 2021-10-18 PROCEDURE — 99214 OFFICE O/P EST MOD 30 MIN: CPT | Mod: S$GLB,,, | Performed by: DERMATOLOGY

## 2021-10-18 PROCEDURE — 1159F PR MEDICATION LIST DOCUMENTED IN MEDICAL RECORD: ICD-10-PCS | Mod: CPTII,S$GLB,, | Performed by: DERMATOLOGY

## 2021-10-18 RX ORDER — KETOCONAZOLE 20 MG/ML
SHAMPOO, SUSPENSION TOPICAL
Qty: 120 ML | Refills: 5 | Status: SHIPPED | OUTPATIENT
Start: 2021-10-18 | End: 2023-03-09

## 2021-10-18 RX ORDER — TRIAMCINOLONE ACETONIDE 1 MG/G
CREAM TOPICAL 2 TIMES DAILY PRN
Qty: 45 G | Refills: 3 | Status: SHIPPED | OUTPATIENT
Start: 2021-10-18

## 2021-10-25 ENCOUNTER — PATIENT MESSAGE (OUTPATIENT)
Dept: INTERNAL MEDICINE | Facility: CLINIC | Age: 41
End: 2021-10-25
Payer: COMMERCIAL

## 2021-10-25 DIAGNOSIS — Z20.822 ENCOUNTER FOR LABORATORY TESTING FOR COVID-19 VIRUS: Primary | ICD-10-CM

## 2021-10-26 ENCOUNTER — TELEPHONE (OUTPATIENT)
Dept: HEPATOLOGY | Facility: CLINIC | Age: 41
End: 2021-10-26
Payer: COMMERCIAL

## 2021-11-08 ENCOUNTER — PATIENT MESSAGE (OUTPATIENT)
Dept: CARDIOLOGY | Facility: CLINIC | Age: 41
End: 2021-11-08
Payer: COMMERCIAL

## 2021-11-09 ENCOUNTER — PATIENT MESSAGE (OUTPATIENT)
Dept: INTERNAL MEDICINE | Facility: CLINIC | Age: 41
End: 2021-11-09
Payer: COMMERCIAL

## 2021-11-09 DIAGNOSIS — M19.019 SHOULDER ARTHRITIS: Primary | ICD-10-CM

## 2021-11-12 ENCOUNTER — TELEPHONE (OUTPATIENT)
Dept: SPORTS MEDICINE | Facility: CLINIC | Age: 41
End: 2021-11-12
Payer: COMMERCIAL

## 2021-11-12 ENCOUNTER — HOSPITAL ENCOUNTER (OUTPATIENT)
Dept: RADIOLOGY | Facility: HOSPITAL | Age: 41
Discharge: HOME | End: 2021-11-12
Attending: INTERNAL MEDICINE
Payer: COMMERCIAL

## 2021-11-12 DIAGNOSIS — R10.9 ABDOMINAL PAIN, UNSPECIFIED ABDOMINAL LOCATION: ICD-10-CM

## 2021-11-12 PROCEDURE — 74183 MRI ABDOMEN W WO CONTRAST: ICD-10-PCS | Mod: 26,,, | Performed by: RADIOLOGY

## 2021-11-12 PROCEDURE — A9585 GADOBUTROL INJECTION: HCPCS | Performed by: INTERNAL MEDICINE

## 2021-11-12 PROCEDURE — 74183 MRI ABD W/O CNTR FLWD CNTR: CPT | Mod: TC

## 2021-11-12 PROCEDURE — 74183 MRI ABD W/O CNTR FLWD CNTR: CPT | Mod: 26,,, | Performed by: RADIOLOGY

## 2021-11-12 PROCEDURE — 25500020 PHARM REV CODE 255: Performed by: INTERNAL MEDICINE

## 2021-11-12 RX ORDER — GADOBUTROL 604.72 MG/ML
10 INJECTION INTRAVENOUS
Status: COMPLETED | OUTPATIENT
Start: 2021-11-12 | End: 2021-11-12

## 2021-11-12 RX ADMIN — GADOBUTROL 10 ML: 604.72 INJECTION INTRAVENOUS at 08:11

## 2021-11-13 ENCOUNTER — PATIENT MESSAGE (OUTPATIENT)
Dept: HEPATOLOGY | Facility: CLINIC | Age: 41
End: 2021-11-13
Payer: COMMERCIAL

## 2021-11-13 ENCOUNTER — PATIENT MESSAGE (OUTPATIENT)
Dept: INTERNAL MEDICINE | Facility: CLINIC | Age: 41
End: 2021-11-13
Payer: COMMERCIAL

## 2021-11-13 ENCOUNTER — PATIENT MESSAGE (OUTPATIENT)
Dept: OBSTETRICS AND GYNECOLOGY | Facility: CLINIC | Age: 41
End: 2021-11-13
Payer: COMMERCIAL

## 2021-11-13 DIAGNOSIS — K76.9 LIVER LESION: Primary | ICD-10-CM

## 2021-11-13 DIAGNOSIS — J90 PLEURAL EFFUSION: Primary | ICD-10-CM

## 2021-11-13 DIAGNOSIS — R10.32 LLQ PAIN: Primary | ICD-10-CM

## 2021-11-13 DIAGNOSIS — N92.1 BREAKTHROUGH BLEEDING ON BIRTH CONTROL PILLS: ICD-10-CM

## 2021-11-14 ENCOUNTER — PATIENT MESSAGE (OUTPATIENT)
Dept: CARDIOLOGY | Facility: CLINIC | Age: 41
End: 2021-11-14
Payer: COMMERCIAL

## 2021-11-15 ENCOUNTER — PATIENT MESSAGE (OUTPATIENT)
Dept: INTERNAL MEDICINE | Facility: CLINIC | Age: 41
End: 2021-11-15
Payer: COMMERCIAL

## 2021-11-15 ENCOUNTER — TELEPHONE (OUTPATIENT)
Dept: HEPATOLOGY | Facility: CLINIC | Age: 41
End: 2021-11-15
Payer: COMMERCIAL

## 2021-11-15 ENCOUNTER — PATIENT MESSAGE (OUTPATIENT)
Dept: HEPATOLOGY | Facility: CLINIC | Age: 41
End: 2021-11-15
Payer: COMMERCIAL

## 2021-11-15 ENCOUNTER — HOSPITAL ENCOUNTER (OUTPATIENT)
Dept: RADIOLOGY | Facility: HOSPITAL | Age: 41
Discharge: HOME | End: 2021-11-15
Attending: ORTHOPAEDIC SURGERY
Payer: COMMERCIAL

## 2021-11-15 ENCOUNTER — OFFICE VISIT (OUTPATIENT)
Dept: SPORTS MEDICINE | Facility: CLINIC | Age: 41
End: 2021-11-15
Payer: COMMERCIAL

## 2021-11-15 VITALS
BODY MASS INDEX: 19.04 KG/M2 | HEIGHT: 71 IN | WEIGHT: 136 LBS | HEART RATE: 64 BPM | SYSTOLIC BLOOD PRESSURE: 141 MMHG | DIASTOLIC BLOOD PRESSURE: 90 MMHG

## 2021-11-15 DIAGNOSIS — M25.512 LEFT SHOULDER PAIN, UNSPECIFIED CHRONICITY: Primary | ICD-10-CM

## 2021-11-15 DIAGNOSIS — M19.019 SHOULDER ARTHRITIS: ICD-10-CM

## 2021-11-15 DIAGNOSIS — M25.512 LEFT SHOULDER PAIN, UNSPECIFIED CHRONICITY: ICD-10-CM

## 2021-11-15 PROCEDURE — 3044F PR MOST RECENT HEMOGLOBIN A1C LEVEL <7.0%: ICD-10-PCS | Mod: CPTII,S$GLB,, | Performed by: ORTHOPAEDIC SURGERY

## 2021-11-15 PROCEDURE — 73030 X-RAY EXAM OF SHOULDER: CPT | Mod: 26,LT,, | Performed by: RADIOLOGY

## 2021-11-15 PROCEDURE — 99999 PR PBB SHADOW E&M-EST. PATIENT-LVL III: CPT | Mod: PBBFAC,,, | Performed by: ORTHOPAEDIC SURGERY

## 2021-11-15 PROCEDURE — 3008F BODY MASS INDEX DOCD: CPT | Mod: CPTII,S$GLB,, | Performed by: ORTHOPAEDIC SURGERY

## 2021-11-15 PROCEDURE — 99203 PR OFFICE/OUTPT VISIT, NEW, LEVL III, 30-44 MIN: ICD-10-PCS | Mod: S$GLB,,, | Performed by: ORTHOPAEDIC SURGERY

## 2021-11-15 PROCEDURE — 3008F PR BODY MASS INDEX (BMI) DOCUMENTED: ICD-10-PCS | Mod: CPTII,S$GLB,, | Performed by: ORTHOPAEDIC SURGERY

## 2021-11-15 PROCEDURE — 3080F DIAST BP >= 90 MM HG: CPT | Mod: CPTII,S$GLB,, | Performed by: ORTHOPAEDIC SURGERY

## 2021-11-15 PROCEDURE — 3077F SYST BP >= 140 MM HG: CPT | Mod: CPTII,S$GLB,, | Performed by: ORTHOPAEDIC SURGERY

## 2021-11-15 PROCEDURE — 3077F PR MOST RECENT SYSTOLIC BLOOD PRESSURE >= 140 MM HG: ICD-10-PCS | Mod: CPTII,S$GLB,, | Performed by: ORTHOPAEDIC SURGERY

## 2021-11-15 PROCEDURE — 3080F PR MOST RECENT DIASTOLIC BLOOD PRESSURE >= 90 MM HG: ICD-10-PCS | Mod: CPTII,S$GLB,, | Performed by: ORTHOPAEDIC SURGERY

## 2021-11-15 PROCEDURE — 99203 OFFICE O/P NEW LOW 30 MIN: CPT | Mod: S$GLB,,, | Performed by: ORTHOPAEDIC SURGERY

## 2021-11-15 PROCEDURE — 73030 X-RAY EXAM OF SHOULDER: CPT | Mod: TC,LT

## 2021-11-15 PROCEDURE — 73030 XR SHOULDER COMPLETE 2 OR MORE VIEWS LEFT: ICD-10-PCS | Mod: 26,LT,, | Performed by: RADIOLOGY

## 2021-11-15 PROCEDURE — 3044F HG A1C LEVEL LT 7.0%: CPT | Mod: CPTII,S$GLB,, | Performed by: ORTHOPAEDIC SURGERY

## 2021-11-15 PROCEDURE — 99999 PR PBB SHADOW E&M-EST. PATIENT-LVL III: ICD-10-PCS | Mod: PBBFAC,,, | Performed by: ORTHOPAEDIC SURGERY

## 2021-11-19 ENCOUNTER — HOSPITAL ENCOUNTER (OUTPATIENT)
Dept: RADIOLOGY | Facility: HOSPITAL | Age: 41
Discharge: HOME | End: 2021-11-19
Attending: INTERNAL MEDICINE
Payer: COMMERCIAL

## 2021-11-19 ENCOUNTER — TELEPHONE (OUTPATIENT)
Dept: INTERNAL MEDICINE | Facility: CLINIC | Age: 41
End: 2021-11-19
Payer: COMMERCIAL

## 2021-11-19 ENCOUNTER — TELEPHONE (OUTPATIENT)
Dept: PULMONOLOGY | Facility: CLINIC | Age: 41
End: 2021-11-19
Payer: COMMERCIAL

## 2021-11-19 DIAGNOSIS — J90 PLEURAL EFFUSION: ICD-10-CM

## 2021-11-19 DIAGNOSIS — K76.9 LIVER LESION: ICD-10-CM

## 2021-11-19 DIAGNOSIS — J90 PLEURAL EFFUSION: Primary | ICD-10-CM

## 2021-11-19 PROCEDURE — 71045 XR CHEST LATERAL DECUBITUS RIGHT: ICD-10-PCS | Mod: 26,RT,, | Performed by: RADIOLOGY

## 2021-11-19 PROCEDURE — 74170 CT ABD WO CNTRST FLWD CNTRST: CPT | Mod: 26,,, | Performed by: RADIOLOGY

## 2021-11-19 PROCEDURE — 74170 CT ABD WO CNTRST FLWD CNTRST: CPT | Mod: TC

## 2021-11-19 PROCEDURE — 71045 X-RAY EXAM CHEST 1 VIEW: CPT | Mod: TC,FY,RT

## 2021-11-19 PROCEDURE — 25500020 PHARM REV CODE 255: Performed by: INTERNAL MEDICINE

## 2021-11-19 PROCEDURE — 74170 CT ABDOMEN W WO CONTRAST: ICD-10-PCS | Mod: 26,,, | Performed by: RADIOLOGY

## 2021-11-19 PROCEDURE — 71045 X-RAY EXAM CHEST 1 VIEW: CPT | Mod: 26,RT,, | Performed by: RADIOLOGY

## 2021-11-19 RX ADMIN — IOHEXOL 75 ML: 350 INJECTION, SOLUTION INTRAVENOUS at 07:11

## 2021-11-21 ENCOUNTER — PATIENT MESSAGE (OUTPATIENT)
Dept: HEPATOLOGY | Facility: CLINIC | Age: 41
End: 2021-11-21
Payer: COMMERCIAL

## 2021-11-21 ENCOUNTER — TELEPHONE (OUTPATIENT)
Dept: TRANSPLANT | Facility: CLINIC | Age: 41
End: 2021-11-21
Payer: COMMERCIAL

## 2021-11-22 ENCOUNTER — OFFICE VISIT (OUTPATIENT)
Dept: PULMONOLOGY | Facility: CLINIC | Age: 41
End: 2021-11-22
Payer: COMMERCIAL

## 2021-11-22 ENCOUNTER — OFFICE VISIT (OUTPATIENT)
Dept: HEPATOLOGY | Facility: CLINIC | Age: 41
End: 2021-11-22
Payer: COMMERCIAL

## 2021-11-22 ENCOUNTER — LAB VISIT (OUTPATIENT)
Dept: LAB | Facility: HOSPITAL | Age: 41
End: 2021-11-22
Attending: INTERNAL MEDICINE
Payer: COMMERCIAL

## 2021-11-22 ENCOUNTER — PATIENT MESSAGE (OUTPATIENT)
Dept: PULMONOLOGY | Facility: CLINIC | Age: 41
End: 2021-11-22

## 2021-11-22 VITALS
HEIGHT: 71 IN | OXYGEN SATURATION: 100 % | TEMPERATURE: 99 F | BODY MASS INDEX: 20.37 KG/M2 | WEIGHT: 145.5 LBS | DIASTOLIC BLOOD PRESSURE: 76 MMHG | SYSTOLIC BLOOD PRESSURE: 144 MMHG | RESPIRATION RATE: 18 BRPM | HEART RATE: 66 BPM

## 2021-11-22 VITALS
WEIGHT: 143.5 LBS | OXYGEN SATURATION: 99 % | BODY MASS INDEX: 20.09 KG/M2 | HEART RATE: 83 BPM | DIASTOLIC BLOOD PRESSURE: 80 MMHG | HEIGHT: 71 IN | SYSTOLIC BLOOD PRESSURE: 120 MMHG

## 2021-11-22 DIAGNOSIS — I51.7 CARDIAC ENLARGEMENT: ICD-10-CM

## 2021-11-22 DIAGNOSIS — C22.0 HCC (HEPATOCELLULAR CARCINOMA): ICD-10-CM

## 2021-11-22 DIAGNOSIS — I31.39 PERICARDIAL EFFUSION: Primary | ICD-10-CM

## 2021-11-22 DIAGNOSIS — R97.8 ELEVATED CA 19-9 LEVEL: Primary | ICD-10-CM

## 2021-11-22 DIAGNOSIS — R97.8 ELEVATED CA 19-9 LEVEL: ICD-10-CM

## 2021-11-22 DIAGNOSIS — J90 PLEURAL EFFUSION: ICD-10-CM

## 2021-11-22 LAB — CANCER AG19-9 SERPL-ACNC: 55 U/ML (ref 0–40)

## 2021-11-22 PROCEDURE — 99204 OFFICE O/P NEW MOD 45 MIN: CPT | Mod: 25,S$GLB,, | Performed by: INTERNAL MEDICINE

## 2021-11-22 PROCEDURE — 99999 PR PBB SHADOW E&M-EST. PATIENT-LVL IV: ICD-10-PCS | Mod: PBBFAC,,, | Performed by: INTERNAL MEDICINE

## 2021-11-22 PROCEDURE — 99999 PR PBB SHADOW E&M-EST. PATIENT-LVL IV: CPT | Mod: PBBFAC,,, | Performed by: INTERNAL MEDICINE

## 2021-11-22 PROCEDURE — 99999 PR PBB SHADOW E&M-EST. PATIENT-LVL III: ICD-10-PCS | Mod: PBBFAC,,, | Performed by: INTERNAL MEDICINE

## 2021-11-22 PROCEDURE — 99204 PR OFFICE/OUTPT VISIT, NEW, LEVL IV, 45-59 MIN: ICD-10-PCS | Mod: 25,S$GLB,, | Performed by: INTERNAL MEDICINE

## 2021-11-22 PROCEDURE — 86301 IMMUNOASSAY TUMOR CA 19-9: CPT | Performed by: INTERNAL MEDICINE

## 2021-11-22 PROCEDURE — 36415 COLL VENOUS BLD VENIPUNCTURE: CPT | Mod: PN | Performed by: INTERNAL MEDICINE

## 2021-11-22 PROCEDURE — 99213 OFFICE O/P EST LOW 20 MIN: CPT | Mod: S$GLB,,, | Performed by: INTERNAL MEDICINE

## 2021-11-22 PROCEDURE — 99999 PR PBB SHADOW E&M-EST. PATIENT-LVL III: CPT | Mod: PBBFAC,,, | Performed by: INTERNAL MEDICINE

## 2021-11-22 PROCEDURE — 99213 PR OFFICE/OUTPT VISIT, EST, LEVL III, 20-29 MIN: ICD-10-PCS | Mod: S$GLB,,, | Performed by: INTERNAL MEDICINE

## 2021-11-23 ENCOUNTER — PATIENT MESSAGE (OUTPATIENT)
Dept: HEPATOLOGY | Facility: CLINIC | Age: 41
End: 2021-11-23
Payer: COMMERCIAL

## 2021-11-23 ENCOUNTER — TELEPHONE (OUTPATIENT)
Dept: HEPATOLOGY | Facility: CLINIC | Age: 41
End: 2021-11-23
Payer: COMMERCIAL

## 2021-11-23 DIAGNOSIS — C22.0 HEPATOMA: Primary | ICD-10-CM

## 2021-11-23 PROBLEM — J90 PLEURAL EFFUSION: Status: ACTIVE | Noted: 2021-11-23

## 2021-11-24 ENCOUNTER — PATIENT MESSAGE (OUTPATIENT)
Dept: OBSTETRICS AND GYNECOLOGY | Facility: CLINIC | Age: 41
End: 2021-11-24
Payer: COMMERCIAL

## 2021-12-03 ENCOUNTER — HOSPITAL ENCOUNTER (OUTPATIENT)
Dept: RADIOLOGY | Facility: HOSPITAL | Age: 41
Discharge: HOME | End: 2021-12-03
Attending: OBSTETRICS & GYNECOLOGY
Payer: COMMERCIAL

## 2021-12-03 ENCOUNTER — PATIENT MESSAGE (OUTPATIENT)
Dept: OBSTETRICS AND GYNECOLOGY | Facility: CLINIC | Age: 41
End: 2021-12-03
Payer: COMMERCIAL

## 2021-12-03 ENCOUNTER — PATIENT MESSAGE (OUTPATIENT)
Dept: INTERNAL MEDICINE | Facility: CLINIC | Age: 41
End: 2021-12-03
Payer: COMMERCIAL

## 2021-12-03 DIAGNOSIS — N92.1 BREAKTHROUGH BLEEDING ON BIRTH CONTROL PILLS: ICD-10-CM

## 2021-12-03 DIAGNOSIS — R10.32 LLQ PAIN: ICD-10-CM

## 2021-12-03 PROCEDURE — 76856 US EXAM PELVIC COMPLETE: CPT | Mod: 26,,, | Performed by: RADIOLOGY

## 2021-12-03 PROCEDURE — 76830 TRANSVAGINAL US NON-OB: CPT | Mod: 26,,, | Performed by: RADIOLOGY

## 2021-12-03 PROCEDURE — 76830 US PELVIS COMP WITH TRANSVAG NON-OB (XPD): ICD-10-PCS | Mod: 26,,, | Performed by: RADIOLOGY

## 2021-12-03 PROCEDURE — 76856 US EXAM PELVIC COMPLETE: CPT | Mod: TC

## 2021-12-03 PROCEDURE — 76856 US PELVIS COMP WITH TRANSVAG NON-OB (XPD): ICD-10-PCS | Mod: 26,,, | Performed by: RADIOLOGY

## 2021-12-06 ENCOUNTER — PATIENT MESSAGE (OUTPATIENT)
Dept: HEPATOLOGY | Facility: CLINIC | Age: 41
End: 2021-12-06
Payer: COMMERCIAL

## 2021-12-06 DIAGNOSIS — R11.0 NAUSEA: Primary | ICD-10-CM

## 2021-12-06 DIAGNOSIS — C22.0 HCC (HEPATOCELLULAR CARCINOMA): ICD-10-CM

## 2021-12-07 ENCOUNTER — TELEPHONE (OUTPATIENT)
Dept: HEPATOLOGY | Facility: CLINIC | Age: 41
End: 2021-12-07
Payer: COMMERCIAL

## 2021-12-16 ENCOUNTER — IMMUNIZATION (OUTPATIENT)
Dept: OBSTETRICS AND GYNECOLOGY | Facility: CLINIC | Age: 41
End: 2021-12-16
Payer: COMMERCIAL

## 2021-12-16 DIAGNOSIS — Z23 NEED FOR VACCINATION: Primary | ICD-10-CM

## 2021-12-16 PROCEDURE — 0004A COVID-19, MRNA, LNP-S, PF, 30 MCG/0.3 ML DOSE VACCINE: CPT | Mod: PBBFAC | Performed by: FAMILY MEDICINE

## 2021-12-20 ENCOUNTER — PATIENT MESSAGE (OUTPATIENT)
Dept: PULMONOLOGY | Facility: CLINIC | Age: 41
End: 2021-12-20
Payer: COMMERCIAL

## 2022-01-01 NOTE — ED PROVIDER NOTES
Encounter Date: 5/1/2020       History     Chief Complaint   Patient presents with    Abnormal ECG     Pt with abnormal cardiac echo, fluid around heart.  Pt states over 2 weeks s/p covid.  Pt denies covid symptoms at present.  Pt denies CP/SOB.     40 yo f, h/o hemangioma, recent COVID infection, had MRI abd in March during which pericardial effusion noted, recommended close f/u.  Pt just able to have ECHO today and was sent here 2/2 large pericardial effusion, primarily anterior, w signs of cardiac tamponade.  Pt is completely asx - denies cp/sob/leg swelling/weakness.  COVID sx have resolved.    The history is provided by the patient.     Review of patient's allergies indicates:   Allergen Reactions    No known drug allergies      Past Medical History:   Diagnosis Date    COVID-19     Deviated septum 2011    Hypertrophy of inferior nasal turbinate     Liver mass     Nasal congestion 2011    Premature menopause      Past Surgical History:   Procedure Laterality Date    HERNIA REPAIR      NASAL SEPTUM SURGERY      TONSILLECTOMY      UMBILICAL HERNIA REPAIR       Family History   Problem Relation Age of Onset    Diabetes Mother     Liver disease Mother         Fatty liver    Hypertension Mother     Macular degeneration Father     Diabetes Father     Hypertension Father     Hyperlipidemia Father     No Known Problems Sister     No Known Problems Brother     No Known Problems Daughter     No Known Problems Brother     No Known Problems Maternal Aunt     No Known Problems Maternal Uncle     No Known Problems Paternal Aunt     No Known Problems Paternal Uncle     No Known Problems Maternal Grandmother     No Known Problems Maternal Grandfather     No Known Problems Paternal Grandmother     No Known Problems Paternal Grandfather     Amblyopia Neg Hx     Blindness Neg Hx     Cancer Neg Hx     Cataracts Neg Hx     Glaucoma Neg Hx     Retinal detachment Neg Hx     Strabismus Neg Hx      Stroke Neg Hx     Thyroid disease Neg Hx     Heart disease Neg Hx     Melanoma Neg Hx      Social History     Tobacco Use    Smoking status: Never Smoker    Smokeless tobacco: Never Used   Substance Use Topics    Alcohol use: Yes     Comment: rare    Drug use: No     Review of Systems   Constitutional: Negative for diaphoresis, fatigue and fever.   Respiratory: Negative for cough, chest tightness and shortness of breath.    Neurological: Negative for syncope and weakness.   All other systems reviewed and are negative.      Physical Exam     Initial Vitals [05/01/20 1645]   BP Pulse Resp Temp SpO2   (!) 133/93 (!) 114 20 98.8 °F (37.1 °C) 100 %      MAP       --         Physical Exam    Nursing note and vitals reviewed.  Constitutional: She appears well-developed and well-nourished. She is not diaphoretic. No distress.   HENT:   Head: Normocephalic and atraumatic.   Mouth/Throat: Mucous membranes are normal.   Eyes: Conjunctivae and EOM are normal.   Neck: Normal range of motion. Neck supple.   Cardiovascular: Normal rate and regular rhythm.   No murmur heard.  Pulmonary/Chest: Breath sounds normal. No respiratory distress.   Abdominal: Soft. She exhibits no distension. There is no tenderness. There is no guarding.   Musculoskeletal: Normal range of motion.   Neurological: She is alert and oriented to person, place, and time.   Skin: Skin is warm and dry. No rash noted.         ED Course   Procedures  Labs Reviewed   CBC W/ AUTO DIFFERENTIAL - Abnormal; Notable for the following components:       Result Value    Mean Corpuscular Hemoglobin Conc 31.3 (*)     All other components within normal limits   COMPREHENSIVE METABOLIC PANEL - Abnormal; Notable for the following components:    CO2 30 (*)     Anion Gap 6 (*)     All other components within normal limits   C-REACTIVE PROTEIN   FERRITIN   LACTATE DEHYDROGENASE   CK   LACTIC ACID, PLASMA   TROPONIN I   B-TYPE NATRIURETIC PEPTIDE   PROCALCITONIN   SARS-COV-2  RNA AMPLIFICATION, QUAL    Narrative:     What symptom criteria does the patient meet?->Other (specify)  Please specify:->repeat        ECG Results          EKG 12-lead (Final result)  Result time 05/02/20 08:37:00    Final result by Interface, Lab [facility] (05/02/20 08:37:00)                 Narrative:    Test Reason : R93.89,    Vent. Rate : 094 BPM     Atrial Rate : 094 BPM     P-R Int : 126 ms          QRS Dur : 076 ms      QT Int : 338 ms       P-R-T Axes : 085 094 075 degrees     QTc Int : 422 ms    Normal sinus rhythm  Rightward axis    When compared with ECG of 22-APR-2020 10:45,  No significant change was found  Confirmed by DIANA HITCHCOCK MD ([id number]) on 5/2/2020 8:36:51 AM    Referred By: [email or web address]   SELF           Confirmed By:DIANA HITCHCOCK MD                            Imaging Results          CT Chest Without Contrast (Final result)  Result time 05/01/20 21:23:46    Final result by Nader Blount MD (05/01/20 21:23:46)                 Impression:      Moderate-sized fluid collection within the inferior aspect of the pericardium, similar to multiple prior MRIs, and most likely a pericardial effusion or pericardial cyst.  Suggest further evaluation with echocardiogram as clinically appropriate.    Presumed passive atelectasis in the lingula and retrocardiac left lower lobe.  Resolving infectious/inflammatory process also considered in this patient with recent diagnosis of COVID-19.      Electronically signed by: Nader Blount MD  Date:    05/01/2020  Time:    21:23             Narrative:    EXAMINATION:  CT CHEST WITHOUT CONTRAST    CLINICAL HISTORY:  Eval pericardial effusion vs pericardial cyst;    TECHNIQUE:  CT images of the chest obtained without intravenous contrast.  Multiplanar reconstructions were performed.  High resolution CT images were obtained. No contrast was administered.    COMPARISON:  Abdominal MRI, 03/02/2020, 03/01/2019, and 01/05/2015.  Chest radiograph, 04/22/2020 and  05/01/2020.    FINDINGS:  The structures at the base of the neck are unremarkable.    Thoracic aorta is normal in caliber.    Heart size is normal.  Moderate volume fluid collection is present at the inferior aspect of the pericardium in the left hemithorax measuring up to 4 cm in maximum thickness.  The density of this collection is only slightly greater than expected for simple fluid (10-15 HU).  No significant effusion in the superior aspect of the pericardium or superior mediastinum.  No solid mass is identified.    No mediastinal or axillary lymphadenopathy.    Central airways are patent.  Linear subsegmental atelectasis and mild ground-glass attenuation in the lingula and left lower lobe, potentially secondary to passive atelectasis in the setting of pericardial fluid collection.  Lungs are otherwise essentially clear.  No pleural fluid is seen.    No aggressive osseous lesions.    Limited evaluation of the upper abdomen is negative for acute finding.  Partially visualized large hemangioma in the right hepatic lobe, much better characterized on recent abdominal MRI.                               X-Ray Chest AP Portable (Final result)  Result time 05/01/20 18:01:55    Final result by Jaleel Helms MD (05/01/20 18:01:55)                 Impression:      1. No acute cardiopulmonary process noting stable configuration of the left costophrenic angle, may reflect small effusion versus pleural thickening.      Electronically signed by: Jaleel Helms MD  Date:    05/01/2020  Time:    18:01             Narrative:    EXAMINATION:  XR CHEST AP PORTABLE    CLINICAL HISTORY:  Suspected Covid-19 Virus Infection;    TECHNIQUE:  Single frontal view of the chest was performed.    COMPARISON:  04/22/2020    FINDINGS:  The cardiomediastinal silhouette is prominent, similar to the previous examination.  There is obscuration of the left costophrenic angle suggesting either pleural thickening or small effusion..  The trachea  is midline.  The lungs are symmetrically expanded bilaterally without evidence of acute parenchymal process. No large focal consolidation seen.  There is no pneumothorax.  The osseous structures are unremarkable.                                 Medical Decision Making:   History:   Old Medical Records: I decided to obtain old medical records.  Initial Assessment:   38 yo f, referred to the ED for admission to cardiology for large pericardial effusion    Pt asx and HD stable and clinically exam does not seem c/w cardiac tamponade  Clinical Tests:   Lab Tests: Ordered and Reviewed  Radiological Study: Reviewed and Ordered  Medical Tests: Ordered and Reviewed  ED Management:  Labs  Cardiology consulted and are admitting pt their service  Other:   I have discussed this case with another health care provider.       <> Summary of the Discussion: Cardiology fellow                                 Clinical Impression:       ICD-10-CM ICD-9-CM   1. Pericardial effusion I31.3 423.9   2. Abnormal ultrasound R93.89 793.99   3. Suspected Covid-19 Virus Infection R68.89    4. Pericardial effusion with cardiac tamponade I31.3 423.9    I31.4 423.3             ED Disposition Condition    Observation                           Aleah Johnson MD  05/02/20 1410     - - -

## 2022-01-03 ENCOUNTER — OFFICE VISIT (OUTPATIENT)
Dept: GASTROENTEROLOGY | Facility: CLINIC | Age: 42
End: 2022-01-03
Payer: COMMERCIAL

## 2022-01-03 ENCOUNTER — PATIENT MESSAGE (OUTPATIENT)
Dept: GASTROENTEROLOGY | Facility: CLINIC | Age: 42
End: 2022-01-03

## 2022-01-03 DIAGNOSIS — R11.0 NAUSEA: Primary | ICD-10-CM

## 2022-01-03 DIAGNOSIS — K21.9 GASTROESOPHAGEAL REFLUX DISEASE, UNSPECIFIED WHETHER ESOPHAGITIS PRESENT: ICD-10-CM

## 2022-01-03 DIAGNOSIS — C22.0 HCC (HEPATOCELLULAR CARCINOMA): ICD-10-CM

## 2022-01-03 DIAGNOSIS — Z86.19 HISTORY OF HELICOBACTER PYLORI INFECTION: ICD-10-CM

## 2022-01-03 PROCEDURE — 99203 PR OFFICE/OUTPT VISIT, NEW, LEVL III, 30-44 MIN: ICD-10-PCS | Mod: 95,,, | Performed by: FAMILY MEDICINE

## 2022-01-03 PROCEDURE — 99203 OFFICE O/P NEW LOW 30 MIN: CPT | Mod: 95,,, | Performed by: FAMILY MEDICINE

## 2022-01-03 RX ORDER — OMEPRAZOLE 20 MG/1
CAPSULE, DELAYED RELEASE ORAL
Qty: 90 CAPSULE | Refills: 1 | Status: SHIPPED | OUTPATIENT
Start: 2022-01-03 | End: 2023-02-22 | Stop reason: SDUPTHER

## 2022-01-03 NOTE — PATIENT INSTRUCTIONS
1. Stop omeprazole for 2 weeks.  - Turn in h pylori stool test after 2 weeks  - once you turn in stool, you can resume omeprazole  - please see list of medications to avoid for stool test below    2. After stool test, start Omeprazole daily in the morning  - best taken on an empty stomach with a sip of water  - wait 30-45 minutes prior to eating  - take for 4-6 weeks and then we can re-evaluate nausea    3. If h pylori is negative, and nausea continues despite consistent Omeprazole use, consider Gastric emptying scan to assess for delayed gastric emptying        H pylori stool instructions  A stool test for H pylori needs to be done to check for bacterial eradication. You need to be off the following medications for the following amount of time:    1. Antibiotics: off for 4 weeks prior to stool collection    2. All proton-pump inhibitors for 2 weeks prior to stool collection  - Nexium (esomeprazole)  - Prilosec (omeprazole)  - Protonix (pantoprazole)  - Prevacid (lansoprazole)  - Aciphex (raberprazole)  - Dexilant (dexlansoprazole)  - Zegrid    3. Off all H2 blocker medications for 2 weeks prior to stool collection  - Zantac (ranitidine)  - Tagamet (cimedtadine)  - Axid (nizatidine)  - Pepcid (famotidine)    4. Off Pepto-bismol for 4 weeks prior to stool collection

## 2022-01-03 NOTE — PROGRESS NOTES
[facility] Consultation Note    Reason for Consult:  The primary encounter diagnosis was Nausea. Diagnoses of HCC (hepatocellular carcinoma), Gastroesophageal reflux disease, unspecified whether esophagitis present, and History of Helicobacter pylori infection were also pertinent to this visit.    PCP:   Melly Sahu   [address] / [place] [address]    Referring MD:  Melly Sahu Md  [address] [facility] [place] [address]    The patient location is:  Patient Home   The chief complaint leading to consultation is: Nausea  Visit type: Virtual visit with synchronous audio and video  Total time spent with patient: Start Time: 1:03pm, End time: 1:25pm (22 minutes)  Each patient to whom he or she provides medical services by telemedicine is:  (1) informed of the relationship between the physician and patient and the respective role of any other health care provider with respect to management of the patient; and (2) notified that he or she may decline to receive medical services by telemedicine and may withdraw from such care at any time.      Initial HPI 1/3/2022:  This is a 41 y.o. female here for evaluation of nausea. She is a new patient as she has not been in seen in our clinic since 2014.  S/p liver resection 6/28/2021 for HCC, cholecystectomy at that time.  Treated for H pylori (+) serology in 2014 with Clarithromycin triple therapy, subsequent stool test was negative.    Reports nausea over last 2-2.5 months. Also c/o bloating and intermittent LLQ abd pains.    Nausea: can be all day, during waking hour. Not relieved with food intake.  She has not had any drastic dietary changes - still eating same foods, same amount.  Associated with increased belching and flatulence.  She does note some relief of nausea with peppermint.  She cannot identify any exacerbating factors.  She denies vomiting.  Abdominal Pain: intermittent LLQ abd pain, usually when she is laying down.  Lasts  for seconds.  Happens about 2-3 x per week.  Has been having intermittent vaginal spotting - evaluated by OB with transvaginal pelvic U/S with no findings to indicate cause of pain (small fibroids).  Has tried nothing for this pain as it lasts for such a short period of time, she does not need treatments.    Reflux - denies pyrosis, water brash; recent episode of upper abdominal burning. Previously had EGD at [facility] > 10 years ago, prescribed Omeprazole.  Seen in the past for cough thought to be caused by acid reflux - given omeprazole rx again and cough resolved.  Dysphagia - denies  Bowel Habits - typically BM every 2-3 days; no issues with diarrhea.  Blood in stool - denies; 1 green stool last week  NSAID use - Ibuprofen post-op back in July. Uses PRN, not daily    Family History: no known family hx of colon or other GI cancers, IBD; mother had GI issues (IBS?)  Social: denies alcohol use. Denies current or former tobacco use. Denies other substance use.      ROS:  Constitutional: No fevers, chills, No weight loss, +weight gain  ENT: No allergies  CV: No chest pain  Pulm: No cough, Occ. shortness of breath, thinks this may be due to anxiety  Ophtho: No vision changes  GI: see HPI  Derm: No rash, Surgical scars healing well, occasional itching  Heme: No lymphadenopathy, No bruising  MSK: No arthritis  : No dysuria, No hematuria  Endo: No hot or cold intolerance  Neuro: No syncope, No seizure  Psych: + anxiety, No depression    Medical History:  has a past medical history of Abdominal pain (8/12/2021), VENKATA positive (8/20/2020), COVID-19, Deviated septum (2011), Hepatocellular carcinoma (6/9/2021), Hypertrophy of inferior nasal turbinate, Liver mass, Nasal congestion (2011), Pericardial effusion, and Premature menopause.    Surgical History:  has a past surgical history that includes Tonsillectomy; Hernia repair; Nasal septum surgery; Pericardiocentesis (N/A, 5/2/2020); Colonoscopy (N/A, 9/21/2020); and Pericardial  window (N/A, 2/22/2021).    Family History: family history includes Diabetes in her father and mother; Hyperlipidemia in her father; Hypertension in her father; Liver disease in her mother; Macular degeneration in her father; No Known Problems in her brother, brother, daughter, maternal aunt, maternal grandfather, maternal grandmother, maternal uncle, paternal aunt, paternal grandfather, paternal grandmother, paternal uncle, and sister..     Social History:  reports that she has never smoked. She has never used smokeless tobacco. She reports current alcohol use. She reports that she does not use drugs.    Review of patient's allergies indicates:   Allergen Reactions    No known drug allergies        Current Outpatient Medications on File Prior to Visit   Medication Sig Dispense Refill    cetirizine (ZYRTEC) 10 MG tablet Take 1 tablet (10 mg total) by mouth once daily. 90 tablet 1    cyclobenzaprine (FLEXERIL) 10 MG tablet TAKE 1 TABLET BY MOUTH EVERY DAY IN THE EVENING AS NEEDED FOR MUSCLE SPASMS 30 tablet 3    ibuprofen (ADVIL,MOTRIN) 600 MG tablet Take 1 tablet (600 mg total) by mouth 3 (three) times daily as needed for Pain. 30 tablet 2    ketoconazole (NIZORAL) 2 % shampoo Wash scalp and ears with medicated shampoo at least 1x/week - let sit at least 5 minutes prior to rinsing. 120 mL 5    multivitamin capsule Take by mouth. 1 capsule Oral Every morning      norethindrone-ethinyl estradiol (MICROGESTIN 1/20) 1-20 mg-mcg per tablet Take 1 tablet by mouth once daily.      PREVIDENT 5000 ORTHO DEFENSE 1.1 % Pste USE IN PLACE OF REGULAR TOOTH PASTE      triamcinolone acetonide 0.1% (KENALOG) 0.1 % cream Apply topically 2 (two) times daily as needed (scaling at external ears). Avoid use on face, body folds, groin/genitalia. 45 g 3    [DISCONTINUED] omeprazole (PRILOSEC) 20 MG capsule TAKE 1 CAPSULE BY MOUTH DAILY AS NEEDED FOR GERD 90 capsule 1     No current facility-administered medications on file prior  to visit.         Objective Findings: Limited due to virtual visit.    Vital Signs:  There were no vitals taken for this visit.  There is no height or weight on file to calculate BMI.    Physical Exam:  General Appearance: Well appearing in no acute distress  Head:   Normocephalic, without obvious abnormality  Neurologic: AAO x 3      Labs:  Lab Results   Component Value Date    WBC 3.86 (L) 11/12/2021    HGB 13.4 11/12/2021    HCT 42.3 11/12/2021     11/12/2021    CRP 0.4 08/28/2020    CHOL 218 (H) 03/18/2021    TRIG 73 03/18/2021    HDL 67 03/18/2021    ALT 13 11/12/2021    AST 14 11/12/2021     11/12/2021    K 4.3 11/12/2021     11/12/2021    CREATININE 0.8 11/12/2021    BUN 8 11/12/2021    CO2 27 11/12/2021    TSH 0.676 03/18/2021    INR 1.0 11/12/2021    HGBA1C 5.3 02/18/2021       Imaging reviewed:  12/3/2021 US Pelvis Complete with Transvag Non-OB  1. Two subcentimeter intramural uterine fibroids identified.  2. Trace amount of anechoic fluid within the endometrial cavity, nonspecific.    11/19/2021 CT Abd W/WO contrast  1. Subcentimeter indeterminate enhancing lesion within the posterior right lobe of the liver.  Close follow-up is recommended as malignancy cannot be excluded.  2. Indeterminate region of enhancement noted on recent MRI examination is not delineated on the CT examination.  Normal enhancement pattern with no discrete masses in this region on this multiphase contrast enhanced CT examination.  3. Findings consistent with hepatic hemangiomas and subcentimeter hepatic cysts.  4. Surgical changes of the liver.  Status post cholecystectomy.  5. Hepatomegaly.  6. Focal biliary dilatation within the dome of the right lobe of the liver adjacent to surgical changes.    Endoscopy reviewed:  9/21/2020 Colonoscopy for rectal bleeding  - to TI, good prep  - The entire examined colon is normal.   - The examined portion of the ileum was normal.   - The distal rectum and anal verge are  normal on retroflexion view.   - No specimens collected.   - repeat in 10 years    41 y.o. female here for evaluation of:    Assessment:  1. Nausea    2. HCC (hepatocellular carcinoma)    3. Gastroesophageal reflux disease, unspecified whether esophagitis present    4. History of Helicobacter pylori infection         2-2.5 months of nausea, can be during all waking hours. Some relief with peppermint. No improvement with food, no worsening with food. Wanting to avoid upper endoscopy. S/p liver resection with biopsies consistent with HCC - no surgical f/u needed. Hx of H pylori (+) serology, treated with clarithromycin triple therapy.  Also with bloating and intermittent LLQ pains (last seconds). Hx of EGD >10 years ago, told she had reflux.  Also had been treated with PPI for persistent cough in the past that did resolve, thought to be reflux-induced.    We discussed obtaining stool antigen for H pylori, treat if positive.  Will start Omeprazole BID x 4-6 weeks after she submits h pylori stool test.  If this does not provide relief of nausea and h pylori is negative, we can consider gastric emptying scan.  If nausea does persist, I would recommend EGD in the future.  She has extensive recent abdominal imaging, I do not feel repeat is necessary at this time.    Recommendations:  1. H pylori stool (Off PPI x 2 weeks prior)  2. Omeprazole 20 mg BID 4-6 weeks (wean down if nausea improves)  3. Consider Gastric emptying scan if nausea persists  4. May consider EGD in future if nausea still a problem    Follow up if symptoms worsen or fail to improve, for with test results.      Order summary:  Orders Placed This Encounter    H. pylori antigen, stool    omeprazole (PRILOSEC) 20 MG capsule         Thank you so much for allowing me to participate in the care of CONNIE Shahid

## 2022-01-10 ENCOUNTER — PATIENT MESSAGE (OUTPATIENT)
Dept: INTERNAL MEDICINE | Facility: CLINIC | Age: 42
End: 2022-01-10
Payer: COMMERCIAL

## 2022-01-17 PROCEDURE — 87338 HPYLORI STOOL AG IA: CPT | Performed by: FAMILY MEDICINE

## 2022-01-18 ENCOUNTER — LAB VISIT (OUTPATIENT)
Dept: LAB | Facility: HOSPITAL | Age: 42
End: 2022-01-18
Attending: FAMILY MEDICINE
Payer: COMMERCIAL

## 2022-01-18 DIAGNOSIS — Z86.19 HISTORY OF HELICOBACTER PYLORI INFECTION: ICD-10-CM

## 2022-01-18 DIAGNOSIS — K21.9 GASTROESOPHAGEAL REFLUX DISEASE, UNSPECIFIED WHETHER ESOPHAGITIS PRESENT: ICD-10-CM

## 2022-01-18 DIAGNOSIS — R11.0 NAUSEA: ICD-10-CM

## 2022-01-26 ENCOUNTER — PATIENT MESSAGE (OUTPATIENT)
Dept: GASTROENTEROLOGY | Facility: CLINIC | Age: 42
End: 2022-01-26
Payer: COMMERCIAL

## 2022-01-26 LAB
H PYLORI AG STL QL IA: NORMAL
SPECIMEN SOURCE: NORMAL

## 2022-01-27 ENCOUNTER — PATIENT MESSAGE (OUTPATIENT)
Dept: TRANSPLANT | Facility: CLINIC | Age: 42
End: 2022-01-27
Payer: COMMERCIAL

## 2022-02-02 ENCOUNTER — PATIENT MESSAGE (OUTPATIENT)
Dept: OBSTETRICS AND GYNECOLOGY | Facility: CLINIC | Age: 42
End: 2022-02-02
Payer: COMMERCIAL

## 2022-02-02 ENCOUNTER — PATIENT MESSAGE (OUTPATIENT)
Dept: HEPATOLOGY | Facility: CLINIC | Age: 42
End: 2022-02-02
Payer: COMMERCIAL

## 2022-02-02 RX ORDER — ALPRAZOLAM 0.25 MG/1
TABLET ORAL
Qty: 6 TABLET | Refills: 0 | Status: SHIPPED | OUTPATIENT
Start: 2022-02-02 | End: 2022-08-11 | Stop reason: SDUPTHER

## 2022-02-23 ENCOUNTER — TELEPHONE (OUTPATIENT)
Dept: DERMATOLOGY | Facility: CLINIC | Age: 42
End: 2022-02-23
Payer: COMMERCIAL

## 2022-02-25 ENCOUNTER — HOSPITAL ENCOUNTER (OUTPATIENT)
Dept: RADIOLOGY | Facility: HOSPITAL | Age: 42
Discharge: HOME | End: 2022-02-25
Attending: INTERNAL MEDICINE
Payer: COMMERCIAL

## 2022-02-25 ENCOUNTER — PATIENT MESSAGE (OUTPATIENT)
Dept: HEPATOLOGY | Facility: CLINIC | Age: 42
End: 2022-02-25
Payer: COMMERCIAL

## 2022-02-25 DIAGNOSIS — C22.0 HEPATOMA: ICD-10-CM

## 2022-02-25 PROCEDURE — 25500020 PHARM REV CODE 255: Performed by: INTERNAL MEDICINE

## 2022-02-25 PROCEDURE — A9585 GADOBUTROL INJECTION: HCPCS | Performed by: INTERNAL MEDICINE

## 2022-02-25 PROCEDURE — 74183 MRI ABD W/O CNTR FLWD CNTR: CPT | Mod: TC

## 2022-02-25 PROCEDURE — 74183 MRI ABD W/O CNTR FLWD CNTR: CPT | Mod: 26,,, | Performed by: RADIOLOGY

## 2022-02-25 PROCEDURE — 74183 MRI ABDOMEN W WO CONTRAST: ICD-10-PCS | Mod: 26,,, | Performed by: RADIOLOGY

## 2022-02-25 RX ORDER — GADOBUTROL 604.72 MG/ML
10 INJECTION INTRAVENOUS
Status: COMPLETED | OUTPATIENT
Start: 2022-02-25 | End: 2022-02-25

## 2022-02-25 RX ADMIN — GADOBUTROL 10 ML: 604.72 INJECTION INTRAVENOUS at 07:02

## 2022-03-03 ENCOUNTER — TELEPHONE (OUTPATIENT)
Dept: HEPATOLOGY | Facility: CLINIC | Age: 42
End: 2022-03-03
Payer: COMMERCIAL

## 2022-03-03 ENCOUNTER — LAB VISIT (OUTPATIENT)
Dept: LAB | Facility: HOSPITAL | Age: 42
End: 2022-03-03
Attending: INTERNAL MEDICINE
Payer: COMMERCIAL

## 2022-03-03 ENCOUNTER — OFFICE VISIT (OUTPATIENT)
Dept: HEPATOLOGY | Facility: CLINIC | Age: 42
End: 2022-03-03
Payer: COMMERCIAL

## 2022-03-03 VITALS
WEIGHT: 145.38 LBS | RESPIRATION RATE: 17 BRPM | BODY MASS INDEX: 20.35 KG/M2 | SYSTOLIC BLOOD PRESSURE: 145 MMHG | HEART RATE: 84 BPM | DIASTOLIC BLOOD PRESSURE: 84 MMHG | OXYGEN SATURATION: 99 % | TEMPERATURE: 98 F | HEIGHT: 71 IN

## 2022-03-03 DIAGNOSIS — R97.8 ELEVATED CA 19-9 LEVEL: ICD-10-CM

## 2022-03-03 DIAGNOSIS — C22.0 HCC (HEPATOCELLULAR CARCINOMA): Primary | ICD-10-CM

## 2022-03-03 DIAGNOSIS — C22.0 HCC (HEPATOCELLULAR CARCINOMA): ICD-10-CM

## 2022-03-03 LAB
AFP SERPL-MCNC: 8.1 NG/ML (ref 0–8.4)
CANCER AG19-9 SERPL-ACNC: 78.9 U/ML (ref 0–40)

## 2022-03-03 PROCEDURE — 3079F PR MOST RECENT DIASTOLIC BLOOD PRESSURE 80-89 MM HG: ICD-10-PCS | Mod: CPTII,S$GLB,, | Performed by: INTERNAL MEDICINE

## 2022-03-03 PROCEDURE — 3077F PR MOST RECENT SYSTOLIC BLOOD PRESSURE >= 140 MM HG: ICD-10-PCS | Mod: CPTII,S$GLB,, | Performed by: INTERNAL MEDICINE

## 2022-03-03 PROCEDURE — 82105 ALPHA-FETOPROTEIN SERUM: CPT | Performed by: INTERNAL MEDICINE

## 2022-03-03 PROCEDURE — 99999 PR PBB SHADOW E&M-EST. PATIENT-LVL V: CPT | Mod: PBBFAC,,, | Performed by: INTERNAL MEDICINE

## 2022-03-03 PROCEDURE — 99999 PR PBB SHADOW E&M-EST. PATIENT-LVL V: ICD-10-PCS | Mod: PBBFAC,,, | Performed by: INTERNAL MEDICINE

## 2022-03-03 PROCEDURE — 3008F BODY MASS INDEX DOCD: CPT | Mod: CPTII,S$GLB,, | Performed by: INTERNAL MEDICINE

## 2022-03-03 PROCEDURE — 1160F PR REVIEW ALL MEDS BY PRESCRIBER/CLIN PHARMACIST DOCUMENTED: ICD-10-PCS | Mod: CPTII,S$GLB,, | Performed by: INTERNAL MEDICINE

## 2022-03-03 PROCEDURE — 3079F DIAST BP 80-89 MM HG: CPT | Mod: CPTII,S$GLB,, | Performed by: INTERNAL MEDICINE

## 2022-03-03 PROCEDURE — 1159F MED LIST DOCD IN RCRD: CPT | Mod: CPTII,S$GLB,, | Performed by: INTERNAL MEDICINE

## 2022-03-03 PROCEDURE — 36415 COLL VENOUS BLD VENIPUNCTURE: CPT | Mod: PN | Performed by: INTERNAL MEDICINE

## 2022-03-03 PROCEDURE — 3008F PR BODY MASS INDEX (BMI) DOCUMENTED: ICD-10-PCS | Mod: CPTII,S$GLB,, | Performed by: INTERNAL MEDICINE

## 2022-03-03 PROCEDURE — 99213 OFFICE O/P EST LOW 20 MIN: CPT | Mod: S$GLB,,, | Performed by: INTERNAL MEDICINE

## 2022-03-03 PROCEDURE — 3077F SYST BP >= 140 MM HG: CPT | Mod: CPTII,S$GLB,, | Performed by: INTERNAL MEDICINE

## 2022-03-03 PROCEDURE — 1159F PR MEDICATION LIST DOCUMENTED IN MEDICAL RECORD: ICD-10-PCS | Mod: CPTII,S$GLB,, | Performed by: INTERNAL MEDICINE

## 2022-03-03 PROCEDURE — 99213 PR OFFICE/OUTPT VISIT, EST, LEVL III, 20-29 MIN: ICD-10-PCS | Mod: S$GLB,,, | Performed by: INTERNAL MEDICINE

## 2022-03-03 PROCEDURE — 86301 IMMUNOASSAY TUMOR CA 19-9: CPT | Performed by: INTERNAL MEDICINE

## 2022-03-03 PROCEDURE — 1160F RVW MEDS BY RX/DR IN RCRD: CPT | Mod: CPTII,S$GLB,, | Performed by: INTERNAL MEDICINE

## 2022-03-03 NOTE — PATIENT INSTRUCTIONS
Ct chest every 6 months- do now  MRI every 3 months- do in 3 mo [facility]  Labs now and in 3 months

## 2022-03-03 NOTE — PROGRESS NOTES
HEPATOLOGY FOLLOW UP    Referring Physician: Melly Sahu MD  Current Corresponding Physician: Melly Sahu MD    Melly Hwang is here for follow up of HCC    HPI  She has a known history of hepatic hemangiomas that were otherwise asymptomatic. She recently had a diagnosis of pericardial effusion of unknown etiology ultimately requiring pericardial window with cardiac surgery. She recovered well from those procedures without any significant cardiac dysfunction. Axial imaging showed new lesions in the right posterior segment had imaging characteristics not consistent with hemangioma and concerning for malignancy. 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     Interval History  Patient has undergone open right liver lobe resection 6/28/21 (partial hepatectomy segments V,VI,VII,VIII); and Portal lymphadenectomy on 6/28/21. Course was uncomplicated.   Pathology  Final Pathologic Diagnosis 1.  Gallbladder (cholecystectomy):   - Benign gallbladder with cholecystitis   2. Right lobe (partial hepatectomy):   - Positive for malignancy, see synoptic report below   - Separate hemangioma, 7.3 cm   3. Lymph nodes, portal (regional resection):   - 8 lymph nodes, negative for tumor (0/8)   ____________________________________________________________________________   ______   Hepatocellular carcinoma synoptic report   - Procedure:  Partial hepatectomy, right lobe   - Histologic type:  Hepatocellular carcinoma, scirrhous   - Histologic grade:  Moderately differentiated, grade 2   - Tumor focality:  Solitary   - Tumor characteristics:   - Tumor site:  Right lobe   - Tumor size:  3.3 cm   - Treatment effect:  No known pre-surgical therapy   - Satellitosis:  Not identified   - Tumor extent:  Confined to liver   - Vascular invasion:  Present, Small vessel   - Perineural invasion:  Present   - Margins:   - All margins  are negative for invasive carcinoma   - Closest margin to invasive carcinoma:  Parenchymal   - Distance from invasive  carcinoma to closest margin:  5 mm   - Regional lymph nodes:   - Number of lymph nodes with tumor:  0   - Number of lymph nodes examined:  8   - Pathology: pT2 N0 MX   - Additional findings:   - No steatosis   - Trichrome stain:  Periportal fibrosis with early septa, stage 1-2   - Iron stain:  Negative    Recommendation: HCC surveillance scans: every 3 months for the next two years, then every six months up to 5 years.      She is overall feeling well. She is back at work. Pain resolved and has not recurred.    Labs 21: Tbil 1.1, ALT 13, AST 14, ALKP 45, WBC 3.86  CA 19-9 fluctuatin.9 on 3/3/22; AFP 8.1 3/3/22      MRI 22:  Hepatobiliary: Liver is enlarged to approximately 21.1 cm.  Postoperative changes of partial hepatic resection involving segments 5, 6, 7, and 8.  Redemonstration of T2 hyperintense right hepatic lobe lesions measuring 2.5 x 1.7 cm, (series 4, image 19), previously 2.5 x 1.7 cm and 1.8 x 1.6 cm, previously 1.7 x 1.7 cm, (series 4, image 37).  These lesions again demonstrate progressive peripheral nodular enhancement, most consistent with hemangiomas.  Heterogeneous parenchymal enhancement without enhancing mass with washout or pseudo capsule to suggest HCC: Stable right hepatic lobe hemangiomas.  No new well-circumscribed enhancing mass with washout or pseudocapsule to suggest HCC.      Outpatient Encounter Medications as of 3/3/2022   Medication Sig Dispense Refill    ketoconazole (NIZORAL) 2 % shampoo Wash scalp and ears with medicated shampoo at least 1x/week - let sit at least 5 minutes prior to rinsing. 120 mL 5    multivitamin capsule Take by mouth. 1 capsule Oral Every morning      norethindrone-ethinyl estradiol (MICROGESTIN ) 1-20 mg-mcg per tablet TAKE 1 TABLET BY MOUTH EVERY DAY 63 tablet 4    PREVIDENT 5000 ORTHO DEFENSE 1.1 % Pste USE IN PLACE  OF REGULAR TOOTH PASTE      ALPRAZolam (XANAX) 0.25 MG tablet Take 1 tablet prior to MRI procedure as needed for pre-procedural anxiety. (Patient not taking: Reported on 3/3/2022) 6 tablet 0    cetirizine (ZYRTEC) 10 MG tablet Take 1 tablet (10 mg total) by mouth once daily. (Patient not taking: Reported on 3/3/2022) 90 tablet 1    cyclobenzaprine (FLEXERIL) 10 MG tablet TAKE 1 TABLET BY MOUTH EVERY DAY IN THE EVENING AS NEEDED FOR MUSCLE SPASMS (Patient not taking: Reported on 3/3/2022) 30 tablet 3    ibuprofen (ADVIL,MOTRIN) 600 MG tablet Take 1 tablet (600 mg total) by mouth 3 (three) times daily as needed for Pain. (Patient not taking: Reported on 3/3/2022) 30 tablet 2    omeprazole (PRILOSEC) 20 MG capsule TAKE 1 CAPSULE BY MOUTH DAILY AS NEEDED FOR GERD (Patient not taking: Reported on 3/3/2022) 90 capsule 1    triamcinolone acetonide 0.1% (KENALOG) 0.1 % cream Apply topically 2 (two) times daily as needed (scaling at external ears). Avoid use on face, body folds, groin/genitalia. (Patient not taking: Reported on 3/3/2022) 45 g 3     No facility-administered encounter medications on file as of 3/3/2022.     Review of patient's allergies indicates:   Allergen Reactions    No known drug allergies      Past Medical History:   Diagnosis Date    Abdominal pain 8/12/2021    VENKATA positive 8/20/2020    COVID-19     Deviated septum 2011    Hepatocellular carcinoma 6/9/2021    Hypertrophy of inferior nasal turbinate     Liver mass     Nasal congestion 2011    Pericardial effusion     Premature menopause        Review of Systems   Constitutional: Negative.    HENT: Negative.    Eyes: Negative.    Respiratory: Negative.    Cardiovascular: Negative.    Gastrointestinal: Negative for abdominal pain.   Genitourinary: Negative.    Musculoskeletal: Negative.    Skin: Negative.    Neurological: Negative.    Psychiatric/Behavioral: Negative.      Vitals:    03/03/22 1417   BP: (!) 145/84   Pulse: 84   Resp: 17    Temp: 98.4 °F (36.9 °C)        Physical Exam  Vitals reviewed.   Constitutional:       Appearance: She is well-developed.   HENT:      Head: Normocephalic and atraumatic.   Eyes:      General: No scleral icterus.     Conjunctiva/sclera: Conjunctivae normal.      Pupils: Pupils are equal, round, and reactive to light.   Neck:      Thyroid: No thyromegaly.   Cardiovascular:      Rate and Rhythm: Normal rate and regular rhythm.      Heart sounds: Normal heart sounds.   Pulmonary:      Effort: Pulmonary effort is normal.      Breath sounds: Normal breath sounds. No rales.   Abdominal:      General: Bowel sounds are normal. There is no distension.      Palpations: Abdomen is soft. There is no mass.      Tenderness: There is no abdominal tenderness.   Musculoskeletal:         General: Normal range of motion.      Cervical back: Normal range of motion and neck supple.   Skin:     General: Skin is warm and dry.      Findings: No rash.   Neurological:      Mental Status: She is alert and oriented to person, place, and time.         MELD-Na score: 7 at 11/12/2021  7:16 AM  MELD score: 7 at 11/12/2021  7:16 AM  Calculated from:  Serum Creatinine: 0.8 mg/dL (Using min of 1 mg/dL) at 11/12/2021  7:16 AM  Serum Sodium: 140 mmol/L (Using max of 137 mmol/L) at 11/12/2021  7:16 AM  Total Bilirubin: 1.1 mg/dL at 11/12/2021  7:16 AM  INR(ratio): 1.0 at 11/12/2021  7:16 AM  Age: 41 years    Lab Results   Component Value Date     11/12/2021    BUN 8 11/12/2021    CREATININE 0.8 11/12/2021    CALCIUM 9.0 11/12/2021     11/12/2021    K 4.3 11/12/2021     11/12/2021    PROT 6.5 11/12/2021    CO2 27 11/12/2021    ANIONGAP 7 (L) 11/12/2021    WBC 3.86 (L) 11/12/2021    RBC 4.61 11/12/2021    HGB 13.4 11/12/2021    HCT 42.3 11/12/2021    HCT 35 (L) 06/28/2021    MCV 92 11/12/2021    MCH 29.1 11/12/2021    MCHC 31.7 (L) 11/12/2021     Lab Results   Component Value Date    RDW 12.6 11/12/2021     11/12/2021    MPV  10.8 11/12/2021    GRAN 2.3 11/12/2021    GRAN 60.1 11/12/2021    LYMPH 1.1 11/12/2021    LYMPH 28.8 11/12/2021    MONO 0.3 11/12/2021    MONO 6.7 11/12/2021    EOSINOPHIL 3.1 11/12/2021    BASOPHIL 1.0 11/12/2021    EOS 0.1 11/12/2021    BASO 0.04 11/12/2021    APTT 23.0 02/22/2021    GROUPTRH B POS 06/26/2021    BNP 11 05/01/2020    CHOL 218 (H) 03/18/2021    TRIG 73 03/18/2021    HDL 67 03/18/2021    CHOLHDL 30.7 03/18/2021    TOTALCHOLEST 3.3 03/18/2021    ALBUMIN 3.5 11/12/2021    BILIDIR 0.3 01/26/2021    AST 14 11/12/2021    ALT 13 11/12/2021    ALKPHOS 45 (L) 11/12/2021    MG 1.8 07/15/2021    LABPROT 10.8 11/12/2021    INR 1.0 11/12/2021       Assessment and Plan:  Patient Active Problem List   Diagnosis    Liver mass: see MRI, stable [id number], also 2018    Premature ovarian failure    Cardiac enlargement: Echo 2014 normal cardiac chamber size with EF 55%    Bradycardia    Leukopenia    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 pylori infection: tx 2014 stool negative    Liver hemangioma    Pericardial effusion    VENKATA positive    History of 2019 novel coronavirus disease (COVID-19)    Rectal bleed    Elevated bilirubin    S/P pericardial window creation    Liver lesion    Hepatocellular carcinoma    HCC (hepatocellular carcinoma)    Hypophosphatemia    Abdominal pain    Elevated CA 19-9 level    Palpitations    Pleural effusion     Melly Hwang is a 41 y.o. female with no underlying cirrhosis and HCC, biopsy proven, now s/p resection. Pathology did not reveal mixed type with cholangiocarcinoma. Intermittent abdominal pain with no blood or imaging abnormalities - Monitor;     HCC surveillance scans: every 3 months for the next two years (until 6/23) (MRI due next in 05/22), then every six months up to 5 years. CA 19-9 every 3 months and AFP every 3 months. CT chest now and every 6 months    REturn 6 months

## 2022-03-04 ENCOUNTER — HOSPITAL ENCOUNTER (OUTPATIENT)
Dept: RADIOLOGY | Facility: HOSPITAL | Age: 42
Discharge: HOME | End: 2022-03-04
Attending: INTERNAL MEDICINE
Payer: COMMERCIAL

## 2022-03-04 DIAGNOSIS — C22.0 HCC (HEPATOCELLULAR CARCINOMA): ICD-10-CM

## 2022-03-04 PROCEDURE — 71250 CT THORAX DX C-: CPT | Mod: TC

## 2022-03-04 PROCEDURE — 71250 CT CHEST WITHOUT CONTRAST: ICD-10-PCS | Mod: 26,,, | Performed by: RADIOLOGY

## 2022-03-04 PROCEDURE — 71250 CT THORAX DX C-: CPT | Mod: 26,,, | Performed by: RADIOLOGY

## 2022-03-07 ENCOUNTER — OFFICE VISIT (OUTPATIENT)
Dept: OBSTETRICS AND GYNECOLOGY | Facility: CLINIC | Age: 42
End: 2022-03-07
Payer: COMMERCIAL

## 2022-03-07 VITALS
BODY MASS INDEX: 20.37 KG/M2 | WEIGHT: 146.06 LBS | SYSTOLIC BLOOD PRESSURE: 108 MMHG | DIASTOLIC BLOOD PRESSURE: 72 MMHG

## 2022-03-07 DIAGNOSIS — Z01.419 VISIT FOR GYNECOLOGIC EXAMINATION: Primary | ICD-10-CM

## 2022-03-07 DIAGNOSIS — Z12.31 SCREENING MAMMOGRAM FOR HIGH-RISK PATIENT: ICD-10-CM

## 2022-03-07 DIAGNOSIS — Z30.41 ENCOUNTER FOR SURVEILLANCE OF CONTRACEPTIVE PILLS: ICD-10-CM

## 2022-03-07 PROCEDURE — 3008F BODY MASS INDEX DOCD: CPT | Mod: CPTII,S$GLB,, | Performed by: OBSTETRICS & GYNECOLOGY

## 2022-03-07 PROCEDURE — 99396 PR PREVENTIVE VISIT,EST,40-64: ICD-10-PCS | Mod: S$GLB,,, | Performed by: OBSTETRICS & GYNECOLOGY

## 2022-03-07 PROCEDURE — 1159F PR MEDICATION LIST DOCUMENTED IN MEDICAL RECORD: ICD-10-PCS | Mod: CPTII,S$GLB,, | Performed by: OBSTETRICS & GYNECOLOGY

## 2022-03-07 PROCEDURE — 99396 PREV VISIT EST AGE 40-64: CPT | Mod: S$GLB,,, | Performed by: OBSTETRICS & GYNECOLOGY

## 2022-03-07 PROCEDURE — 3078F DIAST BP <80 MM HG: CPT | Mod: CPTII,S$GLB,, | Performed by: OBSTETRICS & GYNECOLOGY

## 2022-03-07 PROCEDURE — 3074F PR MOST RECENT SYSTOLIC BLOOD PRESSURE < 130 MM HG: ICD-10-PCS | Mod: CPTII,S$GLB,, | Performed by: OBSTETRICS & GYNECOLOGY

## 2022-03-07 PROCEDURE — 3008F PR BODY MASS INDEX (BMI) DOCUMENTED: ICD-10-PCS | Mod: CPTII,S$GLB,, | Performed by: OBSTETRICS & GYNECOLOGY

## 2022-03-07 PROCEDURE — 3078F PR MOST RECENT DIASTOLIC BLOOD PRESSURE < 80 MM HG: ICD-10-PCS | Mod: CPTII,S$GLB,, | Performed by: OBSTETRICS & GYNECOLOGY

## 2022-03-07 PROCEDURE — 3074F SYST BP LT 130 MM HG: CPT | Mod: CPTII,S$GLB,, | Performed by: OBSTETRICS & GYNECOLOGY

## 2022-03-07 PROCEDURE — 1159F MED LIST DOCD IN RCRD: CPT | Mod: CPTII,S$GLB,, | Performed by: OBSTETRICS & GYNECOLOGY

## 2022-03-07 PROCEDURE — 99999 PR PBB SHADOW E&M-EST. PATIENT-LVL III: ICD-10-PCS | Mod: PBBFAC,,, | Performed by: OBSTETRICS & GYNECOLOGY

## 2022-03-07 PROCEDURE — 99999 PR PBB SHADOW E&M-EST. PATIENT-LVL III: CPT | Mod: PBBFAC,,, | Performed by: OBSTETRICS & GYNECOLOGY

## 2022-03-07 RX ORDER — NORETHINDRONE ACETATE AND ETHINYL ESTRADIOL .02; 1 MG/1; MG/1
1 TABLET ORAL DAILY
Qty: 63 TABLET | Refills: 4 | Status: SHIPPED | OUTPATIENT
Start: 2022-03-07 | End: 2023-03-09 | Stop reason: SDUPTHER

## 2022-03-07 NOTE — PROGRESS NOTES
HISTORY OF PRESENT ILLNESS:    Melly Hwang is a 41 y.o. female, , No LMP recorded. (Menstrual status: Other).,  presents for a routine exam and has no complaints.   HISTORY OF PREMATURE OVARIAN FAILURE, MAINTAINED LOESTRIN 1-20.  HAS RARE BREAKTHROUGH BLEEDING AND NEEDS WITHDRAWAL FROM PILLS TO RESOLVE.  REFER FOR MAMMOGRAM.  NO GYN COMPLAINTS.  MAMMOGRAM ORDER PLACED  PLANNING A TRIP TO [facility] IN 2021:  complaining of RECURRENT VAGINAL IRRITATION.  HAD TEMPORARY RELIEF WITH MONISTAT AND NO IMPROVEMENT WITH BORIC ACID BUT BAKING SODA SOAKS HAVE HELPED SOME.  ON EXAMINATION APPEARS BV AND WILL TREAT WITH P.O. FLAGYL  3/2021:  COTEST, REF MAMMO DUE MAY, POP CHANGE TO LOW-DOSE NEAL (PER RECENT COMMUNICATION FROM [facility] NOT A SIGNIF RISK IN SETTING HEMANGIOMA) AND HOPING BENEFICIAL EFFECTS SKIN WITHOUT INCURRING SIGNIF INCR PERIODS  STILL AT [facility] BUT WOULD CONSIDER [facility] [facility]  [facility]URRENT EFFUSION  2020:  COTEST  AND MICRONOR REFILL, MAMMO HAS BEEN SCHEDULED.  NO GYN C/O.    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  PAP DUE .  GOING TO [facility] IN July IN [facility], AND POSS ALSO VINICIO FOR BIRTHDAY  LAST [id number]:  REFILL OCP AND PAP SUBMITTED.  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  LAST VISIT 2017:  PAP RECENTLY SUBMITTED AT [facility] WHEN SEEN FOR ROUTINE VISIT 2016.  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  HAS HAD YELLOWISH DISCHARGE PAST 2 DAYS WITHOUT ITCHING OR IRRITATION.  NOT SA FOR YEARS.  ADVISED SOME CERVICAL INFLAMMATION BUT NO EVIDENT INFECTION AND MINIMAL D/C ON EXAM - POSS BV, WILL RX METROGEL.  STD SCREEN FOR [facility]ACE OF MIND SINCE HSE HAS POSSIBLE PROFESSIONAL  EXPOSURES.  LAST VISIT 3/2014:  WANTS 3-MO REFILL OCP. PAP IS UP TO DATE. WORKING MODELING, LOCALLY AND SOME NATIONALLY  LAST VISIT 3/2013:  HAS A HX OF OMA OV FAILURE, PREVIOUSLY MAINTAINED ON SEASONIQUE, BUT CHANGED TO MICRONOR FOR CONTROL OF HOT FLUSHES WHEN LIVER HEMANGIOMA NOTED (DUE FOR NEXT MRI THIS MONTH) SKIN IS DRY, BUT NO HOT FLUSHES AND CONCERNED IF STOPS MICRONOR WILL INCR RISK OF OSTEOPOROSIS. STILL AMENORRHEA   WORKS [employer]   PAP SUBMITTED AND DISCUSSED 3YR SCREENING RECOMMENDATION    Past Medical History:   Diagnosis Date    Abdominal pain 8/12/2021    VENKATA positive 8/20/2020    COVID-19     Deviated septum 2011    Hepatocellular carcinoma 6/9/2021    Hypertrophy of inferior nasal turbinate     Liver mass     Nasal congestion 2011    Pericardial effusion     Premature menopause        Past Surgical History:   Procedure Laterality Date    COLONOSCOPY N/A 9/21/2020    Procedure: COLONOSCOPY;  Surgeon: GIOVANNI Crane MD;  Location: [facility] ENDO (4TH FLR);  Service: Endoscopy;  Laterality: N/A;  + covid 4/22    HERNIA REPAIR      NASAL SEPTUM SURGERY      PERICARDIAL WINDOW N/A 2/22/2021    Procedure: CREATION, PERICARDIAL WINDOW;  Surgeon: Ajay Cantor MD;  Location: [facility] OR [facility] FLR;  Service: Cardiovascular;  Laterality: N/A;    PERICARDIOCENTESIS N/A 5/2/2020    Procedure: Pericardiocentesis;  Surgeon: Dickson Dangelo MD;  Location: [facility] CATH LAB;  Service: Cardiology;  Laterality: N/A;    TONSILLECTOMY         MEDICATIONS AND ALLERGIES:      Current Outpatient Medications:     cetirizine (ZYRTEC) 10 MG tablet, Take 1 tablet (10 mg total) by mouth once daily., Disp: 90 tablet, Rfl: 1    multivitamin capsule, Take by mouth. 1 capsule Oral Every morning, Disp: , Rfl:     ALPRAZolam (XANAX) 0.25 MG tablet, Take 1 tablet prior to MRI procedure as needed for pre-procedural anxiety. (Patient not taking: No sig reported), Disp: 6 tablet, Rfl: 0    cyclobenzaprine  (FLEXERIL) 10 MG tablet, TAKE 1 TABLET BY MOUTH EVERY DAY IN THE EVENING AS NEEDED FOR MUSCLE SPASMS (Patient not taking: No sig reported), Disp: 30 tablet, Rfl: 3    ibuprofen (ADVIL,MOTRIN) 600 MG tablet, Take 1 tablet (600 mg total) by mouth 3 (three) times daily as needed for Pain. (Patient not taking: No sig reported), Disp: 30 tablet, Rfl: 2    ketoconazole (NIZORAL) 2 % shampoo, Wash scalp and ears with medicated shampoo at least 1x/week - let sit at least 5 minutes prior to rinsing. (Patient not taking: Reported on 3/7/2022), Disp: 120 mL, Rfl: 5    norethindrone-ethinyl estradiol (MICROGESTIN 1/20) 1-20 mg-mcg per tablet, Take 1 tablet by mouth once daily., Disp: 63 tablet, Rfl: 4    omeprazole (PRILOSEC) 20 MG capsule, TAKE 1 CAPSULE BY MOUTH DAILY AS NEEDED FOR GERD (Patient not taking: No sig reported), Disp: 90 capsule, Rfl: 1    PREVIDENT 5000 ORTHO DEFENSE 1.1 % Pste, USE IN PLACE OF REGULAR TOOTH PASTE, Disp: , Rfl:     triamcinolone acetonide 0.1% (KENALOG) 0.1 % cream, Apply topically 2 (two) times daily as needed (scaling at external ears). Avoid use on face, body folds, groin/genitalia. (Patient not taking: No sig reported), Disp: 45 g, Rfl: 3    Review of patient's allergies indicates:   Allergen Reactions    No known drug allergies        Family History   Problem Relation Age of Onset    Diabetes Mother     Liver disease Mother         Fatty liver    Macular degeneration Father     Diabetes Father     Hypertension Father     Hyperlipidemia Father     No Known Problems Sister     No Known Problems Brother     No Known Problems Daughter     No Known Problems Brother     No Known Problems Maternal Aunt     No Known Problems Maternal Uncle     No Known Problems Paternal Aunt     No Known Problems Paternal Uncle     No Known Problems Maternal Grandmother     No Known Problems Maternal Grandfather     No Known Problems Paternal Grandmother     No Known Problems Paternal  Grandfather     Amblyopia Neg Hx     Blindness Neg Hx     Cancer Neg Hx     Cataracts Neg Hx     Glaucoma Neg Hx     Retinal detachment Neg Hx     Strabismus Neg Hx     Stroke Neg Hx     Thyroid disease Neg Hx     Heart disease Neg Hx     Melanoma Neg Hx     Heart attack Neg Hx        Social History     Socioeconomic History    Marital status: Single    Number of children: 1   Occupational History    Occupation:      Employer: [employer]   Tobacco Use    Smoking status: Never Smoker    Smokeless tobacco: Never Used   Substance and Sexual Activity    Alcohol use: Yes     Comment: rare    Drug use: No    Sexual activity: Yes     Partners: Male     Birth control/protection: OCP       COMPREHENSIVE GYN HISTORY:  PAP History: Denies abnormal Paps.  Infection History: Denies STDs. Denies PID.  Benign History: Denies uterine fibroids. Denies ovarian cysts. Denies endometriosis. Denies other conditions.  Cancer History: Denies cervical cancer. Denies uterine cancer or hyperplasia. Denies ovarian cancer. Denies vulvar cancer or pre-cancer. Denies vaginal cancer or pre-cancer. Denies breast cancer. Denies colon cancer.  Sexual Activity History: Reports currently being sexually active  Menstrual History: Monthly, mild-moderate.  Contraception: NEAL    ROS:  GENERAL: No weight changes. No swelling. No fatigue. No fever.  CARDIOVASCULAR: No chest pain. No shortness of breath. No leg cramps.   NEUROLOGICAL: No headaches. No vision changes.  BREASTS: No pain. No lumps. No discharge.  ABDOMEN: No pain. No nausea. No vomiting. No diarrhea. No constipation.  REPRODUCTIVE: No abnormal bleeding.   VULVA: No pain. No lesions. No itching.  VAGINA: No relaxation. No itching. No odor. No discharge. No lesions.  URINARY: No incontinence. No nocturia. No frequency. No dysuria.    /72   Wt 66.3 kg (146 lb 0.9 oz)   BMI 20.37 kg/m²     PE:  APPEARANCE: Well nourished, well developed, in no  acute distress.  AFFECT: WNL, alert and oriented x 3.  SKIN: No acne or hirsutism.  NECK: Neck symmetric, without masses or thyromegaly.  NODES: No inguinal, cervical, axillary or femoral lymph node enlargement.  CHEST: Good respiratory effort.   ABDOMEN: Soft. No tenderness or masses. No hepatosplenomegaly. No hernias.  BREASTS: Symmetrical, no skin changes, visible lesions, palpable masses or nipple discharge bilaterally.  PELVIC: External female genitalia without lesions.  Female hair distribution. Adequate perineal body, Normal urethral meatus. Vagina moist and well rugated without lesions or discharge.  No significant cystocele or rectocele present. Cervix pink without lesions, discharge or tenderness. Uterus is normal size, regular, mobile and nontender. Adnexa without masses or tenderness.  EXTREMITIES: No edema    PROCEDURES:      DIAGNOSIS:  1. Visit for gynecologic examination     2. Encounter for surveillance of contraceptive pills     3. Screening mammogram for high-risk patient  Mammo Digital Screening Bilat w/ Josue       LABS AND TESTS ORDERED:    MEDICATIONS PRESCRIBED:    COUNSELING:   The patient was counseled today on ACS PAP guidelines, with recommendations for yearly pelvic exams unless their uterus, cervix, and ovaries were removed for benign reasons; in that case, examinations every 3-5 years are recommended.  The patient was also counseled regarding monthly breast self-examination, routine STD screening for at-risk populations, prophylactic immunizations for transmitted infections such as  HPV, Pertussis, or Influenza as appropriate, and yearly mammograms when indicated by ACS guidelines.  She was advised to see her primary care physician for all other health maintenance.    FOLLOW-UP with me annually.

## 2022-03-11 ENCOUNTER — OFFICE VISIT (OUTPATIENT)
Dept: DERMATOLOGY | Facility: CLINIC | Age: 42
End: 2022-03-11
Payer: COMMERCIAL

## 2022-03-11 DIAGNOSIS — L91.0 HYPERTROPHIC SCAR OF SKIN: Primary | ICD-10-CM

## 2022-03-11 PROCEDURE — 99499 UNLISTED E&M SERVICE: CPT | Mod: S$GLB,,, | Performed by: DERMATOLOGY

## 2022-03-11 PROCEDURE — 11900 INJECT SKIN LESIONS </W 7: CPT | Mod: S$GLB,,, | Performed by: DERMATOLOGY

## 2022-03-11 PROCEDURE — 99999 PR PBB SHADOW E&M-EST. PATIENT-LVL II: CPT | Mod: PBBFAC,,, | Performed by: DERMATOLOGY

## 2022-03-11 PROCEDURE — 99999 PR PBB SHADOW E&M-EST. PATIENT-LVL II: ICD-10-PCS | Mod: PBBFAC,,, | Performed by: DERMATOLOGY

## 2022-03-11 PROCEDURE — 11900 PR INJECTION INTO SKIN LESIONS, UP TO 7: ICD-10-PCS | Mod: S$GLB,,, | Performed by: DERMATOLOGY

## 2022-03-11 PROCEDURE — 99499 NO LOS: ICD-10-PCS | Mod: S$GLB,,, | Performed by: DERMATOLOGY

## 2022-03-11 NOTE — PROGRESS NOTES
Subjective:       Patient ID:  Melly Hwang is a 41 y.o. female who presents for   Chief Complaint   Patient presents with    Scar     Flatten scar     HPI     Established patient.  Here for treatment of thickened scars at R shoulder, chest/abdomen. Previously evaluated and discussed ILK.   No further complaints today.       Review of Systems     Objective:    Physical Exam   Constitutional: She appears well-developed and well-nourished. No distress.   Neurological: She is alert and oriented to person, place, and time. She is not disoriented.   Psychiatric: She has a normal mood and affect.   Skin:   Areas Examined (abnormalities noted in diagram):   Abdomen Inspection Performed  RUE Inspected              Diagram Legend     Erythematous scaling macule/papule c/w actinic keratosis       Vascular papule c/w angioma      Pigmented verrucoid papule/plaque c/w seborrheic keratosis      Yellow umbilicated papule c/w sebaceous hyperplasia      Irregularly shaped tan macule c/w lentigo     1-2 mm smooth white papules consistent with Milia      Movable subcutaneous cyst with punctum c/w epidermal inclusion cyst      Subcutaneous movable cyst c/w pilar cyst      Firm pink to brown papule c/w dermatofibroma      Pedunculated fleshy papule(s) c/w skin tag(s)      Evenly pigmented macule c/w junctional nevus     Mildly variegated pigmented, slightly irregular-bordered macule c/w mildly atypical nevus      Flesh colored to evenly pigmented papule c/w intradermal nevus       Pink pearly papule/plaque c/w basal cell carcinoma      Erythematous hyperkeratotic cursted plaque c/w SCC      Surgical scar with no sign of skin cancer recurrence      Open and closed comedones      Inflammatory papules and pustules      Verrucoid papule consistent consistent with wart     Erythematous eczematous patches and plaques     Dystrophic onycholytic nail with subungual debris c/w onychomycosis     Umbilicated papule     Erythematous-base heme-crusted tan verrucoid plaque consistent with inflamed seborrheic keratosis     Erythematous Silvery Scaling Plaque c/w Psoriasis     See annotation      Assessment / Plan:         Hypertrophic scar of skin  -     triamcinolone acetonide injection 10 mg      - Discussed diagnosis, etiology, and treatment options.  - Intralesional Kenalog Injection Procedure Note: Discussed procedure with patient/patient's guardian including risks and benefits as well as treatment alternatives. Risks of procedure include pain, bleeding, surrounding hypopigmentation, atrophy, infection, partial response, lack of response, recurrence. Verbal consent obtained. Area to be treated cleansed with alcohol. A total of 0.1 cc of Kenalog 5 mg/ml used to treat 1 lesion at R shoulder; a total of 0.4 cc of Kenalog 10 mg/ml used to treat 2 lesions at chest / abdomen - 2 units total. Hemostasis achieved with pressure. Patient tolerated procedure well. After-visit wound care instructions. F/u 1 month PRN. [NDC for Kenalog 10 mg/cc: 2564-9742-74]               Follow up in about 1 month (around 4/11/2022).

## 2022-03-21 ENCOUNTER — OFFICE VISIT (OUTPATIENT)
Dept: INTERNAL MEDICINE | Facility: CLINIC | Age: 42
End: 2022-03-21
Payer: COMMERCIAL

## 2022-03-21 ENCOUNTER — HOSPITAL ENCOUNTER (OUTPATIENT)
Dept: RADIOLOGY | Facility: HOSPITAL | Age: 42
Discharge: HOME | End: 2022-03-21
Attending: INTERNAL MEDICINE
Payer: COMMERCIAL

## 2022-03-21 VITALS
DIASTOLIC BLOOD PRESSURE: 75 MMHG | HEIGHT: 71 IN | BODY MASS INDEX: 20.3 KG/M2 | SYSTOLIC BLOOD PRESSURE: 120 MMHG | WEIGHT: 145 LBS

## 2022-03-21 DIAGNOSIS — Z00.00 ANNUAL PHYSICAL EXAM: Primary | ICD-10-CM

## 2022-03-21 DIAGNOSIS — C22.0 HEPATOCELLULAR CARCINOMA: ICD-10-CM

## 2022-03-21 DIAGNOSIS — Z98.890 S/P PERICARDIAL WINDOW CREATION: ICD-10-CM

## 2022-03-21 DIAGNOSIS — D18.03 LIVER HEMANGIOMA: ICD-10-CM

## 2022-03-21 DIAGNOSIS — M47.22 OSTEOARTHRITIS OF SPINE WITH RADICULOPATHY, CERVICAL REGION: ICD-10-CM

## 2022-03-21 DIAGNOSIS — I51.7 CARDIAC ENLARGEMENT: ICD-10-CM

## 2022-03-21 DIAGNOSIS — D72.818 OTHER DECREASED WHITE BLOOD CELL (WBC) COUNT: ICD-10-CM

## 2022-03-21 DIAGNOSIS — I31.39 PERICARDIAL EFFUSION: ICD-10-CM

## 2022-03-21 DIAGNOSIS — R76.8 ANA POSITIVE: ICD-10-CM

## 2022-03-21 DIAGNOSIS — E28.39 PREMATURE OVARIAN FAILURE: ICD-10-CM

## 2022-03-21 PROBLEM — K62.5 RECTAL BLEED: Status: RESOLVED | Noted: 2020-09-21 | Resolved: 2022-03-21

## 2022-03-21 PROBLEM — K76.9 LIVER LESION: Status: RESOLVED | Noted: 2021-05-07 | Resolved: 2022-03-21

## 2022-03-21 PROBLEM — R00.2 PALPITATIONS: Status: RESOLVED | Noted: 2021-09-15 | Resolved: 2022-03-21

## 2022-03-21 PROBLEM — R10.9 ABDOMINAL PAIN: Status: RESOLVED | Noted: 2021-08-12 | Resolved: 2022-03-21

## 2022-03-21 PROCEDURE — 3074F SYST BP LT 130 MM HG: CPT | Mod: CPTII,S$GLB,, | Performed by: INTERNAL MEDICINE

## 2022-03-21 PROCEDURE — 99396 PREV VISIT EST AGE 40-64: CPT | Mod: S$GLB,,, | Performed by: INTERNAL MEDICINE

## 2022-03-21 PROCEDURE — 99999 PR PBB SHADOW E&M-EST. PATIENT-LVL IV: ICD-10-PCS | Mod: PBBFAC,,, | Performed by: INTERNAL MEDICINE

## 2022-03-21 PROCEDURE — 3078F PR MOST RECENT DIASTOLIC BLOOD PRESSURE < 80 MM HG: ICD-10-PCS | Mod: CPTII,S$GLB,, | Performed by: INTERNAL MEDICINE

## 2022-03-21 PROCEDURE — 3074F PR MOST RECENT SYSTOLIC BLOOD PRESSURE < 130 MM HG: ICD-10-PCS | Mod: CPTII,S$GLB,, | Performed by: INTERNAL MEDICINE

## 2022-03-21 PROCEDURE — 72040 X-RAY EXAM NECK SPINE 2-3 VW: CPT | Mod: 26,,, | Performed by: RADIOLOGY

## 2022-03-21 PROCEDURE — 3078F DIAST BP <80 MM HG: CPT | Mod: CPTII,S$GLB,, | Performed by: INTERNAL MEDICINE

## 2022-03-21 PROCEDURE — 3008F PR BODY MASS INDEX (BMI) DOCUMENTED: ICD-10-PCS | Mod: CPTII,S$GLB,, | Performed by: INTERNAL MEDICINE

## 2022-03-21 PROCEDURE — 72040 X-RAY EXAM NECK SPINE 2-3 VW: CPT | Mod: TC

## 2022-03-21 PROCEDURE — 99396 PR PREVENTIVE VISIT,EST,40-64: ICD-10-PCS | Mod: S$GLB,,, | Performed by: INTERNAL MEDICINE

## 2022-03-21 PROCEDURE — 72040 XR CERVICAL SPINE AP LATERAL: ICD-10-PCS | Mod: 26,,, | Performed by: RADIOLOGY

## 2022-03-21 PROCEDURE — 99999 PR PBB SHADOW E&M-EST. PATIENT-LVL IV: CPT | Mod: PBBFAC,,, | Performed by: INTERNAL MEDICINE

## 2022-03-21 PROCEDURE — 3008F BODY MASS INDEX DOCD: CPT | Mod: CPTII,S$GLB,, | Performed by: INTERNAL MEDICINE

## 2022-03-21 NOTE — PROGRESS NOTES
"Chief Complaint  Chief Complaint   Patient presents with    Annual Exam     HPI  Melly Hwang is a 41 y.o. female with multiple medical diagnoses as listed in the medical history and problem list that presents for her annual physical exam.  Their last appointment with primary care was 3/3/2021. She had a liver resection for HCC in June 2021. Since then she has been feeling well and is back to baseline. Her primary complaint today is shoulder and arm heaviness/tightness. She states that she has been experiencing this for many years. She was referred to Sports Medicine and they thought it was cervical radiculopathy. MRI of a cervical spine in 2010 showed "DEGENERATIVE DISC DESICCATION INVOLVING ALL CERVICAL LEVELS WITH MILD LOSS OF INTERVERTEBRAL DISC SPACE HEIGHT C4-5." She denies weakness, numbness, burning, dropping things, and tingling. She states that she is unsure about positioning that alleviates or worsens the symptoms and she does not notice it while sleeping. She sleeps on her back or her side and it is not worse when she sleeps on that side. She is due for a pneumococcal vaccine today.        PAST MEDICAL HISTORY:  Past Medical History:   Diagnosis Date    Abdominal pain 8/12/2021    VENKATA positive 8/20/2020    COVID-19     Deviated septum 2011    Hepatocellular carcinoma 6/9/2021    Hypertrophy of inferior nasal turbinate     Liver mass     Nasal congestion 2011    Pericardial effusion     Premature menopause        PAST SURGICAL HISTORY:  Past Surgical History:   Procedure Laterality Date    COLONOSCOPY N/A 09/21/2020    Procedure: COLONOSCOPY;  Surgeon: GIOVANNI Crane MD;  Location: [facility] (4TH FLR);  Service: Endoscopy;  Laterality: N/A;  + covid 4/22    HERNIA REPAIR      LIVER SURGERY N/A 06/28/2021    NASAL SEPTUM SURGERY      PERICARDIAL WINDOW N/A 02/22/2021    Procedure: CREATION, PERICARDIAL WINDOW;  Surgeon: Ajay Cantor MD;  [facility] OR [facility] FLR;  " Service: Cardiovascular;  Laterality: N/A;    PERICARDIOCENTESIS N/A 05/02/2020    Procedure: Pericardiocentesis;  Surgeon: Dickson Dangelo MD;  Location: [facility] CATH LAB;  Service: Cardiology;  Laterality: N/A;    TONSILLECTOMY         SOCIAL HISTORY:  Social History     Socioeconomic History    Marital status: Single    Number of children: 1   Occupational History    Occupation:      Employer: [employer]   Tobacco Use    Smoking status: Never Smoker    Smokeless tobacco: Never Used   Substance and Sexual Activity    Alcohol use: Yes     Comment: rare    Drug use: No    Sexual activity: Yes     Partners: Male     Birth control/protection: OCP       FAMILY HISTORY:  Family History   Problem Relation Age of Onset    Diabetes Mother     Liver disease Mother         Fatty liver    Heart disease Father     Macular degeneration Father     Diabetes Father     Hypertension Father     Hyperlipidemia Father     No Known Problems Sister     No Known Problems Brother     No Known Problems Brother     No Known Problems Daughter     No Known Problems Maternal Aunt     No Known Problems Maternal Uncle     No Known Problems Paternal Aunt     No Known Problems Paternal Uncle     No Known Problems Maternal Grandmother     No Known Problems Maternal Grandfather     No Known Problems Paternal Grandmother     No Known Problems Paternal Grandfather     Amblyopia Neg Hx     Blindness Neg Hx     Cancer Neg Hx     Cataracts Neg Hx     Glaucoma Neg Hx     Retinal detachment Neg Hx     Strabismus Neg Hx     Stroke Neg Hx     Thyroid disease Neg Hx     Melanoma Neg Hx     Heart attack Neg Hx        ALLERGIES AND MEDICATIONS: updated and reviewed.  Review of patient's allergies indicates:   Allergen Reactions    No known drug allergies      Current Outpatient Medications   Medication Sig Dispense Refill    cetirizine (ZYRTEC) 10 MG tablet Take 1 tablet (10 mg total) by mouth  once daily. 90 tablet 1    cyclobenzaprine (FLEXERIL) 10 MG tablet TAKE 1 TABLET BY MOUTH EVERY DAY IN THE EVENING AS NEEDED FOR MUSCLE SPASMS 30 tablet 3    ibuprofen (ADVIL,MOTRIN) 600 MG tablet Take 1 tablet (600 mg total) by mouth 3 (three) times daily as needed for Pain. 30 tablet 2    ketoconazole (NIZORAL) 2 % shampoo Wash scalp and ears with medicated shampoo at least 1x/week - let sit at least 5 minutes prior to rinsing. 120 mL 5    multivitamin capsule Take by mouth. 1 capsule Oral Every morning      norethindrone-ethinyl estradiol (MICROGESTIN 1/20) 1-20 mg-mcg per tablet Take 1 tablet by mouth once daily. 63 tablet 4    omeprazole (PRILOSEC) 20 MG capsule TAKE 1 CAPSULE BY MOUTH DAILY AS NEEDED FOR GERD 90 capsule 1    PREVIDENT 5000 ORTHO DEFENSE 1.1 % Pste USE IN PLACE OF REGULAR TOOTH PASTE      triamcinolone acetonide 0.1% (KENALOG) 0.1 % cream Apply topically 2 (two) times daily as needed (scaling at external ears). Avoid use on face, body folds, groin/genitalia. 45 g 3    ALPRAZolam (XANAX) 0.25 MG tablet Take 1 tablet prior to MRI procedure as needed for pre-procedural anxiety. (Patient not taking: Reported on 3/21/2022) 6 tablet 0     Current Facility-Administered Medications   Medication Dose Route Frequency Provider Last Rate Last Admin    triamcinolone acetonide injection 10 mg  10 mg Intradermal 1 time in Clinic/HOD Yolande Heredia MD             ROS  Review of Systems   Constitutional: Negative for activity change and unexpected weight change.   HENT: Negative for hearing loss, rhinorrhea and trouble swallowing.    Eyes: Negative for discharge and visual disturbance.   Respiratory: Negative for chest tightness and wheezing.    Cardiovascular: Negative for chest pain and palpitations.   Gastrointestinal: Negative for blood in stool, constipation, diarrhea and vomiting.   Endocrine: Negative for polydipsia and polyuria.   Genitourinary: Negative for difficulty urinating,  "dysuria, hematuria and menstrual problem.   Musculoskeletal: Positive for arthralgias and neck pain. Negative for joint swelling.   Neurological: Negative for weakness and headaches.   Psychiatric/Behavioral: Negative for confusion and dysphoric mood.           Physical Exam  Vitals:    03/21/22 0940   BP: 120/75   BP Location: Right arm   Patient Position: Sitting   BP Method: Medium (Manual)   Weight: 65.8 kg (145 lb)   Height: 5' 11" (1.803 m)    Body mass index is 20.22 kg/m².  Weight: 65.8 kg (145 lb)   Height: 5' 11" (180.3 cm)   Physical Exam  Constitutional:       Appearance: Normal appearance. She is normal weight.   HENT:      Nose: Nose normal.      Mouth/Throat:      Mouth: Mucous membranes are moist.      Pharynx: Oropharynx is clear.   Eyes:      Pupils: Pupils are equal, round, and reactive to light.   Cardiovascular:      Rate and Rhythm: Normal rate and regular rhythm.   Pulmonary:      Effort: Pulmonary effort is normal.      Breath sounds: Normal breath sounds.   Abdominal:      General: Abdomen is flat. Bowel sounds are normal.      Palpations: Abdomen is soft.   Musculoskeletal:         General: No swelling or tenderness.      Cervical back: Normal range of motion.   Skin:     General: Skin is warm and dry.   Neurological:      General: No focal deficit present.      Mental Status: She is alert and oriented to person, place, and time. Mental status is at baseline.      Cranial Nerves: No cranial nerve deficit.      Sensory: No sensory deficit.      Deep Tendon Reflexes: Reflexes normal.       Health Maintenance       Date Due Completion Date    Pneumococcal Vaccines (Age 0-64) (2 of 4 - PCV13) 05/20/2021 5/20/2020    Mammogram 05/14/2022 5/14/2021    Cervical Cancer Screening 03/02/2024 3/2/2021    Override on 4/13/2016: Done    TETANUS VACCINE 10/24/2024 10/24/2014            Assessment and Plan:    Annual physical exam  -     Comprehensive Metabolic Panel; Future; Expected date: 03/21/2022  -  "    CBC Auto Differential; Future; Expected date: 03/21/2022  -     Hemoglobin A1C; Future; Expected date: 03/21/2022  -     TSH; Future; Expected date: 03/21/2022    Hepatocellular carcinoma:  Keep hepatology follow-up    Pericardial effusion:  Appears to be resolved    Cardiac enlargement: Echo 2014 normal cardiac chamber size with EF 55%; keep Cardiology follow-up    Liver hemangioma; keep hepatology follow-up    Other decreased white blood cell (WBC) count    VENKATA positive:  Keep rheumatology follow-up    Premature ovarian failure    S/P pericardial window creation    Osteoarthritis of spine with radiculopathy, cervical region  -     X-Ray Cervical Spine AP And Lateral; Future; Expected date: 03/21/2022  -     Ambulatory referral/consult to Physical/Occupational Therapy; Future; Expected date: 03/28/2022    Tamara Mancera, MS4    Patient seen and examined along with medical student, agree with assessment and plan as above.  Alarm symptoms reviewed.  No hyper reflexia, bladder or bowel symptoms.  Will start with plain x-ray of the neck and then proceed pending these results.  Sleep hygiene, gentle exercise and range of motion recommendations reviewed  I will review all studies and determine further tx depending on findings

## 2022-03-22 ENCOUNTER — PATIENT MESSAGE (OUTPATIENT)
Dept: INTERNAL MEDICINE | Facility: CLINIC | Age: 42
End: 2022-03-22
Payer: COMMERCIAL

## 2022-03-22 ENCOUNTER — LAB VISIT (OUTPATIENT)
Dept: LAB | Facility: HOSPITAL | Age: 42
End: 2022-03-22
Attending: INTERNAL MEDICINE
Payer: COMMERCIAL

## 2022-03-22 DIAGNOSIS — Z00.00 ANNUAL PHYSICAL EXAM: ICD-10-CM

## 2022-03-22 LAB
ALBUMIN SERPL BCP-MCNC: 3.5 G/DL (ref 3.5–5.2)
ALP SERPL-CCNC: 36 U/L (ref 55–135)
ALT SERPL W/O P-5'-P-CCNC: 20 U/L (ref 10–44)
ANION GAP SERPL CALC-SCNC: 6 MMOL/L (ref 8–16)
AST SERPL-CCNC: 14 U/L (ref 10–40)
BASOPHILS # BLD AUTO: 0.04 K/UL (ref 0–0.2)
BASOPHILS NFR BLD: 0.6 % (ref 0–1.9)
BILIRUB SERPL-MCNC: 1 MG/DL (ref 0.1–1)
BUN SERPL-MCNC: 9 MG/DL (ref 6–20)
CALCIUM SERPL-MCNC: 8.7 MG/DL (ref 8.7–10.5)
CHLORIDE SERPL-SCNC: 105 MMOL/L (ref 95–110)
CO2 SERPL-SCNC: 26 MMOL/L (ref 23–29)
CREAT SERPL-MCNC: 0.9 MG/DL (ref 0.5–1.4)
DIFFERENTIAL METHOD: ABNORMAL
EOSINOPHIL # BLD AUTO: 0.2 K/UL (ref 0–0.5)
EOSINOPHIL NFR BLD: 2.8 % (ref 0–8)
ERYTHROCYTE [DISTWIDTH] IN BLOOD BY AUTOMATED COUNT: 11.8 % (ref 11.5–14.5)
EST. GFR  (AFRICAN AMERICAN): >60 ML/MIN/1.73 M^2
EST. GFR  (NON AFRICAN AMERICAN): >60 ML/MIN/1.73 M^2
ESTIMATED AVG GLUCOSE: 103 MG/DL (ref 68–131)
GLUCOSE SERPL-MCNC: 89 MG/DL (ref 70–110)
HBA1C MFR BLD: 5.2 % (ref 4–5.6)
HCT VFR BLD AUTO: 42.6 % (ref 37–48.5)
HGB BLD-MCNC: 13.8 G/DL (ref 12–16)
IMM GRANULOCYTES # BLD AUTO: 0.02 K/UL (ref 0–0.04)
IMM GRANULOCYTES NFR BLD AUTO: 0.3 % (ref 0–0.5)
LYMPHOCYTES # BLD AUTO: 1 K/UL (ref 1–4.8)
LYMPHOCYTES NFR BLD: 15.8 % (ref 18–48)
MCH RBC QN AUTO: 30.8 PG (ref 27–31)
MCHC RBC AUTO-ENTMCNC: 32.4 G/DL (ref 32–36)
MCV RBC AUTO: 95 FL (ref 82–98)
MONOCYTES # BLD AUTO: 0.5 K/UL (ref 0.3–1)
MONOCYTES NFR BLD: 7.1 % (ref 4–15)
NEUTROPHILS # BLD AUTO: 4.7 K/UL (ref 1.8–7.7)
NEUTROPHILS NFR BLD: 73.4 % (ref 38–73)
NRBC BLD-RTO: 0 /100 WBC
PLATELET # BLD AUTO: 239 K/UL (ref 150–450)
PMV BLD AUTO: 9.6 FL (ref 9.2–12.9)
POTASSIUM SERPL-SCNC: 4.4 MMOL/L (ref 3.5–5.1)
PROT SERPL-MCNC: 6.9 G/DL (ref 6–8.4)
RBC # BLD AUTO: 4.48 M/UL (ref 4–5.4)
SODIUM SERPL-SCNC: 137 MMOL/L (ref 136–145)
TSH SERPL DL<=0.005 MIU/L-ACNC: 1.33 UIU/ML (ref 0.4–4)
WBC # BLD AUTO: 6.45 K/UL (ref 3.9–12.7)

## 2022-03-22 PROCEDURE — 83036 HEMOGLOBIN GLYCOSYLATED A1C: CPT | Performed by: INTERNAL MEDICINE

## 2022-03-22 PROCEDURE — 85025 COMPLETE CBC W/AUTO DIFF WBC: CPT | Performed by: INTERNAL MEDICINE

## 2022-03-22 PROCEDURE — 36415 COLL VENOUS BLD VENIPUNCTURE: CPT | Performed by: INTERNAL MEDICINE

## 2022-03-22 PROCEDURE — 80053 COMPREHEN METABOLIC PANEL: CPT | Performed by: INTERNAL MEDICINE

## 2022-03-22 PROCEDURE — 84443 ASSAY THYROID STIM HORMONE: CPT | Performed by: INTERNAL MEDICINE

## 2022-04-01 ENCOUNTER — PATIENT MESSAGE (OUTPATIENT)
Dept: DERMATOLOGY | Facility: CLINIC | Age: 42
End: 2022-04-01
Payer: COMMERCIAL

## 2022-04-11 RX ORDER — CYCLOBENZAPRINE HCL 10 MG
TABLET ORAL
Qty: 30 TABLET | Refills: 3 | Status: SHIPPED | OUTPATIENT
Start: 2022-04-11 | End: 2023-07-02 | Stop reason: SDUPTHER

## 2022-04-14 ENCOUNTER — OFFICE VISIT (OUTPATIENT)
Dept: DERMATOLOGY | Facility: CLINIC | Age: 42
End: 2022-04-14
Payer: COMMERCIAL

## 2022-04-14 DIAGNOSIS — L91.0 HYPERTROPHIC SCAR OF SKIN: ICD-10-CM

## 2022-04-14 DIAGNOSIS — L81.0 POST-INFLAMMATORY HYPERPIGMENTATION: Primary | ICD-10-CM

## 2022-04-14 PROCEDURE — 3044F HG A1C LEVEL LT 7.0%: CPT | Mod: CPTII,S$GLB,, | Performed by: DERMATOLOGY

## 2022-04-14 PROCEDURE — 99999 PR PBB SHADOW E&M-EST. PATIENT-LVL III: CPT | Mod: PBBFAC,,, | Performed by: DERMATOLOGY

## 2022-04-14 PROCEDURE — 99999 PR PBB SHADOW E&M-EST. PATIENT-LVL III: ICD-10-PCS | Mod: PBBFAC,,, | Performed by: DERMATOLOGY

## 2022-04-14 PROCEDURE — 11900 INJECT SKIN LESIONS </W 7: CPT | Mod: S$GLB,,, | Performed by: DERMATOLOGY

## 2022-04-14 PROCEDURE — 1159F MED LIST DOCD IN RCRD: CPT | Mod: CPTII,S$GLB,, | Performed by: DERMATOLOGY

## 2022-04-14 PROCEDURE — 99213 OFFICE O/P EST LOW 20 MIN: CPT | Mod: 25,S$GLB,, | Performed by: DERMATOLOGY

## 2022-04-14 PROCEDURE — 99213 PR OFFICE/OUTPT VISIT, EST, LEVL III, 20-29 MIN: ICD-10-PCS | Mod: 25,S$GLB,, | Performed by: DERMATOLOGY

## 2022-04-14 PROCEDURE — 1159F PR MEDICATION LIST DOCUMENTED IN MEDICAL RECORD: ICD-10-PCS | Mod: CPTII,S$GLB,, | Performed by: DERMATOLOGY

## 2022-04-14 PROCEDURE — 11900 PR INJECTION INTO SKIN LESIONS, UP TO 7: ICD-10-PCS | Mod: S$GLB,,, | Performed by: DERMATOLOGY

## 2022-04-14 PROCEDURE — 3044F PR MOST RECENT HEMOGLOBIN A1C LEVEL <7.0%: ICD-10-PCS | Mod: CPTII,S$GLB,, | Performed by: DERMATOLOGY

## 2022-04-14 RX ORDER — HYDROQUINONE 40 MG/G
CREAM TOPICAL
Qty: 28.35 G | Refills: 0 | Status: SHIPPED | OUTPATIENT
Start: 2022-04-14 | End: 2023-03-03

## 2022-04-14 NOTE — PROGRESS NOTES
Subjective:       Patient ID:  Melly Hwang is a 41 y.o. female who presents for   Chief Complaint   Patient presents with    dark area     Under arm, bikini area    Scar     F/u     Scar         Established patient.  Here for treatment of thickened scars at chest/abdomen. S/p ILK x 1 last month to shoulder (content with current appearance now) and also to chest/abdomen. Requesting repeat treatment at chest/abdomen.   Also underwent a bikini wax a few weeks ago with some skin stripping and subsequent hyperpigmentation.   No further complaints today.       Review of Systems   Constitutional: Negative for malaise.        Objective:    Physical Exam   Constitutional: She appears well-developed and well-nourished. No distress.   Neurological: She is alert and oriented to person, place, and time. She is not disoriented.   Psychiatric: She has a normal mood and affect.   Skin:   Areas Examined (abnormalities noted in diagram):   Abdomen Inspection Performed  Genitals / Buttocks / Groin Inspection Performed              Diagram Legend     Erythematous scaling macule/papule c/w actinic keratosis       Vascular papule c/w angioma      Pigmented verrucoid papule/plaque c/w seborrheic keratosis      Yellow umbilicated papule c/w sebaceous hyperplasia      Irregularly shaped tan macule c/w lentigo     1-2 mm smooth white papules consistent with Milia      Movable subcutaneous cyst with punctum c/w epidermal inclusion cyst      Subcutaneous movable cyst c/w pilar cyst      Firm pink to brown papule c/w dermatofibroma      Pedunculated fleshy papule(s) c/w skin tag(s)      Evenly pigmented macule c/w junctional nevus     Mildly variegated pigmented, slightly irregular-bordered macule c/w mildly atypical nevus      Flesh colored to evenly pigmented papule c/w intradermal nevus       Pink pearly papule/plaque c/w basal cell carcinoma      Erythematous hyperkeratotic cursted plaque c/w SCC      Surgical scar with no  sign of skin cancer recurrence      Open and closed comedones      Inflammatory papules and pustules      Verrucoid papule consistent consistent with wart     Erythematous eczematous patches and plaques     Dystrophic onycholytic nail with subungual debris c/w onychomycosis     Umbilicated papule    Erythematous-base heme-crusted tan verrucoid plaque consistent with inflamed seborrheic keratosis     Erythematous Silvery Scaling Plaque c/w Psoriasis     See annotation      Assessment / Plan:         Hypertrophic scar of skin  - Discussed diagnosis, etiology, and treatment options.  - Intralesional Kenalog Injection Procedure Note: Discussed procedure with patient/patient's guardian including risks and benefits as well as treatment alternatives. Risks of procedure include pain, bleeding, surrounding hypopigmentation, atrophy, infection, partial response, lack of response, recurrence. Verbal consent obtained. Area to be treated cleansed with alcohol. A total of 0.2 cc of Kenalog 10 mg/ml used to treat 2 lesions at chest / abdomen - 1 units total. Hemostasis achieved with pressure. Patient tolerated procedure well. After-visit wound care instructions. F/u 1 month PRN. [NDC for Kenalog 10 mg/cc: 9488-4196-66]      Post-inflammatory hyperpigmentation  -     hydroquinone 4 % Crea; Apply to affected areas nightly for a maximum of 3 month consecutively.  Dispense: 28.35 g; Refill: 0  - Discussed diagnosis, etiology, and treatment options.   - HQ as above for no longer than 3 months consecutively.   - Counseled on potential SE of medication(s) and instructed on use.  - Strict sun protection.            Follow up in about 1 month (around 5/14/2022) for repeat ilk.

## 2022-04-16 ENCOUNTER — PATIENT MESSAGE (OUTPATIENT)
Dept: HEPATOLOGY | Facility: CLINIC | Age: 42
End: 2022-04-16
Payer: COMMERCIAL

## 2022-04-17 ENCOUNTER — PATIENT MESSAGE (OUTPATIENT)
Dept: INTERNAL MEDICINE | Facility: CLINIC | Age: 42
End: 2022-04-17
Payer: COMMERCIAL

## 2022-04-17 DIAGNOSIS — R10.9 FLANK PAIN: Primary | ICD-10-CM

## 2022-04-18 ENCOUNTER — LAB VISIT (OUTPATIENT)
Dept: LAB | Facility: HOSPITAL | Age: 42
End: 2022-04-18
Attending: INTERNAL MEDICINE
Payer: COMMERCIAL

## 2022-04-18 DIAGNOSIS — R10.9 FLANK PAIN: ICD-10-CM

## 2022-04-18 LAB
ALBUMIN SERPL BCP-MCNC: 3.6 G/DL (ref 3.5–5.2)
ANION GAP SERPL CALC-SCNC: 5 MMOL/L (ref 8–16)
BASOPHILS # BLD AUTO: 0.05 K/UL (ref 0–0.2)
BASOPHILS NFR BLD: 0.9 % (ref 0–1.9)
BUN SERPL-MCNC: 7 MG/DL (ref 6–20)
CALCIUM SERPL-MCNC: 9.2 MG/DL (ref 8.7–10.5)
CHLORIDE SERPL-SCNC: 107 MMOL/L (ref 95–110)
CO2 SERPL-SCNC: 27 MMOL/L (ref 23–29)
CREAT SERPL-MCNC: 0.9 MG/DL (ref 0.5–1.4)
DIFFERENTIAL METHOD: ABNORMAL
EOSINOPHIL # BLD AUTO: 0.1 K/UL (ref 0–0.5)
EOSINOPHIL NFR BLD: 2.5 % (ref 0–8)
ERYTHROCYTE [DISTWIDTH] IN BLOOD BY AUTOMATED COUNT: 11.8 % (ref 11.5–14.5)
EST. GFR  (AFRICAN AMERICAN): >60 ML/MIN/1.73 M^2
EST. GFR  (NON AFRICAN AMERICAN): >60 ML/MIN/1.73 M^2
GLUCOSE SERPL-MCNC: 96 MG/DL (ref 70–110)
HCT VFR BLD AUTO: 44.2 % (ref 37–48.5)
HGB BLD-MCNC: 14.5 G/DL (ref 12–16)
IMM GRANULOCYTES # BLD AUTO: 0.02 K/UL (ref 0–0.04)
IMM GRANULOCYTES NFR BLD AUTO: 0.4 % (ref 0–0.5)
LYMPHOCYTES # BLD AUTO: 1.9 K/UL (ref 1–4.8)
LYMPHOCYTES NFR BLD: 34.4 % (ref 18–48)
MCH RBC QN AUTO: 31.3 PG (ref 27–31)
MCHC RBC AUTO-ENTMCNC: 32.8 G/DL (ref 32–36)
MCV RBC AUTO: 95 FL (ref 82–98)
MONOCYTES # BLD AUTO: 0.4 K/UL (ref 0.3–1)
MONOCYTES NFR BLD: 7.3 % (ref 4–15)
NEUTROPHILS # BLD AUTO: 3.1 K/UL (ref 1.8–7.7)
NEUTROPHILS NFR BLD: 54.5 % (ref 38–73)
NRBC BLD-RTO: 0 /100 WBC
PHOSPHATE SERPL-MCNC: 3 MG/DL (ref 2.7–4.5)
PLATELET # BLD AUTO: 254 K/UL (ref 150–450)
PMV BLD AUTO: 9.8 FL (ref 9.2–12.9)
POTASSIUM SERPL-SCNC: 4.1 MMOL/L (ref 3.5–5.1)
RBC # BLD AUTO: 4.64 M/UL (ref 4–5.4)
SODIUM SERPL-SCNC: 139 MMOL/L (ref 136–145)
WBC # BLD AUTO: 5.61 K/UL (ref 3.9–12.7)

## 2022-04-18 PROCEDURE — 85025 COMPLETE CBC W/AUTO DIFF WBC: CPT | Performed by: INTERNAL MEDICINE

## 2022-04-18 PROCEDURE — 36415 COLL VENOUS BLD VENIPUNCTURE: CPT | Performed by: INTERNAL MEDICINE

## 2022-04-18 PROCEDURE — 80069 RENAL FUNCTION PANEL: CPT | Performed by: INTERNAL MEDICINE

## 2022-04-18 RX ORDER — NITROFURANTOIN 25; 75 MG/1; MG/1
100 CAPSULE ORAL 2 TIMES DAILY
Qty: 10 CAPSULE | Refills: 0 | Status: SHIPPED | OUTPATIENT
Start: 2022-04-18 | End: 2022-09-19

## 2022-04-18 NOTE — TELEPHONE ENCOUNTER
Please let her know that I have ordered labs and a urine.  I will send antibiotics to the pharmacy but I would like for her to get the studies done as soon as possible, thank you

## 2022-04-19 ENCOUNTER — PATIENT MESSAGE (OUTPATIENT)
Dept: INTERNAL MEDICINE | Facility: CLINIC | Age: 42
End: 2022-04-19
Payer: COMMERCIAL

## 2022-04-20 ENCOUNTER — LAB VISIT (OUTPATIENT)
Dept: LAB | Facility: HOSPITAL | Age: 42
End: 2022-04-20
Attending: INTERNAL MEDICINE
Payer: COMMERCIAL

## 2022-04-20 ENCOUNTER — PATIENT MESSAGE (OUTPATIENT)
Dept: INTERNAL MEDICINE | Facility: CLINIC | Age: 42
End: 2022-04-20
Payer: COMMERCIAL

## 2022-04-20 DIAGNOSIS — R10.9 FLANK PAIN: ICD-10-CM

## 2022-04-20 LAB
BILIRUB UR QL STRIP: NEGATIVE
CLARITY UR: CLEAR
COLOR UR: COLORLESS
GLUCOSE UR QL STRIP: NEGATIVE
HGB UR QL STRIP: NEGATIVE
KETONES UR QL STRIP: NEGATIVE
LEUKOCYTE ESTERASE UR QL STRIP: NEGATIVE
NITRITE UR QL STRIP: NEGATIVE
PH UR STRIP: 6 [PH] (ref 5–8)
PROT UR QL STRIP: NEGATIVE
SP GR UR STRIP: <1.005 (ref 1–1.03)
URN SPEC COLLECT METH UR: ABNORMAL
UROBILINOGEN UR STRIP-ACNC: NEGATIVE EU/DL

## 2022-04-20 PROCEDURE — 81003 URINALYSIS AUTO W/O SCOPE: CPT | Performed by: INTERNAL MEDICINE

## 2022-04-27 ENCOUNTER — CLINICAL SUPPORT (OUTPATIENT)
Dept: REHABILITATION | Facility: HOSPITAL | Age: 42
End: 2022-04-27
Attending: INTERNAL MEDICINE
Payer: COMMERCIAL

## 2022-04-27 DIAGNOSIS — M53.82 DECREASED ROM OF INTERVERTEBRAL DISCS OF CERVICAL SPINE: ICD-10-CM

## 2022-04-27 DIAGNOSIS — M62.81 WEAKNESS OF TRUNK MUSCULATURE: ICD-10-CM

## 2022-04-27 DIAGNOSIS — M47.22 OSTEOARTHRITIS OF SPINE WITH RADICULOPATHY, CERVICAL REGION: ICD-10-CM

## 2022-04-27 PROCEDURE — 97110 THERAPEUTIC EXERCISES: CPT | Mod: PN

## 2022-04-27 PROCEDURE — 97161 PT EVAL LOW COMPLEX 20 MIN: CPT | Mod: PN

## 2022-04-27 NOTE — PLAN OF CARE
[facility]   Physical Therapy Initial Evaluation     Date: 4/27/2022   Name: Melly Hwang  Clinic Number: [id number]    Therapy Diagnosis:   Encounter Diagnoses   Name Primary?    Osteoarthritis of spine with radiculopathy, cervical region     Decreased ROM of intervertebral discs of cervical spine     Weakness of trunk musculature      Physician: Melly Sahu MD    Physician Orders: PT Eval and Treat   Medical Diagnosis from Referral: M47.22 (ICD-10-CM) - Osteoarthritis of spine with radiculopathy, cervical region  Evaluation Date: 4/27/2022  Authorization Period Expiration: 5/10/2022  Plan of Care Expiration: 6/27/2022  Progress Note Due: 5/27/2022  Visit # / Visits authorized: 1/ 1   FOTO: 1/3    Precautions: Standard     Time In: 8:30 A  Time Out: 9:15 A  Total Appointment Time (timed & untimed codes): 45 minutes      SUBJECTIVE     Date of onset: Chronic > 1 yr.    History of current condition - Melly reports: long standing L para-scapular tightness and intermittent pain radiating into L UE. Noted LUE pain is primarily with sitting activities. Denies UE pain today.She denies specific cervical trauma.    Falls: Denies any.    Imaging, bone scan films:    FINDINGS:  There is slight reversal of the normal cervical lordosis as seen on the lateral view.  The disc spaces are well preserved.  No fracture or dislocation.  No bone destruction identified.  Tiny cervical ribs identified bilaterally    Prior Therapy: None for neck  Social History: Pt drives without difficulty and has been involved in home exercise program but inconsistently.  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 Minimal lifting and moderate computer work involved.  Prior Level of Function: Independent  Current Level of Function: Independnet w pain as limiting factor.    Pain:  Current 3/10, worst 5/10, best 0/10   Location: left neck  and para-scapular    Description: Aching and  Tight  Aggravating Factors: Prolonged sitting  Easing Factors: meditation    Patients goals: Decreased neck and UE pain with sitting activities.     Medical History:   Past Medical History:   Diagnosis Date    Abdominal pain 8/12/2021    VENKATA positive 8/20/2020    COVID-19     Deviated septum 2011    Hepatocellular carcinoma 6/9/2021    Hypertrophy of inferior nasal turbinate     Liver mass     Nasal congestion 2011    Pericardial effusion     Premature menopause        Surgical History:   Melly Hwang  has a past surgical history that includes Tonsillectomy; Hernia repair; Nasal septum surgery; Pericardiocentesis (N/A, 05/02/2020); Colonoscopy (N/A, 09/21/2020); Pericardial window (N/A, 02/22/2021); and Liver surgery (N/A, 06/28/2021).    Medications:   Melly has a current medication list which includes the following prescription(s): alprazolam, cetirizine, cyclobenzaprine, hydroquinone, ibuprofen, ketoconazole, multivitamin, nitrofurantoin (macrocrystal-monohydrate), norethindrone-ethinyl estradiol, omeprazole, prevident 5000 ortho defense, and triamcinolone acetonide 0.1%, and the following Facility-Administered Medications: triamcinolone acetonide and triamcinolone acetonide.    Allergies:   Review of patient's allergies indicates:   Allergen Reactions    No known drug allergies           OBJECTIVE     Posture: Mild rounded shoulders with flattened cervical spine.    Pt demos L UT / Scalene tightness and tenderness to palpation.    Active cervical motion is decreased 25% for R SB / rotation w end range UT / Scalene tightness noted.    Sensation is intact for LT in BUE's.    BUE strength is WNL except B LT's are 4/5 especially in elongated position.    Special Test: (-) Cervical Compression / Cervical quadrant / Spurling's sign      Limitation/Restriction for FOTO Cervical Survey    Therapist reviewed FOTO scores for Melly Hwang on 4/27/2022.   FOTO documents entered into EPIC -  see Media section.    Limitation Score: 13%         TREATMENT     Total Treatment time (time-based codes) separate from Evaluation: 20 minutes      Melly received the treatments listed below:      therapeutic exercises to develop ROM, flexibility and posture for 15 minutes including:  Chin tucks 5 sec x 10.  UT stretch 10 sec x 5.  Scalene stretch 10 sec x 5.  No Money 2 x 10.        neuromuscular re-education activities to improve: Posture for 5 minutes. The following activities were included:  Sitting and sleeping posture as well as info for obtaining cervical pillow.      PATIENT EDUCATION AND HOME EXERCISES     Education provided:   - Instruction in initial HEP and postural correction.    Written Home Exercises Provided: yes. Exercises were reviewed and Melly was able to demonstrate them prior to the end of the session.  Melly demonstrated good  understanding of the education provided. See EMR under Patient Instructions for exercises provided during therapy sessions.    ASSESSMENT     Melly is a 41 y.o. female referred to outpatient Physical Therapy with a medical diagnosis of Cervical DJD with Radiculopathy.. Patient presents with L Para-cervical tightness, para-scapular weakness and postural deficits which are contributing to her neck pain. Unable to reproduce radicular findings with initial eval but will further assess with future visits as merited.    Patient prognosis is Good.   Patient will benefit from skilled outpatient Physical Therapy to address the deficits stated above and in the chart below, provide patient /family education, and to maximize patientt's level of independence.     Plan of care discussed with patient: Yes  Patient's spiritual, cultural and educational needs considered and patient is agreeable to the plan of care and goals as stated below:     Anticipated Barriers for therapy: None    Medical Necessity is demonstrated by the following  History  Co-morbidities and personal factors  that may impact the plan of care Co-morbidities:   history of cancer and prior abdominal surgery    Personal Factors:   no deficits     low   Examination  Body Structures and Functions, activity limitations and participation restrictions that may impact the plan of care Body Regions:   neck  upper extremities    Body Systems:    gross symmetry  ROM  strength    Participation Restrictions:   None    Activity limitations:   Learning and applying knowledge  no deficits    General Tasks and Commands  no deficits    Communication  no deficits    Mobility  no deficits    Self care  no deficits    Domestic Life  no deficits    Interactions/Relationships  no deficits    Life Areas  no deficits    Community and Social Life  no deficits         low   Clinical Presentation evolving clinical presentation with changing clinical characteristics low   Decision Making/ Complexity Score: low     Goals:  Short Term Goals: 4 weeks   1) Pt will tolerate progression of cervical mobility and postural restoration therex with neck nor UE worsening pain.  2) Pt will demo improved postural correction awareness to reduce  stressors on neck / upper back.    Long Term Goals: 8 weeks   1)  Pt will be able to perform all sitting tasks including driving and computer work > 1 hr with neck / UE / para-scapular pain < 2/10.  2) Pt will be independent w HEP  And postural correction to promote self management.    PLAN   Plan of care Certification: 4/27/2022 to 6/27/2022.    Outpatient Physical Therapy 2 times weekly for 8 weeks to include the following interventions: Cervical/Lumbar Traction, Manual Therapy, Neuromuscular Re-ed, Therapeutic Activities, Therapeutic Exercise and Dry needling.     Savage Panda, PT      I CERTIFY THE NEED FOR THESE SERVICES FURNISHED UNDER THIS PLAN OF TREATMENT AND WHILE UNDER MY CARE   Physician's comments:     Physician's Signature: ___________________________________________________

## 2022-05-03 ENCOUNTER — CLINICAL SUPPORT (OUTPATIENT)
Dept: REHABILITATION | Facility: HOSPITAL | Age: 42
End: 2022-05-03
Attending: INTERNAL MEDICINE
Payer: COMMERCIAL

## 2022-05-03 DIAGNOSIS — M62.81 WEAKNESS OF TRUNK MUSCULATURE: ICD-10-CM

## 2022-05-03 DIAGNOSIS — M53.82 DECREASED ROM OF INTERVERTEBRAL DISCS OF CERVICAL SPINE: Primary | ICD-10-CM

## 2022-05-03 PROCEDURE — 97110 THERAPEUTIC EXERCISES: CPT | Mod: PN

## 2022-05-03 NOTE — PROGRESS NOTES
[facility]   Physical Therapy Treatment Note     Name: Melly Hwang  Clinic Number: [id number]    Therapy Diagnosis:   Encounter Diagnoses   Name Primary?    Decreased ROM of intervertebral discs of cervical spine Yes    Weakness of trunk musculature      Physician: Soraya Carranza PA-C    Visit Date: 5/3/2022    Physician Orders: PT Eval and Treat   Medical Diagnosis from Referral: M47.22 (ICD-10-CM) - Osteoarthritis of spine with radiculopathy, cervical region  Evaluation Date: 4/27/2022  Authorization Period Expiration: 5/10/2022  Plan of Care Expiration: 6/27/2022  Progress Note Due: 5/27/2022  Visit # / Visits authorized: 1/ 1   FOTO: 1/3     Precautions: Standard   PTA Visit #: 0/5     Time In: 1422  Time Out: 1435  Total Billable Time: 12 minutes    SUBJECTIVE     Pt reports: full resolution of her neck symptoms since changing her pillow as she was advised to at her initial evaluation. Pt states she does not need to continue with therapy.   She was compliant with home exercise program.  Response to previous treatment: 1st tx  Functional change: symptom resolution    Pain: 0/10  Location: neck     OBJECTIVE     Cervical Flexion: WNL  Cervical Extension: WNL  Cervical SB r: WNL  Cervical SB L: WNL  Cervical rot R: WNL  Cervical Rot L    B UE RANGE OF MOTION WNL    Treatment     Melly received the treatments listed below:      therapeutic exercises to develop strength, endurance, ROM, flexibility, posture and core stabilization for 10 minutes including:  Re-evaluative tests and measures          Patient Education and Home Exercises     Home Exercises Provided and Patient Education Provided     Education provided:   - home exercise program   - plan of care    Written Home Exercises Provided: yes. Exercises were reviewed and Melly was able to demonstrate them prior to the end of the session.  Melly demonstrated good  understanding of the education provided. See EMR  under Patient Instructions for exercises provided during therapy sessions    ASSESSMENT     No reproducible symptoms with physical examination this day. DISCHARGE to independent HEP for maintenance.     Melly Is progressing well towards her goals.   Pt prognosis is Good.     Pt will continue to benefit from skilled outpatient physical therapy to address the deficits listed in the problem list box on initial evaluation, provide pt/family education and to maximize pt's level of independence in the home and community environment.     Pt's spiritual, cultural and educational needs considered and pt agreeable to plan of care and goals.     Anticipated barriers to physical therapy: none    Goals:    Short Term Goals: 4 weeks   1) Pt will tolerate progression of cervical mobility and postural restoration therex with neck nor UE worsening pain.  2) Pt will demo improved postural correction awareness to reduce  stressors on neck / upper back.     Long Term Goals: 8 weeks   1)  Pt will be able to perform all sitting tasks including driving and computer work > 1 hr with neck / UE / para-scapular pain < 2/10.  2) Pt will be independent w HEP  And postural correction to promote self management.    PLAN     Discharged 5/3/2022    Jimena Hua, PT

## 2022-05-17 ENCOUNTER — HOSPITAL ENCOUNTER (OUTPATIENT)
Dept: RADIOLOGY | Facility: HOSPITAL | Age: 42
Discharge: HOME | End: 2022-05-17
Attending: OBSTETRICS & GYNECOLOGY
Payer: COMMERCIAL

## 2022-05-17 VITALS — BODY MASS INDEX: 20.3 KG/M2 | WEIGHT: 145 LBS | HEIGHT: 71 IN

## 2022-05-17 DIAGNOSIS — Z12.31 SCREENING MAMMOGRAM FOR HIGH-RISK PATIENT: ICD-10-CM

## 2022-05-17 PROCEDURE — 77067 SCR MAMMO BI INCL CAD: CPT | Mod: 26,,, | Performed by: RADIOLOGY

## 2022-05-17 PROCEDURE — 77063 BREAST TOMOSYNTHESIS BI: CPT | Mod: 26,,, | Performed by: RADIOLOGY

## 2022-05-17 PROCEDURE — 77063 BREAST TOMOSYNTHESIS BI: CPT | Mod: TC

## 2022-05-17 PROCEDURE — 77067 MAMMO DIGITAL SCREENING BILAT WITH TOMO: ICD-10-PCS | Mod: 26,,, | Performed by: RADIOLOGY

## 2022-05-17 PROCEDURE — 77063 MAMMO DIGITAL SCREENING BILAT WITH TOMO: ICD-10-PCS | Mod: 26,,, | Performed by: RADIOLOGY

## 2022-06-03 ENCOUNTER — HOSPITAL ENCOUNTER (OUTPATIENT)
Dept: RADIOLOGY | Facility: HOSPITAL | Age: 42
Discharge: HOME | End: 2022-06-03
Attending: INTERNAL MEDICINE
Payer: COMMERCIAL

## 2022-06-03 DIAGNOSIS — C22.0 HCC (HEPATOCELLULAR CARCINOMA): ICD-10-CM

## 2022-06-03 PROCEDURE — 74183 MRI ABD W/O CNTR FLWD CNTR: CPT | Mod: 26,,, | Performed by: RADIOLOGY

## 2022-06-03 PROCEDURE — 74183 MRI ABDOMEN W WO CONTRAST: ICD-10-PCS | Mod: 26,,, | Performed by: RADIOLOGY

## 2022-06-03 PROCEDURE — 25500020 PHARM REV CODE 255: Performed by: INTERNAL MEDICINE

## 2022-06-03 PROCEDURE — 74183 MRI ABD W/O CNTR FLWD CNTR: CPT | Mod: TC

## 2022-06-03 PROCEDURE — A9585 GADOBUTROL INJECTION: HCPCS | Performed by: INTERNAL MEDICINE

## 2022-06-03 RX ORDER — GADOBUTROL 604.72 MG/ML
10 INJECTION INTRAVENOUS
Status: COMPLETED | OUTPATIENT
Start: 2022-06-03 | End: 2022-06-03

## 2022-06-03 RX ADMIN — GADOBUTROL 10 ML: 604.72 INJECTION INTRAVENOUS at 07:06

## 2022-06-05 ENCOUNTER — PATIENT MESSAGE (OUTPATIENT)
Dept: HEPATOLOGY | Facility: CLINIC | Age: 42
End: 2022-06-05
Payer: COMMERCIAL

## 2022-06-06 DIAGNOSIS — Z85.05 PERSONAL HISTORY OF MALIGNANT HEPATOMA: Primary | ICD-10-CM

## 2022-07-29 ENCOUNTER — OFFICE VISIT (OUTPATIENT)
Dept: OPTOMETRY | Facility: CLINIC | Age: 42
End: 2022-07-29
Payer: COMMERCIAL

## 2022-07-29 DIAGNOSIS — Z98.890 HX OF LASIK: ICD-10-CM

## 2022-07-29 DIAGNOSIS — H52.13 MYOPIA OF BOTH EYES: Primary | ICD-10-CM

## 2022-07-29 DIAGNOSIS — H04.123 DRY EYE SYNDROME OF BOTH EYES: ICD-10-CM

## 2022-07-29 PROCEDURE — 99999 PR PBB SHADOW E&M-EST. PATIENT-LVL III: ICD-10-PCS | Mod: PBBFAC,,, | Performed by: OPTOMETRIST

## 2022-07-29 PROCEDURE — 92012 INTRM OPH EXAM EST PATIENT: CPT | Mod: S$GLB,,, | Performed by: OPTOMETRIST

## 2022-07-29 PROCEDURE — 92012 PR EYE EXAM, EST PATIENT,INTERMED: ICD-10-PCS | Mod: S$GLB,,, | Performed by: OPTOMETRIST

## 2022-07-29 PROCEDURE — 99999 PR PBB SHADOW E&M-EST. PATIENT-LVL III: CPT | Mod: PBBFAC,,, | Performed by: OPTOMETRIST

## 2022-07-29 NOTE — PROGRESS NOTES
INDU     ARNOLDO: 07/21  Chief complaint (CC): Patient is here for annual eye exam.  Patient hasn't   noticed any vision changes since the last exam.  Patient wears glasses for   glare at night and they still seem fine.  Glasses? + 1 yr. old  Contacts? -  H/o eye surgery, injections or laser: lasik OU  H/o eye injury: -  Known eye conditions? See above  Family h/o eye conditions? Father with AMD  Eye gtts? Gel tears prn      (-) Flashes (-)  Floaters (-) Mucous   (-)  Tearing (-) Itching (-) Burning   (-) Headaches (-) Eye Pain/discomfort (-) Irritation   (-)  Redness (-) Double vision (-) Blurry vision    Diabetic? -  A1c? -        Last edited by Peace Cooley on 7/29/2022  8:41 AM. (History)            Assessment /Plan     For exam results, see Encounter Report.    Myopia of both eyes    Hx of LASIK    Dry eye syndrome of both eyes      1. SRx released to patient. Patient educated on lens options. Normal ocular health. RTC 1 year for routine exam.   2. S/p Lasik 2014  3. Cont w/gel drops to aid with symptoms of dry eyes.

## 2022-08-11 DIAGNOSIS — F41.9 ANXIETY: Primary | ICD-10-CM

## 2022-08-14 ENCOUNTER — PATIENT MESSAGE (OUTPATIENT)
Dept: INTERNAL MEDICINE | Facility: CLINIC | Age: 42
End: 2022-08-14
Payer: COMMERCIAL

## 2022-08-15 ENCOUNTER — HOSPITAL ENCOUNTER (EMERGENCY)
Facility: HOSPITAL | Age: 42
Discharge: HOME | End: 2022-08-15
Attending: EMERGENCY MEDICINE
Payer: COMMERCIAL

## 2022-08-15 VITALS
SYSTOLIC BLOOD PRESSURE: 141 MMHG | DIASTOLIC BLOOD PRESSURE: 78 MMHG | OXYGEN SATURATION: 100 % | TEMPERATURE: 99 F | HEART RATE: 64 BPM | RESPIRATION RATE: 15 BRPM

## 2022-08-15 DIAGNOSIS — R07.9 CHEST PAIN: ICD-10-CM

## 2022-08-15 LAB
ALBUMIN SERPL BCP-MCNC: 3.8 G/DL (ref 3.5–5.2)
ALP SERPL-CCNC: 52 U/L (ref 55–135)
ALT SERPL W/O P-5'-P-CCNC: 14 U/L (ref 10–44)
ANION GAP SERPL CALC-SCNC: 8 MMOL/L (ref 8–16)
AST SERPL-CCNC: 16 U/L (ref 10–40)
BASOPHILS # BLD AUTO: 0.03 K/UL (ref 0–0.2)
BASOPHILS NFR BLD: 0.6 % (ref 0–1.9)
BILIRUB SERPL-MCNC: 1.7 MG/DL (ref 0.1–1)
BUN SERPL-MCNC: 8 MG/DL (ref 6–20)
CALCIUM SERPL-MCNC: 9.5 MG/DL (ref 8.7–10.5)
CHLORIDE SERPL-SCNC: 105 MMOL/L (ref 95–110)
CO2 SERPL-SCNC: 27 MMOL/L (ref 23–29)
CREAT SERPL-MCNC: 0.9 MG/DL (ref 0.5–1.4)
D DIMER PPP IA.FEU-MCNC: 0.22 MG/L FEU
DIFFERENTIAL METHOD: ABNORMAL
EOSINOPHIL # BLD AUTO: 0.1 K/UL (ref 0–0.5)
EOSINOPHIL NFR BLD: 1.4 % (ref 0–8)
ERYTHROCYTE [DISTWIDTH] IN BLOOD BY AUTOMATED COUNT: 11.7 % (ref 11.5–14.5)
EST. GFR  (NO RACE VARIABLE): >60 ML/MIN/1.73 M^2
GLUCOSE SERPL-MCNC: 104 MG/DL (ref 70–110)
HCT VFR BLD AUTO: 44.7 % (ref 37–48.5)
HGB BLD-MCNC: 14.2 G/DL (ref 12–16)
IMM GRANULOCYTES # BLD AUTO: 0.01 K/UL (ref 0–0.04)
IMM GRANULOCYTES NFR BLD AUTO: 0.2 % (ref 0–0.5)
LYMPHOCYTES # BLD AUTO: 1 K/UL (ref 1–4.8)
LYMPHOCYTES NFR BLD: 19.8 % (ref 18–48)
MCH RBC QN AUTO: 30.9 PG (ref 27–31)
MCHC RBC AUTO-ENTMCNC: 31.8 G/DL (ref 32–36)
MCV RBC AUTO: 97 FL (ref 82–98)
MONOCYTES # BLD AUTO: 0.3 K/UL (ref 0.3–1)
MONOCYTES NFR BLD: 6.5 % (ref 4–15)
NEUTROPHILS # BLD AUTO: 3.5 K/UL (ref 1.8–7.7)
NEUTROPHILS NFR BLD: 71.5 % (ref 38–73)
NRBC BLD-RTO: 0 /100 WBC
PLATELET # BLD AUTO: 236 K/UL (ref 150–450)
PMV BLD AUTO: 10.6 FL (ref 9.2–12.9)
POTASSIUM SERPL-SCNC: 3.9 MMOL/L (ref 3.5–5.1)
PROT SERPL-MCNC: 7.4 G/DL (ref 6–8.4)
RBC # BLD AUTO: 4.59 M/UL (ref 4–5.4)
SODIUM SERPL-SCNC: 140 MMOL/L (ref 136–145)
TROPONIN I SERPL DL<=0.01 NG/ML-MCNC: <0.006 NG/ML (ref 0–0.03)
WBC # BLD AUTO: 4.9 K/UL (ref 3.9–12.7)

## 2022-08-15 PROCEDURE — 25000003 PHARM REV CODE 250

## 2022-08-15 PROCEDURE — 96360 HYDRATION IV INFUSION INIT: CPT

## 2022-08-15 PROCEDURE — 86803 HEPATITIS C AB TEST: CPT | Performed by: EMERGENCY MEDICINE

## 2022-08-15 PROCEDURE — 93010 EKG 12-LEAD: ICD-10-PCS | Mod: ,,, | Performed by: INTERNAL MEDICINE

## 2022-08-15 PROCEDURE — 84484 ASSAY OF TROPONIN QUANT: CPT | Performed by: EMERGENCY MEDICINE

## 2022-08-15 PROCEDURE — 87389 HIV-1 AG W/HIV-1&-2 AB AG IA: CPT | Performed by: EMERGENCY MEDICINE

## 2022-08-15 PROCEDURE — 99285 PR EMERGENCY DEPT VISIT,LEVEL V: ICD-10-PCS | Mod: ,,, | Performed by: EMERGENCY MEDICINE

## 2022-08-15 PROCEDURE — 85025 COMPLETE CBC W/AUTO DIFF WBC: CPT | Performed by: EMERGENCY MEDICINE

## 2022-08-15 PROCEDURE — 85379 FIBRIN DEGRADATION QUANT: CPT | Performed by: EMERGENCY MEDICINE

## 2022-08-15 PROCEDURE — 93010 ELECTROCARDIOGRAM REPORT: CPT | Mod: ,,, | Performed by: INTERNAL MEDICINE

## 2022-08-15 PROCEDURE — 93005 ELECTROCARDIOGRAM TRACING: CPT

## 2022-08-15 PROCEDURE — 63600175 PHARM REV CODE 636 W HCPCS

## 2022-08-15 PROCEDURE — 99285 EMERGENCY DEPT VISIT HI MDM: CPT | Mod: ,,, | Performed by: EMERGENCY MEDICINE

## 2022-08-15 PROCEDURE — 99285 EMERGENCY DEPT VISIT HI MDM: CPT | Mod: 25

## 2022-08-15 PROCEDURE — 80053 COMPREHEN METABOLIC PANEL: CPT | Performed by: EMERGENCY MEDICINE

## 2022-08-15 RX ORDER — MAG HYDROX/ALUMINUM HYD/SIMETH 200-200-20
30 SUSPENSION, ORAL (FINAL DOSE FORM) ORAL ONCE
Status: COMPLETED | OUTPATIENT
Start: 2022-08-15 | End: 2022-08-15

## 2022-08-15 RX ORDER — LIDOCAINE HYDROCHLORIDE 20 MG/ML
15 SOLUTION OROPHARYNGEAL ONCE
Status: COMPLETED | OUTPATIENT
Start: 2022-08-15 | End: 2022-08-15

## 2022-08-15 RX ORDER — MAG HYDROX/ALUMINUM HYD/SIMETH 200-200-20
30 SUSPENSION, ORAL (FINAL DOSE FORM) ORAL
Qty: 354 ML | Refills: 0 | Status: SHIPPED | OUTPATIENT
Start: 2022-08-15 | End: 2023-03-03

## 2022-08-15 RX ADMIN — SODIUM CHLORIDE, SODIUM LACTATE, POTASSIUM CHLORIDE, AND CALCIUM CHLORIDE 1000 ML: .6; .31; .03; .02 INJECTION, SOLUTION INTRAVENOUS at 08:08

## 2022-08-15 RX ADMIN — LIDOCAINE HYDROCHLORIDE 15 ML: 20 SOLUTION ORAL; TOPICAL at 10:08

## 2022-08-15 RX ADMIN — ALUMINUM HYDROXIDE, MAGNESIUM HYDROXIDE, AND SIMETHICONE 30 ML: 200; 200; 20 SUSPENSION ORAL at 10:08

## 2022-08-15 NOTE — ED TRIAGE NOTES
Patient is a 42 year old female that presents to the ED via personal vehicle with c/o chest pain that started Saturday soon after eating. Pt states hasn't gone away took OTC meds at home with no relief. Pt reports pain worse with ambulated. Hx of Liver cancer.

## 2022-08-15 NOTE — TELEPHONE ENCOUNTER
Spoke to patient and advised of recommendation. Patient verbalized understanding.      patient has gone to ed

## 2022-08-15 NOTE — ED PROVIDER NOTES
Encounter Date: 8/15/2022       History     Chief Complaint   Patient presents with    Chest Pain     CP that radiates to left side of neck, began Saturday. Worsens with ambulation. Hx pericardial effusion in 2020, symptoms feel similar.      42yof with a history of HCC and pericardial effusion with a window done in 2021 presents with a 1 day history of intermittent chest and throat discomfort. She says that it started after she had eaten spaghetti yesterday and then took a new multivitamin. Shortly after she began having discomfort. She says that it came and went for the past day with no predictable pattern, saying that she experiences it with exertion and at rest. She woke up this morning and says that she expected the pain would be better, but it has persisted. She also reports that she usually bradycardic but says that her hr was in the 90s at home. She denies any recent fevers or illnesses.                                           Review of patient's allergies indicates:   Allergen Reactions    No known drug allergies      Past Medical History:   Diagnosis Date    Abdominal pain 8/12/2021    VENKATA positive 8/20/2020    COVID-19     Deviated septum 2011    Hepatocellular carcinoma 6/9/2021    Hypertrophy of inferior nasal turbinate     Liver mass     Nasal congestion 2011    Pericardial effusion     Premature menopause      Past Surgical History:   Procedure Laterality Date    COLONOSCOPY N/A 09/21/2020    Procedure: COLONOSCOPY;  Surgeon: GIOVANNI Crane MD;  Location: [facility] (4TH FLR);  Service: Endoscopy;  Laterality: N/A;  + covid 4/22    HERNIA REPAIR      LIVER SURGERY N/A 06/28/2021    NASAL SEPTUM SURGERY      PERICARDIAL WINDOW N/A 02/22/2021    Procedure: CREATION, PERICARDIAL WINDOW;  Surgeon: Ajay Cantor MD;  [facility];  Service: Cardiovascular;  Laterality: N/A;    PERICARDIOCENTESIS N/A 05/02/2020    Procedure: Pericardiocentesis;  Surgeon: Dickson PAINTER  MD Antolin;  Location: [facility] LAB;  Service: Cardiology;  Laterality: N/A;    TONSILLECTOMY       Family History   Problem Relation Age of Onset    Diabetes Mother     Liver disease Mother         Fatty liver    Heart disease Father     Macular degeneration Father     Diabetes Father     Hypertension Father     Hyperlipidemia Father     No Known Problems Sister     No Known Problems Brother     No Known Problems Brother     No Known Problems Daughter     No Known Problems Maternal Aunt     No Known Problems Maternal Uncle     No Known Problems Paternal Aunt     No Known Problems Paternal Uncle     No Known Problems Maternal Grandmother     No Known Problems Maternal Grandfather     No Known Problems Paternal Grandmother     No Known Problems Paternal Grandfather     Amblyopia Neg Hx     Blindness Neg Hx     Cancer Neg Hx     Cataracts Neg Hx     Glaucoma Neg Hx     Retinal detachment Neg Hx     Strabismus Neg Hx     Stroke Neg Hx     Thyroid disease Neg Hx     Melanoma Neg Hx     Heart attack Neg Hx      Social History     Tobacco Use    Smoking status: Never Smoker    Smokeless tobacco: Never Used   Substance Use Topics    Alcohol use: Yes     Comment: rare    Drug use: No     Review of Systems   Constitutional: Negative for chills and fever.   HENT: Negative for congestion and sore throat.    Eyes: Negative for redness and visual disturbance.   Respiratory: Negative for cough, choking and wheezing.    Cardiovascular: Positive for chest pain and palpitations. Negative for leg swelling.   Gastrointestinal: Negative for abdominal pain, diarrhea, nausea and vomiting.   Genitourinary: Negative for difficulty urinating and dysuria.   Musculoskeletal: Negative for back pain and neck pain.   Skin: Negative for pallor and rash.   Neurological: Negative for dizziness, light-headedness and headaches.   Psychiatric/Behavioral: Negative for confusion and dysphoric mood.       Physical Exam      Initial Vitals [08/15/22 0635]   BP Pulse Resp Temp SpO2   126/79 99 15 98 °F (36.7 °C) 100 %      MAP       --         Physical Exam    Nursing note and vitals reviewed.  Constitutional: She appears well-developed and well-nourished. She is not diaphoretic. No distress.   HENT:   Head: Normocephalic and atraumatic.   Eyes: EOM are normal. Pupils are equal, round, and reactive to light.   Neck:   Normal range of motion.  Cardiovascular: Normal rate, regular rhythm, normal heart sounds and intact distal pulses.   Pulmonary/Chest: Breath sounds normal. No respiratory distress. She has no rhonchi.   No chest wall discomfort, sternal and epigastric scars present   Abdominal: Abdomen is soft. Bowel sounds are normal. There is no abdominal tenderness. There is no guarding.   Musculoskeletal:         General: No tenderness or edema. Normal range of motion.      Cervical back: Normal range of motion.     Neurological: She is alert and oriented to person, place, and time. She has normal strength. GCS score is 15. GCS eye subscore is 4. GCS verbal subscore is 5. GCS motor subscore is 6.   Skin: Skin is warm and dry. Capillary refill takes less than 2 seconds.   Psychiatric: She has a normal mood and affect. Her behavior is normal. Judgment and thought content normal.         ED Course   Procedures  Labs Reviewed   CBC W/ AUTO DIFFERENTIAL - Abnormal; Notable for the following components:       Result Value    MCHC 31.8 (*)     All other components within normal limits   COMPREHENSIVE METABOLIC PANEL - Abnormal; Notable for the following components:    Total Bilirubin 1.7 (*)     Alkaline Phosphatase 52 (*)     All other components within normal limits   D DIMER, QUANTITATIVE   TROPONIN I   HIV 1 / 2 ANTIBODY   HEPATITIS C ANTIBODY        ECG Results          EKG 12-lead (Final result)  Result time 08/15/22 08:09:25    Final result by Interface, Lab In [facility] (08/15/22 08:09:25)                 Narrative:    Test Reason :  R07.9,    Vent. Rate : 083 BPM     Atrial Rate : 083 BPM     P-R Int : 126 ms          QRS Dur : 080 ms      QT Int : 358 ms       P-R-T Axes : 073 096 070 degrees     QTc Int : 420 ms    Normal sinus rhythm  Possible Left atrial enlargement  Rightward axis  Nonspecific ST and T wave abnormality  Abnormal ECG  When compared with ECG of 15-SEP-2021 09:26,  Non-specific change in ST segment in Inferior leads  T wave inversion now evident in Inferior leads  Confirmed by Edmund NOBLE MD ([id number]) on 8/15/2022 8:09:12 AM    Referred By: [email or web address]   SELF           Confirmed By:Edmund NOBLE MD                            Imaging Results          X-Ray Chest PA And Lateral (Final result)  Result time 08/15/22 09:03:13    Final result by Lance Quinones MD (08/15/22 09:03:13)                 Impression:      See above      Electronically signed by: Lance Quinones MD  Date:    08/15/2022  Time:    09:03             Narrative:    EXAMINATION:  XR CHEST PA AND LATERAL    CLINICAL HISTORY:  Chest pain, unspecified    TECHNIQUE:  PA and lateral views of the chest were performed.    COMPARISON:  No 03/04/2022 CT chest ne    FINDINGS:  Heart size normal.  The lungs are clear.  No pleural effusion.  Postsurgical changes in the right abdomen as before                                 Medications   lactated ringers bolus 1,000 mL (0 mLs Intravenous Stopped 8/15/22 0958)   aluminum-magnesium hydroxide-simethicone 200-200-20 mg/5 mL suspension 30 mL (30 mLs Oral Given 8/15/22 1003)     And   LIDOcaine HCl 2% oral solution 15 mL (15 mLs Oral Given 8/15/22 1013)     Medical Decision Making:   History:   Old Medical Records: I decided to obtain old medical records.  Old Records Summarized: records from clinic visits and records from previous admission(s).       <> Summary of Records: Reviewed for pmh and current medications  Initial Assessment:   42yof with intermittent chest discomfort. EKG is normal. Bedside echo showed no effusion.     Initial DDx:  Pericardial Effusion, GERD, Pill esophagitis    Low concern for PE given vital signs WNL, although pt has a hx of cancer and OCPs, ACS unlikely but considered, Trop ordered  Clinical Tests:   Lab Tests: Reviewed  Medical Tests: Reviewed  ED Management:  See ED course.    Bedside echo also did not reveal any percardial effusion.    Return precautions and follow up instructions discussed with pt, questions answered. Pt was discharged home.             ED Course as of 08/15/22 1551   Mon Aug 15, 2022   0744 Pt seen and examined, in NAD [BP]   0744 EKG 12-lead  NSR, all intervals WNL, no ST elevations [BP]   0935 D-Dimer: 0.22 [BP]   0936 Hemoglobin: 14.2  No anemia [BP]   1550 Troponin I: <0.006 [BP]   1551 D-dimer and trop negative. Pt says she feels better after IVF. [BP]      ED Course User Index  [BP] David Sloan MD             Clinical Impression:   Final diagnoses:  [R07.9] Chest pain          ED Disposition Condition    Discharge Stable        ED Prescriptions     Medication Sig Dispense Start Date End Date Auth. Provider    aluminum-magnesium hydroxide-simethicone (MAALOX) 200-200-20 mg/5 mL Susp Take 30 mLs by mouth 4 (four) times daily before meals and nightly. for 7 days 354 mL 8/15/2022 8/22/2022 David Sloan MD        Follow-up Information     Follow up With Specialties Details Why Contact Info    Melly Sahu MD Internal Medicine In 1 week As needed [address]  [place] [address]  [phone]             David Sloan MD  Resident  08/15/22 1260

## 2022-08-15 NOTE — TELEPHONE ENCOUNTER
If she is concerned that there is something stuck in her esophagus, she will need an urgent endoscopy.  I would recommend ER for this

## 2022-08-15 NOTE — DISCHARGE INSTRUCTIONS
Please return to the ED if you experience chest pain or shortness of breath that is persistent for greater than 15 minutes, get progressively worse or is not relieved with rest.    I recommend following up with your PCP within the week.

## 2022-08-16 LAB — HIV 1+2 AB+HIV1 P24 AG SERPL QL IA: NEGATIVE

## 2022-08-17 ENCOUNTER — LAB VISIT (OUTPATIENT)
Dept: LAB | Facility: HOSPITAL | Age: 42
End: 2022-08-17
Attending: INTERNAL MEDICINE
Payer: COMMERCIAL

## 2022-08-17 ENCOUNTER — PATIENT MESSAGE (OUTPATIENT)
Dept: OBSTETRICS AND GYNECOLOGY | Facility: CLINIC | Age: 42
End: 2022-08-17
Payer: COMMERCIAL

## 2022-08-17 ENCOUNTER — TELEPHONE (OUTPATIENT)
Dept: CARDIOLOGY | Facility: CLINIC | Age: 42
End: 2022-08-17
Payer: COMMERCIAL

## 2022-08-17 DIAGNOSIS — R07.2 PRECORDIAL PAIN: ICD-10-CM

## 2022-08-17 DIAGNOSIS — R07.2 PRECORDIAL PAIN: Primary | ICD-10-CM

## 2022-08-17 LAB
HCV AB SERPL QL IA: NEGATIVE
TROPONIN I SERPL DL<=0.01 NG/ML-MCNC: <0.006 NG/ML (ref 0–0.03)

## 2022-08-17 PROCEDURE — 36415 COLL VENOUS BLD VENIPUNCTURE: CPT | Performed by: INTERNAL MEDICINE

## 2022-08-17 PROCEDURE — 84484 ASSAY OF TROPONIN QUANT: CPT | Performed by: INTERNAL MEDICINE

## 2022-08-17 RX ORDER — ALPRAZOLAM 0.25 MG/1
TABLET ORAL
Qty: 6 TABLET | Refills: 0 | Status: SHIPPED | OUTPATIENT
Start: 2022-08-17 | End: 2022-12-22 | Stop reason: ALTCHOICE

## 2022-08-17 NOTE — TELEPHONE ENCOUNTER
I spoke with Ms Villegas. She has has had a continuous pressure like pain in her chest today all day which is similar to the pain she was having when she went to the ED two days ago. It is not exertional and worse when squatting down. No associated symptoms. She thiks is due to stress. She is currently at work at the [employer]. I advised her to go to the ED for an ECG however she declined. I will check a troponin level.

## 2022-08-17 NOTE — TELEPHONE ENCOUNTER
Spoke with DR Sabillon, she called patient and instructed her to get labs drawn today and she will followup after results come in.

## 2022-08-31 ENCOUNTER — PATIENT MESSAGE (OUTPATIENT)
Dept: HEPATOLOGY | Facility: CLINIC | Age: 42
End: 2022-08-31
Payer: COMMERCIAL

## 2022-09-09 ENCOUNTER — HOSPITAL ENCOUNTER (OUTPATIENT)
Dept: RADIOLOGY | Facility: HOSPITAL | Age: 42
Discharge: HOME | End: 2022-09-09
Attending: INTERNAL MEDICINE
Payer: COMMERCIAL

## 2022-09-09 ENCOUNTER — PATIENT MESSAGE (OUTPATIENT)
Dept: HEPATOLOGY | Facility: CLINIC | Age: 42
End: 2022-09-09
Payer: COMMERCIAL

## 2022-09-09 DIAGNOSIS — Z85.05 PERSONAL HISTORY OF MALIGNANT HEPATOMA: ICD-10-CM

## 2022-09-09 DIAGNOSIS — Z85.05 HISTORY OF MALIGNANT HEPATOMA: Primary | ICD-10-CM

## 2022-09-09 PROCEDURE — 71250 CT THORAX DX C-: CPT | Mod: TC

## 2022-09-09 PROCEDURE — 74183 MRI ABD W/O CNTR FLWD CNTR: CPT | Mod: 26,,, | Performed by: STUDENT IN AN ORGANIZED HEALTH CARE EDUCATION/TRAINING PROGRAM

## 2022-09-09 PROCEDURE — 25500020 PHARM REV CODE 255: Performed by: INTERNAL MEDICINE

## 2022-09-09 PROCEDURE — 74183 MRI ABDOMEN W WO CONTRAST: ICD-10-PCS | Mod: 26,,, | Performed by: STUDENT IN AN ORGANIZED HEALTH CARE EDUCATION/TRAINING PROGRAM

## 2022-09-09 PROCEDURE — 74183 MRI ABD W/O CNTR FLWD CNTR: CPT | Mod: TC

## 2022-09-09 PROCEDURE — 71250 CT THORAX DX C-: CPT | Mod: 26,,, | Performed by: RADIOLOGY

## 2022-09-09 PROCEDURE — A9585 GADOBUTROL INJECTION: HCPCS | Performed by: INTERNAL MEDICINE

## 2022-09-09 PROCEDURE — 71250 CT CHEST WITHOUT CONTRAST: ICD-10-PCS | Mod: 26,,, | Performed by: RADIOLOGY

## 2022-09-09 RX ORDER — GADOBUTROL 604.72 MG/ML
10 INJECTION INTRAVENOUS
Status: COMPLETED | OUTPATIENT
Start: 2022-09-09 | End: 2022-09-09

## 2022-09-09 RX ADMIN — GADOBUTROL 10 ML: 604.72 INJECTION INTRAVENOUS at 08:09

## 2022-09-12 ENCOUNTER — PATIENT MESSAGE (OUTPATIENT)
Dept: INTERNAL MEDICINE | Facility: CLINIC | Age: 42
End: 2022-09-12
Payer: COMMERCIAL

## 2022-09-19 ENCOUNTER — OFFICE VISIT (OUTPATIENT)
Dept: INTERNAL MEDICINE | Facility: CLINIC | Age: 42
End: 2022-09-19
Payer: COMMERCIAL

## 2022-09-19 ENCOUNTER — TELEPHONE (OUTPATIENT)
Dept: INTERNAL MEDICINE | Facility: CLINIC | Age: 42
End: 2022-09-19

## 2022-09-19 ENCOUNTER — PATIENT MESSAGE (OUTPATIENT)
Dept: HEPATOLOGY | Facility: CLINIC | Age: 42
End: 2022-09-19
Payer: COMMERCIAL

## 2022-09-19 VITALS
WEIGHT: 145 LBS | SYSTOLIC BLOOD PRESSURE: 118 MMHG | HEIGHT: 71 IN | BODY MASS INDEX: 20.3 KG/M2 | DIASTOLIC BLOOD PRESSURE: 70 MMHG

## 2022-09-19 DIAGNOSIS — R91.1 LUNG NODULE: ICD-10-CM

## 2022-09-19 DIAGNOSIS — C22.0 HEPATOCELLULAR CARCINOMA: ICD-10-CM

## 2022-09-19 DIAGNOSIS — A09 TRAVELER'S DIARRHEA: Primary | ICD-10-CM

## 2022-09-19 PROCEDURE — 99999 PR PBB SHADOW E&M-EST. PATIENT-LVL IV: CPT | Mod: PBBFAC,,, | Performed by: INTERNAL MEDICINE

## 2022-09-19 PROCEDURE — 99214 OFFICE O/P EST MOD 30 MIN: CPT | Mod: S$GLB,,, | Performed by: INTERNAL MEDICINE

## 2022-09-19 PROCEDURE — 1159F PR MEDICATION LIST DOCUMENTED IN MEDICAL RECORD: ICD-10-PCS | Mod: CPTII,S$GLB,, | Performed by: INTERNAL MEDICINE

## 2022-09-19 PROCEDURE — 1159F MED LIST DOCD IN RCRD: CPT | Mod: CPTII,S$GLB,, | Performed by: INTERNAL MEDICINE

## 2022-09-19 PROCEDURE — 3078F PR MOST RECENT DIASTOLIC BLOOD PRESSURE < 80 MM HG: ICD-10-PCS | Mod: CPTII,S$GLB,, | Performed by: INTERNAL MEDICINE

## 2022-09-19 PROCEDURE — 99214 PR OFFICE/OUTPT VISIT, EST, LEVL IV, 30-39 MIN: ICD-10-PCS | Mod: S$GLB,,, | Performed by: INTERNAL MEDICINE

## 2022-09-19 PROCEDURE — 3044F HG A1C LEVEL LT 7.0%: CPT | Mod: CPTII,S$GLB,, | Performed by: INTERNAL MEDICINE

## 2022-09-19 PROCEDURE — 3074F PR MOST RECENT SYSTOLIC BLOOD PRESSURE < 130 MM HG: ICD-10-PCS | Mod: CPTII,S$GLB,, | Performed by: INTERNAL MEDICINE

## 2022-09-19 PROCEDURE — 3008F BODY MASS INDEX DOCD: CPT | Mod: CPTII,S$GLB,, | Performed by: INTERNAL MEDICINE

## 2022-09-19 PROCEDURE — 99999 PR PBB SHADOW E&M-EST. PATIENT-LVL IV: ICD-10-PCS | Mod: PBBFAC,,, | Performed by: INTERNAL MEDICINE

## 2022-09-19 PROCEDURE — 3044F PR MOST RECENT HEMOGLOBIN A1C LEVEL <7.0%: ICD-10-PCS | Mod: CPTII,S$GLB,, | Performed by: INTERNAL MEDICINE

## 2022-09-19 PROCEDURE — 3074F SYST BP LT 130 MM HG: CPT | Mod: CPTII,S$GLB,, | Performed by: INTERNAL MEDICINE

## 2022-09-19 PROCEDURE — 3078F DIAST BP <80 MM HG: CPT | Mod: CPTII,S$GLB,, | Performed by: INTERNAL MEDICINE

## 2022-09-19 PROCEDURE — 3008F PR BODY MASS INDEX (BMI) DOCUMENTED: ICD-10-PCS | Mod: CPTII,S$GLB,, | Performed by: INTERNAL MEDICINE

## 2022-09-19 RX ORDER — AZITHROMYCIN 250 MG/1
TABLET, FILM COATED ORAL
Qty: 6 TABLET | Refills: 0 | Status: SHIPPED | OUTPATIENT
Start: 2022-09-19 | End: 2022-12-14

## 2022-09-19 NOTE — Clinical Note
Kingsley Arreguin:  You had seen her last year.  Anusha Ken ordered a CT of the chest and she has a new lung nodule measuring 6 mm.  It looks like Anusha ordered another follow-up scan in 3 months.  Do you need to see her as well?  Thanks Melly Sahu

## 2022-09-19 NOTE — PROGRESS NOTES
"Answers submitted by the patient for this visit:  Abdominal Pain Questionnaire (Submitted on 9/12/2022)  Chief Complaint: Abdominal pain  Chronicity: new  Onset: 1 to 4 weeks ago  Onset quality: undetermined  Frequency: daily  Episode duration: 1 Hours  Progression since onset: unchanged  Pain location: suprapubic region  Pain - numeric: 9/10  Pain quality: cramping, a sensation of fullness  anorexia: No  arthralgias: No  belching: No  constipation: No  diarrhea: Yes  dysuria: No  fever: No  flatus: Yes  frequency: No  headaches: No  hematochezia: No  hematuria: No  melena: No  myalgias: No  nausea: No  weight loss: No  vomiting: No  Aggravated by: eating  Relieved by: bowel movements, passing flatus  Pain severity: moderate  abdominal surgery: No  colon cancer: No  Crohn's disease: No  gallstones: No  GERD: No  irritable bowel syndrome: No  kidney stones: No  pancreatitis: No  PUD: No  ulcerative colitis: No  UTI: No  Subjective:       Patient ID: Melly Hwang is a 42 y.o. female.    Chief Complaint: Diarrhea    Following closely in hepatology given her hepatocellular carcinoma     Found to have a new lung lesion, see details from chest CT of September 2022.     See notes from hepatology March 2022:  "known history of hepatic hemangiomas that were otherwise asymptomatic. She recently had a diagnosis of pericardial effusion of unknown etiology ultimately requiring pericardial window with cardiac surgery. She recovered well from those procedures without any significant cardiac dysfunction. Axial imaging showed new lesions in the right posterior segment had imaging characteristics not consistent with hemangioma and concerning for malignancy. 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      Interval History  Patient has undergone open right liver lobe resection 6/28/21 (partial hepatectomy segments " V,VI,VII,VIII); and Portal lymphadenectomy on 6/28/21. Course was uncomplicated.   Pathology  Final Pathologic Diagnosis 1.  Gallbladder (cholecystectomy):   - Benign gallbladder with cholecystitis   2. Right lobe (partial hepatectomy):   - Positive for malignancy, see synoptic report below   - Separate hemangioma, 7.3 cm   3. Lymph nodes, portal (regional resection):   - 8 lymph nodes, negative for tumor (0/8)   ____________________________________________________________________________   ______   Hepatocellular carcinoma synoptic report   - Procedure:  Partial hepatectomy, right lobe   - Histologic type:  Hepatocellular carcinoma, scirrhous   - Histologic grade:  Moderately differentiated, grade 2   - Tumor focality:  Solitary   - Tumor characteristics:   - Tumor site:  Right lobe   - Tumor size:  3.3 cm   - Treatment effect:  No known pre-surgical therapy   - Satellitosis:  Not identified   - Tumor extent:  Confined to liver   - Vascular invasion:  Present, Small vessel   - Perineural invasion:  Present   - Margins:   - All margins are negative for invasive carcinoma   - Closest margin to invasive carcinoma:  Parenchymal   - Distance from invasive  carcinoma to closest margin:  5 mm   - Regional lymph nodes:   - Number of lymph nodes with tumor:  0   - Number of lymph nodes examined:  8   - Pathology: pT2 N0 MX   - Additional findings:   - No steatosis   - Trichrome stain:  Periportal fibrosis with early septa, 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 pylori infection: tx 2014 stool negative    Liver hemangioma    Pericardial effusion    VENKATA positive    History of 2019 novel coronavirus disease (COVID-19)    Elevated bilirubin    S/P pericardial window creation    Hepatocellular carcinoma    Hypophosphatemia    Elevated CA 19-9 level    Pleural effusion    Decreased ROM of intervertebral discs of cervical spine    Weakness of trunk musculature    Lung nodule 9/22            Abdominal Pain  This is a new problem. The current episode started 1 to 4 weeks ago. The onset quality is undetermined. The problem occurs daily. The most recent episode lasted 1 Hours. The problem has been unchanged. The pain is located in the suprapubic region. The pain is at a severity of 9/10. The pain is moderate. The quality of the pain is cramping and a sensation of fullness. Associated symptoms include diarrhea and flatus. Pertinent negatives include no anorexia, arthralgias, belching, constipation, dysuria, fever, frequency, headaches, hematochezia, hematuria, melena, myalgias, nausea, vomiting or weight loss. The pain is aggravated by eating. The pain is relieved by Bowel movements and passing flatus. There is no history of abdominal surgery, colon cancer, Crohn's disease, gallstones, GERD, irritable bowel syndrome, pancreatitis, PUD or ulcerative colitis. Patient's medical history does not include kidney stones and UTI.   Review of Systems   Constitutional:  Negative for fever and weight loss.   Gastrointestinal:  Positive for diarrhea and flatus. Negative for abdominal pain, anorexia, constipation, hematochezia, melena, nausea and vomiting.        See patient's note which was completed prior.  On discussion today, no further abdominal pain.  Stools are a little bit loose but better since she has been using  probiotics.  No blood in the stool and no mucus.  No fever.   Genitourinary:  Negative for dysuria, frequency and hematuria.   Musculoskeletal:  Negative for arthralgias and myalgias.   Neurological:  Negative for headaches.     Objective:   Physical Exam  Vitals and nursing note reviewed.   Constitutional:       Appearance: She is well-developed.   HENT:      Head: Normocephalic and atraumatic.      Right Ear: External ear normal.      Left Ear: External ear normal.      Nose: Nose normal.      Mouth/Throat:      Pharynx: No oropharyngeal exudate.   Eyes:      General: No scleral icterus.     Extraocular Movements: Extraocular movements intact.      Conjunctiva/sclera: Conjunctivae normal.   Neck:      Thyroid: No thyromegaly.      Vascular: No JVD.   Cardiovascular:      Rate and Rhythm: Normal rate and regular rhythm.      Heart sounds: Normal heart sounds. No murmur heard.    No gallop.   Pulmonary:      Effort: Pulmonary effort is normal. No respiratory distress.      Breath sounds: Normal breath sounds. No wheezing.   Abdominal:      General: Bowel sounds are normal. There is no distension.      Palpations: Abdomen is soft. There is no mass.      Tenderness: There is no abdominal tenderness. There is no guarding or rebound.   Musculoskeletal:         General: No tenderness. Normal range of motion.      Cervical back: Normal range of motion and neck supple.   Lymphadenopathy:      Cervical: No cervical adenopathy.   Skin:     General: Skin is warm.      Findings: No erythema or rash.   Neurological:      General: No focal deficit present.      Mental Status: She is alert and oriented to person, place, and time.      Cranial Nerves: No cranial nerve deficit.      Coordination: Coordination normal.   Psychiatric:         Behavior: Behavior normal.         Thought Content: Thought content normal.         Judgment: Judgment normal.       Assessment:       1. Traveler's diarrhea    2. Hepatocellular carcinoma    3.  Lung nodule 9/22       Plan:           Melly was seen today for diarrhea.     Diagnoses and all orders for this visit:     Traveler's diarrhea  -     azithromycin (Z-JUSTIN) 250 MG tablet; Take 2 tablets by mouth on day 1; Take 1 tablet by mouth on days 2-5     Hepatocellular carcinoma     Lung nodule 9/22     Traveler's diarrhea cautions and alarm symptoms reviewed, she has improved  Keep appointment for hepatology follow-up as well as repeat MRI  Follow-up lung nodule, CT is already scheduled; chart to Pulmonary for review                                      Answers submitted by the patient for this visit:  Abdominal Pain Questionnaire (Submitted on 9/12/2022)  Chief Complaint: Abdominal pain  Chronicity: new  Onset: 1 to 4 weeks ago  Onset quality: undetermined  Frequency: daily  Episode duration: 1 Hours  Progression since onset: unchanged  Pain location: suprapubic region  Pain - numeric: 9/10  Pain quality: cramping, a sensation of fullness  anorexia: No  arthralgias: No  belching: No  constipation: No  diarrhea: Yes  dysuria: No  fever: No  flatus: Yes  frequency: No  headaches: No  hematochezia: No  hematuria: No  melena: No  myalgias: No  nausea: No  weight loss: No  vomiting: No  Aggravated by: eating  Relieved by: bowel movements, passing flatus  Pain severity: moderate  abdominal surgery: No  colon cancer: No  Crohn's disease: No  gallstones: No  GERD: No  irritable bowel syndrome: No  kidney stones: No  pancreatitis: No  PUD: No  ulcerative colitis: No  UTI: No

## 2022-09-19 NOTE — PROGRESS NOTES
"Following closely in hepatology given her hepatocellular carcinoma     Found to have a new lung lesion, see details from chest CT of September 2022.     See notes from hepatology March 2022:  "known history of hepatic hemangiomas that were otherwise asymptomatic. She recently had a diagnosis of pericardial effusion of unknown etiology ultimately requiring pericardial window with cardiac surgery. She recovered well from those procedures without any significant cardiac dysfunction. Axial imaging showed new lesions in the right posterior segment had imaging characteristics not consistent with hemangioma and concerning for malignancy. 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      Interval History  Patient has undergone open right liver lobe resection 6/28/21 (partial hepatectomy segments V,VI,VII,VIII); and Portal lymphadenectomy on 6/28/21. Course was uncomplicated.   Pathology  Final Pathologic Diagnosis 1.  Gallbladder (cholecystectomy):   - Benign gallbladder with cholecystitis   2. Right lobe (partial hepatectomy):   - Positive for malignancy, see synoptic report below   - Separate hemangioma, 7.3 cm   3. Lymph nodes, portal (regional resection):   - 8 lymph nodes, negative for tumor (0/8)   ____________________________________________________________________________   ______   Hepatocellular carcinoma synoptic report   - Procedure:  Partial hepatectomy, right lobe   - Histologic type:  Hepatocellular carcinoma, scirrhous   - Histologic grade:  Moderately differentiated, grade 2   - Tumor focality:  Solitary   - Tumor characteristics:   - Tumor site:  Right lobe   - Tumor size:  3.3 cm   - Treatment effect:  No known pre-surgical therapy   - Satellitosis:  Not identified   - Tumor extent:  Confined to liver   - Vascular invasion:  Present, Small vessel   - Perineural invasion:  Present   - Margins:   - All margins " are negative for invasive carcinoma   - Closest margin to invasive carcinoma:  Parenchymal   - Distance from invasive  carcinoma to closest margin:  5 mm   - Regional lymph nodes:   - Number of lymph nodes with tumor:  0   - Number of lymph nodes examined:  8   - Pathology: pT2 N0 MX   - Additional findings:   - No steatosis   - Trichrome stain:  Periportal fibrosis with early septa, 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 pylori infection: tx 2014 stool negative    Liver hemangioma    Pericardial effusion    VENKATA positive    History of 2019 novel coronavirus disease (COVID-19)    Elevated bilirubin    S/P pericardial window creation    Hepatocellular carcinoma    Hypophosphatemia    Elevated CA 19-9 level    Pleural effusion    Decreased ROM of intervertebral discs of cervical spine    Weakness of trunk musculature    Lung nodule 9/22            Abdominal Pain  This is a new problem. The current episode started 1 to 4 weeks ago. The onset quality is undetermined. The problem occurs daily. The most recent episode lasted 1 Hours. The problem has been unchanged. The pain is located in the suprapubic region. The pain is at a severity of 9/10. The pain is moderate. The quality of the pain is cramping and a sensation of fullness. Associated symptoms include diarrhea and flatus. Pertinent  negatives include no anorexia, arthralgias, belching, constipation, dysuria, fever, frequency, headaches, hematochezia, hematuria, melena, myalgias, nausea, vomiting or weight loss. The pain is aggravated by eating. The pain is relieved by Bowel movements and passing flatus. There is no history of abdominal surgery, colon cancer, Crohn's disease, gallstones, GERD, irritable bowel syndrome, pancreatitis, PUD or ulcerative colitis. Patient's medical history does not include kidney stones and UTI.   Review of Systems   Constitutional:  Negative for fever and weight loss.   Gastrointestinal:  Positive for diarrhea and flatus. Negative for abdominal pain, anorexia, constipation, hematochezia, melena, nausea and vomiting.        See patient's note which was completed prior.  On discussion today, no further abdominal pain.  Stools are a little bit loose but better since she has been using probiotics.  No blood in the stool and no mucus.  No fever.   Genitourinary:  Negative for dysuria, frequency and hematuria.   Musculoskeletal:  Negative for arthralgias and myalgias.   Neurological:  Negative for headaches.     Objective:   Physical Exam  Vitals and nursing note reviewed.   Constitutional:       Appearance: She is well-developed.   HENT:      Head: Normocephalic and atraumatic.      Right Ear: External ear normal.      Left Ear: External ear normal.      Nose: Nose normal.      Mouth/Throat:      Pharynx: No oropharyngeal exudate.   Eyes:      General: No scleral icterus.     Extraocular Movements: Extraocular movements intact.      Conjunctiva/sclera: Conjunctivae normal.   Neck:      Thyroid: No thyromegaly.      Vascular: No JVD.   Cardiovascular:      Rate and Rhythm: Normal rate and regular rhythm.      Heart sounds: Normal heart sounds. No murmur heard.    No gallop.   Pulmonary:      Effort: Pulmonary effort is normal. No respiratory distress.      Breath sounds: Normal breath sounds. No wheezing.   Abdominal:       General: Bowel sounds are normal. There is no distension.      Palpations: Abdomen is soft. There is no mass.      Tenderness: There is no abdominal tenderness. There is no guarding or rebound.   Musculoskeletal:         General: No tenderness. Normal range of motion.      Cervical back: Normal range of motion and neck supple.   Lymphadenopathy:      Cervical: No cervical adenopathy.   Skin:     General: Skin is warm.      Findings: No erythema or rash.   Neurological:      General: No focal deficit present.      Mental Status: She is alert and oriented to person, place, and time.      Cranial Nerves: No cranial nerve deficit.      Coordination: Coordination normal.   Psychiatric:         Behavior: Behavior normal.         Thought Content: Thought content normal.         Judgment: Judgment normal.       Assessment:       1. Traveler's diarrhea    2. Hepatocellular carcinoma    3. Lung nodule 9/22       Plan:           Melly was seen today for diarrhea.     Diagnoses and all orders for this visit:     Traveler's diarrhea  -     azithromycin (Z-JUSTIN) 250 MG tablet; Take 2 tablets by mouth on day 1; Take 1 tablet by mouth on days 2-5     Hepatocellular carcinoma     Lung nodule 9/22     Traveler's diarrhea cautions and alarm symptoms reviewed, she has improved  Keep appointment for hepatology follow-up as well as repeat MRI  Follow-up lung nodule, CT is already scheduled; chart to Pulmonary for review       Answers submitted by the patient for this visit:  Abdominal Pain Questionnaire (Submitted on 9/12/2022)  Chief Complaint: Abdominal pain  Chronicity: new  Onset: 1 to 4 weeks ago  Onset quality: undetermined  Frequency: daily  Episode duration: 1 Hours  Progression since onset: unchanged  Pain location: suprapubic region  Pain - numeric: 9/10  Pain quality: cramping, a sensation of fullness  anorexia: No  arthralgias: No  belching: No  constipation: No  diarrhea: Yes  dysuria: No  fever: No  flatus: Yes  frequency:  No  headaches: No  hematochezia: No  hematuria: No  melena: No  myalgias: No  nausea: No  weight loss: No  vomiting: No  Aggravated by: eating  Relieved by: bowel movements, passing flatus  Pain severity: moderate  abdominal surgery: No  colon cancer: No  Crohn's disease: No  gallstones: No  GERD: No  irritable bowel syndrome: No  kidney stones: No  pancreatitis: No  PUD: No  ulcerative colitis: No  UTI: No

## 2022-09-19 NOTE — PROGRESS NOTES
"Subjective:       Patient ID: Melly Hwang is a 42 y.o. female.    Chief Complaint: Diarrhea    Following closely in hepatology given her hepatocellular carcinoma     Found to have a new lung lesion, see details from chest CT of September 2022.     See notes from hepatology March 2022:  "known history of hepatic hemangiomas that were otherwise asymptomatic. She recently had a diagnosis of pericardial effusion of unknown etiology ultimately requiring pericardial window with cardiac surgery. She recovered well from those procedures without any significant cardiac dysfunction. Axial imaging showed new lesions in the right posterior segment had imaging characteristics not consistent with hemangioma and concerning for malignancy. 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      Interval History  Patient has undergone open right liver lobe resection 6/28/21 (partial hepatectomy segments V,VI,VII,VIII); and Portal lymphadenectomy on 6/28/21. Course was uncomplicated.   Pathology  Final Pathologic Diagnosis 1.  Gallbladder (cholecystectomy):   - Benign gallbladder with cholecystitis   2. Right lobe (partial hepatectomy):   - Positive for malignancy, see synoptic report below   - Separate hemangioma, 7.3 cm   3. Lymph nodes, portal (regional resection):   - 8 lymph nodes, negative for tumor (0/8)   ____________________________________________________________________________   ______   Hepatocellular carcinoma synoptic report   - Procedure:  Partial hepatectomy, right lobe   - Histologic type:  Hepatocellular carcinoma, scirrhous   - Histologic grade:  Moderately differentiated, grade 2   - Tumor focality:  Solitary   - Tumor characteristics:   - Tumor site:  Right lobe   - Tumor size:  3.3 cm   - Treatment effect:  No known pre-surgical therapy   - Satellitosis:  Not identified   - Tumor extent:  Confined to liver "   - Vascular invasion:  Present, Small vessel   - Perineural invasion:  Present   - Margins:   - All margins are negative for invasive carcinoma   - Closest margin to invasive carcinoma:  Parenchymal   - Distance from invasive  carcinoma to closest margin:  5 mm   - Regional lymph nodes:   - Number of lymph nodes with tumor:  0   - Number of lymph nodes examined:  8   - Pathology: pT2 N0 MX   - Additional findings:   - No steatosis   - Trichrome stain:  Periportal fibrosis with early septa, 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 pylori infection: tx 2014 stool negative    Liver hemangioma    Pericardial effusion    VENKATA positive    History of 2019 novel coronavirus disease (COVID-19)    Elevated bilirubin    S/P pericardial window creation    Hepatocellular carcinoma    Hypophosphatemia    Elevated CA 19-9 level    Pleural effusion    Decreased ROM of intervertebral discs of cervical spine    Weakness of trunk musculature    Lung nodule 9/22            Abdominal Pain  This is a new problem. The current episode started 1 to 4 weeks ago. The onset quality is undetermined. The problem occurs daily. The most recent episode lasted 1 Hours. The problem has been unchanged. The pain is located in the suprapubic region. The pain is at a severity of 9/10. The pain is moderate. The quality of the  pain is cramping and a sensation of fullness. Associated symptoms include diarrhea and flatus. Pertinent negatives include no anorexia, arthralgias, belching, constipation, dysuria, fever, frequency, headaches, hematochezia, hematuria, melena, myalgias, nausea, vomiting or weight loss. The pain is aggravated by eating. The pain is relieved by Bowel movements and passing flatus. There is no history of abdominal surgery, colon cancer, Crohn's disease, gallstones, GERD, irritable bowel syndrome, pancreatitis, PUD or ulcerative colitis. Patient's medical history does not include kidney stones and UTI.   Review of Systems   Constitutional:  Negative for fever and weight loss.   Gastrointestinal:  Positive for diarrhea and flatus. Negative for abdominal pain, anorexia, constipation, hematochezia, melena, nausea and vomiting.        See patient's note which was completed prior.  On discussion today, no further abdominal pain.  Stools are a little bit loose but better since she has been using probiotics.  No blood in the stool and no mucus.  No fever.   Genitourinary:  Negative for dysuria, frequency and hematuria.   Musculoskeletal:  Negative for arthralgias and myalgias.   Neurological:  Negative for headaches.     Objective:   Physical Exam  Vitals and nursing note reviewed.   Constitutional:       Appearance: She is well-developed.   HENT:      Head: Normocephalic and atraumatic.      Right Ear: External ear normal.      Left Ear: External ear normal.      Nose: Nose normal.      Mouth/Throat:      Pharynx: No oropharyngeal exudate.   Eyes:      General: No scleral icterus.     Extraocular Movements: Extraocular movements intact.      Conjunctiva/sclera: Conjunctivae normal.   Neck:      Thyroid: No thyromegaly.      Vascular: No JVD.   Cardiovascular:      Rate and Rhythm: Normal rate and regular rhythm.      Heart sounds: Normal heart sounds. No murmur heard.    No gallop.   Pulmonary:      Effort: Pulmonary effort is  normal. No respiratory distress.      Breath sounds: Normal breath sounds. No wheezing.   Abdominal:      General: Bowel sounds are normal. There is no distension.      Palpations: Abdomen is soft. There is no mass.      Tenderness: There is no abdominal tenderness. There is no guarding or rebound.   Musculoskeletal:         General: No tenderness. Normal range of motion.      Cervical back: Normal range of motion and neck supple.   Lymphadenopathy:      Cervical: No cervical adenopathy.   Skin:     General: Skin is warm.      Findings: No erythema or rash.   Neurological:      General: No focal deficit present.      Mental Status: She is alert and oriented to person, place, and time.      Cranial Nerves: No cranial nerve deficit.      Coordination: Coordination normal.   Psychiatric:         Behavior: Behavior normal.         Thought Content: Thought content normal.         Judgment: Judgment normal.       Assessment:       1. Traveler's diarrhea    2. Hepatocellular carcinoma    3. Lung nodule 9/22       Plan:           Melly was seen today for diarrhea.     Diagnoses and all orders for this visit:     Traveler's diarrhea  -     azithromycin (Z-JUSTIN) 250 MG tablet; Take 2 tablets by mouth on day 1; Take 1 tablet by mouth on days 2-5     Hepatocellular carcinoma     Lung nodule 9/22     Traveler's diarrhea cautions and alarm symptoms reviewed, she has improved  Keep appointment for hepatology follow-up as well as repeat MRI  Follow-up lung nodule, CT is already scheduled; chart to Pulmonary for review                                      Answers submitted by the patient for this visit:  Abdominal Pain Questionnaire (Submitted on 9/12/2022)  Chief Complaint: Abdominal pain  Chronicity: new  Onset: 1 to 4 weeks ago  Onset quality: undetermined  Frequency: daily  Episode duration: 1 Hours  Progression since onset: unchanged  Pain location: suprapubic region  Pain - numeric: 9/10  Pain quality: cramping, a sensation of  fullness  anorexia: No  arthralgias: No  belching: No  constipation: No  diarrhea: Yes  dysuria: No  fever: No  flatus: Yes  frequency: No  headaches: No  hematochezia: No  hematuria: No  melena: No  myalgias: No  nausea: No  weight loss: No  vomiting: No  Aggravated by: eating  Relieved by: bowel movements, passing flatus  Pain severity: moderate  abdominal surgery: No  colon cancer: No  Crohn's disease: No  gallstones: No  GERD: No  irritable bowel syndrome: No  kidney stones: No  pancreatitis: No  PUD: No  ulcerative colitis: No  UTI: No

## 2022-09-19 NOTE — TELEPHONE ENCOUNTER
----- Message from Rodríguez Carias MD sent at 9/19/2022 12:26 PM CDT -----  I will let Anusha see her and if there is any change in the nodule size I will see her.   Sincerely,  Jo Salcido Jv      ----- Message -----  From: Melly Sahu MD  Sent: 9/19/2022   8:48 AM CDT  To: Rodríguze Carias MD    [facility]:    You had seen her last year.  Anusha Rogers ordered a CT of the chest and she has a new lung nodule measuring 6 mm.  It looks like Anusha ordered another follow-up scan in 3 months.  Do you need to see her as well?  Thanks  Melly Sahu

## 2022-09-22 ENCOUNTER — PATIENT MESSAGE (OUTPATIENT)
Dept: INTERNAL MEDICINE | Facility: CLINIC | Age: 42
End: 2022-09-22
Payer: COMMERCIAL

## 2022-10-12 ENCOUNTER — PATIENT MESSAGE (OUTPATIENT)
Dept: INTERNAL MEDICINE | Facility: CLINIC | Age: 42
End: 2022-10-12
Payer: COMMERCIAL

## 2022-10-12 DIAGNOSIS — M54.2 NECK PAIN: Primary | ICD-10-CM

## 2022-10-12 NOTE — TELEPHONE ENCOUNTER
I recommend an appointment in [facility] for her neck issues, referral is in, thank you

## 2022-10-14 ENCOUNTER — HOSPITAL ENCOUNTER (OUTPATIENT)
Dept: RADIOLOGY | Facility: HOSPITAL | Age: 42
Discharge: HOME | End: 2022-10-14
Attending: ORTHOPAEDIC SURGERY
Payer: COMMERCIAL

## 2022-10-14 ENCOUNTER — OFFICE VISIT (OUTPATIENT)
Dept: ORTHOPEDICS | Facility: CLINIC | Age: 42
End: 2022-10-14
Payer: COMMERCIAL

## 2022-10-14 VITALS — HEIGHT: 71 IN | WEIGHT: 145.5 LBS | BODY MASS INDEX: 20.37 KG/M2

## 2022-10-14 DIAGNOSIS — M54.2 NECK PAIN: ICD-10-CM

## 2022-10-14 DIAGNOSIS — M50.30 DDD (DEGENERATIVE DISC DISEASE), CERVICAL: ICD-10-CM

## 2022-10-14 PROCEDURE — 1159F PR MEDICATION LIST DOCUMENTED IN MEDICAL RECORD: ICD-10-PCS | Mod: CPTII,S$GLB,, | Performed by: ORTHOPAEDIC SURGERY

## 2022-10-14 PROCEDURE — 1159F MED LIST DOCD IN RCRD: CPT | Mod: CPTII,S$GLB,, | Performed by: ORTHOPAEDIC SURGERY

## 2022-10-14 PROCEDURE — 99999 PR PBB SHADOW E&M-EST. PATIENT-LVL III: CPT | Mod: PBBFAC,,, | Performed by: ORTHOPAEDIC SURGERY

## 2022-10-14 PROCEDURE — 72050 X-RAY EXAM NECK SPINE 4/5VWS: CPT | Mod: TC

## 2022-10-14 PROCEDURE — 72050 X-RAY EXAM NECK SPINE 4/5VWS: CPT | Mod: 26,,, | Performed by: RADIOLOGY

## 2022-10-14 PROCEDURE — 72050 XR CERVICAL SPINE AP LAT WITH FLEX EXTEN: ICD-10-PCS | Mod: 26,,, | Performed by: RADIOLOGY

## 2022-10-14 PROCEDURE — 3044F HG A1C LEVEL LT 7.0%: CPT | Mod: CPTII,S$GLB,, | Performed by: ORTHOPAEDIC SURGERY

## 2022-10-14 PROCEDURE — 3044F PR MOST RECENT HEMOGLOBIN A1C LEVEL <7.0%: ICD-10-PCS | Mod: CPTII,S$GLB,, | Performed by: ORTHOPAEDIC SURGERY

## 2022-10-14 PROCEDURE — 99999 PR PBB SHADOW E&M-EST. PATIENT-LVL III: ICD-10-PCS | Mod: PBBFAC,,, | Performed by: ORTHOPAEDIC SURGERY

## 2022-10-14 PROCEDURE — 99204 PR OFFICE/OUTPT VISIT, NEW, LEVL IV, 45-59 MIN: ICD-10-PCS | Mod: S$GLB,,, | Performed by: ORTHOPAEDIC SURGERY

## 2022-10-14 PROCEDURE — 99204 OFFICE O/P NEW MOD 45 MIN: CPT | Mod: S$GLB,,, | Performed by: ORTHOPAEDIC SURGERY

## 2022-10-14 RX ORDER — METHYLPREDNISOLONE 4 MG/1
TABLET ORAL
Qty: 1 EACH | Refills: 0 | Status: SHIPPED | OUTPATIENT
Start: 2022-10-14 | End: 2022-11-04

## 2022-10-14 NOTE — PROGRESS NOTES
DATE: 10/14/2022  PATIENT: Melly Hwang    Supervising Physician: Kervin Storey M.D.    CHIEF COMPLAINT: neck and left arm pain    HISTORY:  Melly Hwang is a 42 y.o. female with a pmhx of hepatocellular carcinoma (sp right lobe liver resection 2021) here for initial evaluation of neck and left arm pain (Neck - 3, Arm - 0). The pain has been present intermittently for years, flaring up again a few days ago. The patient describes the pain as stiff in her neck and throbbing in her entire left arm and sharp behind her left shoulder blade. The pain is worse with head movements and improved by nothing. There is positive associated numbness and tingling into the left 5th finger. There is negative subjective weakness. Prior treatments have included PT, but no ESIs or surgery.     The patient denies myelopathic symptoms such as handwriting changes or difficulty with buttons/coins/keys. Denies perineal paresthesias, bowel/bladder dysfunction.    PAST MEDICAL/SURGICAL HISTORY:  Past Medical History:   Diagnosis Date    Abdominal pain 8/12/2021    VENKATA positive 8/20/2020    COVID-19     Deviated septum 2011    Hepatocellular carcinoma 6/9/2021    Hypertrophy of inferior nasal turbinate     Liver mass     Nasal congestion 2011    Pericardial effusion     Premature menopause      Past Surgical History:   Procedure Laterality Date    COLONOSCOPY N/A 09/21/2020    Procedure: COLONOSCOPY;  Surgeon: GIOVANNI Crane MD;  Location: [facility] ENDO (4TH FLR);  Service: Endoscopy;  Laterality: N/A;  + covid 4/22    HERNIA REPAIR      LIVER SURGERY N/A 06/28/2021    NASAL SEPTUM SURGERY      PERICARDIAL WINDOW N/A 02/22/2021    Procedure: CREATION, PERICARDIAL WINDOW;  Surgeon: Ajay Cantor MD;  Location: [facility] OR [facility] FLR;  Service: Cardiovascular;  Laterality: N/A;    PERICARDIOCENTESIS N/A 05/02/2020    Procedure: Pericardiocentesis;  Surgeon: Dickson Dangelo MD;  Location: [facility] CATH LAB;  Service: Cardiology;   Laterality: N/A;    TONSILLECTOMY         Medications:  Current Outpatient Medications on File Prior to Visit   Medication Sig Dispense Refill    ALPRAZolam (XANAX) 0.25 MG tablet Take 1 tablet prior to MRI procedure as needed for pre-procedural anxiety. 6 tablet 0    aluminum-magnesium hydroxide-simethicone (MAALOX) 200-200-20 mg/5 mL Susp Take 30 mLs by mouth 4 (four) times daily before meals and nightly. for 7 days 354 mL 0    azithromycin (Z-JUSTIN) 250 MG tablet Take 2 tablets by mouth on day 1; Take 1 tablet by mouth on days 2-5 6 tablet 0    cetirizine (ZYRTEC) 10 MG tablet TAKE 1 TABLET BY MOUTH EVERY DAY 90 tablet 2    cyclobenzaprine (FLEXERIL) 10 MG tablet TAKE 1 TABLET BY MOUTH EVERY DAY IN THE EVENING AS NEEDED FOR MUSCLE SPASMS 30 tablet 3    hydroquinone 4 % Crea Apply to affected areas nightly for a maximum of 3 month consecutively. 28.35 g 0    ibuprofen (ADVIL,MOTRIN) 600 MG tablet Take 1 tablet (600 mg total) by mouth 3 (three) times daily as needed for Pain. 30 tablet 2    ketoconazole (NIZORAL) 2 % shampoo Wash scalp and ears with medicated shampoo at least 1x/week - let sit at least 5 minutes prior to rinsing. 120 mL 5    multivitamin capsule Take by mouth. 1 capsule Oral Every morning      norethindrone-ethinyl estradiol (MICROGESTIN 1/20) 1-20 mg-mcg per tablet Take 1 tablet by mouth once daily. 63 tablet 4    omeprazole (PRILOSEC) 20 MG capsule TAKE 1 CAPSULE BY MOUTH DAILY AS NEEDED FOR GERD 90 capsule 1    PREVIDENT 5000 ORTHO DEFENSE 1.1 % Pste USE IN PLACE OF REGULAR TOOTH PASTE      triamcinolone acetonide 0.1% (KENALOG) 0.1 % cream Apply topically 2 (two) times daily as needed (scaling at external ears). Avoid use on face, body folds, groin/genitalia. 45 g 3     Current Facility-Administered Medications on File Prior to Visit   Medication Dose Route Frequency Provider Last Rate Last Admin    triamcinolone acetonide injection 10 mg  10 mg Intradermal 1 time in Clinic/HOD Yolande MCMILLAN  MD Yan        triamcinolone acetonide injection 10 mg  10 mg Intradermal 1 time in Clinic/HOD Yolande Heredia MD           Social History:   Social History     Socioeconomic History    Marital status: Single    Number of children: 1   Occupational History    Occupation:      Employer: [employer]   Tobacco Use    Smoking status: Never    Smokeless tobacco: Never   Substance and Sexual Activity    Alcohol use: Yes     Comment: rare    Drug use: No    Sexual activity: Yes     Partners: Male     Birth control/protection: OCP       REVIEW OF SYSTEMS:  Constitution: Negative. Negative for chills, fever and night sweats.   Cardiovascular: Negative for chest pain and syncope.   Respiratory: Negative for cough and shortness of breath.   Gastrointestinal: See HPI. Negative for nausea/vomiting. Negative for abdominal pain.  Genitourinary: See HPI. Negative for discoloration or dysuria.  Skin: Negative for dry skin, itching and rash.   Hematologic/Lymphatic: Negative for bleeding problem. Does not bruise/bleed easily.   Musculoskeletal: Negative for falls and muscle weakness.   Neurological: See HPI. No seizures.   Endocrine: Negative for polydipsia, polyphagia and polyuria.   Allergic/Immunologic: Negative for hives and persistent infections.  Psychiatric/Behavioral: Negative for depression and insomnia.         EXAM:  There were no vitals taken for this visit.    General: The patient is a very pleasant 42 y.o. female in no apparent distress, the patient is oriented to person, place and time.  Psych: Normal mood and affect  HEENT: Vision grossly intact, hearing intact to the spoken word.  Lungs: Respirations unlabored.  Gait: Normal station and gait, no difficulty with toe or heel walk.   Skin: Cervical skin negative for rashes, lesions, hairy patches and surgical scars.  Range of motion: Cervical range of motion is acceptable. There is negative tenderness to palpation.  Spinal Balance:  Global saggital and coronal spinal balance acceptable, no significant for scoliosis and kyphosis.  Musculoskeletal: No pain with the range of motion of the bilateral shoulders and elbows. Normal bulk and contour of the bilateral hands.  Vascular: Bilateral hands warm and well perfused, radial pulses 2+ bilaterally.  Neurological: Normal strength and tone in all major motor groups in the bilateral upper and lower extremities. Normal sensation to light touch in the C5-T1 and L2-S1 dermatomes bilaterally.  Deep tendon reflexes symmetric 2+ in the bilateral upper and lower extremities.  Negative Inverted Radial Reflex and Morrow's bilaterally. Negative Babinski bilaterally.     IMAGING:   Today I personally reviewed AP, Lat and Flex/Ex  upright C-spine films that demonstrate 5 mm of change from flexion to extension at both C2-3 and C3-4, consistent with translational instability at these levels. C4-5 facet joint narrowing and possible osseous fusion.     There is no height or weight on file to calculate BMI.    Hemoglobin A1C   Date Value Ref Range Status   03/22/2022 5.2 4.0 - 5.6 % Final     Comment:     ADA Screening Guidelines:  5.7-6.4%  Consistent with prediabetes  >or=6.5%  Consistent with diabetes    High levels of fetal hemoglobin interfere with the HbA1C  assay. Heterozygous hemoglobin variants (HbS, HgC, etc)do  not significantly interfere with this assay.   However, presence of multiple variants may affect accuracy.     02/18/2021 5.3 4.0 - 5.6 % Final     Comment:     ADA Screening Guidelines:  5.7-6.4%  Consistent with prediabetes  >or=6.5%  Consistent with diabetes    High levels of fetal hemoglobin interfere with the HbA1C  assay. Heterozygous hemoglobin variants (HbS, HgC, etc)do  not significantly interfere with this assay.   However, presence of multiple variants may affect accuracy.     03/02/2020 5.6 4.0 - 5.6 % Final     Comment:     ADA Screening Guidelines:  5.7-6.4%  Consistent with  prediabetes  >or=6.5%  Consistent with diabetes  High levels of fetal hemoglobin interfere with the HbA1C  assay. Heterozygous hemoglobin variants (HbS, HgC, etc)do  not significantly interfere with this assay.   However, presence of multiple variants may affect accuracy.             ASSESSMENT/PLAN:    Diagnoses and all orders for this visit:    Neck pain  -     Ambulatory referral[facility]consult to [facility]      Today we discussed at length all of the different treatment options including anti-inflammatories, acetaminophen, rest, ice, heat, physical therapy including strengthening and stretching exercises, home exercises, ROM, aerobic conditioning, aqua therapy, other modalities including ultrasound, massage, and dry needling, epidural steroid injections and finally surgical intervention.      Pt presents with acute on chronic left cervical radiculopathy. Will send medrol dose pack to pharmacy. I have provided the patient with a home exercise program. It is the [employer] cervical exercise program. Exercises include walking erectly with neutral head position, supine neutral head position, supine retraction, sitting or standing neck retraction, posture training, forward/backward/sideward isometric strengthening, prone head lifts, supine head lifts, scapular retraction, and neck rotation. Pt will complete each exercise twice daily for 6-8 weeks. Pt will fu if pain persists.

## 2022-10-24 ENCOUNTER — PATIENT MESSAGE (OUTPATIENT)
Dept: ORTHOPEDICS | Facility: CLINIC | Age: 42
End: 2022-10-24
Payer: COMMERCIAL

## 2022-10-25 DIAGNOSIS — M54.12 CERVICAL RADICULOPATHY: Primary | ICD-10-CM

## 2022-10-25 RX ORDER — DIAZEPAM 2 MG/1
2 TABLET ORAL
Qty: 3 TABLET | Refills: 0 | Status: SHIPPED | OUTPATIENT
Start: 2022-10-25 | End: 2022-12-22 | Stop reason: ALTCHOICE

## 2022-10-25 NOTE — PROGRESS NOTES
Spoke to pt over the phone. She was last seen on 10/14/22 and continues to have neck and left arm pain with numbness and tingling. She has tried a medrol dose pack and 8 weeks of PT in the last 6 months with no relief. Failure of conservative rx. Will obtain cervical MRI to further evaluate and call to discuss ESIs.

## 2022-11-07 ENCOUNTER — PATIENT MESSAGE (OUTPATIENT)
Dept: HEPATOLOGY | Facility: CLINIC | Age: 42
End: 2022-11-07
Payer: COMMERCIAL

## 2022-11-07 ENCOUNTER — PATIENT MESSAGE (OUTPATIENT)
Dept: ORTHOPEDICS | Facility: CLINIC | Age: 42
End: 2022-11-07
Payer: COMMERCIAL

## 2022-11-28 ENCOUNTER — HOSPITAL ENCOUNTER (OUTPATIENT)
Dept: RADIOLOGY | Facility: HOSPITAL | Age: 42
Discharge: HOME | End: 2022-11-28
Attending: INTERNAL MEDICINE
Payer: COMMERCIAL

## 2022-11-28 ENCOUNTER — PATIENT MESSAGE (OUTPATIENT)
Dept: ORTHOPEDICS | Facility: CLINIC | Age: 42
End: 2022-11-28
Payer: COMMERCIAL

## 2022-11-28 ENCOUNTER — HOSPITAL ENCOUNTER (OUTPATIENT)
Dept: RADIOLOGY | Facility: HOSPITAL | Age: 42
Discharge: HOME | End: 2022-11-28
Attending: ORTHOPAEDIC SURGERY
Payer: COMMERCIAL

## 2022-11-28 ENCOUNTER — PATIENT MESSAGE (OUTPATIENT)
Dept: HEPATOLOGY | Facility: CLINIC | Age: 42
End: 2022-11-28
Payer: COMMERCIAL

## 2022-11-28 DIAGNOSIS — C22.0 HEPATOMA: Primary | ICD-10-CM

## 2022-11-28 DIAGNOSIS — Z85.05 HISTORY OF MALIGNANT HEPATOMA: ICD-10-CM

## 2022-11-28 DIAGNOSIS — M54.12 CERVICAL RADICULOPATHY: ICD-10-CM

## 2022-11-28 PROCEDURE — 72141 MRI NECK SPINE W/O DYE: CPT | Mod: 26,,, | Performed by: RADIOLOGY

## 2022-11-28 PROCEDURE — 74183 MRI ABD W/O CNTR FLWD CNTR: CPT | Mod: 26,,, | Performed by: INTERNAL MEDICINE

## 2022-11-28 PROCEDURE — 74183 MRI ABD W/O CNTR FLWD CNTR: CPT | Mod: TC

## 2022-11-28 PROCEDURE — 74183 MRI ABDOMEN W WO CONTRAST: ICD-10-PCS | Mod: 26,,, | Performed by: INTERNAL MEDICINE

## 2022-11-28 PROCEDURE — 71250 CT THORAX DX C-: CPT | Mod: TC

## 2022-11-28 PROCEDURE — 72141 MRI NECK SPINE W/O DYE: CPT | Mod: TC

## 2022-11-28 PROCEDURE — 72141 MRI CERVICAL SPINE WITHOUT CONTRAST: ICD-10-PCS | Mod: 26,,, | Performed by: RADIOLOGY

## 2022-11-28 PROCEDURE — 71250 CT THORAX DX C-: CPT | Mod: 26,,, | Performed by: RADIOLOGY

## 2022-11-28 PROCEDURE — 71250 CT CHEST WITHOUT CONTRAST: ICD-10-PCS | Mod: 26,,, | Performed by: RADIOLOGY

## 2022-11-28 PROCEDURE — 25500020 PHARM REV CODE 255: Performed by: INTERNAL MEDICINE

## 2022-11-28 PROCEDURE — A9585 GADOBUTROL INJECTION: HCPCS | Performed by: INTERNAL MEDICINE

## 2022-11-28 RX ORDER — GADOBUTROL 604.72 MG/ML
10 INJECTION INTRAVENOUS
Status: COMPLETED | OUTPATIENT
Start: 2022-11-28 | End: 2022-11-28

## 2022-11-28 RX ADMIN — GADOBUTROL 10 ML: 604.72 INJECTION INTRAVENOUS at 11:11

## 2022-11-29 ENCOUNTER — PATIENT MESSAGE (OUTPATIENT)
Dept: HEPATOLOGY | Facility: CLINIC | Age: 42
End: 2022-11-29
Payer: COMMERCIAL

## 2022-12-11 ENCOUNTER — PATIENT MESSAGE (OUTPATIENT)
Dept: INTERNAL MEDICINE | Facility: CLINIC | Age: 42
End: 2022-12-11
Payer: COMMERCIAL

## 2022-12-14 ENCOUNTER — OFFICE VISIT (OUTPATIENT)
Dept: PULMONOLOGY | Facility: CLINIC | Age: 42
End: 2022-12-14
Payer: COMMERCIAL

## 2022-12-14 VITALS
OXYGEN SATURATION: 97 % | HEIGHT: 71 IN | WEIGHT: 148.13 LBS | BODY MASS INDEX: 20.74 KG/M2 | SYSTOLIC BLOOD PRESSURE: 120 MMHG | HEART RATE: 64 BPM | DIASTOLIC BLOOD PRESSURE: 73 MMHG

## 2022-12-14 DIAGNOSIS — R91.1 SOLITARY PULMONARY NODULE: Primary | ICD-10-CM

## 2022-12-14 DIAGNOSIS — R91.1 LUNG NODULE: ICD-10-CM

## 2022-12-14 PROCEDURE — 1160F RVW MEDS BY RX/DR IN RCRD: CPT | Mod: CPTII,S$GLB,, | Performed by: INTERNAL MEDICINE

## 2022-12-14 PROCEDURE — 99214 PR OFFICE/OUTPT VISIT, EST, LEVL IV, 30-39 MIN: ICD-10-PCS | Mod: S$GLB,,, | Performed by: INTERNAL MEDICINE

## 2022-12-14 PROCEDURE — 3008F BODY MASS INDEX DOCD: CPT | Mod: CPTII,S$GLB,, | Performed by: INTERNAL MEDICINE

## 2022-12-14 PROCEDURE — 3074F PR MOST RECENT SYSTOLIC BLOOD PRESSURE < 130 MM HG: ICD-10-PCS | Mod: CPTII,S$GLB,, | Performed by: INTERNAL MEDICINE

## 2022-12-14 PROCEDURE — 3078F PR MOST RECENT DIASTOLIC BLOOD PRESSURE < 80 MM HG: ICD-10-PCS | Mod: CPTII,S$GLB,, | Performed by: INTERNAL MEDICINE

## 2022-12-14 PROCEDURE — 99999 PR PBB SHADOW E&M-EST. PATIENT-LVL IV: CPT | Mod: PBBFAC,,, | Performed by: INTERNAL MEDICINE

## 2022-12-14 PROCEDURE — 3044F HG A1C LEVEL LT 7.0%: CPT | Mod: CPTII,S$GLB,, | Performed by: INTERNAL MEDICINE

## 2022-12-14 PROCEDURE — 99999 PR PBB SHADOW E&M-EST. PATIENT-LVL IV: ICD-10-PCS | Mod: PBBFAC,,, | Performed by: INTERNAL MEDICINE

## 2022-12-14 PROCEDURE — 3044F PR MOST RECENT HEMOGLOBIN A1C LEVEL <7.0%: ICD-10-PCS | Mod: CPTII,S$GLB,, | Performed by: INTERNAL MEDICINE

## 2022-12-14 PROCEDURE — 99214 OFFICE O/P EST MOD 30 MIN: CPT | Mod: S$GLB,,, | Performed by: INTERNAL MEDICINE

## 2022-12-14 PROCEDURE — 1160F PR REVIEW ALL MEDS BY PRESCRIBER/CLIN PHARMACIST DOCUMENTED: ICD-10-PCS | Mod: CPTII,S$GLB,, | Performed by: INTERNAL MEDICINE

## 2022-12-14 PROCEDURE — 3078F DIAST BP <80 MM HG: CPT | Mod: CPTII,S$GLB,, | Performed by: INTERNAL MEDICINE

## 2022-12-14 PROCEDURE — 3074F SYST BP LT 130 MM HG: CPT | Mod: CPTII,S$GLB,, | Performed by: INTERNAL MEDICINE

## 2022-12-14 PROCEDURE — 1159F MED LIST DOCD IN RCRD: CPT | Mod: CPTII,S$GLB,, | Performed by: INTERNAL MEDICINE

## 2022-12-14 PROCEDURE — 3008F PR BODY MASS INDEX (BMI) DOCUMENTED: ICD-10-PCS | Mod: CPTII,S$GLB,, | Performed by: INTERNAL MEDICINE

## 2022-12-14 PROCEDURE — 1159F PR MEDICATION LIST DOCUMENTED IN MEDICAL RECORD: ICD-10-PCS | Mod: CPTII,S$GLB,, | Performed by: INTERNAL MEDICINE

## 2022-12-14 NOTE — PROGRESS NOTES
Subjective:       Patient ID: Melly Hwang is a 42 y.o. female.    Chief Complaint: CT Scan Resuts    HPI    42-year-old lady here for follow-up of pulmonary nodule.  Patient feels well is having some anxiety about the HCC and the pulmonary nodules.  No complaints of cough shortness of breath except with her anxiety.  Patient had a CT scan in March of this year and the right lower lobe lung nodule was not noted.  It was repeated in September where it was found to be 7 mm or so and then repeated the end of November and increased to 8 mm.  It has a smooth contour and patient is at low risk no history of cancer otherwise and no tobacco history.  There is another right upper lobe nodule that is unchanged from September.  Review of Systems    Objective:      Physical Exam  Personal Diagnostic Review    No flowsheet data found.      Assessment:       1. Solitary pulmonary nodule          Outpatient Encounter Medications as of 12/14/2022   Medication Sig Dispense Refill    ALPRAZolam (XANAX) 0.25 MG tablet Take 1 tablet prior to MRI procedure as needed for pre-procedural anxiety. 6 tablet 0    cetirizine (ZYRTEC) 10 MG tablet TAKE 1 TABLET BY MOUTH EVERY DAY 90 tablet 2    cyclobenzaprine (FLEXERIL) 10 MG tablet TAKE 1 TABLET BY MOUTH EVERY DAY IN THE EVENING AS NEEDED FOR MUSCLE SPASMS 30 tablet 3    hydroquinone 4 % Crea Apply to affected areas nightly for a maximum of 3 month consecutively. 28.35 g 0    ketoconazole (NIZORAL) 2 % shampoo Wash scalp and ears with medicated shampoo at least 1x/week - let sit at least 5 minutes prior to rinsing. 120 mL 5    multivitamin capsule Take by mouth. 1 capsule Oral Every morning      norethindrone-ethinyl estradiol (MICROGESTIN 1/20) 1-20 mg-mcg per tablet Take 1 tablet by mouth once daily. 63 tablet 4    omeprazole (PRILOSEC) 20 MG capsule TAKE 1 CAPSULE BY MOUTH DAILY AS NEEDED FOR GERD 90 capsule 1    triamcinolone acetonide 0.1% (KENALOG) 0.1 % cream Apply topically  2 (two) times daily as needed (scaling at external ears). Avoid use on face, body folds, groin/genitalia. 45 g 3    aluminum-magnesium hydroxide-simethicone (MAALOX) 200-200-20 mg/5 mL Susp Take 30 mLs by mouth 4 (four) times daily before meals and nightly. for 7 days 354 mL 0    azithromycin (Z-JUSTIN) 250 MG tablet Take 2 tablets by mouth on day 1; Take 1 tablet by mouth on days 2-5 6 tablet 0    diazePAM (VALIUM) 2 MG tablet Take 1 tablet (2 mg total) by mouth On call Procedure for Anxiety (take one tablet 30 min before MRI and second as needed). 3 tablet 0    ibuprofen (ADVIL,MOTRIN) 600 MG tablet Take 1 tablet (600 mg total) by mouth 3 (three) times daily as needed for Pain. 30 tablet 2    PREVIDENT 5000 ORTHO DEFENSE 1.1 % Pste USE IN PLACE OF REGULAR TOOTH PASTE       No facility-administered encounter medications on file as of 12/14/2022.     Orders Placed This Encounter   Procedures    CT Chest Without Contrast     Standing Status:   Future     Standing Expiration Date:   12/14/2023       Plan:           Assessment:  Melly was seen today for ct scan resuts.    Diagnoses and all orders for this visit:    Solitary pulmonary nodule  -     CT Chest Without Contrast; Future    Lung nodule 9/22        Plan:  Problem List Items Addressed This Visit       Lung nodule 9/22    Overview     Plan to repeat CT in 3 months.  If at that time the nodule has grown we will consider a PET scan and possibly biopsy if necessary.          Other Visit Diagnoses       Solitary pulmonary nodule    -  Primary    Relevant Orders    CT Chest Without Contrast              No follow-ups on file.    There are no Patient Instructions on file for this visit.    Immunization History   Administered Date(s) Administered    COVID-19, MRNA, LN-S, PF (Pfizer) (Purple Cap) 12/31/2020, 01/22/2021, 12/16/2021    DTaP 1979, 1979, 05/12/1980, 01/29/1981, 05/18/1982, 08/07/1984    Hepatitis A, Adult 02/09/2021, 08/10/2021    Hepatitis B,  Adult 10/14/2014    IPV 1979, 1979, 01/29/1981, 05/18/1982, 08/07/1984    Influenza 10/03/2018, 10/03/2019    Influenza - Quadrivalent 10/10/2017    Influenza - Quadrivalent - PF *Preferred* (6 months and older) 09/22/2015, 09/25/2017, 10/03/2018, 10/03/2019, 10/07/2020, 10/08/2021    Influenza A (H1N1) 2009 Monovalent - IM 11/21/2009    Influenza Split 09/22/2015    Measles 11/17/1980, 04/01/1997    Meningococcal Conjugate (MCV4P) 10/24/2014    Mumps 11/17/1980, 04/02/1997    Pneumococcal Conjugate - 13 Valent 03/21/2022    Pneumococcal Polysaccharide - 23 Valent 05/20/2020    Rubella 11/17/1980, 04/01/1997    Td (ADULT) 04/14/1996, 06/01/2007, 06/11/2007    Tdap 10/24/2014

## 2022-12-22 ENCOUNTER — OFFICE VISIT (OUTPATIENT)
Dept: INTERNAL MEDICINE | Facility: CLINIC | Age: 42
End: 2022-12-22
Payer: COMMERCIAL

## 2022-12-22 DIAGNOSIS — F41.9 ANXIETY: Primary | ICD-10-CM

## 2022-12-22 PROCEDURE — 3044F HG A1C LEVEL LT 7.0%: CPT | Mod: CPTII,S$GLB,, | Performed by: INTERNAL MEDICINE

## 2022-12-22 PROCEDURE — 99999 PR PBB SHADOW E&M-EST. PATIENT-LVL V: ICD-10-PCS | Mod: PBBFAC,,, | Performed by: INTERNAL MEDICINE

## 2022-12-22 PROCEDURE — 3008F PR BODY MASS INDEX (BMI) DOCUMENTED: ICD-10-PCS | Mod: CPTII,S$GLB,, | Performed by: INTERNAL MEDICINE

## 2022-12-22 PROCEDURE — 3044F PR MOST RECENT HEMOGLOBIN A1C LEVEL <7.0%: ICD-10-PCS | Mod: CPTII,S$GLB,, | Performed by: INTERNAL MEDICINE

## 2022-12-22 PROCEDURE — 3008F BODY MASS INDEX DOCD: CPT | Mod: CPTII,S$GLB,, | Performed by: INTERNAL MEDICINE

## 2022-12-22 PROCEDURE — 99213 PR OFFICE/OUTPT VISIT, EST, LEVL III, 20-29 MIN: ICD-10-PCS | Mod: S$GLB,,, | Performed by: INTERNAL MEDICINE

## 2022-12-22 PROCEDURE — 1159F MED LIST DOCD IN RCRD: CPT | Mod: CPTII,S$GLB,, | Performed by: INTERNAL MEDICINE

## 2022-12-22 PROCEDURE — 1159F PR MEDICATION LIST DOCUMENTED IN MEDICAL RECORD: ICD-10-PCS | Mod: CPTII,S$GLB,, | Performed by: INTERNAL MEDICINE

## 2022-12-22 PROCEDURE — 99213 OFFICE O/P EST LOW 20 MIN: CPT | Mod: S$GLB,,, | Performed by: INTERNAL MEDICINE

## 2022-12-22 PROCEDURE — 3074F SYST BP LT 130 MM HG: CPT | Mod: CPTII,S$GLB,, | Performed by: INTERNAL MEDICINE

## 2022-12-22 PROCEDURE — 3079F DIAST BP 80-89 MM HG: CPT | Mod: CPTII,S$GLB,, | Performed by: INTERNAL MEDICINE

## 2022-12-22 PROCEDURE — 99999 PR PBB SHADOW E&M-EST. PATIENT-LVL V: CPT | Mod: PBBFAC,,, | Performed by: INTERNAL MEDICINE

## 2022-12-22 PROCEDURE — 3079F PR MOST RECENT DIASTOLIC BLOOD PRESSURE 80-89 MM HG: ICD-10-PCS | Mod: CPTII,S$GLB,, | Performed by: INTERNAL MEDICINE

## 2022-12-22 PROCEDURE — 3074F PR MOST RECENT SYSTOLIC BLOOD PRESSURE < 130 MM HG: ICD-10-PCS | Mod: CPTII,S$GLB,, | Performed by: INTERNAL MEDICINE

## 2022-12-22 RX ORDER — ALPRAZOLAM 0.5 MG/1
0.5 TABLET ORAL 2 TIMES DAILY PRN
Qty: 30 TABLET | Refills: 2 | Status: SHIPPED | OUTPATIENT
Start: 2022-12-22 | End: 2023-03-03

## 2022-12-26 VITALS
OXYGEN SATURATION: 99 % | BODY MASS INDEX: 21.24 KG/M2 | SYSTOLIC BLOOD PRESSURE: 128 MMHG | TEMPERATURE: 99 F | DIASTOLIC BLOOD PRESSURE: 84 MMHG | HEIGHT: 71 IN | WEIGHT: 151.69 LBS | HEART RATE: 88 BPM

## 2022-12-26 NOTE — PROGRESS NOTES
Subjective:       Patient ID: Melly Hwang is a 42 y.o. female.    Chief Complaint: Anxiety    HPI  She complains of anxiety  Review of Systems   Constitutional:  Negative for chills, fatigue, fever and unexpected weight change.   Respiratory:  Negative for chest tightness and shortness of breath.    Cardiovascular:  Negative for chest pain and palpitations.   Gastrointestinal:  Negative for abdominal pain and blood in stool.   Neurological:  Negative for dizziness, syncope, numbness and headaches.     Objective:      Physical Exam  HENT:      Right Ear: External ear normal.      Left Ear: External ear normal.      Nose: Nose normal.      Mouth/Throat:      Mouth: Mucous membranes are moist.      Pharynx: Oropharynx is clear.   Eyes:      Pupils: Pupils are equal, round, and reactive to light.   Cardiovascular:      Rate and Rhythm: Normal rate and regular rhythm.      Heart sounds: No murmur heard.  Pulmonary:      Breath sounds: Normal breath sounds.   Abdominal:      General: There is no distension.      Palpations: There is no hepatomegaly or splenomegaly.      Tenderness: There is no abdominal tenderness.   Musculoskeletal:      Cervical back: Normal range of motion.   Lymphadenopathy:      Cervical: No cervical adenopathy.      Upper Body:      Right upper body: No axillary adenopathy.      Left upper body: No axillary adenopathy.   Neurological:      Cranial Nerves: No cranial nerve deficit.      Sensory: No sensory deficit.      Motor: Motor function is intact.      Deep Tendon Reflexes: Reflexes are normal and symmetric.       Assessment/Plan       Assessment and plan:  Anxiety.  Schedule psychiatry appointment.  She was here for urgent care only appointment.  She will follow-up with her primary care physician for a physical

## 2022-12-28 DIAGNOSIS — R05.9 COUGH, UNSPECIFIED TYPE: Primary | ICD-10-CM

## 2022-12-28 RX ORDER — AZITHROMYCIN 250 MG/1
TABLET, FILM COATED ORAL
Qty: 6 TABLET | Refills: 0 | Status: SHIPPED | OUTPATIENT
Start: 2022-12-28 | End: 2023-01-02

## 2023-02-06 RX ORDER — NAPROXEN 500 MG/1
500 TABLET ORAL 2 TIMES DAILY
OUTPATIENT
Start: 2023-02-06

## 2023-02-22 ENCOUNTER — OFFICE VISIT (OUTPATIENT)
Dept: INTERNAL MEDICINE | Facility: CLINIC | Age: 43
End: 2023-02-22
Payer: COMMERCIAL

## 2023-02-22 VITALS
BODY MASS INDEX: 21.11 KG/M2 | HEIGHT: 71 IN | SYSTOLIC BLOOD PRESSURE: 130 MMHG | HEART RATE: 79 BPM | WEIGHT: 150.81 LBS | OXYGEN SATURATION: 99 % | DIASTOLIC BLOOD PRESSURE: 80 MMHG

## 2023-02-22 DIAGNOSIS — R11.0 NAUSEA: ICD-10-CM

## 2023-02-22 DIAGNOSIS — K21.9 GASTROESOPHAGEAL REFLUX DISEASE, UNSPECIFIED WHETHER ESOPHAGITIS PRESENT: ICD-10-CM

## 2023-02-22 DIAGNOSIS — R05.3 PERSISTENT COUGH: ICD-10-CM

## 2023-02-22 DIAGNOSIS — R09.81 CONGESTION OF PARANASAL SINUS: Primary | ICD-10-CM

## 2023-02-22 PROCEDURE — 3075F SYST BP GE 130 - 139MM HG: CPT | Mod: CPTII,S$GLB,, | Performed by: INTERNAL MEDICINE

## 2023-02-22 PROCEDURE — 3075F PR MOST RECENT SYSTOLIC BLOOD PRESS GE 130-139MM HG: ICD-10-PCS | Mod: CPTII,S$GLB,, | Performed by: INTERNAL MEDICINE

## 2023-02-22 PROCEDURE — 3008F PR BODY MASS INDEX (BMI) DOCUMENTED: ICD-10-PCS | Mod: CPTII,S$GLB,, | Performed by: INTERNAL MEDICINE

## 2023-02-22 PROCEDURE — 3079F DIAST BP 80-89 MM HG: CPT | Mod: CPTII,S$GLB,, | Performed by: INTERNAL MEDICINE

## 2023-02-22 PROCEDURE — 99213 PR OFFICE/OUTPT VISIT, EST, LEVL III, 20-29 MIN: ICD-10-PCS | Mod: S$GLB,,, | Performed by: INTERNAL MEDICINE

## 2023-02-22 PROCEDURE — 1159F PR MEDICATION LIST DOCUMENTED IN MEDICAL RECORD: ICD-10-PCS | Mod: CPTII,S$GLB,, | Performed by: INTERNAL MEDICINE

## 2023-02-22 PROCEDURE — 1159F MED LIST DOCD IN RCRD: CPT | Mod: CPTII,S$GLB,, | Performed by: INTERNAL MEDICINE

## 2023-02-22 PROCEDURE — 99999 PR PBB SHADOW E&M-EST. PATIENT-LVL III: CPT | Mod: PBBFAC,,, | Performed by: INTERNAL MEDICINE

## 2023-02-22 PROCEDURE — 99999 PR PBB SHADOW E&M-EST. PATIENT-LVL III: ICD-10-PCS | Mod: PBBFAC,,, | Performed by: INTERNAL MEDICINE

## 2023-02-22 PROCEDURE — 99213 OFFICE O/P EST LOW 20 MIN: CPT | Mod: S$GLB,,, | Performed by: INTERNAL MEDICINE

## 2023-02-22 PROCEDURE — 3008F BODY MASS INDEX DOCD: CPT | Mod: CPTII,S$GLB,, | Performed by: INTERNAL MEDICINE

## 2023-02-22 PROCEDURE — 3079F PR MOST RECENT DIASTOLIC BLOOD PRESSURE 80-89 MM HG: ICD-10-PCS | Mod: CPTII,S$GLB,, | Performed by: INTERNAL MEDICINE

## 2023-02-22 RX ORDER — BENZONATATE 100 MG/1
100 CAPSULE ORAL 3 TIMES DAILY PRN
Qty: 30 CAPSULE | Refills: 0 | Status: SHIPPED | OUTPATIENT
Start: 2023-02-22 | End: 2023-03-04

## 2023-02-22 RX ORDER — OMEPRAZOLE 20 MG/1
CAPSULE, DELAYED RELEASE ORAL
Qty: 90 CAPSULE | Refills: 1 | Status: SHIPPED | OUTPATIENT
Start: 2023-02-22 | End: 2023-11-02

## 2023-02-22 RX ORDER — PROMETHAZINE HYDROCHLORIDE AND CODEINE PHOSPHATE 6.25; 1 MG/5ML; MG/5ML
5 SOLUTION ORAL EVERY 8 HOURS PRN
Qty: 118 ML | Refills: 0 | Status: SHIPPED | OUTPATIENT
Start: 2023-02-22 | End: 2023-03-03

## 2023-02-22 RX ORDER — AZITHROMYCIN 250 MG/1
TABLET, FILM COATED ORAL
Qty: 6 TABLET | Refills: 0 | Status: SHIPPED | OUTPATIENT
Start: 2023-02-22 | End: 2023-02-27

## 2023-02-22 NOTE — PROGRESS NOTES
Subjective:       Patient ID: Melly Hwnag is a 42 y.o. female.    Chief Complaint: Cough and Sinus Problem      HPI  Melly Hwang is a 42 y.o. year old female with HCC s/p resection, pulmonary nodule. Considered in remission since 6/28/2021.    On going for >1 week. Cough is worsening. Has not swabbed self. Teeth are aching for the past week.     Frontal sinuses feel congested  L ear feels like there is fluid  Feels something draining back of throat    Cough - clear, no sputum, dry    Does not wish to get covid-19 or flu swabbed.    Review of Systems   HENT:  Positive for congestion and sinus pressure.    Respiratory:  Positive for cough.        Past Medical History:   Diagnosis Date    Abdominal pain 8/12/2021    VENKATA positive 8/20/2020    COVID-19     Deviated septum 2011    Hepatocellular carcinoma 6/9/2021    Hypertrophy of inferior nasal turbinate     Liver mass     Nasal congestion 2011    Pericardial effusion     Premature menopause         Prior to Admission medications    Medication Sig Start Date End Date Taking? Authorizing Provider   cetirizine (ZYRTEC) 10 MG tablet TAKE 1 TABLET BY MOUTH EVERY DAY 7/14/22  Yes Melly Sahu MD   cyclobenzaprine (FLEXERIL) 10 MG tablet TAKE 1 TABLET BY MOUTH EVERY DAY IN THE EVENING AS NEEDED FOR MUSCLE SPASMS 4/11/22  Yes Melly Sahu MD   ketoconazole (NIZORAL) 2 % shampoo Wash scalp and ears with medicated shampoo at least 1x/week - let sit at least 5 minutes prior to rinsing. 10/18/21  Yes Yolande Heredia MD   multivitamin capsule Take by mouth. 1 capsule Oral Every morning   Yes Historical Provider   norethindrone-ethinyl estradiol (MICROGESTIN 1/20) 1-20 mg-mcg per tablet Take 1 tablet by mouth once daily. 3/7/22  Yes Stephanie Saunders MD   omeprazole (PRILOSEC) 20 MG capsule TAKE 1 CAPSULE BY MOUTH DAILY AS NEEDED FOR GERD 1/3/22  Yes Jacqueline Yun, ASHLEY   triamcinolone acetonide 0.1% (KENALOG) 0.1 % cream Apply topically 2  "(two) times daily as needed (scaling at external ears). Avoid use on face, body folds, groin/genitalia. 10/18/21  Yes Yolande Heredia MD   ALPRAZolam (XANAX) 0.5 MG tablet Take 1 tablet (0.5 mg total) by mouth 2 (two) times daily as needed for Anxiety. 12/22/22 1/21/23  America Galeano MD   aluminum-magnesium hydroxide-simethicone (MAALOX) 200-200-20 mg/5 mL Susp Take 30 mLs by mouth 4 (four) times daily before meals and nightly. for 7 days 8/15/22 8/22/22  David Sloan MD   hydroquinone 4 % Crea Apply to affected areas nightly for a maximum of 3 month consecutively. 4/14/22   Yolande Heredia MD        Past medical history, surgical history, and family medical history reviewed and updated as appropriate.    Medications and allergies reviewed.     Objective:          Vitals:    02/22/23 1316   BP: 130/80   BP Location: Right arm   Patient Position: Sitting   Pulse: 79   SpO2: 99%   Weight: 68.4 kg (150 lb 12.7 oz)   Height: 5' 11" (1.803 m)     Body mass index is 21.03 kg/m².  Physical Exam  Constitutional:       Appearance: She is well-developed.   HENT:      Head: Normocephalic and atraumatic.      Right Ear: Tympanic membrane normal.      Left Ear: Tympanic membrane normal.      Mouth/Throat:      Pharynx: No posterior oropharyngeal erythema.   Eyes:      Extraocular Movements: Extraocular movements intact.   Cardiovascular:      Rate and Rhythm: Normal rate and regular rhythm.      Heart sounds: Normal heart sounds.   Pulmonary:      Effort: Pulmonary effort is normal. No respiratory distress.      Breath sounds: Normal breath sounds. No wheezing.   Abdominal:      General: Bowel sounds are normal. There is no distension.      Palpations: Abdomen is soft.      Tenderness: There is no abdominal tenderness.   Musculoskeletal:         General: No tenderness. Normal range of motion.      Cervical back: Normal range of motion.   Lymphadenopathy:      Cervical: No cervical adenopathy.   Skin:     " General: Skin is warm and dry.   Neurological:      Mental Status: She is alert and oriented to person, place, and time.      Cranial Nerves: No cranial nerve deficit.      Deep Tendon Reflexes: Reflexes are normal and symmetric.       Lab Results   Component Value Date    WBC 4.90 08/15/2022    HGB 14.2 08/15/2022    HCT 44.7 08/15/2022     08/15/2022    CHOL 218 (H) 03/18/2021    TRIG 73 03/18/2021    HDL 67 03/18/2021    ALT 14 08/15/2022    AST 16 08/15/2022     08/15/2022    K 3.9 08/15/2022     08/15/2022    CREATININE 0.9 08/15/2022    BUN 8 08/15/2022    CO2 27 08/15/2022    TSH 1.327 03/22/2022    INR 1.0 11/12/2021    HGBA1C 5.2 03/22/2022       Assessment:       1. Congestion of paranasal sinus    2. Nausea    3. Gastroesophageal reflux disease, unspecified whether esophagitis present    4. Persistent cough          Plan:     Melly was seen today for cough and sinus problem.    Diagnoses and all orders for this visit:    Congestion of paranasal sinus  -     Cancel: POCT COVID-19 Rapid Screening  -     Cancel: POCT Influenza A/B Molecular  -     azithromycin (Z-JUSTIN) 250 MG tablet; Take 2 tablets by mouth on day 1; Take 1 tablet by mouth on days 2-5    Nausea  -     omeprazole (PRILOSEC) 20 MG capsule; TAKE 1 CAPSULE BY MOUTH DAILY AS NEEDED FOR GERD    Gastroesophageal reflux disease, unspecified whether esophagitis present  -     omeprazole (PRILOSEC) 20 MG capsule; TAKE 1 CAPSULE BY MOUTH DAILY AS NEEDED FOR GERD    Persistent cough  -     benzonatate (TESSALON) 100 MG capsule; Take 1 capsule (100 mg total) by mouth 3 (three) times daily as needed for Cough.  -     promethazine-codeine 6.25-10 mg/5 ml (PHENERGAN WITH CODEINE) 6.25-10 mg/5 mL syrup; Take 5 mLs by mouth every 8 (eight) hours as needed for Cough (Take at night.).  -     azithromycin (Z-JUSTIN) 250 MG tablet; Take 2 tablets by mouth on day 1; Take 1 tablet by mouth on days 2-5    Patient declines covid-19 and flu swab.      Discussed starting zpak if symptoms not improving after 3 days.    Health maintenance reviewed with patient.     Follow up if symptoms worsen or fail to improve.    Angus Watkins MD  Internal Medicine / Primary Care  [facility]  2/22/2023

## 2023-03-03 ENCOUNTER — OFFICE VISIT (OUTPATIENT)
Dept: INTERNAL MEDICINE | Facility: CLINIC | Age: 43
End: 2023-03-03
Payer: COMMERCIAL

## 2023-03-03 VITALS
HEIGHT: 71 IN | BODY MASS INDEX: 20.58 KG/M2 | SYSTOLIC BLOOD PRESSURE: 120 MMHG | WEIGHT: 147 LBS | DIASTOLIC BLOOD PRESSURE: 75 MMHG

## 2023-03-03 DIAGNOSIS — B96.89 ACUTE BACTERIAL SINUSITIS: Primary | ICD-10-CM

## 2023-03-03 DIAGNOSIS — C22.0 HEPATOCELLULAR CARCINOMA: ICD-10-CM

## 2023-03-03 DIAGNOSIS — J01.90 ACUTE BACTERIAL SINUSITIS: Primary | ICD-10-CM

## 2023-03-03 DIAGNOSIS — R91.1 LUNG NODULE: ICD-10-CM

## 2023-03-03 DIAGNOSIS — R05.9 COUGH, UNSPECIFIED TYPE: ICD-10-CM

## 2023-03-03 LAB
CTP QC/QA: YES
SARS-COV-2 RDRP RESP QL NAA+PROBE: NEGATIVE

## 2023-03-03 PROCEDURE — 3074F PR MOST RECENT SYSTOLIC BLOOD PRESSURE < 130 MM HG: ICD-10-PCS | Mod: CPTII,S$GLB,, | Performed by: INTERNAL MEDICINE

## 2023-03-03 PROCEDURE — 1159F MED LIST DOCD IN RCRD: CPT | Mod: CPTII,S$GLB,, | Performed by: INTERNAL MEDICINE

## 2023-03-03 PROCEDURE — 3078F DIAST BP <80 MM HG: CPT | Mod: CPTII,S$GLB,, | Performed by: INTERNAL MEDICINE

## 2023-03-03 PROCEDURE — 99999 PR PBB SHADOW E&M-EST. PATIENT-LVL III: ICD-10-PCS | Mod: PBBFAC,,, | Performed by: INTERNAL MEDICINE

## 2023-03-03 PROCEDURE — 1160F RVW MEDS BY RX/DR IN RCRD: CPT | Mod: CPTII,S$GLB,, | Performed by: INTERNAL MEDICINE

## 2023-03-03 PROCEDURE — 3078F PR MOST RECENT DIASTOLIC BLOOD PRESSURE < 80 MM HG: ICD-10-PCS | Mod: CPTII,S$GLB,, | Performed by: INTERNAL MEDICINE

## 2023-03-03 PROCEDURE — 99214 PR OFFICE/OUTPT VISIT, EST, LEVL IV, 30-39 MIN: ICD-10-PCS | Mod: S$GLB,,, | Performed by: INTERNAL MEDICINE

## 2023-03-03 PROCEDURE — 3008F PR BODY MASS INDEX (BMI) DOCUMENTED: ICD-10-PCS | Mod: CPTII,S$GLB,, | Performed by: INTERNAL MEDICINE

## 2023-03-03 PROCEDURE — 3008F BODY MASS INDEX DOCD: CPT | Mod: CPTII,S$GLB,, | Performed by: INTERNAL MEDICINE

## 2023-03-03 PROCEDURE — 99999 PR PBB SHADOW E&M-EST. PATIENT-LVL III: CPT | Mod: PBBFAC,,, | Performed by: INTERNAL MEDICINE

## 2023-03-03 PROCEDURE — 1160F PR REVIEW ALL MEDS BY PRESCRIBER/CLIN PHARMACIST DOCUMENTED: ICD-10-PCS | Mod: CPTII,S$GLB,, | Performed by: INTERNAL MEDICINE

## 2023-03-03 PROCEDURE — 3074F SYST BP LT 130 MM HG: CPT | Mod: CPTII,S$GLB,, | Performed by: INTERNAL MEDICINE

## 2023-03-03 PROCEDURE — 87635 SARS-COV-2 COVID-19 AMP PRB: CPT | Mod: QW,S$GLB,, | Performed by: INTERNAL MEDICINE

## 2023-03-03 PROCEDURE — 1159F PR MEDICATION LIST DOCUMENTED IN MEDICAL RECORD: ICD-10-PCS | Mod: CPTII,S$GLB,, | Performed by: INTERNAL MEDICINE

## 2023-03-03 PROCEDURE — 99214 OFFICE O/P EST MOD 30 MIN: CPT | Mod: S$GLB,,, | Performed by: INTERNAL MEDICINE

## 2023-03-03 PROCEDURE — 87635: ICD-10-PCS | Mod: QW,S$GLB,, | Performed by: INTERNAL MEDICINE

## 2023-03-03 RX ORDER — AMOXICILLIN AND CLAVULANATE POTASSIUM 875; 125 MG/1; MG/1
1 TABLET, FILM COATED ORAL 2 TIMES DAILY
Qty: 20 TABLET | Refills: 0 | Status: SHIPPED | OUTPATIENT
Start: 2023-03-03 | End: 2023-04-10 | Stop reason: ALTCHOICE

## 2023-03-06 ENCOUNTER — PATIENT MESSAGE (OUTPATIENT)
Dept: INTERNAL MEDICINE | Facility: CLINIC | Age: 43
End: 2023-03-06
Payer: COMMERCIAL

## 2023-03-06 DIAGNOSIS — R05.3 CHRONIC COUGH: Primary | ICD-10-CM

## 2023-03-09 ENCOUNTER — HOSPITAL ENCOUNTER (OUTPATIENT)
Dept: RADIOLOGY | Facility: HOSPITAL | Age: 43
Discharge: HOME | End: 2023-03-09
Attending: INTERNAL MEDICINE
Payer: COMMERCIAL

## 2023-03-09 ENCOUNTER — PATIENT MESSAGE (OUTPATIENT)
Dept: HEPATOLOGY | Facility: CLINIC | Age: 43
End: 2023-03-09
Payer: COMMERCIAL

## 2023-03-09 ENCOUNTER — TELEPHONE (OUTPATIENT)
Dept: HEPATOLOGY | Facility: CLINIC | Age: 43
End: 2023-03-09
Payer: COMMERCIAL

## 2023-03-09 ENCOUNTER — PATIENT MESSAGE (OUTPATIENT)
Dept: PULMONOLOGY | Facility: CLINIC | Age: 43
End: 2023-03-09
Payer: COMMERCIAL

## 2023-03-09 ENCOUNTER — OFFICE VISIT (OUTPATIENT)
Dept: OBSTETRICS AND GYNECOLOGY | Facility: CLINIC | Age: 43
End: 2023-03-09
Attending: OBSTETRICS & GYNECOLOGY
Payer: COMMERCIAL

## 2023-03-09 VITALS
SYSTOLIC BLOOD PRESSURE: 120 MMHG | WEIGHT: 149.69 LBS | HEIGHT: 71 IN | BODY MASS INDEX: 20.96 KG/M2 | DIASTOLIC BLOOD PRESSURE: 78 MMHG

## 2023-03-09 DIAGNOSIS — Z12.31 VISIT FOR SCREENING MAMMOGRAM: ICD-10-CM

## 2023-03-09 DIAGNOSIS — Z01.419 WOMEN'S ANNUAL ROUTINE GYNECOLOGICAL EXAMINATION: Primary | ICD-10-CM

## 2023-03-09 DIAGNOSIS — R91.1 LUNG NODULE: Primary | ICD-10-CM

## 2023-03-09 DIAGNOSIS — R91.1 SOLITARY PULMONARY NODULE: ICD-10-CM

## 2023-03-09 DIAGNOSIS — Z12.4 PAP SMEAR FOR CERVICAL CANCER SCREENING: ICD-10-CM

## 2023-03-09 DIAGNOSIS — C22.0 HEPATOCELLULAR CARCINOMA: ICD-10-CM

## 2023-03-09 DIAGNOSIS — C22.0 HEPATOMA: ICD-10-CM

## 2023-03-09 DIAGNOSIS — E28.39 PREMATURE OVARIAN FAILURE: ICD-10-CM

## 2023-03-09 PROCEDURE — 3008F BODY MASS INDEX DOCD: CPT | Mod: CPTII,S$GLB,, | Performed by: OBSTETRICS & GYNECOLOGY

## 2023-03-09 PROCEDURE — 1159F MED LIST DOCD IN RCRD: CPT | Mod: CPTII,S$GLB,, | Performed by: OBSTETRICS & GYNECOLOGY

## 2023-03-09 PROCEDURE — 25500020 PHARM REV CODE 255: Performed by: INTERNAL MEDICINE

## 2023-03-09 PROCEDURE — 99999 PR PBB SHADOW E&M-EST. PATIENT-LVL III: ICD-10-PCS | Mod: PBBFAC,,, | Performed by: OBSTETRICS & GYNECOLOGY

## 2023-03-09 PROCEDURE — 3008F PR BODY MASS INDEX (BMI) DOCUMENTED: ICD-10-PCS | Mod: CPTII,S$GLB,, | Performed by: OBSTETRICS & GYNECOLOGY

## 2023-03-09 PROCEDURE — 99396 PR PREVENTIVE VISIT,EST,40-64: ICD-10-PCS | Mod: S$GLB,,, | Performed by: OBSTETRICS & GYNECOLOGY

## 2023-03-09 PROCEDURE — 3078F PR MOST RECENT DIASTOLIC BLOOD PRESSURE < 80 MM HG: ICD-10-PCS | Mod: CPTII,S$GLB,, | Performed by: OBSTETRICS & GYNECOLOGY

## 2023-03-09 PROCEDURE — 71250 CT THORAX DX C-: CPT | Mod: TC

## 2023-03-09 PROCEDURE — 88175 CYTOPATH C/V AUTO FLUID REDO: CPT | Performed by: OBSTETRICS & GYNECOLOGY

## 2023-03-09 PROCEDURE — 3074F PR MOST RECENT SYSTOLIC BLOOD PRESSURE < 130 MM HG: ICD-10-PCS | Mod: CPTII,S$GLB,, | Performed by: OBSTETRICS & GYNECOLOGY

## 2023-03-09 PROCEDURE — 1160F PR REVIEW ALL MEDS BY PRESCRIBER/CLIN PHARMACIST DOCUMENTED: ICD-10-PCS | Mod: CPTII,S$GLB,, | Performed by: OBSTETRICS & GYNECOLOGY

## 2023-03-09 PROCEDURE — 74183 MRI ABD W/O CNTR FLWD CNTR: CPT | Mod: TC

## 2023-03-09 PROCEDURE — 3074F SYST BP LT 130 MM HG: CPT | Mod: CPTII,S$GLB,, | Performed by: OBSTETRICS & GYNECOLOGY

## 2023-03-09 PROCEDURE — 71250 CT CHEST WITHOUT CONTRAST: ICD-10-PCS | Mod: 26,,, | Performed by: RADIOLOGY

## 2023-03-09 PROCEDURE — 87624 HPV HI-RISK TYP POOLED RSLT: CPT | Performed by: OBSTETRICS & GYNECOLOGY

## 2023-03-09 PROCEDURE — 3078F DIAST BP <80 MM HG: CPT | Mod: CPTII,S$GLB,, | Performed by: OBSTETRICS & GYNECOLOGY

## 2023-03-09 PROCEDURE — 74183 MRI ABDOMEN W WO CONTRAST: ICD-10-PCS | Mod: 26,,, | Performed by: STUDENT IN AN ORGANIZED HEALTH CARE EDUCATION/TRAINING PROGRAM

## 2023-03-09 PROCEDURE — 99999 PR PBB SHADOW E&M-EST. PATIENT-LVL III: CPT | Mod: PBBFAC,,, | Performed by: OBSTETRICS & GYNECOLOGY

## 2023-03-09 PROCEDURE — 1160F RVW MEDS BY RX/DR IN RCRD: CPT | Mod: CPTII,S$GLB,, | Performed by: OBSTETRICS & GYNECOLOGY

## 2023-03-09 PROCEDURE — 1159F PR MEDICATION LIST DOCUMENTED IN MEDICAL RECORD: ICD-10-PCS | Mod: CPTII,S$GLB,, | Performed by: OBSTETRICS & GYNECOLOGY

## 2023-03-09 PROCEDURE — 99396 PREV VISIT EST AGE 40-64: CPT | Mod: S$GLB,,, | Performed by: OBSTETRICS & GYNECOLOGY

## 2023-03-09 PROCEDURE — 74183 MRI ABD W/O CNTR FLWD CNTR: CPT | Mod: 26,,, | Performed by: STUDENT IN AN ORGANIZED HEALTH CARE EDUCATION/TRAINING PROGRAM

## 2023-03-09 PROCEDURE — 71250 CT THORAX DX C-: CPT | Mod: 26,,, | Performed by: RADIOLOGY

## 2023-03-09 PROCEDURE — A9585 GADOBUTROL INJECTION: HCPCS | Performed by: INTERNAL MEDICINE

## 2023-03-09 RX ORDER — GADOBUTROL 604.72 MG/ML
10 INJECTION INTRAVENOUS
Status: COMPLETED | OUTPATIENT
Start: 2023-03-09 | End: 2023-03-09

## 2023-03-09 RX ORDER — NORETHINDRONE ACETATE AND ETHINYL ESTRADIOL .02; 1 MG/1; MG/1
1 TABLET ORAL DAILY
Qty: 63 TABLET | Refills: 4 | Status: SHIPPED | OUTPATIENT
Start: 2023-03-09 | End: 2024-02-27 | Stop reason: SDUPTHER

## 2023-03-09 RX ADMIN — GADOBUTROL 10 ML: 604.72 INJECTION INTRAVENOUS at 07:03

## 2023-03-09 NOTE — PROGRESS NOTES
"Melly Hwang is a 42 y.o. female  who presents for annual exam.  Previously seeing Dr. Saunders for GYN care.  She has a history of premature menopause and has been receiving hormonal supplementation with OCPs.  She denies bleeding, hot flashes, and night sweats.  No vaginal dryness.  She has a history of hepatocellular carcinoma, S/P resection 2021.  She is currently being followed with serial CT scans for a pulmonary nodule.  She reports that her physicians are aware that she is taking estrogen / progesterone.  No GYN complaints.   No LMP recorded (lmp unknown). Patient is postmenopausal.    Blood pressure 120/78, height 5' 11" (1.803 m), weight 67.9 kg (149 lb 11.1 oz).    22 Mammogram: Negative, TC 4.43%    3/2/21 Pap: Negative, HPV: Negative      Past Medical History:   Diagnosis Date    Abdominal pain 2021    VENKATA positive 2020    COVID-19     Deviated septum     Hepatocellular carcinoma 2021    Hypertrophy of inferior nasal turbinate     Liver mass     Nasal congestion     Pericardial effusion     Premature menopause        Past Surgical History:   Procedure Laterality Date    COLONOSCOPY N/A 2020    Procedure: COLONOSCOPY;  Surgeon: GIOVANNI Crane MD;  Location: [facility] ENDO (4TH FLR);  Service: Endoscopy;  Laterality: N/A;  + covid     HERNIA REPAIR      LIVER SURGERY N/A 2021    NASAL SEPTUM SURGERY      PERICARDIAL WINDOW N/A 2021    Procedure: CREATION, PERICARDIAL WINDOW;  Surgeon: Ajay Cantor MD;  Location: [facility] OR [facility] FLR;  Service: Cardiovascular;  Laterality: N/A;    PERICARDIOCENTESIS N/A 2020    Procedure: Pericardiocentesis;  Surgeon: Dickson Dangelo MD;  Location: [facility] CATH LAB;  Service: Cardiology;  Laterality: N/A;    TONSILLECTOMY         OB History          1    Para   1    Term   0            AB        Living   1         SAB        IAB        Ectopic        Multiple        Live Births   1     "             ROS:  GENERAL: Feeling well overall.   SKIN: Denies rash or lesions.   HEAD: Denies head injury or headache.   NODES: Denies enlarged lymph nodes.   CHEST: Denies chest pain or shortness of breath.   CARDIOVASCULAR: Denies palpitations or left sided chest pain.   ABDOMEN: No abdominal pain, nausea, vomiting or rectal bleeding.   URINARY: No dysuria or hematuria.  REPRODUCTIVE: See HPI.   BREASTS: Denies pain, lumps, or nipple discharge.   HEMATOLOGIC: No easy bruisability or excessive bleeding.   MUSCULOSKELETAL: Denies joint pain or swelling.   NEUROLOGIC: Denies syncope or weakness.   PSYCHIATRIC: Reports some anxiety     PE:   (chaperone present during entire exam)  APPEARANCE: Well nourished, well developed, in no acute distress.  BREASTS: Symmetrical, no skin changes or visible lesions. No palpable masses, nipple discharge or adenopathy bilaterally.  ABDOMEN: Soft. No tenderness or masses. No hernias. No CVA tenderness.  VULVA: No lesions. Normal female genitalia.  URETHRAL MEATUS: Normal size and location, no lesions, no prolapse.  URETHRA: No masses, tenderness, prolapse or scarring.  VAGINA: No lesions, no abnormal discharge, no significant cystocele or rectocele.  CERVIX: No lesions and discharge. PAP done.  UTERUS: Normal size, regular shape, mobile, non-tender, bladder base nontender.  ADNEXA: No masses, tenderness or CDS nodularity.  ANUS PERINEUM: Normal.      Diagnosis:  1. Women's annual routine gynecological examination    2. Pap smear for cervical cancer screening    3. Visit for screening mammogram    4. Premature ovarian failure    5. Hepatocellular carcinoma          PLAN:    Orders Placed This Encounter    HPV High Risk Genotypes, PCR    Mammo Digital Screening Bilat w/ Josue    Liquid-Based Pap Smear, Screening    norethindrone-ethinyl estradiol (MICROGESTIN 1/20) 1-20 mg-mcg per tablet       Patient was counseled today on the need for annual gyn exams.  We reviewed her usage / r / b  of OCPs for hormone supplementation with her history of premature menopausal as well as history of hepatocellular carcinoma.     Follow-up in 1 year.    This note was created with voice recognition software.  Grammatical, syntax and spelling errors may be inevitable.    Answers submitted by the patient for this visit:  Gynecologic Exam Questionnaire  (Submitted on 3/3/2023)  Chief Complaint: Gynecologic exam  genital itching: No  genital lesions: No  genital odor: No  genital rash: No  missed menses: No  pelvic pain: No  vaginal bleeding: No  vaginal discharge: No  Pregnant now?: No  abdominal pain: No  anorexia: No  back pain: No  chills: No  constipation: No  diarrhea: No  discolored urine: No  dysuria: No  fever: No  flank pain: No  frequency: No  headaches: No  hematuria: No  nausea: No  painful intercourse: No  rash: No  urgency: No  vomiting: No  Birth control: oral contraceptives  Menstrual history: postmenopausal  STD: No  abdominal surgery: No   section: No  Ectopic pregnancy: No  Endometriosis: No  herpes simplex: No  gynecological surgery: No  menorrhagia: No  metrorrhagia: No  miscarriage: No  ovarian cysts: No  perineal abscess: No  PID: No  terminated pregnancy: No  vaginosis: No

## 2023-03-09 NOTE — TELEPHONE ENCOUNTER
Patient: Melly Hwang       MRN: [id number]      : 1980     Age: 42 y.o.  [address]  [address]  [facility] [address]    Presenting Radiologists:     Providers  Hepatologists: Anusha Rogers MD  Surgeons: Lance Howell MD  Radiologists:   Advanced Practice providers:     Priority of review: Cancer    Patient Transplant Status: Other no need for liver tx at present time    Reason for presentation: Reassessment    Clinical Summary: s/p resection of HCC. Now with a slightly enlarged liver lesion and enlarging pulm nodule.    Imaging to be reviewed: mri abdo 3/22 and ct cheat 3/22    HCC Treatment History: s/p resection    ABO: B POS    Platelets:   Lab Results   Component Value Date/Time     08/15/2022 08:10 AM     Creatinine:   Lab Results   Component Value Date/Time    CREATININE 0.9 08/15/2022 08:10 AM     Bilirubin:   Lab Results   Component Value Date/Time    BILITOT 1.7 (H) 08/15/2022 08:10 AM     AFP Last 3 each if available:   Lab Results   Component Value Date/Time    AFP 9.8 (H) 2023 08:08 AM    AFP 8.3 2022 08:02 AM    AFP 8.1 2022 02:57 PM       MELD: Computed MELD-Na score unavailable. Necessary lab results were not found in the last year.  Computed MELD score unavailable. Necessary lab results were not found in the last year.    Plan:     Follow-up Provider:

## 2023-03-11 ENCOUNTER — PATIENT MESSAGE (OUTPATIENT)
Dept: CARDIOLOGY | Facility: CLINIC | Age: 43
End: 2023-03-11
Payer: COMMERCIAL

## 2023-03-14 ENCOUNTER — TELEPHONE (OUTPATIENT)
Dept: HEPATOLOGY | Facility: CLINIC | Age: 43
End: 2023-03-14
Payer: COMMERCIAL

## 2023-03-14 ENCOUNTER — PATIENT MESSAGE (OUTPATIENT)
Dept: INTERNAL MEDICINE | Facility: CLINIC | Age: 43
End: 2023-03-14
Payer: COMMERCIAL

## 2023-03-14 ENCOUNTER — PATIENT MESSAGE (OUTPATIENT)
Dept: PULMONOLOGY | Facility: CLINIC | Age: 43
End: 2023-03-14
Payer: COMMERCIAL

## 2023-03-14 ENCOUNTER — TELEPHONE (OUTPATIENT)
Dept: TRANSPLANT | Facility: HOSPITAL | Age: 43
End: 2023-03-14
Payer: COMMERCIAL

## 2023-03-14 DIAGNOSIS — Z85.05 HISTORY OF MALIGNANT HEPATOMA: Primary | ICD-10-CM

## 2023-03-14 LAB
HPV HR 12 DNA SPEC QL NAA+PROBE: NEGATIVE
HPV16 AG SPEC QL: NEGATIVE
HPV18 DNA SPEC QL NAA+PROBE: NEGATIVE

## 2023-03-14 RX ORDER — BENZONATATE 200 MG/1
200 CAPSULE ORAL 3 TIMES DAILY PRN
Qty: 30 CAPSULE | Refills: 0 | Status: SHIPPED | OUTPATIENT
Start: 2023-03-14 | End: 2023-03-24

## 2023-03-14 RX ORDER — CODEINE PHOSPHATE AND GUAIFENESIN 10; 100 MG/5ML; MG/5ML
5 SOLUTION ORAL NIGHTLY PRN
Qty: 118 ML | Refills: 0 | Status: SHIPPED | OUTPATIENT
Start: 2023-03-14 | End: 2023-03-24

## 2023-03-14 NOTE — TELEPHONE ENCOUNTER
Call to pt re radiology review. Abdo stable. Enlarging lung lesion- consider biopsy. Discussed with Dr Carias who will review and determine further evaluation including biopsy.

## 2023-03-15 ENCOUNTER — PATIENT MESSAGE (OUTPATIENT)
Dept: OBSTETRICS AND GYNECOLOGY | Facility: CLINIC | Age: 43
End: 2023-03-15
Payer: COMMERCIAL

## 2023-03-16 ENCOUNTER — PATIENT MESSAGE (OUTPATIENT)
Dept: OBSTETRICS AND GYNECOLOGY | Facility: CLINIC | Age: 43
End: 2023-03-16
Payer: COMMERCIAL

## 2023-03-16 ENCOUNTER — OFFICE VISIT (OUTPATIENT)
Dept: INTERVENTIONAL RADIOLOGY/VASCULAR | Facility: CLINIC | Age: 43
End: 2023-03-16
Payer: COMMERCIAL

## 2023-03-16 ENCOUNTER — TELEPHONE (OUTPATIENT)
Dept: OBSTETRICS AND GYNECOLOGY | Facility: CLINIC | Age: 43
End: 2023-03-16
Payer: COMMERCIAL

## 2023-03-16 DIAGNOSIS — R91.1 LUNG NODULE: ICD-10-CM

## 2023-03-16 DIAGNOSIS — R91.1 RIGHT LOWER LOBE PULMONARY NODULE: Primary | ICD-10-CM

## 2023-03-16 LAB
FINAL PATHOLOGIC DIAGNOSIS: NORMAL
Lab: NORMAL

## 2023-03-16 PROCEDURE — 1159F PR MEDICATION LIST DOCUMENTED IN MEDICAL RECORD: ICD-10-PCS | Mod: CPTII,95,, | Performed by: PHYSICIAN ASSISTANT

## 2023-03-16 PROCEDURE — 1160F PR REVIEW ALL MEDS BY PRESCRIBER/CLIN PHARMACIST DOCUMENTED: ICD-10-PCS | Mod: CPTII,95,, | Performed by: PHYSICIAN ASSISTANT

## 2023-03-16 PROCEDURE — 99204 OFFICE O/P NEW MOD 45 MIN: CPT | Mod: 95,,, | Performed by: PHYSICIAN ASSISTANT

## 2023-03-16 PROCEDURE — 99204 PR OFFICE/OUTPT VISIT, NEW, LEVL IV, 45-59 MIN: ICD-10-PCS | Mod: 95,,, | Performed by: PHYSICIAN ASSISTANT

## 2023-03-16 PROCEDURE — 1159F MED LIST DOCD IN RCRD: CPT | Mod: CPTII,95,, | Performed by: PHYSICIAN ASSISTANT

## 2023-03-16 PROCEDURE — 1160F RVW MEDS BY RX/DR IN RCRD: CPT | Mod: CPTII,95,, | Performed by: PHYSICIAN ASSISTANT

## 2023-03-16 RX ORDER — FLUCONAZOLE 150 MG/1
TABLET ORAL
Qty: 2 TABLET | Refills: 0 | Status: SHIPPED | OUTPATIENT
Start: 2023-03-16 | End: 2023-05-04

## 2023-03-16 RX ORDER — ONDANSETRON 4 MG/1
4 TABLET, ORALLY DISINTEGRATING ORAL 2 TIMES DAILY
Qty: 2 TABLET | Refills: 0 | Status: SHIPPED | OUTPATIENT
Start: 2023-03-16

## 2023-03-16 NOTE — PROGRESS NOTES
"Subjective:       Patient ID: Melly Hwang is a 42 y.o. female.    Chief Complaint: Lesion (RLL)    Virtual visit with patient referred to Interventional Radiology by Rodríguez Carias MD for evaluation of R lower lung biopsy.  42 y.o. female with significant past medical history of  R lobe liver resection 2021 (no underlying cirrhosis and HCC, biopsy proven).  Imaging obtained on 3/9/23 noted "enlarging 1.1 cm right lower lobe pulmonary nodule worrisome for malignancy.  This nodule previously measured 0.8 cm on exam from 11/28/2022 and 0.6 cm on exam from 09/09/2022.  Consider biopsy versus PET-CT.  Stable 0.4 cm right upper lobe pulmonary nodule.  No new pulmonary nodules."  Pt reports feeling well.  Pt denies chills, fever, unexpected wt change, CP, SOB, abdominal pain, N/V/D, confusion.  She did report significant nausea after sedation with liver biopsy.    Review of Systems   Constitutional:  Negative for activity change, appetite change, chills, fatigue, fever and unexpected weight change.   Respiratory:  Negative for cough and shortness of breath.    Cardiovascular:  Negative for chest pain and leg swelling.   Gastrointestinal:  Negative for abdominal distention, abdominal pain, constipation, diarrhea, nausea and vomiting.   Genitourinary:  Negative for difficulty urinating and flank pain.   Musculoskeletal:  Negative for back pain and gait problem.   Neurological:  Negative for weakness and memory loss.   Psychiatric/Behavioral:  Negative for confusion and sleep disturbance.        Objective:      Physical Exam  Constitutional:       General: She is not in acute distress.     Appearance: She is well-developed. She is not diaphoretic.   HENT:      Head: Normocephalic and atraumatic.   Pulmonary:      Effort: Pulmonary effort is normal. No respiratory distress.   Neurological:      Mental Status: She is alert and oriented to person, place, and time.   Psychiatric:         Behavior: Behavior normal.    "      Thought Content: Thought content normal.         Judgment: Judgment normal.         Assessment:       Problem List Items Addressed This Visit    None  Visit Diagnoses       Right lower lobe pulmonary nodule    -  Primary    Relevant Orders    CBC Auto Differential    Protime-INR    IR Biopsy Lung w/ guidance              Plan:       Discussed how the procedure will be performed, risks (including, but not limited to, pain, bleeding, infection, damage to nearby structures, pneumothorax and the need for additional procedures), benefits, possible complications, pre-post procedure expectations, and alternatives. The patient voices understanding and all questions have been answered.  The patient agrees to proceed as planned. Patient scheduled for 4/10/23 at 6:30AM with Dr. Lala (requested). Pre-procedure handout with clinic phone number provided.

## 2023-03-16 NOTE — TELEPHONE ENCOUNTER
Dr Mejia will be happy to send in the Diflucan, please check with the pharmacy later today. If your symptoms do not improve you will need to come in. Radha  ===View-only below this line===      ----- Message -----       From:Melly Hwang       Sent:3/15/2023 10:10 PM CDT         To:Asaf Mejia MD    Subject:Yeast infection from antibiotics     Good afternoon Dr. Mejia,    I believe Pauline developed a yeast infection from the antibiotics I completed a few days ago from my PCP.  I noticed it yesterday with very mild itching but didnt think nothing of it.  Then I notice this morning the itching was a little worse with a bit of discharge.  I came home after work and showered then had more itching with a little burning.  I had the same problem after my liver resection back in 2021 and Dr. Saunders had to treat with several doses of Diflucan because it took a while to treat.  It only kept coming back worse.      My last visit was 3/9/23 with you for my annual.  Can you please send a prescription in to [facility] (listed in my chart) for Diflucan?    Melly Hwang  [phone]

## 2023-03-25 ENCOUNTER — LAB VISIT (OUTPATIENT)
Dept: LAB | Facility: HOSPITAL | Age: 43
End: 2023-03-25
Attending: INTERNAL MEDICINE
Payer: COMMERCIAL

## 2023-03-25 DIAGNOSIS — R91.1 RIGHT LOWER LOBE PULMONARY NODULE: ICD-10-CM

## 2023-03-25 LAB
BASOPHILS # BLD AUTO: 0.04 K/UL (ref 0–0.2)
BASOPHILS NFR BLD: 0.7 % (ref 0–1.9)
DIFFERENTIAL METHOD: ABNORMAL
EOSINOPHIL # BLD AUTO: 0.1 K/UL (ref 0–0.5)
EOSINOPHIL NFR BLD: 1.2 % (ref 0–8)
ERYTHROCYTE [DISTWIDTH] IN BLOOD BY AUTOMATED COUNT: 11.9 % (ref 11.5–14.5)
HCT VFR BLD AUTO: 43.7 % (ref 37–48.5)
HGB BLD-MCNC: 14.4 G/DL (ref 12–16)
IMM GRANULOCYTES # BLD AUTO: 0.01 K/UL (ref 0–0.04)
IMM GRANULOCYTES NFR BLD AUTO: 0.2 % (ref 0–0.5)
INR PPP: 1.1 (ref 0.8–1.2)
LYMPHOCYTES # BLD AUTO: 1.6 K/UL (ref 1–4.8)
LYMPHOCYTES NFR BLD: 27.8 % (ref 18–48)
MCH RBC QN AUTO: 31.2 PG (ref 27–31)
MCHC RBC AUTO-ENTMCNC: 33 G/DL (ref 32–36)
MCV RBC AUTO: 95 FL (ref 82–98)
MONOCYTES # BLD AUTO: 0.4 K/UL (ref 0.3–1)
MONOCYTES NFR BLD: 6.6 % (ref 4–15)
NEUTROPHILS # BLD AUTO: 3.8 K/UL (ref 1.8–7.7)
NEUTROPHILS NFR BLD: 63.5 % (ref 38–73)
NRBC BLD-RTO: 0 /100 WBC
PLATELET # BLD AUTO: 246 K/UL (ref 150–450)
PMV BLD AUTO: 9.6 FL (ref 9.2–12.9)
PROTHROMBIN TIME: 11.8 SEC (ref 9–12.5)
RBC # BLD AUTO: 4.62 M/UL (ref 4–5.4)
WBC # BLD AUTO: 5.9 K/UL (ref 3.9–12.7)

## 2023-03-25 PROCEDURE — 85610 PROTHROMBIN TIME: CPT | Performed by: PHYSICIAN ASSISTANT

## 2023-03-25 PROCEDURE — 36415 COLL VENOUS BLD VENIPUNCTURE: CPT | Performed by: PHYSICIAN ASSISTANT

## 2023-03-25 PROCEDURE — 85025 COMPLETE CBC W/AUTO DIFF WBC: CPT | Performed by: PHYSICIAN ASSISTANT

## 2023-04-05 ENCOUNTER — TELEPHONE (OUTPATIENT)
Dept: INTERVENTIONAL RADIOLOGY/VASCULAR | Facility: HOSPITAL | Age: 43
End: 2023-04-05
Payer: COMMERCIAL

## 2023-04-10 ENCOUNTER — HOSPITAL ENCOUNTER (OUTPATIENT)
Dept: RADIOLOGY | Facility: HOSPITAL | Age: 43
Discharge: HOME | End: 2023-04-10
Attending: RADIOLOGY
Payer: COMMERCIAL

## 2023-04-10 ENCOUNTER — HOSPITAL ENCOUNTER (OUTPATIENT)
Dept: INTERVENTIONAL RADIOLOGY/VASCULAR | Facility: HOSPITAL | Age: 43
Discharge: HOME | End: 2023-04-10
Attending: PHYSICIAN ASSISTANT
Payer: COMMERCIAL

## 2023-04-10 DIAGNOSIS — R91.1 RIGHT LOWER LOBE PULMONARY NODULE: ICD-10-CM

## 2023-04-10 LAB
B-HCG UR QL: NEGATIVE
CTP QC/QA: YES

## 2023-04-10 PROCEDURE — 71045 X-RAY EXAM CHEST 1 VIEW: CPT | Mod: TC

## 2023-04-10 PROCEDURE — 71045 X-RAY EXAM CHEST 1 VIEW: CPT | Mod: 26,76,, | Performed by: RADIOLOGY

## 2023-04-10 PROCEDURE — 32408 CORE NDL BX LNG/MED PERQ: CPT | Performed by: RADIOLOGY

## 2023-04-10 PROCEDURE — 71045 XR CHEST AP PORTABLE: ICD-10-PCS | Mod: 26,76,, | Performed by: RADIOLOGY

## 2023-04-10 PROCEDURE — 27201068 IR BIOPSY LUNG W/ GUIDANCE

## 2023-04-10 PROCEDURE — 25000003 PHARM REV CODE 250: Performed by: RADIOLOGY

## 2023-04-10 PROCEDURE — 63600175 PHARM REV CODE 636 W HCPCS: Performed by: RADIOLOGY

## 2023-04-10 PROCEDURE — 25000003 PHARM REV CODE 250: Performed by: FAMILY MEDICINE

## 2023-04-10 PROCEDURE — 32408 IR BIOPSY LUNG W/ GUIDANCE: ICD-10-PCS | Mod: ,,, | Performed by: RADIOLOGY

## 2023-04-10 PROCEDURE — 99152 MOD SED SAME PHYS/QHP 5/>YRS: CPT | Performed by: RADIOLOGY

## 2023-04-10 RX ORDER — MIDAZOLAM HYDROCHLORIDE 1 MG/ML
INJECTION INTRAMUSCULAR; INTRAVENOUS
Status: COMPLETED | OUTPATIENT
Start: 2023-04-10 | End: 2023-04-10

## 2023-04-10 RX ORDER — FENTANYL CITRATE 50 UG/ML
INJECTION, SOLUTION INTRAMUSCULAR; INTRAVENOUS
Status: COMPLETED | OUTPATIENT
Start: 2023-04-10 | End: 2023-04-10

## 2023-04-10 RX ORDER — LIDOCAINE HYDROCHLORIDE 10 MG/ML
1 INJECTION, SOLUTION EPIDURAL; INFILTRATION; INTRACAUDAL; PERINEURAL ONCE
Status: DISCONTINUED | OUTPATIENT
Start: 2023-04-10 | End: 2023-04-11 | Stop reason: HOSPADM

## 2023-04-10 RX ORDER — ONDANSETRON 2 MG/ML
4 INJECTION INTRAMUSCULAR; INTRAVENOUS EVERY 6 HOURS PRN
Status: DISCONTINUED | OUTPATIENT
Start: 2023-04-10 | End: 2023-04-11 | Stop reason: HOSPADM

## 2023-04-10 RX ORDER — SODIUM CHLORIDE 9 MG/ML
INJECTION, SOLUTION INTRAVENOUS CONTINUOUS
Status: DISCONTINUED | OUTPATIENT
Start: 2023-04-10 | End: 2023-04-11 | Stop reason: HOSPADM

## 2023-04-10 RX ORDER — LIDOCAINE HYDROCHLORIDE 10 MG/ML
INJECTION INFILTRATION; PERINEURAL
Status: COMPLETED | OUTPATIENT
Start: 2023-04-10 | End: 2023-04-10

## 2023-04-10 RX ADMIN — SODIUM CHLORIDE: 9 INJECTION, SOLUTION INTRAVENOUS at 07:04

## 2023-04-10 RX ADMIN — LIDOCAINE HYDROCHLORIDE 10 ML: 10 INJECTION, SOLUTION INFILTRATION; PERINEURAL at 08:04

## 2023-04-10 RX ADMIN — FENTANYL CITRATE 75 MCG: 50 INJECTION, SOLUTION INTRAMUSCULAR; INTRAVENOUS at 08:04

## 2023-04-10 RX ADMIN — FENTANYL CITRATE 25 MCG: 50 INJECTION, SOLUTION INTRAMUSCULAR; INTRAVENOUS at 08:04

## 2023-04-10 RX ADMIN — MIDAZOLAM HYDROCHLORIDE 0.5 MG: 1 INJECTION INTRAMUSCULAR; INTRAVENOUS at 08:04

## 2023-04-10 RX ADMIN — MIDAZOLAM HYDROCHLORIDE 1.5 MG: 1 INJECTION INTRAMUSCULAR; INTRAVENOUS at 08:04

## 2023-04-10 NOTE — NURSING
Pt arrived to 173 for lung biopsy. Pt oriented to unit and staff. Plan of care reviewed with patient, patient verbalizes understanding. Comfort measures utilized. Pt safely transferred from stretcher to procedural table. Fall risk reviewed with patient, fall risk interventions maintained. . Blankets applied. Pt prepped and draped utilizing standard sterile technique. Patient placed on continuous monitoring, as required by sedation policy. Timeouts completed utilizing standard universal time-out, per department and facility policy. RN to remain at bedside, continuous monitoring maintained. Pt resting comfortably. Denies pain/discomfort. Will continue to monitor. See flow sheets for monitoring, medication administration, and updates.

## 2023-04-10 NOTE — PLAN OF CARE
Pt arrived to ROCU bed MPU 6 for 2 hour post lung Bx recovery. Report received from Anuradha. Pt denies pain/discomfort. Dressing CDI. VSS.  See flow sheets for post procedure monitoring.

## 2023-04-10 NOTE — H&P
Vascular and Interventional Radiology History & Physical    Date:  4/10/2023    Chief Complaint:   Right lung mass    History of Present Illness:  Melly Hwang is a 42 y.o. female who presents for biopsy of right lung mass.    Past Medical History:  Past Medical History:   Diagnosis Date    Abdominal pain 8/12/2021    VENKATA positive 8/20/2020    COVID-19     Deviated septum 2011    Hepatocellular carcinoma 6/9/2021    Hypertrophy of inferior nasal turbinate     Liver mass     Nasal congestion 2011    Pericardial effusion     Premature menopause        Past Surgical History:  Past Surgical History:   Procedure Laterality Date    COLONOSCOPY N/A 09/21/2020    Procedure: COLONOSCOPY;  Surgeon: GIOVANNI Crane MD;  Location: [facility] ENDO (4TH FLR);  Service: Endoscopy;  Laterality: N/A;  + covid 4/22    HERNIA REPAIR      LIVER SURGERY N/A 06/28/2021    NASAL SEPTUM SURGERY      PERICARDIAL WINDOW N/A 02/22/2021    Procedure: CREATION, PERICARDIAL WINDOW;  Surgeon: Ajay Cantor MD;  Location: [facility] OR 2ND FLR;  Service: Cardiovascular;  Laterality: N/A;    PERICARDIOCENTESIS N/A 05/02/2020    Procedure: Pericardiocentesis;  Surgeon: Dickson Dangelo MD;  Location: [facility] CATH LAB;  Service: Cardiology;  Laterality: N/A;    TONSILLECTOMY          Sedation History:    Denies any adverse reactions.  Denies problems laying flat.    Social History:  Social History     Tobacco Use    Smoking status: Never    Smokeless tobacco: Never   Substance Use Topics    Alcohol use: Yes     Comment: rare    Drug use: No        Home Medications:   Prior to Admission medications    Medication Sig Start Date End Date Taking? Authorizing Provider   cetirizine (ZYRTEC) 10 MG tablet TAKE 1 TABLET BY MOUTH EVERY DAY 7/14/22  Yes Melly Sahu MD   cyclobenzaprine (FLEXERIL) 10 MG tablet TAKE 1 TABLET BY MOUTH EVERY DAY IN THE EVENING AS NEEDED FOR MUSCLE SPASMS 4/11/22  Yes Melly Sahu MD   multivitamin capsule Take by  mouth. 1 capsule Oral Every morning   Yes Historical Provider   norethindrone-ethinyl estradiol (MICROGESTIN 1/20) 1-20 mg-mcg per tablet Take 1 tablet by mouth once daily. 3/9/23  Yes Asaf Mejia MD   omeprazole (PRILOSEC) 20 MG capsule TAKE 1 CAPSULE BY MOUTH DAILY AS NEEDED FOR GERD 2/22/23  Yes Angus Watkins MD   triamcinolone acetonide 0.1% (KENALOG) 0.1 % cream Apply topically 2 (two) times daily as needed (scaling at external ears). Avoid use on face, body folds, groin/genitalia. 10/18/21  Yes Yolande Heredia MD   fluconazole (DIFLUCAN) 150 MG Tab Take one tablet today and repeat in three days if still symptomatic 3/16/23   Asaf Mejia MD   ondansetron (ZOFRAN-ODT) 4 MG TbDL Take 1 tablet (4 mg total) by mouth 2 (two) times daily. 3/16/23   Inés Hilliard PA-C   amoxicillin-clavulanate 875-125mg (AUGMENTIN) 875-125 mg per tablet Take 1 tablet by mouth 2 (two) times daily. 3/3/23 4/10/23  Melly Sahu MD       Inpatient Medications:    Current Outpatient Medications:     cetirizine (ZYRTEC) 10 MG tablet, TAKE 1 TABLET BY MOUTH EVERY DAY, Disp: 90 tablet, Rfl: 2    cyclobenzaprine (FLEXERIL) 10 MG tablet, TAKE 1 TABLET BY MOUTH EVERY DAY IN THE EVENING AS NEEDED FOR MUSCLE SPASMS, Disp: 30 tablet, Rfl: 3    multivitamin capsule, Take by mouth. 1 capsule Oral Every morning, Disp: , Rfl:     norethindrone-ethinyl estradiol (MICROGESTIN 1/20) 1-20 mg-mcg per tablet, Take 1 tablet by mouth once daily., Disp: 63 tablet, Rfl: 4    omeprazole (PRILOSEC) 20 MG capsule, TAKE 1 CAPSULE BY MOUTH DAILY AS NEEDED FOR GERD, Disp: 90 capsule, Rfl: 1    triamcinolone acetonide 0.1% (KENALOG) 0.1 % cream, Apply topically 2 (two) times daily as needed (scaling at external ears). Avoid use on face, body folds, groin/genitalia., Disp: 45 g, Rfl: 3    fluconazole (DIFLUCAN) 150 MG Tab, Take one tablet today and repeat in three days if still symptomatic, Disp: 2 tablet, Rfl: 0    ondansetron (ZOFRAN-ODT) 4  MG TbDL, Take 1 tablet (4 mg total) by mouth 2 (two) times daily., Disp: 2 tablet, Rfl: 0    Current Facility-Administered Medications:     0.9%  NaCl infusion, , Intravenous, Continuous, Eduarda Nicolas NP    LIDOcaine (PF) 10 mg/ml (1%) injection 10 mg, 1 mL, Other, Once, Eduarda Nicolas NP     Anticoagulants/Antiplatelets:   no anticoagulation    Allergies:   Review of patient's allergies indicates:   Allergen Reactions    No known drug allergies        Review of Systems:   As documented in primary provider H&P.    Vital Signs (Most Recent):  BP: (!) 148/86 (04/10/23 0654)    Physical Exam:  No acute distress, laying comfortably in bed, pleasant and cooperative  Regular rate and rhythm  Breathing unlabored  Abdomen benign  Extremities warm and well perfused    Sedation Exam:  ASA: II - Patient appears to have mild systemic disease, adequately controlled   Mallampati: II (hard and soft palate, upper portion of tonsils anduvula visible)     Laboratory:  Lab Results   Component Value Date    INR 1.1 03/25/2023       Lab Results   Component Value Date    WBC 5.90 03/25/2023    HGB 14.4 03/25/2023    HCT 43.7 03/25/2023    MCV 95 03/25/2023     03/25/2023      Lab Results   Component Value Date     08/15/2022     08/15/2022    K 3.9 08/15/2022     08/15/2022    CO2 27 08/15/2022    BUN 8 08/15/2022    CREATININE 0.9 08/15/2022    CALCIUM 9.5 08/15/2022    MG 1.8 07/15/2021    ALT 14 08/15/2022    AST 16 08/15/2022    ALBUMIN 3.8 08/15/2022    BILITOT 1.7 (H) 08/15/2022    BILIDIR 0.3 01/26/2021       Imaging:  Reviewed.      ASSESSMENT/PLAN:   42F with right lung mass, here for biopsy.     Procedure discussed in detail with patient. Risks including bleeding, infection, damage to surrounding structures, pneumothorax, the need for additional procedures, and death were discussed. Informed consent was obtained.     Will plan for procedure with moderate intravenous conscious sedation.      Varun Heller MD  Fellow, Dept. Of Interventional Radiology  [facility]

## 2023-04-10 NOTE — DISCHARGE INSTRUCTIONS
Please call with any questions or concerns.      Monday thru Friday 8:00 am - 4:30 pm    Interventional Radiology   [phone]    After Hours    Ask for the Radiology Intern on call  [phone]

## 2023-04-10 NOTE — PROCEDURES
Radiology Post-Procedure Note    Pre Op Diagnosis: Right lung mass    Post Op Diagnosis: Right lung mass    Procedure: right lung biopsy    Procedure performed by: Portillo Lala MD    Written Informed Consent Obtained: Yes    Specimen Removed: YES 8 x 18 gauge cores    Estimated Blood Loss: Minimal    Findings:   Using CT guidance a 17g sheath needle was placed into a Right lung mass. 18g biopsy gun used to take 8 core biopsy samples. Specimen sent to pathology.     Additional specimen sent to lab: No    Patient tolerated procedure well.    Portillo Lala M.D.  Interventional Radiology  Department of Radiology  Pager: [phone]

## 2023-04-10 NOTE — NURSING
Pre-op for IR lung biopsy complete. UPT negative. 20G PIV placed in the LFA. Dr. Heller to bedside for procedure consent.

## 2023-04-10 NOTE — PLAN OF CARE
Second x-Ray reviewed, MD stated that x-ray was normal.  Pt stable no complaints.  V/S stable. Pt states that she runs bradi normally

## 2023-04-10 NOTE — PLAN OF CARE
Recovery completed, IV removed, pt tolerated well.  Bx site clean dry and intact.. Pt educations discharge instructions reviewed with pt.  Pt d/c in wheelchair and taken home by family.

## 2023-04-10 NOTE — NURSING
Lung biopsy complete complete. Pt tolerated well. VSS. No signs or symptoms of distress noted.Pt will be transferred to MPU bed escorted by this RN and report to RN on arrival. Specimen to path per path MD.

## 2023-04-14 VITALS
WEIGHT: 149 LBS | DIASTOLIC BLOOD PRESSURE: 71 MMHG | HEART RATE: 54 BPM | RESPIRATION RATE: 14 BRPM | SYSTOLIC BLOOD PRESSURE: 126 MMHG | TEMPERATURE: 99 F | OXYGEN SATURATION: 100 % | HEIGHT: 71 IN | BODY MASS INDEX: 20.86 KG/M2

## 2023-04-18 ENCOUNTER — PATIENT MESSAGE (OUTPATIENT)
Dept: PULMONOLOGY | Facility: CLINIC | Age: 43
End: 2023-04-18
Payer: COMMERCIAL

## 2023-04-20 ENCOUNTER — TELEPHONE (OUTPATIENT)
Dept: TRANSPLANT | Facility: CLINIC | Age: 43
End: 2023-04-20
Payer: COMMERCIAL

## 2023-04-20 DIAGNOSIS — C22.0 HEPATOMA: Primary | ICD-10-CM

## 2023-04-20 LAB
ADEQUACY: NORMAL
FINAL PATHOLOGIC DIAGNOSIS: NORMAL
GROSS: NORMAL
Lab: NORMAL
MICROSCOPIC EXAM: NORMAL

## 2023-04-21 ENCOUNTER — PATIENT MESSAGE (OUTPATIENT)
Dept: HEPATOLOGY | Facility: CLINIC | Age: 43
End: 2023-04-21
Payer: COMMERCIAL

## 2023-04-21 NOTE — TELEPHONE ENCOUNTER
IR Liver Pathology Conference Note    Patient:  Melly Hwang  MRN:   [id number]  YOB: 1980  Date of Transplant:  N/A  Native Diagnosis:     Discussion/Plan:    Presenter: Hepatologist - Anusha Rogers MD    Reason for presenting: diagnosis confirmation    Concerns for Pathologists: metastatic HCC    Lab Results  WBC (K/uL)   Date Value   03/25/2023 5.90   08/15/2022 4.90   04/18/2022 5.61     PLT (K/uL)   Date Value   03/25/2023 246   08/15/2022 236   04/18/2022 254     INR (no units)   Date Value   03/25/2023 1.1   11/12/2021 1.0   08/10/2021 1.0     AST (U/L)   Date Value   08/15/2022 16     ALT (U/L)   Date Value   08/15/2022 14   03/22/2022 20   11/12/2021 13     BILITOT (mg/dL)   Date Value   08/15/2022 1.7 (H)   03/22/2022 1.0   11/12/2021 1.1 (H)     ALKPHOS (U/L)   Date Value   08/15/2022 52 (L)   03/22/2022 36 (L)   11/12/2021 45 (L)     CREATININE (mg/dL)   Date Value   08/15/2022 0.9   04/18/2022 0.9   03/22/2022 0.9     AFP (ng/mL)   Date Value   03/09/2023 9.8 (H)   06/03/2022 8.3   03/03/2022 8.1

## 2023-04-21 NOTE — TELEPHONE ENCOUNTER
Patient: Melly Hwang       MRN: [id number]      : 1980     Age: 42 y.o.  [address]  [facility] [address]    Presenting Radiologists:     Providers  Hepatologists: Anusha Rogers MD  Surgeons: Lance Howell MD  Radiologists:  Advanced Practice providers:     Priority of review: Cancer    Patient Transplant Status: Not a candidate    Reason for presentation: Other can lung lesion (metastatic HCC) be resecetd    Clinical Summary: She has a known history of hepatic hemangiomas that were otherwise asymptomatic. She recently had a diagnosis of pericardial effusion of unknown etiology ultimately requiring pericardial window with cardiac surgery. She recovered well from those procedures without any significant cardiac dysfunction. Axial imaging showed new lesions in the right posterior segment had imaging characteristics not consistent with hemangioma and concerning for malignancy. 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      Interval History  Patient has undergone open right liver lobe resection 21 (partial hepatectomy segments V,VI,VII,VIII); and Portal lymphadenectomy on 21. Course was uncomplicated.   Pathology  Final Pathologic Diagnosis 1.  Gallbladder (cholecystectomy):   - Benign gallbladder with cholecystitis   2. Right lobe (partial hepatectomy):   - Positive for malignancy, see synoptic report below   - Separate hemangioma, 7.3 cm   3. Lymph nodes, portal (regional resection):   - 8 lymph nodes, negative for tumor (0/8)   ____________________________________________________________________________   ______   Hepatocellular carcinoma synoptic report   - Procedure:  Partial hepatectomy, right lobe   - Histologic type:  Hepatocellular carcinoma, scirrhous   - Histologic grade:  Moderately differentiated, grade 2   - Tumor focality:  Solitary   - Tumor characteristics:   -  Tumor site:  Right lobe   - Tumor size:  3.3 cm   - Treatment effect:  No known pre-surgical therapy   - Satellitosis:  Not identified   - Tumor extent:  Confined to liver   - Vascular invasion:  Present, Small vessel   - Perineural invasion:  Present   - Margins:   - All margins are negative for invasive carcinoma   - Closest margin to invasive carcinoma:  Parenchymal   - Distance from invasive  carcinoma to closest margin:  5 mm   - Regional lymph nodes:   - Number of lymph nodes with tumor:  0   - Number of lymph nodes examined:  8   - Pathology: pT2 N0 MX   - Additional findings:   - No steatosis   - Trichrome stain:  Periportal fibrosis with early septa, 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        She is overall feeling well. She is back at work. Pain resolved and has not recurred.     Labs 21: Tbil 1.1, ALT 13, AST 14, ALKP 45, WBC 3.86  CA 19-9 fluctuatin.9 on 3/3/22; AFP 8.1 3/3/22        MRI 22:  Hepatobiliary: Liver is enlarged to approximately 21.1 cm.  Postoperative changes of partial hepatic resection involving segments 5, 6, 7, and 8.  Redemonstration of T2 hyperintense right hepatic lobe lesions measuring 2.5 x 1.7 cm, (series 4, image 19), previously 2.5 x 1.7 cm and 1.8 x 1.6 cm, previously 1.7 x 1.7 cm, (series 4, image 37).  These lesions again demonstrate progressive peripheral nodular enhancement, most consistent with hemangiomas.  Heterogeneous parenchymal enhancement without enhancing mass with washout or pseudo capsule to suggest HCC: Stable right hepatic lobe hemangiomas.  No new well-circumscribed enhancing mass with washout or pseudocapsule to suggest HCC.         Imaging to be reviewed: lung imaging    HCC Treatment History: resection    Platelets:   Lab Results   Component Value Date/Time     2023 09:40 AM     Creatinine:   Lab Results    Component Value Date/Time    CREATININE 0.9 08/15/2022 08:10 AM     Bilirubin:   Lab Results   Component Value Date/Time    BILITOT 1.7 (H) 08/15/2022 08:10 AM     AFP Last 3 each if available:   Lab Results   Component Value Date/Time    AFP 9.8 (H) 03/09/2023 08:08 AM    AFP 8.3 06/03/2022 08:02 AM    AFP 8.1 03/03/2022 02:57 PM       MELD: Computed MELD-Na score unavailable. Necessary lab results were not found in the last year.  Computed MELD score unavailable. Necessary lab results were not found in the last year.    Plan:     Follow-up Provider:

## 2023-04-25 ENCOUNTER — PATIENT MESSAGE (OUTPATIENT)
Dept: HEPATOLOGY | Facility: CLINIC | Age: 43
End: 2023-04-25
Payer: COMMERCIAL

## 2023-04-25 ENCOUNTER — TELEPHONE (OUTPATIENT)
Dept: TRANSPLANT | Facility: CLINIC | Age: 43
End: 2023-04-25
Payer: COMMERCIAL

## 2023-04-25 ENCOUNTER — CONFERENCE (OUTPATIENT)
Dept: TRANSPLANT | Facility: CLINIC | Age: 43
End: 2023-04-25
Payer: COMMERCIAL

## 2023-04-25 ENCOUNTER — PATIENT MESSAGE (OUTPATIENT)
Dept: TRANSPLANT | Facility: CLINIC | Age: 43
End: 2023-04-25
Payer: COMMERCIAL

## 2023-04-25 NOTE — TELEPHONE ENCOUNTER
Patient: Melly Hwang       MRN: [id number]      : 1980     Age: 42 y.o.  [address]  [facility] [address]     Presenting Radiologists: Du Alford MD     Providers  Hepatologists: Anusha Rogers MD  Surgeons: Lance Howell MD  Radiologists:  Advanced Practice providers:      Priority of review: Cancer     Patient Transplant Status: Not a candidate     Reason for presentation: Other can lung lesion (metastatic HCC) be resecetd     Clinical Summary: She has a known history of hepatic hemangiomas that were otherwise asymptomatic. She recently had a diagnosis of pericardial effusion of unknown etiology ultimately requiring pericardial window with cardiac surgery. She recovered well from those procedures without any significant cardiac dysfunction. Axial imaging showed new lesions in the right posterior segment had imaging characteristics not consistent with hemangioma and concerning for malignancy. 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      Interval History  Patient has undergone open right liver lobe resection 21 (partial hepatectomy segments V,VI,VII,VIII); and Portal lymphadenectomy on 21. Course was uncomplicated.   Pathology  Final Pathologic Diagnosis 1.  Gallbladder (cholecystectomy):   - Benign gallbladder with cholecystitis   2. Right lobe (partial hepatectomy):   - Positive for malignancy, see synoptic report below   - Separate hemangioma, 7.3 cm   3. Lymph nodes, portal (regional resection):   - 8 lymph nodes, negative for tumor (0/8)   ____________________________________________________________________________   ______   Hepatocellular carcinoma synoptic report   - Procedure:  Partial hepatectomy, right lobe   - Histologic type:  Hepatocellular carcinoma, scirrhous   - Histologic grade:  Moderately differentiated, grade 2   - Tumor focality:  Solitary   - Tumor  characteristics:   - Tumor site:  Right lobe   - Tumor size:  3.3 cm   - Treatment effect:  No known pre-surgical therapy   - Satellitosis:  Not identified   - Tumor extent:  Confined to liver   - Vascular invasion:  Present, Small vessel   - Perineural invasion:  Present   - Margins:   - All margins are negative for invasive carcinoma   - Closest margin to invasive carcinoma:  Parenchymal   - Distance from invasive  carcinoma to closest margin:  5 mm   - Regional lymph nodes:   - Number of lymph nodes with tumor:  0   - Number of lymph nodes examined:  8   - Pathology: pT2 N0 MX   - Additional findings:   - No steatosis   - Trichrome stain:  Periportal fibrosis with early septa, 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        She is overall feeling well. She is back at work. Pain resolved and has not recurred.     Labs 21: Tbil 1.1, ALT 13, AST 14, ALKP 45, WBC 3.86  CA 19-9 fluctuatin.9 on 3/3/22; AFP 8.1 3/3/22        MRI 22:  Hepatobiliary: Liver is enlarged to approximately 21.1 cm.  Postoperative changes of partial hepatic resection involving segments 5, 6, 7, and 8.  Redemonstration of T2 hyperintense right hepatic lobe lesions measuring 2.5 x 1.7 cm, (series 4, image 19), previously 2.5 x 1.7 cm and 1.8 x 1.6 cm, previously 1.7 x 1.7 cm, (series 4, image 37).  These lesions again demonstrate progressive peripheral nodular enhancement, most consistent with hemangiomas.  Heterogeneous parenchymal enhancement without enhancing mass with washout or pseudo capsule to suggest HCC: Stable right hepatic lobe hemangiomas.  No new well-circumscribed enhancing mass with washout or pseudocapsule to suggest HCC.           Imaging to be reviewed: lung imaging     HCC Treatment History: resection     Platelets:         Lab Results   Component Value Date/Time      2023 09:40 AM       Creatinine:         Lab Results   Component Value Date/Time     CREATININE 0.9 08/15/2022 08:10 AM      Bilirubin:         Lab Results   Component Value Date/Time     BILITOT 1.7 (H) 08/15/2022 08:10 AM      AFP Last 3 each if available:         Lab Results   Component Value Date/Time     AFP 9.8 (H) 03/09/2023 08:08 AM     AFP 8.3 06/03/2022 08:02 AM     AFP 8.1 03/03/2022 02:57 PM         MELD: Computed MELD-Na score unavailable. Necessary lab results were not found in the last year.  Computed MELD score unavailable. Necessary lab results were not found in the last year.     Plan: : 2 pulmonary nodules, 1.1cm in RLL and 0.4cm in RUL.  RLL nodule was biopsied on 4/10/23.       Discussion:1.1 cm nodule in the right lower lobe, 4 mm  nodule in right upper lobe.  The right lower lobe has been sampled which is HCC.  Systemic treatment for metastatic HCC.  Going to be reviewed at the Thoracic tumor  board meeting.  Abdominal  imaging reviewed before with  no residual or recurrent.  A perfusion defect noted.  Nothing that looks like HCC.   No history of liver disease, no cirrhosis.   Lung can be ablated percutaneously  if no other options     Dr Rogers call and noticed the patient of the recommended results.     Follow-up Provider: Dr Rogers

## 2023-04-25 NOTE — TELEPHONE ENCOUNTER
--call to pt re radiology review. Only one obvious lung met. Liver stable. Plan: pet scan, oncology appt, presentation to thoracic tumor board

## 2023-04-26 ENCOUNTER — HOSPITAL ENCOUNTER (OUTPATIENT)
Dept: RADIOLOGY | Facility: HOSPITAL | Age: 43
Discharge: HOME | End: 2023-04-26
Attending: INTERNAL MEDICINE
Payer: COMMERCIAL

## 2023-04-26 ENCOUNTER — PATIENT MESSAGE (OUTPATIENT)
Dept: PULMONOLOGY | Facility: HOSPITAL | Age: 43
End: 2023-04-26
Payer: COMMERCIAL

## 2023-04-26 ENCOUNTER — PATIENT MESSAGE (OUTPATIENT)
Dept: HEMATOLOGY/ONCOLOGY | Facility: CLINIC | Age: 43
End: 2023-04-26
Payer: COMMERCIAL

## 2023-04-26 ENCOUNTER — PATIENT MESSAGE (OUTPATIENT)
Dept: HEPATOLOGY | Facility: CLINIC | Age: 43
End: 2023-04-26
Payer: COMMERCIAL

## 2023-04-26 DIAGNOSIS — R91.1 LUNG NODULE: Primary | ICD-10-CM

## 2023-04-26 DIAGNOSIS — C22.0 HEPATOMA: ICD-10-CM

## 2023-04-26 LAB — POCT GLUCOSE: 99 MG/DL (ref 70–110)

## 2023-04-26 PROCEDURE — 25500020 PHARM REV CODE 255: Performed by: INTERNAL MEDICINE

## 2023-04-26 PROCEDURE — A9698 NON-RAD CONTRAST MATERIALNOC: HCPCS | Performed by: INTERNAL MEDICINE

## 2023-04-26 PROCEDURE — 78815 NM PET CT ROUTINE: ICD-10-PCS | Mod: 26,PI,, | Performed by: RADIOLOGY

## 2023-04-26 PROCEDURE — A9552 F18 FDG: HCPCS

## 2023-04-26 PROCEDURE — 78815 PET IMAGE W/CT SKULL-THIGH: CPT | Mod: 26,PI,, | Performed by: RADIOLOGY

## 2023-04-26 RX ADMIN — IOHEXOL 450 ML: 9 SOLUTION ORAL at 07:04

## 2023-04-27 ENCOUNTER — CONFERENCE (OUTPATIENT)
Dept: TRANSPLANT | Facility: CLINIC | Age: 43
End: 2023-04-27
Payer: COMMERCIAL

## 2023-04-27 ENCOUNTER — TELEPHONE (OUTPATIENT)
Dept: HEMATOLOGY/ONCOLOGY | Facility: CLINIC | Age: 43
End: 2023-04-27
Payer: COMMERCIAL

## 2023-04-27 ENCOUNTER — PATIENT MESSAGE (OUTPATIENT)
Dept: HEMATOLOGY/ONCOLOGY | Facility: CLINIC | Age: 43
End: 2023-04-27
Payer: COMMERCIAL

## 2023-04-27 ENCOUNTER — TELEPHONE (OUTPATIENT)
Dept: HEPATOLOGY | Facility: CLINIC | Age: 43
End: 2023-04-27
Payer: COMMERCIAL

## 2023-04-27 ENCOUNTER — PATIENT MESSAGE (OUTPATIENT)
Dept: OBSTETRICS AND GYNECOLOGY | Facility: CLINIC | Age: 43
End: 2023-04-27
Payer: COMMERCIAL

## 2023-04-27 NOTE — TELEPHONE ENCOUNTER
----- Message from Philippe Carrasco MD sent at 4/27/2023  3:07 PM CDT -----  Ok thanks.  Agree she should see me.  ----- Message -----  From: Anusha Rogers MD  Sent: 4/27/2023   2:28 PM CDT  To: Rodríguez Carias MD, Eduarda Dillon, RN, #    She canceled because they offered her surgery. But in light of the pet, she does need to see you. I will call her  ----- Message -----  From: Philippe Carrasco MD  Sent: 4/27/2023   1:54 PM CDT  To: Rodríguez Carias MD, Eduarda Dillon, RN, #    Thanks.  I think with the bone met, she will need more than just resection.  I think she would probably benefit from systemic therapy though we could consider resection + SBRT to bone and hold off on systemic.  She was supposed to see me this week.  Eduarda, would you mind reaching out to see if she would see me in coming week or so?  Jose L Hernandez  ----- Message -----  From: Anusha Rogers MD  Sent: 4/27/2023  12:28 PM CDT  To: Rodríguez Carias MD, Philippe Carrasco MD    She was presented at thoracic tumor board and are offering her resection. The pet has 2 possible areas of concern:     1. Slightly more prominent uptake at the left nipple/areola compared with the right may be within normal limits.  2. In the bones, hypermetabolic lesion within the T2 left lamina with SUV max 11 (fused image 42) correlating with lytic lesion on CT    I think she should see you and get your opinion re these potential abnormalities?

## 2023-04-28 ENCOUNTER — PATIENT MESSAGE (OUTPATIENT)
Dept: PULMONOLOGY | Facility: CLINIC | Age: 43
End: 2023-04-28
Payer: COMMERCIAL

## 2023-04-28 DIAGNOSIS — R91.1 LUNG NODULE: Primary | ICD-10-CM

## 2023-05-01 ENCOUNTER — PATIENT MESSAGE (OUTPATIENT)
Dept: HEMATOLOGY/ONCOLOGY | Facility: CLINIC | Age: 43
End: 2023-05-01

## 2023-05-01 ENCOUNTER — OFFICE VISIT (OUTPATIENT)
Dept: HEMATOLOGY/ONCOLOGY | Facility: CLINIC | Age: 43
End: 2023-05-01
Payer: COMMERCIAL

## 2023-05-01 ENCOUNTER — HOSPITAL ENCOUNTER (OUTPATIENT)
Dept: PULMONOLOGY | Facility: CLINIC | Age: 43
Discharge: HOME | End: 2023-05-01
Payer: COMMERCIAL

## 2023-05-01 DIAGNOSIS — R91.1 LUNG NODULE: ICD-10-CM

## 2023-05-01 DIAGNOSIS — M89.9 BONE LESION: ICD-10-CM

## 2023-05-01 DIAGNOSIS — C78.01 MALIGNANT NEOPLASM METASTATIC TO RIGHT LUNG: ICD-10-CM

## 2023-05-01 DIAGNOSIS — D18.03 LIVER HEMANGIOMA: ICD-10-CM

## 2023-05-01 DIAGNOSIS — C22.0 HCC (HEPATOCELLULAR CARCINOMA): Primary | ICD-10-CM

## 2023-05-01 LAB
DLCO ADJ PRE: 18.2 ML/(MIN*MMHG) (ref 23.84–35.31)
DLCO SINGLE BREATH LLN: 23.84
DLCO SINGLE BREATH PRE REF: 63.4 %
DLCO SINGLE BREATH REF: 29.58
DLCOC SBVA LLN: 3.62
DLCOC SBVA PRE REF: 79.4 %
DLCOC SBVA REF: 4.84
DLCOC SINGLE BREATH LLN: 23.84
DLCOC SINGLE BREATH PRE REF: 61.5 %
DLCOC SINGLE BREATH REF: 29.58
DLCOCSBVAULN: 6.06
DLCOCSINGLEBREATHULN: 35.31
DLCOSINGLEBREATHULN: 35.31
DLCOVA LLN: 3.62
DLCOVA PRE REF: 81.7 %
DLCOVA PRE: 3.95 ML/(MIN*MMHG*L) (ref 3.62–6.06)
DLCOVA REF: 4.84
DLCOVAULN: 6.06
DLVAADJ PRE: 3.84 ML/(MIN*MMHG*L) (ref 3.62–6.06)
ERV LLN: -16448.87
ERV PRE REF: 81.9 %
ERV REF: 1.13
ERVULN: ABNORMAL
FEF 25 75 LLN: 1.6
FEF 25 75 PRE REF: 98.1 %
FEF 25 75 REF: 3.08
FEV05 LLN: 1.56
FEV05 REF: 2.42
FEV1 FVC LLN: 71
FEV1 FVC PRE REF: 98.4 %
FEV1 FVC REF: 81
FEV1 LLN: 2.41
FEV1 PRE REF: 101 %
FEV1 REF: 3.14
FRCPLETH LLN: 2.26
FRCPLETH PREREF: 87.6 %
FRCPLETH REF: 3.08
FRCPLETHULN: 3.91
FVC LLN: 3.03
FVC PRE REF: 101.9 %
FVC REF: 3.9
IVC PRE: 3.6 L (ref 3.03–4.81)
IVC SINGLE BREATH LLN: 3.03
IVC SINGLE BREATH PRE REF: 92.3 %
IVC SINGLE BREATH REF: 3.9
IVCSINGLEBREATHULN: 4.81
LLN IC: -16447.04
PEF LLN: 5.24
PEF PRE REF: 89.8 %
PEF REF: 7.7
PHYSICIAN COMMENT: ABNORMAL
PRE DLCO: 18.74 ML/(MIN*MMHG) (ref 23.84–35.31)
PRE ERV: 0.93 L (ref -16448.87–16451.13)
PRE FEF 25 75: 3.02 L/S (ref 1.6–4.98)
PRE FET 100: 4.92 SEC
PRE FEV05 REF: 99.7 %
PRE FEV1 FVC: 79.75 % (ref 70.52–89.86)
PRE FEV1: 3.17 L (ref 2.41–3.84)
PRE FEV5: 2.41 L (ref 1.56–3.27)
PRE FRC PL: 2.7 L (ref 2.26–3.91)
PRE FVC: 3.98 L (ref 3.03–4.81)
PRE IC: 3.05 L (ref -16447.04–16452.96)
PRE PEF: 6.92 L/S (ref 5.24–10.17)
PRE REF IC: 103 %
PRE RV: 1.77 L (ref 1.38–2.53)
PRE TLC: 5.75 L (ref 5.13–7.1)
RAW PRE REF: 88 %
RAW PRE: 2.69 CMH2O*S/L (ref 3.06–3.06)
RAW REF: 3.06
REF IC: 2.96
RV LLN: 1.38
RV PRE REF: 90.9 %
RV REF: 1.95
RVTLC LLN: 24
RVTLC PRE REF: 91.9 %
RVTLC PRE: 30.87 % (ref 23.99–43.17)
RVTLC REF: 34
RVTLCULN: 43
RVULN: 2.53
SGAW PRE REF: 110.4 %
SGAW PRE: 0.11 1/(CMH2O*S) (ref 0.1–0.1)
SGAW REF: 0.1
TLC LLN: 5.13
TLC PRE REF: 94.1 %
TLC REF: 6.11
TLC ULN: 7.1
ULN IC: ABNORMAL
VA PRE: 4.74 L (ref 5.96–5.96)
VA SINGLE BREATH LLN: 5.96
VA SINGLE BREATH PRE REF: 79.5 %
VA SINGLE BREATH REF: 5.96
VASINGLEBREATHULN: 5.96
VC LLN: 3.03
VC PRE REF: 101.9 %
VC PRE: 3.98 L (ref 3.03–4.81)
VC REF: 3.9
VC ULN: 4.81

## 2023-05-01 PROCEDURE — 94729 DIFFUSING CAPACITY: CPT | Mod: S$GLB,,, | Performed by: INTERNAL MEDICINE

## 2023-05-01 PROCEDURE — 99214 OFFICE O/P EST MOD 30 MIN: CPT | Mod: S$GLB,,, | Performed by: INTERNAL MEDICINE

## 2023-05-01 PROCEDURE — 94010 BREATHING CAPACITY TEST: CPT | Mod: S$GLB,,, | Performed by: INTERNAL MEDICINE

## 2023-05-01 PROCEDURE — 99214 PR OFFICE/OUTPT VISIT, EST, LEVL IV, 30-39 MIN: ICD-10-PCS | Mod: S$GLB,,, | Performed by: INTERNAL MEDICINE

## 2023-05-01 PROCEDURE — 94726 PLETHYSMOGRAPHY LUNG VOLUMES: CPT | Mod: S$GLB,,, | Performed by: INTERNAL MEDICINE

## 2023-05-01 PROCEDURE — 99999 PR PBB SHADOW E&M-EST. PATIENT-LVL I: ICD-10-PCS | Mod: PBBFAC,,, | Performed by: INTERNAL MEDICINE

## 2023-05-01 PROCEDURE — 99999 PR PBB SHADOW E&M-EST. PATIENT-LVL I: CPT | Mod: PBBFAC,,, | Performed by: INTERNAL MEDICINE

## 2023-05-01 PROCEDURE — 94010 BREATHING CAPACITY TEST: ICD-10-PCS | Mod: S$GLB,,, | Performed by: INTERNAL MEDICINE

## 2023-05-01 PROCEDURE — 94729 PR C02/MEMBANE DIFFUSE CAPACITY: ICD-10-PCS | Mod: S$GLB,,, | Performed by: INTERNAL MEDICINE

## 2023-05-01 PROCEDURE — 94726 PULM FUNCT TST PLETHYSMOGRAP: ICD-10-PCS | Mod: S$GLB,,, | Performed by: INTERNAL MEDICINE

## 2023-05-01 NOTE — PROGRESS NOTES
MEDICAL ONCOLOGY - ESTABLISHED PATIENT VISIT    Reason for visit: Scirrhous HCC    Best Contact Phone Number(s): [phone] (home)      Cancer/Stage/TNM:    Cancer Staging   No matching staging information was found for the patient.     Oncology History    No history exists.      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 Howell with the same pathology, negative margins, 0 of 8 lymph nodes positive and + for vascular and perineural invasion.  I saw her in 2021 for evaluation for persistent CA 19-9 but there was no radiographic evidence of HCC disease recurrence or alternative reason for CA 19-9 elevation.  CT chest on 3/9/23 showed an enlarging RLL nodule measuring 1.1 cm, increased from 0.8 cm in size.  IR biopsy of this on 4/10/23 confirmed metastatic HCC.  She was discussed for potential surgical resection and is seeing Dr. Tadeo later this week.  Meanwhile she had a PET/CT on 4/26/23 that showed this RLL met to be non-avid but did show a T2 bone lesion that was FDG avid.    She denies any persistent pain in her upper back. She occasionally has some soreness which she attributes to various activities.  She continues to work out, run, etc without issue.  She has no weight loss, fevers or other sites of pain.    ROS:   Review of Systems   Constitutional:  Negative for chills, fever, malaise/fatigue and weight loss.   HENT:  Negative for sore throat.    Eyes:  Negative for blurred vision and pain.   Respiratory:  Negative for cough and shortness of breath.    Cardiovascular:  Negative for chest pain and leg swelling.   Gastrointestinal:  Negative for abdominal pain, constipation, diarrhea, nausea and vomiting.   Genitourinary:  Negative for dysuria and frequency.   Musculoskeletal:  Negative for back pain, falls and myalgias.   Skin:  Negative for rash.   Neurological:  Negative for dizziness, weakness and headaches.   Endo/Heme/Allergies:  Does not  bruise/bleed easily.   Psychiatric/Behavioral:  Negative for depression. The patient is not nervous/anxious.    All other systems reviewed and are negative.    Past Medical History:   Past Medical History:   Diagnosis Date    Abdominal pain 8/12/2021    VENKATA positive 8/20/2020    COVID-19     Deviated septum 2011    Hepatocellular carcinoma 6/9/2021    Hypertrophy of inferior nasal turbinate     Liver mass     Nasal congestion 2011    Pericardial effusion     Premature menopause         Past Surgical History:   Past Surgical History:   Procedure Laterality Date    COLONOSCOPY N/A 09/21/2020    Procedure: COLONOSCOPY;  Surgeon: GIOVANNI Crane MD;  Location: [facility] ENDO (4TH FLR);  Service: Endoscopy;  Laterality: N/A;  + covid 4/22    HERNIA REPAIR      LIVER SURGERY N/A 06/28/2021    NASAL SEPTUM SURGERY      PERICARDIAL WINDOW N/A 02/22/2021    Procedure: CREATION, PERICARDIAL WINDOW;  Surgeon: Ajay Cantor MD;  [facility];  Service: Cardiovascular;  Laterality: N/A;    PERICARDIOCENTESIS N/A 05/02/2020    Procedure: Pericardiocentesis;  Surgeon: Dickson Dangelo MD;  Location: [facility] CATH LAB;  Service: Cardiology;  Laterality: N/A;    TONSILLECTOMY          Family History:   Family History   Problem Relation Age of Onset    Diabetes Mother     Liver disease Mother         Fatty liver    Heart disease Father     Macular degeneration Father     Diabetes Father     Hypertension Father     Hyperlipidemia Father     No Known Problems Sister     No Known Problems Brother     No Known Problems Brother     No Known Problems Daughter     No Known Problems Maternal Aunt     No Known Problems Maternal Uncle     No Known Problems Paternal Aunt     No Known Problems Paternal Uncle     No Known Problems Maternal Grandmother     No Known Problems Maternal Grandfather     No Known Problems Paternal Grandmother     No Known Problems Paternal Grandfather     Amblyopia Neg Hx     Blindness Neg Hx     Cancer  Neg Hx     Cataracts Neg Hx     Glaucoma Neg Hx     Retinal detachment Neg Hx     Strabismus Neg Hx     Stroke Neg Hx     Thyroid disease Neg Hx     Melanoma Neg Hx     Heart attack Neg Hx         Social History:   Social History     Tobacco Use    Smoking status: Never    Smokeless tobacco: Never   Substance Use Topics    Alcohol use: Yes     Comment: rare        I have reviewed and updated the patient's past medical, surgical, family and social histories.    Allergies:   Review of patient's allergies indicates:   Allergen Reactions    No known drug allergies         Medications:   Current Outpatient Medications   Medication Sig Dispense Refill    cetirizine (ZYRTEC) 10 MG tablet TAKE 1 TABLET BY MOUTH EVERY DAY 90 tablet 2    cyclobenzaprine (FLEXERIL) 10 MG tablet TAKE 1 TABLET BY MOUTH EVERY DAY IN THE EVENING AS NEEDED FOR MUSCLE SPASMS 30 tablet 3    fluconazole (DIFLUCAN) 150 MG Tab Take one tablet today and repeat in three days if still symptomatic 2 tablet 0    multivitamin capsule Take by mouth. 1 capsule Oral Every morning      norethindrone-ethinyl estradiol (MICROGESTIN 1/20) 1-20 mg-mcg per tablet Take 1 tablet by mouth once daily. 63 tablet 4    omeprazole (PRILOSEC) 20 MG capsule TAKE 1 CAPSULE BY MOUTH DAILY AS NEEDED FOR GERD 90 capsule 1    ondansetron (ZOFRAN-ODT) 4 MG TbDL Take 1 tablet (4 mg total) by mouth 2 (two) times daily. 2 tablet 0    triamcinolone acetonide 0.1% (KENALOG) 0.1 % cream Apply topically 2 (two) times daily as needed (scaling at external ears). Avoid use on face, body folds, groin/genitalia. 45 g 3     No current facility-administered medications for this visit.        Physical Exam:   LMP  (LMP Unknown)      ECOG Performance status: 0            Physical Exam  Vitals reviewed.   Constitutional:       General: She is not in acute distress.     Appearance: Normal appearance. She is normal weight.   HENT:      Head: Normocephalic and atraumatic.      Right Ear: External ear  normal.      Left Ear: External ear normal.      Nose: Nose normal.      Mouth/Throat:      Mouth: Mucous membranes are moist.      Pharynx: Oropharynx is clear. No posterior oropharyngeal erythema.   Eyes:      General: No scleral icterus.     Extraocular Movements: Extraocular movements intact.      Conjunctiva/sclera: Conjunctivae normal.      Pupils: Pupils are equal, round, and reactive to light.   Cardiovascular:      Rate and Rhythm: Normal rate and regular rhythm.      Pulses: Normal pulses.      Heart sounds: Normal heart sounds.   Pulmonary:      Effort: Pulmonary effort is normal. No respiratory distress.      Breath sounds: Normal breath sounds. No wheezing or rales.   Abdominal:      General: Bowel sounds are normal. There is no distension.      Palpations: Abdomen is soft.      Tenderness: There is no abdominal tenderness.   Musculoskeletal:         General: No swelling. Normal range of motion.      Cervical back: Normal range of motion and neck supple.   Skin:     General: Skin is warm.      Coloration: Skin is not jaundiced.      Findings: No erythema or rash.   Neurological:      General: No focal deficit present.      Mental Status: She is alert and oriented to person, place, and time. Mental status is at baseline.      Gait: Gait normal.   Psychiatric:         Mood and Affect: Mood normal.         Behavior: Behavior normal.         Thought Content: Thought content normal.         Judgment: Judgment normal.         Labs:   Recent Results (from the past 48 hour(s))   Spirometry without Bronchodilator    Collection Time: 05/01/23 11:00 AM   Result Value Ref Range    Physician Comment       Spirometry is normal. Lung volume determination is normal. Airway mechanics show normal airway resistance and conductance. DLCO is mildly decreased.   Â   Notes:  Â       Pre FVC 3.98 3.03 - 4.81 L    PRE FEV5 2.41 1.56 - 3.27 L    Pre FEV1 3.17 2.41 - 3.84 L    Pre FEV1 FVC 79.75 70.52 - 89.86 %    Pre FEF 25 75  3.02 1.60 - 4.98 L/s    Pre PEF 6.92 5.24 - 10.17 L/s    Pre  4.92 sec    Pre DLCO 18.74 (L) 23.84 - 35.31 ml/(min*mmHg)    DLCO ADJ PRE 18.20 (L) 23.84 - 35.31 ml/(min*mmHg)    DLCOVA PRE 3.95 3.62 - 6.06 ml/(min*mmHg*L)    DLVAAdj PRE 3.84 3.62 - 6.06 ml/(min*mmHg*L)    VA PRE 4.74 (L) 5.96 - 5.96 L    IVC PRE 3.60 3.03 - 4.81 L    Pre TLC 5.75 5.13 - 7.10 L    VC PRE 3.98 3.03 - 4.81 L    PRE IC 3.05 -64145.04 - 35930.96 L    Pre FRC PL 2.70 2.26 - 3.91 L    Pre ERV 0.93 -66397.87 - 59156.13 L    Pre RV 1.77 1.38 - 2.53 L    RVTLC PRE 30.87 23.99 - 43.17 %    Raw PRE 2.69 (L) 3.06 - 3.06 cmH2O*s/L    sGaw PRE 0.11 (H) 0.10 - 0.10 1/(cmH2O*s)    FVC Ref 3.90     FVC LLN 3.03     FVC Pre Ref 101.9 %    FEV05 REF 2.42     FEV05 LLN 1.56     PRE FEV05 REF 99.7 %    FEV1 Ref 3.14     FEV1 LLN 2.41     FEV1 Pre Ref 101.0 %    FEV1 FVC Ref 81     FEV1 FVC LLN 71     FEV1 FVC Pre Ref 98.4 %    FEF 25 75 Ref 3.08     FEF 25 75 LLN 1.60     FEF 25 75 Pre Ref 98.1 %    PEF Ref 7.70     PEF LLN 5.24     PEF Pre Ref 89.8 %    TLC Ref 6.11     TLC LLN 5.13     TLC ULN 7.10     TLC Pre Ref 94.1 %    VC Ref 3.90     VC LLN 3.03     VC ULN 4.81     VC Pre Ref 101.9 %    REF IC 2.96     LLN IC -10798.04     ULN IC 72955.96     PRE REF .0 %    FRCpleth Ref 3.08     FRCpleth LLN 2.26     FRC PLETH ULN 3.91     FRCpleth PreRef 87.6 %    ERV Ref 1.13     ERV LLN -74156.87     ERV ULN 09331.13     ERV Pre Ref 81.9 %    RV Ref 1.95     RV LLN 1.38     RV ULN 2.53     RV Pre Ref 90.9 %    RVTLC Ref 34     RVTLC LLN 24     RV TLC ULN 43     RVTLC Pre Ref 91.9 %    Raw Ref 3.06     Raw Pre Ref 88.0 %    sGaw Ref 0.10     sGaw Pre Ref 110.4 %    DLCO Single Breath Ref 29.58     DLCO Single Breath LLN 23.84     DLCO SINGLEBREATH ULN 35.31     DLCO Single Breath Pre Ref 63.4 %    DLCOc Single Breath Ref 29.58     DLCOc Single Breath LLN 23.84     DLCOC SINGLEBREATH ULN 35.31     DLCOc Single Breath Pre Ref 61.5 %    DLCOVA Ref  4.84     DLCOVA LLN 3.62     DLCOVA ULN 6.06     DLCOVA Pre Ref 81.7 %    DLCOc SBVA Ref 4.84     DLCOc SBVA LLN 3.62     DLCOCSBVA ULN 6.06     DLCOc SBVA Pre Ref 79.4 %    VA Single Breath Ref 5.96     VA Single Breath LLN 5.96     VA SINGLEBREATH ULN 5.96     VA Single Breath Pre Ref 79.5 %    IVC Single Breath Ref 3.90     IVC Single Breath LLN 3.03     IVC SINGLEBREATH ULN 4.81     IVC Single Breath Pre Ref 92.3 %        I have reviewed the pertinent labs from 3/25/23 which are notable for normal blood counts. AFP on 3/9/23 was 9.8.     Imaging:       I have personally reviewed the 4/26/23 PET/CT imaging which is notable for RLL metastatic nodule and hypermetabolic T2 lesion that is concerning for metastasis.    Path:   6/28/21: partial hepatectomy:    Final Pathologic Diagnosis 1.  Gallbladder (cholecystectomy):   - Benign gallbladder with cholecystitis   2. Right lobe (partial hepatectomy):   - Positive for malignancy, see synoptic report below   - Separate hemangioma, 7.3 cm   3. Lymph nodes, portal (regional resection):   - 8 lymph nodes, negative for tumor (0/8)   ______________________________________________________________________________   ______   Hepatocellular carcinoma synoptic report   - Procedure:  Partial hepatectomy, right lobe   - Histologic type:  Hepatocellular carcinoma, scirrhous   - Histologic grade:  Moderately differentiated, grade 2   - Tumor focality:  Solitary   - Tumor characteristics:   - Tumor site:  Right lobe   - Tumor size:  3.3 cm   - Treatment effect:  No known pre-surgical therapy   - Satellitosis:  Not identified   - Tumor extent:  Confined to liver   - Vascular invasion:  Present, Small vessel   - Perineural invasion:  Present   - Margins:   - All margins are negative for invasive carcinoma   - Closest margin to invasive carcinoma:  Parenchymal   - Distance from invasive  carcinoma to closest margin:  5 mm   - Regional lymph nodes:   - Number of lymph nodes with tumor:  0    - Number of lymph nodes examined:  8   - Pathology: pT2 N0 MX   - Additional findings:   - No steatosis   - Trichrome stain:  Periportal fibrosis with early septa, stage 1-2   - Iron stain:  Negative   - Iron and trichrome stains with appropriate controls   Tumor blocks:  2A, 2B, 2 C    Comment: Interp By Madeline Carlos MD, Signed on 07/08/2021 at 16:47           Assessment:       1. HCC (hepatocellular carcinoma)    2. Malignant neoplasm metastatic to right lung    3. Bone lesion    4. Liver hemangioma            Plan:             # HCC  Scirrhous subtype, which is very uncommon (~ 1% of all HCC); prognosis is likely similar to typical HCC.  No clear underlying liver pathology in this patient.  Had margin negative resection with negative lymph node eval on 6/28/21 with Dr. Howell.  Unfortunately she has biopsy proven recurrent metastatic disease to her RLL s/p IR biopsy 4/10/23. She has been discussed at thoracic tumor board with potential plan for surgical resection.  Scheduled for 5/4/23 visit with Dr. Tadeo.  She has a concerning bone lesion on PET from 4/26 at T2.  I think we have to assume this is metastatic disease until proven otherwise.  I will discuss this finding and whether a biopsy vs empiric radiation may be warranted with Dr. Dhillon and Dr. Tadeo.    I will plan to bring her back in July with CT CAP for surveillance.  I would not recommend commencing systemic therapy at this time as long as there are local options available that will spare her potential IO toxicities.  I explained this to her today.    # Liver hemangiomas  Continue monitoring as indicated with Dr. Rogers.    Follow up: 2.5 months.    The above information has been reviewed with the patient and all questions have been answered to their apparent satisfaction.  They understand that they can call the clinic with any questions.    Philippe Carrasco MD  Hematology/Oncology  [facility]    Route Chart  for Scheduling    Med Onc Chart Routing      Follow up with physician 3 months. either on 7/31 - same day as ophtho appt or around there   Follow up with MELA    Infusion scheduling note    Injection scheduling note    Labs CBC, CMP and CA 19-9   Scheduling:  Preferred lab:  Lab interval:  & AFP   Imaging CT chest abdomen pelvis   on 7/31 or on weekend around then prior to visit   Pharmacy appointment    Other referrals

## 2023-05-01 NOTE — Clinical Note
Hey guys. This is a very young patient with a rare HCC subtype (scirrhous) s/p resection in 2021 without clear underlying liver pathology who has biopsy proven recurrence to the RLL.  Was discussed at thoracic tumor board last week with plan for wedge resection.  Seeing Ryan on Thursday.  Her PET which seems to have been done after conference also shows a hypermetabolic T2 lesion that looks concerning to me.  Kirk, do you think this is enough to just go ahead and radiate it or do you think it needs biopsy for confirmation?  I would not start systemic therapy at this time given these are the only sites of disease and it would be lifelong therapy once we start. Thanks, Jose L

## 2023-05-03 ENCOUNTER — PATIENT MESSAGE (OUTPATIENT)
Dept: INTERNAL MEDICINE | Facility: CLINIC | Age: 43
End: 2023-05-03
Payer: COMMERCIAL

## 2023-05-03 DIAGNOSIS — M89.9 BONE LESION: Primary | ICD-10-CM

## 2023-05-03 DIAGNOSIS — C22.0 HCC (HEPATOCELLULAR CARCINOMA): ICD-10-CM

## 2023-05-04 ENCOUNTER — OFFICE VISIT (OUTPATIENT)
Dept: CARDIOTHORACIC SURGERY | Facility: CLINIC | Age: 43
End: 2023-05-04
Payer: COMMERCIAL

## 2023-05-04 VITALS
SYSTOLIC BLOOD PRESSURE: 145 MMHG | HEART RATE: 90 BPM | DIASTOLIC BLOOD PRESSURE: 94 MMHG | WEIGHT: 152.13 LBS | BODY MASS INDEX: 21.3 KG/M2 | OXYGEN SATURATION: 96 % | HEIGHT: 71 IN

## 2023-05-04 DIAGNOSIS — R91.1 LUNG NODULE: ICD-10-CM

## 2023-05-04 PROCEDURE — 99214 PR OFFICE/OUTPT VISIT, EST, LEVL IV, 30-39 MIN: ICD-10-PCS | Mod: S$GLB,,, | Performed by: STUDENT IN AN ORGANIZED HEALTH CARE EDUCATION/TRAINING PROGRAM

## 2023-05-04 PROCEDURE — 1159F MED LIST DOCD IN RCRD: CPT | Mod: CPTII,S$GLB,, | Performed by: STUDENT IN AN ORGANIZED HEALTH CARE EDUCATION/TRAINING PROGRAM

## 2023-05-04 PROCEDURE — 3080F DIAST BP >= 90 MM HG: CPT | Mod: CPTII,S$GLB,, | Performed by: STUDENT IN AN ORGANIZED HEALTH CARE EDUCATION/TRAINING PROGRAM

## 2023-05-04 PROCEDURE — 3008F BODY MASS INDEX DOCD: CPT | Mod: CPTII,S$GLB,, | Performed by: STUDENT IN AN ORGANIZED HEALTH CARE EDUCATION/TRAINING PROGRAM

## 2023-05-04 PROCEDURE — 3008F PR BODY MASS INDEX (BMI) DOCUMENTED: ICD-10-PCS | Mod: CPTII,S$GLB,, | Performed by: STUDENT IN AN ORGANIZED HEALTH CARE EDUCATION/TRAINING PROGRAM

## 2023-05-04 PROCEDURE — 3077F SYST BP >= 140 MM HG: CPT | Mod: CPTII,S$GLB,, | Performed by: STUDENT IN AN ORGANIZED HEALTH CARE EDUCATION/TRAINING PROGRAM

## 2023-05-04 PROCEDURE — 1159F PR MEDICATION LIST DOCUMENTED IN MEDICAL RECORD: ICD-10-PCS | Mod: CPTII,S$GLB,, | Performed by: STUDENT IN AN ORGANIZED HEALTH CARE EDUCATION/TRAINING PROGRAM

## 2023-05-04 PROCEDURE — 3077F PR MOST RECENT SYSTOLIC BLOOD PRESSURE >= 140 MM HG: ICD-10-PCS | Mod: CPTII,S$GLB,, | Performed by: STUDENT IN AN ORGANIZED HEALTH CARE EDUCATION/TRAINING PROGRAM

## 2023-05-04 PROCEDURE — 99214 OFFICE O/P EST MOD 30 MIN: CPT | Mod: S$GLB,,, | Performed by: STUDENT IN AN ORGANIZED HEALTH CARE EDUCATION/TRAINING PROGRAM

## 2023-05-04 PROCEDURE — 99999 PR PBB SHADOW E&M-EST. PATIENT-LVL IV: CPT | Mod: PBBFAC,,, | Performed by: STUDENT IN AN ORGANIZED HEALTH CARE EDUCATION/TRAINING PROGRAM

## 2023-05-04 PROCEDURE — 3080F PR MOST RECENT DIASTOLIC BLOOD PRESSURE >= 90 MM HG: ICD-10-PCS | Mod: CPTII,S$GLB,, | Performed by: STUDENT IN AN ORGANIZED HEALTH CARE EDUCATION/TRAINING PROGRAM

## 2023-05-04 PROCEDURE — 99999 PR PBB SHADOW E&M-EST. PATIENT-LVL IV: ICD-10-PCS | Mod: PBBFAC,,, | Performed by: STUDENT IN AN ORGANIZED HEALTH CARE EDUCATION/TRAINING PROGRAM

## 2023-05-04 RX ORDER — ALPRAZOLAM 0.5 MG/1
0.5 TABLET ORAL DAILY PRN
Qty: 30 TABLET | Refills: 2 | Status: SHIPPED | OUTPATIENT
Start: 2023-05-04 | End: 2023-11-27

## 2023-05-04 NOTE — PROGRESS NOTES
"History & Physical    SUBJECTIVE:     History of Present Illness:  42 y.o. female never smoker with h/o COVID infection, GERD and metastatic HCC (initially diagnosed w/ HCC 06/2021) to the right lung. Patient underwent Chest CT Sept. 2022 which revealed a new 6 mm RLL lung nodule.  Follow up Chest CT 03/09/23 showed continued enlarged of RLL nodule to 1.1 cm, previously 8 mm in Nov. 2022. And 6 mm in Sept. 2022. Underwent IR biopsy 04/10/23; path: positive for metastatic HCC. Discussed at previous Thoracic tumor board, 04/26/23, which recommended "referral to Thoracic surgery for evaluation of RLL lung nodule positive for metastatic HCC. Obtain PFTs to evaluate patient's ability to undergo single lung anesthesia. Recommend robotic ICG tagging followed by robotic-assisted RLL wedge resection versus VATS wedge resection." Staging PET/CT 04/26/23 revealed a hypermetabolic lesion within the T2 left lamina with SUV max 11 correlating with lytic lesion on CT. Discussed at Thoracic tumor board yesterday, 05/04/23, which recommended referral to "Interventional Radiology for bone biopsy of T2 PET avid lesion. Delay evaluation and surgical management of metastatic lung lesion pending result of IR biopsy. If biopsy positive, refer to Radiation Oncology for evaluation and further management."    PSH: colonoscopy, hernia repair, liver surgery, nasal septum surgery, pericardial window (02/2021), pericardiocentesis (05/2020), tonsillectomy  Meds: no anticoagulation    Chief Complaint   Patient presents with    Consult       Review of patient's allergies indicates:   Allergen Reactions    No known drug allergies        Current Outpatient Medications   Medication Sig Dispense Refill    cetirizine (ZYRTEC) 10 MG tablet TAKE 1 TABLET BY MOUTH EVERY DAY 90 tablet 2    cyclobenzaprine (FLEXERIL) 10 MG tablet TAKE 1 TABLET BY MOUTH EVERY DAY IN THE EVENING AS NEEDED FOR MUSCLE SPASMS 30 tablet 3    multivitamin capsule Take by mouth. 1 " capsule Oral Every morning      norethindrone-ethinyl estradiol (MICROGESTIN 1/20) 1-20 mg-mcg per tablet Take 1 tablet by mouth once daily. 63 tablet 4    omeprazole (PRILOSEC) 20 MG capsule TAKE 1 CAPSULE BY MOUTH DAILY AS NEEDED FOR GERD 90 capsule 1    ondansetron (ZOFRAN-ODT) 4 MG TbDL Take 1 tablet (4 mg total) by mouth 2 (two) times daily. 2 tablet 0    triamcinolone acetonide 0.1% (KENALOG) 0.1 % cream Apply topically 2 (two) times daily as needed (scaling at external ears). Avoid use on face, body folds, groin/genitalia. 45 g 3    fluconazole (DIFLUCAN) 150 MG Tab Take one tablet today and repeat in three days if still symptomatic 2 tablet 0     No current facility-administered medications for this visit.       Past Medical History:   Diagnosis Date    Abdominal pain 8/12/2021    VENKATA positive 8/20/2020    COVID-19     Deviated septum 2011    Hepatocellular carcinoma 6/9/2021    Hypertrophy of inferior nasal turbinate     Liver mass     Nasal congestion 2011    Pericardial effusion     Premature menopause      Past Surgical History:   Procedure Laterality Date    COLONOSCOPY N/A 09/21/2020    Procedure: COLONOSCOPY;  Surgeon: GIOVANNI Crane MD;  Location: [facility] ENDO (4TH FLR);  Service: Endoscopy;  Laterality: N/A;  + covid 4/22    HERNIA REPAIR      LIVER SURGERY N/A 06/28/2021    NASAL SEPTUM SURGERY      PERICARDIAL WINDOW N/A 02/22/2021    Procedure: CREATION, PERICARDIAL WINDOW;  Surgeon: Ajay Cantor MD;  Location: [facility] OR [facility] FLR;  Service: Cardiovascular;  Laterality: N/A;    PERICARDIOCENTESIS N/A 05/02/2020    Procedure: Pericardiocentesis;  Surgeon: Dickson Dangelo MD;  Location: [facility] CATH LAB;  Service: Cardiology;  Laterality: N/A;    TONSILLECTOMY       Family History   Problem Relation Age of Onset    Diabetes Mother     Liver disease Mother         Fatty liver    Heart disease Father     Macular degeneration Father     Diabetes Father     Hypertension Father     Hyperlipidemia  "Father     No Known Problems Sister     No Known Problems Brother     No Known Problems Brother     No Known Problems Daughter     No Known Problems Maternal Aunt     No Known Problems Maternal Uncle     No Known Problems Paternal Aunt     No Known Problems Paternal Uncle     No Known Problems Maternal Grandmother     No Known Problems Maternal Grandfather     No Known Problems Paternal Grandmother     No Known Problems Paternal Grandfather     Amblyopia Neg Hx     Blindness Neg Hx     Cancer Neg Hx     Cataracts Neg Hx     Glaucoma Neg Hx     Retinal detachment Neg Hx     Strabismus Neg Hx     Stroke Neg Hx     Thyroid disease Neg Hx     Melanoma Neg Hx     Heart attack Neg Hx      Social History     Tobacco Use    Smoking status: Never    Smokeless tobacco: Never   Substance Use Topics    Alcohol use: Yes     Comment: rare    Drug use: No        Review of Systems:  Review of Systems   Constitutional: Negative.    Respiratory: Negative.     Cardiovascular: Negative.    Gastrointestinal: Negative.    Musculoskeletal: Negative.    Skin: Negative.    Neurological: Negative.    Hematological: Negative.    Psychiatric/Behavioral: Negative.       OBJECTIVE:     Vital Signs (Most Recent)  Pulse: 90 (05/04/23 0858)  BP: (!) 145/94 (05/04/23 0858)  SpO2: 96 % (05/04/23 0858)  5' 11" (1.803 m)  69 kg (152 lb 1.9 oz)     Physical Exam:  Physical Exam  Constitutional:       Appearance: Normal appearance. She is normal weight.   Eyes:      Extraocular Movements: Extraocular movements intact.   Cardiovascular:      Rate and Rhythm: Normal rate and regular rhythm.      Pulses: Normal pulses.   Pulmonary:      Effort: Pulmonary effort is normal.      Breath sounds: Normal breath sounds.   Abdominal:      General: Abdomen is flat. Bowel sounds are normal.      Palpations: Abdomen is soft.   Skin:     General: Skin is warm and dry.   Neurological:      General: No focal deficit present.      Mental Status: She is alert and oriented " to person, place, and time. Mental status is at baseline.   Psychiatric:         Mood and Affect: Mood normal.         Behavior: Behavior normal.         Thought Content: Thought content normal.       Diagnostic Results:    Chest CT 03/09/23:   Lungs/Pleura: Lungs are well expanded.  1.1 cm solid pulmonary nodule in the right lower lobe (series 4, image 368), previously measuring 0.8 cm on exam from 11/28/2022 and 0.6 cm on exam from 09/09/2022.  0.4 cm pulmonary nodule in the right upper lobe (series 4, image 224), previously measuring 0.4 cm.    PET/CT 04/26/23:   In the chest, there are no hypermetabolic lesions worrisome for malignancy.  Stable 1.1 cm right lower lobe pulmonary nodule without significant FDG avid uptake reaching an SUV max of 1.8 (fused image 104).  Of note, this lesion was previously biopsied and determined to be compatible with metastatic hepatocellular carcinoma.  Stable 0.4 cm solid pulmonary nodule within the right upper lobe (series 3 image 80), too small to characterize definitively by PET.  There are no pathologically enlarged or hypermetabolic lymph nodes.  In the bones, hypermetabolic lesion within the T2 left lamina with SUV max 11 (fused image 42) correlating with lytic lesion on CT.    PFTs - 05/01/23: FEV1 3.17 101% DLCO 18.7 63.4%      ASSESSMENT/PLAN:     42 y.o. female never smoker with h/o COVID infection, GERD and metastatic HCC (initially diagnosed w/ HCC 06/2021) to the right lung.     PLAN:Plan   Updated patient on Thoracic Tumor Board recommendations from yesterday's conference, 05/04/23. Will proceed with referral to Interventional Radiology for bone biopsy of T2 lesion.

## 2023-05-05 DIAGNOSIS — C22.0 HCC (HEPATOCELLULAR CARCINOMA): Primary | ICD-10-CM

## 2023-05-07 ENCOUNTER — PATIENT MESSAGE (OUTPATIENT)
Dept: CARDIOTHORACIC SURGERY | Facility: CLINIC | Age: 43
End: 2023-05-07
Payer: COMMERCIAL

## 2023-05-15 ENCOUNTER — PATIENT MESSAGE (OUTPATIENT)
Dept: HEMATOLOGY/ONCOLOGY | Facility: CLINIC | Age: 43
End: 2023-05-15
Payer: COMMERCIAL

## 2023-05-18 ENCOUNTER — PATIENT MESSAGE (OUTPATIENT)
Dept: HEMATOLOGY/ONCOLOGY | Facility: CLINIC | Age: 43
End: 2023-05-18
Payer: COMMERCIAL

## 2023-05-19 ENCOUNTER — PATIENT MESSAGE (OUTPATIENT)
Dept: OBSTETRICS AND GYNECOLOGY | Facility: CLINIC | Age: 43
End: 2023-05-19
Payer: COMMERCIAL

## 2023-05-19 ENCOUNTER — HOSPITAL ENCOUNTER (OUTPATIENT)
Dept: RADIOLOGY | Facility: HOSPITAL | Age: 43
Discharge: HOME | End: 2023-05-19
Attending: OBSTETRICS & GYNECOLOGY
Payer: COMMERCIAL

## 2023-05-19 ENCOUNTER — TELEPHONE (OUTPATIENT)
Dept: OBSTETRICS AND GYNECOLOGY | Facility: CLINIC | Age: 43
End: 2023-05-19
Payer: COMMERCIAL

## 2023-05-19 ENCOUNTER — TELEPHONE (OUTPATIENT)
Dept: RADIOLOGY | Facility: HOSPITAL | Age: 43
End: 2023-05-19
Payer: COMMERCIAL

## 2023-05-19 DIAGNOSIS — Z12.31 VISIT FOR SCREENING MAMMOGRAM: ICD-10-CM

## 2023-05-19 DIAGNOSIS — R92.8 ABNORMAL MAMMOGRAM: Primary | ICD-10-CM

## 2023-05-19 PROCEDURE — 77067 SCR MAMMO BI INCL CAD: CPT | Mod: TC

## 2023-05-19 PROCEDURE — 77067 SCR MAMMO BI INCL CAD: CPT | Mod: 26,,, | Performed by: RADIOLOGY

## 2023-05-19 PROCEDURE — 77063 MAMMO DIGITAL SCREENING BILAT WITH TOMO: ICD-10-PCS | Mod: 26,,, | Performed by: RADIOLOGY

## 2023-05-19 PROCEDURE — 77067 MAMMO DIGITAL SCREENING BILAT WITH TOMO: ICD-10-PCS | Mod: 26,,, | Performed by: RADIOLOGY

## 2023-05-19 PROCEDURE — 77063 BREAST TOMOSYNTHESIS BI: CPT | Mod: 26,,, | Performed by: RADIOLOGY

## 2023-05-19 NOTE — TELEPHONE ENCOUNTER
Spoke with patient and explained mammogram findings.Patient expressed understanding of results. Patient scheduled abnormal mammogram follow up appointment at [facility] on 5/29/2023.

## 2023-05-19 NOTE — TELEPHONE ENCOUNTER
I see you spoke with someone at the [facility] today. Dr Mejia did order the testing requested. Radha  ===View-only below this line===      ----- Message -----       From:Melly Hwang       Sent:5/19/2023  1:13 PM CDT         To:Asaf Mejia MD    Subject:Mammo results     Can I have an ultrasound order placed for my breast?    Melly Hwang RN  [phone]

## 2023-05-25 ENCOUNTER — PATIENT MESSAGE (OUTPATIENT)
Dept: INTERNAL MEDICINE | Facility: CLINIC | Age: 43
End: 2023-05-25
Payer: COMMERCIAL

## 2023-05-25 DIAGNOSIS — J01.90 ACUTE BACTERIAL SINUSITIS: Primary | ICD-10-CM

## 2023-05-25 DIAGNOSIS — R09.81 CONGESTION OF PARANASAL SINUS: ICD-10-CM

## 2023-05-25 DIAGNOSIS — B96.89 ACUTE BACTERIAL SINUSITIS: Primary | ICD-10-CM

## 2023-05-26 ENCOUNTER — PATIENT MESSAGE (OUTPATIENT)
Dept: INTERNAL MEDICINE | Facility: CLINIC | Age: 43
End: 2023-05-26
Payer: COMMERCIAL

## 2023-05-27 ENCOUNTER — LAB VISIT (OUTPATIENT)
Dept: LAB | Facility: HOSPITAL | Age: 43
End: 2023-05-27
Attending: FAMILY MEDICINE
Payer: COMMERCIAL

## 2023-05-27 DIAGNOSIS — M89.9 BONE LESION: ICD-10-CM

## 2023-05-27 LAB
BASOPHILS # BLD AUTO: 0.03 K/UL (ref 0–0.2)
BASOPHILS NFR BLD: 0.6 % (ref 0–1.9)
DIFFERENTIAL METHOD: NORMAL
EOSINOPHIL # BLD AUTO: 0.1 K/UL (ref 0–0.5)
EOSINOPHIL NFR BLD: 1.8 % (ref 0–8)
ERYTHROCYTE [DISTWIDTH] IN BLOOD BY AUTOMATED COUNT: 11.8 % (ref 11.5–14.5)
HCT VFR BLD AUTO: 42.3 % (ref 37–48.5)
HGB BLD-MCNC: 14.1 G/DL (ref 12–16)
IMM GRANULOCYTES # BLD AUTO: 0.01 K/UL (ref 0–0.04)
IMM GRANULOCYTES NFR BLD AUTO: 0.2 % (ref 0–0.5)
INR PPP: 1.1 (ref 0.8–1.2)
LYMPHOCYTES # BLD AUTO: 1.5 K/UL (ref 1–4.8)
LYMPHOCYTES NFR BLD: 29.4 % (ref 18–48)
MCH RBC QN AUTO: 31 PG (ref 27–31)
MCHC RBC AUTO-ENTMCNC: 33.3 G/DL (ref 32–36)
MCV RBC AUTO: 93 FL (ref 82–98)
MONOCYTES # BLD AUTO: 0.4 K/UL (ref 0.3–1)
MONOCYTES NFR BLD: 7.7 % (ref 4–15)
NEUTROPHILS # BLD AUTO: 3 K/UL (ref 1.8–7.7)
NEUTROPHILS NFR BLD: 60.3 % (ref 38–73)
NRBC BLD-RTO: 0 /100 WBC
PLATELET # BLD AUTO: 253 K/UL (ref 150–450)
PMV BLD AUTO: 9.3 FL (ref 9.2–12.9)
PROTHROMBIN TIME: 11.3 SEC (ref 9–12.5)
RBC # BLD AUTO: 4.55 M/UL (ref 4–5.4)
WBC # BLD AUTO: 4.94 K/UL (ref 3.9–12.7)

## 2023-05-27 PROCEDURE — 85610 PROTHROMBIN TIME: CPT | Performed by: FAMILY MEDICINE

## 2023-05-27 PROCEDURE — 85025 COMPLETE CBC W/AUTO DIFF WBC: CPT | Performed by: FAMILY MEDICINE

## 2023-05-27 PROCEDURE — 36415 COLL VENOUS BLD VENIPUNCTURE: CPT | Performed by: FAMILY MEDICINE

## 2023-05-29 ENCOUNTER — HOSPITAL ENCOUNTER (OUTPATIENT)
Dept: RADIOLOGY | Facility: HOSPITAL | Age: 43
Discharge: HOME | End: 2023-05-29
Attending: OBSTETRICS & GYNECOLOGY
Payer: COMMERCIAL

## 2023-05-29 ENCOUNTER — DOCUMENTATION ONLY (OUTPATIENT)
Dept: PREADMISSION TESTING | Facility: HOSPITAL | Age: 43
End: 2023-05-29
Payer: COMMERCIAL

## 2023-05-29 DIAGNOSIS — R92.8 ABNORMAL MAMMOGRAM: ICD-10-CM

## 2023-05-29 DIAGNOSIS — R92.8 ABNORMAL FINDING ON BREAST IMAGING: ICD-10-CM

## 2023-05-29 PROCEDURE — 77061 MAMMO DIGITAL DIAGNOSTIC LEFT WITH TOMO: ICD-10-PCS | Mod: 26,LT,, | Performed by: RADIOLOGY

## 2023-05-29 PROCEDURE — 76642 ULTRASOUND BREAST LIMITED: CPT | Mod: TC,LT

## 2023-05-29 PROCEDURE — 76642 US BREAST LEFT LIMITED: ICD-10-PCS | Mod: 26,LT,, | Performed by: RADIOLOGY

## 2023-05-29 PROCEDURE — 76642 ULTRASOUND BREAST LIMITED: CPT | Mod: 26,LT,, | Performed by: RADIOLOGY

## 2023-05-29 PROCEDURE — 77065 DX MAMMO INCL CAD UNI: CPT | Mod: 26,LT,, | Performed by: RADIOLOGY

## 2023-05-29 PROCEDURE — 77061 BREAST TOMOSYNTHESIS UNI: CPT | Mod: 26,LT,, | Performed by: RADIOLOGY

## 2023-05-29 PROCEDURE — 77061 BREAST TOMOSYNTHESIS UNI: CPT | Mod: TC,LT

## 2023-05-29 PROCEDURE — 77065 MAMMO DIGITAL DIAGNOSTIC LEFT WITH TOMO: ICD-10-PCS | Mod: 26,LT,, | Performed by: RADIOLOGY

## 2023-05-29 NOTE — PRE-PROCEDURE INSTRUCTIONS
PRE-OP INSTRUCTIONS:  Instructed patient to have no food,milk or milk products after midnight   It is ok to have clear liquids up until 2 hours before surgery  Medication instructions for pm prior to and am of surgery reviewed.  Instructed patient to avoid taking vitamins,supplements,aspirin or ibuprofen the am of surgery.  Shower instructions provided    Patient denies any side effects or issues with anesthesia or sedation other than PONV (PATCH HAS BEEN SUCCESSFUL FOR PONV IN THE PAST)

## 2023-05-30 ENCOUNTER — HOSPITAL ENCOUNTER (OUTPATIENT)
Dept: INTERVENTIONAL RADIOLOGY/VASCULAR | Facility: HOSPITAL | Age: 43
Discharge: HOME | End: 2023-05-30
Attending: INTERNAL MEDICINE | Admitting: RADIOLOGY
Payer: COMMERCIAL

## 2023-05-30 ENCOUNTER — TELEPHONE (OUTPATIENT)
Dept: SURGERY | Facility: CLINIC | Age: 43
End: 2023-05-30
Payer: COMMERCIAL

## 2023-05-30 ENCOUNTER — ANESTHESIA (OUTPATIENT)
Dept: INTERVENTIONAL RADIOLOGY/VASCULAR | Facility: HOSPITAL | Age: 43
End: 2023-05-30
Payer: COMMERCIAL

## 2023-05-30 VITALS
WEIGHT: 150 LBS | OXYGEN SATURATION: 98 % | TEMPERATURE: 98 F | BODY MASS INDEX: 21 KG/M2 | SYSTOLIC BLOOD PRESSURE: 112 MMHG | HEIGHT: 71 IN | RESPIRATION RATE: 20 BRPM | HEART RATE: 63 BPM | DIASTOLIC BLOOD PRESSURE: 74 MMHG

## 2023-05-30 DIAGNOSIS — M89.9 BONE LESION: ICD-10-CM

## 2023-05-30 DIAGNOSIS — C22.0 HCC (HEPATOCELLULAR CARCINOMA): ICD-10-CM

## 2023-05-30 LAB
B-HCG UR QL: NEGATIVE
CTP QC/QA: YES

## 2023-05-30 PROCEDURE — 20225 IR BIOPSY BONE SUPERFICIAL: ICD-10-PCS | Mod: LT,,, | Performed by: RADIOLOGY

## 2023-05-30 PROCEDURE — A4215 STERILE NEEDLE: HCPCS

## 2023-05-30 PROCEDURE — 81025 URINE PREGNANCY TEST: CPT | Performed by: FAMILY MEDICINE

## 2023-05-30 PROCEDURE — 63600175 PHARM REV CODE 636 W HCPCS: Performed by: NURSE ANESTHETIST, CERTIFIED REGISTERED

## 2023-05-30 PROCEDURE — D9220A PRA ANESTHESIA: Mod: CRNA,,, | Performed by: NURSE ANESTHETIST, CERTIFIED REGISTERED

## 2023-05-30 PROCEDURE — 37000008 HC ANESTHESIA 1ST 15 MINUTES

## 2023-05-30 PROCEDURE — D9220A PRA ANESTHESIA: Mod: ANES,,, | Performed by: ANESTHESIOLOGY

## 2023-05-30 PROCEDURE — 20225 BONE BIOPSY TROCAR/NDL DEEP: CPT | Mod: LT | Performed by: RADIOLOGY

## 2023-05-30 PROCEDURE — 77012 CT SCAN FOR NEEDLE BIOPSY: CPT | Mod: TC | Performed by: RADIOLOGY

## 2023-05-30 PROCEDURE — 77012 CT SCAN FOR NEEDLE BIOPSY: CPT | Mod: 26,,, | Performed by: RADIOLOGY

## 2023-05-30 PROCEDURE — 37000009 HC ANESTHESIA EA ADD 15 MINS

## 2023-05-30 PROCEDURE — 25000003 PHARM REV CODE 250: Performed by: NURSE ANESTHETIST, CERTIFIED REGISTERED

## 2023-05-30 PROCEDURE — 27200940 IR BIOPSY BONE SUPERFICIAL

## 2023-05-30 PROCEDURE — D9220A PRA ANESTHESIA: ICD-10-PCS | Mod: CRNA,,, | Performed by: NURSE ANESTHETIST, CERTIFIED REGISTERED

## 2023-05-30 PROCEDURE — 77012 PR  CT GUIDANCE NEEDLE PLACEMENT: ICD-10-PCS | Mod: 26,,, | Performed by: RADIOLOGY

## 2023-05-30 PROCEDURE — D9220A PRA ANESTHESIA: ICD-10-PCS | Mod: ANES,,, | Performed by: ANESTHESIOLOGY

## 2023-05-30 RX ORDER — PROPOFOL 10 MG/ML
VIAL (ML) INTRAVENOUS CONTINUOUS PRN
Status: DISCONTINUED | OUTPATIENT
Start: 2023-05-30 | End: 2023-05-30

## 2023-05-30 RX ORDER — FENTANYL CITRATE 50 UG/ML
25 INJECTION, SOLUTION INTRAMUSCULAR; INTRAVENOUS EVERY 5 MIN PRN
Status: DISCONTINUED | OUTPATIENT
Start: 2023-05-30 | End: 2023-05-31 | Stop reason: HOSPADM

## 2023-05-30 RX ORDER — SODIUM CHLORIDE 0.9 % (FLUSH) 0.9 %
3 SYRINGE (ML) INJECTION
Status: DISCONTINUED | OUTPATIENT
Start: 2023-05-30 | End: 2023-05-31 | Stop reason: HOSPADM

## 2023-05-30 RX ORDER — ONDANSETRON 2 MG/ML
4 INJECTION INTRAMUSCULAR; INTRAVENOUS EVERY 6 HOURS PRN
Status: DISCONTINUED | OUTPATIENT
Start: 2023-05-30 | End: 2023-05-31 | Stop reason: HOSPADM

## 2023-05-30 RX ORDER — PHENYLEPHRINE HYDROCHLORIDE 10 MG/ML
INJECTION INTRAVENOUS
Status: DISCONTINUED | OUTPATIENT
Start: 2023-05-30 | End: 2023-05-30

## 2023-05-30 RX ORDER — MIDAZOLAM HYDROCHLORIDE 1 MG/ML
INJECTION, SOLUTION INTRAMUSCULAR; INTRAVENOUS
Status: DISCONTINUED | OUTPATIENT
Start: 2023-05-30 | End: 2023-05-30

## 2023-05-30 RX ORDER — OXYCODONE HYDROCHLORIDE 5 MG/1
5 TABLET ORAL EVERY 4 HOURS PRN
Status: DISCONTINUED | OUTPATIENT
Start: 2023-05-30 | End: 2023-05-31 | Stop reason: HOSPADM

## 2023-05-30 RX ORDER — FENTANYL CITRATE 50 UG/ML
INJECTION, SOLUTION INTRAMUSCULAR; INTRAVENOUS
Status: DISCONTINUED | OUTPATIENT
Start: 2023-05-30 | End: 2023-05-30

## 2023-05-30 RX ORDER — SODIUM CHLORIDE 9 MG/ML
INJECTION, SOLUTION INTRAVENOUS CONTINUOUS
Status: DISCONTINUED | OUTPATIENT
Start: 2023-05-30 | End: 2023-05-31 | Stop reason: HOSPADM

## 2023-05-30 RX ORDER — LIDOCAINE HYDROCHLORIDE 10 MG/ML
1 INJECTION, SOLUTION EPIDURAL; INFILTRATION; INTRACAUDAL; PERINEURAL ONCE
Status: DISCONTINUED | OUTPATIENT
Start: 2023-05-30 | End: 2023-05-31 | Stop reason: HOSPADM

## 2023-05-30 RX ORDER — LIDOCAINE HYDROCHLORIDE 20 MG/ML
INJECTION INTRAVENOUS
Status: DISCONTINUED | OUTPATIENT
Start: 2023-05-30 | End: 2023-05-30

## 2023-05-30 RX ADMIN — MIDAZOLAM HYDROCHLORIDE 2 MG: 1 INJECTION, SOLUTION INTRAMUSCULAR; INTRAVENOUS at 11:05

## 2023-05-30 RX ADMIN — FENTANYL CITRATE 50 MCG: 50 INJECTION, SOLUTION INTRAMUSCULAR; INTRAVENOUS at 12:05

## 2023-05-30 RX ADMIN — PHENYLEPHRINE HYDROCHLORIDE 50 MCG: 10 INJECTION INTRAVENOUS at 12:05

## 2023-05-30 RX ADMIN — SODIUM CHLORIDE: 0.9 INJECTION, SOLUTION INTRAVENOUS at 11:05

## 2023-05-30 RX ADMIN — FENTANYL CITRATE 50 MCG: 50 INJECTION, SOLUTION INTRAMUSCULAR; INTRAVENOUS at 11:05

## 2023-05-30 RX ADMIN — LIDOCAINE HYDROCHLORIDE 50 MG: 20 INJECTION INTRAVENOUS at 11:05

## 2023-05-30 RX ADMIN — PROPOFOL 150 MCG/KG/MIN: 10 INJECTION, EMULSION INTRAVENOUS at 11:05

## 2023-05-30 NOTE — H&P
Radiology History & Physical      SUBJECTIVE:     Chief Complaint: Metastasis     History of Present Illness:  Melly Hwang is a 42 y.o. female with metastatic HCC to the right lung, with PET/CT staging revealing a hypermetabolic lesion within the T2 left lamina. Hence, referred for CT guided biopsy of T2 lesion for further aid in diagnosis and therapeutic decision making.     Past Medical History:   Diagnosis Date    Abdominal pain 8/12/2021    VENKATA positive 8/20/2020    COVID-19     Deviated septum 2011    Hepatocellular carcinoma 6/9/2021    Hypertrophy of inferior nasal turbinate     Liver mass     Nasal congestion 2011    Pericardial effusion     Premature menopause      Past Surgical History:   Procedure Laterality Date    COLONOSCOPY N/A 09/21/2020    Procedure: COLONOSCOPY;  Surgeon: GIOVANNI Crane MD;  Location: [facility] ENDO (4TH FLR);  Service: Endoscopy;  Laterality: N/A;  + covid 4/22    HERNIA REPAIR      LIVER SURGERY N/A 06/28/2021    NASAL SEPTUM SURGERY      PERICARDIAL WINDOW N/A 02/22/2021    Procedure: CREATION, PERICARDIAL WINDOW;  Surgeon: Ajay Cantor MD;  Location: [facility] OR 2ND FLR;  Service: Cardiovascular;  Laterality: N/A;    PERICARDIOCENTESIS N/A 05/02/2020    Procedure: Pericardiocentesis;  Surgeon: Dickson Dangelo MD;  Location: [facility] CATH LAB;  Service: Cardiology;  Laterality: N/A;    TONSILLECTOMY         Home Meds:   Prior to Admission medications    Medication Sig Start Date End Date Taking? Authorizing Provider   ALPRAZolam (XANAX) 0.5 MG tablet Take 1 tablet (0.5 mg total) by mouth daily as needed for Anxiety. 5/4/23 6/3/23 Yes Melly Sahu MD   cetirizine (ZYRTEC) 10 MG tablet TAKE 1 TABLET BY MOUTH EVERY DAY  Patient taking differently: Take by mouth daily as needed. 7/14/22  Yes Melly Sahu MD   cyclobenzaprine (FLEXERIL) 10 MG tablet TAKE 1 TABLET BY MOUTH EVERY DAY IN THE EVENING AS NEEDED FOR MUSCLE SPASMS 4/11/22  Yes Melly Sahu MD    multivitamin capsule Take by mouth. 1 capsule Oral Every morning   Yes Historical Provider   norethindrone-ethinyl estradiol (MICROGESTIN 1/20) 1-20 mg-mcg per tablet Take 1 tablet by mouth once daily. 3/9/23  Yes Asaf Mejia MD   fluticasone propionate (FLONASE) 50 mcg/actuation nasal spray 2 sprays (100 mcg total) by Each Nostril route once daily. 5/29/23   Miriam Trejo PA-C   omeprazole (PRILOSEC) 20 MG capsule TAKE 1 CAPSULE BY MOUTH DAILY AS NEEDED FOR GERD 2/22/23   Angus Watkins MD   ondansetron (ZOFRAN-ODT) 4 MG TbDL Take 1 tablet (4 mg total) by mouth 2 (two) times daily. 3/16/23   Inés Hilliard PA-C   triamcinolone acetonide 0.1% (KENALOG) 0.1 % cream Apply topically 2 (two) times daily as needed (scaling at external ears). Avoid use on face, body folds, groin/genitalia. 10/18/21   Yolande Heredia MD     Anticoagulants/Antiplatelets: no anticoagulation    Allergies:   Review of patient's allergies indicates:   Allergen Reactions    No known drug allergies      Sedation History:  no adverse reactions    Review of Systems:   Hematological: no known coagulopathies  Respiratory: no shortness of breath  Cardiovascular: no chest pain  Gastrointestinal: no abdominal pain  Genito-Urinary: no dysuria  Musculoskeletal: negative  Neurological: no TIA or stroke symptoms         OBJECTIVE:     Vital Signs (Most Recent)  Temp: 97.9 °F (36.6 °C) (05/30/23 0955)  Pulse: 87 (05/30/23 0955)  Resp: 18 (05/30/23 0955)  BP: 121/81 (05/30/23 1000)  SpO2: 99 % (05/30/23 0955)    Physical Exam:  ASA: 3  Mallampati: 2    General: no acute distress  Mental Status: alert and oriented to person, place and time  HEENT: normocephalic, atraumatic  Chest: unlabored breathing  Heart: regular heart rate  Abdomen: nondistended  Extremity: moves all extremities    Laboratory  Lab Results   Component Value Date    INR 1.1 05/27/2023       Lab Results   Component Value Date    WBC 4.94 05/27/2023    HGB 14.1  05/27/2023    HCT 42.3 05/27/2023    MCV 93 05/27/2023     05/27/2023      Lab Results   Component Value Date     08/15/2022     08/15/2022    K 3.9 08/15/2022     08/15/2022    CO2 27 08/15/2022    BUN 8 08/15/2022    CREATININE 0.9 08/15/2022    CALCIUM 9.5 08/15/2022    MG 1.8 07/15/2021    ALT 14 08/15/2022    AST 16 08/15/2022    ALBUMIN 3.8 08/15/2022    BILITOT 1.7 (H) 08/15/2022    BILIDIR 0.3 01/26/2021       ASSESSMENT/PLAN:     Sedation Plan: Up to moderate      Patient will undergo: CT guided biopsy of T2 lamina lesion for further aid in diagnosis and therapeutic decision making.           Ahmet Cartwright MD     Neuro Endovascular Surgery Fellow   [facility]

## 2023-05-30 NOTE — PLAN OF CARE
Pre op orders and assessment initiated.  Pt remains npo.  Daughter in waiting room.  Call bell within reach.

## 2023-05-30 NOTE — ANESTHESIA PREPROCEDURE EVALUATION
05/30/2023  Melly Hwang is a 42 y.o., female.  [facility]  Anesthesia Pre-Operative Evaluation         Patient Name: Melly Hwang  YOB: 1980  MRN: [id number]    SUBJECTIVE:     Pre-operative evaluation for * No procedures listed *     05/30/2023    Melly Hwang is a 42 y.o. female     Patient now presents for the above procedure(s).      LDA:       Prev airway:     Drips:    sodium chloride 0.9%         Patient Active Problem List   Diagnosis    Premature ovarian failure    Cardiac enlargement: Echo 2014 normal cardiac chamber size with EF 55%    Bradycardia    Leukopenia    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 pylori infection: tx 2014 stool negative    Liver hemangioma    Pericardial effusion    VENKATA positive    History of 2019 novel coronavirus disease (COVID-19)    Elevated bilirubin    S/P pericardial window creation    Hepatocellular carcinoma    Hypophosphatemia    Elevated CA 19-9 level    Pleural effusion    Decreased ROM of intervertebral discs of cervical spine    Weakness of trunk musculature    Lung nodule 9/22       Review of patient's allergies indicates:   Allergen Reactions    No known drug allergies        Current Inpatient Medications:   LIDOcaine (PF) 10 mg/ml (1%)  1 mL Other Once       Current Outpatient Medications on File Prior to Encounter   Medication Sig Dispense Refill    ALPRAZolam (XANAX) 0.5 MG tablet Take 1 tablet (0.5 mg total) by mouth daily as needed for Anxiety. 30 tablet 2    cetirizine (ZYRTEC) 10 MG tablet TAKE 1 TABLET BY MOUTH EVERY DAY (Patient taking differently: Take by mouth daily as needed.) 90 tablet 2    cyclobenzaprine (FLEXERIL) 10 MG tablet TAKE 1 TABLET BY MOUTH EVERY DAY IN THE EVENING AS NEEDED FOR MUSCLE SPASMS 30  tablet 3    fluticasone propionate (FLONASE) 50 mcg/actuation nasal spray 2 sprays (100 mcg total) by Each Nostril route once daily. 18.2 mL 6    multivitamin capsule Take by mouth. 1 capsule Oral Every morning      norethindrone-ethinyl estradiol (MICROGESTIN 1/20) 1-20 mg-mcg per tablet Take 1 tablet by mouth once daily. 63 tablet 4    omeprazole (PRILOSEC) 20 MG capsule TAKE 1 CAPSULE BY MOUTH DAILY AS NEEDED FOR GERD 90 capsule 1    ondansetron (ZOFRAN-ODT) 4 MG TbDL Take 1 tablet (4 mg total) by mouth 2 (two) times daily. 2 tablet 0    triamcinolone acetonide 0.1% (KENALOG) 0.1 % cream Apply topically 2 (two) times daily as needed (scaling at external ears). Avoid use on face, body folds, groin/genitalia. 45 g 3     No current facility-administered medications on file prior to encounter.       Past Surgical History:   Procedure Laterality Date    COLONOSCOPY N/A 09/21/2020    Procedure: COLONOSCOPY;  Surgeon: GIOVANNI Crane MD;  Location: [facility] ENDO (4TH FLR);  Service: Endoscopy;  Laterality: N/A;  + covid 4/22    HERNIA REPAIR      LIVER SURGERY N/A 06/28/2021    NASAL SEPTUM SURGERY      PERICARDIAL WINDOW N/A 02/22/2021    Procedure: CREATION, PERICARDIAL WINDOW;  Surgeon: Ajay Cantor MD;  [facility];  Service: Cardiovascular;  Laterality: N/A;    PERICARDIOCENTESIS N/A 05/02/2020    Procedure: Pericardiocentesis;  Surgeon: Dickson Dangelo MD;  Location: [facility] CATH LAB;  Service: Cardiology;  Laterality: N/A;    TONSILLECTOMY         Social History     Socioeconomic History    Marital status: Single    Number of children: 1   Occupational History    Occupation:      Employer: [employer]   Tobacco Use    Smoking status: Never    Smokeless tobacco: Never   Substance and Sexual Activity    Alcohol use: Yes     Comment: rare    Drug use: No    Sexual activity: Yes     Partners: Male     Birth control/protection: OCP       OBJECTIVE:      Vital Signs Range (Last 24H):         Significant Labs:  Lab Results   Component Value Date    WBC 4.94 05/27/2023    HGB 14.1 05/27/2023    HCT 42.3 05/27/2023     05/27/2023    CHOL 218 (H) 03/18/2021    TRIG 73 03/18/2021    HDL 67 03/18/2021    ALT 14 08/15/2022    AST 16 08/15/2022     08/15/2022    K 3.9 08/15/2022     08/15/2022    CREATININE 0.9 08/15/2022    BUN 8 08/15/2022    CO2 27 08/15/2022    TSH 1.327 03/22/2022    INR 1.1 05/27/2023    HGBA1C 5.2 03/22/2022       Diagnostic Studies: No relevant studies.    EKG:   Results for orders placed or performed during the hospital encounter of 08/15/22   EKG 12-lead    Collection Time: 08/15/22  6:40 AM    Narrative    Test Reason : R07.9,    Vent. Rate : 083 BPM     Atrial Rate : 083 BPM     P-R Int : 126 ms          QRS Dur : 080 ms      QT Int : 358 ms       P-R-T Axes : 073 096 070 degrees     QTc Int : 420 ms    Normal sinus rhythm  Possible Left atrial enlargement  Rightward axis  Nonspecific ST and T wave abnormality  Abnormal ECG  When compared with ECG of 15-SEP-2021 09:26,  Non-specific change in ST segment in Inferior leads  T wave inversion now evident in Inferior leads  Confirmed by Edmund NOBLE MD ([id number]) on 8/15/2022 8:09:12 AM    Referred By: [email or web address]   SELF           Confirmed By:Edmund NOBLE MD       2D ECHO:  TTE:  Results for orders placed or performed during the hospital encounter of 06/08/21   Echo Color Flow Doppler? Yes   Result Value Ref Range    Ascending aorta 2.63 cm    STJ 2.61 cm    AV mean gradient 3 mmHg    Ao peak janusz 1.12 m/s    Ao VTI 23.48 cm    IVS 0.61 0.6 - 1.1 cm    LA size 2.65 cm    Left Atrium Major Axis 4.27 cm    Left Atrium Minor Axis 3.99 cm    LVIDd 4.77 3.5 - 6.0 cm    LVIDs 3.02 2.1 - 4.0 cm    LVOT diameter 2.20 cm    LVOT peak VTI 19.05 cm    Posterior Wall 0.65 0.6 - 1.1 cm    MV Peak A Janusz 0.42 m/s    E wave deceleration time 163.15 msec    MV Peak E Janusz 0.97 m/s    PV Peak D Janusz 0.52  "m/s    PV Peak S Janusz 0.54 m/s    RA Major Axis 3.63 cm    RA Width 3.03 cm    RVDD 3.02 cm    Sinus 2.82 cm    TAPSE 1.19 cm    TDI LATERAL 0.11 m/s    TDI SEPTAL 0.09 m/s    LA WIDTH 3.70 cm    MV stenosis pressure 1/2 time 47.31 ms    LV Diastolic Volume 105.88 mL    LV Systolic Volume 35.63 mL    RV S' 6.95 cm/s    LVOT peak janusz 0.96 m/s    LA volume (mod) 40.36 cm3    MV "A" wave duration 14.84 msec    LV LATERAL E/E' RATIO 8.82 m/s    LV SEPTAL E/E' RATIO 10.78 m/s    FS 37 %    LA volume 34.38 cm3    LV mass 92.72 g    Left Ventricle Relative Wall Thickness 0.27 cm    AV valve area 3.08 cm2    AV Velocity Ratio 0.86     AV index (prosthetic) 0.81     MV valve area p 1/2 method 4.65 cm2    E/A ratio 2.31     Mean e' 0.10 m/s    Pulm vein S/D ratio 1.04     LVOT area 3.8 cm2    LVOT stroke volume 72.38 cm3    AV peak gradient 5 mmHg    E/E' ratio 9.70 m/s    BSA 1.77 m2    LV Systolic Volume Index 19.8 mL/m2    LV Diastolic Volume Index 58.82 mL/m2    LA Volume Index 19.1 mL/m2    LV Mass Index 52 g/m2    LA Volume Index (Mod) 22.4 mL/m2    Right Atrial Pressure (from IVC) 3 mmHg    EF 55 %    Narrative    · The left ventricle is normal in size with low normal systolic function.  · The estimated ejection fraction is 55%.  · There are segmental left ventricular wall motion abnormalities.  · There is abnormal septal wall motion.  · Normal left ventricular diastolic function.  · Normal right ventricular size with low normal right ventricular systolic   function.  · Normal central venous pressure (3 mmHg).          TEO:  No results found for this or any previous visit.    ASSESSMENT/PLAN:         Pre-op Assessment    I have reviewed the Patient Summary Reports.       I have reviewed the Medications.     Review of Systems  Anesthesia Hx:  No problems with previous Anesthesia  History of prior surgery of interest to airway management or planning: Previous anesthesia: General   Social:  Non-Smoker, Social Alcohol Use  "   Hematology/Oncology:        Hematology Comments: Leukopenia Oncology Comments: Hepatocellular CA    Cardiovascular:   Exercise tolerance: poor Pericardial effusion/pericardial window   Renal/:  Renal/ Normal     Hepatic/GI:   Liver Disease, Liver hamangioma   Musculoskeletal:  Musculoskeletal Normal    Neurological:  Neurology Normal    Endocrine:  Endocrine Normal    Psych:  Psychiatric Normal           Physical Exam  General: Well nourished, Cooperative, Alert and Oriented    Airway:  Mallampati: II   Mouth Opening: Normal  TM Distance: Normal  Tongue: Normal  Neck ROM: Normal ROM    Dental:  Intact        Anesthesia Plan  Type of Anesthesia, risks & benefits discussed:    Anesthesia Type: MAC, Gen Natural Airway  Intra-op Monitoring Plan: Standard ASA Monitors  Post Op Pain Control Plan: multimodal analgesia and IV/PO Opioids PRN  Induction:  IV  Airway Plan: , Post-Induction  Informed Consent: Informed consent signed with the Patient and all parties understand the risks and agree with anesthesia plan.  All questions answered.   ASA Score: 3    Ready For Surgery From Anesthesia Perspective.     .

## 2023-05-30 NOTE — PLAN OF CARE
Pt is alert and oriented with no s/s of acute distress. She denies pain, Denies N/V. Her puncture site is clean and intact with a scant amount of sanguenous drainage present. VSS.

## 2023-05-30 NOTE — TELEPHONE ENCOUNTER
----- Message from Eamon Cali LPN sent at 5/30/2023  8:02 AM CDT -----  Regarding: breast consult  Hello, pt needs breast consult for   Thank you    Recommendation:  Surgical Consult is recommended for the left breast as patient did describe one episode of seeing dried blood on her nipple. No discharge or skin abnormality on today's exam.

## 2023-05-30 NOTE — PROCEDURES
Radiology Post-Procedure Note    Pre Op Diagnosis: ? Metastasis     Post Op Diagnosis: Same    Procedure: Thoracic spine lesion biopsy     Procedure performed by: Pepe Dolan MD    Written Informed Consent Obtained: Yes    Specimen Removed: YES     Estimated Blood Loss: Minimal    Findings:     Left T2 lamina lesion biopsy   - Right posterior lateral approach/ translaminar approach     Needle used: 11/13 Arrow On control gauge  Samples sent for pathology     Patient tolerated procedure well.        Ahmet Cartwright MD     Neuro Endovascular Surgery Fellow   [facility]

## 2023-05-30 NOTE — TRANSFER OF CARE
"Anesthesia Transfer of Care Note    Patient: Melly Hwang    Procedure(s) Performed: * No procedures listed *    Patient location: [facility]    Anesthesia Type: MAC    Transport from OR: Transported from OR on 2-3 L/min O2 by NC with adequate spontaneous ventilation    Post pain: adequate analgesia    Post assessment: no apparent anesthetic complications    Post vital signs: stable    Level of consciousness: sedated    Nausea/Vomiting: no nausea/vomiting    Complications: none    Transfer of care protocol was followed      Last vitals:   Visit Vitals  BP (!) 92/53 (BP Location: Right arm, Patient Position: Lying)   Pulse (!) 53   Temp 36.6 °C (97.8 °F) (Temporal)   Resp 13   Ht 5' 11" (1.803 m)   Wt 68 kg (150 lb)   LMP  (LMP Unknown)   SpO2 100%   Breastfeeding No   BMI 20.92 kg/m²     "

## 2023-05-30 NOTE — DISCHARGE INSTRUCTIONS
Please call with any questions or concerns.      Monday thru Friday 8:00 am - 5 pm    Interventional Radiology   [phone]    After Hours    Ask for the Radiology Resident on call  [phone]

## 2023-05-31 ENCOUNTER — TELEPHONE (OUTPATIENT)
Dept: OBSTETRICS AND GYNECOLOGY | Facility: CLINIC | Age: 43
End: 2023-05-31
Payer: COMMERCIAL

## 2023-05-31 NOTE — TELEPHONE ENCOUNTER
Called patient:    Discussed results of diagnostic mammogram and breast ultrasound report:    Impression:  Mammo Digital Diagnostic Left with Josue  There is no mammographic evidence of malignancy in the left breast.  US Breast Left Limited  There is no sonographic evidence of malignancy in the left breast.  BI-RADS Category:   Overall: 1 - Negative        Recommendation:  Surgical Consult is recommended for the left breast as patient did describe one episode of seeing dried blood on her nipple. No discharge or skin abnormality on today's exam.   Routine screening mammogram in 1 year is recommended.  Your estimated lifetime risk of breast cancer (to age 85) based on Tyrer-Cuzick risk assessment model is Tyrer-Cuzick: 5.14 %. According to the American Cancer Society, patients with a lifetime breast cancer risk of 20% or higher might benefit from supplemental screening tests.  These findings and recommendations were discussed with the patient at the time of the exam by Dr. Blossom Olmstead.      She reports being contacted by the Breast Clinic - she has contact info and will call Breast Clinic to schedule consult.

## 2023-05-31 NOTE — ANESTHESIA POSTPROCEDURE EVALUATION
Anesthesia Post Evaluation    Patient: Melly Hwang    Procedure(s) Performed: * No procedures listed *    [facility] Anesthesia Post Op Evaluation      Vitals Value Taken Time   /81 05/30/23 1355   Temp 36.7 °C (98.1 °F) 05/30/23 1350   Pulse 57 05/30/23 1400   Resp 24 05/30/23 1358   SpO2 99 % 05/30/23 1400   Vitals shown include unvalidated device data.      No case tracking events are documented in the log.      Pain/Lizbet Score: Lizbet Score: 10 (5/30/2023  1:35 PM)

## 2023-05-31 NOTE — ANESTHESIA POSTPROCEDURE EVALUATION
Anesthesia Post Evaluation    Patient: Melly Hwang    Procedure(s) Performed: * No procedures listed *    Final Anesthesia Type: MAC      Patient location during evaluation: PACU  Patient participation: Yes- Able to Participate  Level of consciousness: awake and alert  Post-procedure vital signs: reviewed and stable  Pain management: adequate  Airway patency: patent    PONV status at discharge: No PONV  Anesthetic complications: no      Cardiovascular status: blood pressure returned to baseline  Respiratory status: unassisted  Hydration status: euvolemic  Follow-up not needed.          Vitals Value Taken Time   /81 05/30/23 1355   Temp 36.7 °C (98.1 °F) 05/30/23 1350   Pulse 57 05/30/23 1400   Resp 24 05/30/23 1358   SpO2 99 % 05/30/23 1400   Vitals shown include unvalidated device data.      No case tracking events are documented in the log.      Pain/Lizbet Score: Lizbet Score: 10 (5/30/2023  1:35 PM)

## 2023-06-07 ENCOUNTER — PATIENT MESSAGE (OUTPATIENT)
Dept: ORTHOPEDICS | Facility: CLINIC | Age: 43
End: 2023-06-07
Payer: COMMERCIAL

## 2023-06-07 NOTE — TELEPHONE ENCOUNTER
Spoke to pt over the phone. MRI from 11/2022 shows no evidence of lesion at level of T2. Left arm pain more consistent with radiculopathy secondary to herniated disc and pinched nerve at C6-7. Pt is waiting for biopsy results of T2 lesion and will keep me updated

## 2023-06-08 ENCOUNTER — PATIENT MESSAGE (OUTPATIENT)
Dept: HEMATOLOGY/ONCOLOGY | Facility: CLINIC | Age: 43
End: 2023-06-08
Payer: COMMERCIAL

## 2023-06-08 ENCOUNTER — PATIENT MESSAGE (OUTPATIENT)
Dept: ORTHOPEDICS | Facility: CLINIC | Age: 43
End: 2023-06-08
Payer: COMMERCIAL

## 2023-06-08 DIAGNOSIS — M89.9 BONE LESION: Primary | ICD-10-CM

## 2023-06-09 DIAGNOSIS — C22.0 METASTATIC HEPATOCELLULAR CARCINOMA TO LUNG, RIGHT: Primary | ICD-10-CM

## 2023-06-09 DIAGNOSIS — C78.01 METASTATIC HEPATOCELLULAR CARCINOMA TO LUNG, RIGHT: Primary | ICD-10-CM

## 2023-06-13 ENCOUNTER — PATIENT MESSAGE (OUTPATIENT)
Dept: INTERNAL MEDICINE | Facility: CLINIC | Age: 43
End: 2023-06-13
Payer: COMMERCIAL

## 2023-06-13 ENCOUNTER — PATIENT MESSAGE (OUTPATIENT)
Dept: HEPATOLOGY | Facility: CLINIC | Age: 43
End: 2023-06-13
Payer: COMMERCIAL

## 2023-06-13 ENCOUNTER — PATIENT MESSAGE (OUTPATIENT)
Dept: SURGERY | Facility: HOSPITAL | Age: 43
End: 2023-06-13
Payer: COMMERCIAL

## 2023-06-13 ENCOUNTER — HOSPITAL ENCOUNTER (OUTPATIENT)
Dept: RADIOLOGY | Facility: HOSPITAL | Age: 43
Discharge: HOME | End: 2023-06-13
Attending: ORTHOPAEDIC SURGERY
Payer: COMMERCIAL

## 2023-06-13 ENCOUNTER — PATIENT MESSAGE (OUTPATIENT)
Dept: CARDIOLOGY | Facility: CLINIC | Age: 43
End: 2023-06-13
Payer: COMMERCIAL

## 2023-06-13 ENCOUNTER — HOSPITAL ENCOUNTER (OUTPATIENT)
Dept: RADIOLOGY | Facility: HOSPITAL | Age: 43
Discharge: HOME | End: 2023-06-13
Attending: INTERNAL MEDICINE
Payer: COMMERCIAL

## 2023-06-13 DIAGNOSIS — M89.9 BONE LESION: ICD-10-CM

## 2023-06-13 DIAGNOSIS — Z85.05 HISTORY OF MALIGNANT HEPATOMA: ICD-10-CM

## 2023-06-13 PROCEDURE — 72128 CT THORACIC SPINE WITHOUT CONTRAST: ICD-10-PCS | Mod: 26,,, | Performed by: RADIOLOGY

## 2023-06-13 PROCEDURE — 72197 MRI PELVIS W/O & W/DYE: CPT | Mod: 26,,, | Performed by: STUDENT IN AN ORGANIZED HEALTH CARE EDUCATION/TRAINING PROGRAM

## 2023-06-13 PROCEDURE — 74183 MRI ABD W/O CNTR FLWD CNTR: CPT | Mod: 26,,, | Performed by: STUDENT IN AN ORGANIZED HEALTH CARE EDUCATION/TRAINING PROGRAM

## 2023-06-13 PROCEDURE — 72128 CT CHEST SPINE W/O DYE: CPT | Mod: TC

## 2023-06-13 PROCEDURE — 74183 MRI ABDOMEN-PELVIS W W/O CONTRAST (XPD): ICD-10-PCS | Mod: 26,,, | Performed by: STUDENT IN AN ORGANIZED HEALTH CARE EDUCATION/TRAINING PROGRAM

## 2023-06-13 PROCEDURE — 72197 MRI ABDOMEN-PELVIS W W/O CONTRAST (XPD): ICD-10-PCS | Mod: 26,,, | Performed by: STUDENT IN AN ORGANIZED HEALTH CARE EDUCATION/TRAINING PROGRAM

## 2023-06-13 PROCEDURE — 25500020 PHARM REV CODE 255: Performed by: INTERNAL MEDICINE

## 2023-06-13 PROCEDURE — A9585 GADOBUTROL INJECTION: HCPCS | Performed by: INTERNAL MEDICINE

## 2023-06-13 PROCEDURE — 72128 CT CHEST SPINE W/O DYE: CPT | Mod: 26,,, | Performed by: RADIOLOGY

## 2023-06-13 PROCEDURE — 72197 MRI PELVIS W/O & W/DYE: CPT | Mod: TC

## 2023-06-13 RX ORDER — GADOBUTROL 604.72 MG/ML
10 INJECTION INTRAVENOUS
Status: COMPLETED | OUTPATIENT
Start: 2023-06-13 | End: 2023-06-13

## 2023-06-13 RX ADMIN — GADOBUTROL 10 ML: 604.72 INJECTION INTRAVENOUS at 08:06

## 2023-06-13 NOTE — TELEPHONE ENCOUNTER
Please call to ask her exactly which department she wants to consult with.  It looks like she has a relationship with Dr. Carrasco in Oncology, he messaged her last week about her bone biopsy

## 2023-06-14 ENCOUNTER — PATIENT MESSAGE (OUTPATIENT)
Dept: HEMATOLOGY/ONCOLOGY | Facility: CLINIC | Age: 43
End: 2023-06-14
Payer: COMMERCIAL

## 2023-06-14 ENCOUNTER — TELEPHONE (OUTPATIENT)
Dept: TRANSPLANT | Facility: CLINIC | Age: 43
End: 2023-06-14
Payer: COMMERCIAL

## 2023-06-14 ENCOUNTER — PATIENT MESSAGE (OUTPATIENT)
Dept: CARDIOTHORACIC SURGERY | Facility: CLINIC | Age: 43
End: 2023-06-14
Payer: COMMERCIAL

## 2023-06-14 DIAGNOSIS — C78.01 METASTATIC HEPATOCELLULAR CARCINOMA TO LUNG, RIGHT: Primary | ICD-10-CM

## 2023-06-14 DIAGNOSIS — C22.0 METASTATIC HEPATOCELLULAR CARCINOMA TO LUNG, RIGHT: Primary | ICD-10-CM

## 2023-06-14 NOTE — TELEPHONE ENCOUNTER
Patient: Melly Hwang       MRN: [id number]      : 1980     Age: 42 y.o.  [address]  [address]  [facility] [id number]    Presenting Radiologists:     Providers: Anusha Rogers MD, Philippe Carrasco MD    Priority of review: Cancer    Patient Transplant Status: Not a candidate    Reason for presentation: Other r/o metastatic HCC to portacaval LN    Clinical Summary: 42 yr old [facility] s/p resection of HCC; now with oligomet to lung due for resection in July. Now with enlarging portacaval LN-?need for biopsy?    Imaging to be reviewed: MRI [id number]    HCC Treatment History: see above    Platelets:   Lab Results   Component Value Date/Time     2023 09:27 AM     Creatinine:   Lab Results   Component Value Date/Time    CREATININE 0.9 08/15/2022 08:10 AM     Bilirubin:   Lab Results   Component Value Date/Time    BILITOT 1.7 (H) 08/15/2022 08:10 AM     AFP Last 3 each if available:   Lab Results   Component Value Date/Time    AFP 9.8 (H) 2023 08:08 AM    AFP 8.3 2022 08:02 AM    AFP 8.1 2022 02:57 PM       MELD: Computed MELD-Na unavailable. Necessary lab results were not found in the last year.  Computed MELD unavailable. Necessary lab results were not found in the last year.      Plan:     Follow-up Provider:

## 2023-06-15 ENCOUNTER — PATIENT MESSAGE (OUTPATIENT)
Dept: ORTHOPEDICS | Facility: CLINIC | Age: 43
End: 2023-06-15
Payer: COMMERCIAL

## 2023-06-16 DIAGNOSIS — K76.9 LIVER LESION: Primary | ICD-10-CM

## 2023-06-21 ENCOUNTER — HOSPITAL ENCOUNTER (OUTPATIENT)
Dept: RADIOLOGY | Facility: HOSPITAL | Age: 43
Discharge: HOME | End: 2023-06-21
Attending: PHYSICIAN ASSISTANT
Payer: COMMERCIAL

## 2023-06-21 DIAGNOSIS — C22.0 METASTATIC HEPATOCELLULAR CARCINOMA TO LUNG, RIGHT: ICD-10-CM

## 2023-06-21 DIAGNOSIS — C78.01 METASTATIC HEPATOCELLULAR CARCINOMA TO LUNG, RIGHT: ICD-10-CM

## 2023-06-21 PROCEDURE — 71250 CT THORAX DX C-: CPT | Mod: TC

## 2023-06-21 PROCEDURE — 71250 CT THORAX DX C-: CPT | Mod: 26,,, | Performed by: RADIOLOGY

## 2023-06-21 PROCEDURE — 71250 CT CHEST WITHOUT CONTRAST: ICD-10-PCS | Mod: 26,,, | Performed by: RADIOLOGY

## 2023-06-22 ENCOUNTER — OFFICE VISIT (OUTPATIENT)
Dept: HEMATOLOGY/ONCOLOGY | Facility: CLINIC | Age: 43
End: 2023-06-22
Payer: COMMERCIAL

## 2023-06-22 DIAGNOSIS — M89.9 BONE LESION: ICD-10-CM

## 2023-06-22 DIAGNOSIS — C78.01 MALIGNANT NEOPLASM METASTATIC TO RIGHT LUNG: ICD-10-CM

## 2023-06-22 DIAGNOSIS — D18.03 LIVER HEMANGIOMA: ICD-10-CM

## 2023-06-22 DIAGNOSIS — C22.0 HCC (HEPATOCELLULAR CARCINOMA): Primary | ICD-10-CM

## 2023-06-22 PROCEDURE — 99215 PR OFFICE/OUTPT VISIT, EST, LEVL V, 40-54 MIN: ICD-10-PCS | Mod: 95,,, | Performed by: INTERNAL MEDICINE

## 2023-06-22 PROCEDURE — 99215 OFFICE O/P EST HI 40 MIN: CPT | Mod: 95,,, | Performed by: INTERNAL MEDICINE

## 2023-06-22 NOTE — Clinical Note
Luis.  Can you please help by entering the STRIDE regimen (durvalumab + tremelimumab) for this patient with metastatic HCC? Thanks! Jose L

## 2023-06-22 NOTE — PROGRESS NOTES
The patient location is: [place]    Visit type: audiovisual    Each patient to whom he or she provides medical services by telemedicine is:  (1) informed of the relationship between the physician and patient and the respective role of any other health care provider with respect to management of the patient; and (2) notified that he or she may decline to receive medical services by telemedicine and may withdraw from such care at any time.    MEDICAL ONCOLOGY - ESTABLISHED PATIENT VISIT    Reason for visit: Scirrhous HCC    Best Contact Phone Number(s): [phone] (home)      Cancer/Stage/TNM:    Cancer Staging   No matching staging information was found for the patient.     Oncology History    No history exists.      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 Howell with the same pathology, negative margins, 0 of 8 lymph nodes positive and + for vascular and perineural invasion.  I saw her in 2021 for evaluation for persistent CA 19-9 but there was no radiographic evidence of HCC disease recurrence or alternative reason for CA 19-9 elevation.  CT chest on 3/9/23 showed an enlarging RLL nodule measuring 1.1 cm, increased from 0.8 cm in size.  IR biopsy of this on 4/10/23 confirmed metastatic HCC.  Since then she has also had an MRI abdomen that showed an enlarging portocaval lymph node that is consistent with metastatic disease.    She presents today for discussion of treatment plans.  No new pains.  Feels well.  Has good energy.  Works out regularly.    ROS:   Review of Systems   Constitutional:  Negative for chills, fever, malaise/fatigue and weight loss.   HENT:  Negative for sore throat.    Eyes:  Negative for blurred vision and pain.   Respiratory:  Negative for cough and shortness of breath.    Cardiovascular:  Negative for chest pain and leg swelling.   Gastrointestinal:  Negative for abdominal pain, constipation, diarrhea,  nausea and vomiting.   Genitourinary:  Negative for dysuria and frequency.   Musculoskeletal:  Negative for back pain, falls and myalgias.   Skin:  Negative for rash.   Neurological:  Negative for dizziness, weakness and headaches.   Endo/Heme/Allergies:  Does not bruise/bleed easily.   Psychiatric/Behavioral:  Negative for depression. The patient is not nervous/anxious.    All other systems reviewed and are negative.    Past Medical History:   Past Medical History:   Diagnosis Date    Abdominal pain 8/12/2021    VENKATA positive 8/20/2020    COVID-19     Deviated septum 2011    Hepatocellular carcinoma 6/9/2021    Hypertrophy of inferior nasal turbinate     Liver mass     Nasal congestion 2011    Pericardial effusion     Premature menopause         Past Surgical History:   Past Surgical History:   Procedure Laterality Date    COLONOSCOPY N/A 09/21/2020    Procedure: COLONOSCOPY;  Surgeon: GIOVANNI Crane MD;  Location: [facility] ENDO (4TH FLR);  Service: Endoscopy;  Laterality: N/A;  + covid 4/22    HERNIA REPAIR      LIVER SURGERY N/A 06/28/2021    NASAL SEPTUM SURGERY      PERICARDIAL WINDOW N/A 02/22/2021    Procedure: CREATION, PERICARDIAL WINDOW;  Surgeon: Ajay Cantor MD;  Location: [facility] OR 2ND FLR;  Service: Cardiovascular;  Laterality: N/A;    PERICARDIOCENTESIS N/A 05/02/2020    Procedure: Pericardiocentesis;  Surgeon: Dickson Dangelo MD;  Location: [facility] CATH LAB;  Service: Cardiology;  Laterality: N/A;    TONSILLECTOMY          Family History:   Family History   Problem Relation Age of Onset    Diabetes Mother     Liver disease Mother         Fatty liver    Heart disease Father     Macular degeneration Father     Diabetes Father     Hypertension Father     Hyperlipidemia Father     No Known Problems Sister     No Known Problems Brother     No Known Problems Brother     No Known Problems Daughter     No Known Problems Maternal Aunt     No Known Problems Maternal Uncle     No Known Problems Paternal  Aunt     No Known Problems Paternal Uncle     No Known Problems Maternal Grandmother     No Known Problems Maternal Grandfather     No Known Problems Paternal Grandmother     No Known Problems Paternal Grandfather     Amblyopia Neg Hx     Blindness Neg Hx     Cancer Neg Hx     Cataracts Neg Hx     Glaucoma Neg Hx     Retinal detachment Neg Hx     Strabismus Neg Hx     Stroke Neg Hx     Thyroid disease Neg Hx     Melanoma Neg Hx     Heart attack Neg Hx         Social History:   Social History     Tobacco Use    Smoking status: Never    Smokeless tobacco: Never   Substance Use Topics    Alcohol use: Yes     Comment: rare        I have reviewed and updated the patient's past medical, surgical, family and social histories.    Allergies:   Review of patient's allergies indicates:   Allergen Reactions    No known drug allergies         Medications:   Current Outpatient Medications   Medication Sig Dispense Refill    ALPRAZolam (XANAX) 0.5 MG tablet Take 1 tablet (0.5 mg total) by mouth daily as needed for Anxiety. 30 tablet 2    cetirizine (ZYRTEC) 10 MG tablet TAKE 1 TABLET BY MOUTH EVERY DAY (Patient taking differently: Take by mouth daily as needed.) 90 tablet 2    cyclobenzaprine (FLEXERIL) 10 MG tablet TAKE 1 TABLET BY MOUTH EVERY DAY IN THE EVENING AS NEEDED FOR MUSCLE SPASMS 30 tablet 3    fluticasone propionate (FLONASE) 50 mcg/actuation nasal spray 2 sprays (100 mcg total) by Each Nostril route once daily. 18.2 mL 6    multivitamin capsule Take by mouth. 1 capsule Oral Every morning      norethindrone-ethinyl estradiol (MICROGESTIN 1/20) 1-20 mg-mcg per tablet Take 1 tablet by mouth once daily. 63 tablet 4    omeprazole (PRILOSEC) 20 MG capsule TAKE 1 CAPSULE BY MOUTH DAILY AS NEEDED FOR GERD 90 capsule 1    ondansetron (ZOFRAN-ODT) 4 MG TbDL Take 1 tablet (4 mg total) by mouth 2 (two) times daily. 2 tablet 0    triamcinolone acetonide 0.1% (KENALOG) 0.1 % cream Apply topically 2 (two) times daily as needed  (scaling at external ears). Avoid use on face, body folds, groin/genitalia. 45 g 3     No current facility-administered medications for this visit.        Physical Exam:   LMP  (LMP Unknown)      ECOG Performance status: 0            Physical Exam  Constitutional:       General: She is not in acute distress.     Appearance: Normal appearance. She is not ill-appearing.      Comments: Exam limited by virtual nature   HENT:      Head: Normocephalic and atraumatic.   Eyes:      General: No scleral icterus.     Extraocular Movements: Extraocular movements intact.      Conjunctiva/sclera: Conjunctivae normal.   Pulmonary:      Effort: Pulmonary effort is normal. No respiratory distress.   Neurological:      Mental Status: She is alert and oriented to person, place, and time.   Psychiatric:         Mood and Affect: Mood normal.         Behavior: Behavior normal.         Thought Content: Thought content normal.         Judgment: Judgment normal.         Labs:   No results found for this or any previous visit (from the past 48 hour(s)).       I have reviewed the pertinent labs from 5/27/23 which are notable for normal blood counts.     Imaging:       I have personally reviewed the 6/21/23 CT chest which is notable for slightly enlarging RLL metastatic nodule and stable RUL subcentimeter nodule.    Path:   6/28/21: partial hepatectomy:    Final Pathologic Diagnosis 1.  Gallbladder (cholecystectomy):   - Benign gallbladder with cholecystitis   2. Right lobe (partial hepatectomy):   - Positive for malignancy, see synoptic report below   - Separate hemangioma, 7.3 cm   3. Lymph nodes, portal (regional resection):   - 8 lymph nodes, negative for tumor (0/8)   ______________________________________________________________________________   ______   Hepatocellular carcinoma synoptic report   - Procedure:  Partial hepatectomy, right lobe   - Histologic type:  Hepatocellular carcinoma, scirrhous   - Histologic grade:  Moderately  differentiated, grade 2   - Tumor focality:  Solitary   - Tumor characteristics:   - Tumor site:  Right lobe   - Tumor size:  3.3 cm   - Treatment effect:  No known pre-surgical therapy   - Satellitosis:  Not identified   - Tumor extent:  Confined to liver   - Vascular invasion:  Present, Small vessel   - Perineural invasion:  Present   - Margins:   - All margins are negative for invasive carcinoma   - Closest margin to invasive carcinoma:  Parenchymal   - Distance from invasive  carcinoma to closest margin:  5 mm   - Regional lymph nodes:   - Number of lymph nodes with tumor:  0   - Number of lymph nodes examined:  8   - Pathology: pT2 N0 MX   - Additional findings:   - No steatosis   - Trichrome stain:  Periportal fibrosis with early septa, stage 1-2   - Iron stain:  Negative   - Iron and trichrome stains with appropriate controls   Tumor blocks:  2A, 2B, 2 C    Comment: Interp By Madeline Carlos MD, Signed on 07/08/2021 at 16:47           Assessment:       1. HCC (hepatocellular carcinoma)    2. Bone lesion    3. Malignant neoplasm metastatic to right lung    4. Liver hemangioma            Plan:             # HCC  Scirrhous subtype, which is very uncommon (~ 1% of all HCC); prognosis is likely similar to typical HCC.  No clear underlying liver pathology in this patient.  Had margin negative resection with negative lymph node eval on 6/28/21 with Dr. Howell.  Unfortunately she has biopsy proven recurrent metastatic disease to her RLL s/p IR biopsy 4/10/23.   She was discussed at thoracic tumor board with potential plan for surgical resection with Dr. Tadeo.  She had a concerning bone lesion on PET from 4/26 at [facility].  This was biopsied and proven to be benign.  In the interim she had a repeat abdominal MRI on 6/13/23 which demonstrated growth of a portacaval lymph node that was very concerning for metastatic disease when we reviewed her imaging at liver conference.  Meanwhile her RLL met has grown slightly on  6/21/23 CT as well.  I discussed her case with Valerie Dhillon and Shwetha and we are all in agreement with proceeding with systemic therapy rather than surgery or radiation given multifocal progression.    I discussed this with her and she is agreeable with starting systemic therapy.  Reviewed possibilities of atezo + magaly or durva + treme.  She wishes to proceed with STRIDE regimen: durvalumab + tremelimumab.    Will plan to bring her back in 2 weeks to start systemic therapy.    # Liver hemangiomas  Continue monitoring as indicated with Dr. Rogers.    Follow up: 2 weeks.    The above information has been reviewed with the patient and all questions have been answered to their apparent satisfaction.  They understand that they can call the clinic with any questions.    Philippe Carrasco MD  Hematology/Oncology  [facility]    Route Chart for Scheduling    Med Onc Chart Routing      Follow up with physician 2 weeks. for cycle 1 of durvalumab + tremelimumab   Follow up with MELA    Infusion scheduling note    Injection scheduling note    Labs CBC, CMP and TSH   Scheduling:  Preferred lab:  Lab interval:  & AFP   Imaging    Pharmacy appointment    Other referrals

## 2023-06-23 ENCOUNTER — PATIENT MESSAGE (OUTPATIENT)
Dept: HEMATOLOGY/ONCOLOGY | Facility: CLINIC | Age: 43
End: 2023-06-23
Payer: COMMERCIAL

## 2023-06-24 ENCOUNTER — PATIENT MESSAGE (OUTPATIENT)
Dept: HEMATOLOGY/ONCOLOGY | Facility: CLINIC | Age: 43
End: 2023-06-24
Payer: COMMERCIAL

## 2023-06-26 ENCOUNTER — PATIENT MESSAGE (OUTPATIENT)
Dept: INFECTIOUS DISEASES | Facility: CLINIC | Age: 43
End: 2023-06-26
Payer: COMMERCIAL

## 2023-07-01 ENCOUNTER — PATIENT MESSAGE (OUTPATIENT)
Dept: HEMATOLOGY/ONCOLOGY | Facility: CLINIC | Age: 43
End: 2023-07-01
Payer: COMMERCIAL

## 2023-07-03 RX ORDER — CYCLOBENZAPRINE HCL 10 MG
TABLET ORAL
Qty: 30 TABLET | Refills: 3 | Status: SHIPPED | OUTPATIENT
Start: 2023-07-03 | End: 2023-07-17

## 2023-07-03 NOTE — TELEPHONE ENCOUNTER
No care due was identified.  [facility] Embedded Care Due Messages. Reference number: [id number].   7/02/2023 8:05:45 PM CDT

## 2023-07-04 ENCOUNTER — PATIENT MESSAGE (OUTPATIENT)
Dept: HEMATOLOGY/ONCOLOGY | Facility: CLINIC | Age: 43
End: 2023-07-04
Payer: COMMERCIAL

## 2023-07-07 ENCOUNTER — PATIENT MESSAGE (OUTPATIENT)
Dept: HEMATOLOGY/ONCOLOGY | Facility: CLINIC | Age: 43
End: 2023-07-07
Payer: COMMERCIAL

## 2023-07-07 ENCOUNTER — LAB VISIT (OUTPATIENT)
Dept: LAB | Facility: HOSPITAL | Age: 43
End: 2023-07-07
Attending: INTERNAL MEDICINE
Payer: COMMERCIAL

## 2023-07-07 ENCOUNTER — OFFICE VISIT (OUTPATIENT)
Dept: HEMATOLOGY/ONCOLOGY | Facility: CLINIC | Age: 43
End: 2023-07-07
Payer: COMMERCIAL

## 2023-07-07 ENCOUNTER — TELEPHONE (OUTPATIENT)
Dept: HEMATOLOGY/ONCOLOGY | Facility: CLINIC | Age: 43
End: 2023-07-07
Payer: COMMERCIAL

## 2023-07-07 VITALS
SYSTOLIC BLOOD PRESSURE: 138 MMHG | HEART RATE: 70 BPM | RESPIRATION RATE: 16 BRPM | BODY MASS INDEX: 21.42 KG/M2 | WEIGHT: 153 LBS | TEMPERATURE: 99 F | HEIGHT: 71 IN | OXYGEN SATURATION: 100 % | DIASTOLIC BLOOD PRESSURE: 81 MMHG

## 2023-07-07 DIAGNOSIS — M89.9 BONE LESION: ICD-10-CM

## 2023-07-07 DIAGNOSIS — C22.0 HCC (HEPATOCELLULAR CARCINOMA): ICD-10-CM

## 2023-07-07 DIAGNOSIS — C78.01 MALIGNANT NEOPLASM METASTATIC TO RIGHT LUNG: ICD-10-CM

## 2023-07-07 DIAGNOSIS — C22.0 HCC (HEPATOCELLULAR CARCINOMA): Primary | ICD-10-CM

## 2023-07-07 DIAGNOSIS — D18.03 LIVER HEMANGIOMA: ICD-10-CM

## 2023-07-07 LAB
AFP SERPL-MCNC: 8.9 NG/ML (ref 0–8.4)
ALBUMIN SERPL BCP-MCNC: 3.7 G/DL (ref 3.5–5.2)
ALP SERPL-CCNC: 37 U/L (ref 55–135)
ALT SERPL W/O P-5'-P-CCNC: 13 U/L (ref 10–44)
ANION GAP SERPL CALC-SCNC: 6 MMOL/L (ref 8–16)
AST SERPL-CCNC: 13 U/L (ref 10–40)
BILIRUB SERPL-MCNC: 0.8 MG/DL (ref 0.1–1)
BUN SERPL-MCNC: 11 MG/DL (ref 6–20)
CALCIUM SERPL-MCNC: 9.3 MG/DL (ref 8.7–10.5)
CHLORIDE SERPL-SCNC: 105 MMOL/L (ref 95–110)
CO2 SERPL-SCNC: 25 MMOL/L (ref 23–29)
CREAT SERPL-MCNC: 0.8 MG/DL (ref 0.5–1.4)
ERYTHROCYTE [DISTWIDTH] IN BLOOD BY AUTOMATED COUNT: 11.9 % (ref 11.5–14.5)
EST. GFR  (NO RACE VARIABLE): >60 ML/MIN/1.73 M^2
GLUCOSE SERPL-MCNC: 127 MG/DL (ref 70–110)
HCT VFR BLD AUTO: 44.7 % (ref 37–48.5)
HGB BLD-MCNC: 14.6 G/DL (ref 12–16)
IMM GRANULOCYTES # BLD AUTO: 0.02 K/UL (ref 0–0.04)
MCH RBC QN AUTO: 30.9 PG (ref 27–31)
MCHC RBC AUTO-ENTMCNC: 32.7 G/DL (ref 32–36)
MCV RBC AUTO: 95 FL (ref 82–98)
NEUTROPHILS # BLD AUTO: 2.9 K/UL (ref 1.8–7.7)
PLATELET # BLD AUTO: 261 K/UL (ref 150–450)
PMV BLD AUTO: 9.9 FL (ref 9.2–12.9)
POTASSIUM SERPL-SCNC: 4.3 MMOL/L (ref 3.5–5.1)
PROT SERPL-MCNC: 7 G/DL (ref 6–8.4)
RBC # BLD AUTO: 4.72 M/UL (ref 4–5.4)
SODIUM SERPL-SCNC: 136 MMOL/L (ref 136–145)
TSH SERPL DL<=0.005 MIU/L-ACNC: 1.34 UIU/ML (ref 0.4–4)
WBC # BLD AUTO: 4.44 K/UL (ref 3.9–12.7)

## 2023-07-07 PROCEDURE — 3079F DIAST BP 80-89 MM HG: CPT | Mod: CPTII,S$GLB,, | Performed by: INTERNAL MEDICINE

## 2023-07-07 PROCEDURE — 82105 ALPHA-FETOPROTEIN SERUM: CPT | Performed by: INTERNAL MEDICINE

## 2023-07-07 PROCEDURE — 99215 OFFICE O/P EST HI 40 MIN: CPT | Mod: S$GLB,,, | Performed by: INTERNAL MEDICINE

## 2023-07-07 PROCEDURE — 3075F SYST BP GE 130 - 139MM HG: CPT | Mod: CPTII,S$GLB,, | Performed by: INTERNAL MEDICINE

## 2023-07-07 PROCEDURE — 99999 PR PBB SHADOW E&M-EST. PATIENT-LVL III: ICD-10-PCS | Mod: PBBFAC,,, | Performed by: INTERNAL MEDICINE

## 2023-07-07 PROCEDURE — 3008F PR BODY MASS INDEX (BMI) DOCUMENTED: ICD-10-PCS | Mod: CPTII,S$GLB,, | Performed by: INTERNAL MEDICINE

## 2023-07-07 PROCEDURE — 3075F PR MOST RECENT SYSTOLIC BLOOD PRESS GE 130-139MM HG: ICD-10-PCS | Mod: CPTII,S$GLB,, | Performed by: INTERNAL MEDICINE

## 2023-07-07 PROCEDURE — 3079F PR MOST RECENT DIASTOLIC BLOOD PRESSURE 80-89 MM HG: ICD-10-PCS | Mod: CPTII,S$GLB,, | Performed by: INTERNAL MEDICINE

## 2023-07-07 PROCEDURE — 80053 COMPREHEN METABOLIC PANEL: CPT | Performed by: INTERNAL MEDICINE

## 2023-07-07 PROCEDURE — 3008F BODY MASS INDEX DOCD: CPT | Mod: CPTII,S$GLB,, | Performed by: INTERNAL MEDICINE

## 2023-07-07 PROCEDURE — 99999 PR PBB SHADOW E&M-EST. PATIENT-LVL III: CPT | Mod: PBBFAC,,, | Performed by: INTERNAL MEDICINE

## 2023-07-07 PROCEDURE — 99215 PR OFFICE/OUTPT VISIT, EST, LEVL V, 40-54 MIN: ICD-10-PCS | Mod: S$GLB,,, | Performed by: INTERNAL MEDICINE

## 2023-07-07 PROCEDURE — 84443 ASSAY THYROID STIM HORMONE: CPT | Performed by: INTERNAL MEDICINE

## 2023-07-07 PROCEDURE — 85027 COMPLETE CBC AUTOMATED: CPT | Performed by: INTERNAL MEDICINE

## 2023-07-07 PROCEDURE — 36415 COLL VENOUS BLD VENIPUNCTURE: CPT | Performed by: INTERNAL MEDICINE

## 2023-07-07 RX ORDER — PROCHLORPERAZINE EDISYLATE 5 MG/ML
5 INJECTION INTRAMUSCULAR; INTRAVENOUS ONCE AS NEEDED
Status: CANCELLED
Start: 2023-07-07

## 2023-07-07 RX ORDER — HEPARIN 100 UNIT/ML
500 SYRINGE INTRAVENOUS
Status: CANCELLED | OUTPATIENT
Start: 2023-07-07

## 2023-07-07 RX ORDER — SODIUM CHLORIDE 0.9 % (FLUSH) 0.9 %
10 SYRINGE (ML) INJECTION
Status: CANCELLED | OUTPATIENT
Start: 2023-07-07

## 2023-07-07 RX ORDER — DIPHENHYDRAMINE HYDROCHLORIDE 50 MG/ML
50 INJECTION INTRAMUSCULAR; INTRAVENOUS ONCE AS NEEDED
Status: CANCELLED | OUTPATIENT
Start: 2023-07-07

## 2023-07-07 RX ORDER — EPINEPHRINE 0.3 MG/.3ML
0.3 INJECTION SUBCUTANEOUS ONCE AS NEEDED
Status: CANCELLED | OUTPATIENT
Start: 2023-07-07

## 2023-07-07 NOTE — PROGRESS NOTES
MEDICAL ONCOLOGY - ESTABLISHED PATIENT VISIT    Reason for visit: Scirrhous HCC    Best Contact Phone Number(s): [phone] (home)      Cancer/Stage/TNM:    Cancer Staging   No matching staging information was found for the patient.     Oncology History   Hepatocellular carcinoma   6/9/2021 Initial Diagnosis    Hepatocellular carcinoma     6/26/2023 -  Chemotherapy    Treatment Summary   Plan Name: OP TREMELIMUMAB 300 MG (X 1) + DURVALUMAB 1500 MG Q4W  Treatment Goal: Control  Status: Active  Start Date: 6/26/2023 (Planned)  End Date: 5/27/2024 (Planned)  Provider: Philippe Carrasco MD  Chemotherapy: [No matching medication found in this treatment plan]        Interim History:   43 y.o. 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 Howell with the same pathology, negative margins, 0 of 8 lymph nodes positive and + for vascular and perineural invasion.  I saw her in 2021 for evaluation for persistent CA 19-9 but there was no radiographic evidence of HCC disease recurrence or alternative reason for CA 19-9 elevation.  CT chest on 3/9/23 showed an enlarging RLL nodule measuring 1.1 cm, increased from 0.8 cm in size.  IR biopsy of this on 4/10/23 confirmed metastatic HCC.  Since then in mid-June she has also had an MRI abdomen that showed an enlarging portocaval lymph node that is consistent with metastatic disease.    She presents today prior to cycle 1 of durvalumab + tremelimumab as part of the STRiDE regimen.  She has intermittent upper and lower back pain that started after bone biopsy.  Also has some gassy chest discomfort for which she takes Prilosec.  She denies any dyspnea, diarrhea.  She has no history of autoimmune disease.      Presents alone today.  ECOG PS 0.    ROS:   Review of Systems   Constitutional:  Negative for chills, fever, malaise/fatigue and weight loss.   HENT:  Negative for sore throat.    Eyes:  Negative for blurred vision and pain.    Respiratory:  Negative for cough and shortness of breath.    Cardiovascular:  Negative for chest pain and leg swelling.   Gastrointestinal:  Negative for abdominal pain, constipation, diarrhea, nausea and vomiting.   Genitourinary:  Negative for dysuria and frequency.   Musculoskeletal:  Negative for back pain, falls and myalgias.   Skin:  Negative for rash.   Neurological:  Negative for dizziness, weakness and headaches.   Endo/Heme/Allergies:  Does not bruise/bleed easily.   Psychiatric/Behavioral:  Negative for depression. The patient is not nervous/anxious.    All other systems reviewed and are negative.    Past Medical History:   Past Medical History:   Diagnosis Date    Abdominal pain 8/12/2021    VENKATA positive 8/20/2020    COVID-19     Deviated septum 2011    Hepatocellular carcinoma 6/9/2021    Hypertrophy of inferior nasal turbinate     Liver mass     Nasal congestion 2011    Pericardial effusion     Premature menopause         Past Surgical History:   Past Surgical History:   Procedure Laterality Date    COLONOSCOPY N/A 09/21/2020    Procedure: COLONOSCOPY;  Surgeon: GIOVANNI Crane MD;  Location: [facility] ENDO (4TH FLR);  Service: Endoscopy;  Laterality: N/A;  + covid 4/22    HERNIA REPAIR      LIVER SURGERY N/A 06/28/2021    NASAL SEPTUM SURGERY      PERICARDIAL WINDOW N/A 02/22/2021    Procedure: CREATION, PERICARDIAL WINDOW;  Surgeon: Ajay Cantor MD;  Location: [facility] OR 2ND FLR;  Service: Cardiovascular;  Laterality: N/A;    PERICARDIOCENTESIS N/A 05/02/2020    Procedure: Pericardiocentesis;  Surgeon: Dickson Dangelo MD;  Location: [facility] CATH LAB;  Service: Cardiology;  Laterality: N/A;    TONSILLECTOMY          Family History:   Family History   Problem Relation Age of Onset    Diabetes Mother     Liver disease Mother         Fatty liver    Heart disease Father     Macular degeneration Father     Diabetes Father     Hypertension Father     Hyperlipidemia Father     No Known Problems Sister      No Known Problems Brother     No Known Problems Brother     No Known Problems Daughter     No Known Problems Maternal Aunt     No Known Problems Maternal Uncle     No Known Problems Paternal Aunt     No Known Problems Paternal Uncle     No Known Problems Maternal Grandmother     No Known Problems Maternal Grandfather     No Known Problems Paternal Grandmother     No Known Problems Paternal Grandfather     Amblyopia Neg Hx     Blindness Neg Hx     Cancer Neg Hx     Cataracts Neg Hx     Glaucoma Neg Hx     Retinal detachment Neg Hx     Strabismus Neg Hx     Stroke Neg Hx     Thyroid disease Neg Hx     Melanoma Neg Hx     Heart attack Neg Hx         Social History:   Social History     Tobacco Use    Smoking status: Never    Smokeless tobacco: Never   Substance Use Topics    Alcohol use: Yes     Comment: rare        I have reviewed and updated the patient's past medical, surgical, family and social histories.    Allergies:   Review of patient's allergies indicates:   Allergen Reactions    No known drug allergies         Medications:   Current Outpatient Medications   Medication Sig Dispense Refill    ALPRAZolam (XANAX) 0.5 MG tablet Take 1 tablet (0.5 mg total) by mouth daily as needed for Anxiety. 30 tablet 2    cetirizine (ZYRTEC) 10 MG tablet TAKE 1 TABLET BY MOUTH EVERY DAY (Patient taking differently: Take by mouth daily as needed.) 90 tablet 2    cyclobenzaprine (FLEXERIL) 10 MG tablet TAKE 1 TABLET BY MOUTH EVERY DAY IN THE EVENING AS NEEDED FOR MUSCLE SPASMS  Strength: 10 mg 30 tablet 3    fluticasone propionate (FLONASE) 50 mcg/actuation nasal spray 2 sprays (100 mcg total) by Each Nostril route once daily. 18.2 mL 6    multivitamin capsule Take by mouth. 1 capsule Oral Every morning      norethindrone-ethinyl estradiol (MICROGESTIN 1/20) 1-20 mg-mcg per tablet Take 1 tablet by mouth once daily. 63 tablet 4    omeprazole (PRILOSEC) 20 MG capsule TAKE 1 CAPSULE BY MOUTH DAILY AS NEEDED FOR GERD 90 capsule 1  "   ondansetron (ZOFRAN-ODT) 4 MG TbDL Take 1 tablet (4 mg total) by mouth 2 (two) times daily. 2 tablet 0    triamcinolone acetonide 0.1% (KENALOG) 0.1 % cream Apply topically 2 (two) times daily as needed (scaling at external ears). Avoid use on face, body folds, groin/genitalia. 45 g 3     No current facility-administered medications for this visit.        Physical Exam:   /81   Pulse 70   Temp 98.6 °F (37 °C) (Oral)   Resp 16   Ht 5' 11" (1.803 m)   Wt 69.4 kg (153 lb)   LMP  (LMP Unknown)   SpO2 100%   BMI 21.34 kg/m²      ECOG Performance status: 0            Physical Exam  Vitals reviewed.   Constitutional:       General: She is not in acute distress.     Appearance: Normal appearance. She is normal weight. She is not ill-appearing.   HENT:      Head: Normocephalic and atraumatic.      Right Ear: External ear normal.      Left Ear: External ear normal.      Nose: Nose normal.      Mouth/Throat:      Mouth: Mucous membranes are moist.      Pharynx: Oropharynx is clear. No posterior oropharyngeal erythema.   Eyes:      General: No scleral icterus.     Extraocular Movements: Extraocular movements intact.      Conjunctiva/sclera: Conjunctivae normal.      Pupils: Pupils are equal, round, and reactive to light.   Cardiovascular:      Rate and Rhythm: Normal rate and regular rhythm.      Pulses: Normal pulses.      Heart sounds: Normal heart sounds.   Pulmonary:      Effort: Pulmonary effort is normal. No respiratory distress.      Breath sounds: Normal breath sounds. No wheezing or rales.   Abdominal:      General: Bowel sounds are normal. There is no distension.      Palpations: Abdomen is soft.      Tenderness: There is no abdominal tenderness.   Musculoskeletal:         General: No swelling. Normal range of motion.      Cervical back: Normal range of motion and neck supple.   Skin:     General: Skin is warm.      Coloration: Skin is not jaundiced.      Findings: No erythema or rash.   Neurological: "      General: No focal deficit present.      Mental Status: She is alert and oriented to person, place, and time. Mental status is at baseline.      Gait: Gait normal.   Psychiatric:         Mood and Affect: Mood normal.         Behavior: Behavior normal.         Thought Content: Thought content normal.         Judgment: Judgment normal.         Labs:   Recent Results (from the past 48 hour(s))   CBC Oncology    Collection Time: 07/07/23  7:18 AM   Result Value Ref Range    WBC 4.44 3.90 - 12.70 K/uL    RBC 4.72 4.00 - 5.40 M/uL    Hemoglobin 14.6 12.0 - 16.0 g/dL    Hematocrit 44.7 37.0 - 48.5 %    MCV 95 82 - 98 fL    MCH 30.9 27.0 - 31.0 pg    MCHC 32.7 32.0 - 36.0 g/dL    RDW 11.9 11.5 - 14.5 %    Platelets 261 150 - 450 K/uL    MPV 9.9 9.2 - 12.9 fL    Gran # (ANC) 2.9 1.8 - 7.7 K/uL    Immature Grans (Abs) 0.02 0.00 - 0.04 K/uL   Comprehensive Metabolic Panel    Collection Time: 07/07/23  7:18 AM   Result Value Ref Range    Sodium 136 136 - 145 mmol/L    Potassium 4.3 3.5 - 5.1 mmol/L    Chloride 105 95 - 110 mmol/L    CO2 25 23 - 29 mmol/L    Glucose 127 (H) 70 - 110 mg/dL    BUN 11 6 - 20 mg/dL    Creatinine 0.8 0.5 - 1.4 mg/dL    Calcium 9.3 8.7 - 10.5 mg/dL    Total Protein 7.0 6.0 - 8.4 g/dL    Albumin 3.7 3.5 - 5.2 g/dL    Total Bilirubin 0.8 0.1 - 1.0 mg/dL    Alkaline Phosphatase 37 (L) 55 - 135 U/L    AST 13 10 - 40 U/L    ALT 13 10 - 44 U/L    eGFR >60.0 >60 mL/min/1.73 m^2    Anion Gap 6 (L) 8 - 16 mmol/L   AFP Tumor Marker    Collection Time: 07/07/23  7:18 AM   Result Value Ref Range    AFP 8.9 (H) 0.0 - 8.4 ng/mL   TSH    Collection Time: 07/07/23  7:18 AM   Result Value Ref Range    TSH 1.344 0.400 - 4.000 uIU/mL        I have reviewed the pertinent labs from 7/7/23 which are notable for normal blood counts.  Normal LFTs, AFP 8.9.    Imaging:       I have personally reviewed the 6/21/23 CT chest which is notable for slightly enlarging RLL metastatic nodule and stable RUL subcentimeter  nodule.    Path:   6/28/21: partial hepatectomy:    Final Pathologic Diagnosis 1.  Gallbladder (cholecystectomy):   - Benign gallbladder with cholecystitis   2. Right lobe (partial hepatectomy):   - Positive for malignancy, see synoptic report below   - Separate hemangioma, 7.3 cm   3. Lymph nodes, portal (regional resection):   - 8 lymph nodes, negative for tumor (0/8)   ______________________________________________________________________________   ______   Hepatocellular carcinoma synoptic report   - Procedure:  Partial hepatectomy, right lobe   - Histologic type:  Hepatocellular carcinoma, scirrhous   - Histologic grade:  Moderately differentiated, grade 2   - Tumor focality:  Solitary   - Tumor characteristics:   - Tumor site:  Right lobe   - Tumor size:  3.3 cm   - Treatment effect:  No known pre-surgical therapy   - Satellitosis:  Not identified   - Tumor extent:  Confined to liver   - Vascular invasion:  Present, Small vessel   - Perineural invasion:  Present   - Margins:   - All margins are negative for invasive carcinoma   - Closest margin to invasive carcinoma:  Parenchymal   - Distance from invasive  carcinoma to closest margin:  5 mm   - Regional lymph nodes:   - Number of lymph nodes with tumor:  0   - Number of lymph nodes examined:  8   - Pathology: pT2 N0 MX   - Additional findings:   - No steatosis   - Trichrome stain:  Periportal fibrosis with early septa, stage 1-2   - Iron stain:  Negative   - Iron and trichrome stains with appropriate controls   Tumor blocks:  2A, 2B, 2 C    Comment: Interp By Madeline Carlos MD, Signed on 07/08/2021 at 16:47           Assessment:       1. HCC (hepatocellular carcinoma)    2. Malignant neoplasm metastatic to right lung    3. Bone lesion    4. Liver hemangioma              Plan:             # HCC  Scirrhous subtype, which is very uncommon (~ 1% of all HCC); prognosis is likely similar to typical HCC.  No clear underlying liver pathology in this patient.  Had  margin negative resection with negative lymph node eval on 6/28/21 with Dr. Howell.  Unfortunately she has biopsy proven recurrent metastatic disease to her RLL s/p IR biopsy 4/10/23.   She was discussed at thoracic tumor board with potential plan for surgical resection with Dr. Tadeo.  She had a concerning bone lesion on PET from 4/26 at [facility].  This was biopsied and proven to be benign.  In the interim she had a repeat abdominal MRI on 6/13/23 which demonstrated growth of a portacaval lymph node that was very concerning for metastatic disease when we reviewed her imaging at liver conference.  Meanwhile her RLL met has grown slightly on 6/21/23 CT as well.  I discussed her case with Valerie Dhillon and Shwetha and we were all in agreement with proceeding with systemic therapy rather than surgery or radiation given multifocal progression.    I discussed this with her and she is agreeable with starting systemic therapy.  Reviewed possibilities of atezo + magaly or durva + treme.  She wished to proceed with STRIDE regimen: durvalumab + tremelimumab.    Presents today prior to cycle 1.  Labs acceptable to proceed.  Orders signed.    Patient was consented for immunotherapy today.  An extensive discussion was had which included a thorough discussion of the risk and benefits of treatment and alternatives.  Risks, including but not limited to, immune-mediated toxicities involving multiple body organs including lungs, kidneys, GI tract, liver, heart, central nervous system, skin, thyroid, pituitary gland, and others were all explained to the patient. The patient agrees with the plan, and all questions have been answered to their satisfaction.  Consent was signed the patient, provider, and a third party witness.     Will plan to bring her back in 4 weeks for cycle 2.    # Liver hemangiomas  Continue monitoring as indicated with Dr. Rogers.    Follow up: 4 weeks.    The above information has been reviewed with the patient and all  questions have been answered to their apparent satisfaction.  They understand that they can call the clinic with any questions.    Philippe Carrasco MD  Hematology/Oncology  [facility]    Route Chart for Scheduling    Med Onc Chart Routing      Follow up with physician 4 weeks and 2 months. for durvalumab on 8/7/23; and in 2 months for durvalumab   Follow up with MELA    Infusion scheduling note    Injection scheduling note    Labs CBC, CMP and TSH   Scheduling:  Preferred lab:  Lab interval: every 4 weeks  & AFP   Imaging    Pharmacy appointment    Other referrals            Treatment Plan Information   OP TREMELIMUMAB 300 MG (X 1) + DURVALUMAB 1500 MG Q4W   Philippe Carrasco MD   Upcoming Treatment Dates - OP TREMELIMUMAB 300 MG (X 1) + DURVALUMAB 1500 MG Q4W    6/26/2023       Chemotherapy       durvalumab (IMFINZI) 1,500 mg in sodium chloride 0.9% SolP 280 mL chemo infusion       tremelimumab-actl (IMJUDO) IV infusion (wt >/= 30 kg)       Antiemetics       prochlorperazine injection Soln 5 mg  7/24/2023       Chemotherapy       durvalumab (IMFINZI) 1,500 mg in sodium chloride 0.9% SolP 280 mL chemo infusion       Antiemetics       prochlorperazine injection Soln 5 mg  8/21/2023       Chemotherapy       durvalumab (IMFINZI) 1,500 mg in sodium chloride 0.9% SolP 280 mL chemo infusion       Antiemetics       prochlorperazine injection Soln 5 mg  9/18/2023       Chemotherapy       durvalumab (IMFINZI) 1,500 mg in sodium chloride 0.9% SolP 280 mL chemo infusion       Antiemetics       prochlorperazine injection Soln 5 mg

## 2023-07-11 ENCOUNTER — INFUSION (OUTPATIENT)
Dept: INFUSION THERAPY | Facility: HOSPITAL | Age: 43
End: 2023-07-11
Payer: COMMERCIAL

## 2023-07-11 VITALS
SYSTOLIC BLOOD PRESSURE: 142 MMHG | TEMPERATURE: 99 F | DIASTOLIC BLOOD PRESSURE: 91 MMHG | WEIGHT: 153 LBS | RESPIRATION RATE: 18 BRPM | BODY MASS INDEX: 21.42 KG/M2 | OXYGEN SATURATION: 99 % | HEART RATE: 70 BPM | HEIGHT: 71 IN

## 2023-07-11 DIAGNOSIS — C22.0 HEPATOCELLULAR CARCINOMA: Primary | ICD-10-CM

## 2023-07-11 PROCEDURE — 25000003 PHARM REV CODE 250: Performed by: INTERNAL MEDICINE

## 2023-07-11 PROCEDURE — 63600175 PHARM REV CODE 636 W HCPCS: Mod: JZ,JG | Performed by: INTERNAL MEDICINE

## 2023-07-11 PROCEDURE — 96413 CHEMO IV INFUSION 1 HR: CPT

## 2023-07-11 PROCEDURE — 96417 CHEMO IV INFUS EACH ADDL SEQ: CPT

## 2023-07-11 RX ORDER — PROCHLORPERAZINE EDISYLATE 5 MG/ML
5 INJECTION INTRAMUSCULAR; INTRAVENOUS ONCE AS NEEDED
Status: DISCONTINUED | OUTPATIENT
Start: 2023-07-11 | End: 2023-07-11 | Stop reason: HOSPADM

## 2023-07-11 RX ORDER — SODIUM CHLORIDE 0.9 % (FLUSH) 0.9 %
10 SYRINGE (ML) INJECTION
Status: DISCONTINUED | OUTPATIENT
Start: 2023-07-11 | End: 2023-07-11 | Stop reason: HOSPADM

## 2023-07-11 RX ORDER — EPINEPHRINE 0.3 MG/.3ML
0.3 INJECTION SUBCUTANEOUS ONCE AS NEEDED
Status: DISCONTINUED | OUTPATIENT
Start: 2023-07-11 | End: 2023-07-11 | Stop reason: HOSPADM

## 2023-07-11 RX ORDER — DIPHENHYDRAMINE HYDROCHLORIDE 50 MG/ML
50 INJECTION INTRAMUSCULAR; INTRAVENOUS ONCE AS NEEDED
Status: DISCONTINUED | OUTPATIENT
Start: 2023-07-11 | End: 2023-07-11 | Stop reason: HOSPADM

## 2023-07-11 RX ORDER — HEPARIN 100 UNIT/ML
500 SYRINGE INTRAVENOUS
Status: DISCONTINUED | OUTPATIENT
Start: 2023-07-11 | End: 2023-07-11 | Stop reason: HOSPADM

## 2023-07-11 RX ADMIN — TREMELIMUMAB 300 MG: 300 INJECTION, SOLUTION INTRAVENOUS at 09:07

## 2023-07-11 RX ADMIN — SODIUM CHLORIDE 1500 MG: 9 INJECTION, SOLUTION INTRAVENOUS at 10:07

## 2023-07-11 RX ADMIN — SODIUM CHLORIDE: 9 INJECTION, SOLUTION INTRAVENOUS at 08:07

## 2023-07-11 NOTE — PLAN OF CARE
0800  Patient seated in chair, VSS, assessment done.  PIV inserted without issue, flushed, blood return noted, started NS @ 50 cc/hr KVO while waiting for Imjudo & Imfinzi from pharmacy.  [facility] for safety

## 2023-07-11 NOTE — PLAN OF CARE
1145  Patient completed D1C1 Imjudo and Imfinzi, tolerated well.  PIV discontinued without issue.  RTC 8/7/23  Patient ambulated off floor independently, NAD.

## 2023-07-14 ENCOUNTER — PATIENT MESSAGE (OUTPATIENT)
Dept: CARDIOLOGY | Facility: CLINIC | Age: 43
End: 2023-07-14
Payer: COMMERCIAL

## 2023-07-17 RX ORDER — CYCLOBENZAPRINE HCL 10 MG
TABLET ORAL
Qty: 30 TABLET | Refills: 3 | Status: SHIPPED | OUTPATIENT
Start: 2023-07-17 | End: 2023-08-15 | Stop reason: ALTCHOICE

## 2023-07-17 NOTE — TELEPHONE ENCOUNTER
No care due was identified.  [facility] Embedded Care Due Messages. Reference number: [id number].   7/17/2023 11:40:07 AM CDT

## 2023-07-17 NOTE — TELEPHONE ENCOUNTER
Patient contacted and reports the neck and chest pain resolved with taking a muscle relaxer.  Patient is not interested in a cardiology follow up visit.  Patient states she is good.  No further questions or concerns.

## 2023-07-19 ENCOUNTER — PATIENT MESSAGE (OUTPATIENT)
Dept: HEMATOLOGY/ONCOLOGY | Facility: CLINIC | Age: 43
End: 2023-07-19
Payer: COMMERCIAL

## 2023-07-21 ENCOUNTER — PATIENT MESSAGE (OUTPATIENT)
Dept: HEMATOLOGY/ONCOLOGY | Facility: CLINIC | Age: 43
End: 2023-07-21
Payer: COMMERCIAL

## 2023-07-25 ENCOUNTER — PATIENT MESSAGE (OUTPATIENT)
Dept: INTERNAL MEDICINE | Facility: CLINIC | Age: 43
End: 2023-07-25
Payer: COMMERCIAL

## 2023-07-26 ENCOUNTER — TELEPHONE (OUTPATIENT)
Dept: INTERNAL MEDICINE | Facility: CLINIC | Age: 43
End: 2023-07-26
Payer: COMMERCIAL

## 2023-07-26 ENCOUNTER — LAB VISIT (OUTPATIENT)
Dept: LAB | Facility: HOSPITAL | Age: 43
End: 2023-07-26
Attending: INTERNAL MEDICINE
Payer: COMMERCIAL

## 2023-07-26 DIAGNOSIS — R19.7 DIARRHEA, UNSPECIFIED TYPE: Primary | ICD-10-CM

## 2023-07-26 DIAGNOSIS — R19.7 DIARRHEA, UNSPECIFIED TYPE: ICD-10-CM

## 2023-07-26 LAB
ALBUMIN SERPL BCP-MCNC: 3.6 G/DL (ref 3.5–5.2)
ALP SERPL-CCNC: 48 U/L (ref 55–135)
ALT SERPL W/O P-5'-P-CCNC: 14 U/L (ref 10–44)
ANION GAP SERPL CALC-SCNC: 8 MMOL/L (ref 8–16)
AST SERPL-CCNC: 13 U/L (ref 10–40)
BASOPHILS # BLD AUTO: 0.01 K/UL (ref 0–0.2)
BASOPHILS NFR BLD: 0.3 % (ref 0–1.9)
BILIRUB SERPL-MCNC: 0.6 MG/DL (ref 0.1–1)
BUN SERPL-MCNC: 8 MG/DL (ref 6–20)
CALCIUM SERPL-MCNC: 9 MG/DL (ref 8.7–10.5)
CHLORIDE SERPL-SCNC: 105 MMOL/L (ref 95–110)
CO2 SERPL-SCNC: 25 MMOL/L (ref 23–29)
CREAT SERPL-MCNC: 0.9 MG/DL (ref 0.5–1.4)
CRP SERPL-MCNC: 16.8 MG/L (ref 0–8.2)
DIFFERENTIAL METHOD: ABNORMAL
EOSINOPHIL # BLD AUTO: 0.1 K/UL (ref 0–0.5)
EOSINOPHIL NFR BLD: 3.6 % (ref 0–8)
ERYTHROCYTE [DISTWIDTH] IN BLOOD BY AUTOMATED COUNT: 11.8 % (ref 11.5–14.5)
ERYTHROCYTE [SEDIMENTATION RATE] IN BLOOD BY WESTERGREN METHOD: 15 MM/HR (ref 0–20)
EST. GFR  (NO RACE VARIABLE): >60 ML/MIN/1.73 M^2
GLUCOSE SERPL-MCNC: 81 MG/DL (ref 70–110)
HCT VFR BLD AUTO: 40.9 % (ref 37–48.5)
HGB BLD-MCNC: 13.4 G/DL (ref 12–16)
IMM GRANULOCYTES # BLD AUTO: 0.01 K/UL (ref 0–0.04)
IMM GRANULOCYTES NFR BLD AUTO: 0.3 % (ref 0–0.5)
LYMPHOCYTES # BLD AUTO: 1.5 K/UL (ref 1–4.8)
LYMPHOCYTES NFR BLD: 41.1 % (ref 18–48)
MCH RBC QN AUTO: 30.7 PG (ref 27–31)
MCHC RBC AUTO-ENTMCNC: 32.8 G/DL (ref 32–36)
MCV RBC AUTO: 94 FL (ref 82–98)
MONOCYTES # BLD AUTO: 0.6 K/UL (ref 0.3–1)
MONOCYTES NFR BLD: 17.5 % (ref 4–15)
NEUTROPHILS # BLD AUTO: 1.4 K/UL (ref 1.8–7.7)
NEUTROPHILS NFR BLD: 37.2 % (ref 38–73)
NRBC BLD-RTO: 0 /100 WBC
PLATELET # BLD AUTO: 249 K/UL (ref 150–450)
PMV BLD AUTO: 9.5 FL (ref 9.2–12.9)
POTASSIUM SERPL-SCNC: 3.7 MMOL/L (ref 3.5–5.1)
PROT SERPL-MCNC: 7 G/DL (ref 6–8.4)
RBC # BLD AUTO: 4.37 M/UL (ref 4–5.4)
SODIUM SERPL-SCNC: 138 MMOL/L (ref 136–145)
TSH SERPL DL<=0.005 MIU/L-ACNC: 2.01 UIU/ML (ref 0.4–4)
WBC # BLD AUTO: 3.65 K/UL (ref 3.9–12.7)

## 2023-07-26 PROCEDURE — 36415 COLL VENOUS BLD VENIPUNCTURE: CPT | Performed by: INTERNAL MEDICINE

## 2023-07-26 PROCEDURE — 86140 C-REACTIVE PROTEIN: CPT | Performed by: INTERNAL MEDICINE

## 2023-07-26 PROCEDURE — 82784 ASSAY IGA/IGD/IGG/IGM EACH: CPT | Mod: 59 | Performed by: INTERNAL MEDICINE

## 2023-07-26 PROCEDURE — 80053 COMPREHEN METABOLIC PANEL: CPT | Performed by: INTERNAL MEDICINE

## 2023-07-26 PROCEDURE — 84443 ASSAY THYROID STIM HORMONE: CPT | Performed by: INTERNAL MEDICINE

## 2023-07-26 PROCEDURE — 85652 RBC SED RATE AUTOMATED: CPT | Performed by: INTERNAL MEDICINE

## 2023-07-26 PROCEDURE — 86364 TISS TRNSGLTMNASE EA IG CLAS: CPT | Performed by: INTERNAL MEDICINE

## 2023-07-26 PROCEDURE — 85025 COMPLETE CBC W/AUTO DIFF WBC: CPT | Performed by: INTERNAL MEDICINE

## 2023-07-26 NOTE — TELEPHONE ENCOUNTER
Okay, glad to know that the diarrhea is a little bit better.  I would like to have her get blood work tomorrow and to have her submit some stool, ordered cultures x3    If symptoms worsen, she will need to be seen in the ER, may need a CT scan and IV fluids thanks

## 2023-07-26 NOTE — TELEPHONE ENCOUNTER
Called and spoke with pt, she set a virtual appt for next week. Pt states she hasn't had any bowel movements today but still stomach cramps,. States oncology didn't reach out today but she spoke with them on yesterday. She states oncology is telling her they don't believe the diarrhea is from the infusion.

## 2023-07-26 NOTE — TELEPHONE ENCOUNTER
Called and spoke with pt, informed her she would need to bring in stool specimen as well as do blood work. Pt states due to work she will have to do labs and bring in specimen to [facility] on Friday

## 2023-07-26 NOTE — TELEPHONE ENCOUNTER
I am concerned that it is related to the infusion that she got on July 11th     I am also concerned that she is had 5 or 6 episodes a day and she is going to end up getting dehydrated     She needs to discuss this with Dr. Luna AGUILAR toady     I am willing to see her but if this is the only new medication, it is likely related to her immunotherapy

## 2023-07-27 ENCOUNTER — PATIENT MESSAGE (OUTPATIENT)
Dept: HEMATOLOGY/ONCOLOGY | Facility: CLINIC | Age: 43
End: 2023-07-27
Payer: COMMERCIAL

## 2023-07-27 ENCOUNTER — TELEPHONE (OUTPATIENT)
Dept: INTERNAL MEDICINE | Facility: CLINIC | Age: 43
End: 2023-07-27
Payer: COMMERCIAL

## 2023-07-27 ENCOUNTER — PATIENT MESSAGE (OUTPATIENT)
Dept: INTERNAL MEDICINE | Facility: CLINIC | Age: 43
End: 2023-07-27
Payer: COMMERCIAL

## 2023-07-27 DIAGNOSIS — I51.7 CARDIAC ENLARGEMENT: ICD-10-CM

## 2023-07-27 DIAGNOSIS — R10.9 ABDOMINAL PAIN, UNSPECIFIED ABDOMINAL LOCATION: Primary | ICD-10-CM

## 2023-07-27 LAB — IGA SERPL-MCNC: 53 MG/DL (ref 40–350)

## 2023-07-28 ENCOUNTER — HOSPITAL ENCOUNTER (EMERGENCY)
Facility: HOSPITAL | Age: 43
Discharge: HOME | End: 2023-07-28
Attending: EMERGENCY MEDICINE
Payer: COMMERCIAL

## 2023-07-28 ENCOUNTER — LAB VISIT (OUTPATIENT)
Dept: LAB | Facility: HOSPITAL | Age: 43
End: 2023-07-28
Attending: INTERNAL MEDICINE
Payer: COMMERCIAL

## 2023-07-28 VITALS
BODY MASS INDEX: 20.74 KG/M2 | HEART RATE: 76 BPM | TEMPERATURE: 98 F | WEIGHT: 148.13 LBS | OXYGEN SATURATION: 96 % | HEIGHT: 71 IN | RESPIRATION RATE: 16 BRPM | DIASTOLIC BLOOD PRESSURE: 62 MMHG | SYSTOLIC BLOOD PRESSURE: 130 MMHG

## 2023-07-28 DIAGNOSIS — R19.7 DIARRHEA: ICD-10-CM

## 2023-07-28 DIAGNOSIS — R19.7 DIARRHEA, UNSPECIFIED TYPE: ICD-10-CM

## 2023-07-28 LAB
ALBUMIN SERPL BCP-MCNC: 3.6 G/DL (ref 3.5–5.2)
ALP SERPL-CCNC: 56 U/L (ref 55–135)
ALT SERPL W/O P-5'-P-CCNC: 16 U/L (ref 10–44)
ANION GAP SERPL CALC-SCNC: 10 MMOL/L (ref 8–16)
AST SERPL-CCNC: 15 U/L (ref 10–40)
BASOPHILS # BLD AUTO: 0.02 K/UL (ref 0–0.2)
BASOPHILS NFR BLD: 0.4 % (ref 0–1.9)
BILIRUB SERPL-MCNC: 0.8 MG/DL (ref 0.1–1)
BILIRUB UR QL STRIP: NEGATIVE
BUN SERPL-MCNC: 8 MG/DL (ref 6–20)
C DIFF GDH STL QL: NEGATIVE
C DIFF TOX A+B STL QL IA: NEGATIVE
CALCIUM SERPL-MCNC: 9.1 MG/DL (ref 8.7–10.5)
CHLORIDE SERPL-SCNC: 107 MMOL/L (ref 95–110)
CLARITY UR REFRACT.AUTO: CLEAR
CO2 SERPL-SCNC: 23 MMOL/L (ref 23–29)
COLOR UR AUTO: YELLOW
CREAT SERPL-MCNC: 0.8 MG/DL (ref 0.5–1.4)
DIFFERENTIAL METHOD: NORMAL
EOSINOPHIL # BLD AUTO: 0.1 K/UL (ref 0–0.5)
EOSINOPHIL NFR BLD: 2 % (ref 0–8)
ERYTHROCYTE [DISTWIDTH] IN BLOOD BY AUTOMATED COUNT: 11.8 % (ref 11.5–14.5)
EST. GFR  (NO RACE VARIABLE): >60 ML/MIN/1.73 M^2
GLUCOSE SERPL-MCNC: 78 MG/DL (ref 70–110)
GLUCOSE UR QL STRIP: NEGATIVE
HCT VFR BLD AUTO: 42.5 % (ref 37–48.5)
HGB BLD-MCNC: 13.9 G/DL (ref 12–16)
HGB UR QL STRIP: NEGATIVE
IMM GRANULOCYTES # BLD AUTO: 0.02 K/UL (ref 0–0.04)
IMM GRANULOCYTES NFR BLD AUTO: 0.4 % (ref 0–0.5)
KETONES UR QL STRIP: ABNORMAL
LEUKOCYTE ESTERASE UR QL STRIP: NEGATIVE
LIPASE SERPL-CCNC: 16 U/L (ref 4–60)
LYMPHOCYTES # BLD AUTO: 1.4 K/UL (ref 1–4.8)
LYMPHOCYTES NFR BLD: 30.6 % (ref 18–48)
MCH RBC QN AUTO: 30.5 PG (ref 27–31)
MCHC RBC AUTO-ENTMCNC: 32.7 G/DL (ref 32–36)
MCV RBC AUTO: 93 FL (ref 82–98)
MONOCYTES # BLD AUTO: 0.6 K/UL (ref 0.3–1)
MONOCYTES NFR BLD: 14 % (ref 4–15)
NEUTROPHILS # BLD AUTO: 2.4 K/UL (ref 1.8–7.7)
NEUTROPHILS NFR BLD: 52.6 % (ref 38–73)
NITRITE UR QL STRIP: NEGATIVE
NRBC BLD-RTO: 0 /100 WBC
PH UR STRIP: 6 [PH] (ref 5–8)
PLATELET # BLD AUTO: 275 K/UL (ref 150–450)
PMV BLD AUTO: 9.4 FL (ref 9.2–12.9)
POTASSIUM SERPL-SCNC: 3.7 MMOL/L (ref 3.5–5.1)
PROT SERPL-MCNC: 7.1 G/DL (ref 6–8.4)
PROT UR QL STRIP: ABNORMAL
RBC # BLD AUTO: 4.55 M/UL (ref 4–5.4)
SODIUM SERPL-SCNC: 140 MMOL/L (ref 136–145)
SP GR UR STRIP: 1.02 (ref 1–1.03)
TROPONIN I SERPL DL<=0.01 NG/ML-MCNC: 0.01 NG/ML (ref 0–0.03)
URN SPEC COLLECT METH UR: ABNORMAL
WBC # BLD AUTO: 4.58 K/UL (ref 3.9–12.7)

## 2023-07-28 PROCEDURE — 81003 URINALYSIS AUTO W/O SCOPE: CPT | Performed by: PHYSICIAN ASSISTANT

## 2023-07-28 PROCEDURE — 93010 ELECTROCARDIOGRAM REPORT: CPT | Mod: ,,, | Performed by: INTERNAL MEDICINE

## 2023-07-28 PROCEDURE — 25500020 PHARM REV CODE 255: Performed by: EMERGENCY MEDICINE

## 2023-07-28 PROCEDURE — 87427 SHIGA-LIKE TOXIN AG IA: CPT | Performed by: INTERNAL MEDICINE

## 2023-07-28 PROCEDURE — 83993 ASSAY FOR CALPROTECTIN FECAL: CPT | Performed by: INTERNAL MEDICINE

## 2023-07-28 PROCEDURE — 96374 THER/PROPH/DIAG INJ IV PUSH: CPT

## 2023-07-28 PROCEDURE — 87449 NOS EACH ORGANISM AG IA: CPT | Performed by: INTERNAL MEDICINE

## 2023-07-28 PROCEDURE — 99285 EMERGENCY DEPT VISIT HI MDM: CPT | Mod: 25

## 2023-07-28 PROCEDURE — 87046 STOOL CULTR AEROBIC BACT EA: CPT | Performed by: INTERNAL MEDICINE

## 2023-07-28 PROCEDURE — 80053 COMPREHEN METABOLIC PANEL: CPT | Performed by: PHYSICIAN ASSISTANT

## 2023-07-28 PROCEDURE — 93010 EKG 12-LEAD: ICD-10-PCS | Mod: ,,, | Performed by: INTERNAL MEDICINE

## 2023-07-28 PROCEDURE — 63600175 PHARM REV CODE 636 W HCPCS: Performed by: EMERGENCY MEDICINE

## 2023-07-28 PROCEDURE — 87045 FECES CULTURE AEROBIC BACT: CPT | Performed by: INTERNAL MEDICINE

## 2023-07-28 PROCEDURE — 96361 HYDRATE IV INFUSION ADD-ON: CPT

## 2023-07-28 PROCEDURE — 85025 COMPLETE CBC W/AUTO DIFF WBC: CPT | Performed by: PHYSICIAN ASSISTANT

## 2023-07-28 PROCEDURE — 83690 ASSAY OF LIPASE: CPT | Performed by: PHYSICIAN ASSISTANT

## 2023-07-28 PROCEDURE — 93005 ELECTROCARDIOGRAM TRACING: CPT

## 2023-07-28 PROCEDURE — 84484 ASSAY OF TROPONIN QUANT: CPT | Performed by: PHYSICIAN ASSISTANT

## 2023-07-28 RX ORDER — METOCLOPRAMIDE HYDROCHLORIDE 5 MG/ML
10 INJECTION INTRAMUSCULAR; INTRAVENOUS
Status: COMPLETED | OUTPATIENT
Start: 2023-07-28 | End: 2023-07-28

## 2023-07-28 RX ADMIN — IOHEXOL 75 ML: 350 INJECTION, SOLUTION INTRAVENOUS at 06:07

## 2023-07-28 RX ADMIN — METOCLOPRAMIDE 10 MG: 5 INJECTION, SOLUTION INTRAMUSCULAR; INTRAVENOUS at 06:07

## 2023-07-28 NOTE — ED NOTES
Patient identifiers for Melly Hwang 43 y.o. female checked and correct.    LOC: Patient is awake, alert, and aware of environment with an appropriate affect. Patient is oriented x 4 and speaking appropriately.  APPEARANCE: Patient resting comfortably and in no acute distress. Patient is clean and well groomed, patient's clothing is properly fastened.  HEENT: WDL  SKIN: The skin is warm and dry. Patient has normal skin turgor and moist mucus membranes.   MUSKULOSKELETAL: Patient is moving all extremities well, no obvious deformities noted. Pulses intact.   RESPIRATORY: Airway is open and patent. Respirations are spontaneous and non-labored with normal effort and rate.  CARDIAC: Patient has a normal rate and rhythm 95 on cardiac monitor. No peripheral edema noted. +intermittent chest pain  ABDOMEN: No distention noted. Soft and non-tender upon palpation. +nausea, diarrhea, gas  NEUROLOGICAL: pupils 3 mm, PERRL. Facial expression is symmetrical. Hand grasps are equal bilaterally. Normal sensation in all extremities when touched with finger.

## 2023-07-28 NOTE — PROVIDER PROGRESS NOTES - EMERGENCY DEPT.
Encounter Date: 7/28/2023    ED Physician Progress Notes        Physician Note:   Postmenopausal.  Okay to CT scan without pregnancy test.

## 2023-07-28 NOTE — ED TRIAGE NOTES
Pt endorses diarrhea and hot flashes x2 weeks. Had immunotherapy July 11. Pt also endorses nausea and increased gas/intermittent chest pains beginning last night.

## 2023-07-28 NOTE — ED PROVIDER NOTES
Encounter Date: 7/28/2023       History     Chief Complaint   Patient presents with    Diarrhea     Dr was going to put me on steroids for inflammation , diarrhea returned today, c diff test yests was neg, abd cramping and gas in chest     HPI  43-year-old female with past medical history of hepatocellular carcinoma status post resection in 2021, was reportedly in remission when she was recently discovered to have new Mets to her lungs.  She was restarted on July 11th on immunotherapy.  After the immunotherapy and for about the last 2 weeks she is been having diarrhea, multiple 2-5 episodes of green diarrhea per day.  Nausea without vomiting, no fevers, symptoms are accompanied by abdominal cramping.  She is tried Imodium which helped with the diarrhea but causes abdominal cramping.  She also has had some intermittent chest discomfort as well as burping.  She is been in contact with her primary care doctor as well as oncologist for the symptoms.  She had a negative C diff yesterday and has a CT scan and ultrasound scheduled but has not yet been performed.  She is coming to the emergency department because her symptoms have not resolved and she was not able to get in touch with her oncologist today.  Of breath with ambulation.  None currently.  Some vague dysuria/abnormal vaginal discharge which resolved after taking fluconazole.    No sick contacts, no travel, no camping.    Review of patient's allergies indicates:   Allergen Reactions    No known drug allergies      Past Medical History:   Diagnosis Date    Abdominal pain 8/12/2021    VENKATA positive 8/20/2020    COVID-19     Deviated septum 2011    Hepatocellular carcinoma 6/9/2021    Hypertrophy of inferior nasal turbinate     Liver mass     Nasal congestion 2011    Pericardial effusion     Premature menopause      Past Surgical History:   Procedure Laterality Date    COLONOSCOPY N/A 09/21/2020    Procedure: COLONOSCOPY;  Surgeon: GIOVANNI Crane MD;  Location:  [facility] ENDO (4TH FLR);  Service: Endoscopy;  Laterality: N/A;  + covid 4/22    HERNIA REPAIR      LIVER SURGERY N/A 06/28/2021    NASAL SEPTUM SURGERY      PERICARDIAL WINDOW N/A 02/22/2021    Procedure: CREATION, PERICARDIAL WINDOW;  Surgeon: Ajay Cantor MD;  Location: [facility] OR 2ND FLR;  Service: Cardiovascular;  Laterality: N/A;    PERICARDIOCENTESIS N/A 05/02/2020    Procedure: Pericardiocentesis;  Surgeon: Dickson Dangelo MD;  Location: [facility] CATH LAB;  Service: Cardiology;  Laterality: N/A;    TONSILLECTOMY       Family History   Problem Relation Age of Onset    Diabetes Mother     Liver disease Mother         Fatty liver    Heart disease Father     Macular degeneration Father     Diabetes Father     Hypertension Father     Hyperlipidemia Father     No Known Problems Sister     No Known Problems Brother     No Known Problems Brother     No Known Problems Daughter     No Known Problems Maternal Aunt     No Known Problems Maternal Uncle     No Known Problems Paternal Aunt     No Known Problems Paternal Uncle     No Known Problems Maternal Grandmother     No Known Problems Maternal Grandfather     No Known Problems Paternal Grandmother     No Known Problems Paternal Grandfather     Amblyopia Neg Hx     Blindness Neg Hx     Cancer Neg Hx     Cataracts Neg Hx     Glaucoma Neg Hx     Retinal detachment Neg Hx     Strabismus Neg Hx     Stroke Neg Hx     Thyroid disease Neg Hx     Melanoma Neg Hx     Heart attack Neg Hx      Social History     Tobacco Use    Smoking status: Never    Smokeless tobacco: Never   Substance Use Topics    Alcohol use: Yes     Comment: rare    Drug use: No     Review of Systems    Physical Exam     Initial Vitals [07/28/23 1515]   BP Pulse Resp Temp SpO2   (!) 132/92 95 18 97.3 °F (36.3 °C) 100 %      MAP       --         Physical Exam    Physical Exam:  CONSTITUTIONAL: Well developed, well nourished, in no acute distress.  HENT: Normocephalic, atraumatic    EYES: Sclerae anicteric    NECK: Supple, no thyroid enlargement  CARDIOVASCULAR: Regular rate and rhythm without any murmurs, gallops, rubs.  RESPIRATORY: Speaking in full sentences. Breathing comfortably. Auscultation of the lungs revealed normal breath sounds b/l, no wheezing, no rales, no rhonchi.  ABDOMEN: Soft, there is some mild right upper quadrant tenderness palpation, no masses, no rebound or guarding   NEUROLOGIC: Alert, interacting normally. No facial droop.   MSK: Moving all four extremities.  There is no CVA tenderness.  Skin: Warm and dry. No visible rash on exposed areas of skin.    Psych: Mood and affect normal.       ED Course   Procedures  Labs Reviewed   URINALYSIS, REFLEX TO URINE CULTURE - Abnormal; Notable for the following components:       Result Value    Protein, UA Trace (*)     Ketones, UA 2+ (*)     All other components within normal limits    Narrative:     Specimen Source->Urine   CBC W/ AUTO DIFFERENTIAL   COMPREHENSIVE METABOLIC PANEL   LIPASE   TROPONIN I   TROPONIN I    Narrative:     add on troponin per Santi Lozano MD order# [id number] 07/28/2023 @   17:45      EKG Readings: (Independently Interpreted)   Normal sinus rhythm at a rate of 84 with no significant ischemic changes, normal axis, intervals unremarkable.  Morphology.     ECG Results              EKG 12-lead (Final result)  Result time 07/29/23 14:02:25      Final result by Interface, Lab In [facility] (07/29/23 14:02:25)                   Narrative:    Test Reason : R19.7,    Vent. Rate : 084 BPM     Atrial Rate : 084 BPM     P-R Int : 122 ms          QRS Dur : 078 ms      QT Int : 346 ms       P-R-T Axes : 078 086 064 degrees     QTc Int : 408 ms    Normal sinus rhythm  Possible Left atrial enlargement  Nonspecific ST and/or T wave abnormalities  Abnormal ECG  When compared with ECG of 15-AUG-2022 06:40,  Axis is now normal  Confirmed by Suraj Driver MD ([id number]) on 7/29/2023 2:02:16 PM    Referred By: KIRTI   SELF           Confirmed  By:Suraj Driver MD                                  Imaging Results               CT Abdomen Pelvis With Contrast (Final result)  Result time 07/28/23 20:04:27      Final result by Terrence De Luna MD (07/28/23 20:04:27)                   Impression:      No acute abnormality in the abdomen or pelvis.  Prominent fluid-filled loops of small bowel without evidence of bowel obstruction.  Nonspecific, but may relate to nonspecific enteritis.    Postoperative change of partial right hepatectomy with stable hypoattenuating liver lesions, which were better characterized as hemangiomas on prior MRI.  No definite new or enlarging liver lesions.    Persistent dilation of the intrahepatic biliary ducts, likely related to compression of the common bile duct by a 3.3 cm enhancing periportal lymph node, which is mildly increased in size from prior MRI, as detailed above.    Partially imaged 1.6 right lower lobe solid pulmonary nodule, but appears minimally enlarged from prior.    Questioned prominent narrowing of the left renal vein between the SMA and aorta.  No evidence of left renal parenchymal attenuation abnormality to indicate venous congestion.    Hepatomegaly.    This report was flagged in Epic as abnormal.    Electronically signed by resident: Gay Anthony  Date:    07/28/2023  Time:    19:00    Electronically signed by: Terrence De Luna MD  Date:    07/28/2023  Time:    20:04               Narrative:    EXAMINATION:  CT ABDOMEN PELVIS WITH CONTRAST    CLINICAL HISTORY:  Abdominal pain, acute, nonlocalized;2 weeks diarrhea, abdo cramping. Hx of HCC recently started on immunotherapy.;    TECHNIQUE:  Axial images of the abdomen and pelvis were acquired after the use of 75 cc [id number] IV contrast.  Coronal and sagittal reconstructions were also obtained    COMPARISON:  CT 06/21/2023, MRI 06/13/2023, CT 09/09/2022    FINDINGS:  Thoracic soft tissues: Partially visualized thoracic soft tissues appear unremarkable.    Heart: No  pericardial effusion.    Lungs: Partially visualized solid pulmonary nodule measuring at least 1.6 x 1.5 cm in the right lower lobe (2-1), which measures somewhat larger compared to CT 06/21/2023, at which time it measured 1.4 x 1.3 cm.  No pleural effusion. Mild atelectasis at the left lung base.  No consolidation or pleural effusion.    Esophagus: Unremarkable.    Stomach and duodenum: Unremarkable.    Liver: Postoperative change of partial right hepatectomy.  Hepatomegaly measuring 21.6 cm in craniocaudal dimension.  Stable hypoattenuating lesions within the liver, the largest of which measure 2.2 cm near the albert hepatis and 1.4 cm in the right hepatic lobe (2-44 and 2-84), which were better characterized as hemangiomas on prior MRI.  No definite new or enlarging liver lesions.    Gallbladder: Surgically absent.    Bile Ducts: Similar appearance of dilated intra hepatic biliary tree, likely secondary to compression from adjacent large periportal lymph node.    Spleen: Unremarkable.    Pancreas: No mass or peripancreatic fat stranding.    Adrenals: Unremarkable.    Kidneys/Ureters: Normal in size and location. Normal enhancement. No hydronephrosis or nephrolithiasis. No ureteral dilatation.    Bladder: Bladder is decompressed.    Reproductive organs: Unremarkable.    Bowel/Mesentery: Prominent fluid-filled small bowel loops throughout the abdomen, none of which are pathologically dilated by CT criteria.  No evidence of obstruction.  No evidence of inflammation or wall thickening.  Colon demonstrates no focal wall thickening.  Appendix is normal.  No pneumatosis or portal venous gas.    Peritoneum: Stable small volume of fluid superior to the right kidney, which is unchanged from prior MRI, and may be related to postoperative change from partial hepatectomy.    Lymph nodes: Enhancing periportal lymph node which measures 2.3 x 3.3 cm (2-55) and is mildly increased in size from MRI 06/13/2023 (previously 2.6 by 2.2  cm.).    Abdominal wall:  Anterior abdominal wall hernia containing a portion of the liver, not significantly changed from prior.    Vasculature: Left renal vein appears severely narrowed between the aorta and SMA (2-50) no significant calcific atherosclerosis.    Bones: No acute fracture. No suspicious osseous lesions.                                       Medications   lactated ringers bolus 1,000 mL (0 mLs Intravenous Stopped 7/28/23 2118)   metoclopramide HCl injection 10 mg (10 mg Intravenous Given 7/28/23 1857)   iohexoL (OMNIPAQUE 350) injection 75 mL (75 mLs Intravenous Given 7/28/23 1836)     Medical Decision Making:   History:   Old Records Summarized: records from clinic visits.       <> Summary of Records: Good previous notes from clinic as well as epic communication with patient between her primary care doctor and oncologist.  Independently Interpreted Test(s):   I have ordered and independently interpreted EKG Reading(s) - see prior notes  Clinical Tests:   Lab Tests: Ordered and Reviewed  Radiological Study: Ordered and Reviewed  Medical Tests: Ordered and Reviewed         43-year-old female with past medical history as noted coming in with 2 weeks of diarrhea, cramping with some associated chest discomfort.  C diff was negative yesterday, she is coming in because she is had continued symptoms and wants understand why she is having these as well as for symptomatic relief.  She is been taking Zofran at home with minimal improvement.    On exam no acute distress, well appearing, mild right upper quadrant discomfort otherwise benign exam.    She is asking to have CT scan done today.  Scheduled for Wednesday.    Labs sent by triage, LFTs are unremarkable, CMP CBC are also otherwise unremarkable, lipase is negative.    Given her 2 weeks of diarrhea, will undertake CT on pelvis to look for colitis, inflammation, findings concerning or worsening HC.  I strongly suspect that her symptoms are from her  immunotherapy given that there was no other change in her habits.  No true infectious symptoms.    IV fluids, Reglan in the emergency department.  Will also add on troponin as she is been having some chest discomfort associated with the symptoms although it is seems to be associated with burping/reflux symptoms.    If ED workup is unremarkable anticipate discharge home with follow-up with her Oncology and primary care doctor for further evaluation and treatment.    Update:  She remains hemodynamically stable and well-appearing emergency department.    Labs are unremarkable, CT scan as noted, there are multiple findings which are not new as well as nonspecific enteritis.    At this time not consistent with acutely dangerous pathology.    Suspect likely reaction to her immunotherapy.  Safe for outpatient follow-up with her primary care doctor and Oncology doctor.  ED return precautions.    Findings of ED work up and return precautions verbally explained to patient. Patient agrees with discharge plan, return instructions and verbalizes understanding of return precautions.                          Clinical Impression:   Final diagnoses:  [R19.7] Diarrhea        ED Disposition Condition    Discharge Stable          ED Prescriptions    None       Follow-up Information       Follow up With Specialties Details Why Contact Info    Melly Sahu MD Internal Medicine In 1 week  [address]  [place] [address]  [phone]               Santi Lozano MD  07/30/23 2120

## 2023-07-28 NOTE — FIRST PROVIDER EVALUATION
Emergency Department TeleTriage Encounter Note      CHIEF COMPLAINT    Chief Complaint   Patient presents with    Diarrhea     Dr was going to put me on steroids for inflammation , diarrhea returned today, c diff test yests was neg, abd cramping and gas in chest       VITAL SIGNS   Initial Vitals [07/28/23 1515]   BP Pulse Resp Temp SpO2   (!) 132/92 95 18 97.3 °F (36.3 °C) 100 %      MAP       --            ALLERGIES    Review of patient's allergies indicates:   Allergen Reactions    No known drug allergies        PROVIDER TRIAGE NOTE  This is a teletriage evaluation of a 43 y.o. female presenting to the ED complaining of abdominal pain. Patient being treated for hepatocellular carcinoma. She has had diarrhea ongoing for a few weeks, got better for a few days, but then started again.     Patient is alert and oriented. She speaks in complete sentences. She is sitting upright in the chair in no distress.     Initial orders will be placed and care will be transferred to an alternate provider when patient is roomed for a full evaluation. Any additional orders and the final disposition will be determined by that provider.         ORDERS  Labs Reviewed   CBC W/ AUTO DIFFERENTIAL   COMPREHENSIVE METABOLIC PANEL   LIPASE   URINALYSIS, REFLEX TO URINE CULTURE       ED Orders (720h ago, onward)      Start Ordered     Status Ordering Provider    07/28/23 1625 07/28/23 1624  Vital signs  Every 2 hours         Ordered EDER, WAYNE G.    07/28/23 1624 07/28/23 1624  Diet NPO  Diet effective now         Ordered EDER, WAYNE G.    07/28/23 1624 07/28/23 1624  Insert peripheral IV  Once         Ordered EDER, WAYNE G.    07/28/23 1624 07/28/23 1624  CBC W/ AUTO DIFFERENTIAL  STAT         Ordered EDER, WAYNE G.    07/28/23 1624 07/28/23 1624  Comp. Metabolic Panel  STAT         Ordered EDER, WAYNE G.    07/28/23 1624 07/28/23 1624  Lipase  STAT         Ordered EDER, WAYNE G.    07/28/23 1624 07/28/23 1624  Urinalysis, Reflex to  Urine Culture Urine, Clean Catch  STAT         Ordered EDERWAYNE FLYNN STEPHANIE    07/28/23 1519 07/28/23 1518  EKG 12-lead  Once         Completed by JOHNNY OLIVER on 7/28/2023 at  3:22 PM TINY LAST              Virtual Visit Note: The provider triage portion of this emergency department evaluation and documentation was performed via [employer], a HIPAA-compliant telemedicine application, in concert with a tele-presenter in the room. A face to face patient evaluation with one of my colleagues will occur once the patient is placed in an emergency department room.      DISCLAIMER: This note was prepared with [employer] voice recognition transcription software. Garbled syntax, mangled pronouns, and other bizarre constructions may be attributed to that software system.

## 2023-07-29 LAB
E COLI SXT1 STL QL IA: NEGATIVE
E COLI SXT2 STL QL IA: NEGATIVE

## 2023-07-29 NOTE — DISCHARGE INSTRUCTIONS
Please stay well hydrated.    Follow-up with your cancer doctor as well as your primary care doctor for continued evaluation.    If you develop any new, worsening worrisome symptoms please return to the emergency department for re-evaluation.

## 2023-07-30 ENCOUNTER — PATIENT MESSAGE (OUTPATIENT)
Dept: HEMATOLOGY/ONCOLOGY | Facility: CLINIC | Age: 43
End: 2023-07-30
Payer: COMMERCIAL

## 2023-07-31 ENCOUNTER — OFFICE VISIT (OUTPATIENT)
Dept: OPTOMETRY | Facility: CLINIC | Age: 43
End: 2023-07-31
Payer: COMMERCIAL

## 2023-07-31 DIAGNOSIS — Z98.890 HX OF LASIK: ICD-10-CM

## 2023-07-31 DIAGNOSIS — H52.13 MYOPIA OF BOTH EYES: Primary | ICD-10-CM

## 2023-07-31 LAB
BACTERIA STL CULT: NORMAL
GLIADIN PEPTIDE IGA SER-ACNC: 0.4 U/ML
GLIADIN PEPTIDE IGG SER-ACNC: 1.7 U/ML
IGA SERPL-MCNC: 48 MG/DL (ref 70–400)
TTG IGA SER-ACNC: <0.2 U/ML
TTG IGG SER-ACNC: 1 U/ML

## 2023-07-31 PROCEDURE — 92014 PR EYE EXAM, EST PATIENT,COMPREHESV: ICD-10-PCS | Mod: S$GLB,,, | Performed by: OPTOMETRIST

## 2023-07-31 PROCEDURE — 99999 PR PBB SHADOW E&M-EST. PATIENT-LVL III: ICD-10-PCS | Mod: PBBFAC,,, | Performed by: OPTOMETRIST

## 2023-07-31 PROCEDURE — 92014 COMPRE OPH EXAM EST PT 1/>: CPT | Mod: S$GLB,,, | Performed by: OPTOMETRIST

## 2023-07-31 PROCEDURE — 92015 PR REFRACTION: ICD-10-PCS | Mod: S$GLB,,, | Performed by: OPTOMETRIST

## 2023-07-31 PROCEDURE — 99999 PR PBB SHADOW E&M-EST. PATIENT-LVL III: CPT | Mod: PBBFAC,,, | Performed by: OPTOMETRIST

## 2023-07-31 PROCEDURE — 92015 DETERMINE REFRACTIVE STATE: CPT | Mod: S$GLB,,, | Performed by: OPTOMETRIST

## 2023-07-31 NOTE — PROGRESS NOTES
INDU    ARNOLDO: 07/22  Chief complaint (CC): Patient is here for annual eye exam today.  Patient   has glasses she only wears for night driving.  Patient feels like she sees   fine most of the time.  Glasses? + 1 yr. old  Contacts? -  H/o eye surgery, injections or laser: Lasik OU  H/o eye injury: -  Known eye conditions? See above  Family h/o eye conditions? -Father with AMD  Eye gtts? -      (-) Flashes (-)  Floaters (-) Mucous   (-)  Tearing (-) Itching (-) Burning   (-) Headaches (-) Eye Pain/discomfort (-) Irritation   (-)  Redness (-) Double vision (-) Blurry vision    Diabetic? -  A1c? -      Last edited by Peace Cooley on 7/31/2023  8:32 AM.            Assessment /Plan     For exam results, see Encounter Report.      Myopia of both eyes  Hx of LASIK  No Srx indicated at this time. Normal ocular health. RTC 1 year.

## 2023-08-02 ENCOUNTER — HOSPITAL ENCOUNTER (OUTPATIENT)
Dept: CARDIOLOGY | Facility: HOSPITAL | Age: 43
Discharge: HOME | End: 2023-08-02
Attending: INTERNAL MEDICINE
Payer: COMMERCIAL

## 2023-08-02 VITALS
BODY MASS INDEX: 20.72 KG/M2 | DIASTOLIC BLOOD PRESSURE: 76 MMHG | WEIGHT: 148 LBS | SYSTOLIC BLOOD PRESSURE: 134 MMHG | HEIGHT: 71 IN | HEART RATE: 81 BPM

## 2023-08-02 LAB
ASCENDING AORTA: 2.29 CM
AV INDEX (PROSTH): 0.82
AV MEAN GRADIENT: 3 MMHG
AV PEAK GRADIENT: 6 MMHG
AV VALVE AREA BY VELOCITY RATIO: 3.05 CM²
AV VALVE AREA: 3.07 CM²
AV VELOCITY RATIO: 0.82
BSA FOR ECHO PROCEDURE: 1.83 M2
CV ECHO LV RWT: 0.37 CM
DOP CALC AO PEAK VEL: 1.21 M/S
DOP CALC AO VTI: 21.42 CM
DOP CALC LVOT AREA: 3.7 CM2
DOP CALC LVOT DIAMETER: 2.18 CM
DOP CALC LVOT PEAK VEL: 0.99 M/S
DOP CALC LVOT STROKE VOLUME: 65.81 CM3
DOP CALCLVOT PEAK VEL VTI: 17.64 CM
E WAVE DECELERATION TIME: 159.62 MSEC
E/A RATIO: 1.1
E/E' RATIO: 5.48 M/S
ECHO LV POSTERIOR WALL: 0.8 CM (ref 0.6–1.1)
FRACTIONAL SHORTENING: 36 % (ref 28–44)
INTERVENTRICULAR SEPTUM: 0.46 CM (ref 0.6–1.1)
IVRT: 82.78 MSEC
LA MAJOR: 5.08 CM
LA MINOR: 5.13 CM
LA WIDTH: 3.83 CM
LEFT ATRIUM SIZE: 2.43 CM
LEFT ATRIUM VOLUME INDEX MOD: 14.8 ML/M2
LEFT ATRIUM VOLUME INDEX: 21.7 ML/M2
LEFT ATRIUM VOLUME MOD: 27.44 CM3
LEFT ATRIUM VOLUME: 40.38 CM3
LEFT INTERNAL DIMENSION IN SYSTOLE: 2.76 CM (ref 2.1–4)
LEFT VENTRICLE DIASTOLIC VOLUME INDEX: 44.62 ML/M2
LEFT VENTRICLE DIASTOLIC VOLUME: 82.99 ML
LEFT VENTRICLE MASS INDEX: 42 G/M2
LEFT VENTRICLE SYSTOLIC VOLUME INDEX: 15.4 ML/M2
LEFT VENTRICLE SYSTOLIC VOLUME: 28.56 ML
LEFT VENTRICULAR INTERNAL DIMENSION IN DIASTOLE: 4.3 CM (ref 3.5–6)
LEFT VENTRICULAR MASS: 77.45 G
LV LATERAL E/E' RATIO: 4.93 M/S
LV SEPTAL E/E' RATIO: 6.17 M/S
MV A" WAVE DURATION": 7.42 MSEC
MV PEAK A VEL: 0.67 M/S
MV PEAK E VEL: 0.74 M/S
MV STENOSIS PRESSURE HALF TIME: 46.29 MS
MV VALVE AREA P 1/2 METHOD: 4.75 CM2
PISA TR MAX VEL: 2.39 M/S
PULM VEIN S/D RATIO: 0.59
PV PEAK D VEL: 1.05 M/S
PV PEAK S VEL: 0.62 M/S
RA MAJOR: 4.68 CM
RA PRESSURE ESTIMATED: 3 MMHG
RA WIDTH: 4.29 CM
RIGHT VENTRICULAR END-DIASTOLIC DIMENSION: 3.02 CM
RV TB RVSP: 5 MMHG
SINUS: 2.72 CM
STJ: 2.31 CM
TDI LATERAL: 0.15 M/S
TDI SEPTAL: 0.12 M/S
TDI: 0.14 M/S
TR MAX PG: 23 MMHG
TRICUSPID ANNULAR PLANE SYSTOLIC EXCURSION: 2 CM
TV REST PULMONARY ARTERY PRESSURE: 26 MMHG
Z-SCORE OF LEFT VENTRICULAR DIMENSION IN END DIASTOLE: -1.85
Z-SCORE OF LEFT VENTRICULAR DIMENSION IN END SYSTOLE: -1.15

## 2023-08-02 PROCEDURE — 93306 TTE W/DOPPLER COMPLETE: CPT

## 2023-08-02 PROCEDURE — 93306 ECHO (CUPID ONLY): ICD-10-PCS | Mod: 26,,, | Performed by: INTERNAL MEDICINE

## 2023-08-02 PROCEDURE — 93306 TTE W/DOPPLER COMPLETE: CPT | Mod: 26,,, | Performed by: INTERNAL MEDICINE

## 2023-08-06 NOTE — TELEPHONE ENCOUNTER
No care due was identified.  [facility] Embedded Care Due Messages. Reference number: [id number].   8/06/2023 2:53:37 AM CDT

## 2023-08-07 ENCOUNTER — INFUSION (OUTPATIENT)
Dept: INFUSION THERAPY | Facility: HOSPITAL | Age: 43
End: 2023-08-07
Payer: COMMERCIAL

## 2023-08-07 ENCOUNTER — LAB VISIT (OUTPATIENT)
Dept: LAB | Facility: HOSPITAL | Age: 43
End: 2023-08-07
Attending: INTERNAL MEDICINE
Payer: COMMERCIAL

## 2023-08-07 ENCOUNTER — OFFICE VISIT (OUTPATIENT)
Dept: HEMATOLOGY/ONCOLOGY | Facility: CLINIC | Age: 43
End: 2023-08-07
Payer: COMMERCIAL

## 2023-08-07 VITALS
DIASTOLIC BLOOD PRESSURE: 71 MMHG | BODY MASS INDEX: 20.71 KG/M2 | TEMPERATURE: 99 F | RESPIRATION RATE: 18 BRPM | HEIGHT: 71 IN | SYSTOLIC BLOOD PRESSURE: 120 MMHG | WEIGHT: 147.94 LBS | HEART RATE: 81 BPM

## 2023-08-07 VITALS
HEART RATE: 84 BPM | RESPIRATION RATE: 18 BRPM | HEIGHT: 71 IN | DIASTOLIC BLOOD PRESSURE: 1 MMHG | OXYGEN SATURATION: 100 % | WEIGHT: 147.94 LBS | TEMPERATURE: 98 F | BODY MASS INDEX: 20.71 KG/M2 | SYSTOLIC BLOOD PRESSURE: 148 MMHG

## 2023-08-07 DIAGNOSIS — D70.9 NEUTROPENIA, UNSPECIFIED TYPE: ICD-10-CM

## 2023-08-07 DIAGNOSIS — C22.0 HEPATOCELLULAR CARCINOMA: Primary | ICD-10-CM

## 2023-08-07 DIAGNOSIS — C78.01 MALIGNANT NEOPLASM METASTATIC TO RIGHT LUNG: ICD-10-CM

## 2023-08-07 DIAGNOSIS — M89.9 BONE LESION: ICD-10-CM

## 2023-08-07 DIAGNOSIS — R19.7 DIARRHEA, UNSPECIFIED TYPE: ICD-10-CM

## 2023-08-07 DIAGNOSIS — R14.1 ABDOMINAL GAS PAIN: ICD-10-CM

## 2023-08-07 DIAGNOSIS — D18.03 LIVER HEMANGIOMA: ICD-10-CM

## 2023-08-07 DIAGNOSIS — C22.0 HCC (HEPATOCELLULAR CARCINOMA): Primary | ICD-10-CM

## 2023-08-07 DIAGNOSIS — C22.0 HCC (HEPATOCELLULAR CARCINOMA): ICD-10-CM

## 2023-08-07 LAB
AFP SERPL-MCNC: 8.5 NG/ML (ref 0–8.4)
ALBUMIN SERPL BCP-MCNC: 3.1 G/DL (ref 3.5–5.2)
ALP SERPL-CCNC: 70 U/L (ref 55–135)
ALT SERPL W/O P-5'-P-CCNC: 50 U/L (ref 10–44)
ANION GAP SERPL CALC-SCNC: 9 MMOL/L (ref 8–16)
AST SERPL-CCNC: 46 U/L (ref 10–40)
BILIRUB SERPL-MCNC: 0.6 MG/DL (ref 0.1–1)
BUN SERPL-MCNC: 7 MG/DL (ref 6–20)
CALCIUM SERPL-MCNC: 8.6 MG/DL (ref 8.7–10.5)
CHLORIDE SERPL-SCNC: 105 MMOL/L (ref 95–110)
CO2 SERPL-SCNC: 22 MMOL/L (ref 23–29)
CREAT SERPL-MCNC: 0.9 MG/DL (ref 0.5–1.4)
ERYTHROCYTE [DISTWIDTH] IN BLOOD BY AUTOMATED COUNT: 11.9 % (ref 11.5–14.5)
EST. GFR  (NO RACE VARIABLE): >60 ML/MIN/1.73 M^2
GLUCOSE SERPL-MCNC: 125 MG/DL (ref 70–110)
HCT VFR BLD AUTO: 41.6 % (ref 37–48.5)
HGB BLD-MCNC: 13.5 G/DL (ref 12–16)
IMM GRANULOCYTES # BLD AUTO: 0.01 K/UL (ref 0–0.04)
MCH RBC QN AUTO: 30.3 PG (ref 27–31)
MCHC RBC AUTO-ENTMCNC: 32.5 G/DL (ref 32–36)
MCV RBC AUTO: 94 FL (ref 82–98)
NEUTROPHILS # BLD AUTO: 1.6 K/UL (ref 1.8–7.7)
PLATELET # BLD AUTO: 212 K/UL (ref 150–450)
PMV BLD AUTO: 9.7 FL (ref 9.2–12.9)
POTASSIUM SERPL-SCNC: 3.8 MMOL/L (ref 3.5–5.1)
PROT SERPL-MCNC: 6.4 G/DL (ref 6–8.4)
RBC # BLD AUTO: 4.45 M/UL (ref 4–5.4)
SODIUM SERPL-SCNC: 136 MMOL/L (ref 136–145)
TSH SERPL DL<=0.005 MIU/L-ACNC: 1.38 UIU/ML (ref 0.4–4)
WBC # BLD AUTO: 2.48 K/UL (ref 3.9–12.7)

## 2023-08-07 PROCEDURE — 99215 PR OFFICE/OUTPT VISIT, EST, LEVL V, 40-54 MIN: ICD-10-PCS | Mod: S$GLB,,, | Performed by: INTERNAL MEDICINE

## 2023-08-07 PROCEDURE — 82105 ALPHA-FETOPROTEIN SERUM: CPT | Performed by: INTERNAL MEDICINE

## 2023-08-07 PROCEDURE — 36415 COLL VENOUS BLD VENIPUNCTURE: CPT | Performed by: INTERNAL MEDICINE

## 2023-08-07 PROCEDURE — 3078F DIAST BP <80 MM HG: CPT | Mod: CPTII,S$GLB,, | Performed by: INTERNAL MEDICINE

## 2023-08-07 PROCEDURE — 3008F BODY MASS INDEX DOCD: CPT | Mod: CPTII,S$GLB,, | Performed by: INTERNAL MEDICINE

## 2023-08-07 PROCEDURE — 3077F SYST BP >= 140 MM HG: CPT | Mod: CPTII,S$GLB,, | Performed by: INTERNAL MEDICINE

## 2023-08-07 PROCEDURE — 1159F PR MEDICATION LIST DOCUMENTED IN MEDICAL RECORD: ICD-10-PCS | Mod: CPTII,S$GLB,, | Performed by: INTERNAL MEDICINE

## 2023-08-07 PROCEDURE — 1159F MED LIST DOCD IN RCRD: CPT | Mod: CPTII,S$GLB,, | Performed by: INTERNAL MEDICINE

## 2023-08-07 PROCEDURE — 96413 CHEMO IV INFUSION 1 HR: CPT

## 2023-08-07 PROCEDURE — 85027 COMPLETE CBC AUTOMATED: CPT | Performed by: INTERNAL MEDICINE

## 2023-08-07 PROCEDURE — 3078F PR MOST RECENT DIASTOLIC BLOOD PRESSURE < 80 MM HG: ICD-10-PCS | Mod: CPTII,S$GLB,, | Performed by: INTERNAL MEDICINE

## 2023-08-07 PROCEDURE — 99999 PR PBB SHADOW E&M-EST. PATIENT-LVL IV: CPT | Mod: PBBFAC,,, | Performed by: INTERNAL MEDICINE

## 2023-08-07 PROCEDURE — 25000003 PHARM REV CODE 250: Performed by: INTERNAL MEDICINE

## 2023-08-07 PROCEDURE — 84443 ASSAY THYROID STIM HORMONE: CPT | Performed by: INTERNAL MEDICINE

## 2023-08-07 PROCEDURE — 3077F PR MOST RECENT SYSTOLIC BLOOD PRESSURE >= 140 MM HG: ICD-10-PCS | Mod: CPTII,S$GLB,, | Performed by: INTERNAL MEDICINE

## 2023-08-07 PROCEDURE — 99215 OFFICE O/P EST HI 40 MIN: CPT | Mod: S$GLB,,, | Performed by: INTERNAL MEDICINE

## 2023-08-07 PROCEDURE — 80053 COMPREHEN METABOLIC PANEL: CPT | Performed by: INTERNAL MEDICINE

## 2023-08-07 PROCEDURE — 3008F PR BODY MASS INDEX (BMI) DOCUMENTED: ICD-10-PCS | Mod: CPTII,S$GLB,, | Performed by: INTERNAL MEDICINE

## 2023-08-07 PROCEDURE — 99999 PR PBB SHADOW E&M-EST. PATIENT-LVL IV: ICD-10-PCS | Mod: PBBFAC,,, | Performed by: INTERNAL MEDICINE

## 2023-08-07 PROCEDURE — 63600175 PHARM REV CODE 636 W HCPCS: Mod: JZ,JG | Performed by: INTERNAL MEDICINE

## 2023-08-07 RX ORDER — HEPARIN 100 UNIT/ML
500 SYRINGE INTRAVENOUS
Status: DISCONTINUED | OUTPATIENT
Start: 2023-08-07 | End: 2023-08-07 | Stop reason: HOSPADM

## 2023-08-07 RX ORDER — PROCHLORPERAZINE EDISYLATE 5 MG/ML
5 INJECTION INTRAMUSCULAR; INTRAVENOUS ONCE AS NEEDED
Status: CANCELLED
Start: 2023-08-07

## 2023-08-07 RX ORDER — HEPARIN 100 UNIT/ML
500 SYRINGE INTRAVENOUS
Status: CANCELLED | OUTPATIENT
Start: 2023-08-07

## 2023-08-07 RX ORDER — SODIUM CHLORIDE 0.9 % (FLUSH) 0.9 %
10 SYRINGE (ML) INJECTION
Status: CANCELLED | OUTPATIENT
Start: 2023-08-07

## 2023-08-07 RX ORDER — DIPHENHYDRAMINE HYDROCHLORIDE 50 MG/ML
50 INJECTION INTRAMUSCULAR; INTRAVENOUS ONCE AS NEEDED
Status: CANCELLED | OUTPATIENT
Start: 2023-08-07

## 2023-08-07 RX ORDER — DIPHENHYDRAMINE HYDROCHLORIDE 50 MG/ML
50 INJECTION INTRAMUSCULAR; INTRAVENOUS ONCE AS NEEDED
Status: DISCONTINUED | OUTPATIENT
Start: 2023-08-07 | End: 2023-08-07 | Stop reason: HOSPADM

## 2023-08-07 RX ORDER — CETIRIZINE HYDROCHLORIDE 10 MG/1
10 TABLET ORAL DAILY
Qty: 90 TABLET | Refills: 0 | Status: SHIPPED | OUTPATIENT
Start: 2023-08-07 | End: 2024-02-08 | Stop reason: SDUPTHER

## 2023-08-07 RX ORDER — EPINEPHRINE 0.3 MG/.3ML
0.3 INJECTION SUBCUTANEOUS ONCE AS NEEDED
Status: DISCONTINUED | OUTPATIENT
Start: 2023-08-07 | End: 2023-08-07 | Stop reason: HOSPADM

## 2023-08-07 RX ORDER — SODIUM CHLORIDE 0.9 % (FLUSH) 0.9 %
10 SYRINGE (ML) INJECTION
Status: DISCONTINUED | OUTPATIENT
Start: 2023-08-07 | End: 2023-08-07 | Stop reason: HOSPADM

## 2023-08-07 RX ORDER — EPINEPHRINE 0.3 MG/.3ML
0.3 INJECTION SUBCUTANEOUS ONCE AS NEEDED
Status: CANCELLED | OUTPATIENT
Start: 2023-08-07

## 2023-08-07 RX ORDER — PROCHLORPERAZINE EDISYLATE 5 MG/ML
5 INJECTION INTRAMUSCULAR; INTRAVENOUS ONCE AS NEEDED
Status: DISCONTINUED | OUTPATIENT
Start: 2023-08-07 | End: 2023-08-07 | Stop reason: HOSPADM

## 2023-08-07 RX ADMIN — SODIUM CHLORIDE: 9 INJECTION, SOLUTION INTRAVENOUS at 09:08

## 2023-08-07 RX ADMIN — SODIUM CHLORIDE 1500 MG: 9 INJECTION, SOLUTION INTRAVENOUS at 09:08

## 2023-08-07 NOTE — PROGRESS NOTES
MEDICAL ONCOLOGY - ESTABLISHED PATIENT VISIT    Reason for visit: Scirrhous HCC    Best Contact Phone Number(s): [phone] (home)      Cancer/Stage/TNM:    Cancer Staging   Hepatocellular carcinoma  Staging form: Liver, AJCC 8th Edition  - Clinical stage from 4/10/2023: Stage IVB (rcT1b, cN0, pM1) - Signed by Philippe Carrasco MD on 8/7/2023       Oncology History   Hepatocellular carcinoma   6/9/2021 Initial Diagnosis    Hepatocellular carcinoma     4/10/2023 Cancer Staged    Staging form: Liver, AJCC 8th Edition  - Clinical stage from 4/10/2023: Stage IVB (rcT1b, cN0, pM1)     7/11/2023 -  Chemotherapy    Treatment Summary   Plan Name: OP TREMELIMUMAB 300 MG (X 1) + DURVALUMAB 1500 MG Q4W  Treatment Goal: Control  Status: Active  Start Date: 7/11/2023  End Date: 6/10/2024 (Planned)  Provider: Philippe Carrasco MD  Chemotherapy: [No matching medication found in this treatment plan]        Interim History:   43 y.o. 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 Howell with the same pathology, negative margins, 0 of 8 lymph nodes positive and + for vascular and perineural invasion.  I saw her in 2021 for evaluation for persistent CA 19-9 but there was no radiographic evidence of HCC disease recurrence or alternative reason for CA 19-9 elevation.  CT chest on 3/9/23 showed an enlarging RLL nodule measuring 1.1 cm, increased from 0.8 cm in size.  IR biopsy of this on 4/10/23 confirmed metastatic HCC.  Since then in mid-June she has also had an MRI abdomen that showed an enlarging portocaval lymph node that is consistent with metastatic disease.    She presents today prior to cycle 2 of durvalumab + tremelimumab as part of the STRiDE regimen.  She had some intermittent episodes of loose stools that have since normalized.  Also with some gassiness.  She has started Prilosec which has improved her symptoms.  Feels frustrated with the response time to some of her  portal messages.  Feeling fairly well physically today other than some intermittent aches on her shins.    Presents with her daughter today.  ECOG PS 0.    ROS:   Review of Systems   Constitutional:  Negative for chills, fever, malaise/fatigue and weight loss.   HENT:  Negative for sore throat.    Eyes:  Negative for blurred vision and pain.   Respiratory:  Negative for cough and shortness of breath.    Cardiovascular:  Negative for chest pain and leg swelling.   Gastrointestinal:  Negative for abdominal pain, constipation, diarrhea, nausea and vomiting.   Genitourinary:  Negative for dysuria and frequency.   Musculoskeletal:  Negative for back pain, falls and myalgias.   Skin:  Negative for rash.   Neurological:  Negative for dizziness, weakness and headaches.   Endo/Heme/Allergies:  Does not bruise/bleed easily.   Psychiatric/Behavioral:  Negative for depression. The patient is not nervous/anxious.    All other systems reviewed and are negative.      Past Medical History:   Past Medical History:   Diagnosis Date    Abdominal pain 8/12/2021    VENKATA positive 8/20/2020    COVID-19     Deviated septum 2011    Hepatocellular carcinoma 6/9/2021    Hypertrophy of inferior nasal turbinate     Liver mass     Nasal congestion 2011    Pericardial effusion     Premature menopause         Past Surgical History:   Past Surgical History:   Procedure Laterality Date    COLONOSCOPY N/A 09/21/2020    Procedure: COLONOSCOPY;  Surgeon: GIOVANNI Crane MD;  Location: [facility] (4TH FLR);  Service: Endoscopy;  Laterality: N/A;  + covid 4/22    HERNIA REPAIR      LIVER SURGERY N/A 06/28/2021    NASAL SEPTUM SURGERY      PERICARDIAL WINDOW N/A 02/22/2021    Procedure: CREATION, PERICARDIAL WINDOW;  Surgeon: Ajay Cantor MD;  [facility];  Service: Cardiovascular;  Laterality: N/A;    PERICARDIOCENTESIS N/A 05/02/2020    Procedure: Pericardiocentesis;  Surgeon: Dickson Dangelo MD;  Location: [facility] CATH LAB;   Service: Cardiology;  Laterality: N/A;    TONSILLECTOMY          Family History:   Family History   Problem Relation Age of Onset    Diabetes Mother     Liver disease Mother         Fatty liver    Heart disease Father     Macular degeneration Father     Diabetes Father     Hypertension Father     Hyperlipidemia Father     No Known Problems Sister     No Known Problems Brother     No Known Problems Brother     No Known Problems Daughter     No Known Problems Maternal Aunt     No Known Problems Maternal Uncle     No Known Problems Paternal Aunt     No Known Problems Paternal Uncle     No Known Problems Maternal Grandmother     No Known Problems Maternal Grandfather     No Known Problems Paternal Grandmother     No Known Problems Paternal Grandfather     Amblyopia Neg Hx     Blindness Neg Hx     Cancer Neg Hx     Cataracts Neg Hx     Glaucoma Neg Hx     Retinal detachment Neg Hx     Strabismus Neg Hx     Stroke Neg Hx     Thyroid disease Neg Hx     Melanoma Neg Hx     Heart attack Neg Hx         Social History:   Social History     Tobacco Use    Smoking status: Never    Smokeless tobacco: Never   Substance Use Topics    Alcohol use: Yes     Comment: rare        I have reviewed and updated the patient's past medical, surgical, family and social histories.    Allergies:   Review of patient's allergies indicates:   Allergen Reactions    No known drug allergies         Medications:   Current Outpatient Medications   Medication Sig Dispense Refill    ALPRAZolam (XANAX) 0.5 MG tablet Take 1 tablet (0.5 mg total) by mouth daily as needed for Anxiety. 30 tablet 2    cetirizine (ZYRTEC) 10 MG tablet Take 1 tablet (10 mg total) by mouth once daily. 90 tablet 0    cyclobenzaprine (FLEXERIL) 10 MG tablet TAKE 1 TABLET BY MOUTH EVERY DAY IN THE EVENING AS NEEDED FOR MUSCLE SPASMS 30 tablet 3    fluticasone propionate (FLONASE) 50 mcg/actuation nasal spray 2 sprays (100 mcg total) by Each Nostril route once daily. 18.2 mL 6     "multivitamin capsule Take by mouth. 1 capsule Oral Every morning      norethindrone-ethinyl estradiol (MICROGESTIN 1/20) 1-20 mg-mcg per tablet Take 1 tablet by mouth once daily. 63 tablet 4    omeprazole (PRILOSEC) 20 MG capsule TAKE 1 CAPSULE BY MOUTH DAILY AS NEEDED FOR GERD 90 capsule 1    ondansetron (ZOFRAN-ODT) 4 MG TbDL Take 1 tablet (4 mg total) by mouth 2 (two) times daily. 2 tablet 0    triamcinolone acetonide 0.1% (KENALOG) 0.1 % cream Apply topically 2 (two) times daily as needed (scaling at external ears). Avoid use on face, body folds, groin/genitalia. 45 g 3     No current facility-administered medications for this visit.        Physical Exam:   BP (!) 148/1 (BP Location: Left arm, Patient Position: Sitting, BP Method: Medium (Automatic))   Pulse 84   Temp 98.2 °F (36.8 °C) (Oral)   Resp 18   Ht 5' 11" (1.803 m)   Wt 67.1 kg (147 lb 14.9 oz)   LMP  (LMP Unknown)   SpO2 100%   BMI 20.63 kg/m²      ECOG Performance status: 0            Physical Exam  Vitals reviewed.   Constitutional:       General: She is not in acute distress.     Appearance: Normal appearance. She is normal weight. She is not ill-appearing.   HENT:      Head: Normocephalic and atraumatic.      Right Ear: External ear normal.      Left Ear: External ear normal.      Nose: Nose normal.      Mouth/Throat:      Mouth: Mucous membranes are moist.      Pharynx: Oropharynx is clear. No posterior oropharyngeal erythema.   Eyes:      General: No scleral icterus.     Extraocular Movements: Extraocular movements intact.      Conjunctiva/sclera: Conjunctivae normal.      Pupils: Pupils are equal, round, and reactive to light.   Cardiovascular:      Rate and Rhythm: Normal rate and regular rhythm.      Pulses: Normal pulses.      Heart sounds: Normal heart sounds.   Pulmonary:      Effort: Pulmonary effort is normal. No respiratory distress.      Breath sounds: Normal breath sounds. No wheezing or rales.   Abdominal:      General: Bowel " sounds are normal. There is no distension.      Palpations: Abdomen is soft.      Tenderness: There is no abdominal tenderness.   Musculoskeletal:         General: No swelling. Normal range of motion.      Cervical back: Normal range of motion and neck supple.   Skin:     General: Skin is warm.      Coloration: Skin is not jaundiced.      Findings: No erythema or rash.   Neurological:      General: No focal deficit present.      Mental Status: She is alert and oriented to person, place, and time. Mental status is at baseline.      Gait: Gait normal.   Psychiatric:         Mood and Affect: Mood normal.         Behavior: Behavior normal.         Thought Content: Thought content normal.         Judgment: Judgment normal.           Labs:   Recent Results (from the past 48 hour(s))   CBC Oncology    Collection Time: 08/07/23  7:17 AM   Result Value Ref Range    WBC 2.48 (L) 3.90 - 12.70 K/uL    RBC 4.45 4.00 - 5.40 M/uL    Hemoglobin 13.5 12.0 - 16.0 g/dL    Hematocrit 41.6 37.0 - 48.5 %    MCV 94 82 - 98 fL    MCH 30.3 27.0 - 31.0 pg    MCHC 32.5 32.0 - 36.0 g/dL    RDW 11.9 11.5 - 14.5 %    Platelets 212 150 - 450 K/uL    MPV 9.7 9.2 - 12.9 fL    Gran # (ANC) 1.6 (L) 1.8 - 7.7 K/uL    Immature Grans (Abs) 0.01 0.00 - 0.04 K/uL   Comprehensive Metabolic Panel    Collection Time: 08/07/23  7:17 AM   Result Value Ref Range    Sodium 136 136 - 145 mmol/L    Potassium 3.8 3.5 - 5.1 mmol/L    Chloride 105 95 - 110 mmol/L    CO2 22 (L) 23 - 29 mmol/L    Glucose 125 (H) 70 - 110 mg/dL    BUN 7 6 - 20 mg/dL    Creatinine 0.9 0.5 - 1.4 mg/dL    Calcium 8.6 (L) 8.7 - 10.5 mg/dL    Total Protein 6.4 6.0 - 8.4 g/dL    Albumin 3.1 (L) 3.5 - 5.2 g/dL    Total Bilirubin 0.6 0.1 - 1.0 mg/dL    Alkaline Phosphatase 70 55 - 135 U/L    AST 46 (H) 10 - 40 U/L    ALT 50 (H) 10 - 44 U/L    eGFR >60.0 >60 mL/min/1.73 m^2    Anion Gap 9 8 - 16 mmol/L   AFP Tumor Marker    Collection Time: 08/07/23  7:17 AM   Result Value Ref Range    AFP 8.5  (H) 0.0 - 8.4 ng/mL   TSH    Collection Time: 08/07/23  7:17 AM   Result Value Ref Range    TSH 1.384 0.400 - 4.000 uIU/mL        I have reviewed the pertinent labs from 7/7/23 which are notable for mild neutropenia, mildly elevated LFTs, AFP 8.5 from 8.9.    Imaging:       I have personally reviewed the 6/21/23 CT chest which is notable for slightly enlarging RLL metastatic nodule and stable RUL subcentimeter nodule.    Path:   6/28/21: partial hepatectomy:    Final Pathologic Diagnosis 1.  Gallbladder (cholecystectomy):   - Benign gallbladder with cholecystitis   2. Right lobe (partial hepatectomy):   - Positive for malignancy, see synoptic report below   - Separate hemangioma, 7.3 cm   3. Lymph nodes, portal (regional resection):   - 8 lymph nodes, negative for tumor (0/8)   ______________________________________________________________________________   ______   Hepatocellular carcinoma synoptic report   - Procedure:  Partial hepatectomy, right lobe   - Histologic type:  Hepatocellular carcinoma, scirrhous   - Histologic grade:  Moderately differentiated, grade 2   - Tumor focality:  Solitary   - Tumor characteristics:   - Tumor site:  Right lobe   - Tumor size:  3.3 cm   - Treatment effect:  No known pre-surgical therapy   - Satellitosis:  Not identified   - Tumor extent:  Confined to liver   - Vascular invasion:  Present, Small vessel   - Perineural invasion:  Present   - Margins:   - All margins are negative for invasive carcinoma   - Closest margin to invasive carcinoma:  Parenchymal   - Distance from invasive  carcinoma to closest margin:  5 mm   - Regional lymph nodes:   - Number of lymph nodes with tumor:  0   - Number of lymph nodes examined:  8   - Pathology: pT2 N0 MX   - Additional findings:   - No steatosis   - Trichrome stain:  Periportal fibrosis with early septa, stage 1-2   - Iron stain:  Negative   - Iron and trichrome stains with appropriate controls   Tumor blocks:  2A, 2B, 2 C    Comment:  Interp By Madeline Carlos MD, Signed on 07/08/2021 at 16:47           Assessment:       1. HCC (hepatocellular carcinoma)    2. Malignant neoplasm metastatic to right lung    3. Bone lesion    4. Diarrhea, unspecified type    5. Abdominal gas pain    6. Liver hemangioma    7. Neutropenia, unspecified type                Plan:             # HCC  Scirrhous subtype, which is very uncommon (~ 1% of all HCC); prognosis is likely similar to typical HCC.  No clear underlying liver pathology in this patient.  Had margin negative resection with negative lymph node eval on 6/28/21 with Dr. Howell.  Unfortunately she has biopsy proven recurrent metastatic disease to her RLL s/p IR biopsy 4/10/23.   She was discussed at thoracic tumor board with potential plan for surgical resection with Dr. Tadeo.  She had a concerning bone lesion on PET from 4/26 at [facility].  This was biopsied and proven to be benign.  In the interim she had a repeat abdominal MRI on 6/13/23 which demonstrated growth of a portacaval lymph node that was very concerning for metastatic disease when we reviewed her imaging at liver conference.  Meanwhile her RLL met has grown slightly on 6/21/23 CT as well.  I discussed her case with Valerie Dhillon and Shwetha and we were all in agreement with proceeding with systemic therapy rather than surgery or radiation given multifocal progression.    I discussed this with her and she is agreeable with starting systemic therapy.  Reviewed possibilities of atezo + magaly or durva + treme.  She wished to proceed with STRIDE regimen: durvalumab + tremelimumab.    Received cycle 1 on 7/11/23.  Presents today prior to cycle 2.  Labs acceptable to proceed.  Orders signed.    I do not believe that her mild diarrhea and gassiness are clearly attributable to immunotherapy.  She has improved symptoms on Prilosec, which she will continue.  Monitor for any worsening.  Discussed use of Imodium and indications for potential endoscopic evaluation  and/or steroids.    Will plan to bring her back in 4 weeks for cycle 3.  Repeat imaging prior to cycle 4.    # Neutropenia  Mild.  Has experienced in past.  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    # Liver hemangiomas  Continue monitoring as indicated with Dr. Rogers.    Follow up: 4 weeks.    The above information has been reviewed with the patient and all questions have been answered to their apparent satisfaction.  They understand that they can call the clinic with any questions.    Philippe Carrasco MD  Hematology/Oncology  [facility]    Route Chart for Scheduling    Med Onc Chart Routing      Follow up with physician 4 weeks and 2 months. for durvalumab; wants October infusion on 10/6   Follow up with MELA    Infusion scheduling note    Injection scheduling note    Labs CBC, CMP and TSH   Scheduling:  Preferred lab:  Lab interval: every 4 weeks  & AFP   Imaging    Pharmacy appointment    Other referrals              Treatment Plan Information   OP TREMELIMUMAB 300 MG (X 1) + DURVALUMAB 1500 MG Q4W   Philippe Carrasco MD   Upcoming Treatment Dates - OP TREMELIMUMAB 300 MG (X 1) + DURVALUMAB 1500 MG Q4W    9/4/2023       Chemotherapy       durvalumab (IMFINZI) 1,500 mg in sodium chloride 0.9% SolP 280 mL chemo infusion       Antiemetics       prochlorperazine injection Soln 5 mg  10/2/2023       Chemotherapy       durvalumab (IMFINZI) 1,500 mg in sodium chloride 0.9% SolP 280 mL chemo infusion       Antiemetics       prochlorperazine injection Soln 5 mg  10/30/2023       Chemotherapy       durvalumab (IMFINZI) 1,500 mg in sodium chloride 0.9% SolP 280 mL chemo infusion       Antiemetics       prochlorperazine injection Soln 5 mg  11/27/2023       Chemotherapy       durvalumab (IMFINZI) 1,500 mg in sodium chloride 0.9% SolP 280 mL chemo infusion       Antiemetics       prochlorperazine injection Soln 5 mg

## 2023-08-07 NOTE — PLAN OF CARE
1100-Shortly after initiation of infusion patient noticed IV site was wet.   IV assessed- flushed well with good blood return, no swelling or redness noted.  IV connections assessed and tightened and no further dampness noted for remainder of infusion.  with Patient tolerated treatment well. Discharged without complaints or S/S of adverse event.   Instructed to call provider for any questions or concerns.

## 2023-08-07 NOTE — PLAN OF CARE
0930-Labs , hx, and medications reviewed, patient was seen by Md prior to arrival. Assessment completed. Discussed plan of care with patient. Patient in agreement. Chair reclined and warm blanket and snack offered.

## 2023-08-09 ENCOUNTER — PATIENT MESSAGE (OUTPATIENT)
Dept: HEMATOLOGY/ONCOLOGY | Facility: CLINIC | Age: 43
End: 2023-08-09
Payer: COMMERCIAL

## 2023-08-11 ENCOUNTER — PATIENT MESSAGE (OUTPATIENT)
Dept: HEMATOLOGY/ONCOLOGY | Facility: CLINIC | Age: 43
End: 2023-08-11
Payer: COMMERCIAL

## 2023-08-11 DIAGNOSIS — C22.0 HCC (HEPATOCELLULAR CARCINOMA): Primary | ICD-10-CM

## 2023-08-15 ENCOUNTER — LAB VISIT (OUTPATIENT)
Dept: LAB | Facility: HOSPITAL | Age: 43
End: 2023-08-15
Attending: INTERNAL MEDICINE
Payer: COMMERCIAL

## 2023-08-15 ENCOUNTER — OFFICE VISIT (OUTPATIENT)
Dept: INTERNAL MEDICINE | Facility: CLINIC | Age: 43
End: 2023-08-15
Payer: COMMERCIAL

## 2023-08-15 VITALS
BODY MASS INDEX: 21.11 KG/M2 | SYSTOLIC BLOOD PRESSURE: 124 MMHG | HEART RATE: 75 BPM | WEIGHT: 150.81 LBS | HEIGHT: 71 IN | OXYGEN SATURATION: 99 % | DIASTOLIC BLOOD PRESSURE: 76 MMHG

## 2023-08-15 DIAGNOSIS — C22.0 HCC (HEPATOCELLULAR CARCINOMA): ICD-10-CM

## 2023-08-15 DIAGNOSIS — R19.7 DIARRHEA, UNSPECIFIED TYPE: ICD-10-CM

## 2023-08-15 DIAGNOSIS — R51.9 NONINTRACTABLE HEADACHE, UNSPECIFIED CHRONICITY PATTERN, UNSPECIFIED HEADACHE TYPE: Primary | ICD-10-CM

## 2023-08-15 DIAGNOSIS — C22.0 HEPATOCELLULAR CARCINOMA: ICD-10-CM

## 2023-08-15 LAB
ALBUMIN SERPL BCP-MCNC: 3.3 G/DL (ref 3.5–5.2)
ALP SERPL-CCNC: 67 U/L (ref 55–135)
ALT SERPL W/O P-5'-P-CCNC: 25 U/L (ref 10–44)
ANION GAP SERPL CALC-SCNC: 8 MMOL/L (ref 8–16)
AST SERPL-CCNC: 19 U/L (ref 10–40)
BILIRUB SERPL-MCNC: 0.7 MG/DL (ref 0.1–1)
BUN SERPL-MCNC: 5 MG/DL (ref 6–20)
CALCIUM SERPL-MCNC: 9.2 MG/DL (ref 8.7–10.5)
CHLORIDE SERPL-SCNC: 106 MMOL/L (ref 95–110)
CO2 SERPL-SCNC: 25 MMOL/L (ref 23–29)
CORTIS SERPL-MCNC: 17 UG/DL (ref 4.3–22.4)
CREAT SERPL-MCNC: 0.8 MG/DL (ref 0.5–1.4)
ERYTHROCYTE [DISTWIDTH] IN BLOOD BY AUTOMATED COUNT: 11.7 % (ref 11.5–14.5)
EST. GFR  (NO RACE VARIABLE): >60 ML/MIN/1.73 M^2
GLUCOSE SERPL-MCNC: 101 MG/DL (ref 70–110)
HCT VFR BLD AUTO: 42.6 % (ref 37–48.5)
HGB BLD-MCNC: 13.6 G/DL (ref 12–16)
IMM GRANULOCYTES # BLD AUTO: 0.02 K/UL (ref 0–0.04)
MAGNESIUM SERPL-MCNC: 1.9 MG/DL (ref 1.6–2.6)
MCH RBC QN AUTO: 30.1 PG (ref 27–31)
MCHC RBC AUTO-ENTMCNC: 31.9 G/DL (ref 32–36)
MCV RBC AUTO: 94 FL (ref 82–98)
NEUTROPHILS # BLD AUTO: 3 K/UL (ref 1.8–7.7)
PLATELET # BLD AUTO: 371 K/UL (ref 150–450)
PMV BLD AUTO: 9.3 FL (ref 9.2–12.9)
POTASSIUM SERPL-SCNC: 4 MMOL/L (ref 3.5–5.1)
PROT SERPL-MCNC: 7.1 G/DL (ref 6–8.4)
RBC # BLD AUTO: 4.52 M/UL (ref 4–5.4)
SODIUM SERPL-SCNC: 139 MMOL/L (ref 136–145)
T4 FREE SERPL-MCNC: 0.99 NG/DL (ref 0.71–1.51)
TSH SERPL DL<=0.005 MIU/L-ACNC: 0.71 UIU/ML (ref 0.4–4)
WBC # BLD AUTO: 4.85 K/UL (ref 3.9–12.7)

## 2023-08-15 PROCEDURE — 80053 COMPREHEN METABOLIC PANEL: CPT | Performed by: INTERNAL MEDICINE

## 2023-08-15 PROCEDURE — 36415 COLL VENOUS BLD VENIPUNCTURE: CPT | Performed by: INTERNAL MEDICINE

## 2023-08-15 PROCEDURE — 99214 OFFICE O/P EST MOD 30 MIN: CPT | Mod: S$GLB,,, | Performed by: INTERNAL MEDICINE

## 2023-08-15 PROCEDURE — 1160F RVW MEDS BY RX/DR IN RCRD: CPT | Mod: CPTII,S$GLB,, | Performed by: INTERNAL MEDICINE

## 2023-08-15 PROCEDURE — 3078F DIAST BP <80 MM HG: CPT | Mod: CPTII,S$GLB,, | Performed by: INTERNAL MEDICINE

## 2023-08-15 PROCEDURE — 3008F BODY MASS INDEX DOCD: CPT | Mod: CPTII,S$GLB,, | Performed by: INTERNAL MEDICINE

## 2023-08-15 PROCEDURE — 99999 PR PBB SHADOW E&M-EST. PATIENT-LVL IV: CPT | Mod: PBBFAC,,, | Performed by: INTERNAL MEDICINE

## 2023-08-15 PROCEDURE — 1159F MED LIST DOCD IN RCRD: CPT | Mod: CPTII,S$GLB,, | Performed by: INTERNAL MEDICINE

## 2023-08-15 PROCEDURE — 3078F PR MOST RECENT DIASTOLIC BLOOD PRESSURE < 80 MM HG: ICD-10-PCS | Mod: CPTII,S$GLB,, | Performed by: INTERNAL MEDICINE

## 2023-08-15 PROCEDURE — 99999 PR PBB SHADOW E&M-EST. PATIENT-LVL IV: ICD-10-PCS | Mod: PBBFAC,,, | Performed by: INTERNAL MEDICINE

## 2023-08-15 PROCEDURE — 85027 COMPLETE CBC AUTOMATED: CPT | Performed by: INTERNAL MEDICINE

## 2023-08-15 PROCEDURE — 3074F SYST BP LT 130 MM HG: CPT | Mod: CPTII,S$GLB,, | Performed by: INTERNAL MEDICINE

## 2023-08-15 PROCEDURE — 1160F PR REVIEW ALL MEDS BY PRESCRIBER/CLIN PHARMACIST DOCUMENTED: ICD-10-PCS | Mod: CPTII,S$GLB,, | Performed by: INTERNAL MEDICINE

## 2023-08-15 PROCEDURE — 82533 TOTAL CORTISOL: CPT | Performed by: INTERNAL MEDICINE

## 2023-08-15 PROCEDURE — 83735 ASSAY OF MAGNESIUM: CPT | Performed by: INTERNAL MEDICINE

## 2023-08-15 PROCEDURE — 1159F PR MEDICATION LIST DOCUMENTED IN MEDICAL RECORD: ICD-10-PCS | Mod: CPTII,S$GLB,, | Performed by: INTERNAL MEDICINE

## 2023-08-15 PROCEDURE — 82024 ASSAY OF ACTH: CPT | Performed by: INTERNAL MEDICINE

## 2023-08-15 PROCEDURE — 84439 ASSAY OF FREE THYROXINE: CPT | Performed by: INTERNAL MEDICINE

## 2023-08-15 PROCEDURE — 3074F PR MOST RECENT SYSTOLIC BLOOD PRESSURE < 130 MM HG: ICD-10-PCS | Mod: CPTII,S$GLB,, | Performed by: INTERNAL MEDICINE

## 2023-08-15 PROCEDURE — 99214 PR OFFICE/OUTPT VISIT, EST, LEVL IV, 30-39 MIN: ICD-10-PCS | Mod: S$GLB,,, | Performed by: INTERNAL MEDICINE

## 2023-08-15 PROCEDURE — 84443 ASSAY THYROID STIM HORMONE: CPT | Performed by: INTERNAL MEDICINE

## 2023-08-15 PROCEDURE — 3008F PR BODY MASS INDEX (BMI) DOCUMENTED: ICD-10-PCS | Mod: CPTII,S$GLB,, | Performed by: INTERNAL MEDICINE

## 2023-08-15 RX ORDER — TIZANIDINE 4 MG/1
4 TABLET ORAL EVERY 6 HOURS PRN
Qty: 30 TABLET | Refills: 1 | Status: SHIPPED | OUTPATIENT
Start: 2023-08-15

## 2023-08-15 NOTE — PROGRESS NOTES
"Subjective:       Patient ID: Melly Hwang is a 43 y.o. female.    Chief Complaint: Headache  This is a 43-year-old who presents today for a few concerns she is been having trouble with diarrhea at times she did recently start new treatment for her hepatocellular carcinoma and is following with her hematologist oncologist reports that they did recommend that she gets some fluids and she plans to schedule that infusion.  She was feeling a bit dehydrated when she went to the ER with diarrhea.  She reports that the symptoms did seem to improve with Prilosec but she is still getting it on occasion not sure if is related to her new treatment. 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      Headache   Pertinent negatives include no numbness.     Review of Systems   Constitutional:  Positive for fatigue.   Gastrointestinal:         Diarrhea at times on and off since her treatment    Neurological:  Positive for headaches. Negative for numbness.       Objective:    Blood pressure 124/76, pulse 75, height 5' 11" (1.803 m), weight 68.4 kg (150 lb 12.7 oz), SpO2 99 %.  Temp 98.5   Physical Exam  Constitutional:       General: She is not in acute distress.  HENT:      Head: Normocephalic.      Comments: Rhinitis congestion      Mouth/Throat:      Pharynx: Oropharynx is clear.   Eyes:      General: No scleral icterus.  Cardiovascular:      Rate and Rhythm: Normal rate and regular rhythm.      Heart sounds: Normal heart sounds. No murmur heard.     No friction rub. No gallop.   Pulmonary:      Effort: Pulmonary effort is normal. No respiratory distress.      Breath sounds: Normal breath sounds.   Abdominal:      General: Bowel sounds are normal.      Palpations: Abdomen is soft. There is no mass.      Tenderness: There is no abdominal tenderness.      Comments: Surgical scar    Musculoskeletal:      Cervical " back: Neck supple.   Skin:     Findings: No erythema.   Neurological:      Mental Status: She is alert and oriented to person, place, and time.      Cranial Nerves: No cranial nerve deficit.         Assessment:       1. Nonintractable headache, unspecified chronicity pattern, unspecified headache type    2. Hepatocellular carcinoma    3. Diarrhea, unspecified type        Plan:       Melly was seen today for headache.    Diagnoses and all orders for this visit:    Nonintractable headache, unspecified chronicity pattern, unspecified headache type  Discussed with patient multifactorial she does have some cervicogenic components and some nasal congestion as well discussed continue with Flonase more regularly she will monitor for signs of infection and she can switch her antihistamine from Zyrtec to Allegra as well.    Hepatocellular carcinoma  She is currently undergoing treatment with her oncologist encouraged to discuss her symptoms as well as she has an upcoming lab appointment and had her infusion a month ago    Diarrhea, unspecified type  She was getting intermittent with her new treatments not sure if related she did have some improvement with Prilosec for her gassiness but is getting intermittent diarrhea we discussed hydration patient reports that she has an order in for infusion for fluids from her oncologist as well I did place a GI referral for her to schedule if symptoms persist  -     Ambulatory referral/consult to Gastroenterology; Future    Cervicalgia and some cervicogenic component to the headache she can stop the Flexeril and trial of  -     tiZANidine (ZANAFLEX) 4 MG tablet; Take 1 tablet (4 mg total) by mouth every 6 (six) hours as needed (pain/spasm).    She will discuss her concerns with hematology oncology discussed

## 2023-08-18 ENCOUNTER — PATIENT MESSAGE (OUTPATIENT)
Dept: INTERNAL MEDICINE | Facility: CLINIC | Age: 43
End: 2023-08-18
Payer: COMMERCIAL

## 2023-08-18 LAB — ACTH PLAS-MCNC: 11 PG/ML (ref 0–46)

## 2023-08-21 RX ORDER — AMOXICILLIN 500 MG/1
500 TABLET, FILM COATED ORAL EVERY 12 HOURS
Qty: 20 TABLET | Refills: 0 | Status: SHIPPED | OUTPATIENT
Start: 2023-08-21 | End: 2023-09-18

## 2023-08-30 NOTE — PROGRESS NOTES
Subjective:   Patient ID:  Melly Hwang is a 43 y.o. female who presents for follow-up of No chief complaint on file.    The patient location is: home  The chief complaint leading to consultation is: chest pain, palpitations    Visit type: audiovisual    Face to Face time with patient: 15 min  30 minutes of total time spent on the encounter, which includes face to face time and non-face to face time preparing to see the patient (eg, review of tests), Obtaining and/or reviewing separately obtained history, Documenting clinical information in the electronic or other health record, Independently interpreting results (not separately reported) and communicating results to the patient/family/caregiver, or Care coordination (not separately reported).         Each patient to whom he or she provides medical services by telemedicine is:  (1) informed of the relationship between the physician and patient and the respective role of any other health care provider with respect to management of the patient; and (2) notified that he or she may decline to receive medical services by telemedicine and may withdraw from such care at any time.          PROBLEM LIST:  Metastatic hepatocellular carcinoma (TREMELIMUMAB 300 MG (X 1) + DURVALUMAB 1500 MG Q4W began 7/11/23)  Recurrent pericardial effusion s/p pericardial window 5/20    HPI 5/8/20:  Recent hospital admission 5/1 for pericardial effusion with tamponade physiology on echo.  39F with recent COVID-19 infection, prior pericardial effusion, multiple hepatic hemangiomas sent to ER after echocardiogram with findings of early tamponade (mitral inflow variation, RV diastolic collapse) with large anterior pericardial effusion. She had an MRI in March for monitoring of her hemangiomas; incidentally noted pericardial fluid vs cyst and an echo was ordered to evaluate. Deferred until today due to COVID-19. Pt works as a nurse; reported symptoms c/w COVID in mid April. Routine  screening negative 4/15, suspected false negative in setting of  classic symptoms of fever, slight cough, and nausea, decreased appetite, and loose stools. Returned to ER  with chest tightness, shortness of breath, mild nonproductive cough, anosmia. Rapid screen positive for COVID. Reports resolution of her symptoms of chest tightness and SOB about 1-2 days later. Inflammatory markers were negative. She underwent perciardiocentesis on  with removal of 520 cc of fluid. Cytology and gram stain were negative. ECG without findings of pericarditis. Of note she had a pericardial effusion noted on echocardiograms done in  and . She denies any connective disease or chronic medical conditions. After her discharge, she was seen in the ED on  for presyncope describing a warm sensation throughout her body and slight dizziness. She was given 1.5 Liters of fluid and felt better. She currently reports occasional pressure in her chest. She had recently restarted her lexapro prior to these symptoms.   Her echo today showed an LVEF of 50% and a small lateral effusion.    Interval history: Cardio-oncology follow-up  She was diagnosed with recurrent metastatic hepatocellular carcinoma in July with positive lymph nodes. She began ICI with tremelimumab and durvalumab and has received 2 cycles thus far. She reports feeling pain in her chest back in July after her second infusion that felt like a pulled muscle and went across her chest. No associated symptoms. She went to the ED where her ECG and troponins were normal. She has not had any recurrence of her symptoms in 3 weeks. She does report diarrhea after her infusion but no myalgias or difficulty swallowing. No shortness of breath. She reports two episodes of tachycardia an a warm feeling coming over her. Once occurred at work and once during her step brother's . She did not have any syncopal events.       ECG 2023 15:16:02: Personally reviewed by me  shows NSR 84 bpm QTc 408    Echo 7/23:  Summary         Left Ventricle: The left ventricle is normal in size. Normal wall thickness. Normal wall motion. There is normal systolic function with a visually estimated ejection fraction of 55 - 60%. There is normal diastolic function.    Left Atrium: Normal left atrial size.    Right Ventricle: Normal right ventricular cavity size. Right ventricle wall motion  is normal. Systolic function is normal.    Aortic Valve: The aortic valve is a trileaflet valve.    Tricuspid Valve: There is trace transvalvular regurgitation.    IVC/SVC: Normal venous pressure at 3 mmHg.    Past Medical History:   Diagnosis Date    Abdominal pain 8/12/2021    VENKATA positive 8/20/2020    COVID-19     Deviated septum 2011    Hepatocellular carcinoma 6/9/2021    Hypertrophy of inferior nasal turbinate     Liver mass     Nasal congestion 2011    Pericardial effusion     Premature menopause        Past Surgical History:   Procedure Laterality Date    COLONOSCOPY N/A 09/21/2020    Procedure: COLONOSCOPY;  Surgeon: GIOVANNI Crane MD;  Location: [facility] ENDO (4TH FLR);  Service: Endoscopy;  Laterality: N/A;  + covid 4/22    HERNIA REPAIR      LIVER SURGERY N/A 06/28/2021    NASAL SEPTUM SURGERY      PERICARDIAL WINDOW N/A 02/22/2021    Procedure: CREATION, PERICARDIAL WINDOW;  Surgeon: Ajay Cantor MD;  Location: [facility] OR [facility] FLR;  Service: Cardiovascular;  Laterality: N/A;    PERICARDIOCENTESIS N/A 05/02/2020    Procedure: Pericardiocentesis;  Surgeon: Dickson Dangelo MD;  Location: [facility] CATH LAB;  Service: Cardiology;  Laterality: N/A;    TONSILLECTOMY         Social History     Socioeconomic History    Marital status: Single    Number of children: 1   Occupational History    Occupation:      Employer: [employer]   Tobacco Use    Smoking status: Never    Smokeless tobacco: Never   Substance and Sexual Activity    Alcohol use: Yes     Comment: rare    Drug use: No     Sexual activity: Yes     Partners: Male     Birth control/protection: OCP       Family History   Problem Relation Age of Onset    Diabetes Mother     Liver disease Mother         Fatty liver    Heart disease Father     Macular degeneration Father     Diabetes Father     Hypertension Father     Hyperlipidemia Father     No Known Problems Sister     No Known Problems Brother     No Known Problems Brother     No Known Problems Daughter     No Known Problems Maternal Aunt     No Known Problems Maternal Uncle     No Known Problems Paternal Aunt     No Known Problems Paternal Uncle     No Known Problems Maternal Grandmother     No Known Problems Maternal Grandfather     No Known Problems Paternal Grandmother     No Known Problems Paternal Grandfather     Amblyopia Neg Hx     Blindness Neg Hx     Cancer Neg Hx     Cataracts Neg Hx     Glaucoma Neg Hx     Retinal detachment Neg Hx     Strabismus Neg Hx     Stroke Neg Hx     Thyroid disease Neg Hx     Melanoma Neg Hx     Heart attack Neg Hx        Patient's Medications   New Prescriptions    No medications on file   Previous Medications    ALPRAZOLAM (XANAX) 0.5 MG TABLET    Take 1 tablet (0.5 mg total) by mouth daily as needed for Anxiety.    AMOXICILLIN (AMOXIL) 500 MG TAB    Take 1 tablet (500 mg total) by mouth every 12 (twelve) hours. for 10 days    CETIRIZINE (ZYRTEC) 10 MG TABLET    Take 1 tablet (10 mg total) by mouth once daily.    FLUTICASONE PROPIONATE (FLONASE) 50 MCG/ACTUATION NASAL SPRAY    2 sprays (100 mcg total) by Each Nostril route once daily.    MULTIVITAMIN CAPSULE    Take by mouth. 1 capsule Oral Every morning    NORETHINDRONE-ETHINYL ESTRADIOL (MICROGESTIN 1/20) 1-20 MG-MCG PER TABLET    Take 1 tablet by mouth once daily.    OMEPRAZOLE (PRILOSEC) 20 MG CAPSULE    TAKE 1 CAPSULE BY MOUTH DAILY AS NEEDED FOR GERD    ONDANSETRON (ZOFRAN-ODT) 4 MG TBDL    Take 1 tablet (4 mg total) by mouth 2 (two) times daily.    TIZANIDINE (ZANAFLEX) 4 MG TABLET    Take  1 tablet (4 mg total) by mouth every 6 (six) hours as needed (pain/spasm).    TRIAMCINOLONE ACETONIDE 0.1% (KENALOG) 0.1 % CREAM    Apply topically 2 (two) times daily as needed (scaling at external ears). Avoid use on face, body folds, groin/genitalia.   Modified Medications    No medications on file   Discontinued Medications    No medications on file       Review of Systems   Constitutional: Negative for malaise/fatigue and weight gain.   HENT:  Negative for hearing loss.    Eyes:  Negative for visual disturbance.   Cardiovascular:  Positive for palpitations. Negative for chest pain, claudication, dyspnea on exertion, leg swelling, near-syncope, orthopnea, paroxysmal nocturnal dyspnea and syncope.   Respiratory:  Negative for cough, shortness of breath, sleep disturbances due to breathing, snoring and wheezing.    Endocrine: Negative for cold intolerance, heat intolerance, polydipsia, polyphagia and polyuria.   Hematologic/Lymphatic: Negative for bleeding problem. Does not bruise/bleed easily.   Skin:  Negative for rash and suspicious lesions.   Musculoskeletal:  Negative for arthritis, falls, joint pain, muscle weakness and myalgias.   Gastrointestinal:  Negative for abdominal pain, change in bowel habit, constipation, diarrhea, heartburn, hematochezia, melena and nausea.   Genitourinary:  Negative for hematuria and nocturia.   Neurological:  Negative for excessive daytime sleepiness, dizziness, headaches, light-headedness, loss of balance and weakness.   Psychiatric/Behavioral:  Negative for depression. The patient is not nervous/anxious.    Allergic/Immunologic: Negative for environmental allergies.       LMP  (LMP Unknown)     Objective:       Lab Results   Component Value Date     08/15/2023    K 4.0 08/15/2023     08/15/2023    CO2 25 08/15/2023    BUN 5 (L) 08/15/2023    CREATININE 0.8 08/15/2023     08/15/2023    HGBA1C 5.2 03/22/2022    MG 1.9 08/15/2023    AST 19 08/15/2023    ALT 25  08/15/2023    ALBUMIN 3.3 (L) 08/15/2023    PROT 7.1 08/15/2023    BILITOT 0.7 08/15/2023    WBC 4.85 08/15/2023    HGB 13.6 08/15/2023    HCT 42.6 08/15/2023    HCT 35 (L) 06/28/2021    MCV 94 08/15/2023     08/15/2023    INR 1.1 05/27/2023    TSH 0.708 08/15/2023    CHOL 218 (H) 03/18/2021    HDL 67 03/18/2021    LDLCALC 136.4 03/18/2021    TRIG 73 03/18/2021    BNP 11 05/01/2020       Assessment:     1. Metastatic hepatocellular carcinoma: Currently on dual ICI therapy with TREMELIMUMAB + DURVALUMAB.  It is recommended to check an ECG and troponin level with each cycle of ICI therapy for the first 4 cycles, and then if stable, every 3 cycles until the completion of therapy. ECG and BNP levels are recommend every 6-12 months in high risk patients who require therapy > 12 months per the ECS/ICOS 2022 Cardio-Oncology Guidelines.   2. Palpitations :  She had two self limited events. If symptoms recur, we discussed getting a 30 day event monitor for further evaluation. ECG in ED was normal.   3.      Chest pain: Resolved. This did not sound like classic pericarditis or angina and was self limited. No evidence of myocarditis in July with negative troponin, normal echo and ECG. Recommend checking a troponin level next week after her infusion and then ongoing monitoring as outlined in #1.    Plan:     Diagnoses and all orders for this visit:    Hepatocellular carcinoma    S/P pericardial window creation        Thank you for allowing me to participate in this patient's care. Please do not hesitate to contact me with any questions or concerns.

## 2023-09-01 ENCOUNTER — OFFICE VISIT (OUTPATIENT)
Dept: CARDIOLOGY | Facility: CLINIC | Age: 43
End: 2023-09-01
Payer: COMMERCIAL

## 2023-09-01 ENCOUNTER — PATIENT MESSAGE (OUTPATIENT)
Dept: CARDIOLOGY | Facility: CLINIC | Age: 43
End: 2023-09-01

## 2023-09-01 DIAGNOSIS — Z98.890 S/P PERICARDIAL WINDOW CREATION: ICD-10-CM

## 2023-09-01 DIAGNOSIS — C22.0 HEPATOCELLULAR CARCINOMA: Primary | ICD-10-CM

## 2023-09-01 DIAGNOSIS — R07.9 CHEST PAIN, UNSPECIFIED TYPE: ICD-10-CM

## 2023-09-01 DIAGNOSIS — R00.2 PALPITATIONS: ICD-10-CM

## 2023-09-01 PROCEDURE — 1160F RVW MEDS BY RX/DR IN RCRD: CPT | Mod: CPTII,95,, | Performed by: INTERNAL MEDICINE

## 2023-09-01 PROCEDURE — 1160F PR REVIEW ALL MEDS BY PRESCRIBER/CLIN PHARMACIST DOCUMENTED: ICD-10-PCS | Mod: CPTII,95,, | Performed by: INTERNAL MEDICINE

## 2023-09-01 PROCEDURE — 99214 PR OFFICE/OUTPT VISIT, EST, LEVL IV, 30-39 MIN: ICD-10-PCS | Mod: 95,,, | Performed by: INTERNAL MEDICINE

## 2023-09-01 PROCEDURE — 1159F MED LIST DOCD IN RCRD: CPT | Mod: CPTII,95,, | Performed by: INTERNAL MEDICINE

## 2023-09-01 PROCEDURE — 99214 OFFICE O/P EST MOD 30 MIN: CPT | Mod: 95,,, | Performed by: INTERNAL MEDICINE

## 2023-09-01 PROCEDURE — 1159F PR MEDICATION LIST DOCUMENTED IN MEDICAL RECORD: ICD-10-PCS | Mod: CPTII,95,, | Performed by: INTERNAL MEDICINE

## 2023-09-02 ENCOUNTER — PATIENT MESSAGE (OUTPATIENT)
Dept: CARDIOLOGY | Facility: CLINIC | Age: 43
End: 2023-09-02
Payer: COMMERCIAL

## 2023-09-04 ENCOUNTER — PATIENT MESSAGE (OUTPATIENT)
Dept: HEMATOLOGY/ONCOLOGY | Facility: CLINIC | Age: 43
End: 2023-09-04
Payer: COMMERCIAL

## 2023-09-05 ENCOUNTER — PATIENT MESSAGE (OUTPATIENT)
Dept: CARDIOLOGY | Facility: CLINIC | Age: 43
End: 2023-09-05
Payer: COMMERCIAL

## 2023-09-08 ENCOUNTER — INFUSION (OUTPATIENT)
Dept: INFUSION THERAPY | Facility: HOSPITAL | Age: 43
End: 2023-09-08
Payer: COMMERCIAL

## 2023-09-08 ENCOUNTER — PATIENT MESSAGE (OUTPATIENT)
Dept: HEMATOLOGY/ONCOLOGY | Facility: CLINIC | Age: 43
End: 2023-09-08

## 2023-09-08 ENCOUNTER — OFFICE VISIT (OUTPATIENT)
Dept: HEMATOLOGY/ONCOLOGY | Facility: CLINIC | Age: 43
End: 2023-09-08
Payer: COMMERCIAL

## 2023-09-08 ENCOUNTER — LAB VISIT (OUTPATIENT)
Dept: LAB | Facility: HOSPITAL | Age: 43
End: 2023-09-08
Attending: INTERNAL MEDICINE
Payer: COMMERCIAL

## 2023-09-08 VITALS
WEIGHT: 151.69 LBS | TEMPERATURE: 98 F | SYSTOLIC BLOOD PRESSURE: 135 MMHG | HEIGHT: 71 IN | RESPIRATION RATE: 18 BRPM | DIASTOLIC BLOOD PRESSURE: 74 MMHG | HEART RATE: 55 BPM | BODY MASS INDEX: 21.24 KG/M2

## 2023-09-08 VITALS
SYSTOLIC BLOOD PRESSURE: 144 MMHG | RESPIRATION RATE: 16 BRPM | BODY MASS INDEX: 21.24 KG/M2 | OXYGEN SATURATION: 100 % | DIASTOLIC BLOOD PRESSURE: 95 MMHG | HEIGHT: 71 IN | TEMPERATURE: 98 F | WEIGHT: 151.69 LBS | HEART RATE: 62 BPM

## 2023-09-08 DIAGNOSIS — R19.7 DIARRHEA, UNSPECIFIED TYPE: ICD-10-CM

## 2023-09-08 DIAGNOSIS — D18.03 LIVER HEMANGIOMA: ICD-10-CM

## 2023-09-08 DIAGNOSIS — R14.1 ABDOMINAL GAS PAIN: ICD-10-CM

## 2023-09-08 DIAGNOSIS — C22.0 HCC (HEPATOCELLULAR CARCINOMA): ICD-10-CM

## 2023-09-08 DIAGNOSIS — C78.01 MALIGNANT NEOPLASM METASTATIC TO RIGHT LUNG: ICD-10-CM

## 2023-09-08 DIAGNOSIS — M89.9 BONE LESION: ICD-10-CM

## 2023-09-08 DIAGNOSIS — C22.0 HCC (HEPATOCELLULAR CARCINOMA): Primary | ICD-10-CM

## 2023-09-08 DIAGNOSIS — C22.0 HEPATOCELLULAR CARCINOMA: Primary | ICD-10-CM

## 2023-09-08 LAB
AFP SERPL-MCNC: 9.8 NG/ML (ref 0–8.4)
ALBUMIN SERPL BCP-MCNC: 3.5 G/DL (ref 3.5–5.2)
ALP SERPL-CCNC: 39 U/L (ref 55–135)
ALT SERPL W/O P-5'-P-CCNC: 12 U/L (ref 10–44)
ANION GAP SERPL CALC-SCNC: 9 MMOL/L (ref 8–16)
AST SERPL-CCNC: 14 U/L (ref 10–40)
BILIRUB SERPL-MCNC: 0.8 MG/DL (ref 0.1–1)
BUN SERPL-MCNC: 9 MG/DL (ref 6–20)
CALCIUM SERPL-MCNC: 8.9 MG/DL (ref 8.7–10.5)
CHLORIDE SERPL-SCNC: 108 MMOL/L (ref 95–110)
CO2 SERPL-SCNC: 22 MMOL/L (ref 23–29)
CREAT SERPL-MCNC: 0.8 MG/DL (ref 0.5–1.4)
ERYTHROCYTE [DISTWIDTH] IN BLOOD BY AUTOMATED COUNT: 12.1 % (ref 11.5–14.5)
EST. GFR  (NO RACE VARIABLE): >60 ML/MIN/1.73 M^2
GLUCOSE SERPL-MCNC: 109 MG/DL (ref 70–110)
HCT VFR BLD AUTO: 41.3 % (ref 37–48.5)
HGB BLD-MCNC: 13.1 G/DL (ref 12–16)
IMM GRANULOCYTES # BLD AUTO: 0 K/UL (ref 0–0.04)
MCH RBC QN AUTO: 30.2 PG (ref 27–31)
MCHC RBC AUTO-ENTMCNC: 31.7 G/DL (ref 32–36)
MCV RBC AUTO: 95 FL (ref 82–98)
NEUTROPHILS # BLD AUTO: 2.4 K/UL (ref 1.8–7.7)
PLATELET # BLD AUTO: 240 K/UL (ref 150–450)
PMV BLD AUTO: 9.7 FL (ref 9.2–12.9)
POTASSIUM SERPL-SCNC: 4.2 MMOL/L (ref 3.5–5.1)
PROT SERPL-MCNC: 6.8 G/DL (ref 6–8.4)
RBC # BLD AUTO: 4.34 M/UL (ref 4–5.4)
SODIUM SERPL-SCNC: 139 MMOL/L (ref 136–145)
TSH SERPL DL<=0.005 MIU/L-ACNC: 0.89 UIU/ML (ref 0.4–4)
WBC # BLD AUTO: 4.16 K/UL (ref 3.9–12.7)

## 2023-09-08 PROCEDURE — 63600175 PHARM REV CODE 636 W HCPCS: Mod: JZ,JG | Performed by: INTERNAL MEDICINE

## 2023-09-08 PROCEDURE — 99215 PR OFFICE/OUTPT VISIT, EST, LEVL V, 40-54 MIN: ICD-10-PCS | Mod: S$GLB,,, | Performed by: INTERNAL MEDICINE

## 2023-09-08 PROCEDURE — 3080F DIAST BP >= 90 MM HG: CPT | Mod: CPTII,S$GLB,, | Performed by: INTERNAL MEDICINE

## 2023-09-08 PROCEDURE — 99999 PR PBB SHADOW E&M-EST. PATIENT-LVL III: CPT | Mod: PBBFAC,,, | Performed by: INTERNAL MEDICINE

## 2023-09-08 PROCEDURE — 82105 ALPHA-FETOPROTEIN SERUM: CPT | Performed by: INTERNAL MEDICINE

## 2023-09-08 PROCEDURE — 85027 COMPLETE CBC AUTOMATED: CPT | Performed by: INTERNAL MEDICINE

## 2023-09-08 PROCEDURE — 96360 HYDRATION IV INFUSION INIT: CPT

## 2023-09-08 PROCEDURE — 3080F PR MOST RECENT DIASTOLIC BLOOD PRESSURE >= 90 MM HG: ICD-10-PCS | Mod: CPTII,S$GLB,, | Performed by: INTERNAL MEDICINE

## 2023-09-08 PROCEDURE — 3008F PR BODY MASS INDEX (BMI) DOCUMENTED: ICD-10-PCS | Mod: CPTII,S$GLB,, | Performed by: INTERNAL MEDICINE

## 2023-09-08 PROCEDURE — 25000003 PHARM REV CODE 250: Performed by: INTERNAL MEDICINE

## 2023-09-08 PROCEDURE — 80053 COMPREHEN METABOLIC PANEL: CPT | Performed by: INTERNAL MEDICINE

## 2023-09-08 PROCEDURE — 36415 COLL VENOUS BLD VENIPUNCTURE: CPT | Performed by: INTERNAL MEDICINE

## 2023-09-08 PROCEDURE — 99215 OFFICE O/P EST HI 40 MIN: CPT | Mod: S$GLB,,, | Performed by: INTERNAL MEDICINE

## 2023-09-08 PROCEDURE — 96413 CHEMO IV INFUSION 1 HR: CPT

## 2023-09-08 PROCEDURE — 84443 ASSAY THYROID STIM HORMONE: CPT | Performed by: INTERNAL MEDICINE

## 2023-09-08 PROCEDURE — 3077F PR MOST RECENT SYSTOLIC BLOOD PRESSURE >= 140 MM HG: ICD-10-PCS | Mod: CPTII,S$GLB,, | Performed by: INTERNAL MEDICINE

## 2023-09-08 PROCEDURE — 3008F BODY MASS INDEX DOCD: CPT | Mod: CPTII,S$GLB,, | Performed by: INTERNAL MEDICINE

## 2023-09-08 PROCEDURE — 99999 PR PBB SHADOW E&M-EST. PATIENT-LVL III: ICD-10-PCS | Mod: PBBFAC,,, | Performed by: INTERNAL MEDICINE

## 2023-09-08 PROCEDURE — 3077F SYST BP >= 140 MM HG: CPT | Mod: CPTII,S$GLB,, | Performed by: INTERNAL MEDICINE

## 2023-09-08 RX ORDER — SODIUM CHLORIDE 0.9 % (FLUSH) 0.9 %
10 SYRINGE (ML) INJECTION
Status: CANCELLED | OUTPATIENT
Start: 2023-09-08

## 2023-09-08 RX ORDER — HEPARIN 100 UNIT/ML
500 SYRINGE INTRAVENOUS
Status: CANCELLED | OUTPATIENT
Start: 2023-09-08

## 2023-09-08 RX ORDER — HEPARIN 100 UNIT/ML
500 SYRINGE INTRAVENOUS
Status: DISCONTINUED | OUTPATIENT
Start: 2023-09-08 | End: 2023-09-08 | Stop reason: HOSPADM

## 2023-09-08 RX ORDER — DIPHENHYDRAMINE HYDROCHLORIDE 50 MG/ML
50 INJECTION INTRAMUSCULAR; INTRAVENOUS ONCE AS NEEDED
Status: CANCELLED | OUTPATIENT
Start: 2023-09-08

## 2023-09-08 RX ORDER — PROCHLORPERAZINE EDISYLATE 5 MG/ML
5 INJECTION INTRAMUSCULAR; INTRAVENOUS ONCE AS NEEDED
Status: DISCONTINUED | OUTPATIENT
Start: 2023-09-08 | End: 2023-09-08 | Stop reason: HOSPADM

## 2023-09-08 RX ORDER — PROCHLORPERAZINE EDISYLATE 5 MG/ML
5 INJECTION INTRAMUSCULAR; INTRAVENOUS ONCE AS NEEDED
Status: CANCELLED
Start: 2023-09-08

## 2023-09-08 RX ORDER — EPINEPHRINE 0.3 MG/.3ML
0.3 INJECTION SUBCUTANEOUS ONCE AS NEEDED
Status: CANCELLED | OUTPATIENT
Start: 2023-09-08

## 2023-09-08 RX ORDER — EPINEPHRINE 0.3 MG/.3ML
0.3 INJECTION SUBCUTANEOUS ONCE AS NEEDED
Status: DISCONTINUED | OUTPATIENT
Start: 2023-09-08 | End: 2023-09-08 | Stop reason: HOSPADM

## 2023-09-08 RX ORDER — SODIUM CHLORIDE 0.9 % (FLUSH) 0.9 %
10 SYRINGE (ML) INJECTION
Status: DISCONTINUED | OUTPATIENT
Start: 2023-09-08 | End: 2023-09-08 | Stop reason: HOSPADM

## 2023-09-08 RX ORDER — DIPHENHYDRAMINE HYDROCHLORIDE 50 MG/ML
50 INJECTION INTRAMUSCULAR; INTRAVENOUS ONCE AS NEEDED
Status: DISCONTINUED | OUTPATIENT
Start: 2023-09-08 | End: 2023-09-08 | Stop reason: HOSPADM

## 2023-09-08 RX ADMIN — SODIUM CHLORIDE: 9 INJECTION, SOLUTION INTRAVENOUS at 09:09

## 2023-09-08 RX ADMIN — SODIUM CHLORIDE 1500 MG: 9 INJECTION, SOLUTION INTRAVENOUS at 09:09

## 2023-09-08 RX ADMIN — SODIUM CHLORIDE 1000 ML: 9 INJECTION, SOLUTION INTRAVENOUS at 09:09

## 2023-09-08 NOTE — PROGRESS NOTES
MEDICAL ONCOLOGY - ESTABLISHED PATIENT VISIT    Reason for visit: Scirrhous HCC    Best Contact Phone Number(s): [phone] (home)      Cancer/Stage/TNM:    Cancer Staging   Hepatocellular carcinoma  Staging form: Liver, AJCC 8th Edition  - Clinical stage from 4/10/2023: Stage IVB (rcT1b, cN0, pM1) - Signed by Philippe Carrasco MD on 8/7/2023       Oncology History   Hepatocellular carcinoma   6/9/2021 Initial Diagnosis    Hepatocellular carcinoma     4/10/2023 Cancer Staged    Staging form: Liver, AJCC 8th Edition  - Clinical stage from 4/10/2023: Stage IVB (rcT1b, cN0, pM1)     7/11/2023 -  Chemotherapy    Treatment Summary   Plan Name: OP TREMELIMUMAB 300 MG (X 1) + DURVALUMAB 1500 MG Q4W  Treatment Goal: Control  Status: Active  Start Date: 7/11/2023  End Date: 6/10/2024 (Planned)  Provider: Philippe Carrasco MD  Chemotherapy: [No matching medication found in this treatment plan]        Interim History:   43 y.o. 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 Howell with the same pathology, negative margins, 0 of 8 lymph nodes positive and + for vascular and perineural invasion.  I saw her in 2021 for evaluation for persistent CA 19-9 but there was no radiographic evidence of HCC disease recurrence or alternative reason for CA 19-9 elevation.  CT chest on 3/9/23 showed an enlarging RLL nodule measuring 1.1 cm, increased from 0.8 cm in size.  IR biopsy of this on 4/10/23 confirmed metastatic HCC.  Since then in mid-June she has also had an MRI abdomen that showed an enlarging portocaval lymph node that is consistent with metastatic disease.    She presents today prior to cycle 3 of durvalumab + tremelimumab as part of the STRiDE regimen.  She states she had loose stools again after her last infusion - lasted about one week.  Worst was about 4 episodes per day.  Has some discomfort in her ankles associated with some edema that improved with elevation of her  "legs.  The edema has continued but the pain has resolved.  She states she was felt "gassy" with prior infusion so she is pre-emptively taking Prilosec when she is getting closer to her infusion.    Presents alone today.  ECOG PS 0.    ROS:   Review of Systems   Constitutional:  Negative for chills, fever, malaise/fatigue and weight loss.   HENT:  Negative for sore throat.    Eyes:  Negative for blurred vision and pain.   Respiratory:  Negative for cough and shortness of breath.    Cardiovascular:  Negative for chest pain and leg swelling.   Gastrointestinal:  Negative for abdominal pain, constipation, diarrhea, nausea and vomiting.   Genitourinary:  Negative for dysuria and frequency.   Musculoskeletal:  Negative for back pain, falls and myalgias.   Skin:  Negative for rash.   Neurological:  Negative for dizziness, weakness and headaches.   Endo/Heme/Allergies:  Does not bruise/bleed easily.   Psychiatric/Behavioral:  Negative for depression. The patient is not nervous/anxious.    All other systems reviewed and are negative.      Past Medical History:   Past Medical History:   Diagnosis Date    Abdominal pain 8/12/2021    VENKATA positive 8/20/2020    COVID-19     Deviated septum 2011    Hepatocellular carcinoma 6/9/2021    Hypertrophy of inferior nasal turbinate     Liver mass     Nasal congestion 2011    Pericardial effusion     Premature menopause         Past Surgical History:   Past Surgical History:   Procedure Laterality Date    COLONOSCOPY N/A 09/21/2020    Procedure: COLONOSCOPY;  Surgeon: GIOVANNI Crane MD;  Location: [facility] ([facility]);  Service: Endoscopy;  Laterality: N/A;  + covid 4/22    HERNIA REPAIR      LIVER SURGERY N/A 06/28/2021    NASAL SEPTUM SURGERY      PERICARDIAL WINDOW N/A 02/22/2021    Procedure: CREATION, PERICARDIAL WINDOW;  Surgeon: Ajay Cantor MD;  Location: [address];  Service: Cardiovascular;  Laterality: N/A;    PERICARDIOCENTESIS N/A 05/02/2020    Procedure: " Pericardiocentesis;  Surgeon: Dickson Dangelo MD;  Location: [facility] CATH LAB;  Service: Cardiology;  Laterality: N/A;    TONSILLECTOMY          Family History:   Family History   Problem Relation Age of Onset    Diabetes Mother     Liver disease Mother         Fatty liver    Heart disease Father     Macular degeneration Father     Diabetes Father     Hypertension Father     Hyperlipidemia Father     No Known Problems Sister     No Known Problems Brother     No Known Problems Brother     No Known Problems Daughter     No Known Problems Maternal Aunt     No Known Problems Maternal Uncle     No Known Problems Paternal Aunt     No Known Problems Paternal Uncle     No Known Problems Maternal Grandmother     No Known Problems Maternal Grandfather     No Known Problems Paternal Grandmother     No Known Problems Paternal Grandfather     Amblyopia Neg Hx     Blindness Neg Hx     Cancer Neg Hx     Cataracts Neg Hx     Glaucoma Neg Hx     Retinal detachment Neg Hx     Strabismus Neg Hx     Stroke Neg Hx     Thyroid disease Neg Hx     Melanoma Neg Hx     Heart attack Neg Hx         Social History:   Social History     Tobacco Use    Smoking status: Never    Smokeless tobacco: Never   Substance Use Topics    Alcohol use: Yes     Comment: rare        I have reviewed and updated the patient's past medical, surgical, family and social histories.    Allergies:   Review of patient's allergies indicates:   Allergen Reactions    No known drug allergies         Medications:   Current Outpatient Medications   Medication Sig Dispense Refill    ALPRAZolam (XANAX) 0.5 MG tablet Take 1 tablet (0.5 mg total) by mouth daily as needed for Anxiety. 30 tablet 2    cetirizine (ZYRTEC) 10 MG tablet Take 1 tablet (10 mg total) by mouth once daily. 90 tablet 0    fluticasone propionate (FLONASE) 50 mcg/actuation nasal spray 2 sprays (100 mcg total) by Each Nostril route once daily. 18.2 mL 6    multivitamin capsule Take by mouth. 1 capsule Oral  "Every morning      norethindrone-ethinyl estradiol (MICROGESTIN 1/20) 1-20 mg-mcg per tablet Take 1 tablet by mouth once daily. 63 tablet 4    omeprazole (PRILOSEC) 20 MG capsule TAKE 1 CAPSULE BY MOUTH DAILY AS NEEDED FOR GERD 90 capsule 1    ondansetron (ZOFRAN-ODT) 4 MG TbDL Take 1 tablet (4 mg total) by mouth 2 (two) times daily. 2 tablet 0    tiZANidine (ZANAFLEX) 4 MG tablet Take 1 tablet (4 mg total) by mouth every 6 (six) hours as needed (pain/spasm). 30 tablet 1    triamcinolone acetonide 0.1% (KENALOG) 0.1 % cream Apply topically 2 (two) times daily as needed (scaling at external ears). Avoid use on face, body folds, groin/genitalia. 45 g 3     No current facility-administered medications for this visit.        Physical Exam:   BP (!) 144/95 (BP Location: Left arm, Patient Position: Sitting, BP Method: Medium (Automatic))   Pulse 62   Temp 98.1 °F (36.7 °C) (Oral)   Resp 16   Ht 5' 11" (1.803 m)   Wt 68.8 kg (151 lb 10.8 oz)   LMP  (LMP Unknown)   SpO2 100%   BMI 21.15 kg/m²      ECOG Performance status: 0            Physical Exam  Vitals reviewed.   Constitutional:       General: She is not in acute distress.     Appearance: Normal appearance. She is normal weight. She is not ill-appearing.   HENT:      Head: Normocephalic and atraumatic.      Right Ear: External ear normal.      Left Ear: External ear normal.      Nose: Nose normal.      Mouth/Throat:      Mouth: Mucous membranes are moist.      Pharynx: Oropharynx is clear. No posterior oropharyngeal erythema.   Eyes:      General: No scleral icterus.     Extraocular Movements: Extraocular movements intact.      Conjunctiva/sclera: Conjunctivae normal.      Pupils: Pupils are equal, round, and reactive to light.   Cardiovascular:      Rate and Rhythm: Normal rate and regular rhythm.      Pulses: Normal pulses.      Heart sounds: Normal heart sounds.   Pulmonary:      Effort: Pulmonary effort is normal. No respiratory distress.      Breath " sounds: Normal breath sounds. No wheezing or rales.   Abdominal:      General: Bowel sounds are normal. There is no distension.      Palpations: Abdomen is soft.      Tenderness: There is no abdominal tenderness.   Musculoskeletal:         General: No swelling. Normal range of motion.      Cervical back: Normal range of motion and neck supple.   Skin:     General: Skin is warm.      Coloration: Skin is not jaundiced.      Findings: No erythema or rash.   Neurological:      General: No focal deficit present.      Mental Status: She is alert and oriented to person, place, and time. Mental status is at baseline.      Gait: Gait normal.   Psychiatric:         Mood and Affect: Mood normal.         Behavior: Behavior normal.         Thought Content: Thought content normal.         Judgment: Judgment normal.           Labs:   Recent Results (from the past 48 hour(s))   CBC Oncology    Collection Time: 09/08/23  7:23 AM   Result Value Ref Range    WBC 4.16 3.90 - 12.70 K/uL    RBC 4.34 4.00 - 5.40 M/uL    Hemoglobin 13.1 12.0 - 16.0 g/dL    Hematocrit 41.3 37.0 - 48.5 %    MCV 95 82 - 98 fL    MCH 30.2 27.0 - 31.0 pg    MCHC 31.7 (L) 32.0 - 36.0 g/dL    RDW 12.1 11.5 - 14.5 %    Platelets 240 150 - 450 K/uL    MPV 9.7 9.2 - 12.9 fL    Gran # (ANC) 2.4 1.8 - 7.7 K/uL    Immature Grans (Abs) 0.00 0.00 - 0.04 K/uL   Comprehensive Metabolic Panel    Collection Time: 09/08/23  7:23 AM   Result Value Ref Range    Sodium 139 136 - 145 mmol/L    Potassium 4.2 3.5 - 5.1 mmol/L    Chloride 108 95 - 110 mmol/L    CO2 22 (L) 23 - 29 mmol/L    Glucose 109 70 - 110 mg/dL    BUN 9 6 - 20 mg/dL    Creatinine 0.8 0.5 - 1.4 mg/dL    Calcium 8.9 8.7 - 10.5 mg/dL    Total Protein 6.8 6.0 - 8.4 g/dL    Albumin 3.5 3.5 - 5.2 g/dL    Total Bilirubin 0.8 0.1 - 1.0 mg/dL    Alkaline Phosphatase 39 (L) 55 - 135 U/L    AST 14 10 - 40 U/L    ALT 12 10 - 44 U/L    eGFR >60.0 >60 mL/min/1.73 m^2    Anion Gap 9 8 - 16 mmol/L   TSH    Collection Time:  09/08/23  7:23 AM   Result Value Ref Range    TSH 0.889 0.400 - 4.000 uIU/mL        I have reviewed the pertinent labs from 9/8/23 which are notable for normal blood counts, CMP normal.    Imaging:       I have personally reviewed the 6/21/23 CT chest which is notable for slightly enlarging RLL metastatic nodule and stable RUL subcentimeter nodule.    Path:   6/28/21: partial hepatectomy:    Final Pathologic Diagnosis 1.  Gallbladder (cholecystectomy):   - Benign gallbladder with cholecystitis   2. Right lobe (partial hepatectomy):   - Positive for malignancy, see synoptic report below   - Separate hemangioma, 7.3 cm   3. Lymph nodes, portal (regional resection):   - 8 lymph nodes, negative for tumor (0/8)   ______________________________________________________________________________   ______   Hepatocellular carcinoma synoptic report   - Procedure:  Partial hepatectomy, right lobe   - Histologic type:  Hepatocellular carcinoma, scirrhous   - Histologic grade:  Moderately differentiated, grade 2   - Tumor focality:  Solitary   - Tumor characteristics:   - Tumor site:  Right lobe   - Tumor size:  3.3 cm   - Treatment effect:  No known pre-surgical therapy   - Satellitosis:  Not identified   - Tumor extent:  Confined to liver   - Vascular invasion:  Present, Small vessel   - Perineural invasion:  Present   - Margins:   - All margins are negative for invasive carcinoma   - Closest margin to invasive carcinoma:  Parenchymal   - Distance from invasive  carcinoma to closest margin:  5 mm   - Regional lymph nodes:   - Number of lymph nodes with tumor:  0   - Number of lymph nodes examined:  8   - Pathology: pT2 N0 MX   - Additional findings:   - No steatosis   - Trichrome stain:  Periportal fibrosis with early septa, stage 1-2   - Iron stain:  Negative   - Iron and trichrome stains with appropriate controls   Tumor blocks:  2A, 2B, 2 C    Comment: Interp By Madeline Carlos MD, Signed on 07/08/2021 at 16:47            Assessment:       1. HCC (hepatocellular carcinoma)    2. Malignant neoplasm metastatic to right lung    3. Bone lesion    4. Diarrhea, unspecified type    5. Abdominal gas pain    6. Liver hemangioma              Plan:             # HCC  Scirrhous subtype, which is very uncommon (~ 1% of all HCC); prognosis is likely similar to typical HCC.  No clear underlying liver pathology in this patient.  Had margin negative resection with negative lymph node eval on 6/28/21 with Dr. Howell.  Unfortunately she has biopsy proven recurrent metastatic disease to her RLL s/p IR biopsy 4/10/23.   She was discussed at thoracic tumor board with potential plan for surgical resection with Dr. Tadeo.  She had a concerning bone lesion on PET from 4/26 at T[facility].  This was biopsied and proven to be benign.  In the interim she had a repeat abdominal MRI on 6/13/23 which demonstrated growth of a portacaval lymph node that was very concerning for metastatic disease when we reviewed her imaging at liver conference.  Meanwhile her RLL met has grown slightly on 6/21/23 CT as well.  I discussed her case with Valerie Dhillon and Shwetha and we were all in agreement with proceeding with systemic therapy rather than surgery or radiation given multifocal progression.    I discussed this with her and she is agreeable with starting systemic therapy.  Reviewed possibilities of atezo + magaly or durva + treme.  She wished to proceed with STRIDE regimen: durvalumab + tremelimumab.    Received cycle 1 on 7/11/23.  Presents today prior to cycle 3.  Labs acceptable to proceed.  Orders signed.  Having mild diarrhea after infusions, not entirely consistent with ICI colitis.  Will give IVF today.    Saw Dr. Sabillon of cardiology who recommended troponin and ECG monitoring given her cardiac history.  She is asymptomatic at present.    Will plan to bring her back in 4 weeks for cycle 4.  Repeat imaging prior to cycle 4.    # Neutropenia  Resolved.  Has experienced in  past.  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    # Liver hemangiomas  Continue monitoring as indicated with Dr. Rogers.    Follow up: 4 weeks.    The above information has been reviewed with the patient and all questions have been answered to their apparent satisfaction.  They understand that they can call the clinic with any questions.    Philippe Carrasco MD  Hematology/Oncology  [facility]    Route Chart for Scheduling    Med Onc Chart Routing      Follow up with physician 4 weeks. for imfinzi   Follow up with MELA    Infusion scheduling note    Injection scheduling note    Labs CBC, CMP and TSH   Scheduling:  Preferred lab:  Lab interval:  & AFP   Imaging CT chest abdomen pelvis   & CT head   Pharmacy appointment    Other referrals              Treatment Plan Information   OP TREMELIMUMAB 300 MG (X 1) + DURVALUMAB 1500 MG Q4W   Philippe Carrasco MD   Upcoming Treatment Dates - OP TREMELIMUMAB 300 MG (X 1) + DURVALUMAB 1500 MG Q4W    9/4/2023       Chemotherapy       durvalumab (IMFINZI) 1,500 mg in sodium chloride 0.9% SolP 280 mL chemo infusion       Antiemetics       prochlorperazine injection Soln 5 mg  10/2/2023       Chemotherapy       durvalumab (IMFINZI) 1,500 mg in sodium chloride 0.9% SolP 280 mL chemo infusion       Antiemetics       prochlorperazine injection Soln 5 mg  10/30/2023       Chemotherapy       durvalumab (IMFINZI) 1,500 mg in sodium chloride 0.9% SolP 280 mL chemo infusion       Antiemetics       prochlorperazine injection Soln 5 mg  11/27/2023       Chemotherapy       durvalumab (IMFINZI) 1,500 mg in sodium chloride 0.9% SolP 280 mL chemo infusion       Antiemetics       prochlorperazine injection Soln 5 mg         no

## 2023-09-08 NOTE — PLAN OF CARE
Problem: Adult Inpatient Plan of Care  Goal: Plan of Care Review  Outcome: Ongoing, Progressing   Patient tolerated infusions today. NAD noted. VSS. PIV + blood return upon deaccess. Discharged home and ambulates independently

## 2023-09-11 ENCOUNTER — OFFICE VISIT (OUTPATIENT)
Dept: HEMATOLOGY/ONCOLOGY | Facility: CLINIC | Age: 43
End: 2023-09-11
Payer: COMMERCIAL

## 2023-09-11 DIAGNOSIS — C22.0 HCC (HEPATOCELLULAR CARCINOMA): ICD-10-CM

## 2023-09-11 DIAGNOSIS — Z71.83 ENCOUNTER FOR NONPROCREATIVE GENETIC COUNSELING: Primary | ICD-10-CM

## 2023-09-11 PROCEDURE — 99215 OFFICE O/P EST HI 40 MIN: CPT | Mod: 95,,, | Performed by: PHYSICIAN ASSISTANT

## 2023-09-11 PROCEDURE — 99215 PR OFFICE/OUTPT VISIT, EST, LEVL V, 40-54 MIN: ICD-10-PCS | Mod: 95,,, | Performed by: PHYSICIAN ASSISTANT

## 2023-09-11 NOTE — PROGRESS NOTES
Cancer Genetics  Hereditary/High Risk Clinic  Department of Hematology and Oncology  [facility]    Initial Consult    Date of Service:  23  Visit Provider:  Zakia Jacksno PA-C  Collaborating Physician:  Zaida Campbell MD    Patient:  Melly Hwang  :  1980  MRN:   [id number]     Referring Provider    Philippe Carrasco MD  [address] [place] [address]    Televisit Information  Patient location: [place], [place].    Visit type:  audiovisual  Approximately 40 minutes in total were spent on this encounter, which includes face-to-face time and non-face-to-face time preparing to see the patient (e.g., review of tests), obtaining and/or reviewing separately obtained history, documenting clinical information in the electronic or other health record, independently interpreting results (not separately reported) and communicating results to the patient/family/caregiver, or care coordination (not separately reported).  Each patient to whom he or she provides medical services by telemedicine is:  (1) informed of the relationship between the physician and patient and the respective role of any other health care provider with respect to management of the patient; and (2) notified that he or she may decline to receive medical services by telemedicine and may withdraw from such care at any time.    ASSESSMENT/PLAN   Melly Hwang, 43 y.o., assigned female sex at birth, is established with the [facility] of [facility] but new to me. A cancer-genetic evaluation and pre-test genetic counseling were conducted. Melly has the following personal and family history:  Melly: Stage IV hepatocellular carcinoma, schirrhous type (s-HCC); initially diagnosed at age 40  Maternal aunt:  from lung cancer in her 40s, heavy smoker.     Melly's diagnosis of s-HCC at age 40 is unusual, particularly given that she has none of the typical risk factors for s-HCC  such as chronic hepatitis and cirrhosis. However, there is no strong evidence that HCC is caused by an inherited genetic mutation, and the family history is not suggestive of a hereditary cancer syndrome. HCC has been seen in individuals with germline TERT and DICER1 mutations, but a family with a TERT mutation will typically have bladder cancer, brain cancer, and melanoma and a family with DICER1 syndrome will have adults and children with cancers of the lung (pediatric pleuropulmonary blastoma), kidneys, ovaries, and thyroid as well as benign tumors. It is most likely that Melly has a sporadic HCC. That said, she was offered genetic testing that would include TERT and DICER1 and she declined due to the out of pocket cost. This decision is very reasonable.     1. Encounter for nonprocreative genetic counseling    2. HCC (hepatocellular carcinoma)  - Patient declined germline testing.      Follow-up:  Follow up for further discussion if indicated or patient desires.     Questions were encouraged and answered to the patient's satisfaction, and she verbalized understanding of information and agreement with the plan.     SUBJECTIVE   Chief Complaint: Genetic evaluation  History of Present Illness (HPI):  Melly Hwang presents for genetic cancer risk assessment given her family history of cancer.     Genetic Assessment History:  Germline cancer-genetic testing: no  Personal history of cancer:    Hepatocellular carcinoma, scirrhous type (s-HCC) (5/2021, age 40) s/p right partial hepatectomy with pulmonary and portocaval mayra metastases in 2023, currently on systemic chemotherapy.   Benign tumors:  No  Colon polyps: No. Last colonoscopy 9/2020  was normal. Plan is to repeat in 10 years.   Pancreatitis:  No  Chemoprevention: Never used  Uterus and ovaries intact:  yes    Past Medical History:   Diagnosis Date    VENKATA positive 08/20/2020    COVID-19     Deviated septum 2011    Hepatocellular carcinoma 05/07/2021     Hypertrophy of inferior nasal turbinate     Pericardial effusion     Premature menopause      Patient Active Problem List    Diagnosis Date Noted    Chest pain 09/01/2023    Neutropenia, unspecified type 08/07/2023    Lung nodule 9/22 09/19/2022    Decreased ROM of intervertebral discs of cervical spine 04/27/2022    Weakness of trunk musculature 04/27/2022    Pleural effusion 11/23/2021    Palpitations 09/15/2021    Elevated CA 19-9 level 09/13/2021    Hypophosphatemia 07/01/2021    Hepatocellular carcinoma 06/09/2021    S/P pericardial window creation 02/23/2021    Elevated bilirubin 01/26/2021    History of 2019 novel coronavirus disease (COVID-19) 09/03/2020    VENKATA positive 08/20/2020    Pericardial effusion 05/01/2020    Liver hemangioma 03/14/2017    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 pylori infection: tx 2014 stool negative 02/22/2017    Leukopenia 04/03/2014    Bradycardia 04/09/2013    Cardiac enlargement: Echo 2014 normal cardiac chamber size with EF 55% 04/06/2013    Premature ovarian failure 03/06/2013      Family History  Germline cancer-genetic testing in blood relatives:  No  Ashkenazi Protestant ancestry: No  Consanguinity in ancestors:  No  No known history of cancer of colorectal polyps in extended family other than noted below.     ** If the pedigree is small/illegible, expand this note window horizontally to view the pedigree in a larger format. **      Family History   Problem Relation Age of Onset    Diabetes Mother     Liver disease Mother         fatty liver    Heart disease Father     Macular degeneration Father     Diabetes Father     Hypertension Father     Hyperlipidemia Father     Lung cancer Maternal Aunt         heavy smoker    Cerebral aneurysm Paternal Aunt     Cataracts Neg Hx     Glaucoma Neg Hx     Retinal detachment Neg Hx     Stroke Neg Hx     Thyroid disease Neg Hx     Melanoma Neg Hx     Heart attack Neg Hx        Review of Systems  See HPI.      OBJECTIVE   Physical Exam  Limited secondary to the inherent nature of a virtual visit.  Very pleasant patient.  Constitutional: No apparent distress.   Neurological: Alert and oriented. No obvious neurological deficits.   Psychiatric: Normal mood, affect, thought content, speech, behavior, judgment.  Genetics-specific: It is my assessment that the patient is ready to proceed with cancer-genetic testing from a psychosocial perspective.    COUNSELING   Types of cancer:  Hereditary cancer: Approximately one out of ten cancers is hereditary. This means it is caused by an inherited mutation/variant in a single gene that is passed directly from parent to child. These families usually have multiple individuals in successive generations (parents and their children) with the same or related cancers occurring at a younger age than usual.   Sporadic cancer: Approximately nine out of ten cancers are sporadic or random. This means they are caused by genetic mutations acquired during a person's lifetime. These mutations can be caused by environmental, lifestyle, or medical risk factors or even random errors that occur during DNA replication. These families usually have individuals with unrelated cancers at older ages that occur randomly in the family.   Familial cancer: Some families may have what is called familial cancer. This means they have a clustering of a particular cancer that is more than you would expect to see by chance, but is not caused by an inherited mutation in a single gene. Instead, they are caused by a combination of shared risk factors.     Risk factors for cancer:   Lifestyle:  Smoking, alcohol consumption, obesity, lack of exercise, unhealthy diet.   Environmental: Chemical and radiation exposures in the workplace, contaminated air and water, UV exposure from the sun and tanning beds, and ionizing radiation from xrays and CT scans.   Medical conditions: Chronic  inflammation (Crohn's disease, diabetes), viral infections (HPV, hepatitis), fatty liver disease, etc.     Possible results of genetic testing:   Positive: A mutation is present that is known to result in an increased risk for cancer or other disease.      Negative: No cancer-causing mutations are present.   Uncertain: A mutation is present that is of unknown clinical significance. Researchers do not know if the mutation results in an increased risk for cancer.     Genetic testing logistics:  Standard method: A blood sample is collected at an [facility] lab and sent to an outside genetics lab for testing. Blood specimens can be utilized for DNA and RNA analysis.   Alternate method: A saliva sample is collected by the patient at home and mailed to the genetics lab. Saliva specimens can only be used for DNA analysis.   Alternate method: A skin punch biopsy is collected at [facility] and sent to an outside lab that extracts DNA from the cultured skin fibroblasts that is sent for genetic testing.   Why is this done? Individuals with certain active hematological conditions may have circulating leukocytes containing abnormal somatic variants. The presence of these somatic variants can result in a complete test failure, partial test failure, false negatives, or false positives. Since leukocytes are found in the blood, saliva and buccal specimens, a skin punch is the only way to ensure a specimen that is free of abnormal somatic variants.   Additionally, individuals who are status post allogenic bone marrow transplant, stem cell transplant, or had a bood transfusion <2 weeks prior must also be tested using a skin specimen.     Cost of testing:   Genetic testing is expensive, but is covered by most health insurance policies. With commercial insurance coverage, most people pay up to $100 and a max of $250 if they haven't met their deductible. If testing isn't covered by insurance, the cash price is $250.  Some genetics labs offer  financial assistance.     Genetic information discrimination:  Genetic information discrimination occurs when an employer, insurance company, or other entity uses genetic information to make decisions or otherwise discriminate against an individual. Examples would include an insurance refusing to issue a policy because the individual has a genetic mutation or an employer refusing to hire or promote someone because they discovered they have a mutation or family history of cancer. A federal law called [place]ARY[place] provide some protections for health insurance and employment. Other laws and policies offer additional protections against genetic discrimination.    The Genetic Information Nondiscrimination Act of 2008 (MARYCHUY) is a federal law that expands the protections included in the Health Insurance Portability and Accountability Act of 1996 (HIPAA).  Under Title I of [place], group health plans cannot base premiums for a plan or a group of similarly situated individuals on genetic information. MARY[place]Y generally prohibits plans from requesting or requiring an individual to undergo genetic tests, and prohibits a plan from collecting genetic information (including family medical history) prior to or in connection with enrollment, or for underwriting purposes.  This rule does not apply to individuals who receive their insurance through the federal government or . Those entities have their own set of rules regarding genetic information.   This rule only applies to health insurance. Other types of insurance (life, disability, long-term care, cancer, etc) are not protected. An individual can be denied coverage or charged a higher premium based on their genetic information.   Under Title II of [place], it is illegal to discriminate against employees or applicants because of genetic information. Title II of [place] prohibits the use of genetic information in making employment decisions, restricts employers and other entities covered by  Title II (employment agencies, labor organizations and joint labor-management training and apprenticeship  MARY[place]UY has a small business exemption for employers with less than 15 employees.    REFERENCES   Ahmet S, Tu J, Jan ROA, et al. Germline and somatic DICER1 mutations in familial and sporadic liver tumors. J Hepatol. 2017;66(4):734-742. doi:10.1016/j.jhep.2016.12.010. Retrieved on 9/11/2023 from https://pubmed.ncbi.nlm.nih.gov/46360721/  Avtar R, Enma TORREY, Alan DOLL, Yue MUNIZ. Genomic Medicine and Implications for Hepatocellular Carcinoma Prevention and Therapy. Gastroenterology. 2019;156(2):492-509. doi:10.1053/j.gastro.2018.11.001  National Comprehensive Cancer Network (NCCN). (2023). Hepatocellular carcinoma. NCCN Clinical Practice Guidelines in Oncology (NCCN Guidelines), Version 2.2023.     GEORGIA Montes PATeodoroC  Physician Assistant, Hereditary/High Risk Clinic  Hematology/Oncology, [facility]

## 2023-09-13 ENCOUNTER — LAB VISIT (OUTPATIENT)
Dept: LAB | Facility: HOSPITAL | Age: 43
End: 2023-09-13
Attending: INTERNAL MEDICINE
Payer: COMMERCIAL

## 2023-09-13 DIAGNOSIS — C22.0 HEPATOCELLULAR CARCINOMA: ICD-10-CM

## 2023-09-13 DIAGNOSIS — R07.9 CHEST PAIN, UNSPECIFIED TYPE: ICD-10-CM

## 2023-09-13 LAB — TROPONIN I SERPL DL<=0.01 NG/ML-MCNC: <0.006 NG/ML (ref 0–0.03)

## 2023-09-13 PROCEDURE — 36415 COLL VENOUS BLD VENIPUNCTURE: CPT | Performed by: INTERNAL MEDICINE

## 2023-09-13 PROCEDURE — 84484 ASSAY OF TROPONIN QUANT: CPT | Performed by: INTERNAL MEDICINE

## 2023-09-16 ENCOUNTER — HOSPITAL ENCOUNTER (OUTPATIENT)
Dept: RADIOLOGY | Facility: HOSPITAL | Age: 43
Discharge: HOME | End: 2023-09-16
Attending: INTERNAL MEDICINE
Payer: COMMERCIAL

## 2023-09-16 DIAGNOSIS — K76.9 LIVER LESION: ICD-10-CM

## 2023-09-16 PROCEDURE — 76705 ECHO EXAM OF ABDOMEN: CPT | Mod: 26,,, | Performed by: RADIOLOGY

## 2023-09-16 PROCEDURE — 76705 US ABDOMEN LIMITED: ICD-10-PCS | Mod: 26,,, | Performed by: RADIOLOGY

## 2023-09-16 PROCEDURE — 76705 ECHO EXAM OF ABDOMEN: CPT | Mod: TC

## 2023-09-18 ENCOUNTER — PATIENT MESSAGE (OUTPATIENT)
Dept: INTERNAL MEDICINE | Facility: CLINIC | Age: 43
End: 2023-09-18

## 2023-09-18 ENCOUNTER — OFFICE VISIT (OUTPATIENT)
Dept: INTERNAL MEDICINE | Facility: CLINIC | Age: 43
End: 2023-09-18
Payer: COMMERCIAL

## 2023-09-18 ENCOUNTER — LAB VISIT (OUTPATIENT)
Dept: LAB | Facility: HOSPITAL | Age: 43
End: 2023-09-18
Payer: COMMERCIAL

## 2023-09-18 VITALS
HEIGHT: 71 IN | WEIGHT: 149 LBS | SYSTOLIC BLOOD PRESSURE: 118 MMHG | BODY MASS INDEX: 20.86 KG/M2 | DIASTOLIC BLOOD PRESSURE: 78 MMHG

## 2023-09-18 DIAGNOSIS — R91.1 LUNG NODULE: ICD-10-CM

## 2023-09-18 DIAGNOSIS — M54.12 CERVICAL RADICULOPATHY: ICD-10-CM

## 2023-09-18 DIAGNOSIS — Z98.890 S/P PERICARDIAL WINDOW CREATION: ICD-10-CM

## 2023-09-18 DIAGNOSIS — I51.7 CARDIAC ENLARGEMENT: ICD-10-CM

## 2023-09-18 DIAGNOSIS — E83.19 IRON EXCESS: ICD-10-CM

## 2023-09-18 DIAGNOSIS — C22.0 HEPATOCELLULAR CARCINOMA: ICD-10-CM

## 2023-09-18 DIAGNOSIS — Z00.00 ANNUAL PHYSICAL EXAM: ICD-10-CM

## 2023-09-18 DIAGNOSIS — E53.8 LOW SERUM VITAMIN B12: Primary | ICD-10-CM

## 2023-09-18 DIAGNOSIS — Z00.00 ANNUAL PHYSICAL EXAM: Primary | ICD-10-CM

## 2023-09-18 PROBLEM — R07.9 CHEST PAIN: Status: RESOLVED | Noted: 2023-09-01 | Resolved: 2023-09-18

## 2023-09-18 PROBLEM — M62.81 WEAKNESS OF TRUNK MUSCULATURE: Status: RESOLVED | Noted: 2022-04-27 | Resolved: 2023-09-18

## 2023-09-18 PROBLEM — Z86.16 HISTORY OF 2019 NOVEL CORONAVIRUS DISEASE (COVID-19): Status: RESOLVED | Noted: 2020-09-03 | Resolved: 2023-09-18

## 2023-09-18 PROBLEM — R00.2 PALPITATIONS: Status: RESOLVED | Noted: 2021-09-15 | Resolved: 2023-09-18

## 2023-09-18 LAB
25(OH)D3+25(OH)D2 SERPL-MCNC: 34 NG/ML (ref 30–96)
CHOLEST SERPL-MCNC: 195 MG/DL (ref 120–199)
CHOLEST/HDLC SERPL: 3.4 {RATIO} (ref 2–5)
ESTIMATED AVG GLUCOSE: 100 MG/DL (ref 68–131)
FERRITIN SERPL-MCNC: 48 NG/ML (ref 20–300)
HBA1C MFR BLD: 5.1 % (ref 4–5.6)
HDLC SERPL-MCNC: 58 MG/DL (ref 40–75)
HDLC SERPL: 29.7 % (ref 20–50)
IRON SERPL-MCNC: 183 UG/DL (ref 30–160)
LDLC SERPL CALC-MCNC: 109 MG/DL (ref 63–159)
MAGNESIUM SERPL-MCNC: 1.7 MG/DL (ref 1.6–2.6)
NONHDLC SERPL-MCNC: 137 MG/DL
SATURATED IRON: 39 % (ref 20–50)
T4 FREE SERPL-MCNC: 0.87 NG/DL (ref 0.71–1.51)
TOTAL IRON BINDING CAPACITY: 475 UG/DL (ref 250–450)
TRANSFERRIN SERPL-MCNC: 321 MG/DL (ref 200–375)
TRIGL SERPL-MCNC: 140 MG/DL (ref 30–150)
TSH SERPL DL<=0.005 MIU/L-ACNC: 0.36 UIU/ML (ref 0.4–4)
VIT B12 SERPL-MCNC: 302 PG/ML (ref 210–950)

## 2023-09-18 PROCEDURE — 1159F PR MEDICATION LIST DOCUMENTED IN MEDICAL RECORD: ICD-10-PCS | Mod: CPTII,S$GLB,, | Performed by: INTERNAL MEDICINE

## 2023-09-18 PROCEDURE — 82306 VITAMIN D 25 HYDROXY: CPT | Performed by: INTERNAL MEDICINE

## 2023-09-18 PROCEDURE — 3078F DIAST BP <80 MM HG: CPT | Mod: CPTII,S$GLB,, | Performed by: INTERNAL MEDICINE

## 2023-09-18 PROCEDURE — 83540 ASSAY OF IRON: CPT | Performed by: INTERNAL MEDICINE

## 2023-09-18 PROCEDURE — 99396 PREV VISIT EST AGE 40-64: CPT | Mod: S$GLB,,, | Performed by: INTERNAL MEDICINE

## 2023-09-18 PROCEDURE — 84439 ASSAY OF FREE THYROXINE: CPT | Performed by: INTERNAL MEDICINE

## 2023-09-18 PROCEDURE — 82728 ASSAY OF FERRITIN: CPT | Performed by: INTERNAL MEDICINE

## 2023-09-18 PROCEDURE — 3074F SYST BP LT 130 MM HG: CPT | Mod: CPTII,S$GLB,, | Performed by: INTERNAL MEDICINE

## 2023-09-18 PROCEDURE — 36415 COLL VENOUS BLD VENIPUNCTURE: CPT | Performed by: INTERNAL MEDICINE

## 2023-09-18 PROCEDURE — 3008F BODY MASS INDEX DOCD: CPT | Mod: CPTII,S$GLB,, | Performed by: INTERNAL MEDICINE

## 2023-09-18 PROCEDURE — 99396 PR PREVENTIVE VISIT,EST,40-64: ICD-10-PCS | Mod: S$GLB,,, | Performed by: INTERNAL MEDICINE

## 2023-09-18 PROCEDURE — 3078F PR MOST RECENT DIASTOLIC BLOOD PRESSURE < 80 MM HG: ICD-10-PCS | Mod: CPTII,S$GLB,, | Performed by: INTERNAL MEDICINE

## 2023-09-18 PROCEDURE — 82607 VITAMIN B-12: CPT | Performed by: INTERNAL MEDICINE

## 2023-09-18 PROCEDURE — 3074F PR MOST RECENT SYSTOLIC BLOOD PRESSURE < 130 MM HG: ICD-10-PCS | Mod: CPTII,S$GLB,, | Performed by: INTERNAL MEDICINE

## 2023-09-18 PROCEDURE — 83735 ASSAY OF MAGNESIUM: CPT | Performed by: INTERNAL MEDICINE

## 2023-09-18 PROCEDURE — 3008F PR BODY MASS INDEX (BMI) DOCUMENTED: ICD-10-PCS | Mod: CPTII,S$GLB,, | Performed by: INTERNAL MEDICINE

## 2023-09-18 PROCEDURE — 84466 ASSAY OF TRANSFERRIN: CPT | Performed by: INTERNAL MEDICINE

## 2023-09-18 PROCEDURE — 99999 PR PBB SHADOW E&M-EST. PATIENT-LVL III: CPT | Mod: PBBFAC,,, | Performed by: INTERNAL MEDICINE

## 2023-09-18 PROCEDURE — 83036 HEMOGLOBIN GLYCOSYLATED A1C: CPT | Performed by: INTERNAL MEDICINE

## 2023-09-18 PROCEDURE — 80061 LIPID PANEL: CPT | Performed by: INTERNAL MEDICINE

## 2023-09-18 PROCEDURE — 99999 PR PBB SHADOW E&M-EST. PATIENT-LVL III: ICD-10-PCS | Mod: PBBFAC,,, | Performed by: INTERNAL MEDICINE

## 2023-09-18 PROCEDURE — 84443 ASSAY THYROID STIM HORMONE: CPT | Performed by: INTERNAL MEDICINE

## 2023-09-18 PROCEDURE — 1159F MED LIST DOCD IN RCRD: CPT | Mod: CPTII,S$GLB,, | Performed by: INTERNAL MEDICINE

## 2023-09-18 NOTE — PROGRESS NOTES
" Patient ID: Melly Hwang is a 43 y.o. female.    Chief Complaint: Annual Exam      Assessment:       1. Annual physical exam    2. Hepatocellular carcinoma    3. S/P pericardial window creation    4. Lung nodule 9/22; enlarged 7/23    5. Cardiac enlargement: Echo 2014 normal cardiac chamber size with EF 55%; stable 7/23    6. Cervical radiculopathy          Plan:         1. Annual physical exam  -     Vitamin B12; Future; Expected date: 09/18/2023  -     Vitamin D; Future; Expected date: 09/18/2023  -     TSH; Future; Expected date: 09/18/2023  -     Lipid Panel; Future; Expected date: 09/18/2023  -     Magnesium; Future; Expected date: 09/18/2023  -     Iron and TIBC; Future; Expected date: 09/18/2023  -     Ferritin; Future; Expected date: 09/18/2023  -     Hemoglobin A1C; Future; Expected date: 09/18/2023    2. Hepatocellular carcinoma    3. S/P pericardial window creation    4. Lung nodule 9/22; enlarged 7/23  Overview:  Plan to repeat CT in 3 months.  If at that time the nodule has grown we will consider a PET scan and possibly biopsy if necessary.      5. Cardiac enlargement: Echo 2014 normal cardiac chamber size with EF 55%; stable 7/23  Overview:  Per MRI Echo 2014 normal cardiac chamber size with EF 55%      6. Cervical radiculopathy  -     EMG W/ ULTRASOUND AND NERVE CONDUCTION TEST 2 Extremities; Future     Rule out ulnar neuropathy   EMG   Follow-up Spine Clinic   She will get flu shot at a later point   Cardiology and Oncology follow ups   I will review all studies and determine further tx depending on findings   She is stable Currently; albumin is better.  Kidney function and liver function appear to be improved/normal.  Alarm symptoms and ED cautions reviewed, she is well aware    Subjective:   Annual exam    Complictaed history    Oncology 9/23:  "43 y.o. 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 Howell with the same pathology, negative " "margins, 0 of 8 lymph nodes positive and + for vascular and perineural invasion.  I saw her in 2021 for evaluation for persistent CA 19-9 but there was no radiographic evidence of HCC disease recurrence or alternative reason for CA 19-9 elevation.  CT chest on 3/9/23 showed an enlarging RLL nodule measuring 1.1 cm, increased from 0.8 cm in size.  IR biopsy of this on 4/10/23 confirmed metastatic HCC.  Since then in mid-June she has also had an MRI abdomen that showed an enlarging portocaval lymph node that is consistent with metastatic disease.     She presents today prior to cycle 3 of durvalumab + tremelimumab as part of the STRiDE regimen.  She states she had loose stools again after her last infusion - lasted about one week.  Worst was about 4 episodes per day.  Has some discomfort in her ankles associated with some edema that improved with elevation of her legs.  The edema has continued but the pain has resolved.  She states she was felt "gassy" with prior infusion so she is pre-emptively taking Prilosec when she is getting closer to her infusion.     # HCC  Scirrhous subtype, which is very uncommon (~ 1% of all HCC); prognosis is likely similar to typical HCC.  No clear underlying liver pathology in this patient.  Had margin negative resection with negative lymph node eval on 6/28/21 with Dr. Howell.  Unfortunately she has biopsy proven recurrent metastatic disease to her RLL s/p IR biopsy 4/10/23.   She was discussed at thoracic tumor board with potential plan for surgical resection with Dr. Tadeo.  She had a concerning bone lesion on PET from 4/26 at [facility].  This was biopsied and proven to be benign.  In the interim she had a repeat abdominal MRI on 6/13/23 which demonstrated growth of a portacaval lymph node that was very concerning for metastatic disease when we reviewed her imaging at liver conference.  Meanwhile her RLL met has grown slightly on 6/21/23 CT as well.  I discussed her case with Valerie Dhillon and " "Shwetha and we were all in agreement with proceeding with systemic therapy rather than surgery or radiation given multifocal progression.     I discussed this with her and she is agreeable with starting systemic therapy.  Reviewed possibilities of atezo + magaly or durva + treme.  She wished to proceed with STRIDE regimen: durvalumab + tremelimumab.     Received cycle 1 on 23.  Presents today prior to cycle 3.  Labs acceptable to proceed.  Orders signed.  Having mild diarrhea after infusions, not entirely consistent with ICI colitis.  Will give IVF today.     Saw Dr. Sabillon of cardiology who recommended troponin and ECG monitoring given her cardiac history.  She is asymptomatic at present.     Will plan to bring her back in 4 weeks for cycle 4.  Repeat imaging prior to cycle 4.     # Neutropenia  Resolved.  Has experienced in past.  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     # Liver hemangiomas  Continue monitoring as indicated with Dr. Rogers."       Also recently seen in Oncology- for genetic evaluation:  · "Stage IV hepatocellular carcinoma, schirrhous type (s-HCC); initially diagnosed at age 40  · Maternal aunt:  from lung cancer in her 40s, heavy smoker.      "Melly's diagnosis of s-HCC at age 40 is unusual, particularly given that she has none of the typical risk factors for s-HCC such as chronic hepatitis and cirrhosis. However, there is no strong evidence that HCC is caused by an inherited genetic mutation, and the family history is not suggestive of a hereditary cancer syndrome. HCC has been seen in individuals with germline TERT and DICER1 mutations, but a family with a TERT mutation will typically have bladder cancer, brain cancer, and melanoma and a family with DICER1 syndrome will have adults and children with cancers of the lung (pediatric pleuropulmonary blastoma), kidneys, ovaries, and thyroid as well as benign tumors. It is most likely that Melly has a sporadic HCC. That said, she was " "offered genetic testing that would include TERT and DICER1 and she declined due to the out of pocket cost. This decision is very reasonable."           In general, feeling all right.  Did have some diarrhea with her recent treatment but seems to have found regimen that helps with that.  Working full-time.  Following closely with Cardiology and of course Hematology-Oncology.    Some ongoing left arm pain and numbness around the elbow.  Previously thought to have cervical radiculopathy.  No trauma or injury.  Sometimes hand feels a little bit crampy.  No right hand symptoms.  Nerve conduction study in 2018 was acceptable.  When she had seen the Spine Clinic, they told her that she might need an injection in the spine and she is willing to consider this.  Sometimes her neck is sore but not all the time.    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 pylori infection: tx 2014 stool negative    Liver hemangioma    Pericardial effusion    VENKATA positive    Elevated bilirubin    S/P pericardial window creation    Hepatocellular carcinoma    Hypophosphatemia    Elevated CA 19-9 level    Pleural effusion    Decreased ROM of intervertebral discs of cervical spine    Lung nodule 9/22; enlarged 7/23    Neutropenia, unspecified type        Review of Systems   Constitutional:  Negative for activity change and unexpected weight change.   HENT:  Negative for hearing loss, rhinorrhea and trouble swallowing.    Eyes:  Negative for discharge and visual disturbance.   Respiratory:  Negative for chest tightness and wheezing.    Cardiovascular:  Negative for chest pain and palpitations.   Gastrointestinal:  Negative for blood in stool, constipation, diarrhea and vomiting.   Endocrine: Negative for polydipsia and " polyuria.   Genitourinary:  Negative for difficulty urinating, dysuria, hematuria and menstrual problem.   Musculoskeletal:  Positive for arthralgias. Negative for joint swelling and neck pain.        See HPI   Neurological:  Negative for weakness and headaches.   Psychiatric/Behavioral:  Negative for confusion and dysphoric mood.          Objective:      Physical Exam  Vitals and nursing note reviewed.   Constitutional:       Appearance: She is well-developed.   HENT:      Head: Normocephalic and atraumatic.      Right Ear: External ear normal.      Left Ear: External ear normal.      Nose: Nose normal.      Mouth/Throat:      Pharynx: No oropharyngeal exudate.   Eyes:      General: No scleral icterus.     Extraocular Movements: Extraocular movements intact.      Conjunctiva/sclera: Conjunctivae normal.   Neck:      Thyroid: No thyromegaly.      Vascular: No JVD.   Cardiovascular:      Rate and Rhythm: Normal rate and regular rhythm.      Heart sounds: Normal heart sounds. No murmur heard.     No gallop.   Pulmonary:      Effort: Pulmonary effort is normal. No respiratory distress.      Breath sounds: Normal breath sounds. No wheezing.   Abdominal:      General: Bowel sounds are normal. There is no distension.      Palpations: Abdomen is soft. There is no mass.      Tenderness: There is no abdominal tenderness. There is no guarding or rebound.   Musculoskeletal:         General: No tenderness. Normal range of motion.      Cervical back: Normal range of motion and neck supple.      Comments: No deformity of either hand  Negative Phalen's and Tinel's  Slight numbness over elbow   Lymphadenopathy:      Cervical: No cervical adenopathy.   Skin:     General: Skin is warm.      Findings: No erythema or rash.   Neurological:      General: No focal deficit present.      Mental Status: She is alert and oriented to person, place, and time.      Cranial Nerves: No cranial nerve deficit.      Coordination: Coordination normal.    Psychiatric:         Behavior: Behavior normal.         Thought Content: Thought content normal.         Judgment: Judgment normal.             Health Maintenance Due   Topic Date Due    COVID-19 Vaccine (4 - Pfizer series) 02/10/2022    Influenza Vaccine (1) 09/01/2023

## 2023-09-21 ENCOUNTER — PATIENT MESSAGE (OUTPATIENT)
Dept: HEMATOLOGY/ONCOLOGY | Facility: CLINIC | Age: 43
End: 2023-09-21
Payer: COMMERCIAL

## 2023-09-21 ENCOUNTER — PATIENT MESSAGE (OUTPATIENT)
Dept: CARDIOLOGY | Facility: CLINIC | Age: 43
End: 2023-09-21
Payer: COMMERCIAL

## 2023-09-21 PROBLEM — E83.19 IRON EXCESS: Status: ACTIVE | Noted: 2023-09-21

## 2023-09-21 PROBLEM — E53.8 LOW SERUM VITAMIN B12: Status: ACTIVE | Noted: 2023-09-21

## 2023-09-21 RX ORDER — LANOLIN ALCOHOL/MO/W.PET/CERES
1000 CREAM (GRAM) TOPICAL DAILY
Qty: 30 TABLET | Refills: 12
Start: 2023-09-21

## 2023-09-21 NOTE — TELEPHONE ENCOUNTER
Please let her know that I have ordered the iron and B12 to be done with her November labs    Please assist with scheduling the nerve conduction study which I ordered at her last visit, thank you

## 2023-10-01 ENCOUNTER — PATIENT MESSAGE (OUTPATIENT)
Dept: HEPATOLOGY | Facility: CLINIC | Age: 43
End: 2023-10-01
Payer: COMMERCIAL

## 2023-10-02 ENCOUNTER — HOSPITAL ENCOUNTER (OUTPATIENT)
Dept: RADIOLOGY | Facility: HOSPITAL | Age: 43
Discharge: HOME | End: 2023-10-02
Attending: INTERNAL MEDICINE
Payer: COMMERCIAL

## 2023-10-02 DIAGNOSIS — C22.0 HCC (HEPATOCELLULAR CARCINOMA): ICD-10-CM

## 2023-10-02 PROCEDURE — 71260 CT THORAX DX C+: CPT | Mod: TC

## 2023-10-02 PROCEDURE — 71260 CT THORAX DX C+: CPT | Mod: 26,,, | Performed by: RADIOLOGY

## 2023-10-02 PROCEDURE — 70470 CT HEAD WITH AND WITHOUT: ICD-10-PCS | Mod: 26,,, | Performed by: INTERNAL MEDICINE

## 2023-10-02 PROCEDURE — 70470 CT HEAD/BRAIN W/O & W/DYE: CPT | Mod: 26,,, | Performed by: INTERNAL MEDICINE

## 2023-10-02 PROCEDURE — 74177 CT ABD & PELVIS W/CONTRAST: CPT | Mod: TC

## 2023-10-02 PROCEDURE — 25500020 PHARM REV CODE 255: Performed by: INTERNAL MEDICINE

## 2023-10-02 PROCEDURE — 74177 CT CHEST ABDOMEN PELVIS WITH CONTRAST (XPD): ICD-10-PCS | Mod: 26,,, | Performed by: RADIOLOGY

## 2023-10-02 PROCEDURE — 71260 CT CHEST ABDOMEN PELVIS WITH CONTRAST (XPD): ICD-10-PCS | Mod: 26,,, | Performed by: RADIOLOGY

## 2023-10-02 PROCEDURE — 74177 CT ABD & PELVIS W/CONTRAST: CPT | Mod: 26,,, | Performed by: RADIOLOGY

## 2023-10-02 PROCEDURE — 70470 CT HEAD/BRAIN W/O & W/DYE: CPT | Mod: TC

## 2023-10-02 RX ADMIN — IOHEXOL 100 ML: 350 INJECTION, SOLUTION INTRAVENOUS at 07:10

## 2023-10-05 ENCOUNTER — PATIENT MESSAGE (OUTPATIENT)
Dept: HEMATOLOGY/ONCOLOGY | Facility: CLINIC | Age: 43
End: 2023-10-05
Payer: COMMERCIAL

## 2023-10-05 RX ORDER — SODIUM CHLORIDE, SODIUM LACTATE, POTASSIUM CHLORIDE, CALCIUM CHLORIDE 600; 310; 30; 20 MG/100ML; MG/100ML; MG/100ML; MG/100ML
INJECTION, SOLUTION INTRAVENOUS CONTINUOUS
Status: CANCELLED
Start: 2023-10-05

## 2023-10-05 RX ORDER — EPINEPHRINE 0.3 MG/.3ML
0.3 INJECTION SUBCUTANEOUS ONCE AS NEEDED
Status: CANCELLED | OUTPATIENT
Start: 2023-10-05

## 2023-10-05 RX ORDER — DIPHENHYDRAMINE HYDROCHLORIDE 50 MG/ML
50 INJECTION INTRAMUSCULAR; INTRAVENOUS ONCE AS NEEDED
Status: CANCELLED | OUTPATIENT
Start: 2023-10-05

## 2023-10-05 RX ORDER — SODIUM CHLORIDE 0.9 % (FLUSH) 0.9 %
10 SYRINGE (ML) INJECTION
Status: CANCELLED | OUTPATIENT
Start: 2023-10-05

## 2023-10-05 RX ORDER — HEPARIN 100 UNIT/ML
500 SYRINGE INTRAVENOUS
Status: CANCELLED | OUTPATIENT
Start: 2023-10-05

## 2023-10-05 RX ORDER — PROCHLORPERAZINE EDISYLATE 5 MG/ML
5 INJECTION INTRAMUSCULAR; INTRAVENOUS ONCE AS NEEDED
Status: CANCELLED
Start: 2023-10-05

## 2023-10-05 NOTE — PROGRESS NOTES
The patient location is: [place]    Visit type: audiovisual    Each patient to whom he or she provides medical services by telemedicine is:  (1) informed of the relationship between the physician and patient and the respective role of any other health care provider with respect to management of the patient; and (2) notified that he or she may decline to receive medical services by telemedicine and may withdraw from such care at any time.      MEDICAL ONCOLOGY - ESTABLISHED PATIENT VISIT    Reason for visit: Scirrhous HCC    Best Contact Phone Number(s): [phone] (home)      Cancer/Stage/TNM:    Cancer Staging   Hepatocellular carcinoma  Staging form: Liver, AJCC 8th Edition  - Clinical stage from 4/10/2023: Stage IVB (rcT1b, cN0, pM1) - Signed by Philippe Carrasco MD on 8/7/2023       Oncology History   Hepatocellular carcinoma   6/9/2021 Initial Diagnosis    Hepatocellular carcinoma     4/10/2023 Cancer Staged    Staging form: Liver, AJCC 8th Edition  - Clinical stage from 4/10/2023: Stage IVB (rcT1b, cN0, pM1)     7/11/2023 -  Chemotherapy    Treatment Summary   Plan Name: OP TREMELIMUMAB 300 MG (X 1) + DURVALUMAB 1500 MG Q4W  Treatment Goal: Control  Status: Active  Start Date: 7/11/2023  End Date: 6/10/2024 (Planned)  Provider: Philippe Carrasco MD  Chemotherapy: [No matching medication found in this treatment plan]     9/11/2023 Genetic Testing    Genetic counseling done. Hereditary syndrome unlikely. Patient offered testing, but declined due to cost.         Interim History:   43 y.o. 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 Howell with the same pathology, negative margins, 0 of 8 lymph nodes positive and + for vascular and perineural invasion.  I saw her in 2021 for evaluation for persistent CA 19-9 but there was no radiographic evidence of HCC disease recurrence or alternative reason for CA 19-9 elevation.  CT chest on 3/9/23 showed an  enlarging RLL nodule measuring 1.1 cm, increased from 0.8 cm in size.  IR biopsy of this on 4/10/23 confirmed metastatic HCC.  Since then in mid-June she has also had an MRI abdomen that showed an enlarging portocaval lymph node that is consistent with metastatic disease.    She presents today prior to cycle 4 of durvalumab + tremelimumab as part of the STRiDE regimen.  This past infusion did not give her as many adverse effects.  States she had one episode of loose stools a couple weeks ago.  Also continues to have some fatigue at the end of the day - feels she needs to sit down and rest.  No significant dyspnea, cough.  She is still working without an issue. Goes to gym.    Presents alone today.  ECOG PS 0.    ROS:   Review of Systems   Constitutional:  Negative for chills, fever, malaise/fatigue and weight loss.   HENT:  Negative for sore throat.    Eyes:  Negative for blurred vision and pain.   Respiratory:  Negative for cough and shortness of breath.    Cardiovascular:  Negative for chest pain and leg swelling.   Gastrointestinal:  Negative for abdominal pain, constipation, diarrhea, nausea and vomiting.   Genitourinary:  Negative for dysuria and frequency.   Musculoskeletal:  Negative for back pain, falls and myalgias.   Skin:  Negative for rash.   Neurological:  Negative for dizziness, weakness and headaches.   Endo/Heme/Allergies:  Does not bruise/bleed easily.   Psychiatric/Behavioral:  Negative for depression. The patient is not nervous/anxious.    All other systems reviewed and are negative.      Past Medical History:   Past Medical History:   Diagnosis Date    VENKATA positive 08/20/2020    COVID-19     Deviated septum 2011    Hepatocellular carcinoma 05/07/2021    Hypertrophy of inferior nasal turbinate     Pericardial effusion     Premature menopause         Past Surgical History:   Past Surgical History:   Procedure Laterality Date    COLONOSCOPY N/A 09/21/2020    Procedure: COLONOSCOPY;  Surgeon: GIOVANNI  Jasper Crane MD;  Location: [facility] ENDO (4TH FLR);  Service: Endoscopy;  Laterality: N/A;  + covid 4/22    HERNIA REPAIR      LIVER SURGERY N/A 06/28/2021    NASAL SEPTUM SURGERY      PERICARDIAL WINDOW N/A 02/22/2021    Procedure: CREATION, PERICARDIAL WINDOW;  Surgeon: Ajay Cantor MD;  Location: [facility] OR 2ND FLR;  Service: Cardiovascular;  Laterality: N/A;    PERICARDIOCENTESIS N/A 05/02/2020    Procedure: Pericardiocentesis;  Surgeon: Dickson Dangelo MD;  Location: [facility] CATH LAB;  Service: Cardiology;  Laterality: N/A;    TONSILLECTOMY          Family History:   Family History   Problem Relation Age of Onset    Diabetes Mother     Liver disease Mother         fatty liver    Heart disease Father     Macular degeneration Father     Diabetes Father     Hypertension Father     Hyperlipidemia Father     Heart disease Sister     Lung cancer Maternal Aunt         heavy smoker    Cerebral aneurysm Paternal Aunt     Cataracts Neg Hx     Glaucoma Neg Hx     Retinal detachment Neg Hx     Stroke Neg Hx     Thyroid disease Neg Hx     Melanoma Neg Hx     Heart attack Neg Hx         Social History:   Social History     Tobacco Use    Smoking status: Never    Smokeless tobacco: Never   Substance Use Topics    Alcohol use: Yes     Comment: rare        I have reviewed and updated the patient's past medical, surgical, family and social histories.    Allergies:   Review of patient's allergies indicates:   Allergen Reactions    No known drug allergies         Medications:   Current Outpatient Medications   Medication Sig Dispense Refill    ALPRAZolam (XANAX) 0.5 MG tablet Take 1 tablet (0.5 mg total) by mouth daily as needed for Anxiety. 30 tablet 2    cetirizine (ZYRTEC) 10 MG tablet Take 1 tablet (10 mg total) by mouth once daily. 90 tablet 0    cyanocobalamin (VITAMIN B-12) 1000 MCG tablet Take 1 tablet (1,000 mcg total) by mouth once daily. 30 tablet 12    fluticasone propionate (FLONASE) 50 mcg/actuation nasal spray 2  sprays (100 mcg total) by Each Nostril route once daily. 18.2 mL 6    multivitamin capsule Take by mouth. 1 capsule Oral Every morning      norethindrone-ethinyl estradiol (MICROGESTIN 1/20) 1-20 mg-mcg per tablet Take 1 tablet by mouth once daily. 63 tablet 4    omeprazole (PRILOSEC) 20 MG capsule TAKE 1 CAPSULE BY MOUTH DAILY AS NEEDED FOR GERD 90 capsule 1    ondansetron (ZOFRAN-ODT) 4 MG TbDL Take 1 tablet (4 mg total) by mouth 2 (two) times daily. 2 tablet 0    tiZANidine (ZANAFLEX) 4 MG tablet Take 1 tablet (4 mg total) by mouth every 6 (six) hours as needed (pain/spasm). 30 tablet 1    triamcinolone acetonide 0.1% (KENALOG) 0.1 % cream Apply topically 2 (two) times daily as needed (scaling at external ears). Avoid use on face, body folds, groin/genitalia. 45 g 3     No current facility-administered medications for this visit.        Physical Exam:   LMP  (LMP Unknown)      ECOG Performance status: 0            Physical Exam  Constitutional:       General: She is not in acute distress.     Appearance: Normal appearance. She is not ill-appearing.      Comments: Exam limited by virtual nature   HENT:      Head: Normocephalic and atraumatic.   Eyes:      General: No scleral icterus.     Extraocular Movements: Extraocular movements intact.      Conjunctiva/sclera: Conjunctivae normal.   Pulmonary:      Effort: Pulmonary effort is normal. No respiratory distress.   Neurological:      Mental Status: She is alert and oriented to person, place, and time.   Psychiatric:         Mood and Affect: Mood normal.         Behavior: Behavior normal.         Thought Content: Thought content normal.         Judgment: Judgment normal.           Labs:   No results found for this or any previous visit (from the past 48 hour(s)).       I have reviewed the pertinent labs from 10/6/23 which are notable for normal blood counts, total bili 1.1.  AFP 9.2.    Imaging:       10/2/23 - CT CAP:    Impression:     1. Postsurgical changes of  partial right hepatectomy for reported history of metastatic HCC, unchanged.  2. 0.9-cm, 2.7-cm and 2.1-cm hepatic parenchymal heterogenous hypodense nodules, not significantly changed.  3. 2.8 x 3.9-cm (previously 2.2 x 3.0-cm) abnormally enlarged albert hepatis lymph node.  4.  1.6-cm (previously 1.3-cm) solid nodule in the right lower lobe.  0.3-cm left upper lobe 0.5-cm right upper lobe solid pulmonary parenchymal nodules, not significantly changed.  This report was flagged in Epic as abnormal.    Path:   6/28/21: partial hepatectomy:    Final Pathologic Diagnosis 1.  Gallbladder (cholecystectomy):   - Benign gallbladder with cholecystitis   2. Right lobe (partial hepatectomy):   - Positive for malignancy, see synoptic report below   - Separate hemangioma, 7.3 cm   3. Lymph nodes, portal (regional resection):   - 8 lymph nodes, negative for tumor (0/8)   ______________________________________________________________________________   ______   Hepatocellular carcinoma synoptic report   - Procedure:  Partial hepatectomy, right lobe   - Histologic type:  Hepatocellular carcinoma, scirrhous   - Histologic grade:  Moderately differentiated, grade 2   - Tumor focality:  Solitary   - Tumor characteristics:   - Tumor site:  Right lobe   - Tumor size:  3.3 cm   - Treatment effect:  No known pre-surgical therapy   - Satellitosis:  Not identified   - Tumor extent:  Confined to liver   - Vascular invasion:  Present, Small vessel   - Perineural invasion:  Present   - Margins:   - All margins are negative for invasive carcinoma   - Closest margin to invasive carcinoma:  Parenchymal   - Distance from invasive  carcinoma to closest margin:  5 mm   - Regional lymph nodes:   - Number of lymph nodes with tumor:  0   - Number of lymph nodes examined:  8   - Pathology: pT2 N0 MX   - Additional findings:   - No steatosis   - Trichrome stain:  Periportal fibrosis with early septa, stage 1-2   - Iron stain:  Negative   - Iron and  trichrome stains with appropriate controls   Tumor blocks:  2A, 2B, 2 C    Comment: Interp By Madeline Carlos MD, Signed on 07/08/2021 at 16:47           Assessment:       1. HCC (hepatocellular carcinoma)    2. Regional lymph node metastasis present    3. Malignant neoplasm metastatic to right lung    4. Bone lesion    5. Diarrhea, unspecified type    6. Abdominal gas pain    7. Liver hemangioma                Plan:             # HCC  Scirrhous subtype, which is very uncommon (~ 1% of all HCC); prognosis is likely similar to typical HCC.  No clear underlying liver pathology in this patient.  Had margin negative resection with negative lymph node eval on 6/28/21 with Dr. Howell.  Unfortunately she has biopsy proven recurrent metastatic disease to her RLL s/p IR biopsy 4/10/23.   She was discussed at thoracic tumor board with potential plan for surgical resection with Dr. Tadeo.  She had a concerning bone lesion on PET from 4/26 at T2.  This was biopsied and proven to be benign.  In the interim she had a repeat abdominal MRI on 6/13/23 which demonstrated growth of a portacaval lymph node that was very concerning for metastatic disease when we reviewed her imaging at liver conference.  Meanwhile her RLL met has grown slightly on 6/21/23 CT as well.  I discussed her case with Valerie Dhillon and Shwetha and we were all in agreement with proceeding with systemic therapy rather than surgery or radiation given multifocal progression.    I discussed this with her and she is agreeable with starting systemic therapy.  Reviewed possibilities of atezo + magaly or durva + treme.  She wished to proceed with STRIDE regimen: durvalumab + tremelimumab.    Received cycle 1 on 7/11/23.  Repeat CT CAP after cycle 3 demonstrates mild growth in albert hepatis lymph node.  Stable disease elsewhere.    Discussed with Dr. Mendiola and she appears to be a candidate for radiation to the progressing abdominal lymph node.  Will continue with STRIDE  regimen, meanwhile.  Patient agreeable.  Proceed with cycle 4 today - durvalumab alone.  Will give IVF per her request with infusion today.    Saw Dr. Sabillon of cardiology who recommended troponin and ECG monitoring given her cardiac history.  She is asymptomatic at present.    Will plan to bring her back in 4 weeks for cycle 5.  Repeat imaging prior to cycle 7.    # Neutropenia  Resolved.  Has experienced in past.  Consideration to benign etiology.    # Liver hemangiomas  Continue monitoring as indicated with Dr. Rogers.    Follow up: 4 weeks.    The above information has been reviewed with the patient and all questions have been answered to their apparent satisfaction.  They understand that they can call the clinic with any questions.    Philippe Carrasco MD  Hematology/Oncology  [facility]    Route Chart for Scheduling    Med Onc Chart Routing      Follow up with physician 4 weeks and 2 months. for durvalumab   Follow up with MELA    Infusion scheduling note    Injection scheduling note    Labs CBC, CMP and TSH   Scheduling:  Preferred lab:  Lab interval:  & AFP   Imaging    Pharmacy appointment    Other referrals                  Treatment Plan Information   OP TREMELIMUMAB 300 MG (X 1) + DURVALUMAB 1500 MG Q4W   Philippe Carrasco MD   Upcoming Treatment Dates - OP TREMELIMUMAB 300 MG (X 1) + DURVALUMAB 1500 MG Q4W    10/30/2023       Chemotherapy       durvalumab (IMFINZI) 1,500 mg in sodium chloride 0.9% SolP 280 mL chemo infusion       Antiemetics       prochlorperazine injection Soln 5 mg  11/27/2023       Chemotherapy       durvalumab (IMFINZI) 1,500 mg in sodium chloride 0.9% SolP 280 mL chemo infusion       Antiemetics       prochlorperazine injection Soln 5 mg  12/25/2023       Chemotherapy       durvalumab (IMFINZI) 1,500 mg in sodium chloride 0.9% SolP 280 mL chemo infusion       Antiemetics       prochlorperazine injection Soln 5 mg  1/22/2024       Chemotherapy        durvalumab (IMFINZI) 1,500 mg in sodium chloride 0.9% SolP 280 mL chemo infusion       Antiemetics       prochlorperazine injection Soln 5 mg

## 2023-10-06 ENCOUNTER — LAB VISIT (OUTPATIENT)
Dept: LAB | Facility: HOSPITAL | Age: 43
End: 2023-10-06
Attending: INTERNAL MEDICINE
Payer: COMMERCIAL

## 2023-10-06 ENCOUNTER — INFUSION (OUTPATIENT)
Dept: INFUSION THERAPY | Facility: HOSPITAL | Age: 43
End: 2023-10-06
Payer: COMMERCIAL

## 2023-10-06 ENCOUNTER — OFFICE VISIT (OUTPATIENT)
Dept: HEMATOLOGY/ONCOLOGY | Facility: CLINIC | Age: 43
End: 2023-10-06
Payer: COMMERCIAL

## 2023-10-06 VITALS
RESPIRATION RATE: 18 BRPM | SYSTOLIC BLOOD PRESSURE: 150 MMHG | HEART RATE: 61 BPM | DIASTOLIC BLOOD PRESSURE: 75 MMHG | TEMPERATURE: 98 F

## 2023-10-06 DIAGNOSIS — R19.7 DIARRHEA, UNSPECIFIED TYPE: ICD-10-CM

## 2023-10-06 DIAGNOSIS — M89.9 BONE LESION: ICD-10-CM

## 2023-10-06 DIAGNOSIS — R14.1 ABDOMINAL GAS PAIN: ICD-10-CM

## 2023-10-06 DIAGNOSIS — C22.0 HEPATOCELLULAR CARCINOMA: Primary | ICD-10-CM

## 2023-10-06 DIAGNOSIS — C78.01 MALIGNANT NEOPLASM METASTATIC TO RIGHT LUNG: ICD-10-CM

## 2023-10-06 DIAGNOSIS — C77.9 REGIONAL LYMPH NODE METASTASIS PRESENT: ICD-10-CM

## 2023-10-06 DIAGNOSIS — D18.03 LIVER HEMANGIOMA: ICD-10-CM

## 2023-10-06 DIAGNOSIS — C22.0 HCC (HEPATOCELLULAR CARCINOMA): Primary | ICD-10-CM

## 2023-10-06 DIAGNOSIS — C22.0 HCC (HEPATOCELLULAR CARCINOMA): ICD-10-CM

## 2023-10-06 LAB
AFP SERPL-MCNC: 9.2 NG/ML (ref 0–8.4)
ALBUMIN SERPL BCP-MCNC: 3.7 G/DL (ref 3.5–5.2)
ALP SERPL-CCNC: 44 U/L (ref 55–135)
ALT SERPL W/O P-5'-P-CCNC: 17 U/L (ref 10–44)
ANION GAP SERPL CALC-SCNC: 7 MMOL/L (ref 8–16)
AST SERPL-CCNC: 15 U/L (ref 10–40)
BILIRUB SERPL-MCNC: 1.1 MG/DL (ref 0.1–1)
BUN SERPL-MCNC: 9 MG/DL (ref 6–20)
CALCIUM SERPL-MCNC: 9.2 MG/DL (ref 8.7–10.5)
CHLORIDE SERPL-SCNC: 105 MMOL/L (ref 95–110)
CO2 SERPL-SCNC: 24 MMOL/L (ref 23–29)
CREAT SERPL-MCNC: 0.8 MG/DL (ref 0.5–1.4)
ERYTHROCYTE [DISTWIDTH] IN BLOOD BY AUTOMATED COUNT: 11.9 % (ref 11.5–14.5)
EST. GFR  (NO RACE VARIABLE): >60 ML/MIN/1.73 M^2
GLUCOSE SERPL-MCNC: 117 MG/DL (ref 70–110)
HCT VFR BLD AUTO: 43.2 % (ref 37–48.5)
HGB BLD-MCNC: 14.1 G/DL (ref 12–16)
IMM GRANULOCYTES # BLD AUTO: 0.01 K/UL (ref 0–0.04)
MCH RBC QN AUTO: 30.8 PG (ref 27–31)
MCHC RBC AUTO-ENTMCNC: 32.6 G/DL (ref 32–36)
MCV RBC AUTO: 94 FL (ref 82–98)
NEUTROPHILS # BLD AUTO: 2.5 K/UL (ref 1.8–7.7)
PLATELET # BLD AUTO: 269 K/UL (ref 150–450)
PMV BLD AUTO: 10.2 FL (ref 9.2–12.9)
POTASSIUM SERPL-SCNC: 4.3 MMOL/L (ref 3.5–5.1)
PROT SERPL-MCNC: 7.2 G/DL (ref 6–8.4)
RBC # BLD AUTO: 4.58 M/UL (ref 4–5.4)
SODIUM SERPL-SCNC: 136 MMOL/L (ref 136–145)
TSH SERPL DL<=0.005 MIU/L-ACNC: 0.75 UIU/ML (ref 0.4–4)
WBC # BLD AUTO: 4.13 K/UL (ref 3.9–12.7)

## 2023-10-06 PROCEDURE — 25000003 PHARM REV CODE 250: Performed by: INTERNAL MEDICINE

## 2023-10-06 PROCEDURE — 99215 PR OFFICE/OUTPT VISIT, EST, LEVL V, 40-54 MIN: ICD-10-PCS | Mod: 95,,, | Performed by: INTERNAL MEDICINE

## 2023-10-06 PROCEDURE — 82105 ALPHA-FETOPROTEIN SERUM: CPT | Performed by: INTERNAL MEDICINE

## 2023-10-06 PROCEDURE — 80053 COMPREHEN METABOLIC PANEL: CPT | Performed by: INTERNAL MEDICINE

## 2023-10-06 PROCEDURE — 36415 COLL VENOUS BLD VENIPUNCTURE: CPT | Performed by: INTERNAL MEDICINE

## 2023-10-06 PROCEDURE — 99215 OFFICE O/P EST HI 40 MIN: CPT | Mod: 95,,, | Performed by: INTERNAL MEDICINE

## 2023-10-06 PROCEDURE — 85027 COMPLETE CBC AUTOMATED: CPT | Performed by: INTERNAL MEDICINE

## 2023-10-06 PROCEDURE — 96413 CHEMO IV INFUSION 1 HR: CPT

## 2023-10-06 PROCEDURE — 3044F PR MOST RECENT HEMOGLOBIN A1C LEVEL <7.0%: ICD-10-PCS | Mod: CPTII,95,, | Performed by: INTERNAL MEDICINE

## 2023-10-06 PROCEDURE — 3044F HG A1C LEVEL LT 7.0%: CPT | Mod: CPTII,95,, | Performed by: INTERNAL MEDICINE

## 2023-10-06 PROCEDURE — 63600175 PHARM REV CODE 636 W HCPCS: Performed by: INTERNAL MEDICINE

## 2023-10-06 PROCEDURE — 84443 ASSAY THYROID STIM HORMONE: CPT | Performed by: INTERNAL MEDICINE

## 2023-10-06 RX ORDER — HEPARIN 100 UNIT/ML
500 SYRINGE INTRAVENOUS
Status: DISCONTINUED | OUTPATIENT
Start: 2023-10-06 | End: 2023-10-06 | Stop reason: HOSPADM

## 2023-10-06 RX ORDER — SODIUM CHLORIDE, SODIUM LACTATE, POTASSIUM CHLORIDE, CALCIUM CHLORIDE 600; 310; 30; 20 MG/100ML; MG/100ML; MG/100ML; MG/100ML
INJECTION, SOLUTION INTRAVENOUS CONTINUOUS
Status: DISCONTINUED | OUTPATIENT
Start: 2023-10-06 | End: 2023-10-06 | Stop reason: HOSPADM

## 2023-10-06 RX ORDER — EPINEPHRINE 0.3 MG/.3ML
0.3 INJECTION SUBCUTANEOUS ONCE AS NEEDED
Status: DISCONTINUED | OUTPATIENT
Start: 2023-10-06 | End: 2023-10-06 | Stop reason: HOSPADM

## 2023-10-06 RX ORDER — DIPHENHYDRAMINE HYDROCHLORIDE 50 MG/ML
50 INJECTION INTRAMUSCULAR; INTRAVENOUS ONCE AS NEEDED
Status: DISCONTINUED | OUTPATIENT
Start: 2023-10-06 | End: 2023-10-06 | Stop reason: HOSPADM

## 2023-10-06 RX ORDER — SODIUM CHLORIDE 0.9 % (FLUSH) 0.9 %
10 SYRINGE (ML) INJECTION
Status: DISCONTINUED | OUTPATIENT
Start: 2023-10-06 | End: 2023-10-06 | Stop reason: HOSPADM

## 2023-10-06 RX ORDER — PROCHLORPERAZINE EDISYLATE 5 MG/ML
5 INJECTION INTRAMUSCULAR; INTRAVENOUS ONCE AS NEEDED
Status: DISCONTINUED | OUTPATIENT
Start: 2023-10-06 | End: 2023-10-06 | Stop reason: HOSPADM

## 2023-10-06 RX ADMIN — SODIUM CHLORIDE, POTASSIUM CHLORIDE, SODIUM LACTATE AND CALCIUM CHLORIDE: 600; 310; 30; 20 INJECTION, SOLUTION INTRAVENOUS at 09:10

## 2023-10-06 RX ADMIN — SODIUM CHLORIDE 1500 MG: 9 INJECTION, SOLUTION INTRAVENOUS at 09:10

## 2023-10-06 NOTE — PLAN OF CARE
1045 pt tolerated Imfinzi/ iv fluids without issue. Pt to rtc 11/3/23. Pt d/c to home, no distress noted.

## 2023-10-06 NOTE — PLAN OF CARE
0910 pt here for iv fluids and tx Imfinzi. Pts chart reviewed allergies, hx, meds, tx. Pt reclined in chair. No distress noted.

## 2023-10-06 NOTE — Clinical Note
Luis Roberts, I discussed with her radiation to the enlarging albert hepatis lymph node and she is agreeable.  Can your team bring her in for consultation please? Thanks! Jose L

## 2023-10-07 ENCOUNTER — PATIENT MESSAGE (OUTPATIENT)
Dept: HEMATOLOGY/ONCOLOGY | Facility: CLINIC | Age: 43
End: 2023-10-07
Payer: COMMERCIAL

## 2023-10-09 ENCOUNTER — TELEPHONE (OUTPATIENT)
Dept: RADIATION ONCOLOGY | Facility: CLINIC | Age: 43
End: 2023-10-09
Payer: COMMERCIAL

## 2023-10-11 ENCOUNTER — OFFICE VISIT (OUTPATIENT)
Dept: RADIATION ONCOLOGY | Facility: CLINIC | Age: 43
End: 2023-10-11
Payer: COMMERCIAL

## 2023-10-11 ENCOUNTER — PATIENT MESSAGE (OUTPATIENT)
Dept: RADIATION ONCOLOGY | Facility: CLINIC | Age: 43
End: 2023-10-11

## 2023-10-11 VITALS
WEIGHT: 153.25 LBS | BODY MASS INDEX: 21.45 KG/M2 | HEIGHT: 71 IN | HEART RATE: 75 BPM | DIASTOLIC BLOOD PRESSURE: 83 MMHG | SYSTOLIC BLOOD PRESSURE: 141 MMHG | OXYGEN SATURATION: 100 %

## 2023-10-11 DIAGNOSIS — C22.0 HCC (HEPATOCELLULAR CARCINOMA): Primary | ICD-10-CM

## 2023-10-11 PROCEDURE — 99205 PR OFFICE/OUTPT VISIT, NEW, LEVL V, 60-74 MIN: ICD-10-PCS | Mod: S$GLB,,, | Performed by: STUDENT IN AN ORGANIZED HEALTH CARE EDUCATION/TRAINING PROGRAM

## 2023-10-11 PROCEDURE — 1159F MED LIST DOCD IN RCRD: CPT | Mod: CPTII,S$GLB,, | Performed by: STUDENT IN AN ORGANIZED HEALTH CARE EDUCATION/TRAINING PROGRAM

## 2023-10-11 PROCEDURE — 3077F SYST BP >= 140 MM HG: CPT | Mod: CPTII,S$GLB,, | Performed by: STUDENT IN AN ORGANIZED HEALTH CARE EDUCATION/TRAINING PROGRAM

## 2023-10-11 PROCEDURE — 3008F BODY MASS INDEX DOCD: CPT | Mod: CPTII,S$GLB,, | Performed by: STUDENT IN AN ORGANIZED HEALTH CARE EDUCATION/TRAINING PROGRAM

## 2023-10-11 PROCEDURE — 3008F PR BODY MASS INDEX (BMI) DOCUMENTED: ICD-10-PCS | Mod: CPTII,S$GLB,, | Performed by: STUDENT IN AN ORGANIZED HEALTH CARE EDUCATION/TRAINING PROGRAM

## 2023-10-11 PROCEDURE — 99999 PR PBB SHADOW E&M-EST. PATIENT-LVL V: ICD-10-PCS | Mod: PBBFAC,,, | Performed by: STUDENT IN AN ORGANIZED HEALTH CARE EDUCATION/TRAINING PROGRAM

## 2023-10-11 PROCEDURE — 3077F PR MOST RECENT SYSTOLIC BLOOD PRESSURE >= 140 MM HG: ICD-10-PCS | Mod: CPTII,S$GLB,, | Performed by: STUDENT IN AN ORGANIZED HEALTH CARE EDUCATION/TRAINING PROGRAM

## 2023-10-11 PROCEDURE — 3044F PR MOST RECENT HEMOGLOBIN A1C LEVEL <7.0%: ICD-10-PCS | Mod: CPTII,S$GLB,, | Performed by: STUDENT IN AN ORGANIZED HEALTH CARE EDUCATION/TRAINING PROGRAM

## 2023-10-11 PROCEDURE — 1160F PR REVIEW ALL MEDS BY PRESCRIBER/CLIN PHARMACIST DOCUMENTED: ICD-10-PCS | Mod: CPTII,S$GLB,, | Performed by: STUDENT IN AN ORGANIZED HEALTH CARE EDUCATION/TRAINING PROGRAM

## 2023-10-11 PROCEDURE — 1160F RVW MEDS BY RX/DR IN RCRD: CPT | Mod: CPTII,S$GLB,, | Performed by: STUDENT IN AN ORGANIZED HEALTH CARE EDUCATION/TRAINING PROGRAM

## 2023-10-11 PROCEDURE — 3044F HG A1C LEVEL LT 7.0%: CPT | Mod: CPTII,S$GLB,, | Performed by: STUDENT IN AN ORGANIZED HEALTH CARE EDUCATION/TRAINING PROGRAM

## 2023-10-11 PROCEDURE — 3079F DIAST BP 80-89 MM HG: CPT | Mod: CPTII,S$GLB,, | Performed by: STUDENT IN AN ORGANIZED HEALTH CARE EDUCATION/TRAINING PROGRAM

## 2023-10-11 PROCEDURE — 99205 OFFICE O/P NEW HI 60 MIN: CPT | Mod: S$GLB,,, | Performed by: STUDENT IN AN ORGANIZED HEALTH CARE EDUCATION/TRAINING PROGRAM

## 2023-10-11 PROCEDURE — 3079F PR MOST RECENT DIASTOLIC BLOOD PRESSURE 80-89 MM HG: ICD-10-PCS | Mod: CPTII,S$GLB,, | Performed by: STUDENT IN AN ORGANIZED HEALTH CARE EDUCATION/TRAINING PROGRAM

## 2023-10-11 PROCEDURE — 1159F PR MEDICATION LIST DOCUMENTED IN MEDICAL RECORD: ICD-10-PCS | Mod: CPTII,S$GLB,, | Performed by: STUDENT IN AN ORGANIZED HEALTH CARE EDUCATION/TRAINING PROGRAM

## 2023-10-11 PROCEDURE — 99999 PR PBB SHADOW E&M-EST. PATIENT-LVL V: CPT | Mod: PBBFAC,,, | Performed by: STUDENT IN AN ORGANIZED HEALTH CARE EDUCATION/TRAINING PROGRAM

## 2023-10-11 NOTE — PROGRESS NOTES
Radiation Oncology Consult Note        Date of Service: 10/11/2023     Chief Complaint: Stage 4 HCC     Reason for visit: consideration for SBRT to oligo metastatic sites     Referring Physician: Dr Carrasco ([facility])     Implantable devices: denies     Therapy to Date:  No radiation     Diagnosis/Assessment:   Melly Hwang is a 43 y.o. woman with Stage IVB (rcT1b, cN0, pM1) HCC, s/p resection with R liver partial hepatectomy (6/28/2021 Dr. Howell), SM-, no LN involved, with biopsy proven (4/10/2023) recurrence in the RLL and enlarging portocaval LN, on durvalumab + tremelimumab (STRIDE regimen with Dr Carrasco) since 7/11/2023 with oligoprogression.     PMH not significant     ECOG 0, asymptomatic    Referred for consideration of SBRT to oligo metastatic sites     Plan   After discussing her case with Dr Carrasco, I would like to offer both    - SBRT 3-5 fractions QOD using VMAT to the enlarging portocaval LN. Side effects include but are not limited to nausea, vomiting, diarrhea and constipation    - SBRT 3-5 fractions QOD using VMAT to the recurrence in the RLL. Side effects include but are not limited to CP, cough, rib pain/fracture and neuritis to the spinal cord.    Our goal is to treat both lesions at the same time    I reviewed the rationale and goal of the proposed treatment course. The radiotherapeutic procedure with associated side effects and potential complications were explained. They had the opportunity to ask questions which were answered to the best of my knowledge.      The patient voiced understanding of the above       The patient signed RT informed consent after I answered questions to their apparent satisfaction.     - chemo per Dr Carrasco  - RT consent signed today  - 10/13/2023 4DCT SIM NPO x 4 hurs  - recommend Miralax maintenance daily while eating dinner  - patient requested oncopsych eval       HPI:   Melly Hwang is a 43 y.o. woman with Stage IVB (rcT1b, cN0, pM1)  HCC    Recent oncologic history:  4/10/2023 RLL IR biopsy: metastatic hepatocellular carcinoma.     5/30/2023 T2 bone biopsy: negative for neoplasm     7/11/2023 started durvalumab + tremelimumab (Dr Carrasco)    10/2/2023 CT CAP notable for   a 2.8 x 3.9-cm (previously 2.2 x 3.0 cm) abnormally enlarged albert hepatis lymph node (3:51).  a 1.6 cm (previously 1.3 cm) solid nodule in the right lower lobe.    a 0.3cm left upper lobe 0.5 cm right upper lobe solid pulmonary parenchymal nodules, not significantly changed.        10/5/2023 last follow-up Dr Carrasco      Subjective:   In clinic the patient is alone and reports feeling OK overall.     She is asymptomatic from her lung nodule and her albert hepatis node.  She denies any major respiratory issues such as CP, SOB, FINLEY, hemoptysis, or voice changes  She denies any major GI issues such as nausea, vomiting, diarrhea or constipation. Her BMs are normal and take place about 3x/week. She takes Prilosec due to systemic therapy symptom control.  Her main issues are leg edema, sinus congestion, and treatment related anxiety.  She is very active and works out.     The patient denies any history of radiation therapy, implantable cardiac devices, or connective tissue disease.     Social history  Works for [employer] and lives in [place]  Social History     Tobacco Use    Smoking status: Never    Smokeless tobacco: Never   Substance Use Topics    Alcohol use: Yes     Comment: rare    Drug use: No         Family History   Problem Relation Age of Onset    Diabetes Mother     Liver disease Mother         fatty liver    Heart disease Father     Macular degeneration Father     Diabetes Father     Hypertension Father     Hyperlipidemia Father     Heart disease Sister     Lung cancer Maternal Aunt         heavy smoker    Cerebral aneurysm Paternal Aunt     Cataracts Neg Hx     Glaucoma Neg Hx     Retinal detachment Neg Hx     Stroke Neg Hx     Thyroid disease Neg Hx     Melanoma Neg Hx      Heart attack Neg Hx          Past Surgical History:   Procedure Laterality Date    COLONOSCOPY N/A 09/21/2020    Procedure: COLONOSCOPY;  Surgeon: GIOVANNI Crane MD;  Location: [facility] ENDO (4TH FLR);  Service: Endoscopy;  Laterality: N/A;  + covid 4/22    HERNIA REPAIR      LIVER SURGERY N/A 06/28/2021    NASAL SEPTUM SURGERY      PERICARDIAL WINDOW N/A 02/22/2021    Procedure: CREATION, PERICARDIAL WINDOW;  Surgeon: Ajay Cantor MD;  Location: [facility] OR 2ND FLR;  Service: Cardiovascular;  Laterality: N/A;    PERICARDIOCENTESIS N/A 05/02/2020    Procedure: Pericardiocentesis;  Surgeon: Dickson Dangelo MD;  Location: [facility] CATH LAB;  Service: Cardiology;  Laterality: N/A;    TONSILLECTOMY         Past Medical History:   Diagnosis Date    VENKATA positive 08/20/2020    COVID-19     Deviated septum 2011    Hepatocellular carcinoma 05/07/2021    Hypertrophy of inferior nasal turbinate     Pericardial effusion     Premature menopause          Review of patient's allergies indicates:   Allergen Reactions    No known drug allergies        Current Outpatient Medications on File Prior to Visit   Medication Sig Dispense Refill    ALPRAZolam (XANAX) 0.5 MG tablet Take 1 tablet (0.5 mg total) by mouth daily as needed for Anxiety. 30 tablet 2    cetirizine (ZYRTEC) 10 MG tablet Take 1 tablet (10 mg total) by mouth once daily. 90 tablet 0    cyanocobalamin (VITAMIN B-12) 1000 MCG tablet Take 1 tablet (1,000 mcg total) by mouth once daily. 30 tablet 12    fluticasone propionate (FLONASE) 50 mcg/actuation nasal spray 2 sprays (100 mcg total) by Each Nostril route once daily. 18.2 mL 6    multivitamin capsule Take by mouth. 1 capsule Oral Every morning      norethindrone-ethinyl estradiol (MICROGESTIN 1/20) 1-20 mg-mcg per tablet Take 1 tablet by mouth once daily. 63 tablet 4    omeprazole (PRILOSEC) 20 MG capsule TAKE 1 CAPSULE BY MOUTH DAILY AS NEEDED FOR GERD 90 capsule 1    ondansetron (ZOFRAN-ODT) 4 MG TbDL Take 1 tablet  (4 mg total) by mouth 2 (two) times daily. 2 tablet 0    tiZANidine (ZANAFLEX) 4 MG tablet Take 1 tablet (4 mg total) by mouth every 6 (six) hours as needed (pain/spasm). 30 tablet 1    triamcinolone acetonide 0.1% (KENALOG) 0.1 % cream Apply topically 2 (two) times daily as needed (scaling at external ears). Avoid use on face, body folds, groin/genitalia. 45 g 3     No current facility-administered medications on file prior to visit.                 Review of Systems   Negative unless as above       Objective:   Physical Exam  Vitals reviewed.     Constitutional:       Appearance: Normal appearance.   HENT:      Head: Normocephalic and atraumatic.   Pulmonary:      Effort: Pulmonary effort is normal.   Abdominal:      General: There is no distension.  Musculoskeletal:         General: Normal range of motion.   Neurological:      General: No focal deficit present.      Mental Status: Alert and oriented  Psychiatric:         Mood and Affect: Mood normal.         Behavior: Behavior normal.      Imaging: I have personally reviewed the patient's available images and reports and summarized pertinent findings above in HPI.      Pathology: I have personally reviewed the patient's available pathology and summarized pertinent findings above in HPI.         I spent approximately 60 minutes reviewing the available records and evaluating the patient, out of which over 50% of the time was spent face to face with the patient in counseling and coordinating this patient's care.     Thank you for the opportunity to care for this patient. Please do not hesitate to contact me with any questions.     Armando Mendiola MD/PhD

## 2023-10-13 ENCOUNTER — TELEPHONE (OUTPATIENT)
Dept: INFUSION THERAPY | Facility: HOSPITAL | Age: 43
End: 2023-10-13
Payer: COMMERCIAL

## 2023-10-13 ENCOUNTER — HOSPITAL ENCOUNTER (OUTPATIENT)
Dept: RADIATION THERAPY | Facility: HOSPITAL | Age: 43
Discharge: HOME | End: 2023-10-13
Attending: INTERNAL MEDICINE
Payer: COMMERCIAL

## 2023-10-13 ENCOUNTER — PROCEDURE VISIT (OUTPATIENT)
Dept: NEUROLOGY | Facility: CLINIC | Age: 43
End: 2023-10-13
Payer: COMMERCIAL

## 2023-10-13 DIAGNOSIS — C22.0 HEPATOCELLULAR CARCINOMA: Primary | ICD-10-CM

## 2023-10-13 DIAGNOSIS — M54.12 CERVICAL RADICULOPATHY: ICD-10-CM

## 2023-10-13 LAB
B-HCG UR QL: NEGATIVE
CTP QC/QA: YES

## 2023-10-13 PROCEDURE — 77334 RADIATION TREATMENT AID(S): CPT | Mod: 26,,, | Performed by: STUDENT IN AN ORGANIZED HEALTH CARE EDUCATION/TRAINING PROGRAM

## 2023-10-13 PROCEDURE — 77263 PR  RADIATION THERAPY PLAN COMPLEX: ICD-10-PCS | Mod: ,,, | Performed by: STUDENT IN AN ORGANIZED HEALTH CARE EDUCATION/TRAINING PROGRAM

## 2023-10-13 PROCEDURE — 77014 PR  CT GUIDANCE PLACEMENT RAD THERAPY FIELDS: ICD-10-PCS | Mod: 26,,, | Performed by: STUDENT IN AN ORGANIZED HEALTH CARE EDUCATION/TRAINING PROGRAM

## 2023-10-13 PROCEDURE — 77334 PR  RADN TREATMENT AID(S) COMPLX: ICD-10-PCS | Mod: 26,,, | Performed by: STUDENT IN AN ORGANIZED HEALTH CARE EDUCATION/TRAINING PROGRAM

## 2023-10-13 PROCEDURE — 95913 PR NERVE CONDUCTION STUDY; 13 OR MORE STUDIES: ICD-10-PCS | Mod: S$GLB,,, | Performed by: PSYCHIATRY & NEUROLOGY

## 2023-10-13 PROCEDURE — 77263 THER RADIOLOGY TX PLNG CPLX: CPT | Mod: ,,, | Performed by: STUDENT IN AN ORGANIZED HEALTH CARE EDUCATION/TRAINING PROGRAM

## 2023-10-13 PROCEDURE — 77014 HC CT GUIDANCE RADIATION THERAPY FLDS PLACEMENT: CPT | Mod: TC | Performed by: STUDENT IN AN ORGANIZED HEALTH CARE EDUCATION/TRAINING PROGRAM

## 2023-10-13 PROCEDURE — 95886 PR EMG COMPLETE, W/ NERVE CONDUCTION STUDIES, 5+ MUSCLES: ICD-10-PCS | Mod: S$GLB,,, | Performed by: PSYCHIATRY & NEUROLOGY

## 2023-10-13 PROCEDURE — 95886 MUSC TEST DONE W/N TEST COMP: CPT | Mod: S$GLB,,, | Performed by: PSYCHIATRY & NEUROLOGY

## 2023-10-13 PROCEDURE — 95913 NRV CNDJ TEST 13/> STUDIES: CPT | Mod: S$GLB,,, | Performed by: PSYCHIATRY & NEUROLOGY

## 2023-10-13 PROCEDURE — 81025 URINE PREGNANCY TEST: CPT | Performed by: RADIOLOGY

## 2023-10-13 PROCEDURE — 77334 RADIATION TREATMENT AID(S): CPT | Mod: TC | Performed by: STUDENT IN AN ORGANIZED HEALTH CARE EDUCATION/TRAINING PROGRAM

## 2023-10-13 PROCEDURE — 77014 PR  CT GUIDANCE PLACEMENT RAD THERAPY FIELDS: CPT | Mod: 26,,, | Performed by: STUDENT IN AN ORGANIZED HEALTH CARE EDUCATION/TRAINING PROGRAM

## 2023-10-13 NOTE — ADDENDUM NOTE
Encounter addended by: Sarah Guajardo LPN on: 10/13/2023 2:41 PM   Actions taken: DIANA properties accepted, MAR administration accepted

## 2023-10-16 ENCOUNTER — PATIENT MESSAGE (OUTPATIENT)
Dept: INTERNAL MEDICINE | Facility: CLINIC | Age: 43
End: 2023-10-16
Payer: COMMERCIAL

## 2023-10-16 ENCOUNTER — PATIENT MESSAGE (OUTPATIENT)
Dept: RADIATION ONCOLOGY | Facility: CLINIC | Age: 43
End: 2023-10-16
Payer: COMMERCIAL

## 2023-10-16 PROBLEM — G56.01 CARPAL TUNNEL SYNDROME OF RIGHT WRIST: Status: ACTIVE | Noted: 2023-10-16

## 2023-10-18 ENCOUNTER — TELEPHONE (OUTPATIENT)
Dept: INFUSION THERAPY | Facility: HOSPITAL | Age: 43
End: 2023-10-18
Payer: COMMERCIAL

## 2023-10-20 PROCEDURE — 77301 RADIOTHERAPY DOSE PLAN IMRT: CPT | Mod: TC | Performed by: STUDENT IN AN ORGANIZED HEALTH CARE EDUCATION/TRAINING PROGRAM

## 2023-10-20 PROCEDURE — 77301 PR  INTEN MOD RADIOTHER PLAN W/DOSE VOL HIST: ICD-10-PCS | Mod: 26,,, | Performed by: STUDENT IN AN ORGANIZED HEALTH CARE EDUCATION/TRAINING PROGRAM

## 2023-10-20 PROCEDURE — 77301 RADIOTHERAPY DOSE PLAN IMRT: CPT | Mod: 26,,, | Performed by: STUDENT IN AN ORGANIZED HEALTH CARE EDUCATION/TRAINING PROGRAM

## 2023-10-20 PROCEDURE — 77293 RESPIRATOR MOTION MGMT SIMUL: CPT | Mod: TC | Performed by: STUDENT IN AN ORGANIZED HEALTH CARE EDUCATION/TRAINING PROGRAM

## 2023-10-21 PROCEDURE — 77338 DESIGN MLC DEVICE FOR IMRT: CPT | Mod: TC | Performed by: STUDENT IN AN ORGANIZED HEALTH CARE EDUCATION/TRAINING PROGRAM

## 2023-10-21 PROCEDURE — 77470 PR  SPECIAL RADIATION TREATMENT: ICD-10-PCS | Mod: 26,59,, | Performed by: STUDENT IN AN ORGANIZED HEALTH CARE EDUCATION/TRAINING PROGRAM

## 2023-10-21 PROCEDURE — 77470 SPECIAL RADIATION TREATMENT: CPT | Mod: 26,59,, | Performed by: STUDENT IN AN ORGANIZED HEALTH CARE EDUCATION/TRAINING PROGRAM

## 2023-10-21 PROCEDURE — 77300 PR RADIATION THERAPY,DOSIMETRY PLAN: ICD-10-PCS | Mod: 26,,, | Performed by: STUDENT IN AN ORGANIZED HEALTH CARE EDUCATION/TRAINING PROGRAM

## 2023-10-21 PROCEDURE — 77300 RADIATION THERAPY DOSE PLAN: CPT | Mod: TC | Performed by: STUDENT IN AN ORGANIZED HEALTH CARE EDUCATION/TRAINING PROGRAM

## 2023-10-21 PROCEDURE — 77300 RADIATION THERAPY DOSE PLAN: CPT | Mod: 26,,, | Performed by: STUDENT IN AN ORGANIZED HEALTH CARE EDUCATION/TRAINING PROGRAM

## 2023-10-21 PROCEDURE — 77338 DESIGN MLC DEVICE FOR IMRT: CPT | Mod: 26,,, | Performed by: STUDENT IN AN ORGANIZED HEALTH CARE EDUCATION/TRAINING PROGRAM

## 2023-10-21 PROCEDURE — 77338 PR  MLC IMRT DESIGN & CONSTRUCTION PER IMRT PLAN: ICD-10-PCS | Mod: 26,,, | Performed by: STUDENT IN AN ORGANIZED HEALTH CARE EDUCATION/TRAINING PROGRAM

## 2023-10-21 PROCEDURE — 77470 SPECIAL RADIATION TREATMENT: CPT | Mod: 59,TC | Performed by: STUDENT IN AN ORGANIZED HEALTH CARE EDUCATION/TRAINING PROGRAM

## 2023-10-21 PROCEDURE — 77370 RADIATION PHYSICS CONSULT: CPT | Performed by: STUDENT IN AN ORGANIZED HEALTH CARE EDUCATION/TRAINING PROGRAM

## 2023-10-23 PROCEDURE — 77435 PR  RADN RX DELIV,BODY, MANAGEMENT, PER COURSE: ICD-10-PCS | Mod: ,,, | Performed by: STUDENT IN AN ORGANIZED HEALTH CARE EDUCATION/TRAINING PROGRAM

## 2023-10-23 PROCEDURE — 77014 HC CT GUIDANCE RADIATION THERAPY FLDS PLACEMENT: CPT | Mod: TC | Performed by: STUDENT IN AN ORGANIZED HEALTH CARE EDUCATION/TRAINING PROGRAM

## 2023-10-23 PROCEDURE — 77014 PR  CT GUIDANCE PLACEMENT RAD THERAPY FIELDS: ICD-10-PCS | Mod: 26,,, | Performed by: STUDENT IN AN ORGANIZED HEALTH CARE EDUCATION/TRAINING PROGRAM

## 2023-10-23 PROCEDURE — 77014 PR  CT GUIDANCE PLACEMENT RAD THERAPY FIELDS: CPT | Mod: 26,,, | Performed by: STUDENT IN AN ORGANIZED HEALTH CARE EDUCATION/TRAINING PROGRAM

## 2023-10-23 PROCEDURE — 77435 SBRT MANAGEMENT: CPT | Mod: ,,, | Performed by: STUDENT IN AN ORGANIZED HEALTH CARE EDUCATION/TRAINING PROGRAM

## 2023-10-23 PROCEDURE — 77373 STRTCTC BDY RAD THER TX DLVR: CPT | Performed by: STUDENT IN AN ORGANIZED HEALTH CARE EDUCATION/TRAINING PROGRAM

## 2023-10-25 DIAGNOSIS — C22.0 HCC (HEPATOCELLULAR CARCINOMA): Primary | ICD-10-CM

## 2023-10-25 PROCEDURE — 77373 STRTCTC BDY RAD THER TX DLVR: CPT | Performed by: STUDENT IN AN ORGANIZED HEALTH CARE EDUCATION/TRAINING PROGRAM

## 2023-10-25 PROCEDURE — 77014 PR  CT GUIDANCE PLACEMENT RAD THERAPY FIELDS: ICD-10-PCS | Mod: 26,,, | Performed by: STUDENT IN AN ORGANIZED HEALTH CARE EDUCATION/TRAINING PROGRAM

## 2023-10-25 PROCEDURE — 77014 PR  CT GUIDANCE PLACEMENT RAD THERAPY FIELDS: CPT | Mod: 26,,, | Performed by: STUDENT IN AN ORGANIZED HEALTH CARE EDUCATION/TRAINING PROGRAM

## 2023-10-25 PROCEDURE — 77014 HC CT GUIDANCE RADIATION THERAPY FLDS PLACEMENT: CPT | Mod: TC | Performed by: STUDENT IN AN ORGANIZED HEALTH CARE EDUCATION/TRAINING PROGRAM

## 2023-10-25 RX ORDER — ONDANSETRON 4 MG/1
4 TABLET, ORALLY DISINTEGRATING ORAL EVERY 12 HOURS PRN
Qty: 20 TABLET | Refills: 0 | Status: SHIPPED | OUTPATIENT
Start: 2023-10-25

## 2023-10-27 ENCOUNTER — HOSPITAL ENCOUNTER (OUTPATIENT)
Dept: RADIOLOGY | Facility: HOSPITAL | Age: 43
Discharge: HOME | End: 2023-10-27
Attending: STUDENT IN AN ORGANIZED HEALTH CARE EDUCATION/TRAINING PROGRAM
Payer: COMMERCIAL

## 2023-10-27 ENCOUNTER — PATIENT MESSAGE (OUTPATIENT)
Dept: HEMATOLOGY/ONCOLOGY | Facility: CLINIC | Age: 43
End: 2023-10-27
Payer: COMMERCIAL

## 2023-10-27 ENCOUNTER — PATIENT MESSAGE (OUTPATIENT)
Dept: RADIATION ONCOLOGY | Facility: CLINIC | Age: 43
End: 2023-10-27
Payer: COMMERCIAL

## 2023-10-27 DIAGNOSIS — C22.0 HCC (HEPATOCELLULAR CARCINOMA): Primary | ICD-10-CM

## 2023-10-27 DIAGNOSIS — R10.9 ABDOMINAL PAIN, UNSPECIFIED ABDOMINAL LOCATION: ICD-10-CM

## 2023-10-27 PROCEDURE — 74177 CT ABD & PELVIS W/CONTRAST: CPT | Mod: TC

## 2023-10-27 PROCEDURE — 77014 PR  CT GUIDANCE PLACEMENT RAD THERAPY FIELDS: ICD-10-PCS | Mod: 26,,, | Performed by: STUDENT IN AN ORGANIZED HEALTH CARE EDUCATION/TRAINING PROGRAM

## 2023-10-27 PROCEDURE — 77373 STRTCTC BDY RAD THER TX DLVR: CPT | Performed by: STUDENT IN AN ORGANIZED HEALTH CARE EDUCATION/TRAINING PROGRAM

## 2023-10-27 PROCEDURE — 77014 HC CT GUIDANCE RADIATION THERAPY FLDS PLACEMENT: CPT | Mod: TC | Performed by: STUDENT IN AN ORGANIZED HEALTH CARE EDUCATION/TRAINING PROGRAM

## 2023-10-27 PROCEDURE — 74177 CT ABD & PELVIS W/CONTRAST: CPT | Mod: 26,,, | Performed by: RADIOLOGY

## 2023-10-27 PROCEDURE — 77014 PR  CT GUIDANCE PLACEMENT RAD THERAPY FIELDS: CPT | Mod: 26,,, | Performed by: STUDENT IN AN ORGANIZED HEALTH CARE EDUCATION/TRAINING PROGRAM

## 2023-10-27 PROCEDURE — 74177 CT ABDOMEN PELVIS WITH IV CONTRAST: ICD-10-PCS | Mod: 26,,, | Performed by: RADIOLOGY

## 2023-10-27 PROCEDURE — 25500020 PHARM REV CODE 255: Performed by: STUDENT IN AN ORGANIZED HEALTH CARE EDUCATION/TRAINING PROGRAM

## 2023-10-27 RX ADMIN — IOHEXOL 75 ML: 350 INJECTION, SOLUTION INTRAVENOUS at 03:10

## 2023-10-30 ENCOUNTER — PATIENT MESSAGE (OUTPATIENT)
Dept: PSYCHIATRY | Facility: CLINIC | Age: 43
End: 2023-10-30
Payer: COMMERCIAL

## 2023-10-30 ENCOUNTER — TELEPHONE (OUTPATIENT)
Dept: HEPATOLOGY | Facility: CLINIC | Age: 43
End: 2023-10-30
Payer: COMMERCIAL

## 2023-10-30 ENCOUNTER — OFFICE VISIT (OUTPATIENT)
Dept: PSYCHIATRY | Facility: CLINIC | Age: 43
End: 2023-10-30
Payer: COMMERCIAL

## 2023-10-30 DIAGNOSIS — R45.89 ANXIETY ABOUT HEALTH: Primary | ICD-10-CM

## 2023-10-30 DIAGNOSIS — C22.0 HCC (HEPATOCELLULAR CARCINOMA): ICD-10-CM

## 2023-10-30 DIAGNOSIS — C22.0 HEPATOCELLULAR CARCINOMA: ICD-10-CM

## 2023-10-30 PROBLEM — F41.8 ANXIETY ABOUT HEALTH: Status: ACTIVE | Noted: 2023-10-30

## 2023-10-30 PROCEDURE — 3044F PR MOST RECENT HEMOGLOBIN A1C LEVEL <7.0%: ICD-10-PCS | Mod: CPTII,S$GLB,, | Performed by: PSYCHOLOGIST

## 2023-10-30 PROCEDURE — 77014 HC CT GUIDANCE RADIATION THERAPY FLDS PLACEMENT: CPT | Mod: TC | Performed by: STUDENT IN AN ORGANIZED HEALTH CARE EDUCATION/TRAINING PROGRAM

## 2023-10-30 PROCEDURE — 1160F PR REVIEW ALL MEDS BY PRESCRIBER/CLIN PHARMACIST DOCUMENTED: ICD-10-PCS | Mod: CPTII,S$GLB,, | Performed by: PSYCHOLOGIST

## 2023-10-30 PROCEDURE — 1159F MED LIST DOCD IN RCRD: CPT | Mod: CPTII,S$GLB,, | Performed by: PSYCHOLOGIST

## 2023-10-30 PROCEDURE — 3044F HG A1C LEVEL LT 7.0%: CPT | Mod: CPTII,S$GLB,, | Performed by: PSYCHOLOGIST

## 2023-10-30 PROCEDURE — 1159F PR MEDICATION LIST DOCUMENTED IN MEDICAL RECORD: ICD-10-PCS | Mod: CPTII,S$GLB,, | Performed by: PSYCHOLOGIST

## 2023-10-30 PROCEDURE — 90791 PR PSYCHIATRIC DIAGNOSTIC EVALUATION: ICD-10-PCS | Mod: S$GLB,,, | Performed by: PSYCHOLOGIST

## 2023-10-30 PROCEDURE — 99999 PR PBB SHADOW E&M-EST. PATIENT-LVL II: CPT | Mod: PBBFAC,,, | Performed by: PSYCHOLOGIST

## 2023-10-30 PROCEDURE — 90791 PSYCH DIAGNOSTIC EVALUATION: CPT | Mod: S$GLB,,, | Performed by: PSYCHOLOGIST

## 2023-10-30 PROCEDURE — 1160F RVW MEDS BY RX/DR IN RCRD: CPT | Mod: CPTII,S$GLB,, | Performed by: PSYCHOLOGIST

## 2023-10-30 PROCEDURE — 77014 PR  CT GUIDANCE PLACEMENT RAD THERAPY FIELDS: ICD-10-PCS | Mod: 26,,, | Performed by: STUDENT IN AN ORGANIZED HEALTH CARE EDUCATION/TRAINING PROGRAM

## 2023-10-30 PROCEDURE — 77373 STRTCTC BDY RAD THER TX DLVR: CPT | Performed by: STUDENT IN AN ORGANIZED HEALTH CARE EDUCATION/TRAINING PROGRAM

## 2023-10-30 PROCEDURE — 77014 PR  CT GUIDANCE PLACEMENT RAD THERAPY FIELDS: CPT | Mod: 26,,, | Performed by: STUDENT IN AN ORGANIZED HEALTH CARE EDUCATION/TRAINING PROGRAM

## 2023-10-30 PROCEDURE — 99999 PR PBB SHADOW E&M-EST. PATIENT-LVL II: ICD-10-PCS | Mod: PBBFAC,,, | Performed by: PSYCHOLOGIST

## 2023-10-30 NOTE — PROGRESS NOTES
PSYCHO-ONCOLOGY INTAKE    Diagnostic Interview - CPT 91777    Date: 10/30/2023  Site: [facility]     Evaluation Length (direct face-to-face time):  1 hour     Referral Source: Oncologist:  Armando Mendiola MD    PCP: Melly Sahu MD    Clinical status of patient: Outpatient    Melly Hwang, a 43 y.o. female, seen for initial evaluation visit.  Met with patient.    Chief complaint/reason for encounter: adjustment to illness, anxiety    Medical/Surgical History:    Patient Active Problem List   Diagnosis    Premature ovarian failure    Cardiac enlargement: Echo 2014 normal cardiac chamber size with EF 55%; stable 7/23    Bradycardia    Leukopenia    Nutcracker phenomenon of renal vein: see MRI 2014- seen in Vascular stable 2015    H. pylori infection: tx 2014 stool negative    Liver hemangioma    Pericardial effusion    VENKATA positive    Elevated bilirubin    S/P pericardial window creation    Hepatocellular carcinoma    Hypophosphatemia    Elevated CA 19-9 level    Pleural effusion    Decreased ROM of intervertebral discs of cervical spine    Lung nodule 9/22; enlarged 7/23    Neutropenia, unspecified type    Low serum vitamin B12    Iron excess    Carpal tunnel syndrome of right wrist: mild, see EMG 10/23    Anxiety about health       Health Behaviors:       ETOH Use: Yes (rare)       Tobacco Use: No   THC use: No   Non-prescribed/Illicit Drug Use:  No   Prescription Misuse:No   Caffeine: minimal   Exercise:The patient engaged in regular activity prior to illness      Past Psychiatric History:   Inpatient treatment: No     Outpatient treatment: No     Prior substance abuse treatment: No     Suicide Attempts: No     Psychotropic Medications:  Current: Xanax       Past: Lexapro     Current medications as per below, allergies reviewed in chart.    Current Outpatient Medications   Medication    ALPRAZolam (XANAX) 0.5 MG tablet    cetirizine (ZYRTEC) 10 MG tablet    cyanocobalamin (VITAMIN B-12)  "1000 MCG tablet    fluticasone propionate (FLONASE) 50 mcg/actuation nasal spray    multivitamin capsule    norethindrone-ethinyl estradiol (MICROGESTIN ) 1-20 mg-mcg per tablet    omeprazole (PRILOSEC) 20 MG capsule    ondansetron (ZOFRAN-ODT) 4 MG TbDL    ondansetron (ZOFRAN-ODT) 4 MG TbDL    tiZANidine (ZANAFLEX) 4 MG tablet    triamcinolone acetonide 0.1% (KENALOG) 0.1 % cream     No current facility-administered medications for this visit.          Social situation/Stressors: Melly Hwang lives alone in [place], [place].  She is a full-time nurse at [employer].  She has transitioned back and forth between [employer] and the [facility] during the past year due to diagnosis and health insurance considerations.  Melly Hwang has never been  and has 1 adult daughter (Madelin-21, at college). SHe has 2 brothers and 1 sister. Her parents are still living.   The patient reports limited social support (not particularly close to her family, no close friends). Melly Hwang is an active member of the [employer].  Melly Hwang reports having no hobbies ("just work"). She does, occasionally, enjoy travel.   Additional stressors: financial strain of healthcare    Step-brother  unexpectedly in  (Jaleel Leon)    Strengths:Steady employment, financial stability, Housing stability, Motivation, readiness for change, Setting and pursuing goals, hopes, dreams, aspirations, and Vocational interests, hobbies and/or talents  Liabilities: Complicated medical illness and Lack of social supports    Current Evaluation:     Mental Status Exam: Melly Hwang arrived promptly for the assessment session. The patient was fully cooperative throughout the interview and was an adequate historian   Appearance: age appropriate, casually  dressed, well groomed  Behavior/Cooperation: friendly and cooperative  Speech: normal in rate, volume, and tone and appropriate quality, " quantity and organization of sentences  Mood: anxious, dysthymic  Affect: dysphoric  Thought Process: goal-directed, logical  Thought Content: normal, no suicidality, no homicidality, delusions, or paranoia;did not appear to be responding to internal stimuli during the interview.   Orientation: grossly intact  Memory: Grossly intact  Attention Span/Concentration: Attends to interview without distraction; reports no difficulty  Fund of Knowledge: average  Estimate of Intelligence: average from verbal skills and history  Cognition: grossly intact  Insight: patient has awareness of illness; good insight into own behavior and behavior of others  Judgment: the patient's behavior is adequate to circumstances    Distress thermometer:       10/27/2023     2:09 AM 10/11/2023     8:53 AM 8/7/2023     8:09 AM 7/31/2023     6:38 PM 6/21/2023     2:38 PM 4/27/2023     5:28 PM   DISTRESS SCREENING   Distress Score 9 3 10 - Extreme Distress 7    7 2 5    5   Practical Problems None of these None of these  Treatment Decisions    Treatment Decisions Treatment Decisions None of these    None of these   Family Problems None of these None of these  None of these    None of these None of these None of these    None of these   Emotional Problems Fears;Nervousness;Worry Worry;Nervousness  Fears;Worry    Fears;Worry Fears;Worry Fears;Nervousness;Sadness;Worry    Fears;Nervousness;Sadness;Worry   Spiritual / Presybeterian Concerns No No  No    No No No    No   Physical Problems Nausea;Sleep None of these  None of these    None of these Pain None of these    None of these   Other Problems    Lack communication from dept/staff    Lack communication from dept/staff Right back/flank discomfort 3/10 on/off         MMSE:     Pain: 0  Pain catastrophizing: Elevations on PCS total score (38; 90th%ile), helplessness (16; 87th%ile), magnification (11;  98th%ile), and rumination (13; 86th%ile)  suggest lack of confidence in her ability to cope with the  "pain experience, high levels of emotional distress when pain occurs, and a negative mental set and persistent attention brought to bear during the experience of pain.  Catastrophic thinking is a risk factor for chronicity.      History of present illness:    Oncology History   Hepatocellular carcinoma   2021 Initial Diagnosis    Hepatocellular carcinoma     4/10/2023 Cancer Staged    Staging form: Liver, AJCC 8th Edition  - Clinical stage from 4/10/2023: Stage IVB (rcT1b, cN0, pM1)     2023 -  Chemotherapy    Treatment Summary   Plan Name: OP TREMELIMUMAB 300 MG (X 1) + DURVALUMAB 1500 MG Q4W  Treatment Goal: Control  Status: Active  Start Date: 2023  End Date: 6/10/2024 (Planned)  Provider: Philippe Carrasco MD  Chemotherapy: [No matching medication found in this treatment plan]     2023 Genetic Testing    Genetic counseling done. Hereditary syndrome unlikely. Patient offered testing, but declined due to cost.        Melly Hwang has adjusted to illness with significant difficulty.  She reports feeling overwhelmed and frequently thinks about an MD she knew who recently  of HCC. WHen she was first diagnosed, she was able to put her diagnosis out of her mind. Now, she is focused entirely on cancer and equates her cancer diagnosis to death. She had planned to return to school in January for her BSN, but now believes she would be too distracted by her health to fully engage in her education. She has also decided to defer a move she had planned to a larger house. Prior to diagnosis, [facility] due to better [facility] and [facility], but came back to [employer] due to insurance considerations. She would like to work at the [facility], but feels she cannot make that switch again unless she is "WEN." She kamille primarily through active coping strategies, focus on work, and focus on family. She has engaged in appropriate information gathering.  The patient has adequate, but not " "ideal family/friend support. She reports not being very close to her siblings and having no close friends.  Her parents and daughter are coping adequately with the diagnosis/treatment/prognosis. Illness-related psychosocial stressors include financial strain  and interruption in education.  The patient has an unsatisfactory partnership with her [facility] oncology treatment team.  She is frustrated by nurses responding to some of her questions instead of her doctors and by a negative infusion experience. She has lost confidence in her treating physicians due to their perceived ambivalence about her needs or due to communication strains.  The patient reports the following barriers to cancer care:lack of connection to her treatment team.     Areas assessed:   Mood: Depression: denied;  no prior; no SI/HI; PHQ-9=does not meet screener;   Pat: Denies  Psychosis: Denies   Anxiety: Feeling nervous, anxious, or on edge, Uncontrollable worry (about death, her daughter, COVID, future), Excessive worry (interfering with mood, sleep), Difficulty relaxing, Restlessness, Irritability, Fear of unknown, and panic attacks;  mild elevation in anxiety throughout adulthood- significant anxiety increase since the pandemic and further increase with diagnosis/PD ;  SARABJIT-7=17; severe  Generalized anxiety: diffuse, significant worry  Panic Disorder: uncued and cued panic attacks since diagnosis, increased HR, SOB, sweating, need to escape; Xanax when feels anxiety increasing; uses distraction to calm from panic; agoraphobia: crowds, "anywhere I can't out of"; left step-brother's  due to feeling constrained  Social/specific phobia: claustrophobia- MRIs, "being suctioned to the table" for radiation treatment, planes   OCD:   Trauma: witnessed several COVID-19 deaths among patients, [employer] friends/daughter's friends also  due to COVID-19  Sexual Dysfunction:    Substance abuse: denied  Cognitive functioning: denied  Health behaviors: " noncontributory  Sleep: 8 hours is normal; 1-2x/week maintenance difficulty (sleeps 8:30p-1a; awake 1a-4a, back to sleep 1-2 hours); no sleep onset difficulty , no EDS , no reported apneic events, and no naps , no caffeine/stimulants , (+) sleep hygiene considerations, and (+) psychophysiological factors, (+) use of benzodiazepines (occasional Xanax for sleep); flexeril prior to sleep in recent weeks    Pain: Ms. Hwang reports no pain.     Assessment - Diagnosis - Goals:       ICD-10-CM ICD-9-CM   1. Hepatocellular carcinoma  C22.0 155.0   2. HCC (hepatocellular carcinoma)  C22.0 155.0   3. Anxiety about health  F41.8 300.09       Summary and Recommendations  Melly Hwang is a 43 y.o. female referred by Dr. Mendiola for psychological evaluation and treatment.  Ms. Hwang appears to be experiencing significant difficulty coping with her diagnosis and treatment, superimposed upon pre-existing elevated anxiety since the start of the pandemic. She has minimal perceived social support and is not well-bonded to her treatment team. She displays prominent catastrophic thinking around pain, which is a risk factor for chronicity. She is not currently open to psychotherapy or pharmacotherapy (other than Xanax) to address her anxiety/coping. She is not interested in self-help techniques such as relaxation training or meditation. She is not interested in CBT for treatment of insomnia. The patient is aware of resources available should  her willingness change in the future.      Arpit Garza, PhD  Clinical Psychologist  [place] License #[id number]  [place] License #[id number]

## 2023-10-30 NOTE — TELEPHONE ENCOUNTER
Patient: Melly Hwang       MRN: [id number]      : 1980     Age: 43 y.o.  [address]  [place] [id number]    Presenting Radiologists:     Providers: FLAVIO Carrasco    Priority of review: Cancer    Patient Transplant Status:     Reason for presentation: Reassessment    Clinical Summary:   43 y.o. female with scirrhous HCC s/p resection with R liver partial hepatectomy on 21 with Dr. Howell with the same pathology, negative margins, 0 of 8 lymph nodes positive and + for vascular and perineural invasion.  I saw her in  for evaluation for persistent CA 19-9 but there was no radiographic evidence of HCC disease recurrence or alternative reason for CA 19-9 elevation.  CT chest on 3/9/23 showed an enlarging RLL nodule measuring 1.1 cm, increased from 0.8 cm in size.  IR biopsy of this on 4/10/23 confirmed metastatic HCC.  Since then in mid- she has also had an MRI abdomen that showed an enlarging portocaval lymph node that is consistent with metastatic disease.    Imaging to be reviewed: CT scan 10/27/23    HCC Treatment History:     Platelets:   Lab Results   Component Value Date/Time     10/06/2023 07:03 AM     Creatinine:   Lab Results   Component Value Date/Time    CREATININE 0.8 10/06/2023 07:03 AM     Bilirubin:   Lab Results   Component Value Date/Time    BILITOT 1.1 (H) 10/06/2023 07:03 AM     AFP Last 3 each if available:   Lab Results   Component Value Date/Time    AFP 9.2 (H) 10/06/2023 07:03 AM    AFP 9.8 (H) 2023 07:23 AM    AFP 8.5 (H) 2023 07:17 AM       MELD: Computed MELD 3.0 unavailable. Necessary lab results were not found in the last year.  Computed MELD-Na unavailable. Necessary lab results were not found in the last year.      Plan:     Follow-up Provider: Dr Carrasco

## 2023-10-30 NOTE — LETTER
November 6, 2023        Armando Mendiola MD  [address] [place] [id number]             [facility] - Psychiatry  [address] [place] [id number]  Phone: [phone]  Fax: [phone]   Patient: Melly Hwang   MR Number: [id number]   YOB: 1980   Date of Visit: 10/30/2023       Dear Dr. Mendiola:    Thank you for referring Melly Hwang to me for evaluation. Below are the relevant portions of my assessment and plan of care.     Melly Hwang is a 43 y.o. female referred by Dr. Mendiola for psychological evaluation and treatment.  Ms. Hwang appears to be experiencing significant difficulty coping with her diagnosis and treatment, superimposed upon pre-existing elevated anxiety since the start of the pandemic. She has minimal perceived social support and is not well-bonded to her treatment team. She displays prominent catastrophic thinking around pain, which is a risk factor for chronicity. She is not currently open to psychotherapy or pharmacotherapy (other than Xanax) to address her anxiety/coping. She is not interested in self-help techniques such as relaxation training or meditation. She is not interested in CBT for treatment of insomnia. The patient is aware of resources available should  her willingness change in the future.     If you have questions, please do not hesitate to call me. I look forward to following Melly along with you.    Sincerely,      Arpit Ewing, PhD           CC  Philippe Carrasco MD

## 2023-10-31 ENCOUNTER — CONFERENCE (OUTPATIENT)
Dept: TRANSPLANT | Facility: CLINIC | Age: 43
End: 2023-10-31
Payer: COMMERCIAL

## 2023-11-01 ENCOUNTER — HOSPITAL ENCOUNTER (OUTPATIENT)
Dept: RADIATION THERAPY | Facility: HOSPITAL | Age: 43
Discharge: HOME | End: 2023-11-01
Attending: STUDENT IN AN ORGANIZED HEALTH CARE EDUCATION/TRAINING PROGRAM
Payer: COMMERCIAL

## 2023-11-01 PROCEDURE — 77014 PR  CT GUIDANCE PLACEMENT RAD THERAPY FIELDS: CPT | Mod: 26,,, | Performed by: STUDENT IN AN ORGANIZED HEALTH CARE EDUCATION/TRAINING PROGRAM

## 2023-11-01 PROCEDURE — 77014 PR  CT GUIDANCE PLACEMENT RAD THERAPY FIELDS: ICD-10-PCS | Mod: 26,,, | Performed by: STUDENT IN AN ORGANIZED HEALTH CARE EDUCATION/TRAINING PROGRAM

## 2023-11-01 PROCEDURE — 77373 STRTCTC BDY RAD THER TX DLVR: CPT | Performed by: STUDENT IN AN ORGANIZED HEALTH CARE EDUCATION/TRAINING PROGRAM

## 2023-11-02 DIAGNOSIS — R11.0 NAUSEA: ICD-10-CM

## 2023-11-02 DIAGNOSIS — K21.9 GASTROESOPHAGEAL REFLUX DISEASE, UNSPECIFIED WHETHER ESOPHAGITIS PRESENT: ICD-10-CM

## 2023-11-02 RX ORDER — OMEPRAZOLE 20 MG/1
CAPSULE, DELAYED RELEASE ORAL
Qty: 90 CAPSULE | Refills: 3 | Status: SHIPPED | OUTPATIENT
Start: 2023-11-02 | End: 2023-12-19 | Stop reason: DRUGHIGH

## 2023-11-02 NOTE — TELEPHONE ENCOUNTER
Refill Decision Note   Melly Hwang  is requesting a refill authorization.  Brief Assessment and Rationale for Refill:  Approve     Medication Therapy Plan:         Comments:     Note composed:10:32 AM 11/02/2023

## 2023-11-02 NOTE — TELEPHONE ENCOUNTER
No care due was identified.  [facility] Embedded Care Due Messages. Reference number: [id number].   11/02/2023 12:10:47 AM CDT

## 2023-11-03 ENCOUNTER — INFUSION (OUTPATIENT)
Dept: INFUSION THERAPY | Facility: HOSPITAL | Age: 43
End: 2023-11-03
Payer: COMMERCIAL

## 2023-11-03 ENCOUNTER — LAB VISIT (OUTPATIENT)
Dept: LAB | Facility: HOSPITAL | Age: 43
End: 2023-11-03
Attending: INTERNAL MEDICINE
Payer: COMMERCIAL

## 2023-11-03 ENCOUNTER — OFFICE VISIT (OUTPATIENT)
Dept: HEMATOLOGY/ONCOLOGY | Facility: CLINIC | Age: 43
End: 2023-11-03
Payer: COMMERCIAL

## 2023-11-03 VITALS
BODY MASS INDEX: 21.14 KG/M2 | TEMPERATURE: 98 F | BODY MASS INDEX: 21.14 KG/M2 | HEIGHT: 71 IN | HEIGHT: 71 IN | DIASTOLIC BLOOD PRESSURE: 89 MMHG | WEIGHT: 151 LBS | HEART RATE: 88 BPM | OXYGEN SATURATION: 99 % | TEMPERATURE: 98 F | HEART RATE: 92 BPM | SYSTOLIC BLOOD PRESSURE: 122 MMHG | RESPIRATION RATE: 18 BRPM | WEIGHT: 151 LBS | RESPIRATION RATE: 18 BRPM | DIASTOLIC BLOOD PRESSURE: 88 MMHG | SYSTOLIC BLOOD PRESSURE: 152 MMHG

## 2023-11-03 DIAGNOSIS — D18.03 LIVER HEMANGIOMA: ICD-10-CM

## 2023-11-03 DIAGNOSIS — C77.9 REGIONAL LYMPH NODE METASTASIS PRESENT: ICD-10-CM

## 2023-11-03 DIAGNOSIS — E53.8 LOW SERUM VITAMIN B12: ICD-10-CM

## 2023-11-03 DIAGNOSIS — R14.1 ABDOMINAL GAS PAIN: ICD-10-CM

## 2023-11-03 DIAGNOSIS — C22.0 HCC (HEPATOCELLULAR CARCINOMA): ICD-10-CM

## 2023-11-03 DIAGNOSIS — M89.9 BONE LESION: ICD-10-CM

## 2023-11-03 DIAGNOSIS — C22.0 HCC (HEPATOCELLULAR CARCINOMA): Primary | ICD-10-CM

## 2023-11-03 DIAGNOSIS — C22.0 HEPATOCELLULAR CARCINOMA: Primary | ICD-10-CM

## 2023-11-03 DIAGNOSIS — R19.7 DIARRHEA, UNSPECIFIED TYPE: ICD-10-CM

## 2023-11-03 DIAGNOSIS — C78.01 MALIGNANT NEOPLASM METASTATIC TO RIGHT LUNG: ICD-10-CM

## 2023-11-03 DIAGNOSIS — D70.9 NEUTROPENIA, UNSPECIFIED TYPE: ICD-10-CM

## 2023-11-03 DIAGNOSIS — E83.19 IRON EXCESS: ICD-10-CM

## 2023-11-03 LAB
AFP SERPL-MCNC: 8.5 NG/ML (ref 0–8.4)
ALBUMIN SERPL BCP-MCNC: 3.5 G/DL (ref 3.5–5.2)
ALP SERPL-CCNC: 58 U/L (ref 55–135)
ALT SERPL W/O P-5'-P-CCNC: 21 U/L (ref 10–44)
ANION GAP SERPL CALC-SCNC: 10 MMOL/L (ref 8–16)
AST SERPL-CCNC: 17 U/L (ref 10–40)
BILIRUB SERPL-MCNC: 0.5 MG/DL (ref 0.1–1)
BUN SERPL-MCNC: 9 MG/DL (ref 6–20)
CALCIUM SERPL-MCNC: 9.3 MG/DL (ref 8.7–10.5)
CHLORIDE SERPL-SCNC: 104 MMOL/L (ref 95–110)
CO2 SERPL-SCNC: 21 MMOL/L (ref 23–29)
CREAT SERPL-MCNC: 0.9 MG/DL (ref 0.5–1.4)
ERYTHROCYTE [DISTWIDTH] IN BLOOD BY AUTOMATED COUNT: 11.4 % (ref 11.5–14.5)
EST. GFR  (NO RACE VARIABLE): >60 ML/MIN/1.73 M^2
FERRITIN SERPL-MCNC: 75 NG/ML (ref 20–300)
GLUCOSE SERPL-MCNC: 120 MG/DL (ref 70–110)
HCT VFR BLD AUTO: 41.8 % (ref 37–48.5)
HGB BLD-MCNC: 14 G/DL (ref 12–16)
IMM GRANULOCYTES # BLD AUTO: 0.01 K/UL (ref 0–0.04)
IRON SERPL-MCNC: 135 UG/DL (ref 30–160)
MCH RBC QN AUTO: 30.8 PG (ref 27–31)
MCHC RBC AUTO-ENTMCNC: 33.5 G/DL (ref 32–36)
MCV RBC AUTO: 92 FL (ref 82–98)
NEUTROPHILS # BLD AUTO: 2.4 K/UL (ref 1.8–7.7)
PLATELET # BLD AUTO: 282 K/UL (ref 150–450)
PMV BLD AUTO: 10.1 FL (ref 9.2–12.9)
POTASSIUM SERPL-SCNC: 4.1 MMOL/L (ref 3.5–5.1)
PROT SERPL-MCNC: 7.3 G/DL (ref 6–8.4)
RBC # BLD AUTO: 4.55 M/UL (ref 4–5.4)
SATURATED IRON: 31 % (ref 20–50)
SODIUM SERPL-SCNC: 135 MMOL/L (ref 136–145)
TOTAL IRON BINDING CAPACITY: 438 UG/DL (ref 250–450)
TRANSFERRIN SERPL-MCNC: 296 MG/DL (ref 200–375)
TSH SERPL DL<=0.005 MIU/L-ACNC: 0.52 UIU/ML (ref 0.4–4)
VIT B12 SERPL-MCNC: 622 PG/ML (ref 210–950)
WBC # BLD AUTO: 3.5 K/UL (ref 3.9–12.7)

## 2023-11-03 PROCEDURE — 63600175 PHARM REV CODE 636 W HCPCS: Mod: JZ,JG | Performed by: INTERNAL MEDICINE

## 2023-11-03 PROCEDURE — 99215 OFFICE O/P EST HI 40 MIN: CPT | Mod: S$GLB,,, | Performed by: INTERNAL MEDICINE

## 2023-11-03 PROCEDURE — 80053 COMPREHEN METABOLIC PANEL: CPT | Performed by: INTERNAL MEDICINE

## 2023-11-03 PROCEDURE — 82728 ASSAY OF FERRITIN: CPT | Performed by: INTERNAL MEDICINE

## 2023-11-03 PROCEDURE — 83540 ASSAY OF IRON: CPT | Performed by: INTERNAL MEDICINE

## 2023-11-03 PROCEDURE — 82105 ALPHA-FETOPROTEIN SERUM: CPT | Performed by: INTERNAL MEDICINE

## 2023-11-03 PROCEDURE — 99999 PR PBB SHADOW E&M-EST. PATIENT-LVL IV: CPT | Mod: PBBFAC,,, | Performed by: INTERNAL MEDICINE

## 2023-11-03 PROCEDURE — 3008F BODY MASS INDEX DOCD: CPT | Mod: CPTII,S$GLB,, | Performed by: INTERNAL MEDICINE

## 2023-11-03 PROCEDURE — 85027 COMPLETE CBC AUTOMATED: CPT | Performed by: INTERNAL MEDICINE

## 2023-11-03 PROCEDURE — 99999 PR PBB SHADOW E&M-EST. PATIENT-LVL IV: ICD-10-PCS | Mod: PBBFAC,,, | Performed by: INTERNAL MEDICINE

## 2023-11-03 PROCEDURE — 96413 CHEMO IV INFUSION 1 HR: CPT

## 2023-11-03 PROCEDURE — 84443 ASSAY THYROID STIM HORMONE: CPT | Performed by: INTERNAL MEDICINE

## 2023-11-03 PROCEDURE — 77336 RADIATION PHYSICS CONSULT: CPT | Performed by: STUDENT IN AN ORGANIZED HEALTH CARE EDUCATION/TRAINING PROGRAM

## 2023-11-03 PROCEDURE — 3077F SYST BP >= 140 MM HG: CPT | Mod: CPTII,S$GLB,, | Performed by: INTERNAL MEDICINE

## 2023-11-03 PROCEDURE — 36415 COLL VENOUS BLD VENIPUNCTURE: CPT | Performed by: INTERNAL MEDICINE

## 2023-11-03 PROCEDURE — 25000003 PHARM REV CODE 250: Performed by: INTERNAL MEDICINE

## 2023-11-03 PROCEDURE — 3044F PR MOST RECENT HEMOGLOBIN A1C LEVEL <7.0%: ICD-10-PCS | Mod: CPTII,S$GLB,, | Performed by: INTERNAL MEDICINE

## 2023-11-03 PROCEDURE — 3008F PR BODY MASS INDEX (BMI) DOCUMENTED: ICD-10-PCS | Mod: CPTII,S$GLB,, | Performed by: INTERNAL MEDICINE

## 2023-11-03 PROCEDURE — 3079F PR MOST RECENT DIASTOLIC BLOOD PRESSURE 80-89 MM HG: ICD-10-PCS | Mod: CPTII,S$GLB,, | Performed by: INTERNAL MEDICINE

## 2023-11-03 PROCEDURE — 84466 ASSAY OF TRANSFERRIN: CPT | Performed by: INTERNAL MEDICINE

## 2023-11-03 PROCEDURE — 1159F PR MEDICATION LIST DOCUMENTED IN MEDICAL RECORD: ICD-10-PCS | Mod: CPTII,S$GLB,, | Performed by: INTERNAL MEDICINE

## 2023-11-03 PROCEDURE — 3079F DIAST BP 80-89 MM HG: CPT | Mod: CPTII,S$GLB,, | Performed by: INTERNAL MEDICINE

## 2023-11-03 PROCEDURE — 99215 PR OFFICE/OUTPT VISIT, EST, LEVL V, 40-54 MIN: ICD-10-PCS | Mod: S$GLB,,, | Performed by: INTERNAL MEDICINE

## 2023-11-03 PROCEDURE — 3077F PR MOST RECENT SYSTOLIC BLOOD PRESSURE >= 140 MM HG: ICD-10-PCS | Mod: CPTII,S$GLB,, | Performed by: INTERNAL MEDICINE

## 2023-11-03 PROCEDURE — 3044F HG A1C LEVEL LT 7.0%: CPT | Mod: CPTII,S$GLB,, | Performed by: INTERNAL MEDICINE

## 2023-11-03 PROCEDURE — 82607 VITAMIN B-12: CPT | Performed by: INTERNAL MEDICINE

## 2023-11-03 PROCEDURE — 1159F MED LIST DOCD IN RCRD: CPT | Mod: CPTII,S$GLB,, | Performed by: INTERNAL MEDICINE

## 2023-11-03 PROCEDURE — 96361 HYDRATE IV INFUSION ADD-ON: CPT

## 2023-11-03 RX ORDER — EPINEPHRINE 0.3 MG/.3ML
0.3 INJECTION SUBCUTANEOUS ONCE AS NEEDED
Status: CANCELLED | OUTPATIENT
Start: 2023-11-03

## 2023-11-03 RX ORDER — DIPHENHYDRAMINE HYDROCHLORIDE 50 MG/ML
50 INJECTION INTRAMUSCULAR; INTRAVENOUS ONCE AS NEEDED
Status: DISCONTINUED | OUTPATIENT
Start: 2023-11-03 | End: 2023-11-03 | Stop reason: HOSPADM

## 2023-11-03 RX ORDER — SODIUM CHLORIDE 0.9 % (FLUSH) 0.9 %
10 SYRINGE (ML) INJECTION
Status: DISCONTINUED | OUTPATIENT
Start: 2023-11-03 | End: 2023-11-03 | Stop reason: HOSPADM

## 2023-11-03 RX ORDER — SODIUM CHLORIDE 0.9 % (FLUSH) 0.9 %
10 SYRINGE (ML) INJECTION
Status: CANCELLED | OUTPATIENT
Start: 2023-11-03

## 2023-11-03 RX ORDER — HEPARIN 100 UNIT/ML
500 SYRINGE INTRAVENOUS
Status: CANCELLED | OUTPATIENT
Start: 2023-11-03

## 2023-11-03 RX ORDER — SODIUM CHLORIDE, SODIUM LACTATE, POTASSIUM CHLORIDE, CALCIUM CHLORIDE 600; 310; 30; 20 MG/100ML; MG/100ML; MG/100ML; MG/100ML
INJECTION, SOLUTION INTRAVENOUS CONTINUOUS
Status: CANCELLED
Start: 2023-11-03

## 2023-11-03 RX ORDER — EPINEPHRINE 0.3 MG/.3ML
0.3 INJECTION SUBCUTANEOUS ONCE AS NEEDED
Status: DISCONTINUED | OUTPATIENT
Start: 2023-11-03 | End: 2023-11-03 | Stop reason: HOSPADM

## 2023-11-03 RX ORDER — PROCHLORPERAZINE EDISYLATE 5 MG/ML
5 INJECTION INTRAMUSCULAR; INTRAVENOUS ONCE AS NEEDED
Status: CANCELLED
Start: 2023-11-03

## 2023-11-03 RX ORDER — DIPHENHYDRAMINE HYDROCHLORIDE 50 MG/ML
50 INJECTION INTRAMUSCULAR; INTRAVENOUS ONCE AS NEEDED
Status: CANCELLED | OUTPATIENT
Start: 2023-11-03

## 2023-11-03 RX ORDER — PROCHLORPERAZINE EDISYLATE 5 MG/ML
5 INJECTION INTRAMUSCULAR; INTRAVENOUS ONCE AS NEEDED
Status: DISCONTINUED | OUTPATIENT
Start: 2023-11-03 | End: 2023-11-03 | Stop reason: HOSPADM

## 2023-11-03 RX ORDER — HEPARIN 100 UNIT/ML
500 SYRINGE INTRAVENOUS
Status: DISCONTINUED | OUTPATIENT
Start: 2023-11-03 | End: 2023-11-03 | Stop reason: HOSPADM

## 2023-11-03 RX ORDER — SODIUM CHLORIDE, SODIUM LACTATE, POTASSIUM CHLORIDE, CALCIUM CHLORIDE 600; 310; 30; 20 MG/100ML; MG/100ML; MG/100ML; MG/100ML
INJECTION, SOLUTION INTRAVENOUS
Status: COMPLETED | OUTPATIENT
Start: 2023-11-03 | End: 2023-11-03

## 2023-11-03 RX ADMIN — SODIUM CHLORIDE 1500 MG: 9 INJECTION, SOLUTION INTRAVENOUS at 09:11

## 2023-11-03 RX ADMIN — SODIUM CHLORIDE, POTASSIUM CHLORIDE, SODIUM LACTATE AND CALCIUM CHLORIDE: 600; 310; 30; 20 INJECTION, SOLUTION INTRAVENOUS at 09:11

## 2023-11-03 RX ADMIN — SODIUM CHLORIDE: 9 INJECTION, SOLUTION INTRAVENOUS at 09:11

## 2023-11-03 NOTE — PROGRESS NOTES
MEDICAL ONCOLOGY - ESTABLISHED PATIENT VISIT    Reason for visit: Scirrhous HCC    Best Contact Phone Number(s): [phone] (home)      Cancer/Stage/TNM:    Cancer Staging   Hepatocellular carcinoma  Staging form: Liver, AJCC 8th Edition  - Clinical stage from 4/10/2023: Stage IVB (rcT1b, cN0, pM1) - Signed by Philippe Carrasco MD on 8/7/2023       Oncology History   Hepatocellular carcinoma   6/9/2021 Initial Diagnosis    Hepatocellular carcinoma     4/10/2023 Cancer Staged    Staging form: Liver, AJCC 8th Edition  - Clinical stage from 4/10/2023: Stage IVB (rcT1b, cN0, pM1)     7/11/2023 -  Chemotherapy    Treatment Summary   Plan Name: OP TREMELIMUMAB 300 MG (X 1) + DURVALUMAB 1500 MG Q4W  Treatment Goal: Control  Status: Active  Start Date: 7/11/2023  End Date: 6/10/2024 (Planned)  Provider: Philippe Carrasco MD  Chemotherapy: [No matching medication found in this treatment plan]     9/11/2023 Genetic Testing    Genetic counseling done. Hereditary syndrome unlikely. Patient offered testing, but declined due to cost.         Interim History:   43 y.o. 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 Howell with the same pathology, negative margins, 0 of 8 lymph nodes positive and + for vascular and perineural invasion.  I saw her in 2021 for evaluation for persistent CA 19-9 but there was no radiographic evidence of HCC disease recurrence or alternative reason for CA 19-9 elevation.  CT chest on 3/9/23 showed an enlarging RLL nodule measuring 1.1 cm, increased from 0.8 cm in size.  IR biopsy of this on 4/10/23 confirmed metastatic HCC.  Since then in mid-June she has also had an MRI abdomen that showed an enlarging portocaval lymph node that is consistent with metastatic disease.  She began SBRT 10/30/23 to her lung metastasis and to her portocaval lymph node.    She presents today for cycle 5 of durvalumab + tremelimumab as part of the STRiDE regimen.  She feels  well physically other than nausea which began when she started her radiation.  She had a CT last week that showed a new liver lesion concerning for HCC.  Discussion was had at liver conference with plan for either SBRT or ablation with IR.  She has no pain.  Continues to work full time.  Constipation is managed with stool softeners and laxatives as needed.    Presents alone today.  ECOG PS 0.    ROS:   Review of Systems   Constitutional:  Negative for chills, fever, malaise/fatigue and weight loss.   HENT:  Negative for sore throat.    Eyes:  Negative for blurred vision and pain.   Respiratory:  Negative for cough and shortness of breath.    Cardiovascular:  Negative for chest pain and leg swelling.   Gastrointestinal:  Positive for constipation and nausea. Negative for abdominal pain, diarrhea and vomiting.   Genitourinary:  Negative for dysuria and frequency.   Musculoskeletal:  Negative for back pain, falls and myalgias.   Skin:  Negative for rash.   Neurological:  Negative for dizziness, weakness and headaches.   Endo/Heme/Allergies:  Does not bruise/bleed easily.   Psychiatric/Behavioral:  Negative for depression. The patient is not nervous/anxious.    All other systems reviewed and are negative.      Past Medical History:   Past Medical History:   Diagnosis Date    VENKATA positive 08/20/2020    COVID-19     Deviated septum 2011    Hepatocellular carcinoma 05/07/2021    Hypertrophy of inferior nasal turbinate     Pericardial effusion     Premature menopause         Past Surgical History:   Past Surgical History:   Procedure Laterality Date    COLONOSCOPY N/A 09/21/2020    Procedure: COLONOSCOPY;  Surgeon: GIOVANNI Crane MD;  Location: [facility] ENDO ([address] [place] [address]);  Service: Endoscopy;  Laterality: N/A;  + covid 4/22    HERNIA REPAIR      LIVER SURGERY N/A 06/28/2021    NASAL SEPTUM SURGERY      PERICARDIAL WINDOW N/A 02/22/2021    Procedure: CREATION, PERICARDIAL WINDOW;  Surgeon: Ajay Cantor MD;  Location: [facility]  OR 2ND FLR;  Service: Cardiovascular;  Laterality: N/A;    PERICARDIOCENTESIS N/A 05/02/2020    Procedure: Pericardiocentesis;  Surgeon: Dickson Dangelo MD;  Location: [facility] CATH LAB;  Service: Cardiology;  Laterality: N/A;    TONSILLECTOMY          Family History:   Family History   Problem Relation Age of Onset    Diabetes Mother     Liver disease Mother         fatty liver    Heart disease Father     Macular degeneration Father     Diabetes Father     Hypertension Father     Hyperlipidemia Father     Heart disease Sister     Lung cancer Maternal Aunt         heavy smoker    Cerebral aneurysm Paternal Aunt     Cataracts Neg Hx     Glaucoma Neg Hx     Retinal detachment Neg Hx     Stroke Neg Hx     Thyroid disease Neg Hx     Melanoma Neg Hx     Heart attack Neg Hx         Social History:   Social History     Tobacco Use    Smoking status: Never    Smokeless tobacco: Never   Substance Use Topics    Alcohol use: Yes     Comment: rare        I have reviewed and updated the patient's past medical, surgical, family and social histories.    Allergies:   Review of patient's allergies indicates:   Allergen Reactions    No known drug allergies         Medications:   Current Outpatient Medications   Medication Sig Dispense Refill    cetirizine (ZYRTEC) 10 MG tablet Take 1 tablet (10 mg total) by mouth once daily. 90 tablet 0    cyanocobalamin (VITAMIN B-12) 1000 MCG tablet Take 1 tablet (1,000 mcg total) by mouth once daily. 30 tablet 12    fluticasone propionate (FLONASE) 50 mcg/actuation nasal spray 2 sprays (100 mcg total) by Each Nostril route once daily. 18.2 mL 6    multivitamin capsule Take by mouth. 1 capsule Oral Every morning      norethindrone-ethinyl estradiol (MICROGESTIN 1/20) 1-20 mg-mcg per tablet Take 1 tablet by mouth once daily. 63 tablet 4    omeprazole (PRILOSEC) 20 MG capsule TAKE 1 CAPSULE BY MOUTH ONCE DAILY AS NEEDED FOR GERD 90 capsule 3    ondansetron (ZOFRAN-ODT) 4 MG TbDL Take 1 tablet (4 mg  total) by mouth every 12 (twelve) hours as needed (nausea). 20 tablet 0    tiZANidine (ZANAFLEX) 4 MG tablet Take 1 tablet (4 mg total) by mouth every 6 (six) hours as needed (pain/spasm). 30 tablet 1    triamcinolone acetonide 0.1% (KENALOG) 0.1 % cream Apply topically 2 (two) times daily as needed (scaling at external ears). Avoid use on face, body folds, groin/genitalia. 45 g 3    ALPRAZolam (XANAX) 0.5 MG tablet Take 1 tablet (0.5 mg total) by mouth daily as needed for Anxiety. 30 tablet 2    ondansetron (ZOFRAN-ODT) 4 MG TbDL Take 1 tablet (4 mg total) by mouth 2 (two) times daily. (Patient not taking: Reported on 11/3/2023) 2 tablet 0     No current facility-administered medications for this visit.     Facility-Administered Medications Ordered in Other Visits   Medication Dose Route Frequency Provider Last Rate Last Admin    alteplase injection 2 mg  2 mg Intra-Catheter PRN Philippe Carrasco MD        diphenhydrAMINE injection 50 mg  50 mg Intravenous Once PRN Philippe Carrasco MD        durvalumab (IMFINZI) 1,500 mg in sodium chloride 0.9% SolP 315 mL chemo infusion  1,500 mg Intravenous 1 time in Clinic/Philippe Hernández  mL/hr at 11/03/23 0957 1,500 mg at 11/03/23 0957    EPINEPHrine (EPIPEN) 0.3 mg/0.3 mL pen injection 0.3 mg  0.3 mg Intramuscular Once PRN Philippe Carrasco MD        heparin, porcine (PF) 100 unit/mL injection flush 500 Units  500 Units Intravenous PRN Philippe Carrasco MD        hydrocortisone sodium succinate injection 100 mg  100 mg Intravenous Once PRN Philippe Carrasco MD        prochlorperazine injection Soln 5 mg  5 mg Intravenous Once PRN Philippe Carrasco MD        sodium chloride 0.9% 100 mL flush bag   Intravenous 1 time in Clinic/Philippe Hernández MD        sodium chloride 0.9% flush 10 mL  10 mL Intravenous PRN Philippe Carrasco MD            Physical Exam:   BP (!) 152/88 (BP Location: Right arm, Patient Position: Sitting, BP  "Method: Medium (Automatic))   Pulse 88   Temp 98.4 °F (36.9 °C) (Oral)   Resp 18   Ht 5' 11" (1.803 m)   Wt 68.5 kg (151 lb 0.2 oz)   LMP  (LMP Unknown)   SpO2 99%   BMI 21.06 kg/m²      ECOG Performance status: 0            Physical Exam  Vitals reviewed.   Constitutional:       General: She is not in acute distress.     Appearance: Normal appearance. She is normal weight.   HENT:      Head: Normocephalic and atraumatic.      Right Ear: External ear normal.      Left Ear: External ear normal.      Nose: Nose normal.      Mouth/Throat:      Mouth: Mucous membranes are moist.      Pharynx: Oropharynx is clear. No posterior oropharyngeal erythema.   Eyes:      General: No scleral icterus.     Extraocular Movements: Extraocular movements intact.      Conjunctiva/sclera: Conjunctivae normal.      Pupils: Pupils are equal, round, and reactive to light.   Cardiovascular:      Rate and Rhythm: Normal rate and regular rhythm.      Pulses: Normal pulses.      Heart sounds: Normal heart sounds.   Pulmonary:      Effort: Pulmonary effort is normal.      Breath sounds: Normal breath sounds. No wheezing or rales.   Abdominal:      General: Bowel sounds are normal. There is no distension.      Palpations: Abdomen is soft.      Tenderness: There is no abdominal tenderness.   Musculoskeletal:         General: No swelling. Normal range of motion.      Cervical back: Normal range of motion and neck supple.   Skin:     General: Skin is warm.      Coloration: Skin is not jaundiced.      Findings: No erythema or rash.   Neurological:      General: No focal deficit present.      Mental Status: She is alert and oriented to person, place, and time. Mental status is at baseline.      Gait: Gait normal.   Psychiatric:         Mood and Affect: Mood normal.         Behavior: Behavior normal.         Thought Content: Thought content normal.         Judgment: Judgment normal.           Labs:   Recent Results (from the past 48 hour(s)) "   CBC Oncology    Collection Time: 11/03/23  7:19 AM   Result Value Ref Range    WBC 3.50 (L) 3.90 - 12.70 K/uL    RBC 4.55 4.00 - 5.40 M/uL    Hemoglobin 14.0 12.0 - 16.0 g/dL    Hematocrit 41.8 37.0 - 48.5 %    MCV 92 82 - 98 fL    MCH 30.8 27.0 - 31.0 pg    MCHC 33.5 32.0 - 36.0 g/dL    RDW 11.4 (L) 11.5 - 14.5 %    Platelets 282 150 - 450 K/uL    MPV 10.1 9.2 - 12.9 fL    Gran # (ANC) 2.4 1.8 - 7.7 K/uL    Immature Grans (Abs) 0.01 0.00 - 0.04 K/uL   Comprehensive Metabolic Panel    Collection Time: 11/03/23  7:19 AM   Result Value Ref Range    Sodium 135 (L) 136 - 145 mmol/L    Potassium 4.1 3.5 - 5.1 mmol/L    Chloride 104 95 - 110 mmol/L    CO2 21 (L) 23 - 29 mmol/L    Glucose 120 (H) 70 - 110 mg/dL    BUN 9 6 - 20 mg/dL    Creatinine 0.9 0.5 - 1.4 mg/dL    Calcium 9.3 8.7 - 10.5 mg/dL    Total Protein 7.3 6.0 - 8.4 g/dL    Albumin 3.5 3.5 - 5.2 g/dL    Total Bilirubin 0.5 0.1 - 1.0 mg/dL    Alkaline Phosphatase 58 55 - 135 U/L    AST 17 10 - 40 U/L    ALT 21 10 - 44 U/L    eGFR >60.0 >60 mL/min/1.73 m^2    Anion Gap 10 8 - 16 mmol/L   AFP Tumor Marker    Collection Time: 11/03/23  7:19 AM   Result Value Ref Range    AFP 8.5 (H) 0.0 - 8.4 ng/mL   TSH    Collection Time: 11/03/23  7:19 AM   Result Value Ref Range    TSH 0.518 0.400 - 4.000 uIU/mL   Vitamin B12    Collection Time: 11/03/23  7:19 AM   Result Value Ref Range    Vitamin B-12 622 210 - 950 pg/mL   Iron and TIBC    Collection Time: 11/03/23  7:19 AM   Result Value Ref Range    Iron 135 30 - 160 ug/dL    Transferrin 296 200 - 375 mg/dL    TIBC 438 250 - 450 ug/dL    Saturated Iron 31 20 - 50 %   Ferritin    Collection Time: 11/03/23  7:19 AM   Result Value Ref Range    Ferritin 75 20.0 - 300.0 ng/mL          I have reviewed the pertinent labs from 11/3/23 which are notable for mild leukopenia.  AFP 8.5.    Imaging:       10/2/23 - CT CAP:    Impression:     1. Postsurgical changes of partial right hepatectomy for reported history of metastatic HCC,  unchanged.  2. 0.9-cm, 2.7-cm and 2.1-cm hepatic parenchymal heterogenous hypodense nodules, not significantly changed.  3. 2.8 x 3.9-cm (previously 2.2 x 3.0-cm) abnormally enlarged albert hepatis lymph node.  4.  1.6-cm (previously 1.3-cm) solid nodule in the right lower lobe.  0.3-cm left upper lobe 0.5-cm right upper lobe solid pulmonary parenchymal nodules, not significantly changed.  This report was flagged in Epic as abnormal.    Path:   6/28/21: partial hepatectomy:    Final Pathologic Diagnosis 1.  Gallbladder (cholecystectomy):   - Benign gallbladder with cholecystitis   2. Right lobe (partial hepatectomy):   - Positive for malignancy, see synoptic report below   - Separate hemangioma, 7.3 cm   3. Lymph nodes, portal (regional resection):   - 8 lymph nodes, negative for tumor (0/8)   ______________________________________________________________________________   ______   Hepatocellular carcinoma synoptic report   - Procedure:  Partial hepatectomy, right lobe   - Histologic type:  Hepatocellular carcinoma, scirrhous   - Histologic grade:  Moderately differentiated, grade 2   - Tumor focality:  Solitary   - Tumor characteristics:   - Tumor site:  Right lobe   - Tumor size:  3.3 cm   - Treatment effect:  No known pre-surgical therapy   - Satellitosis:  Not identified   - Tumor extent:  Confined to liver   - Vascular invasion:  Present, Small vessel   - Perineural invasion:  Present   - Margins:   - All margins are negative for invasive carcinoma   - Closest margin to invasive carcinoma:  Parenchymal   - Distance from invasive  carcinoma to closest margin:  5 mm   - Regional lymph nodes:   - Number of lymph nodes with tumor:  0   - Number of lymph nodes examined:  8   - Pathology: pT2 N0 MX   - Additional findings:   - No steatosis   - Trichrome stain:  Periportal fibrosis with early septa, stage 1-2   - Iron stain:  Negative   - Iron and trichrome stains with appropriate controls   Tumor blocks:  2A, 2B, 2 C     Comment: Interp By Madeline Carlos MD, Signed on 07/08/2021 at 16:47           Assessment:       1. HCC (hepatocellular carcinoma)    2. Regional lymph node metastasis present    3. Malignant neoplasm metastatic to right lung    4. Bone lesion    5. Diarrhea, unspecified type    6. Abdominal gas pain    7. Neutropenia, unspecified type    8. Liver hemangioma                  Plan:             # HCC  Scirrhous subtype, which is very uncommon (~ 1% of all HCC); prognosis is likely similar to typical HCC.  No clear underlying liver pathology in this patient.  Had margin negative resection with negative lymph node eval on 6/28/21 with Dr. Howell.  Unfortunately she has biopsy proven recurrent metastatic disease to her RLL s/p IR biopsy 4/10/23.   She was discussed at thoracic tumor board with potential plan for surgical resection with Dr. Tadeo.  She had a concerning bone lesion on PET from 4/26 at [facility].  This was biopsied and proven to be benign.  In the interim she had a repeat abdominal MRI on 6/13/23 which demonstrated growth of a portacaval lymph node that was very concerning for metastatic disease when we reviewed her imaging at liver conference.  Meanwhile her RLL met has grown slightly on 6/21/23 CT as well.  I discussed her case with Valerie Dhillon and Shwetha and we were all in agreement with proceeding with systemic therapy rather than surgery or radiation given multifocal progression.    I discussed this with her and she is agreeable with starting systemic therapy.  Reviewed possibilities of atezo + magaly or durva + treme.  She wished to proceed with STRIDE regimen: durvalumab + tremelimumab.    Received cycle 1 on 7/11/23.  Repeat CT CAP after cycle 3 demonstrates mild growth in albert hepatis lymph node.  Stable disease elsewhere.    Discussed with Dr. Mendiola and she was deemed eligible for SBRT to her abdominal lymph node and growing lung nodule.  She completes SBRT 11/3/23.  Meanwhile she has developed a new  concerning small liver lesion consistent with HCC. Discussed at tumor board and will plan for SBRT to this as well. She is requesting switching to a different radiation oncologist so will reach out to Dr. Dhillon.    Will continue with STRIDE regimen, meanwhile.  Patient agreeable.  Proceed with cycle 5 today - durvalumab alone.  Will give IVF per her request with infusion today.    Consider switching systemic therapy only if significant progression given likely side effects of TKI.    Saw Dr. Sabillon of cardiology who recommended troponin and ECG monitoring given her cardiac history.  She is asymptomatic at present.    Will plan to bring her back in 4 weeks for cycle 6.  Repeat imaging prior to cycle 7.    # Neutropenia  Resolved.  Has experienced in past.  Likely benign etiology.    # Liver hemangiomas  Continue monitoring as indicated with Dr. Rogers.    Follow up: 4 weeks.    The above information has been reviewed with the patient and all questions have been answered to their apparent satisfaction.  They understand that they can call the clinic with any questions.    Philippe Carrasco MD  Hematology/Oncology  [facility]    Route Chart for Scheduling    Med Onc Chart Routing      Follow up with physician 4 weeks and 2 months. for durvalumab on Fridays   Follow up with MELA    Infusion scheduling note    Injection scheduling note    Labs CBC, CMP and TSH   Scheduling:  Preferred lab:  Lab interval: every 4 weeks  & AFP   Imaging    Pharmacy appointment    Other referrals                  Treatment Plan Information   OP TREMELIMUMAB 300 MG (X 1) + DURVALUMAB 1500 MG Q4W   Philippe Carrasco MD   Upcoming Treatment Dates - OP TREMELIMUMAB 300 MG (X 1) + DURVALUMAB 1500 MG Q4W    11/27/2023       Chemotherapy       durvalumab (IMFINZI) 1,500 mg in sodium chloride 0.9% SolP 280 mL chemo infusion       Antiemetics       prochlorperazine injection Soln 5 mg  12/25/2023        Chemotherapy       durvalumab (IMFINZI) 1,500 mg in sodium chloride 0.9% SolP 280 mL chemo infusion       Antiemetics       prochlorperazine injection Soln 5 mg  1/22/2024       Chemotherapy       durvalumab (IMFINZI) 1,500 mg in sodium chloride 0.9% SolP 280 mL chemo infusion       Antiemetics       prochlorperazine injection Soln 5 mg  2/19/2024       Chemotherapy       durvalumab (IMFINZI) 1,500 mg in sodium chloride 0.9% SolP 280 mL chemo infusion       Antiemetics       prochlorperazine injection Soln 5 mg

## 2023-11-03 NOTE — PLAN OF CARE
Problem: Adult Inpatient Plan of Care  Goal: Plan of Care Review  Outcome: Ongoing, Progressing   Patient tolerated imfinzi and lactated ringer infusion today. NAD noted. VSS. Discharged home and ambulated independently

## 2023-11-06 ENCOUNTER — PATIENT MESSAGE (OUTPATIENT)
Dept: HEMATOLOGY/ONCOLOGY | Facility: CLINIC | Age: 43
End: 2023-11-06
Payer: COMMERCIAL

## 2023-11-06 NOTE — TELEPHONE ENCOUNTER
Patient: Melly Hwang       MRN: [id number]      : 1980     Age: 43 y.o.  [address]  [facility] [address]     Presenting Radiologists: Amanda Caldera MD     Providers: FLAVIO Carrasco     Priority of review: Cancer     Patient Transplant Status:      Reason for presentation: Reassessment     Clinical Summary:   43 y.o. female with scirrhous HCC s/p resection with R liver partial hepatectomy on 21 with Dr. Howell with the same pathology, negative margins, 0 of 8 lymph nodes positive and + for vascular and perineural invasion.  I saw her in  for evaluation for persistent CA 19-9 but there was no radiographic evidence of HCC disease recurrence or alternative reason for CA 19-9 elevation.  CT chest on 3/9/23 showed an enlarging RLL nodule measuring 1.1 cm, increased from 0.8 cm in size.  IR biopsy of this on 4/10/23 confirmed metastatic HCC.  Since then in mid- she has also had an MRI abdomen that showed an enlarging portocaval lymph node that is consistent with metastatic disease.     Imaging to be reviewed: CT scan 10/27/23     HCC Treatment History:      Platelets:         Lab Results   Component Value Date/Time      10/06/2023 07:03 AM      Creatinine:         Lab Results   Component Value Date/Time     CREATININE 0.8 10/06/2023 07:03 AM      Bilirubin:         Lab Results   Component Value Date/Time     BILITOT 1.1 (H) 10/06/2023 07:03 AM      AFP Last 3 each if available:         Lab Results   Component Value Date/Time     AFP 9.2 (H) 10/06/2023 07:03 AM     AFP 9.8 (H) 2023 07:23 AM     AFP 8.5 (H) 2023 07:17 AM         MELD: Computed MELD 3.0 unavailable. Necessary lab results were not found in the last year.  Computed MELD-Na unavailable. Necessary lab results were not found in the last year.        Plan: Node stable in size overall.  Lung lesion and a couple of hyperdense areas.  Growing right posterior lobe  low attenuation lesion that has not been bx,  possible reoccurrence or mets.    Anteriorly Stable from previous imaging  small amount of low attenuation that has grown in size.  Currently receiving SBRT   The rest is stable in size.    Already mapped for the area, add additional area for treatment.  Marked location for treatment.     Note sent to Dr Carrasco    Follow-up Provider: Dr Carrasco

## 2023-11-09 ENCOUNTER — PATIENT MESSAGE (OUTPATIENT)
Dept: RADIATION ONCOLOGY | Facility: CLINIC | Age: 43
End: 2023-11-09
Payer: COMMERCIAL

## 2023-11-13 ENCOUNTER — HOSPITAL ENCOUNTER (OUTPATIENT)
Dept: RADIATION THERAPY | Facility: HOSPITAL | Age: 43
Discharge: HOME | End: 2023-11-13
Attending: INTERNAL MEDICINE
Payer: COMMERCIAL

## 2023-11-13 ENCOUNTER — OFFICE VISIT (OUTPATIENT)
Dept: RADIATION ONCOLOGY | Facility: CLINIC | Age: 43
End: 2023-11-13
Payer: COMMERCIAL

## 2023-11-13 VITALS
BODY MASS INDEX: 21.36 KG/M2 | HEIGHT: 71 IN | SYSTOLIC BLOOD PRESSURE: 155 MMHG | WEIGHT: 152.56 LBS | HEART RATE: 98 BPM | RESPIRATION RATE: 17 BRPM | OXYGEN SATURATION: 100 % | DIASTOLIC BLOOD PRESSURE: 88 MMHG

## 2023-11-13 DIAGNOSIS — C22.0 HEPATOCELLULAR CARCINOMA: Primary | ICD-10-CM

## 2023-11-13 PROCEDURE — 99024 PR POST-OP FOLLOW-UP VISIT: ICD-10-PCS | Mod: S$GLB,,, | Performed by: RADIOLOGY

## 2023-11-13 PROCEDURE — 3079F DIAST BP 80-89 MM HG: CPT | Mod: CPTII,S$GLB,, | Performed by: RADIOLOGY

## 2023-11-13 PROCEDURE — 3044F HG A1C LEVEL LT 7.0%: CPT | Mod: CPTII,S$GLB,, | Performed by: RADIOLOGY

## 2023-11-13 PROCEDURE — 1159F PR MEDICATION LIST DOCUMENTED IN MEDICAL RECORD: ICD-10-PCS | Mod: CPTII,S$GLB,, | Performed by: RADIOLOGY

## 2023-11-13 PROCEDURE — 77014 HC CT GUIDANCE RADIATION THERAPY FLDS PLACEMENT: CPT | Mod: TC | Performed by: RADIOLOGY

## 2023-11-13 PROCEDURE — 1159F MED LIST DOCD IN RCRD: CPT | Mod: CPTII,S$GLB,, | Performed by: RADIOLOGY

## 2023-11-13 PROCEDURE — 77263 PR  RADIATION THERAPY PLAN COMPLEX: ICD-10-PCS | Mod: ,,, | Performed by: RADIOLOGY

## 2023-11-13 PROCEDURE — 77263 THER RADIOLOGY TX PLNG CPLX: CPT | Mod: ,,, | Performed by: RADIOLOGY

## 2023-11-13 PROCEDURE — 3044F PR MOST RECENT HEMOGLOBIN A1C LEVEL <7.0%: ICD-10-PCS | Mod: CPTII,S$GLB,, | Performed by: RADIOLOGY

## 2023-11-13 PROCEDURE — 77014 PR  CT GUIDANCE PLACEMENT RAD THERAPY FIELDS: CPT | Mod: 26,,, | Performed by: RADIOLOGY

## 2023-11-13 PROCEDURE — 99024 POSTOP FOLLOW-UP VISIT: CPT | Mod: S$GLB,,, | Performed by: RADIOLOGY

## 2023-11-13 PROCEDURE — 99999 PR PBB SHADOW E&M-EST. PATIENT-LVL III: CPT | Mod: PBBFAC,,, | Performed by: RADIOLOGY

## 2023-11-13 PROCEDURE — 77014 PR  CT GUIDANCE PLACEMENT RAD THERAPY FIELDS: ICD-10-PCS | Mod: 26,,, | Performed by: RADIOLOGY

## 2023-11-13 PROCEDURE — 3077F SYST BP >= 140 MM HG: CPT | Mod: CPTII,S$GLB,, | Performed by: RADIOLOGY

## 2023-11-13 PROCEDURE — 3077F PR MOST RECENT SYSTOLIC BLOOD PRESSURE >= 140 MM HG: ICD-10-PCS | Mod: CPTII,S$GLB,, | Performed by: RADIOLOGY

## 2023-11-13 PROCEDURE — 3079F PR MOST RECENT DIASTOLIC BLOOD PRESSURE 80-89 MM HG: ICD-10-PCS | Mod: CPTII,S$GLB,, | Performed by: RADIOLOGY

## 2023-11-13 PROCEDURE — 99999 PR PBB SHADOW E&M-EST. PATIENT-LVL III: ICD-10-PCS | Mod: PBBFAC,,, | Performed by: RADIOLOGY

## 2023-11-16 NOTE — PROGRESS NOTES
11/13/2023    Radiation Oncology Follow-Up Visit      Prior Radiation History:    Treatment Site  Energy  Dose/Fx (Gy)  #Fx  Total Dose (Gy)  Start Date  End Date  Elapsed Days    SB Lung_R  6X  10  5 / 5  50  10/23/2023  11/1/2023  9    SB Abdomen  6X  10  5 / 5  50  10/23/2023  11/3/2023  11        Is the patient female between ages 15-55:  Yes    Does the patient have a CIED:  No      Assessment   This is a 43 y.o. female with Stage IVB (rcT1b, cN0, pM1) HCC, s/p resection with R liver partial hepatectomy (6/28/2021 Dr. Howell), SM-, no LN involved, with biopsy proven (4/10/2023) recurrence in the RLL and enlarging portocaval LN, on durvalumab + tremelimumab (STRIDE regimen with Dr Carrasco) since 7/11/2023 with oligoprogression. She received SBRT 50 Gy in 5 fx to RLL lung and portocaval node by Dr. Mendiola completed 11/3/23. During treatment, CT Abdomen demonstrated possible new hepatic metastasis in the inferior Right hepatic lobe. She is referred for consideration of SBRT.     She reports significant nausea during treatment, mostly managed w/ zofran. I discussed the role of ablative radiation in management of oligometastatic disease. Will plan to deliver 50 Gy in 5 fractions per RTOG 1112. Potential side effects including N/V, diarrhea, and fatigue were reviewed. Low potential risk for late damage to bowel including stricture or fistula. At the end of our discussion, she was in agreement with proceeding with the recommended treatment.          Plan   1) CT Simulation today w/ contrast for SBRT liver treatment planning.     **ADDENDUM** lesion recently seen on diagnostic CT 10/27/23 was not visible on any phase of contrasted simulation scan. In the absence of a target, I have recommended consideration of MRI abdomen and ablation by IR.**           CHIEF COMPLAINT: Liver SBRT    HPI/Focused ROS:       Past Medical History:   Diagnosis Date    VENKATA positive 08/20/2020    COVID-19     Deviated septum 2011     Hepatocellular carcinoma 05/07/2021    Hypertrophy of inferior nasal turbinate     Pericardial effusion     Premature menopause        Past Surgical History:   Procedure Laterality Date    COLONOSCOPY N/A 09/21/2020    Procedure: COLONOSCOPY;  Surgeon: GIOVANNI Crane MD;  Location: [facility] ENDO (4TH FLR);  Service: Endoscopy;  Laterality: N/A;  + covid 4/22    HERNIA REPAIR      LIVER SURGERY N/A 06/28/2021    NASAL SEPTUM SURGERY      PERICARDIAL WINDOW N/A 02/22/2021    Procedure: CREATION, PERICARDIAL WINDOW;  Surgeon: Ajay Cantor MD;  Location: [facility] OR 2ND FLR;  Service: Cardiovascular;  Laterality: N/A;    PERICARDIOCENTESIS N/A 05/02/2020    Procedure: Pericardiocentesis;  Surgeon: Dickson Dangelo MD;  Location: [facility] CATH LAB;  Service: Cardiology;  Laterality: N/A;    TONSILLECTOMY         Social History     Tobacco Use    Smoking status: Never    Smokeless tobacco: Never   Substance Use Topics    Alcohol use: Yes     Comment: rare    Drug use: No       Cancer-related family history includes Lung cancer in her maternal aunt. There is no history of Melanoma.    Current Outpatient Medications on File Prior to Visit   Medication Sig Dispense Refill    ALPRAZolam (XANAX) 0.5 MG tablet Take 1 tablet (0.5 mg total) by mouth daily as needed for Anxiety. 30 tablet 2    cetirizine (ZYRTEC) 10 MG tablet Take 1 tablet (10 mg total) by mouth once daily. 90 tablet 0    cyanocobalamin (VITAMIN B-12) 1000 MCG tablet Take 1 tablet (1,000 mcg total) by mouth once daily. 30 tablet 12    fluticasone propionate (FLONASE) 50 mcg/actuation nasal spray 2 sprays (100 mcg total) by Each Nostril route once daily. 18.2 mL 6    multivitamin capsule Take by mouth. 1 capsule Oral Every morning      norethindrone-ethinyl estradiol (MICROGESTIN 1/20) 1-20 mg-mcg per tablet Take 1 tablet by mouth once daily. 63 tablet 4    omeprazole (PRILOSEC) 20 MG capsule TAKE 1 CAPSULE BY MOUTH ONCE DAILY AS NEEDED FOR GERD 90 capsule 3     "ondansetron (ZOFRAN-ODT) 4 MG TbDL Take 1 tablet (4 mg total) by mouth 2 (two) times daily. (Patient not taking: Reported on 11/3/2023) 2 tablet 0    ondansetron (ZOFRAN-ODT) 4 MG TbDL Take 1 tablet (4 mg total) by mouth every 12 (twelve) hours as needed (nausea). 20 tablet 0    tiZANidine (ZANAFLEX) 4 MG tablet Take 1 tablet (4 mg total) by mouth every 6 (six) hours as needed (pain/spasm). 30 tablet 1    triamcinolone acetonide 0.1% (KENALOG) 0.1 % cream Apply topically 2 (two) times daily as needed (scaling at external ears). Avoid use on face, body folds, groin/genitalia. 45 g 3     No current facility-administered medications on file prior to visit.       Review of patient's allergies indicates:   Allergen Reactions    No known drug allergies          Vital Signs: BP (!) 155/88 (BP Location: Left arm, Patient Position: Sitting)   Pulse 98   Resp 17   Ht 5' 11" (1.803 m)   Wt 69.2 kg (152 lb 8.9 oz)   LMP  (LMP Unknown)   SpO2 100%   BMI 21.28 kg/m²     ECOG Performance Status: 0 - Fully Active    Physical Exam  Constitutional:       Appearance: Normal appearance.   HENT:      Head: Normocephalic and atraumatic.   Eyes:      General: No scleral icterus.     Extraocular Movements: Extraocular movements intact.   Pulmonary:      Effort: No accessory muscle usage or respiratory distress.   Musculoskeletal:      Cervical back: Normal range of motion.   Neurological:      Mental Status: She is alert and oriented to person, place, and time.   Psychiatric:         Mood and Affect: Mood and affect normal.         Judgment: Judgment normal.          Labs:    Imaging: I have personally reviewed the patient's available images and reports and summarized pertinent findings above in HPI.     Pathology: No new path      "

## 2023-11-17 DIAGNOSIS — K76.9 LIVER DISEASE, UNSPECIFIED: Primary | ICD-10-CM

## 2023-11-21 ENCOUNTER — HOSPITAL ENCOUNTER (OUTPATIENT)
Dept: RADIOLOGY | Facility: HOSPITAL | Age: 43
Discharge: HOME | End: 2023-11-21
Attending: INTERNAL MEDICINE
Payer: COMMERCIAL

## 2023-11-21 DIAGNOSIS — K76.9 LIVER DISEASE, UNSPECIFIED: ICD-10-CM

## 2023-11-21 PROCEDURE — 25500020 PHARM REV CODE 255: Performed by: INTERNAL MEDICINE

## 2023-11-21 PROCEDURE — A9585 GADOBUTROL INJECTION: HCPCS | Performed by: INTERNAL MEDICINE

## 2023-11-21 PROCEDURE — 74183 MRI ABDOMEN W WO CONTRAST: ICD-10-PCS | Mod: 26,,, | Performed by: RADIOLOGY

## 2023-11-21 PROCEDURE — 74183 MRI ABD W/O CNTR FLWD CNTR: CPT | Mod: TC

## 2023-11-21 PROCEDURE — 74183 MRI ABD W/O CNTR FLWD CNTR: CPT | Mod: 26,,, | Performed by: RADIOLOGY

## 2023-11-21 RX ORDER — GADOBUTROL 604.72 MG/ML
10 INJECTION INTRAVENOUS
Status: COMPLETED | OUTPATIENT
Start: 2023-11-21 | End: 2023-11-21

## 2023-11-21 RX ADMIN — GADOBUTROL 10 ML: 604.72 INJECTION INTRAVENOUS at 07:11

## 2023-11-24 ENCOUNTER — PATIENT MESSAGE (OUTPATIENT)
Dept: CARDIOLOGY | Facility: CLINIC | Age: 43
End: 2023-11-24
Payer: COMMERCIAL

## 2023-11-24 ENCOUNTER — PATIENT MESSAGE (OUTPATIENT)
Dept: HEMATOLOGY/ONCOLOGY | Facility: CLINIC | Age: 43
End: 2023-11-24
Payer: COMMERCIAL

## 2023-11-27 RX ORDER — ALPRAZOLAM 0.5 MG/1
0.5 TABLET ORAL DAILY PRN
Qty: 30 TABLET | Refills: 0 | Status: SHIPPED | OUTPATIENT
Start: 2023-11-27 | End: 2024-03-22 | Stop reason: SDUPTHER

## 2023-11-27 NOTE — TELEPHONE ENCOUNTER
No care due was identified.  [facility] Embedded Care Due Messages. Reference number: [id number].   11/27/2023 10:32:52 AM CST

## 2023-12-01 ENCOUNTER — INFUSION (OUTPATIENT)
Dept: INFUSION THERAPY | Facility: HOSPITAL | Age: 43
End: 2023-12-01
Payer: COMMERCIAL

## 2023-12-01 ENCOUNTER — LAB VISIT (OUTPATIENT)
Dept: LAB | Facility: HOSPITAL | Age: 43
End: 2023-12-01
Attending: INTERNAL MEDICINE
Payer: COMMERCIAL

## 2023-12-01 ENCOUNTER — OFFICE VISIT (OUTPATIENT)
Dept: HEMATOLOGY/ONCOLOGY | Facility: CLINIC | Age: 43
End: 2023-12-01
Payer: COMMERCIAL

## 2023-12-01 VITALS
OXYGEN SATURATION: 99 % | DIASTOLIC BLOOD PRESSURE: 82 MMHG | WEIGHT: 151.88 LBS | HEIGHT: 71 IN | BODY MASS INDEX: 21.26 KG/M2 | RESPIRATION RATE: 18 BRPM | SYSTOLIC BLOOD PRESSURE: 130 MMHG | HEART RATE: 96 BPM | TEMPERATURE: 98 F

## 2023-12-01 VITALS
RESPIRATION RATE: 18 BRPM | DIASTOLIC BLOOD PRESSURE: 82 MMHG | SYSTOLIC BLOOD PRESSURE: 129 MMHG | OXYGEN SATURATION: 100 % | WEIGHT: 151.88 LBS | BODY MASS INDEX: 21.26 KG/M2 | HEIGHT: 71 IN | HEART RATE: 60 BPM

## 2023-12-01 DIAGNOSIS — C22.0 HCC (HEPATOCELLULAR CARCINOMA): ICD-10-CM

## 2023-12-01 DIAGNOSIS — C78.01 MALIGNANT NEOPLASM METASTATIC TO RIGHT LUNG: ICD-10-CM

## 2023-12-01 DIAGNOSIS — C22.0 HCC (HEPATOCELLULAR CARCINOMA): Primary | ICD-10-CM

## 2023-12-01 DIAGNOSIS — C22.0 HEPATOCELLULAR CARCINOMA: Primary | ICD-10-CM

## 2023-12-01 DIAGNOSIS — D70.9 NEUTROPENIA, UNSPECIFIED TYPE: ICD-10-CM

## 2023-12-01 DIAGNOSIS — D18.03 LIVER HEMANGIOMA: ICD-10-CM

## 2023-12-01 DIAGNOSIS — M89.9 BONE LESION: ICD-10-CM

## 2023-12-01 DIAGNOSIS — C77.9 REGIONAL LYMPH NODE METASTASIS PRESENT: ICD-10-CM

## 2023-12-01 LAB
AFP SERPL-MCNC: 9.3 NG/ML (ref 0–8.4)
ALBUMIN SERPL BCP-MCNC: 3.6 G/DL (ref 3.5–5.2)
ALP SERPL-CCNC: 38 U/L (ref 55–135)
ALT SERPL W/O P-5'-P-CCNC: 18 U/L (ref 10–44)
ANION GAP SERPL CALC-SCNC: 10 MMOL/L (ref 8–16)
AST SERPL-CCNC: 18 U/L (ref 10–40)
BILIRUB SERPL-MCNC: 1 MG/DL (ref 0.1–1)
BUN SERPL-MCNC: 12 MG/DL (ref 6–20)
CALCIUM SERPL-MCNC: 9.3 MG/DL (ref 8.7–10.5)
CANCER AG19-9 SERPL-ACNC: 70.5 U/ML (ref 0–40)
CHLORIDE SERPL-SCNC: 106 MMOL/L (ref 95–110)
CO2 SERPL-SCNC: 22 MMOL/L (ref 23–29)
CREAT SERPL-MCNC: 0.9 MG/DL (ref 0.5–1.4)
ERYTHROCYTE [DISTWIDTH] IN BLOOD BY AUTOMATED COUNT: 12 % (ref 11.5–14.5)
EST. GFR  (NO RACE VARIABLE): >60 ML/MIN/1.73 M^2
GLUCOSE SERPL-MCNC: 105 MG/DL (ref 70–110)
HCT VFR BLD AUTO: 41.9 % (ref 37–48.5)
HGB BLD-MCNC: 13.8 G/DL (ref 12–16)
IMM GRANULOCYTES # BLD AUTO: 0.01 K/UL (ref 0–0.04)
MCH RBC QN AUTO: 30.5 PG (ref 27–31)
MCHC RBC AUTO-ENTMCNC: 32.9 G/DL (ref 32–36)
MCV RBC AUTO: 93 FL (ref 82–98)
NEUTROPHILS # BLD AUTO: 1.9 K/UL (ref 1.8–7.7)
PLATELET # BLD AUTO: 203 K/UL (ref 150–450)
PMV BLD AUTO: 10 FL (ref 9.2–12.9)
POTASSIUM SERPL-SCNC: 4.6 MMOL/L (ref 3.5–5.1)
PROT SERPL-MCNC: 7.1 G/DL (ref 6–8.4)
RBC # BLD AUTO: 4.52 M/UL (ref 4–5.4)
SODIUM SERPL-SCNC: 138 MMOL/L (ref 136–145)
TSH SERPL DL<=0.005 MIU/L-ACNC: 1.06 UIU/ML (ref 0.4–4)
WBC # BLD AUTO: 3.18 K/UL (ref 3.9–12.7)

## 2023-12-01 PROCEDURE — 96361 HYDRATE IV INFUSION ADD-ON: CPT

## 2023-12-01 PROCEDURE — 3075F PR MOST RECENT SYSTOLIC BLOOD PRESS GE 130-139MM HG: ICD-10-PCS | Mod: CPTII,S$GLB,, | Performed by: INTERNAL MEDICINE

## 2023-12-01 PROCEDURE — 99999 PR PBB SHADOW E&M-EST. PATIENT-LVL IV: CPT | Mod: PBBFAC,,, | Performed by: INTERNAL MEDICINE

## 2023-12-01 PROCEDURE — 63600175 PHARM REV CODE 636 W HCPCS: Mod: JZ,JG | Performed by: INTERNAL MEDICINE

## 2023-12-01 PROCEDURE — 3079F DIAST BP 80-89 MM HG: CPT | Mod: CPTII,S$GLB,, | Performed by: INTERNAL MEDICINE

## 2023-12-01 PROCEDURE — 3008F PR BODY MASS INDEX (BMI) DOCUMENTED: ICD-10-PCS | Mod: CPTII,S$GLB,, | Performed by: INTERNAL MEDICINE

## 2023-12-01 PROCEDURE — 86301 IMMUNOASSAY TUMOR CA 19-9: CPT | Performed by: INTERNAL MEDICINE

## 2023-12-01 PROCEDURE — 25000003 PHARM REV CODE 250: Performed by: INTERNAL MEDICINE

## 2023-12-01 PROCEDURE — 80053 COMPREHEN METABOLIC PANEL: CPT | Performed by: INTERNAL MEDICINE

## 2023-12-01 PROCEDURE — 3075F SYST BP GE 130 - 139MM HG: CPT | Mod: CPTII,S$GLB,, | Performed by: INTERNAL MEDICINE

## 2023-12-01 PROCEDURE — 96413 CHEMO IV INFUSION 1 HR: CPT

## 2023-12-01 PROCEDURE — 3044F PR MOST RECENT HEMOGLOBIN A1C LEVEL <7.0%: ICD-10-PCS | Mod: CPTII,S$GLB,, | Performed by: INTERNAL MEDICINE

## 2023-12-01 PROCEDURE — 85027 COMPLETE CBC AUTOMATED: CPT | Performed by: INTERNAL MEDICINE

## 2023-12-01 PROCEDURE — 3008F BODY MASS INDEX DOCD: CPT | Mod: CPTII,S$GLB,, | Performed by: INTERNAL MEDICINE

## 2023-12-01 PROCEDURE — 3079F PR MOST RECENT DIASTOLIC BLOOD PRESSURE 80-89 MM HG: ICD-10-PCS | Mod: CPTII,S$GLB,, | Performed by: INTERNAL MEDICINE

## 2023-12-01 PROCEDURE — 3044F HG A1C LEVEL LT 7.0%: CPT | Mod: CPTII,S$GLB,, | Performed by: INTERNAL MEDICINE

## 2023-12-01 PROCEDURE — 36415 COLL VENOUS BLD VENIPUNCTURE: CPT | Performed by: INTERNAL MEDICINE

## 2023-12-01 PROCEDURE — 99215 PR OFFICE/OUTPT VISIT, EST, LEVL V, 40-54 MIN: ICD-10-PCS | Mod: S$GLB,,, | Performed by: INTERNAL MEDICINE

## 2023-12-01 PROCEDURE — 82105 ALPHA-FETOPROTEIN SERUM: CPT | Performed by: INTERNAL MEDICINE

## 2023-12-01 PROCEDURE — 84443 ASSAY THYROID STIM HORMONE: CPT | Performed by: INTERNAL MEDICINE

## 2023-12-01 PROCEDURE — 99215 OFFICE O/P EST HI 40 MIN: CPT | Mod: S$GLB,,, | Performed by: INTERNAL MEDICINE

## 2023-12-01 PROCEDURE — 99999 PR PBB SHADOW E&M-EST. PATIENT-LVL IV: ICD-10-PCS | Mod: PBBFAC,,, | Performed by: INTERNAL MEDICINE

## 2023-12-01 RX ORDER — HEPARIN 100 UNIT/ML
500 SYRINGE INTRAVENOUS
Status: DISCONTINUED | OUTPATIENT
Start: 2023-12-01 | End: 2023-12-01 | Stop reason: HOSPADM

## 2023-12-01 RX ORDER — DIPHENHYDRAMINE HYDROCHLORIDE 50 MG/ML
50 INJECTION INTRAMUSCULAR; INTRAVENOUS ONCE AS NEEDED
Status: CANCELLED | OUTPATIENT
Start: 2023-12-01

## 2023-12-01 RX ORDER — PROCHLORPERAZINE EDISYLATE 5 MG/ML
5 INJECTION INTRAMUSCULAR; INTRAVENOUS ONCE AS NEEDED
Status: CANCELLED
Start: 2023-12-01

## 2023-12-01 RX ORDER — SODIUM CHLORIDE 0.9 % (FLUSH) 0.9 %
10 SYRINGE (ML) INJECTION
Status: DISCONTINUED | OUTPATIENT
Start: 2023-12-01 | End: 2023-12-01 | Stop reason: HOSPADM

## 2023-12-01 RX ORDER — EPINEPHRINE 0.3 MG/.3ML
0.3 INJECTION SUBCUTANEOUS ONCE AS NEEDED
Status: DISCONTINUED | OUTPATIENT
Start: 2023-12-01 | End: 2023-12-01 | Stop reason: HOSPADM

## 2023-12-01 RX ORDER — SODIUM CHLORIDE, SODIUM LACTATE, POTASSIUM CHLORIDE, CALCIUM CHLORIDE 600; 310; 30; 20 MG/100ML; MG/100ML; MG/100ML; MG/100ML
INJECTION, SOLUTION INTRAVENOUS CONTINUOUS
Status: CANCELLED
Start: 2023-12-01

## 2023-12-01 RX ORDER — EPINEPHRINE 0.3 MG/.3ML
0.3 INJECTION SUBCUTANEOUS ONCE AS NEEDED
Status: CANCELLED | OUTPATIENT
Start: 2023-12-01

## 2023-12-01 RX ORDER — HEPARIN 100 UNIT/ML
500 SYRINGE INTRAVENOUS
Status: CANCELLED | OUTPATIENT
Start: 2023-12-01

## 2023-12-01 RX ORDER — SODIUM CHLORIDE 0.9 % (FLUSH) 0.9 %
10 SYRINGE (ML) INJECTION
Status: CANCELLED | OUTPATIENT
Start: 2023-12-01

## 2023-12-01 RX ORDER — PROCHLORPERAZINE EDISYLATE 5 MG/ML
5 INJECTION INTRAMUSCULAR; INTRAVENOUS ONCE AS NEEDED
Status: DISCONTINUED | OUTPATIENT
Start: 2023-12-01 | End: 2023-12-01 | Stop reason: HOSPADM

## 2023-12-01 RX ORDER — SODIUM CHLORIDE, SODIUM LACTATE, POTASSIUM CHLORIDE, CALCIUM CHLORIDE 600; 310; 30; 20 MG/100ML; MG/100ML; MG/100ML; MG/100ML
INJECTION, SOLUTION INTRAVENOUS CONTINUOUS
Status: DISCONTINUED | OUTPATIENT
Start: 2023-12-01 | End: 2023-12-01 | Stop reason: HOSPADM

## 2023-12-01 RX ORDER — DIPHENHYDRAMINE HYDROCHLORIDE 50 MG/ML
50 INJECTION INTRAMUSCULAR; INTRAVENOUS ONCE AS NEEDED
Status: DISCONTINUED | OUTPATIENT
Start: 2023-12-01 | End: 2023-12-01 | Stop reason: HOSPADM

## 2023-12-01 RX ADMIN — SODIUM CHLORIDE, POTASSIUM CHLORIDE, SODIUM LACTATE AND CALCIUM CHLORIDE: 600; 310; 30; 20 INJECTION, SOLUTION INTRAVENOUS at 09:12

## 2023-12-01 RX ADMIN — SODIUM CHLORIDE 1500 MG: 9 INJECTION, SOLUTION INTRAVENOUS at 10:12

## 2023-12-01 NOTE — PROGRESS NOTES
MEDICAL ONCOLOGY - ESTABLISHED PATIENT VISIT    Reason for visit: Scirrhous HCC    Best Contact Phone Number(s): [phone] (home)      Cancer/Stage/TNM:    Cancer Staging   Hepatocellular carcinoma  Staging form: Liver, AJCC 8th Edition  - Clinical stage from 4/10/2023: Stage IVB (rcT1b, cN0, pM1) - Signed by Philippe Carrasco MD on 8/7/2023       Oncology History   Hepatocellular carcinoma   6/9/2021 Initial Diagnosis    Hepatocellular carcinoma     4/10/2023 Cancer Staged    Staging form: Liver, AJCC 8th Edition  - Clinical stage from 4/10/2023: Stage IVB (rcT1b, cN0, pM1)     7/11/2023 -  Chemotherapy    Treatment Summary   Plan Name: OP TREMELIMUMAB 300 MG (X 1) + DURVALUMAB 1500 MG Q4W  Treatment Goal: Control  Status: Active  Start Date: 7/11/2023  End Date: 6/10/2024 (Planned)  Provider: Philippe Carrasco MD  Chemotherapy: [No matching medication found in this treatment plan]     9/11/2023 Genetic Testing    Genetic counseling done. Hereditary syndrome unlikely. Patient offered testing, but declined due to cost.      10/23/2023 - 11/3/2023 Radiation Therapy    Treating physician: yamil villa    Course: C1 Chst/Abd 2023  Treatment Site Ref. ID Energy Dose/Fx (Gy) #Fx Dose Correction (Gy) Total Dose (Gy) Start Date End Date Elapsed Days   SB Lung_R PTV_Lung 6X 10 5 / 5 0 50 10/23/2023 11/1/2023 9   SB Abdomen PTV_PortalLN 6X 10 5 / 5 0 50 10/23/2023 11/3/2023 11         Interim History:   43 y.o. 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 Howell with the same pathology, negative margins, 0 of 8 lymph nodes positive and + for vascular and perineural invasion.  I saw her in 2021 for evaluation for persistent CA 19-9 but there was no radiographic evidence of HCC disease recurrence or alternative reason for CA 19-9 elevation.  CT chest on 3/9/23 showed an enlarging RLL nodule measuring 1.1 cm, increased from 0.8 cm in size.  IR biopsy of this on  4/10/23 confirmed metastatic HCC.  Since then in mid-June she has also had an MRI abdomen that showed an enlarging portocaval lymph node that is consistent with metastatic disease.  She completed SBRT at the beginning of November to her lung metastasis and to her portocaval lymph node.    She presents today for cycle 6 of durvalumab + tremelimumab as part of the STRiDE regimen.  She is feeling well.  No further nausea.  She had an MRI last week that showed some concern for recurrence along her resection margin.  Plan for Y-90 mapping later this month.  No dyspnea or cough.  Very minimal transient RUQ pain.    Presents alone today.  ECOG PS 0.    ROS:   Review of Systems   Constitutional:  Negative for chills, fever, malaise/fatigue and weight loss.   HENT:  Negative for sore throat.    Eyes:  Negative for blurred vision and pain.   Respiratory:  Negative for cough and shortness of breath.    Cardiovascular:  Negative for chest pain and leg swelling.   Gastrointestinal:  Positive for constipation. Negative for abdominal pain, diarrhea, nausea and vomiting.   Genitourinary:  Negative for dysuria and frequency.   Musculoskeletal:  Negative for back pain, falls and myalgias.   Skin:  Negative for rash.   Neurological:  Negative for dizziness, weakness and headaches.   Endo/Heme/Allergies:  Does not bruise/bleed easily.   Psychiatric/Behavioral:  Negative for depression. The patient is not nervous/anxious.    All other systems reviewed and are negative.      Past Medical History:   Past Medical History:   Diagnosis Date    VENKATA positive 08/20/2020    COVID-19     Deviated septum 2011    Hepatocellular carcinoma 05/07/2021    Hypertrophy of inferior nasal turbinate     Pericardial effusion     Premature menopause         Past Surgical History:   Past Surgical History:   Procedure Laterality Date    COLONOSCOPY N/A 09/21/2020    Procedure: COLONOSCOPY;  Surgeon: GIOVANNI Crane MD;  Location: [facility] ([address] [place] [address]);  Service:  Endoscopy;  Laterality: N/A;  + covid 4/22    HERNIA REPAIR      LIVER SURGERY N/A 06/28/2021    NASAL SEPTUM SURGERY      PERICARDIAL WINDOW N/A 02/22/2021    Procedure: CREATION, PERICARDIAL WINDOW;  Surgeon: Ajay Cantor MD;  [facility];  Service: Cardiovascular;  Laterality: N/A;    PERICARDIOCENTESIS N/A 05/02/2020    Procedure: Pericardiocentesis;  Surgeon: Dickson Dangelo MD;  Location: [facility] CATH LAB;  Service: Cardiology;  Laterality: N/A;    TONSILLECTOMY          Family History:   Family History   Problem Relation Age of Onset    Diabetes Mother     Liver disease Mother         fatty liver    Heart disease Father     Macular degeneration Father     Diabetes Father     Hypertension Father     Hyperlipidemia Father     Heart disease Sister     Lung cancer Maternal Aunt         heavy smoker    Cerebral aneurysm Paternal Aunt     Cataracts Neg Hx     Glaucoma Neg Hx     Retinal detachment Neg Hx     Stroke Neg Hx     Thyroid disease Neg Hx     Melanoma Neg Hx     Heart attack Neg Hx         Social History:   Social History     Tobacco Use    Smoking status: Never    Smokeless tobacco: Never   Substance Use Topics    Alcohol use: Yes     Comment: rare        I have reviewed and updated the patient's past medical, surgical, family and social histories.    Allergies:   Review of patient's allergies indicates:   Allergen Reactions    No known drug allergies         Medications:   Current Outpatient Medications   Medication Sig Dispense Refill    ALPRAZolam (XANAX) 0.5 MG tablet TAKE 1 TABLET BY MOUTH EVERY DAY AS NEEDED FOR ANXIETY 30 tablet 0    cetirizine (ZYRTEC) 10 MG tablet Take 1 tablet (10 mg total) by mouth once daily. 90 tablet 0    cyanocobalamin (VITAMIN B-12) 1000 MCG tablet Take 1 tablet (1,000 mcg total) by mouth once daily. 30 tablet 12    fluticasone propionate (FLONASE) 50 mcg/actuation nasal spray 2 sprays (100 mcg total) by Each Nostril route once daily. 18.2 mL 6     multivitamin capsule Take by mouth. 1 capsule Oral Every morning      norethindrone-ethinyl estradiol (MICROGESTIN 1/20) 1-20 mg-mcg per tablet Take 1 tablet by mouth once daily. 63 tablet 4    omeprazole (PRILOSEC) 20 MG capsule TAKE 1 CAPSULE BY MOUTH ONCE DAILY AS NEEDED FOR GERD 90 capsule 3    ondansetron (ZOFRAN-ODT) 4 MG TbDL Take 1 tablet (4 mg total) by mouth 2 (two) times daily. 2 tablet 0    ondansetron (ZOFRAN-ODT) 4 MG TbDL Take 1 tablet (4 mg total) by mouth every 12 (twelve) hours as needed (nausea). 20 tablet 0    tiZANidine (ZANAFLEX) 4 MG tablet Take 1 tablet (4 mg total) by mouth every 6 (six) hours as needed (pain/spasm). 30 tablet 1    triamcinolone acetonide 0.1% (KENALOG) 0.1 % cream Apply topically 2 (two) times daily as needed (scaling at external ears). Avoid use on face, body folds, groin/genitalia. 45 g 3     No current facility-administered medications for this visit.     Facility-Administered Medications Ordered in Other Visits   Medication Dose Route Frequency Provider Last Rate Last Admin    alteplase injection 2 mg  2 mg Intra-Catheter PRN Philippe Carrasco MD        diphenhydrAMINE injection 50 mg  50 mg Intravenous Once PRN Philippe Carrasco MD        durvalumab (IMFINZI) 1,500 mg in sodium chloride 0.9% SolP 315 mL chemo infusion  1,500 mg Intravenous 1 time in Clinic/HOD Philippe Carrasco MD        EPINEPHrine (EPIPEN) 0.3 mg/0.3 mL pen injection 0.3 mg  0.3 mg Intramuscular Once PRN Philippe Carrasco MD        heparin, porcine (PF) 100 unit/mL injection flush 500 Units  500 Units Intravenous PRN Philippe Carrasco MD        hydrocortisone sodium succinate injection 100 mg  100 mg Intravenous Once PRN Philippe Carrasco MD        lactated ringers infusion   Intravenous Continuous Philippe Carrasco MD 1,000 mL/hr at 12/01/23 0920 New Bag at 12/01/23 0920    prochlorperazine injection Soln 5 mg  5 mg Intravenous Once PRN Philippe Carrasco MD         "sodium chloride 0.9% 100 mL flush bag   Intravenous 1 time in Clinic/HOD Philippe Carrasco MD        sodium chloride 0.9% flush 10 mL  10 mL Intravenous PRN Philippe Carrasco MD            Physical Exam:   /82 (BP Location: Left arm, Patient Position: Sitting, BP Method: Medium (Automatic))   Pulse 96   Temp 98.1 °F (36.7 °C) (Oral)   Resp 18   Ht 5' 11" (1.803 m)   Wt 68.9 kg (151 lb 14.4 oz)   LMP  (LMP Unknown)   SpO2 99%   BMI 21.19 kg/m²      ECOG Performance status: 0            Physical Exam  Vitals reviewed.   Constitutional:       General: She is not in acute distress.     Appearance: Normal appearance. She is normal weight.   HENT:      Head: Normocephalic and atraumatic.      Right Ear: External ear normal.      Left Ear: External ear normal.      Nose: Nose normal.      Mouth/Throat:      Mouth: Mucous membranes are moist.      Pharynx: Oropharynx is clear. No posterior oropharyngeal erythema.   Eyes:      General: No scleral icterus.     Extraocular Movements: Extraocular movements intact.      Conjunctiva/sclera: Conjunctivae normal.      Pupils: Pupils are equal, round, and reactive to light.   Cardiovascular:      Rate and Rhythm: Normal rate and regular rhythm.      Pulses: Normal pulses.      Heart sounds: Normal heart sounds.   Pulmonary:      Effort: Pulmonary effort is normal.      Breath sounds: Normal breath sounds. No wheezing or rales.   Abdominal:      General: Bowel sounds are normal. There is no distension.      Palpations: Abdomen is soft.      Tenderness: There is no abdominal tenderness.   Musculoskeletal:         General: No swelling. Normal range of motion.      Cervical back: Normal range of motion and neck supple.   Skin:     General: Skin is warm.      Coloration: Skin is not jaundiced.      Findings: No erythema or rash.   Neurological:      General: No focal deficit present.      Mental Status: She is alert and oriented to person, place, and time. Mental " status is at baseline.      Gait: Gait normal.   Psychiatric:         Mood and Affect: Mood normal.         Behavior: Behavior normal.         Thought Content: Thought content normal.         Judgment: Judgment normal.           Labs:   Recent Results (from the past 48 hour(s))   CBC Oncology    Collection Time: 12/01/23  7:11 AM   Result Value Ref Range    WBC 3.18 (L) 3.90 - 12.70 K/uL    RBC 4.52 4.00 - 5.40 M/uL    Hemoglobin 13.8 12.0 - 16.0 g/dL    Hematocrit 41.9 37.0 - 48.5 %    MCV 93 82 - 98 fL    MCH 30.5 27.0 - 31.0 pg    MCHC 32.9 32.0 - 36.0 g/dL    RDW 12.0 11.5 - 14.5 %    Platelets 203 150 - 450 K/uL    MPV 10.0 9.2 - 12.9 fL    Gran # (ANC) 1.9 1.8 - 7.7 K/uL    Immature Grans (Abs) 0.01 0.00 - 0.04 K/uL   Comprehensive Metabolic Panel    Collection Time: 12/01/23  7:11 AM   Result Value Ref Range    Sodium 138 136 - 145 mmol/L    Potassium 4.6 3.5 - 5.1 mmol/L    Chloride 106 95 - 110 mmol/L    CO2 22 (L) 23 - 29 mmol/L    Glucose 105 70 - 110 mg/dL    BUN 12 6 - 20 mg/dL    Creatinine 0.9 0.5 - 1.4 mg/dL    Calcium 9.3 8.7 - 10.5 mg/dL    Total Protein 7.1 6.0 - 8.4 g/dL    Albumin 3.6 3.5 - 5.2 g/dL    Total Bilirubin 1.0 0.1 - 1.0 mg/dL    Alkaline Phosphatase 38 (L) 55 - 135 U/L    AST 18 10 - 40 U/L    ALT 18 10 - 44 U/L    eGFR >60.0 >60 mL/min/1.73 m^2    Anion Gap 10 8 - 16 mmol/L   AFP Tumor Marker    Collection Time: 12/01/23  7:11 AM   Result Value Ref Range    AFP 9.3 (H) 0.0 - 8.4 ng/mL   TSH    Collection Time: 12/01/23  7:11 AM   Result Value Ref Range    TSH 1.061 0.400 - 4.000 uIU/mL   Cancer Antigen 19-9    Collection Time: 12/01/23  7:11 AM   Result Value Ref Range    CA 19-9 70.5 (H) 0.0 - 40.0 U/mL       I have reviewed the pertinent labs from 12/1/23 which are notable for mild leukopenia.  AFP 9.3 from 8.5.    Imaging:       10/2/23 - CT CAP:    Impression:     1. Postsurgical changes of partial right hepatectomy for reported history of metastatic HCC, unchanged.  2. 0.9-cm,  2.7-cm and 2.1-cm hepatic parenchymal heterogenous hypodense nodules, not significantly changed.  3. 2.8 x 3.9-cm (previously 2.2 x 3.0-cm) abnormally enlarged albert hepatis lymph node.  4.  1.6-cm (previously 1.3-cm) solid nodule in the right lower lobe.  0.3-cm left upper lobe 0.5-cm right upper lobe solid pulmonary parenchymal nodules, not significantly changed.  This report was flagged in Epic as abnormal.    Path:   6/28/21: partial hepatectomy:    Final Pathologic Diagnosis 1.  Gallbladder (cholecystectomy):   - Benign gallbladder with cholecystitis   2. Right lobe (partial hepatectomy):   - Positive for malignancy, see synoptic report below   - Separate hemangioma, 7.3 cm   3. Lymph nodes, portal (regional resection):   - 8 lymph nodes, negative for tumor (0/8)   ______________________________________________________________________________   ______   Hepatocellular carcinoma synoptic report   - Procedure:  Partial hepatectomy, right lobe   - Histologic type:  Hepatocellular carcinoma, scirrhous   - Histologic grade:  Moderately differentiated, grade 2   - Tumor focality:  Solitary   - Tumor characteristics:   - Tumor site:  Right lobe   - Tumor size:  3.3 cm   - Treatment effect:  No known pre-surgical therapy   - Satellitosis:  Not identified   - Tumor extent:  Confined to liver   - Vascular invasion:  Present, Small vessel   - Perineural invasion:  Present   - Margins:   - All margins are negative for invasive carcinoma   - Closest margin to invasive carcinoma:  Parenchymal   - Distance from invasive  carcinoma to closest margin:  5 mm   - Regional lymph nodes:   - Number of lymph nodes with tumor:  0   - Number of lymph nodes examined:  8   - Pathology: pT2 N0 MX   - Additional findings:   - No steatosis   - Trichrome stain:  Periportal fibrosis with early septa, stage 1-2   - Iron stain:  Negative   - Iron and trichrome stains with appropriate controls   Tumor blocks:  2A, 2B, 2 C    Comment: Interp By  Madeline Carlos MD, Signed on 07/08/2021 at 16:47       Assessment:       1. HCC (hepatocellular carcinoma)    2. Regional lymph node metastasis present    3. Malignant neoplasm metastatic to right lung    4. Bone lesion    5. Neutropenia, unspecified type    6. Liver hemangioma              Plan:             # HCC  Scirrhous subtype, which is very uncommon (~ 1% of all HCC); prognosis is likely similar to typical HCC.  No clear underlying liver pathology in this patient.  Had margin negative resection with negative lymph node eval on 6/28/21 with Dr. Howell.  Unfortunately she has biopsy proven recurrent metastatic disease to her RLL s/p IR biopsy 4/10/23.   She was discussed at thoracic tumor board with potential plan for surgical resection with Dr. Tadeo.  She had a concerning bone lesion on PET from 4/26 at T2.  This was biopsied and proven to be benign.  In the interim she had a repeat abdominal MRI on 6/13/23 which demonstrated growth of a portacaval lymph node that was very concerning for metastatic disease when we reviewed her imaging at liver conference.  Meanwhile her RLL met has grown slightly on 6/21/23 CT as well.  I discussed her case with Valerie Dhillon and Shwetha and we were all in agreement with proceeding with systemic therapy rather than surgery or radiation given multifocal progression.    I discussed this with her and she is agreeable with starting systemic therapy.  Reviewed possibilities of atezo + magaly or durva + treme.  She wished to proceed with STRIDE regimen: durvalumab + tremelimumab.    Received cycle 1 on 7/11/23.  Repeat CT CAP after cycle 3 demonstrates mild growth in albert hepatis lymph node.  Stable disease elsewhere.    Discussed with Dr. Mendiola and she was deemed eligible for SBRT to her abdominal lymph node and growing lung nodule.  She completed SBRT to both 11/3/23.    Meanwhile she has developed a new concerning area of possible recurrence near her resection site.  Discussed  with Dr. Lala and Dr. Dhillon.  Dr. Dhillon was unable to visualize it during radiation simulation.  We will thus proceed with Y-90 to this area with IR.    Will continue with STRIDE regimen, meanwhile.  Patient agreeable.  Proceed with cycle 6 today - durvalumab alone.  Will give IVF per her request with infusion today.    Consider switching systemic therapy only if significant progression given likely side effects of TKI.    Saw Dr. Sabillon of cardiology who recommended troponin and ECG monitoring given her cardiac history.  She is asymptomatic at present.    Will plan to bring her back in 4 weeks for cycle 7.  Repeat chest imaging prior to cycle 7.    # Neutropenia  Resolved.  Has experienced in past.  Likely benign etiology.    # Liver hemangiomas  Continue monitoring as indicated with Dr. Rogers.    Follow up: 4 weeks.    The above information has been reviewed with the patient and all questions have been answered to their apparent satisfaction.  They understand that they can call the clinic with any questions.    Philippe Carrasco MD  Hematology/Oncology  [facility]    Route Chart for Scheduling    Med Onc Chart Routing      Follow up with physician 2 months. for imfinzi   Follow up with MELA 4 weeks. with CT chest prior - for imfinzi   Infusion scheduling note   early AM appts   Injection scheduling note    Labs CBC, CMP, TSH and CA 19-9   Scheduling:  Preferred lab:  Lab interval: every 4 weeks  & AFP   Imaging   CT chest prior to f/u in 4 weeks   Pharmacy appointment    Other referrals                  Treatment Plan Information   OP TREMELIMUMAB 300 MG (X 1) + DURVALUMAB 1500 MG Q4W   Philippe Carrasco MD   Upcoming Treatment Dates - OP TREMELIMUMAB 300 MG (X 1) + DURVALUMAB 1500 MG Q4W    12/25/2023       Chemotherapy       durvalumab (IMFINZI) 1,500 mg in sodium chloride 0.9% SolP 280 mL chemo infusion       Antiemetics       prochlorperazine injection Soln 5  mg  1/22/2024       Chemotherapy       durvalumab (IMFINZI) 1,500 mg in sodium chloride 0.9% SolP 280 mL chemo infusion       Antiemetics       prochlorperazine injection Soln 5 mg  2/19/2024       Chemotherapy       durvalumab (IMFINZI) 1,500 mg in sodium chloride 0.9% SolP 280 mL chemo infusion       Antiemetics       prochlorperazine injection Soln 5 mg  3/18/2024       Chemotherapy       durvalumab (IMFINZI) 1,500 mg in sodium chloride 0.9% SolP 280 mL chemo infusion       Antiemetics       prochlorperazine injection Soln 5 mg

## 2023-12-01 NOTE — PLAN OF CARE
Pt received Imfinzi & LR today and tolerated well, without complications. VSS. Educated patient about Imfinzi & LR (indications, side effects, possible reactions, chemotherapy precautions) and verbalized understanding. PIV positive for blood return, saline locked and removed prior to DC, catheter tip intact. Pt DC with no distress noted, ambulated off of unit, via self, w/o event, pleased.

## 2023-12-06 ENCOUNTER — PATIENT MESSAGE (OUTPATIENT)
Dept: INTERNAL MEDICINE | Facility: CLINIC | Age: 43
End: 2023-12-06
Payer: COMMERCIAL

## 2023-12-06 ENCOUNTER — OFFICE VISIT (OUTPATIENT)
Dept: URGENT CARE | Facility: CLINIC | Age: 43
End: 2023-12-06
Payer: COMMERCIAL

## 2023-12-06 ENCOUNTER — TELEPHONE (OUTPATIENT)
Dept: INTERNAL MEDICINE | Facility: CLINIC | Age: 43
End: 2023-12-06

## 2023-12-06 VITALS
TEMPERATURE: 99 F | RESPIRATION RATE: 20 BRPM | WEIGHT: 151 LBS | HEART RATE: 56 BPM | BODY MASS INDEX: 21.14 KG/M2 | HEIGHT: 71 IN | SYSTOLIC BLOOD PRESSURE: 137 MMHG | DIASTOLIC BLOOD PRESSURE: 88 MMHG | OXYGEN SATURATION: 98 %

## 2023-12-06 DIAGNOSIS — R07.9 LEFT-SIDED CHEST PAIN: Primary | ICD-10-CM

## 2023-12-06 PROCEDURE — 93005 EKG 12-LEAD: ICD-10-PCS | Mod: S$GLB,,, | Performed by: FAMILY MEDICINE

## 2023-12-06 PROCEDURE — 93010 EKG 12-LEAD: ICD-10-PCS | Mod: S$GLB,,, | Performed by: INTERNAL MEDICINE

## 2023-12-06 PROCEDURE — 93005 ELECTROCARDIOGRAM TRACING: CPT | Mod: S$GLB,,, | Performed by: FAMILY MEDICINE

## 2023-12-06 PROCEDURE — 99214 OFFICE O/P EST MOD 30 MIN: CPT | Mod: S$GLB,,, | Performed by: FAMILY MEDICINE

## 2023-12-06 PROCEDURE — 93010 ELECTROCARDIOGRAM REPORT: CPT | Mod: S$GLB,,, | Performed by: INTERNAL MEDICINE

## 2023-12-06 PROCEDURE — 99214 PR OFFICE/OUTPT VISIT, EST, LEVL IV, 30-39 MIN: ICD-10-PCS | Mod: S$GLB,,, | Performed by: FAMILY MEDICINE

## 2023-12-06 RX ORDER — CYCLOBENZAPRINE HCL 10 MG
TABLET ORAL
COMMUNITY
Start: 2023-11-21

## 2023-12-06 NOTE — TELEPHONE ENCOUNTER
If she is having severe, unusual new unexplained chest pain, I recommend that she go to the emergency room    I know she has had many evaluations with other physicians for her heart and lungs but with her description of the severity of these symptoms I do not think a routine echo is appropriate

## 2023-12-06 NOTE — TELEPHONE ENCOUNTER
I spoke to the patient. She spoke to her cardiologist a week ago. This pain came and went away in a second. The pain was 8-9/10. Last week she had a lot of gas. She told the cardiologist about it. She didn't take anything for it. No neck or ear pain, is having back pain, has spasm to left side from a bulging disc. Her dad is on BP meds for heart. Her cardiologist told her it only lasted seconds so she did not think it was cardiac. She will at least UC tomorrow. Did not want to go to ER

## 2023-12-06 NOTE — Clinical Note
Hi, this very pleasant patient of yours was seen in the urgent care center this evening for evaluation for brief episode left side chest pain that lasted just seconds early in her shift this morning while at work.  It was brief without any associated symptoms and she has not had symptoms since.  Vital signs within normal limits except very mild bradycardia at 56 beats per minute.  EKG showed sinus Ricki with possible left atrial enlargement.  No significant changes from previous electrocardiograms.   She declined going to the ER for further evaluation including troponin level.  Give her strict ER precautions for any recurring symptoms and advised close follow up with Cardiology and PCP teams

## 2023-12-07 ENCOUNTER — OFFICE VISIT (OUTPATIENT)
Dept: INTERVENTIONAL RADIOLOGY/VASCULAR | Facility: CLINIC | Age: 43
End: 2023-12-07
Payer: COMMERCIAL

## 2023-12-07 ENCOUNTER — PATIENT MESSAGE (OUTPATIENT)
Dept: CARDIOLOGY | Facility: CLINIC | Age: 43
End: 2023-12-07
Payer: COMMERCIAL

## 2023-12-07 DIAGNOSIS — C22.0 HCC (HEPATOCELLULAR CARCINOMA): Primary | ICD-10-CM

## 2023-12-07 DIAGNOSIS — C22.0 HEPATOCELLULAR CARCINOMA: ICD-10-CM

## 2023-12-07 PROCEDURE — 99214 OFFICE O/P EST MOD 30 MIN: CPT | Mod: 95,,, | Performed by: PHYSICIAN ASSISTANT

## 2023-12-07 PROCEDURE — 99214 PR OFFICE/OUTPT VISIT, EST, LEVL IV, 30-39 MIN: ICD-10-PCS | Mod: 95,,, | Performed by: PHYSICIAN ASSISTANT

## 2023-12-07 NOTE — PROGRESS NOTES
"Subjective:      Patient ID: Melly Hwang is a 43 y.o. female.    Vitals:  height is 5' 11" (1.803 m) and weight is 68.5 kg (151 lb). Her oral temperature is 98.6 °F (37 °C). Her blood pressure is 137/88 and her pulse is 56 (abnormal). Her respiration is 20 and oxygen saturation is 98%.     Chief Complaint: Chest Pain    Patient presents for evaluation for brief episode left side chest pain that lasted just seconds early in her shift this morning while at work.  It was brief without any associated symptoms and she has not had symptoms since.  Short complete shift.  She reached out to her PCP who had recommended that she goes to the ER for further evaluation and management.  Patient declined ER instead presented here to the urgent care after her shift.  She reports no current symptoms.  No chest pain shortness for breath.  She is not had any fever cough or chest congestion.  She did not endorse any dizziness or LOC.  She states she often will have left arm pain and numbness is thought to be musculoskeletal.  She takes muscle relaxers for this.  She is not currently having this pain.        ROS   Objective:     Physical Exam  Constitutional: Pt oriented to person, place, and time.  Non-toxic appearance.   Patient does not appear ill. No distress. normal  HENT: No icterus or facial swelling appreciated  Head: Normocephalic and atraumatic.   Nose: No congestion.   Pulmonary/Chest: Effort normal. No stridor. No respiratory distress.   Chest: s1/s2 RRR  Abdominal: Normal appearance. Abdomen exhibits no distension.   Musculoskeletal:         General: No swelling.   Neurological: no focal deficit. Patient is alert and oriented to person, place, and time.   Skin: Skin is not diaphoretic and not pale. no jaundice  Psychiatric: Patients behavior is normal. Mood, judgment and thought content normal.     Assessment:     1. Left-sided chest pain        Plan:       Left-sided chest pain  -     IN OFFICE EKG 12-LEAD (to " Muse)      Vital signs within normal limits except very mild bradycardia at 56 beats per minute.  EKG showed sinus Ricki with possible left atrial enlargement.  No significant changes from previous electrocardiograms.    She declined going to the ER for further evaluation including troponin level.  Give her strict ER precautions for any recurring symptoms and advised close follow up with Cardiology and PCP teams    DDX include but not limited to ACS, PE, GERD, MSK etiology,

## 2023-12-07 NOTE — TELEPHONE ENCOUNTER
SHe went to , EKG looked fine. They could not d dimer or Troponin. She already reached out to the cardiologist. She passed gas and belched all night and is feeling better. Thank you Dr. Sahu

## 2023-12-08 NOTE — PROGRESS NOTES
Subjective     Patient ID: Melly Hwang is a 43 y.o. female.    Chief Complaint: Lesion    Virtual visit with patient referred to Interventional Radiology by Dr. Carrasco for evaluation of Y90.  43 y.o. female with significant past medical history of Scirrhous subtype HCC, margin negative resection with negative lymph node eval on 6/28/21 with Dr. Howell.  Biopsy proven recurrent metastatic disease to her RLL s/p IR biopsy 4/10/23. Imaging obtained on 11/21/23 MRI noted  increased size of nodular enhancement about the resection margin compared to prior MR 06/13/2023, without pseudo capsule or washout.  Finding may reflect evolving postoperative scarring, however threshold growth is concerning for recurrence.  Case discussed with Dr. Lala with recommendation for Y90 to the enlarging lesion by the resection margin.  Pt reports feeling well.  Pt denies chills, fever, unexpected wt change, CP, SOB, abdominal pain, N/V/D, confusion.     Patient denies AC or antiplatelet use.  Pt denies issues laying flat. Pt denies CPAP use or O2 use at home  Oncology  notes reviewed.        Review of Systems   Constitutional:  Negative for activity change, appetite change, chills, fatigue, fever and unexpected weight change.   Respiratory:  Negative for cough and shortness of breath.    Cardiovascular:  Negative for chest pain and leg swelling.   Gastrointestinal:  Negative for abdominal distention, abdominal pain, constipation, diarrhea, nausea and vomiting.   Genitourinary:  Negative for difficulty urinating and flank pain.   Musculoskeletal:  Negative for back pain and gait problem.   Neurological:  Negative for weakness and memory loss.   Psychiatric/Behavioral:  Negative for confusion and sleep disturbance.           Objective     Physical Exam  Constitutional:       General: She is not in acute distress.     Appearance: She is well-developed. She is not diaphoretic.   HENT:      Head: Normocephalic and atraumatic.    Pulmonary:      Effort: Pulmonary effort is normal. No respiratory distress.   Neurological:      Mental Status: She is alert and oriented to person, place, and time.   Psychiatric:         Behavior: Behavior normal.         Thought Content: Thought content normal.         Judgment: Judgment normal.       IMAGING MRI reviewed independently  EXAMINATION:  MRI ABDOMEN W WO CONTRAST     CLINICAL HISTORY:  Liver disease, chronic, history of HCC, monitor;  Liver disease, unspecified     TECHNIQUE:  Multisequence, multiplanar MRI of the abdomen performed per liver protocol before and after administration of 10 mL Gadavist intravenous contrast.     COMPARISON:  CT abdomen pelvis 10/27/2023.  MRI abdomen 06/13/2023.     FINDINGS:  LOWER THORAX: Trace pleural fluid.     LIVER: Postoperative change of partial right hepatectomy.  1.9 x 1.4 cm nodular enhancing focus about the resection margin (series 11, image 17), increased in size compared to prior, previously measuring 1.5 x 1.3 cm.  Lesion remains hyperintense on subsequent phases.  No pseudocapsule or washout.     Enlarged, 21.1 cm craniocaudal.  Two T2 hyperintense right hepatic lobe lesions (series 11, image 35 and 69), both demonstrate discontinuous peripheral nodular enhancement with centripetal fill-in on subsequent phases, compatible with benign hemangiomas, stable.  Additional subcentimeter right hepatic lobe lesions (series 11, images 35 and 68), demonstrate fill-in on delayed phase (series 15, images 34 and 68), likely additional hemangiomas.  Stable nonspecific 1.2 cm arterial-enhancing focus in the left hepatic lobe (series 11, image 28), becomes isointense on subsequent phases without worrisome features.  Portal and hepatic veins are patent.     BILIARY: Normal gallbladder. Stable intrahepatic biliary ductal dilatation.     SPLEEN: No splenomegaly.     PANCREAS: No focal masses or ductal dilatation.     ADRENALS: No adrenal nodules.     KIDNEYS/URETERS: No  hydronephrosis or solid mass lesions.  Bilateral prominent renal sinus fat.     PERITONEUM / RETROPERITONEUM: No significant free fluid.     LYMPH NODES: 3.1 x 1.5 cm portacaval node with central hypoenhancement, suggesting necrosis (series 11, image 45).     VESSELS: Unremarkable.     GI TRACT: No bowel obstruction or wall thickening.  Stomach is distended, likely with ingested contents.     BONES AND SOFT TISSUES: No marrow replacing lesions.     Impression:     Postoperative change of partial right hepatectomy.  Increased size of nodular enhancement about the resection margin compared to prior MR 06/13/2023, without pseudo capsule or washout.  Finding may reflect evolving postoperative scarring, however threshold growth is concerning for recurrence.     Additional hepatic lesions as above, favor benign etiology.     3.1 cm portacaval node, slightly decreased in size compared to prior CT allowing for difference in modality.     Stable intrahepatic biliary duct dilatation.        Assessment and Plan     1. HCC (hepatocellular carcinoma)  -     IR Embolization for Tumor_Organ Ischemia; Future; Expected date: 12/07/2023  -     NM Liver Imaging Static PRE Y-90 Emboliz; Future; Expected date: 12/07/2023  -     NM Liver Imaging Static POST Y-90 Emboli; Future; Expected date: 12/07/2023  -     NM Spect/CT Single Area; Future; Expected date: 12/07/2023  -     NM Spect/CT Single Area; Future; Expected date: 12/07/2023  -     IR Embolization for Tumor_Organ Ischemia; Future; Expected date: 12/07/2023  -     Comprehensive Metabolic Panel; Future; Expected date: 12/07/2023  -     Bilirubin, Direct; Future; Expected date: 12/07/2023  -     Protime-INR; Future; Expected date: 12/07/2023  -     CBC Auto Differential; Future; Expected date: 12/07/2023    2. Hepatocellular carcinoma    The patient location is: [place]  The chief complaint leading to consultation is: 30 min    Visit type: audiovisual    Face to Face time with  patient: 30  45 minutes of total time spent on the encounter, which includes face to face time and non-face to face time preparing to see the patient (eg, review of tests), Obtaining and/or reviewing separately obtained history, Documenting clinical information in the electronic or other health record, Independently interpreting results (not separately reported) and communicating results to the patient/family/caregiver, or Care coordination (not separately reported).         Each patient to whom he or she provides medical services by telemedicine is:  (1) informed of the relationship between the physician and patient and the respective role of any other health care provider with respect to management of the patient; and (2) notified that he or she may decline to receive medical services by telemedicine and may withdraw from such care at any time.    Notes:    Yttrium 90 Radioembolization discussed in detail with the patient including risks (including, but not limited to, pain, bleeding, infection, damage to nearby structures, and the need for additional procedures), benefits, potential complications, usual pre and post procedure course.  Discussed the need for initial Angiogram mapping study prior to scheduling the actual Radioembolization procedure. Utilized images from the patient's chart, the internet, and illustrations to help explain procedure. The patient voices understanding and all questions have been answered.  The patient agrees to proceed as planned.     Patient scheduled for Y90 mapping at [facility] 1/4 under anesthesia per patient request with Dr. Lala or Dr. Alford, pt agreeable to either physician.  2.  Pre-procedure lab orders placed.  3.  Pt denies allergy to contrast.  4.  Pre-procedure handout with clinic phone number provided.  Discussed driving restrictions.  Discussed after delivery for 3 days pt to not be within 3 ft of pregnant women, young children, or pets.  Pt advised to call if any  further questions or need to reschedule.  Discussed with patient our nursing team will call the day before the procedure with reminders for instructions.          No follow-ups on file.

## 2023-12-11 DIAGNOSIS — M51.34 DDD (DEGENERATIVE DISC DISEASE), THORACIC: Primary | ICD-10-CM

## 2023-12-19 ENCOUNTER — OFFICE VISIT (OUTPATIENT)
Dept: GASTROENTEROLOGY | Facility: CLINIC | Age: 43
End: 2023-12-19
Payer: COMMERCIAL

## 2023-12-19 VITALS
BODY MASS INDEX: 21.48 KG/M2 | HEIGHT: 71 IN | WEIGHT: 153.44 LBS | HEART RATE: 67 BPM | DIASTOLIC BLOOD PRESSURE: 98 MMHG | SYSTOLIC BLOOD PRESSURE: 158 MMHG

## 2023-12-19 DIAGNOSIS — R14.2 BELCHING: ICD-10-CM

## 2023-12-19 DIAGNOSIS — K21.9 GASTROESOPHAGEAL REFLUX DISEASE, UNSPECIFIED WHETHER ESOPHAGITIS PRESENT: Primary | ICD-10-CM

## 2023-12-19 PROCEDURE — 3077F PR MOST RECENT SYSTOLIC BLOOD PRESSURE >= 140 MM HG: ICD-10-PCS | Mod: CPTII,S$GLB,,

## 2023-12-19 PROCEDURE — 3044F PR MOST RECENT HEMOGLOBIN A1C LEVEL <7.0%: ICD-10-PCS | Mod: CPTII,S$GLB,,

## 2023-12-19 PROCEDURE — 99999 PR PBB SHADOW E&M-EST. PATIENT-LVL IV: CPT | Mod: PBBFAC,,,

## 2023-12-19 PROCEDURE — 3077F SYST BP >= 140 MM HG: CPT | Mod: CPTII,S$GLB,,

## 2023-12-19 PROCEDURE — 3008F BODY MASS INDEX DOCD: CPT | Mod: CPTII,S$GLB,,

## 2023-12-19 PROCEDURE — 3008F PR BODY MASS INDEX (BMI) DOCUMENTED: ICD-10-PCS | Mod: CPTII,S$GLB,,

## 2023-12-19 PROCEDURE — 99214 PR OFFICE/OUTPT VISIT, EST, LEVL IV, 30-39 MIN: ICD-10-PCS | Mod: S$GLB,,,

## 2023-12-19 PROCEDURE — 3080F PR MOST RECENT DIASTOLIC BLOOD PRESSURE >= 90 MM HG: ICD-10-PCS | Mod: CPTII,S$GLB,,

## 2023-12-19 PROCEDURE — 1159F PR MEDICATION LIST DOCUMENTED IN MEDICAL RECORD: ICD-10-PCS | Mod: CPTII,S$GLB,,

## 2023-12-19 PROCEDURE — 99214 OFFICE O/P EST MOD 30 MIN: CPT | Mod: S$GLB,,,

## 2023-12-19 PROCEDURE — 3080F DIAST BP >= 90 MM HG: CPT | Mod: CPTII,S$GLB,,

## 2023-12-19 PROCEDURE — 1159F MED LIST DOCD IN RCRD: CPT | Mod: CPTII,S$GLB,,

## 2023-12-19 PROCEDURE — 99999 PR PBB SHADOW E&M-EST. PATIENT-LVL IV: ICD-10-PCS | Mod: PBBFAC,,,

## 2023-12-19 PROCEDURE — 3044F HG A1C LEVEL LT 7.0%: CPT | Mod: CPTII,S$GLB,,

## 2023-12-19 RX ORDER — PANTOPRAZOLE SODIUM 40 MG/1
40 TABLET, DELAYED RELEASE ORAL DAILY
Qty: 30 TABLET | Refills: 3 | Status: SHIPPED | OUTPATIENT
Start: 2023-12-19 | End: 2024-03-15

## 2023-12-19 NOTE — PATIENT INSTRUCTIONS
--Phazyme, Charcotabs, or Mylicon as needed for excess flatus.     For GERD/Reflux:     Take your PPI 30-45 minutes before your first protein containing meal (breakfast) every day. Take once daily for 8 weeks. If symptoms improve ok discontinue an use PPI as needed for symptoms.      Take Pepcid 20mg every evening before bedtime to help with nocturnal symptoms, as needed.     Remain upright for at least 3 hours after eating.      Elevate the head of the bed for nighttime.      Avoid foods that you have noticed make your symptoms worse (possible triggers include: peppermint, alcohol, chocolate, caffeine, spicy foods, greasy/fried foods, acidic foods-citrus).

## 2023-12-19 NOTE — PROGRESS NOTES
"GENERAL GI PATIENT INTAKE:    COVID symptoms in the last 7 days (runny nose, sore throat, congestion, cough, fever): No  PCP: Melly Sahu  If not PCP-  number given to establish [phone]: N/A    ALLERGIES REVIEWED:  Yes    CHIEF COMPLAINT:    Chief Complaint   Patient presents with    GI Problem     Nausea, belching       VITAL SIGNS:  BP (!) 158/98   Pulse 67   Ht 5' 11" (1.803 m)   Wt 69.6 kg (153 lb 7 oz)   LMP  (LMP Unknown)   BMI 21.40 kg/m²      Change in medical, surgical, family or social history: No      REVIEWED MEDICATION LIST RECONCILED INCLUDING ABOVE MEDS:  Yes     "

## 2023-12-19 NOTE — PROGRESS NOTES
Gastroenterology Clinic Consultation Note    Reason for Visit:  The primary encounter diagnosis was Gastroesophageal reflux disease, unspecified whether esophagitis present. A diagnosis of Belching was also pertinent to this visit.    PCP:   Melly Sahu   [address] / [place] [address]      Initial HPI   This is a 43 y.o. female referred from her PCP for further evaluation of patients GI symptoms while undergoing treatment for her hepatocellular carcinoma. Patient presenting for increased belching. Symptom history as follows: Started immunotherapy in July (that she receives once monthly) which caused her constant diarrhea. She now gets fluids prior to immunotherapy which has alleviated her diarrhea. Patient started radiation in November and finished later that month in which she started experiencing daily nausea unrelieved by Zofran. Had some associated post prandial fullness as well. Does not feel nauseous or postprandial fullness anymore. Her main complaint is increased episodes of flatus and belching. Feels like this is more than her usual gas. Denies any current nausea, vomiting, diarrhea. Tolerating her meals. Denies early satiety. Bowel movements have been normal in color and consistency. Started taking Prilosec OTC about 2 weeks ago and states that this did help her a little. Stopped taking in an effort to decrease some of the meds she is taking and she was concerned about this med decreasing some of her vitamin levels.     January 4th will be getting embolization with Y90 which has post procedure limitations for the patient for 3 days.     Patient with remote history of H. Pylori in 2014 that was successfully treated. Last stool test 1/17/2022 was noted to be negative.       ROS:  Review of Systems   Constitutional:  Negative for chills, diaphoresis, fever, malaise/fatigue and weight loss.   HENT:  Negative for sore  throat.    Eyes:  Negative for redness.   Gastrointestinal:  Negative for abdominal pain, blood in stool, constipation, diarrhea, heartburn, melena, nausea and vomiting.   Skin:  Negative for itching and rash.   Neurological:  Negative for dizziness, loss of consciousness and weakness.        Medical History:  has a past medical history of VENKATA positive (2020), COVID-19, Deviated septum (), Hepatocellular carcinoma (2021), Hypertrophy of inferior nasal turbinate, Pericardial effusion, and Premature menopause.    Surgical History:  has a past surgical history that includes Tonsillectomy; Hernia repair; Nasal septum surgery; Pericardiocentesis (N/A, 2020); Colonoscopy (N/A, 2020); Pericardial window (N/A, 2021); and Liver surgery (N/A, 2021).    Family History: family history includes Cerebral aneurysm in her paternal aunt; Diabetes in her father and mother; Heart disease in her father and sister; Hyperlipidemia in her father; Hypertension in her father; Liver disease in her mother; Lung cancer in her maternal aunt; Macular degeneration in her father..       Review of patient's allergies indicates:   Allergen Reactions    No known drug allergies        Current Outpatient Medications on File Prior to Visit   Medication Sig Dispense Refill    ALPRAZolam (XANAX) 0.5 MG tablet TAKE 1 TABLET BY MOUTH EVERY DAY AS NEEDED FOR ANXIETY 30 tablet 0    cetirizine (ZYRTEC) 10 MG tablet Take 1 tablet (10 mg total) by mouth once daily. 90 tablet 0    cyanocobalamin (VITAMIN B-12) 1000 MCG tablet Take 1 tablet (1,000 mcg total) by mouth once daily. 30 tablet 12    cyclobenzaprine (FLEXERIL) 10 MG tablet SMARTSI Tablet(s) By Mouth Every Evening PRN      fluticasone propionate (FLONASE) 50 mcg/actuation nasal spray 2 sprays (100 mcg total) by Each Nostril route once daily. 18.2 mL 6    multivitamin capsule Take by mouth. 1 capsule Oral Every morning      norethindrone-ethinyl estradiol  "(MICROGESTIN 1/20) 1-20 mg-mcg per tablet Take 1 tablet by mouth once daily. 63 tablet 4    ondansetron (ZOFRAN-ODT) 4 MG TbDL Take 1 tablet (4 mg total) by mouth 2 (two) times daily. 2 tablet 0    ondansetron (ZOFRAN-ODT) 4 MG TbDL Take 1 tablet (4 mg total) by mouth every 12 (twelve) hours as needed (nausea). 20 tablet 0    tiZANidine (ZANAFLEX) 4 MG tablet Take 1 tablet (4 mg total) by mouth every 6 (six) hours as needed (pain/spasm). 30 tablet 1    triamcinolone acetonide 0.1% (KENALOG) 0.1 % cream Apply topically 2 (two) times daily as needed (scaling at external ears). Avoid use on face, body folds, groin/genitalia. 45 g 3    [DISCONTINUED] omeprazole (PRILOSEC) 20 MG capsule TAKE 1 CAPSULE BY MOUTH ONCE DAILY AS NEEDED FOR GERD 90 capsule 3     No current facility-administered medications on file prior to visit.         Objective Findings:    Vital Signs:  BP (!) 158/98   Pulse 67   Ht 5' 11" (1.803 m)   Wt 69.6 kg (153 lb 7 oz)   LMP  (LMP Unknown)   BMI 21.40 kg/m²   Body mass index is 21.4 kg/m².    Physical Exam:  Physical Exam  Vitals reviewed.   Constitutional:       Appearance: She is normal weight. She is not ill-appearing.   HENT:      Mouth/Throat:      Mouth: Mucous membranes are moist.      Pharynx: Oropharynx is clear.   Abdominal:      General: Abdomen is flat. Bowel sounds are normal. There is no distension.      Palpations: Abdomen is soft. There is no mass.      Tenderness: There is no abdominal tenderness.      Hernia: No hernia is present.   Skin:     General: Skin is warm and dry.      Capillary Refill: Capillary refill takes less than 2 seconds.      Coloration: Skin is not jaundiced or pale.   Neurological:      Mental Status: She is alert and oriented to person, place, and time. Mental status is at baseline.             Labs:  Lab Results   Component Value Date    WBC 3.18 (L) 12/01/2023    HGB 13.8 12/01/2023    HCT 41.9 12/01/2023     12/01/2023    CRP 16.8 (H) 07/26/2023 "    CHOL 195 09/18/2023    TRIG 140 09/18/2023    HDL 58 09/18/2023    ALKPHOS 38 (L) 12/01/2023    LIPASE 16 07/28/2023    ALT 18 12/01/2023    AST 18 12/01/2023     12/01/2023    K 4.6 12/01/2023     12/01/2023    CREATININE 0.9 12/01/2023    BUN 12 12/01/2023    CO2 22 (L) 12/01/2023    TSH 1.061 12/01/2023    INR 1.1 05/27/2023    HGBA1C 5.1 09/18/2023       Imaging reviewed: MRI Abdomen 11/21/2023  FINDINGS:  LOWER THORAX: Trace pleural fluid.     LIVER: Postoperative change of partial right hepatectomy.  1.9 x 1.4 cm nodular enhancing focus about the resection margin (series 11, image 17), increased in size compared to prior, previously measuring 1.5 x 1.3 cm.  Lesion remains hyperintense on subsequent phases.  No pseudocapsule or washout.     Enlarged, 21.1 cm craniocaudal.  Two T2 hyperintense right hepatic lobe lesions (series 11, image 35 and 69), both demonstrate discontinuous peripheral nodular enhancement with centripetal fill-in on subsequent phases, compatible with benign hemangiomas, stable.  Additional subcentimeter right hepatic lobe lesions (series 11, images 35 and 68), demonstrate fill-in on delayed phase (series 15, images 34 and 68), likely additional hemangiomas.  Stable nonspecific 1.2 cm arterial-enhancing focus in the left hepatic lobe (series 11, image 28), becomes isointense on subsequent phases without worrisome features.  Portal and hepatic veins are patent.     BILIARY: Normal gallbladder. Stable intrahepatic biliary ductal dilatation.     SPLEEN: No splenomegaly.     PANCREAS: No focal masses or ductal dilatation.     ADRENALS: No adrenal nodules.     KIDNEYS/URETERS: No hydronephrosis or solid mass lesions.  Bilateral prominent renal sinus fat.     PERITONEUM / RETROPERITONEUM: No significant free fluid.     LYMPH NODES: 3.1 x 1.5 cm portacaval node with central hypoenhancement, suggesting necrosis (series 11, image 45).     VESSELS: Unremarkable.     GI TRACT: No bowel  obstruction or wall thickening.  Stomach is distended, likely with ingested contents.     BONES AND SOFT TISSUES: No marrow replacing lesions.     Impression:     Postoperative change of partial right hepatectomy.  Increased size of nodular enhancement about the resection margin compared to prior MR 06/13/2023, without pseudo capsule or washout.  Finding may reflect evolving postoperative scarring, however threshold growth is concerning for recurrence.     Additional hepatic lesions as above, favor benign etiology.     3.1 cm portacaval node, slightly decreased in size compared to prior CT allowing for difference in modality.     Stable intrahepatic biliary duct dilatation.     Additional findings as above.     This report was flagged in Epic as abnormal.     Electronically signed by resident: Juan Carlos Ricardo  Date:                                            11/21/2023  Time:                                           11:41     Electronically signed by: Ajay Castro MD  Date:                                            11/21/2023  Time:                                           17:29      Endoscopy reviewed: Colonoscopy 9/21/2020  Impression:           - The entire examined colon is normal.                         - The examined portion of the ileum was normal.                         - The distal rectum and anal verge are normal on                         retroflexion view.                         - No specimens collected.   Recommendation:       - Discharge patient to home (ambulatory).                         - Patient has a contact number available for                         emergencies. The signs and symptoms of potential                         delayed complications were discussed with the                         patient. Return to normal activities tomorrow.                         Written discharge instructions were provided to the                         patient.                         - Resume previous  diet.                         - Continue present medications.                         - Repeat colonoscopy in 10 years for screening                         purposes.   Attending Participation:        I was present and participated during the entire procedure,        including non-akhtar portions.   Jasper Crane MD   9/21/2020 11:57:40 AM       Assessment:  1. Gastroesophageal reflux disease, unspecified whether esophagitis present    2. Belching             Plan:  Optimized patients PPI regimen to Protonix 40mg PO once daily to improve patients GI symptoms. Recommended using for 8 weeks and then can take as needed should symptoms improve. Should symptoms worsen, recommend EGD for further evaluation and to consider biopsies of H. Pylori given patients remote history. Given her current symptoms, would not recommend holding for two week for stool testing. Should symptoms continue, will proceed with EGD with biopsies to further assess. Patient would like to get her radiation treatment and consider EGD in the future.   Recommended phazyme, charcotabs, or Simethicone.       Thank you for allowing me to participate in this patient's care.    Sincerely,     Aleah Joiner NP  Gastroenterology Department  [facility]

## 2023-12-20 ENCOUNTER — PATIENT MESSAGE (OUTPATIENT)
Dept: GASTROENTEROLOGY | Facility: CLINIC | Age: 43
End: 2023-12-20
Payer: COMMERCIAL

## 2023-12-27 DIAGNOSIS — M50.30 DDD (DEGENERATIVE DISC DISEASE), CERVICAL: Primary | ICD-10-CM

## 2023-12-27 NOTE — PROGRESS NOTES
DATE: 12/28/2023  PATIENT: Melly Hwang    Attending Physician: Kervin Storey MD    HISTORY:  Melly Hwang is a 43 y.o. female who returns to me today for follow up.  She was last seen by me 10/14/2022.  Today she is doing well but notes she continues to have neck and left arm pain. She has been taking NSAIDs and flexeril with mild relief. I have provided the patient with a home exercise program. It is the [employer] cervical exercise program. Exercises include walking erectly with neutral head position, supine neutral head position, supine retraction, sitting or standing neck retraction, posture training, forward/backward/sideward isometric strengthening, prone head lifts, supine head lifts, scapular retraction, and neck rotation. Pt completes each exercise daily for 1 hour with worsening of pain. Of note, she is currently under active treatment for cancer.      The Patient denies myelopathic symptoms such as handwriting changes or difficulty with buttons/coins/keys. Denies perineal paresthesias, bowel/bladder dysfunction.    PMH/PSH/FamHx/SocHx:  Unchanged from prior visit    ROS:  REVIEW OF SYSTEMS:  Constitution: Negative. Negative for chills, fever and night sweats.   HENT: Negative for congestion and headaches.    Eyes: Negative for blurred vision, left vision loss and right vision loss.   Cardiovascular: Negative for chest pain and syncope.   Respiratory: Negative for cough and shortness of breath.    Endocrine: Negative for polydipsia, polyphagia and polyuria.   Hematologic/Lymphatic: Negative for bleeding problem. Does not bruise/bleed easily.   Skin: Negative for dry skin, itching and rash.   Musculoskeletal: Negative for falls and muscle weakness.   Gastrointestinal: Negative for abdominal pain and bowel incontinence.   Allergic/Immunologic: Negative for hives and persistent infections.  Genitourinary: Negative for urinary retention/incontinence and nocturia.  "  Neurological: negative for disturbances in coordination, no myelopathic symptoms such as handwriting changes or difficulty with buttons, coins, keys or small objects. No loss of balance and seizures.   Psychiatric/Behavioral: Negative for depression. The patient does not have insomnia.   Denies myelopathic symptoms, perineal paresthesias, bowel or bladder incontinence    EXAM:  Ht 5' 11" (1.803 m)   Wt 69.6 kg (153 lb 7 oz)   LMP  (LMP Unknown)   BMI 21.40 kg/m²     My physical examination was notable for the following findings:     Musculoskeletal and neuro exam stable    IMAGING:    Today I personally re-reviewed AP, Lat and Flex/Ex  upright C-spine films that demonstrate 5 mm of change from flexion to extension at both C2-3 and C3-4, consistent with translational instability at these levels. C4-5 facet joint narrowing and possible osseous fusion.      MRI cervical demonstrates disc osteophyte complex at C6-7 resulting in moderate central stenosis and left neural foraminal narrowing.    CT thoracic demonstrates recently biopsied 1.5 x 1.8 cm lobulated lesion centered within the left T1-T2 facet joint that demonstrates stable surrounding osseous erosive changes and appears unchanged in size dating back to MRI of 12/14/2010.  Imaging characteristics, stability over time and pathology results suggest a benign etiology, possibly an inflammatory lesion (gouty tophus, amyloid) or a chronic/indolent infectious process (including mycobacteria).     Body mass index is 21.4 kg/m².    Hemoglobin A1C   Date Value Ref Range Status   09/18/2023 5.1 4.0 - 5.6 % Final     Comment:     ADA Screening Guidelines:  5.7-6.4%  Consistent with prediabetes  >or=6.5%  Consistent with diabetes    High levels of fetal hemoglobin interfere with the HbA1C  assay. Heterozygous hemoglobin variants (HbS, HgC, etc)do  not significantly interfere with this assay.   However, presence of multiple variants may affect accuracy.     03/22/2022 5.2 " 4.0 - 5.6 % Final     Comment:     ADA Screening Guidelines:  5.7-6.4%  Consistent with prediabetes  >or=6.5%  Consistent with diabetes    High levels of fetal hemoglobin interfere with the HbA1C  assay. Heterozygous hemoglobin variants (HbS, HgC, etc)do  not significantly interfere with this assay.   However, presence of multiple variants may affect accuracy.     02/18/2021 5.3 4.0 - 5.6 % Final     Comment:     ADA Screening Guidelines:  5.7-6.4%  Consistent with prediabetes  >or=6.5%  Consistent with diabetes    High levels of fetal hemoglobin interfere with the HbA1C  assay. Heterozygous hemoglobin variants (HbS, HgC, etc)do  not significantly interfere with this assay.   However, presence of multiple variants may affect accuracy.           ASSESSMENT/PLAN:    There are no diagnoses linked to this encounter.    Today we discussed at length all of the different treatment options including anti-inflammatories, acetaminophen, rest, ice, heat, physical therapy including strengthening and stretching exercises, home exercises, ROM, aerobic conditioning, aqua therapy, other modalities including ultrasound, massage, and dry needling, epidural steroid injections and finally surgical intervention.      Pt presents with acute on chronic cervical radiculopathy. Will message oncologist regarding medrol dose pack. Will continue conservative rx. I have provided the patient with a home exercise program. It is the [employer] cervical exercise program. Exercises include walking erectly with neutral head position, supine neutral head position, supine retraction, sitting or standing neck retraction, posture training, forward/backward/sideward isometric strengthening, prone head lifts, supine head lifts, scapular retraction, and neck rotation. Pt will complete each exercise twice daily for 6-8 weeks.

## 2023-12-28 ENCOUNTER — OFFICE VISIT (OUTPATIENT)
Dept: ORTHOPEDICS | Facility: CLINIC | Age: 43
End: 2023-12-28
Payer: COMMERCIAL

## 2023-12-28 ENCOUNTER — HOSPITAL ENCOUNTER (OUTPATIENT)
Dept: RADIOLOGY | Facility: HOSPITAL | Age: 43
Discharge: HOME | End: 2023-12-28
Attending: ORTHOPAEDIC SURGERY
Payer: COMMERCIAL

## 2023-12-28 VITALS — WEIGHT: 153.44 LBS | BODY MASS INDEX: 21.48 KG/M2 | HEIGHT: 71 IN

## 2023-12-28 DIAGNOSIS — M51.34 DDD (DEGENERATIVE DISC DISEASE), THORACIC: ICD-10-CM

## 2023-12-28 DIAGNOSIS — M50.30 DDD (DEGENERATIVE DISC DISEASE), CERVICAL: ICD-10-CM

## 2023-12-28 DIAGNOSIS — M54.12 CERVICAL RADICULOPATHY: Primary | ICD-10-CM

## 2023-12-28 PROCEDURE — 72050 X-RAY EXAM NECK SPINE 4/5VWS: CPT | Mod: TC

## 2023-12-28 PROCEDURE — 3008F BODY MASS INDEX DOCD: CPT | Mod: CPTII,S$GLB,, | Performed by: ORTHOPAEDIC SURGERY

## 2023-12-28 PROCEDURE — 72070 X-RAY EXAM THORAC SPINE 2VWS: CPT | Mod: 26,,, | Performed by: RADIOLOGY

## 2023-12-28 PROCEDURE — 72050 X-RAY EXAM NECK SPINE 4/5VWS: CPT | Mod: 26,,, | Performed by: RADIOLOGY

## 2023-12-28 PROCEDURE — 72070 X-RAY EXAM THORAC SPINE 2VWS: CPT | Mod: TC

## 2023-12-28 PROCEDURE — 72070 XR THORACIC SPINE AP LATERAL: ICD-10-PCS | Mod: 26,,, | Performed by: RADIOLOGY

## 2023-12-28 PROCEDURE — 72050 XR CERVICAL SPINE AP LAT WITH FLEX EXTEN: ICD-10-PCS | Mod: 26,,, | Performed by: RADIOLOGY

## 2023-12-28 PROCEDURE — 99999 PR PBB SHADOW E&M-EST. PATIENT-LVL III: CPT | Mod: PBBFAC,,, | Performed by: ORTHOPAEDIC SURGERY

## 2023-12-28 PROCEDURE — 99213 OFFICE O/P EST LOW 20 MIN: CPT | Mod: S$GLB,,, | Performed by: ORTHOPAEDIC SURGERY

## 2023-12-29 ENCOUNTER — PATIENT MESSAGE (OUTPATIENT)
Dept: HEMATOLOGY/ONCOLOGY | Facility: CLINIC | Age: 43
End: 2023-12-29
Payer: COMMERCIAL

## 2023-12-29 ENCOUNTER — INFUSION (OUTPATIENT)
Dept: INFUSION THERAPY | Facility: HOSPITAL | Age: 43
End: 2023-12-29
Payer: COMMERCIAL

## 2023-12-29 ENCOUNTER — HOSPITAL ENCOUNTER (OUTPATIENT)
Dept: RADIOLOGY | Facility: HOSPITAL | Age: 43
Discharge: HOME | End: 2023-12-29
Attending: INTERNAL MEDICINE
Payer: COMMERCIAL

## 2023-12-29 ENCOUNTER — OFFICE VISIT (OUTPATIENT)
Dept: HEMATOLOGY/ONCOLOGY | Facility: CLINIC | Age: 43
End: 2023-12-29
Payer: COMMERCIAL

## 2023-12-29 VITALS
HEIGHT: 71 IN | SYSTOLIC BLOOD PRESSURE: 136 MMHG | WEIGHT: 151.81 LBS | HEART RATE: 104 BPM | BODY MASS INDEX: 21.25 KG/M2 | OXYGEN SATURATION: 98 % | RESPIRATION RATE: 18 BRPM | DIASTOLIC BLOOD PRESSURE: 84 MMHG

## 2023-12-29 VITALS
SYSTOLIC BLOOD PRESSURE: 143 MMHG | HEART RATE: 109 BPM | RESPIRATION RATE: 18 BRPM | DIASTOLIC BLOOD PRESSURE: 86 MMHG | OXYGEN SATURATION: 100 %

## 2023-12-29 DIAGNOSIS — M89.9 BONE LESION: ICD-10-CM

## 2023-12-29 DIAGNOSIS — C22.0 HEPATOCELLULAR CARCINOMA: Primary | ICD-10-CM

## 2023-12-29 DIAGNOSIS — D70.9 NEUTROPENIA, UNSPECIFIED TYPE: ICD-10-CM

## 2023-12-29 DIAGNOSIS — K21.9 GASTROESOPHAGEAL REFLUX DISEASE, UNSPECIFIED WHETHER ESOPHAGITIS PRESENT: ICD-10-CM

## 2023-12-29 DIAGNOSIS — E03.2 HYPOTHYROIDISM DUE TO MEDICATION: ICD-10-CM

## 2023-12-29 DIAGNOSIS — C22.0 HCC (HEPATOCELLULAR CARCINOMA): Primary | ICD-10-CM

## 2023-12-29 DIAGNOSIS — M54.12 CERVICAL RADICULOPATHY: ICD-10-CM

## 2023-12-29 DIAGNOSIS — C22.0 HCC (HEPATOCELLULAR CARCINOMA): ICD-10-CM

## 2023-12-29 DIAGNOSIS — D18.03 LIVER HEMANGIOMA: ICD-10-CM

## 2023-12-29 DIAGNOSIS — C77.9 REGIONAL LYMPH NODE METASTASIS PRESENT: ICD-10-CM

## 2023-12-29 DIAGNOSIS — C78.01 MALIGNANT NEOPLASM METASTATIC TO RIGHT LUNG: ICD-10-CM

## 2023-12-29 PROCEDURE — 96361 HYDRATE IV INFUSION ADD-ON: CPT

## 2023-12-29 PROCEDURE — 1160F RVW MEDS BY RX/DR IN RCRD: CPT | Mod: CPTII,S$GLB,, | Performed by: PHYSICIAN ASSISTANT

## 2023-12-29 PROCEDURE — 63600175 PHARM REV CODE 636 W HCPCS: Mod: JZ,JG | Performed by: PHYSICIAN ASSISTANT

## 2023-12-29 PROCEDURE — 3044F HG A1C LEVEL LT 7.0%: CPT | Mod: CPTII,S$GLB,, | Performed by: PHYSICIAN ASSISTANT

## 2023-12-29 PROCEDURE — 71250 CT CHEST WITHOUT CONTRAST: ICD-10-PCS | Mod: 26,,, | Performed by: STUDENT IN AN ORGANIZED HEALTH CARE EDUCATION/TRAINING PROGRAM

## 2023-12-29 PROCEDURE — 3044F PR MOST RECENT HEMOGLOBIN A1C LEVEL <7.0%: ICD-10-PCS | Mod: CPTII,S$GLB,, | Performed by: PHYSICIAN ASSISTANT

## 2023-12-29 PROCEDURE — 96413 CHEMO IV INFUSION 1 HR: CPT

## 2023-12-29 PROCEDURE — 99215 PR OFFICE/OUTPT VISIT, EST, LEVL V, 40-54 MIN: ICD-10-PCS | Mod: S$GLB,,, | Performed by: PHYSICIAN ASSISTANT

## 2023-12-29 PROCEDURE — 99999 PR PBB SHADOW E&M-EST. PATIENT-LVL IV: ICD-10-PCS | Mod: PBBFAC,,, | Performed by: PHYSICIAN ASSISTANT

## 2023-12-29 PROCEDURE — 99999 PR PBB SHADOW E&M-EST. PATIENT-LVL IV: CPT | Mod: PBBFAC,,, | Performed by: PHYSICIAN ASSISTANT

## 2023-12-29 PROCEDURE — 71250 CT THORAX DX C-: CPT | Mod: 26,,, | Performed by: STUDENT IN AN ORGANIZED HEALTH CARE EDUCATION/TRAINING PROGRAM

## 2023-12-29 PROCEDURE — 3079F PR MOST RECENT DIASTOLIC BLOOD PRESSURE 80-89 MM HG: ICD-10-PCS | Mod: CPTII,S$GLB,, | Performed by: PHYSICIAN ASSISTANT

## 2023-12-29 PROCEDURE — 3008F BODY MASS INDEX DOCD: CPT | Mod: CPTII,S$GLB,, | Performed by: PHYSICIAN ASSISTANT

## 2023-12-29 PROCEDURE — 1160F PR REVIEW ALL MEDS BY PRESCRIBER/CLIN PHARMACIST DOCUMENTED: ICD-10-PCS | Mod: CPTII,S$GLB,, | Performed by: PHYSICIAN ASSISTANT

## 2023-12-29 PROCEDURE — 3079F DIAST BP 80-89 MM HG: CPT | Mod: CPTII,S$GLB,, | Performed by: PHYSICIAN ASSISTANT

## 2023-12-29 PROCEDURE — 1159F PR MEDICATION LIST DOCUMENTED IN MEDICAL RECORD: ICD-10-PCS | Mod: CPTII,S$GLB,, | Performed by: PHYSICIAN ASSISTANT

## 2023-12-29 PROCEDURE — 25000003 PHARM REV CODE 250: Performed by: PHYSICIAN ASSISTANT

## 2023-12-29 PROCEDURE — 3075F SYST BP GE 130 - 139MM HG: CPT | Mod: CPTII,S$GLB,, | Performed by: PHYSICIAN ASSISTANT

## 2023-12-29 PROCEDURE — 1159F MED LIST DOCD IN RCRD: CPT | Mod: CPTII,S$GLB,, | Performed by: PHYSICIAN ASSISTANT

## 2023-12-29 PROCEDURE — 99215 OFFICE O/P EST HI 40 MIN: CPT | Mod: S$GLB,,, | Performed by: PHYSICIAN ASSISTANT

## 2023-12-29 PROCEDURE — 3008F PR BODY MASS INDEX (BMI) DOCUMENTED: ICD-10-PCS | Mod: CPTII,S$GLB,, | Performed by: PHYSICIAN ASSISTANT

## 2023-12-29 PROCEDURE — 71250 CT THORAX DX C-: CPT | Mod: TC

## 2023-12-29 PROCEDURE — 3075F PR MOST RECENT SYSTOLIC BLOOD PRESS GE 130-139MM HG: ICD-10-PCS | Mod: CPTII,S$GLB,, | Performed by: PHYSICIAN ASSISTANT

## 2023-12-29 RX ORDER — PROCHLORPERAZINE EDISYLATE 5 MG/ML
5 INJECTION INTRAMUSCULAR; INTRAVENOUS ONCE AS NEEDED
Status: DISCONTINUED | OUTPATIENT
Start: 2023-12-29 | End: 2023-12-29 | Stop reason: HOSPADM

## 2023-12-29 RX ORDER — EPINEPHRINE 0.3 MG/.3ML
0.3 INJECTION SUBCUTANEOUS ONCE AS NEEDED
Status: DISCONTINUED | OUTPATIENT
Start: 2023-12-29 | End: 2023-12-29 | Stop reason: HOSPADM

## 2023-12-29 RX ORDER — SODIUM CHLORIDE, SODIUM LACTATE, POTASSIUM CHLORIDE, CALCIUM CHLORIDE 600; 310; 30; 20 MG/100ML; MG/100ML; MG/100ML; MG/100ML
INJECTION, SOLUTION INTRAVENOUS CONTINUOUS
Status: DISCONTINUED | OUTPATIENT
Start: 2023-12-29 | End: 2023-12-29 | Stop reason: HOSPADM

## 2023-12-29 RX ORDER — HEPARIN 100 UNIT/ML
500 SYRINGE INTRAVENOUS
Status: CANCELLED | OUTPATIENT
Start: 2023-12-29

## 2023-12-29 RX ORDER — HEPARIN 100 UNIT/ML
500 SYRINGE INTRAVENOUS
Status: DISCONTINUED | OUTPATIENT
Start: 2023-12-29 | End: 2023-12-29 | Stop reason: HOSPADM

## 2023-12-29 RX ORDER — DIPHENHYDRAMINE HYDROCHLORIDE 50 MG/ML
50 INJECTION INTRAMUSCULAR; INTRAVENOUS ONCE AS NEEDED
Status: CANCELLED | OUTPATIENT
Start: 2023-12-29

## 2023-12-29 RX ORDER — DIPHENHYDRAMINE HYDROCHLORIDE 50 MG/ML
50 INJECTION INTRAMUSCULAR; INTRAVENOUS ONCE AS NEEDED
Status: DISCONTINUED | OUTPATIENT
Start: 2023-12-29 | End: 2023-12-29 | Stop reason: HOSPADM

## 2023-12-29 RX ORDER — SODIUM CHLORIDE, SODIUM LACTATE, POTASSIUM CHLORIDE, CALCIUM CHLORIDE 600; 310; 30; 20 MG/100ML; MG/100ML; MG/100ML; MG/100ML
INJECTION, SOLUTION INTRAVENOUS CONTINUOUS
Status: CANCELLED
Start: 2023-12-29

## 2023-12-29 RX ORDER — PROCHLORPERAZINE EDISYLATE 5 MG/ML
5 INJECTION INTRAMUSCULAR; INTRAVENOUS ONCE AS NEEDED
Status: CANCELLED
Start: 2023-12-29

## 2023-12-29 RX ORDER — EPINEPHRINE 0.3 MG/.3ML
0.3 INJECTION SUBCUTANEOUS ONCE AS NEEDED
Status: CANCELLED | OUTPATIENT
Start: 2023-12-29

## 2023-12-29 RX ORDER — SODIUM CHLORIDE 0.9 % (FLUSH) 0.9 %
10 SYRINGE (ML) INJECTION
Status: CANCELLED | OUTPATIENT
Start: 2023-12-29

## 2023-12-29 RX ORDER — SODIUM CHLORIDE 0.9 % (FLUSH) 0.9 %
10 SYRINGE (ML) INJECTION
Status: DISCONTINUED | OUTPATIENT
Start: 2023-12-29 | End: 2023-12-29 | Stop reason: HOSPADM

## 2023-12-29 RX ADMIN — SODIUM CHLORIDE, POTASSIUM CHLORIDE, SODIUM LACTATE AND CALCIUM CHLORIDE: 600; 310; 30; 20 INJECTION, SOLUTION INTRAVENOUS at 09:12

## 2023-12-29 RX ADMIN — SODIUM CHLORIDE 1500 MG: 9 INJECTION, SOLUTION INTRAVENOUS at 10:12

## 2023-12-29 RX ADMIN — SODIUM CHLORIDE: 9 INJECTION, SOLUTION INTRAVENOUS at 09:12

## 2023-12-29 NOTE — PROGRESS NOTES
MEDICAL ONCOLOGY - ESTABLISHED PATIENT VISIT    Reason for visit: Scirrhous HCC    Best Contact Phone Number(s): [phone] (home)      Cancer/Stage/TNM:    Cancer Staging   Hepatocellular carcinoma  Staging form: Liver, AJCC 8th Edition  - Clinical stage from 4/10/2023: Stage IVB (rcT1b, cN0, pM1) - Signed by Philippe Carrasco MD on 8/7/2023       Oncology History   Hepatocellular carcinoma   6/9/2021 Initial Diagnosis    Hepatocellular carcinoma     4/10/2023 Cancer Staged    Staging form: Liver, AJCC 8th Edition  - Clinical stage from 4/10/2023: Stage IVB (rcT1b, cN0, pM1)     7/11/2023 -  Chemotherapy    Treatment Summary   Plan Name: OP TREMELIMUMAB 300 MG (X 1) + DURVALUMAB 1500 MG Q4W  Treatment Goal: Control  Status: Active  Start Date: 7/11/2023  End Date: 6/10/2024 (Planned)  Provider: Philippe Carrasco MD  Chemotherapy: [No matching medication found in this treatment plan]     9/11/2023 Genetic Testing    Genetic counseling done. Hereditary syndrome unlikely. Patient offered testing, but declined due to cost.      10/23/2023 - 11/3/2023 Radiation Therapy    Treating physician: yamil villa    Course: C1 Chst/Abd 2023  Treatment Site Ref. ID Energy Dose/Fx (Gy) #Fx Dose Correction (Gy) Total Dose (Gy) Start Date End Date Elapsed Days   SB Lung_R PTV_Lung 6X 10 5 / 5 0 50 10/23/2023 11/1/2023 9   SB Abdomen PTV_PortalLN 6X 10 5 / 5 0 50 10/23/2023 11/3/2023 11         Interim History:   43 y.o. 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 Howell with the same pathology, negative margins, 0 of 8 lymph nodes positive and + for vascular and perineural invasion. We saw her in 2021 for evaluation for persistent CA 19-9 but there was no radiographic evidence of HCC disease recurrence or alternative reason for CA 19-9 elevation.  CT chest on 3/9/23 showed an enlarging RLL nodule measuring 1.1 cm, increased from 0.8 cm in size.  IR biopsy of this on  4/10/23 confirmed metastatic HCC.  Since then in mid-June she has also had an MRI abdomen that showed an enlarging portocaval lymph node that is consistent with metastatic disease.  She completed SBRT at the beginning of November to her lung metastasis and to her portocaval lymph node.    She presents today for cycle 7 of durvalumab + tremelimumab as part of the STRiDE regimen.  She is feeling well.  No further nausea.  She had an MRI last week that showed some concern for recurrence along her resection margin.  Scheduled for Y-90 mapping on 1/4 with Dr. Lala in preparation for Y-90 .  No dyspnea or cough.  Very minimal transient RUQ pain. Was recently seen by the GI team for increased belching and GERD. Was placed on Protonix and will continue to f/u with them.     Presents alone today.  ECOG PS 0. Had x-ray of the cervical and thoracic spine for ongoing cervical radiculopathy, continues to f/u with Orthopedics    ROS:   Review of Systems   Constitutional:  Negative for chills, fever, malaise/fatigue and weight loss.   HENT:  Negative for sore throat.    Eyes:  Negative for blurred vision and pain.   Respiratory:  Negative for cough and shortness of breath.    Cardiovascular:  Negative for chest pain and leg swelling.   Gastrointestinal:  Positive for constipation. Negative for abdominal pain, diarrhea, nausea and vomiting.   Genitourinary:  Negative for dysuria and frequency.   Musculoskeletal:  Negative for back pain, falls and myalgias.   Skin:  Negative for rash.   Neurological:  Negative for dizziness, weakness and headaches.   Endo/Heme/Allergies:  Does not bruise/bleed easily.   Psychiatric/Behavioral:  Negative for depression. The patient is not nervous/anxious.    All other systems reviewed and are negative.      Past Medical History:   Past Medical History:   Diagnosis Date    VENKATA positive 08/20/2020    COVID-19     Deviated septum 2011    Hepatocellular carcinoma 05/07/2021    Hypertrophy of inferior  nasal turbinate     Pericardial effusion     Premature menopause         Past Surgical History:   Past Surgical History:   Procedure Laterality Date    COLONOSCOPY N/A 09/21/2020    Procedure: COLONOSCOPY;  Surgeon: GIOVANNI Crane MD;  [facility]  Service: Endoscopy;  Laterality: N/A;  + covid 4/22    HERNIA REPAIR      LIVER SURGERY N/A 06/28/2021    NASAL SEPTUM SURGERY      PERICARDIAL WINDOW N/A 02/22/2021    Procedure: CREATION, PERICARDIAL WINDOW;  Surgeon: Ajay Cantor MD;  Location: [facility] OR [facility] FLR;  Service: Cardiovascular;  Laterality: N/A;    PERICARDIOCENTESIS N/A 05/02/2020    Procedure: Pericardiocentesis;  Surgeon: Dickson Dangelo MD;  Location: [facility] CATH LAB;  Service: Cardiology;  Laterality: N/A;    TONSILLECTOMY          Family History:   Family History   Problem Relation Age of Onset    Diabetes Mother     Liver disease Mother         fatty liver    Heart disease Father     Macular degeneration Father     Diabetes Father     Hypertension Father     Hyperlipidemia Father     Heart disease Sister     Lung cancer Maternal Aunt         heavy smoker    Cerebral aneurysm Paternal Aunt     Cataracts Neg Hx     Glaucoma Neg Hx     Retinal detachment Neg Hx     Stroke Neg Hx     Thyroid disease Neg Hx     Melanoma Neg Hx     Heart attack Neg Hx         Social History:   Social History     Tobacco Use    Smoking status: Never    Smokeless tobacco: Never   Substance Use Topics    Alcohol use: Yes     Comment: rare        I have reviewed and updated the patient's past medical, surgical, family and social histories.    Allergies:   Review of patient's allergies indicates:   Allergen Reactions    No known drug allergies         Medications:   Current Outpatient Medications   Medication Sig Dispense Refill    ALPRAZolam (XANAX) 0.5 MG tablet TAKE 1 TABLET BY MOUTH EVERY DAY AS NEEDED FOR ANXIETY 30 tablet 0    cetirizine (ZYRTEC) 10 MG tablet Take 1 tablet (10 mg total) by  "mouth once daily. 90 tablet 0    cyanocobalamin (VITAMIN B-12) 1000 MCG tablet Take 1 tablet (1,000 mcg total) by mouth once daily. 30 tablet 12    cyclobenzaprine (FLEXERIL) 10 MG tablet SMARTSI Tablet(s) By Mouth Every Evening PRN      fluticasone propionate (FLONASE) 50 mcg/actuation nasal spray 2 sprays (100 mcg total) by Each Nostril route once daily. 18.2 mL 6    multivitamin capsule Take by mouth. 1 capsule Oral Every morning      norethindrone-ethinyl estradiol (MICROGESTIN ) 1-20 mg-mcg per tablet Take 1 tablet by mouth once daily. 63 tablet 4    ondansetron (ZOFRAN-ODT) 4 MG TbDL Take 1 tablet (4 mg total) by mouth 2 (two) times daily. 2 tablet 0    ondansetron (ZOFRAN-ODT) 4 MG TbDL Take 1 tablet (4 mg total) by mouth every 12 (twelve) hours as needed (nausea). 20 tablet 0    pantoprazole (PROTONIX) 40 MG tablet Take 1 tablet (40 mg total) by mouth once daily. 30 tablet 3    tiZANidine (ZANAFLEX) 4 MG tablet Take 1 tablet (4 mg total) by mouth every 6 (six) hours as needed (pain/spasm). 30 tablet 1    triamcinolone acetonide 0.1% (KENALOG) 0.1 % cream Apply topically 2 (two) times daily as needed (scaling at external ears). Avoid use on face, body folds, groin/genitalia. 45 g 3     No current facility-administered medications for this visit.        Physical Exam:   /84 (BP Location: Left arm, Patient Position: Sitting, BP Method: Large (Automatic))   Pulse 104   Resp 18   Ht 5' 11" (1.803 m)   Wt 68.9 kg (151 lb 12.6 oz)   LMP  (LMP Unknown)   SpO2 98%   BMI 21.17 kg/m²      ECOG Performance status: 0            Physical Exam  Vitals reviewed.   Constitutional:       General: She is not in acute distress.     Appearance: Normal appearance. She is normal weight.   HENT:      Head: Normocephalic and atraumatic.      Right Ear: External ear normal.      Left Ear: External ear normal.      Nose: Nose normal.      Mouth/Throat:      Mouth: Mucous membranes are moist.      Pharynx: " Oropharynx is clear. No posterior oropharyngeal erythema.   Eyes:      General: No scleral icterus.     Extraocular Movements: Extraocular movements intact.      Conjunctiva/sclera: Conjunctivae normal.      Pupils: Pupils are equal, round, and reactive to light.   Cardiovascular:      Rate and Rhythm: Normal rate and regular rhythm.      Pulses: Normal pulses.      Heart sounds: Normal heart sounds.   Pulmonary:      Effort: Pulmonary effort is normal.      Breath sounds: Normal breath sounds. No wheezing or rales.   Abdominal:      General: Bowel sounds are normal. There is no distension.      Palpations: Abdomen is soft.      Tenderness: There is no abdominal tenderness.   Musculoskeletal:         General: No swelling. Normal range of motion.      Cervical back: Normal range of motion and neck supple.   Skin:     General: Skin is warm.      Coloration: Skin is not jaundiced.      Findings: No erythema or rash.   Neurological:      General: No focal deficit present.      Mental Status: She is alert and oriented to person, place, and time. Mental status is at baseline.      Gait: Gait normal.   Psychiatric:         Mood and Affect: Mood normal.         Behavior: Behavior normal.         Thought Content: Thought content normal.         Judgment: Judgment normal.           Labs:   Recent Results (from the past 48 hour(s))   CBC Oncology    Collection Time: 12/29/23  7:03 AM   Result Value Ref Range    WBC 4.21 3.90 - 12.70 K/uL    RBC 4.59 4.00 - 5.40 M/uL    Hemoglobin 14.4 12.0 - 16.0 g/dL    Hematocrit 43.0 37.0 - 48.5 %    MCV 94 82 - 98 fL    MCH 31.4 (H) 27.0 - 31.0 pg    MCHC 33.5 32.0 - 36.0 g/dL    RDW 12.6 11.5 - 14.5 %    Platelets 228 150 - 450 K/uL    MPV 10.1 9.2 - 12.9 fL    Gran # (ANC) 2.8 1.8 - 7.7 K/uL    Immature Grans (Abs) 0.02 0.00 - 0.04 K/uL   Comprehensive Metabolic Panel    Collection Time: 12/29/23  7:03 AM   Result Value Ref Range    Sodium 137 136 - 145 mmol/L    Potassium 4.2 3.5 - 5.1  mmol/L    Chloride 103 95 - 110 mmol/L    CO2 26 23 - 29 mmol/L    Glucose 132 (H) 70 - 110 mg/dL    BUN 14 6 - 20 mg/dL    Creatinine 1.0 0.5 - 1.4 mg/dL    Calcium 9.8 8.7 - 10.5 mg/dL    Total Protein 7.3 6.0 - 8.4 g/dL    Albumin 3.8 3.5 - 5.2 g/dL    Total Bilirubin 1.1 (H) 0.1 - 1.0 mg/dL    Alkaline Phosphatase 41 (L) 55 - 135 U/L    AST 16 10 - 40 U/L    ALT 18 10 - 44 U/L    eGFR >60.0 >60 mL/min/1.73 m^2    Anion Gap 8 8 - 16 mmol/L   AFP Tumor Marker    Collection Time: 12/29/23  7:03 AM   Result Value Ref Range    AFP 9.3 (H) 0.0 - 8.4 ng/mL   TSH    Collection Time: 12/29/23  7:03 AM   Result Value Ref Range    TSH 1.164 0.400 - 4.000 uIU/mL         I have reviewed the pertinent labs     Imaging:       10/2/23 - CT CAP:    Impression:     1. Postsurgical changes of partial right hepatectomy for reported history of metastatic HCC, unchanged.  2. 0.9-cm, 2.7-cm and 2.1-cm hepatic parenchymal heterogenous hypodense nodules, not significantly changed.  3. 2.8 x 3.9-cm (previously 2.2 x 3.0-cm) abnormally enlarged albert hepatis lymph node.  4.  1.6-cm (previously 1.3-cm) solid nodule in the right lower lobe.  0.3-cm left upper lobe 0.5-cm right upper lobe solid pulmonary parenchymal nodules, not significantly changed.  This report was flagged in Epic as abnormal.    Path:   6/28/21: partial hepatectomy:    Final Pathologic Diagnosis 1.  Gallbladder (cholecystectomy):   - Benign gallbladder with cholecystitis   2. Right lobe (partial hepatectomy):   - Positive for malignancy, see synoptic report below   - Separate hemangioma, 7.3 cm   3. Lymph nodes, portal (regional resection):   - 8 lymph nodes, negative for tumor (0/8)   ______________________________________________________________________________   ______   Hepatocellular carcinoma synoptic report   - Procedure:  Partial hepatectomy, right lobe   - Histologic type:  Hepatocellular carcinoma, scirrhous   - Histologic grade:  Moderately differentiated,  grade 2   - Tumor focality:  Solitary   - Tumor characteristics:   - Tumor site:  Right lobe   - Tumor size:  3.3 cm   - Treatment effect:  No known pre-surgical therapy   - Satellitosis:  Not identified   - Tumor extent:  Confined to liver   - Vascular invasion:  Present, Small vessel   - Perineural invasion:  Present   - Margins:   - All margins are negative for invasive carcinoma   - Closest margin to invasive carcinoma:  Parenchymal   - Distance from invasive  carcinoma to closest margin:  5 mm   - Regional lymph nodes:   - Number of lymph nodes with tumor:  0   - Number of lymph nodes examined:  8   - Pathology: pT2 N0 MX   - Additional findings:   - No steatosis   - Trichrome stain:  Periportal fibrosis with early septa, stage 1-2   - Iron stain:  Negative   - Iron and trichrome stains with appropriate controls   Tumor blocks:  2A, 2B, 2 C    Comment: Interp By Madeline Carlos MD, Signed on 07/08/2021 at 16:47       Assessment:       1. HCC (hepatocellular carcinoma)    2. Regional lymph node metastasis present    3. Malignant neoplasm metastatic to right lung    4. Bone lesion    5. Neutropenia, unspecified type    6. Liver hemangioma    7. Gastroesophageal reflux disease, unspecified whether esophagitis present    8. Cervical radiculopathy    9. Hypothyroidism due to medication                Plan:             # HCC  Scirrhous subtype, which is very uncommon (~ 1% of all HCC); prognosis is likely similar to typical HCC.  No clear underlying liver pathology in this patient.  Had margin negative resection with negative lymph node eval on 6/28/21 with Dr. Howell.  Unfortunately she has biopsy proven recurrent metastatic disease to her RLL s/p IR biopsy 4/10/23.   She was discussed at thoracic tumor board with potential plan for surgical resection with Dr. Tadeo.  She had a concerning bone lesion on PET from 4/26 at T2.  This was biopsied and proven to be benign.  In the interim she had a repeat abdominal MRI  on 6/13/23 which demonstrated growth of a portacaval lymph node that was very concerning for metastatic disease when we reviewed her imaging at liver conference.  Meanwhile her RLL met has grown slightly on 6/21/23 CT as well.  We discussed her case with Valerie Dhillon and Shwetha and we were all in agreement with proceeding with systemic therapy rather than surgery or radiation given multifocal progression.    We discussed this with her and she is agreeable with starting systemic therapy.  Reviewed possibilities of atezo + magaly or durva + treme.  She wished to proceed with STRIDE regimen: durvalumab + tremelimumab.    Received cycle 1 on 7/11/23.  Repeat CT CAP after cycle 3 demonstrates mild growth in albert hepatis lymph node.  Stable disease elsewhere.    Discussed with Dr. Mendiola and she was deemed eligible for SBRT to her abdominal lymph node and growing lung nodule.  She completed SBRT to both 11/3/23.    Meanwhile she has developed a new concerning area of possible recurrence near her resection site.  Discussed with Dr. Lala and Dr. Dhillon.  Dr. Dhillon was unable to visualize it during radiation simulation.  We will thus proceed with Y-90 to this area with IR. She is scheduled for mapping on 1/4 with Dr. Lala  - doing well with immunotherapy. Lab work has been reviewed and adequate for treatment.   - Will continue with STRIDE regimen, meanwhile.  Patient agreeable.  - Proceed with cycle 7 today - durvalumab alone.  Will give IVF per her request with infusion today.    Consider switching systemic therapy only if significant progression given likely side effects of TKI.    Saw Dr. Sabillon of cardiology who recommended troponin and ECG monitoring given her cardiac history.  She is asymptomatic at present.    Will plan to bring her back in 4 weeks for cycle 8. Has repeat chest imaging scheduled for 1/3.    # Neutropenia, cervical radiculopathy  Resolved.  Has experienced in past.  Likely benign etiology.  Continue  to f/u with Orthopedics    # Liver hemangiomas, GERD  Continue monitoring as indicated with Dr. Rogers.  Continue to f/u with GI team for GERD symptoms. Continue with medication management with Protonix    Follow up: 4 weeks.    The above information has been reviewed with the patient and all questions have been answered to their apparent satisfaction.  They understand that they can call the clinic with any questions.      GEORGIA Merino, PA-C  Physician Assistant Certified  Dept of Hematology/Oncology  PA-C to Dr. Vidal, Dr. Carrasco and Dr. Martinez     Route Chart for Scheduling    Med Onc Chart Routing      Follow up with physician 4 weeks, 2 months and 3 months. See Dr. Carrasco in 4 weeks for cycle 8 of Durv. See Dr. Carrasco in 8 and 12 weeks for cycle 9 and 10 of Durvalumab   Follow up with MELA    Infusion scheduling note    Injection scheduling note    Labs CBC, CMP, TSH and free T4   Scheduling:  Preferred lab:  Lab interval: every 4 weeks  AFP   Imaging   CT chest. Scheduled. Thank you   Pharmacy appointment    Other referrals                  Treatment Plan Information   OP TREMELIMUMAB 300 MG (X 1) + DURVALUMAB 1500 MG Q4W   Philippe Carrasco MD   Upcoming Treatment Dates - OP TREMELIMUMAB 300 MG (X 1) + DURVALUMAB 1500 MG Q4W    12/25/2023       Chemotherapy       durvalumab (IMFINZI) 1,500 mg in sodium chloride 0.9% SolP 280 mL chemo infusion       Antiemetics       prochlorperazine injection Soln 5 mg  1/22/2024       Chemotherapy       durvalumab (IMFINZI) 1,500 mg in sodium chloride 0.9% SolP 280 mL chemo infusion       Antiemetics       prochlorperazine injection Soln 5 mg  2/19/2024       Chemotherapy       durvalumab (IMFINZI) 1,500 mg in sodium chloride 0.9% SolP 280 mL chemo infusion       Antiemetics       prochlorperazine injection Soln 5 mg  3/18/2024       Chemotherapy       durvalumab (IMFINZI) 1,500 mg in sodium chloride 0.9% SolP 280 mL chemo infusion       Antiemetics        prochlorperazine injection Soln 5 mg

## 2023-12-29 NOTE — PLAN OF CARE
Problem: Anemia (Chemotherapy Effects)  Goal: Anemia Symptom Improvement  Outcome: Ongoing, Progressing     Problem: Urinary Bleeding Risk or Actual (Chemotherapy Effects)  Goal: Absence of Hematuria  Outcome: Ongoing, Progressing     Problem: Nausea and Vomiting (Chemotherapy Effects)  Goal: Fluid and Electrolyte Balance  Outcome: Ongoing, Progressing     Problem: Neurotoxicity (Chemotherapy Effects)  Goal: Neurotoxicity Symptom Control  Outcome: Ongoing, Progressing     Problem: Neutropenia (Chemotherapy Effects)  Goal: Absence of Infection  Outcome: Ongoing, Progressing     Problem: Oral Mucositis (Chemotherapy Effects)  Goal: Improved Oral Mucous Membrane Integrity  Outcome: Ongoing, Progressing     Problem: Thrombocytopenia Bleeding Risk (Chemotherapy Effects)  Goal: Absence of Bleeding  Outcome: Ongoing, Progressing     Ambulatory to clinic with no c/o adverse effects or s/s of infection.  PIV inserted, flushed without difficulty, blood return noted and infused without problems.  IVF's and Imfinzi  infusions tolerated with no problems.  Ambulatory home with NAD note.

## 2023-12-31 ENCOUNTER — PATIENT MESSAGE (OUTPATIENT)
Dept: GASTROENTEROLOGY | Facility: CLINIC | Age: 43
End: 2023-12-31
Payer: COMMERCIAL

## 2024-01-02 ENCOUNTER — DOCUMENTATION ONLY (OUTPATIENT)
Dept: PREADMISSION TESTING | Facility: HOSPITAL | Age: 44
End: 2024-01-02
Payer: COMMERCIAL

## 2024-01-02 ENCOUNTER — PATIENT MESSAGE (OUTPATIENT)
Dept: GASTROENTEROLOGY | Facility: CLINIC | Age: 44
End: 2024-01-02
Payer: COMMERCIAL

## 2024-01-02 NOTE — PRE-PROCEDURE INSTRUCTIONS
PRE-OP INSTRUCTIONS:  Instructed patient to have no food,milk or milk products after midnight   It is ok to take AM medications with a few sips of water   Medication instructions for pm prior to and am of surgery reviewed.  Instructed patient to avoid taking vitamins,supplements,aspirin or ibuprofen the am of surgery.  Shower instructions provided    Patient denies any side effects or issues with anesthesia or sedation other than PONV  (A patch behind the ear has worked successfully for her in the past)

## 2024-01-03 ENCOUNTER — PATIENT MESSAGE (OUTPATIENT)
Dept: HEMATOLOGY/ONCOLOGY | Facility: CLINIC | Age: 44
End: 2024-01-03
Payer: COMMERCIAL

## 2024-01-03 ENCOUNTER — TELEPHONE (OUTPATIENT)
Dept: ENDOSCOPY | Facility: HOSPITAL | Age: 44
End: 2024-01-03
Payer: COMMERCIAL

## 2024-01-03 ENCOUNTER — PATIENT MESSAGE (OUTPATIENT)
Dept: ORTHOPEDICS | Facility: CLINIC | Age: 44
End: 2024-01-03
Payer: COMMERCIAL

## 2024-01-03 ENCOUNTER — TELEPHONE (OUTPATIENT)
Dept: GASTROENTEROLOGY | Facility: CLINIC | Age: 44
End: 2024-01-03
Payer: COMMERCIAL

## 2024-01-03 DIAGNOSIS — A04.8 H. PYLORI INFECTION: Primary | ICD-10-CM

## 2024-01-03 DIAGNOSIS — K21.9 GASTROESOPHAGEAL REFLUX DISEASE, UNSPECIFIED WHETHER ESOPHAGITIS PRESENT: ICD-10-CM

## 2024-01-03 RX ORDER — METHYLPREDNISOLONE 4 MG/1
TABLET ORAL
Qty: 1 EACH | Refills: 0 | Status: SHIPPED | OUTPATIENT
Start: 2024-01-03 | End: 2024-01-24

## 2024-01-03 NOTE — TELEPHONE ENCOUNTER
Spoke to pt to schedule procedure(s) Upper Endoscopy (EGD)       Physician to perform procedure(s) Dr. CAITLYN Lynch  Date of Procedure (s) 1/10/24  Arrival Time 8:00 AM  Time of Procedure(s) 9:00 AM   Location of Procedure(s) [address]  Type of Rx Prep sent to patient: N/A  Instructions provided to patient via [facility]    Patient was informed on the following information and verbalized understanding. Screening questionnaire reviewed with patient and complete. If procedure requires anesthesia, a responsible adult needs to be present to accompany the patient home, patient cannot drive after receiving anesthesia. Appointment details are tentative, especially check-in time. Patient will receive a prep-op call 7 days prior to confirm check-in time for procedure. If applicable the patient should contact their pharmacy to verify Rx for procedure prep is ready for pick-up. Patient was advised to call the scheduling department at [phone] if pharmacy states no Rx is available. Patient was advised to call the endoscopy scheduling department if any questions or concerns arise.       Endoscopy Scheduling Department              EGD Procedure Prep Instructions      Date of procedure: 1/10/24 Arrive at: 8:00AM      Location of Department: [address] [place] [id number]  Take the [facility] Elevators to 2nd Floor Outpatient Surgery    How to prep:    Day Before Procedure: 1/9/24     You may have a light evening meal.   No solid food after 7:00 pm.   Continue drinking clear liquids.       Day of the Procedure:  1/10/24     You may have water/clear liquids until 4 hours before your procedure or as directed by the scheduling nurse  5:00AM. See below for list.    What You CANNOT do:   Do not drink milk or anything colored red.  Do not drink alcohol.  No gum chewing or candy morning of procedure.    Liquids That Are OK to Drink:   Water  Sports drinks (Gatorade, Power-Aid)  Coffee or tea (no cream or nondairy  creamer)  Clear juices without pulp (apple, white grape)  Gelatin desserts (no fruit or toppings)  Clear soda (sprite, coke, ginger ale)  Chicken broth (until 12 midnight the night before procedure)      Comments:

## 2024-01-03 NOTE — TELEPHONE ENCOUNTER
"EGD  Received: Today  Aleah Joiner, NP  P [facility]  Caller: Unspecified (Today,  9:52 AM)  Procedure: EGD    Diagnosis: Abnormal finding on GI tract imaging, Abdominal pain, and GERD    Procedure Timin-4 weeks    *If within 4 weeks selected, please munira as high priority*    *If greater than 12 weeks, please select "5-12 weeks" and delay sending until 3 months prior to requested date*    Provider: Any GI provider    Location: [facility], [facility], and [facility]    Additional Scheduling Information: history of HCC s/p chemo and radiation    Prep Specifications:N/A    Is the patient taking a GLP-1 Agonist:no    Have you attached a patient to this message: yes  "

## 2024-01-03 NOTE — TELEPHONE ENCOUNTER
"----- Message from Samira Lopez sent at 1/3/2024  7:54 AM CST -----  Regarding: pt advice  Contact: [phone]  Name Of Caller: self     Contact Preference?:  [phone]     What is the nature of the call?: requesting a call back regarding she would like to know if she can have that EGD next week due to this the 2nd week she been taking medication and it's getting worse     Additional Notes:    "Thank you for all that you do for our patients"        "

## 2024-01-04 ENCOUNTER — HOSPITAL ENCOUNTER (OUTPATIENT)
Dept: INTERVENTIONAL RADIOLOGY/VASCULAR | Facility: HOSPITAL | Age: 44
Discharge: HOME | End: 2024-01-04
Attending: RADIOLOGY | Admitting: RADIOLOGY
Payer: COMMERCIAL

## 2024-01-04 ENCOUNTER — ANESTHESIA EVENT (OUTPATIENT)
Dept: INTERVENTIONAL RADIOLOGY/VASCULAR | Facility: HOSPITAL | Age: 44
End: 2024-01-04
Payer: COMMERCIAL

## 2024-01-04 ENCOUNTER — HOSPITAL ENCOUNTER (OUTPATIENT)
Dept: RADIOLOGY | Facility: HOSPITAL | Age: 44
Discharge: HOME | End: 2024-01-04
Attending: PHYSICIAN ASSISTANT
Payer: COMMERCIAL

## 2024-01-04 DIAGNOSIS — C22.0 HCC (HEPATOCELLULAR CARCINOMA): ICD-10-CM

## 2024-01-04 LAB
ALBUMIN SERPL BCP-MCNC: 3.6 G/DL (ref 3.5–5.2)
ALP SERPL-CCNC: 55 U/L (ref 55–135)
ALT SERPL W/O P-5'-P-CCNC: 15 U/L (ref 10–44)
ANION GAP SERPL CALC-SCNC: 9 MMOL/L (ref 8–16)
AST SERPL-CCNC: 15 U/L (ref 10–40)
B-HCG UR QL: NEGATIVE
BASOPHILS # BLD AUTO: 0.04 K/UL (ref 0–0.2)
BASOPHILS NFR BLD: 0.8 % (ref 0–1.9)
BILIRUB DIRECT SERPL-MCNC: 0.4 MG/DL (ref 0.1–0.3)
BILIRUB SERPL-MCNC: 1.3 MG/DL (ref 0.1–1)
BUN SERPL-MCNC: 11 MG/DL (ref 6–20)
CALCIUM SERPL-MCNC: 9.4 MG/DL (ref 8.7–10.5)
CHLORIDE SERPL-SCNC: 105 MMOL/L (ref 95–110)
CO2 SERPL-SCNC: 26 MMOL/L (ref 23–29)
CREAT SERPL-MCNC: 0.8 MG/DL (ref 0.5–1.4)
CTP QC/QA: YES
DIFFERENTIAL METHOD BLD: ABNORMAL
EOSINOPHIL # BLD AUTO: 0.2 K/UL (ref 0–0.5)
EOSINOPHIL NFR BLD: 3.1 % (ref 0–8)
ERYTHROCYTE [DISTWIDTH] IN BLOOD BY AUTOMATED COUNT: 12.7 % (ref 11.5–14.5)
EST. GFR  (NO RACE VARIABLE): >60 ML/MIN/1.73 M^2
GLUCOSE SERPL-MCNC: 89 MG/DL (ref 70–110)
HCT VFR BLD AUTO: 39.8 % (ref 37–48.5)
HGB BLD-MCNC: 13.3 G/DL (ref 12–16)
IMM GRANULOCYTES # BLD AUTO: 0.02 K/UL (ref 0–0.04)
IMM GRANULOCYTES NFR BLD AUTO: 0.4 % (ref 0–0.5)
INR PPP: 1 (ref 0.8–1.2)
LYMPHOCYTES # BLD AUTO: 0.8 K/UL (ref 1–4.8)
LYMPHOCYTES NFR BLD: 15.7 % (ref 18–48)
MCH RBC QN AUTO: 31.2 PG (ref 27–31)
MCHC RBC AUTO-ENTMCNC: 33.4 G/DL (ref 32–36)
MCV RBC AUTO: 93 FL (ref 82–98)
MONOCYTES # BLD AUTO: 0.5 K/UL (ref 0.3–1)
MONOCYTES NFR BLD: 10 % (ref 4–15)
NEUTROPHILS # BLD AUTO: 3.6 K/UL (ref 1.8–7.7)
NEUTROPHILS NFR BLD: 70 % (ref 38–73)
NRBC BLD-RTO: 0 /100 WBC
PLATELET # BLD AUTO: 217 K/UL (ref 150–450)
PMV BLD AUTO: 9.6 FL (ref 9.2–12.9)
POTASSIUM SERPL-SCNC: 4.2 MMOL/L (ref 3.5–5.1)
PROT SERPL-MCNC: 7.6 G/DL (ref 6–8.4)
PROTHROMBIN TIME: 10.7 SEC (ref 9–12.5)
RBC # BLD AUTO: 4.26 M/UL (ref 4–5.4)
SODIUM SERPL-SCNC: 140 MMOL/L (ref 136–145)
WBC # BLD AUTO: 5.11 K/UL (ref 3.9–12.7)

## 2024-01-04 PROCEDURE — 81025 URINE PREGNANCY TEST: CPT | Performed by: RADIOLOGY

## 2024-01-04 PROCEDURE — 75774 ARTERY X-RAY EACH VESSEL: CPT | Mod: TC,59 | Performed by: RADIOLOGY

## 2024-01-04 PROCEDURE — 25500020 PHARM REV CODE 255: Performed by: RADIOLOGY

## 2024-01-04 PROCEDURE — 75887 VEIN X-RAY LIVER W/O HEMODYN: CPT | Mod: 26,,, | Performed by: RADIOLOGY

## 2024-01-04 PROCEDURE — 75726 ARTERY X-RAYS ABDOMEN: CPT | Mod: 26,,, | Performed by: RADIOLOGY

## 2024-01-04 PROCEDURE — 25000003 PHARM REV CODE 250: Performed by: ANESTHESIOLOGY

## 2024-01-04 PROCEDURE — 37000008 HC ANESTHESIA 1ST 15 MINUTES

## 2024-01-04 PROCEDURE — 78830 RP LOCLZJ TUM SPECT W/CT 1: CPT | Mod: TC

## 2024-01-04 PROCEDURE — 78201 LIVER IMAGING STATIC ONLY: CPT | Mod: 26,,, | Performed by: STUDENT IN AN ORGANIZED HEALTH CARE EDUCATION/TRAINING PROGRAM

## 2024-01-04 PROCEDURE — 80053 COMPREHEN METABOLIC PANEL: CPT

## 2024-01-04 PROCEDURE — D9220A PRA ANESTHESIA: Mod: CRNA,,, | Performed by: NURSE ANESTHETIST, CERTIFIED REGISTERED

## 2024-01-04 PROCEDURE — 75726 ARTERY X-RAYS ABDOMEN: CPT | Mod: TC | Performed by: RADIOLOGY

## 2024-01-04 PROCEDURE — 63600175 PHARM REV CODE 636 W HCPCS: Mod: JG | Performed by: RADIOLOGY

## 2024-01-04 PROCEDURE — 85610 PROTHROMBIN TIME: CPT

## 2024-01-04 PROCEDURE — 76377 3D RENDER W/INTRP POSTPROCES: CPT | Mod: TC,59 | Performed by: RADIOLOGY

## 2024-01-04 PROCEDURE — D9220A PRA ANESTHESIA: Mod: ANES,,, | Performed by: ANESTHESIOLOGY

## 2024-01-04 PROCEDURE — 36247 INS CATH ABD/L-EXT ART 3RD: CPT | Mod: RT | Performed by: RADIOLOGY

## 2024-01-04 PROCEDURE — 63600175 PHARM REV CODE 636 W HCPCS: Performed by: NURSE ANESTHETIST, CERTIFIED REGISTERED

## 2024-01-04 PROCEDURE — 82248 BILIRUBIN DIRECT: CPT

## 2024-01-04 PROCEDURE — 76377 3D RENDER W/INTRP POSTPROCES: CPT | Mod: 26,59,, | Performed by: RADIOLOGY

## 2024-01-04 PROCEDURE — C1760 CLOSURE DEV, VASC: HCPCS

## 2024-01-04 PROCEDURE — 77290 THER RAD SIMULAJ FIELD CPLX: CPT | Mod: TC | Performed by: RADIOLOGY

## 2024-01-04 PROCEDURE — 78201 LIVER IMAGING STATIC ONLY: CPT | Mod: TC

## 2024-01-04 PROCEDURE — 36247 INS CATH ABD/L-EXT ART 3RD: CPT | Mod: 51,RT,, | Performed by: RADIOLOGY

## 2024-01-04 PROCEDURE — 75887 VEIN X-RAY LIVER W/O HEMODYN: CPT | Mod: TC | Performed by: RADIOLOGY

## 2024-01-04 PROCEDURE — 76380 CAT SCAN FOLLOW-UP STUDY: CPT | Mod: 26,,, | Performed by: RADIOLOGY

## 2024-01-04 PROCEDURE — 75774 ARTERY X-RAY EACH VESSEL: CPT | Mod: 26,59,, | Performed by: RADIOLOGY

## 2024-01-04 PROCEDURE — 76380 CAT SCAN FOLLOW-UP STUDY: CPT | Mod: TC | Performed by: RADIOLOGY

## 2024-01-04 PROCEDURE — 85025 COMPLETE CBC W/AUTO DIFF WBC: CPT

## 2024-01-04 PROCEDURE — 76937 US GUIDE VASCULAR ACCESS: CPT | Mod: 26,,, | Performed by: RADIOLOGY

## 2024-01-04 PROCEDURE — 63600175 PHARM REV CODE 636 W HCPCS: Performed by: ANESTHESIOLOGY

## 2024-01-04 PROCEDURE — 78830 RP LOCLZJ TUM SPECT W/CT 1: CPT | Mod: 26,,, | Performed by: STUDENT IN AN ORGANIZED HEALTH CARE EDUCATION/TRAINING PROGRAM

## 2024-01-04 PROCEDURE — 36248 INS CATH ABD/L-EXT ART ADDL: CPT | Mod: LT,,, | Performed by: RADIOLOGY

## 2024-01-04 PROCEDURE — 36248 INS CATH ABD/L-EXT ART ADDL: CPT | Mod: LT | Performed by: RADIOLOGY

## 2024-01-04 PROCEDURE — 37000009 HC ANESTHESIA EA ADD 15 MINS

## 2024-01-04 PROCEDURE — 25000003 PHARM REV CODE 250: Performed by: NURSE ANESTHETIST, CERTIFIED REGISTERED

## 2024-01-04 PROCEDURE — 76937 US GUIDE VASCULAR ACCESS: CPT | Mod: TC | Performed by: RADIOLOGY

## 2024-01-04 RX ORDER — PHENYLEPHRINE HYDROCHLORIDE 10 MG/ML
INJECTION INTRAVENOUS
Status: DISCONTINUED | OUTPATIENT
Start: 2024-01-04 | End: 2024-01-04

## 2024-01-04 RX ORDER — SCOLOPAMINE TRANSDERMAL SYSTEM 1 MG/1
1 PATCH, EXTENDED RELEASE TRANSDERMAL ONCE
Status: DISCONTINUED | OUTPATIENT
Start: 2024-01-04 | End: 2024-01-05 | Stop reason: HOSPADM

## 2024-01-04 RX ORDER — ONDANSETRON 2 MG/ML
4 INJECTION INTRAMUSCULAR; INTRAVENOUS DAILY PRN
Status: DISCONTINUED | OUTPATIENT
Start: 2024-01-04 | End: 2024-01-05 | Stop reason: HOSPADM

## 2024-01-04 RX ORDER — FENTANYL CITRATE 50 UG/ML
25 INJECTION, SOLUTION INTRAMUSCULAR; INTRAVENOUS EVERY 5 MIN PRN
Status: DISCONTINUED | OUTPATIENT
Start: 2024-01-04 | End: 2024-01-05 | Stop reason: HOSPADM

## 2024-01-04 RX ORDER — LIDOCAINE HYDROCHLORIDE 20 MG/ML
INJECTION INTRAVENOUS
Status: DISCONTINUED | OUTPATIENT
Start: 2024-01-04 | End: 2024-01-04

## 2024-01-04 RX ORDER — FENTANYL CITRATE 50 UG/ML
INJECTION, SOLUTION INTRAMUSCULAR; INTRAVENOUS
Status: DISCONTINUED | OUTPATIENT
Start: 2024-01-04 | End: 2024-01-04

## 2024-01-04 RX ORDER — MIDAZOLAM HYDROCHLORIDE 1 MG/ML
INJECTION, SOLUTION INTRAMUSCULAR; INTRAVENOUS
Status: DISCONTINUED | OUTPATIENT
Start: 2024-01-04 | End: 2024-01-04

## 2024-01-04 RX ORDER — PROPOFOL 10 MG/ML
VIAL (ML) INTRAVENOUS
Status: DISCONTINUED | OUTPATIENT
Start: 2024-01-04 | End: 2024-01-04

## 2024-01-04 RX ORDER — DEXAMETHASONE SODIUM PHOSPHATE 4 MG/ML
INJECTION, SOLUTION INTRA-ARTICULAR; INTRALESIONAL; INTRAMUSCULAR; INTRAVENOUS; SOFT TISSUE
Status: DISCONTINUED | OUTPATIENT
Start: 2024-01-04 | End: 2024-01-04

## 2024-01-04 RX ORDER — LIDOCAINE HYDROCHLORIDE 10 MG/ML
1 INJECTION, SOLUTION EPIDURAL; INFILTRATION; INTRACAUDAL; PERINEURAL ONCE AS NEEDED
Status: DISCONTINUED | OUTPATIENT
Start: 2024-01-04 | End: 2024-01-05 | Stop reason: HOSPADM

## 2024-01-04 RX ORDER — ONDANSETRON 2 MG/ML
INJECTION INTRAMUSCULAR; INTRAVENOUS
Status: DISCONTINUED | OUTPATIENT
Start: 2024-01-04 | End: 2024-01-04

## 2024-01-04 RX ORDER — ROCURONIUM BROMIDE 10 MG/ML
INJECTION, SOLUTION INTRAVENOUS
Status: DISCONTINUED | OUTPATIENT
Start: 2024-01-04 | End: 2024-01-04

## 2024-01-04 RX ORDER — PROCHLORPERAZINE EDISYLATE 5 MG/ML
5 INJECTION INTRAMUSCULAR; INTRAVENOUS EVERY 30 MIN PRN
Status: DISCONTINUED | OUTPATIENT
Start: 2024-01-04 | End: 2024-01-05 | Stop reason: HOSPADM

## 2024-01-04 RX ORDER — HYDROMORPHONE HYDROCHLORIDE 1 MG/ML
0.5 INJECTION, SOLUTION INTRAMUSCULAR; INTRAVENOUS; SUBCUTANEOUS EVERY 10 MIN PRN
Status: DISCONTINUED | OUTPATIENT
Start: 2024-01-04 | End: 2024-01-05 | Stop reason: HOSPADM

## 2024-01-04 RX ORDER — KETAMINE HCL IN 0.9 % NACL 50 MG/5 ML
SYRINGE (ML) INTRAVENOUS
Status: DISCONTINUED | OUTPATIENT
Start: 2024-01-04 | End: 2024-01-04

## 2024-01-04 RX ORDER — OXYCODONE HYDROCHLORIDE 5 MG/1
5 TABLET ORAL
Status: DISCONTINUED | OUTPATIENT
Start: 2024-01-04 | End: 2024-01-05 | Stop reason: HOSPADM

## 2024-01-04 RX ORDER — SODIUM CHLORIDE 9 MG/ML
INJECTION, SOLUTION INTRAVENOUS CONTINUOUS
Status: DISCONTINUED | OUTPATIENT
Start: 2024-01-04 | End: 2024-01-05 | Stop reason: HOSPADM

## 2024-01-04 RX ADMIN — PHENYLEPHRINE HYDROCHLORIDE 100 MCG: 10 INJECTION INTRAVENOUS at 08:01

## 2024-01-04 RX ADMIN — PROPOFOL 40 MG: 10 INJECTION, EMULSION INTRAVENOUS at 09:01

## 2024-01-04 RX ADMIN — PHENYLEPHRINE HYDROCHLORIDE 100 MCG: 10 INJECTION INTRAVENOUS at 09:01

## 2024-01-04 RX ADMIN — SCOPALAMINE 1 PATCH: 1 PATCH, EXTENDED RELEASE TRANSDERMAL at 07:01

## 2024-01-04 RX ADMIN — Medication 10 MG: at 09:01

## 2024-01-04 RX ADMIN — PROPOFOL 150 MG: 10 INJECTION, EMULSION INTRAVENOUS at 07:01

## 2024-01-04 RX ADMIN — SODIUM CHLORIDE: 0.9 INJECTION, SOLUTION INTRAVENOUS at 07:01

## 2024-01-04 RX ADMIN — FENTANYL CITRATE 50 MCG: 50 INJECTION, SOLUTION INTRAMUSCULAR; INTRAVENOUS at 07:01

## 2024-01-04 RX ADMIN — ROCURONIUM BROMIDE 20 MG: 10 INJECTION INTRAVENOUS at 08:01

## 2024-01-04 RX ADMIN — IOHEXOL 90 ML: 300 INJECTION, SOLUTION INTRAVENOUS at 09:01

## 2024-01-04 RX ADMIN — SUGAMMADEX 200 MG: 100 INJECTION, SOLUTION INTRAVENOUS at 10:01

## 2024-01-04 RX ADMIN — ONDANSETRON 4 MG: 2 INJECTION INTRAMUSCULAR; INTRAVENOUS at 10:01

## 2024-01-04 RX ADMIN — ROCURONIUM BROMIDE 50 MG: 10 INJECTION INTRAVENOUS at 07:01

## 2024-01-04 RX ADMIN — NITROGLYCERIN 200 MCG: 20 INJECTION INTRAVENOUS at 09:01

## 2024-01-04 RX ADMIN — ROCURONIUM BROMIDE 20 MG: 10 INJECTION INTRAVENOUS at 09:01

## 2024-01-04 RX ADMIN — PROCHLORPERAZINE EDISYLATE 5 MG: 5 INJECTION INTRAMUSCULAR; INTRAVENOUS at 11:01

## 2024-01-04 RX ADMIN — DEXAMETHASONE SODIUM PHOSPHATE 4 MG: 4 INJECTION, SOLUTION INTRAMUSCULAR; INTRAVENOUS at 07:01

## 2024-01-04 RX ADMIN — SUGAMMADEX 100 MG: 100 INJECTION, SOLUTION INTRAVENOUS at 10:01

## 2024-01-04 RX ADMIN — LIDOCAINE HYDROCHLORIDE 80 MG: 20 INJECTION INTRAVENOUS at 07:01

## 2024-01-04 RX ADMIN — Medication 10 MG: at 07:01

## 2024-01-04 RX ADMIN — FENTANYL CITRATE 100 MCG: 50 INJECTION, SOLUTION INTRAMUSCULAR; INTRAVENOUS at 07:01

## 2024-01-04 RX ADMIN — MIDAZOLAM HYDROCHLORIDE 2 MG: 1 INJECTION, SOLUTION INTRAMUSCULAR; INTRAVENOUS at 07:01

## 2024-01-04 NOTE — DISCHARGE INSTRUCTIONS
Please call with any questions or concerns.      Monday thru Friday 8:00 am - 4:30 pm    Interventional Radiology   [phone]    After Hours    Ask for the Radiology Intern on call  [phone]      Post-Yttrium (Y90) instructions:    Dont drive for 3 days.  Avoid walking, bending, and taking stairs for 24 hours.  No heavy lifting (gallon of milk) or strenuous exercise for 10 days.  Keep small children, pregnant women, and pets 3 feet away for 3 days.  For international flights, the radiation may set off the security scans.   Keep your dressing clean and dry for 24 hours. Do not submerge insertion site in water (bath tub, swimming pool) until fully healed.  Be sure to follow any other instructions from your doctor.      Call your doctor if you have any of the following:    Fever of 100.4 (38C) or higher lasting for 24 to 48 hours  Bleeding, swelling, or a large lump at the insertion site  Sharp or increasing pain at the insertion site  Leg pain, numbness, or a cold leg or foot  Any other symptoms your provider instructed you to report based on your medical condition.    Contact information:    For immediate concerns that are not emergent, you may call our interventional radiology clinic at [phone] or [phone]    ** After hours and weekends: Call the paging  at [phone] and ask for the Radiology Resident on call**

## 2024-01-04 NOTE — PLAN OF CARE
Pt arrived to IR room 188 via stretcher w/ RN, Rashid & CRNA.  AAO x4. Name//allergies/procedure verified. Pt will be monitored by RN & CRNA @ bedside throughout procedure/sedation/ Sedation/airway/vs per anesthesia team.

## 2024-01-04 NOTE — PLAN OF CARE
Pt to be transferred to NM for post procedure scan prior to transfer to PACU for recovery. Pt remains under the care of anesthesia team for scan/transfer to PACU.

## 2024-01-04 NOTE — TRANSFER OF CARE
"Anesthesia Transfer of Care Note    Patient: Melly Hwang    Procedure(s) Performed: * No procedures listed *    Patient location: [facility]    Anesthesia Type: general    Transport from OR: Transported from OR on 6-10 L/min O2 by face mask with adequate spontaneous ventilation    Post pain: adequate analgesia    Post assessment: no apparent anesthetic complications and tolerated procedure well    Post vital signs: stable    Level of consciousness: awake    Nausea/Vomiting: no nausea/vomiting    Complications: none    Transfer of care protocol was followed    Last vitals: Visit Vitals  /69   Pulse 78   Temp 36.7 °C (98.1 °F) (Skin)   Resp 20   Ht 5' 11" (1.803 m)   Wt 68.9 kg (152 lb)   LMP  (LMP Unknown)   SpO2 100%   Breastfeeding No   BMI 21.20 kg/m²     "

## 2024-01-04 NOTE — ANESTHESIA POSTPROCEDURE EVALUATION
Anesthesia Post Evaluation    Patient: Melly Hwang    Procedure(s) Performed: * No procedures listed *    Final Anesthesia Type: general      Patient location during evaluation: PACU  Patient participation: Yes- Able to Participate  Level of consciousness: awake and alert  Post-procedure vital signs: reviewed and stable  Pain management: adequate  Airway patency: patent  KEVIN mitigation strategies: Extubation while patient is awake  PONV status at discharge: nausea (controlled)  Anesthetic complications: no      Cardiovascular status: stable  Respiratory status: unassisted and spontaneous ventilation  Hydration status: euvolemic  Follow-up not needed.  Comments: Very nauseous in DOSC requiring treatment. Would suggest propofol based anesthetic for next procedure.              Vitals Value Taken Time   /74 01/04/24 1300   Temp 36.8 °C (98.2 °F) 01/04/24 1300   Pulse 79 01/04/24 1300   Resp 20 01/04/24 1300   SpO2 97 % 01/04/24 1300         No case tracking events are documented in the log.      Pain/Lizbet Score: Lizbet Score: 10 (1/4/2024  1:00 PM)

## 2024-01-04 NOTE — H&P
VIR Pre-Procedure H&P      SUBJECTIVE:          History of Present Illness:  Melly Hwang is a 43 y.o. female who presents for Y90 mapping angiography.   Past Medical History:   Diagnosis Date    VENKATA positive 2020    COVID-19     Deviated septum     Hepatocellular carcinoma 2021    Hypertrophy of inferior nasal turbinate     Pericardial effusion     Premature menopause      Past Surgical History:   Procedure Laterality Date    COLONOSCOPY N/A 2020    Procedure: COLONOSCOPY;  Surgeon: GIOVANNI Crane MD;  Location: [facility] ENDO (4TH FLR);  Service: Endoscopy;  Laterality: N/A;  + covid     HERNIA REPAIR      LIVER SURGERY N/A 2021    NASAL SEPTUM SURGERY      PERICARDIAL WINDOW N/A 2021    Procedure: CREATION, PERICARDIAL WINDOW;  Surgeon: Ajay Cantor MD;  Location: [facility] OR 2ND FLR;  Service: Cardiovascular;  Laterality: N/A;    PERICARDIOCENTESIS N/A 2020    Procedure: Pericardiocentesis;  Surgeon: Dickson Dangelo MD;  Location: [facility] CATH LAB;  Service: Cardiology;  Laterality: N/A;    TONSILLECTOMY         Home Meds:   Prior to Admission medications    Medication Sig Start Date End Date Taking? Authorizing Provider   ALPRAZolam (XANAX) 0.5 MG tablet TAKE 1 TABLET BY MOUTH EVERY DAY AS NEEDED FOR ANXIETY 23  Yes Melly Sahu MD   cetirizine (ZYRTEC) 10 MG tablet Take 1 tablet (10 mg total) by mouth once daily.  Patient taking differently: Take 10 mg by mouth daily as needed. 23  Yes Mari Nayak MD   cyanocobalamin (VITAMIN B-12) 1000 MCG tablet Take 1 tablet (1,000 mcg total) by mouth once daily. 23  Yes Melly Sahu MD   cyclobenzaprine (FLEXERIL) 10 MG tablet SMARTSI Tablet(s) By Mouth Every Evening PRN 23  Yes Provider, Historical   fluticasone propionate (FLONASE) 50 mcg/actuation nasal spray 2 sprays (100 mcg total) by Each Nostril route once daily.  Patient taking differently: 2 sprays by Each Nostril route  daily as needed. 5/29/23  Yes Miriam Trejo PA-C   multivitamin capsule Take by mouth. 1 capsule Oral Every morning   Yes Provider, Historical   norethindrone-ethinyl estradiol (MICROGESTIN 1/20) 1-20 mg-mcg per tablet Take 1 tablet by mouth once daily. 3/9/23  Yes Asaf Mejia MD   pantoprazole (PROTONIX) 40 MG tablet Take 1 tablet (40 mg total) by mouth once daily.  Patient taking differently: Take 40 mg by mouth every morning. 12/19/23 4/17/24 Yes Aleah Joiner NP   methylPREDNISolone (MEDROL DOSEPACK) 4 mg tablet use as directed 1/3/24 1/24/24  Ladi Roman PA-C   ondansetron (ZOFRAN-ODT) 4 MG TbDL Take 1 tablet (4 mg total) by mouth 2 (two) times daily. 3/16/23   Inés Hilliard PA-C   ondansetron (ZOFRAN-ODT) 4 MG TbDL Take 1 tablet (4 mg total) by mouth every 12 (twelve) hours as needed (nausea). 10/25/23   Armando Mendiola MD   tiZANidine (ZANAFLEX) 4 MG tablet Take 1 tablet (4 mg total) by mouth every 6 (six) hours as needed (pain/spasm). 8/15/23   Kelsey Castro MD   triamcinolone acetonide 0.1% (KENALOG) 0.1 % cream Apply topically 2 (two) times daily as needed (scaling at external ears). Avoid use on face, body folds, groin/genitalia. 10/18/21   Yolande Heredia MD     Anticoagulants/Antiplatelets: no anticoagulation    Allergies:   Review of patient's allergies indicates:   Allergen Reactions    No known drug allergies      Sedation History:  no adverse reactions    Review of Systems:   Hematological: no known coagulopathies  Respiratory: no shortness of breath  Cardiovascular: no chest pain  Gastrointestinal: no abdominal pain  Genito-Urinary: no dysuria  Musculoskeletal: negative  Neurological: no TIA or stroke symptoms         OBJECTIVE:     Vital Signs (Most Recent)  Temp: 97.7 °F (36.5 °C) (01/04/24 0601)  Pulse: 79 (01/04/24 0601)  Resp: 18 (01/04/24 0601)  BP: 116/76 (01/04/24 0601)  SpO2: 100 % (01/04/24 0601)    Physical Exam:  ASA: Per  anesthesia.  Mallampati: Per anesthesia.    General: no acute distress  Mental Status: alert and oriented to person, place and time  HEENT: normocephalic, atraumatic  Chest: unlabored breathing  Heart: regular heart rate  Abdomen: nondistended  Extremity: moves all extremities    Laboratory  Lab Results   Component Value Date    INR 1.0 01/04/2024       Lab Results   Component Value Date    WBC 5.11 01/04/2024    HGB 13.3 01/04/2024    HCT 39.8 01/04/2024    MCV 93 01/04/2024     01/04/2024      Lab Results   Component Value Date    GLU 89 01/04/2024     01/04/2024    K 4.2 01/04/2024     01/04/2024    CO2 26 01/04/2024    BUN 11 01/04/2024    CREATININE 0.8 01/04/2024    CALCIUM 9.4 01/04/2024    MG 1.7 09/18/2023    ALT 15 01/04/2024    AST 15 01/04/2024    ALBUMIN 3.6 01/04/2024    BILITOT 1.3 (H) 01/04/2024    BILIDIR 0.4 (H) 01/04/2024       ASSESSMENT/PLAN:     Sedation Plan: Per anesthesia.  Patient will undergo Y90 mapping angiography..      Ninfa Ovalles MD  VIR Fellow

## 2024-01-04 NOTE — PLAN OF CARE
5 Fr vascade deployed to right femoral artery. Hemostasis @ 0913. Pt to remain flat x2 hrs. May sit up @ 1113.

## 2024-01-04 NOTE — DISCHARGE SUMMARY
VIR Discharge Summary      Hospital Course: No complications    Admit Date: 1/4/2024  Discharge Date: 01/04/2024     Instructions Given to Patient: Yes  Diet: Resume prior diet  Activity:   No driving for 3 days, no heavy lifting or strenuous exercise for 10 days.     Description of Condition on Discharge: Stable  Vital Signs (Most Recent): Temp: 97.7 °F (36.5 °C) (01/04/24 0601)  Pulse: 79 (01/04/24 0601)  Resp: 18 (01/04/24 0601)  BP: 116/76 (01/04/24 0601)  SpO2: 100 % (01/04/24 0601)    Discharge Disposition: Home    Discharge Diagnosis: HCC     Follow-up: Will schedule for Y90 treatment.       Ninfa Ovalles MD  VIR Fellow

## 2024-01-04 NOTE — ANESTHESIA PROCEDURE NOTES
Intubation    Date/Time: 1/4/2024 7:37 AM    Performed by: Dov Presley CRNA  Authorized by: Km Persaud MD    Intubation:     Induction:  Intravenous    Intubated:  Postinduction    Mask Ventilation:  Easy mask    Attempts:  1    Attempted By:  Student    Method of Intubation:  Video laryngoscopy    Blade:  Georges 3    Laryngeal View Grade: Grade I - full view of cords      Difficult Airway Encountered?: No      Complications:  None    Airway Device:  Oral endotracheal tube    Airway Device Size:  7.0    Style/Cuff Inflation:  Cuffed (inflated to minimal occlusive pressure)    Inflation Amount (mL):  8    Tube secured:  22    Secured at:  The lips    Placement Verified By:  Capnometry    Complicating Factors:  None    Findings Post-Intubation:  BS equal bilateral and atraumatic/condition of teeth unchanged

## 2024-01-04 NOTE — PROCEDURES
VIR Post-Procedure Note    Pre Op Diagnosis: Hepatocellular carcinoma    Post Op Diagnosis: Same    Procedure: Yttrium planning    Procedure performed by: Ninfa Ovalles MD / Du Alford MD    Written Informed Consent Obtained:  Yes    Specimen Removed: No    Estimated Blood Loss:  Minimal     Findings:    RCFA access, Celiac and superselective hepatic angiography    Radioembolization preparatory angiography with administration of [id number].     Patient tolerated procedure well.      Plan: Patient to be sent to nuclear medicine for radioisotope imaging and calculation of lung shunt fraction        Ninfa Ovalles MD  VIR Fellow

## 2024-01-04 NOTE — ANESTHESIA PREPROCEDURE EVALUATION
2024  Melly Hwang is a 43 y.o., female.    Pre-operative evaluation for * No procedures listed *    Melly Hwang is a 43 y.o. 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 pylori infection: tx  stool negative    Liver hemangioma    Pericardial effusion    VENKATA positive    Elevated bilirubin    S/P pericardial window creation    Hepatocellular carcinoma    Hypophosphatemia    Elevated CA 19-9 level    Pleural effusion    Decreased ROM of intervertebral discs of cervical spine    Lung nodule ; enlarged     Neutropenia, unspecified type    Low serum vitamin B12    Iron excess    Carpal tunnel syndrome of right wrist: mild, see EMG 10/23    Anxiety about health       Review of patient's allergies indicates:   Allergen Reactions    No known drug allergies        Current Outpatient Medications on File Prior to Encounter   Medication Sig Dispense Refill    ALPRAZolam (XANAX) 0.5 MG tablet TAKE 1 TABLET BY MOUTH EVERY DAY AS NEEDED FOR ANXIETY 30 tablet 0    cetirizine (ZYRTEC) 10 MG tablet Take 1 tablet (10 mg total) by mouth once daily. (Patient taking differently: Take 10 mg by mouth daily as needed.) 90 tablet 0    cyanocobalamin (VITAMIN B-12) 1000 MCG tablet Take 1 tablet (1,000 mcg total) by mouth once daily. 30 tablet 12    cyclobenzaprine (FLEXERIL) 10 MG tablet SMARTSI Tablet(s) By Mouth Every Evening PRN      fluticasone propionate (FLONASE) 50 mcg/actuation nasal spray 2 sprays (100 mcg total) by Each Nostril route once daily. (Patient taking differently: 2 sprays by Each Nostril route daily as needed.) 18.2 mL 6    multivitamin  capsule Take by mouth. 1 capsule Oral Every morning      norethindrone-ethinyl estradiol (MICROGESTIN 1/20) 1-20 mg-mcg per tablet Take 1 tablet by mouth once daily. 63 tablet 4    pantoprazole (PROTONIX) 40 MG tablet Take 1 tablet (40 mg total) by mouth once daily. (Patient taking differently: Take 40 mg by mouth every morning.) 30 tablet 3    methylPREDNISolone (MEDROL DOSEPACK) 4 mg tablet use as directed 1 each 0    ondansetron (ZOFRAN-ODT) 4 MG TbDL Take 1 tablet (4 mg total) by mouth 2 (two) times daily. 2 tablet 0    ondansetron (ZOFRAN-ODT) 4 MG TbDL Take 1 tablet (4 mg total) by mouth every 12 (twelve) hours as needed (nausea). 20 tablet 0    tiZANidine (ZANAFLEX) 4 MG tablet Take 1 tablet (4 mg total) by mouth every 6 (six) hours as needed (pain/spasm). 30 tablet 1    triamcinolone acetonide 0.1% (KENALOG) 0.1 % cream Apply topically 2 (two) times daily as needed (scaling at external ears). Avoid use on face, body folds, groin/genitalia. 45 g 3     No current facility-administered medications on file prior to encounter.       Past Surgical History:   Procedure Laterality Date    COLONOSCOPY N/A 09/21/2020    Procedure: COLONOSCOPY;  Surgeon: GIOVANNI Crane MD;  Location: [facility] ENDO (4TH FLR);  Service: Endoscopy;  Laterality: N/A;  + covid 4/22    HERNIA REPAIR      LIVER SURGERY N/A 06/28/2021    NASAL SEPTUM SURGERY      PERICARDIAL WINDOW N/A 02/22/2021    Procedure: CREATION, PERICARDIAL WINDOW;  Surgeon: Ajay Cantor MD;  Location: [facility] OR 2ND FLR;  Service: Cardiovascular;  Laterality: N/A;    PERICARDIOCENTESIS N/A 05/02/2020    Procedure: Pericardiocentesis;  Surgeon: Dickson Dangelo MD;  Location: 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)     Social Determinants of Health     Financial Resource Strain: Low Risk  (12/19/2023)    Overall Financial Resource Strain (CARDIA)     Difficulty of Paying Living Expenses: Not very hard   Food Insecurity: No Food Insecurity (12/19/2023)    Hunger Vital Sign     Worried About Running Out of Food in the Last Year: Never true     Ran Out of Food in the Last Year: Never true   Transportation Needs: No Transportation Needs (12/19/2023)    PRAPARE - Transportation     Lack of Transportation (Medical): No     Lack of Transportation (Non-Medical): No   Physical Activity: Insufficiently Active (12/19/2023)    Exercise Vital Sign     Days of Exercise per Week: 2 days     Minutes of Exercise per Session: 10 min   Stress: Stress Concern Present (12/19/2023)    [employer] [place] [employer] - Occupational Stress Questionnaire     Feeling of Stress : To some extent   Social Connections: Unknown (12/19/2023)    Social Connection and Isolation Panel [NHANES]     Frequency of Communication with Friends and Family: Three times a week     Frequency of Social Gatherings with Friends and Family: Once a week     Active Member of Clubs or Organizations: No     Attends Club or Organization Meetings: Never     Marital Status: Never    Housing Stability: Low Risk  (12/19/2023)    Housing Stability Vital Sign     Unable to Pay for Housing in the Last Year: No     Number of Places Lived in the Last Year: 0     Unstable Housing in the Last Year: No         CBC: No results for input(s): "WBC", "RBC", "HGB", "HCT", "PLT", "MCV", "MCH", "MCHC" in the last 72 hours.    CMP: No results for input(s): "NA", "K", "CL", "CO2", "BUN", "CREATININE", "GLU", "MG", "PHOS", " ""CALCIUM", "ALBUMIN", "PROT", "ALKPHOS", "ALT", "AST", "BILITOT" in the last 72 hours.    INR  No results for input(s): "PT", "INR", "PROTIME", "APTT" in the last 72 hours.        Diagnostic Studies:      EKD Echo:  No results found. However, due to the size of the patient record, not all encounters were searched. Please check Results Review for a complete set of results.        Pre-op Assessment    I have reviewed the Patient Summary Reports.     I have reviewed the Nursing Notes. I have reviewed the NPO Status.   I have reviewed the Medications.     Review of Systems  Anesthesia Hx:  No problems with previous Anesthesia   History of prior surgery of interest to airway management or planning:  Previous anesthesia: General           Social:  Non-Smoker, Social Alcohol Use       Hematology/Oncology:                   Hematology Comments: Leukopenia                Oncology Comments: Hepatocellular CA     Cardiovascular:  Exercise tolerance: poor                  Pericardial effusion/pericardial window                         Renal/:  Renal/ Normal                 Hepatic/GI:      Liver Disease,  Liver hemangioma       Liver Disease        Musculoskeletal:  Musculoskeletal Normal                Neurological:  Neurology Normal                                    Neuromuscular Disease   Endocrine:  Endocrine Normal            Psych:  Psychiatric Normal                    Physical Exam  General: Well nourished, Cooperative and Alert    Airway:  Mallampati: I   Mouth Opening: Normal  TM Distance: Normal  Tongue: Normal  Neck ROM: Normal ROM    Dental:  Intact    Chest/Lungs:  Normal Respiratory Rate    Heart:  Rate: Normal        Anesthesia Plan  Type of Anesthesia, risks & benefits discussed:    Anesthesia Type: Gen ETT  Intra-op Monitoring Plan: Standard ASA Monitors  Post Op Pain Control Plan: multimodal analgesia and IV/PO Opioids PRN  Induction:  IV  Airway Plan: Direct, Post-Induction  Informed Consent: " Informed consent signed with the Patient and all parties understand the risks and agree with anesthesia plan.  All questions answered.   ASA Score: 3  Day of Surgery Review of History & Physical: H&P Update referred to the surgeon/provider.    Ready For Surgery From Anesthesia Perspective.     .

## 2024-01-05 VITALS
HEIGHT: 71 IN | TEMPERATURE: 98 F | WEIGHT: 152 LBS | HEART RATE: 79 BPM | BODY MASS INDEX: 21.28 KG/M2 | OXYGEN SATURATION: 97 % | DIASTOLIC BLOOD PRESSURE: 74 MMHG | SYSTOLIC BLOOD PRESSURE: 122 MMHG | RESPIRATION RATE: 20 BRPM

## 2024-01-10 ENCOUNTER — ANESTHESIA EVENT (OUTPATIENT)
Dept: ENDOSCOPY | Facility: HOSPITAL | Age: 44
End: 2024-01-10
Payer: COMMERCIAL

## 2024-01-10 ENCOUNTER — HOSPITAL ENCOUNTER (OUTPATIENT)
Facility: HOSPITAL | Age: 44
Discharge: HOME | End: 2024-01-10
Attending: INTERNAL MEDICINE | Admitting: INTERNAL MEDICINE
Payer: COMMERCIAL

## 2024-01-10 ENCOUNTER — ANESTHESIA (OUTPATIENT)
Dept: ENDOSCOPY | Facility: HOSPITAL | Age: 44
End: 2024-01-10
Payer: COMMERCIAL

## 2024-01-10 VITALS
HEART RATE: 67 BPM | TEMPERATURE: 98 F | HEIGHT: 71 IN | DIASTOLIC BLOOD PRESSURE: 79 MMHG | RESPIRATION RATE: 16 BRPM | BODY MASS INDEX: 21.28 KG/M2 | SYSTOLIC BLOOD PRESSURE: 129 MMHG | WEIGHT: 152 LBS | OXYGEN SATURATION: 100 %

## 2024-01-10 DIAGNOSIS — R10.9 ABDOMINAL PAIN: ICD-10-CM

## 2024-01-10 PROCEDURE — 88305 TISSUE EXAM BY PATHOLOGIST: CPT | Mod: 26,,, | Performed by: PATHOLOGY

## 2024-01-10 PROCEDURE — 37000008 HC ANESTHESIA 1ST 15 MINUTES: Performed by: INTERNAL MEDICINE

## 2024-01-10 PROCEDURE — 27201012 HC FORCEPS, HOT/COLD, DISP: Performed by: INTERNAL MEDICINE

## 2024-01-10 PROCEDURE — 88342 IMHCHEM/IMCYTCHM 1ST ANTB: CPT | Performed by: PATHOLOGY

## 2024-01-10 PROCEDURE — 43239 EGD BIOPSY SINGLE/MULTIPLE: CPT | Mod: ,,, | Performed by: INTERNAL MEDICINE

## 2024-01-10 PROCEDURE — 88342 IMHCHEM/IMCYTCHM 1ST ANTB: CPT | Mod: 26,,, | Performed by: PATHOLOGY

## 2024-01-10 PROCEDURE — 37000009 HC ANESTHESIA EA ADD 15 MINS: Performed by: INTERNAL MEDICINE

## 2024-01-10 PROCEDURE — 63600175 PHARM REV CODE 636 W HCPCS: Performed by: NURSE ANESTHETIST, CERTIFIED REGISTERED

## 2024-01-10 PROCEDURE — 25000003 PHARM REV CODE 250: Performed by: NURSE ANESTHETIST, CERTIFIED REGISTERED

## 2024-01-10 PROCEDURE — D9220A PRA ANESTHESIA: Mod: CRNA,,, | Performed by: NURSE ANESTHETIST, CERTIFIED REGISTERED

## 2024-01-10 PROCEDURE — D9220A PRA ANESTHESIA: Mod: ANES,,, | Performed by: SURGERY

## 2024-01-10 PROCEDURE — 43239 EGD BIOPSY SINGLE/MULTIPLE: CPT | Performed by: INTERNAL MEDICINE

## 2024-01-10 PROCEDURE — 88305 TISSUE EXAM BY PATHOLOGIST: CPT | Performed by: PATHOLOGY

## 2024-01-10 RX ORDER — PROPOFOL 10 MG/ML
VIAL (ML) INTRAVENOUS
Status: DISCONTINUED | OUTPATIENT
Start: 2024-01-10 | End: 2024-01-10

## 2024-01-10 RX ORDER — SODIUM CHLORIDE 9 MG/ML
INJECTION, SOLUTION INTRAVENOUS CONTINUOUS
Status: DISCONTINUED | OUTPATIENT
Start: 2024-01-10 | End: 2024-01-10 | Stop reason: HOSPADM

## 2024-01-10 RX ORDER — SODIUM CHLORIDE 0.9 % (FLUSH) 0.9 %
10 SYRINGE (ML) INJECTION
Status: DISCONTINUED | OUTPATIENT
Start: 2024-01-10 | End: 2024-01-10 | Stop reason: HOSPADM

## 2024-01-10 RX ORDER — LIDOCAINE HYDROCHLORIDE 20 MG/ML
INJECTION INTRAVENOUS
Status: DISCONTINUED | OUTPATIENT
Start: 2024-01-10 | End: 2024-01-10

## 2024-01-10 RX ADMIN — PROPOFOL 80 MG: 10 INJECTION, EMULSION INTRAVENOUS at 09:01

## 2024-01-10 RX ADMIN — LIDOCAINE HYDROCHLORIDE 100 MG: 20 INJECTION INTRAVENOUS at 09:01

## 2024-01-10 RX ADMIN — SODIUM CHLORIDE: 9 INJECTION, SOLUTION INTRAVENOUS at 09:01

## 2024-01-10 NOTE — H&P
Short Stay Endoscopy History and Physical    PCP - Melly Sahu MD    Procedure - EGD  ASA - per anesthesia  Mallampati - per anesthesia  History of Anesthesia problems - no  Family history Anesthesia problems - no     HPI:  Melly Hwang a 43 y.o. female here for evaluation of left upper quadrant pain and reflux (on PPI daily).     ROS:  Constitutional: No fevers, chills, No weight loss  ENT: No allergies  CV: No chest pain  Pulm: No shortness of breath  GI: see HPI  Derm: No rash    Medical History:  has a past medical history of VENKATA positive (08/20/2020), COVID-19, Deviated septum (2011), Hepatocellular carcinoma (05/07/2021), Hypertrophy of inferior nasal turbinate, Pericardial effusion, and Premature menopause.    Surgical History:  has a past surgical history that includes Tonsillectomy; Hernia repair; Nasal septum surgery; Pericardiocentesis (N/A, 05/02/2020); Colonoscopy (N/A, 09/21/2020); Pericardial window (N/A, 02/22/2021); and Liver surgery (N/A, 06/28/2021).    Family History: family history includes Cerebral aneurysm in her paternal aunt; Diabetes in her father and mother; Heart disease in her father and sister; Hyperlipidemia in her father; Hypertension in her father; Liver disease in her mother; Lung cancer in her maternal aunt; Macular degeneration in her father.. Otherwise no colon cancer, inflammatory bowel disease, or GI malignancies.    Social History:  reports that she has never smoked. She has never used smokeless tobacco. She reports current alcohol use. She reports that she does not use drugs.    Review of patient's allergies indicates:   Allergen Reactions    No known drug allergies        Medications:   Medications Prior to Admission   Medication Sig Dispense Refill Last Dose    cetirizine (ZYRTEC) 10 MG tablet Take 1 tablet (10 mg total) by mouth once daily. (Patient taking differently: Take 10 mg by mouth daily as needed.) 90 tablet 0 1/9/2024    norethindrone-ethinyl  estradiol (MICROGESTIN /20) 1-20 mg-mcg per tablet Take 1 tablet by mouth once daily. 63 tablet 4 1/10/2024    pantoprazole (PROTONIX) 40 MG tablet Take 1 tablet (40 mg total) by mouth once daily. (Patient taking differently: Take 40 mg by mouth every morning.) 30 tablet 3 2024    ALPRAZolam (XANAX) 0.5 MG tablet TAKE 1 TABLET BY MOUTH EVERY DAY AS NEEDED FOR ANXIETY 30 tablet 0     cyanocobalamin (VITAMIN B-12) 1000 MCG tablet Take 1 tablet (1,000 mcg total) by mouth once daily. 30 tablet 12     cyclobenzaprine (FLEXERIL) 10 MG tablet SMARTSI Tablet(s) By Mouth Every Evening PRN       fluticasone propionate (FLONASE) 50 mcg/actuation nasal spray 2 sprays (100 mcg total) by Each Nostril route once daily. (Patient taking differently: 2 sprays by Each Nostril route daily as needed.) 18.2 mL 6     methylPREDNISolone (MEDROL DOSEPACK) 4 mg tablet use as directed 1 each 0     multivitamin capsule Take by mouth. 1 capsule Oral Every morning       ondansetron (ZOFRAN-ODT) 4 MG TbDL Take 1 tablet (4 mg total) by mouth 2 (two) times daily. 2 tablet 0     ondansetron (ZOFRAN-ODT) 4 MG TbDL Take 1 tablet (4 mg total) by mouth every 12 (twelve) hours as needed (nausea). 20 tablet 0     tiZANidine (ZANAFLEX) 4 MG tablet Take 1 tablet (4 mg total) by mouth every 6 (six) hours as needed (pain/spasm). 30 tablet 1     triamcinolone acetonide 0.1% (KENALOG) 0.1 % cream Apply topically 2 (two) times daily as needed (scaling at external ears). Avoid use on face, body folds, groin/genitalia. 45 g 3          Objective Findings:    Vital Signs: see nursing notes    Physical Exam:  General Appearance: Well appearing in no acute distress  Eyes:    No scleral icterus  ENT: Neck supple  Lungs: CTA anteriorly  Heart:  S1, S2 normal, no murmurs heard  Abdomen: Soft, non tender, non distended with positive bowel sounds. No hepatosplenomegaly, ascites, or mass  Extremities: no edema  Skin: No rash      Labs:  Lab Results   Component Value  Date    WBC 5.11 01/04/2024    HGB 13.3 01/04/2024    HCT 39.8 01/04/2024     01/04/2024    CHOL 195 09/18/2023    TRIG 140 09/18/2023    HDL 58 09/18/2023    ALT 15 01/04/2024    AST 15 01/04/2024     01/04/2024    K 4.2 01/04/2024     01/04/2024    CREATININE 0.8 01/04/2024    BUN 11 01/04/2024    CO2 26 01/04/2024    TSH 1.164 12/29/2023    INR 1.0 01/04/2024    HGBA1C 5.1 09/18/2023         Assessment:   Melly Hwang is a 43 y.o. female presenting today for an EGD for evaluation of reflux and upper abdominal pain.       Plan:   -Proceed with scheduled procedure today. I have explained the risks and benefits of endoscopy procedures to the patient including but not limited to bleeding, perforation, infection, and death.  -Evaluation and consent for anesthesia portion of this procedure completed by anesthesia team.         Eric Chakraborty MD, PGY-VI  Gastroenterology Fellow  [facility]

## 2024-01-10 NOTE — ANESTHESIA PREPROCEDURE EVALUATION
01/10/2024  Melly Hwang is a 43 y.o., female.    Pre-operative evaluation for * EGD    Melly Hwang is a 43 y.o. 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 pylori infection: tx 2014 stool negative    Liver hemangioma    Pericardial effusion    VENKATA positive    Elevated bilirubin    S/P pericardial window creation    Hepatocellular carcinoma    Hypophosphatemia    Elevated CA 19-9 level    Pleural effusion    Decreased ROM of intervertebral discs of cervical spine    Lung nodule 9/22; enlarged 7/23    Neutropenia, unspecified type    Low serum vitamin B12    Iron excess    Carpal tunnel syndrome of right wrist: mild, see EMG 10/23    Anxiety about health       Review of patient's allergies indicates:   Allergen Reactions    No known drug allergies        No current facility-administered medications on file prior to encounter.     Current Outpatient Medications on File Prior to Encounter   Medication Sig Dispense Refill    cetirizine (ZYRTEC) 10 MG tablet Take 1 tablet (10 mg total) by mouth once daily. (Patient taking differently: Take 10 mg by mouth daily as needed.) 90 tablet 0    norethindrone-ethinyl estradiol (MICROGESTIN 1/20) 1-20 mg-mcg per tablet Take 1 tablet by mouth once daily. 63 tablet 4    pantoprazole (PROTONIX) 40 MG tablet Take 1 tablet (40 mg total) by mouth once daily. (Patient taking differently: Take 40 mg by mouth every morning.) 30 tablet 3    ALPRAZolam (XANAX) 0.5 MG tablet TAKE 1 TABLET BY MOUTH EVERY DAY AS NEEDED FOR ANXIETY 30 tablet 0    cyanocobalamin (VITAMIN B-12) 1000 MCG tablet Take 1 tablet (1,000 mcg total) by  mouth once daily. 30 tablet 12    cyclobenzaprine (FLEXERIL) 10 MG tablet SMARTSI Tablet(s) By Mouth Every Evening PRN      fluticasone propionate (FLONASE) 50 mcg/actuation nasal spray 2 sprays (100 mcg total) by Each Nostril route once daily. (Patient taking differently: 2 sprays by Each Nostril route daily as needed.) 18.2 mL 6    methylPREDNISolone (MEDROL DOSEPACK) 4 mg tablet use as directed 1 each 0    multivitamin capsule Take by mouth. 1 capsule Oral Every morning      ondansetron (ZOFRAN-ODT) 4 MG TbDL Take 1 tablet (4 mg total) by mouth 2 (two) times daily. 2 tablet 0    ondansetron (ZOFRAN-ODT) 4 MG TbDL Take 1 tablet (4 mg total) by mouth every 12 (twelve) hours as needed (nausea). 20 tablet 0    tiZANidine (ZANAFLEX) 4 MG tablet Take 1 tablet (4 mg total) by mouth every 6 (six) hours as needed (pain/spasm). 30 tablet 1    triamcinolone acetonide 0.1% (KENALOG) 0.1 % cream Apply topically 2 (two) times daily as needed (scaling at external ears). Avoid use on face, body folds, groin/genitalia. 45 g 3       Past Surgical History:   Procedure Laterality Date    COLONOSCOPY N/A 2020    Procedure: COLONOSCOPY;  Surgeon: GIOVANNI Crane MD;  Location: [facility] ([address] [place] [address]);  Service: Endoscopy;  Laterality: N/A;  + covid     HERNIA REPAIR      LIVER SURGERY N/A 2021    NASAL SEPTUM SURGERY      PERICARDIAL WINDOW N/A 2021    Procedure: CREATION, PERICARDIAL WINDOW;  Surgeon: Ajay Cantor MD;  [facility];  Service: Cardiovascular;  Laterality: N/A;    PERICARDIOCENTESIS N/A 2020    Procedure: Pericardiocentesis;  Surgeon: Dickson Dangelo MD;  Location: [facility] CATH LAB;  Service: Cardiology;  Laterality: N/A;    TONSILLECTOMY         Social History     Socioeconomic History    Marital status: Single    Number of children: 1   Occupational History    Occupation:      Employer: [employer]   Tobacco Use    Smoking status: Never     "Smokeless tobacco: Never   Substance and Sexual Activity    Alcohol use: Yes     Comment: rare    Drug use: No    Sexual activity: Yes     Partners: Male     Birth control/protection: OCP   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)     Social Determinants of Health     Financial Resource Strain: Low Risk  (12/19/2023)    Overall Financial Resource Strain (CARDIA)     Difficulty of Paying Living Expenses: Not very hard   Food Insecurity: No Food Insecurity (12/19/2023)    Hunger Vital Sign     Worried About Running Out of Food in the Last Year: Never true     Ran Out of Food in the Last Year: Never true   Transportation Needs: No Transportation Needs (12/19/2023)    PRAPARE - Transportation     Lack of Transportation (Medical): No     Lack of Transportation (Non-Medical): No   Physical Activity: Insufficiently Active (12/19/2023)    Exercise Vital Sign     Days of Exercise per Week: 2 days     Minutes of Exercise per Session: 10 min   Stress: Stress Concern Present (12/19/2023)    [place] [employer] - Occupational Stress Questionnaire     Feeling of Stress : To some extent   Social Connections: Unknown (12/19/2023)    Social Connection and Isolation Panel [NHANES]     Frequency of Communication with Friends and Family: Three times a week     Frequency of Social Gatherings with Friends and Family: Once a week     Active Member of Clubs or Organizations: No     Attends Club or Organization Meetings: Never     Marital Status: Never    Housing Stability: Low Risk  (12/19/2023)    Housing Stability Vital Sign     Unable to Pay for Housing in the Last Year: No     Number of Places Lived in the Last Year: 0     Unstable Housing in the Last Year: No         CBC: No results for input(s): "WBC", "RBC", "HGB", "HCT", "PLT", "MCV", "MCH", "MCHC" in the last 72 hours.    CMP: No results for input(s): "NA", "K", "CL", "CO2", "BUN", "CREATININE", "GLU", "MG", "PHOS", "CALCIUM", " ""ALBUMIN", "PROT", "ALKPHOS", "ALT", "AST", "BILITOT" in the last 72 hours.    INR  No results for input(s): "PT", "INR", "PROTIME", "APTT" in the last 72 hours.        Diagnostic Studies:      EKD Echo:  No results found. However, due to the size of the patient record, not all encounters were searched. Please check Results Review for a complete set of results.        Pre-op Assessment    I have reviewed the Patient Summary Reports.     I have reviewed the Nursing Notes. I have reviewed the NPO Status.   I have reviewed the Medications.     Review of Systems  Anesthesia Hx:  No problems with previous Anesthesia   History of prior surgery of interest to airway management or planning:  Previous anesthesia: General           Social:  Non-Smoker, Social Alcohol Use       Hematology/Oncology:                   Hematology Comments: Leukopenia                Oncology Comments: Hepatocellular CA     Cardiovascular:  Exercise tolerance: poor                  Pericardial effusion/pericardial window                         Renal/:  Renal/ Normal                 Hepatic/GI:      Liver Disease,  Liver hemangioma       Liver Disease        Musculoskeletal:  Musculoskeletal Normal                Neurological:  Neurology Normal                                    Neuromuscular Disease   Endocrine:  Endocrine Normal            Psych:  Psychiatric Normal                    Physical Exam  General: Well nourished, Cooperative and Alert    Airway:  Mallampati: I   Mouth Opening: Normal  TM Distance: Normal  Tongue: Normal  Neck ROM: Normal ROM    Dental:  Intact    Chest/Lungs:  Normal Respiratory Rate    Heart:  Rate: Normal        Anesthesia Plan  Type of Anesthesia, risks & benefits discussed:    Anesthesia Type: Gen Natural Airway  Intra-op Monitoring Plan: Standard ASA Monitors  Post Op Pain Control Plan: multimodal analgesia and IV/PO Opioids PRN  Induction:  IV  Informed Consent: Informed consent signed with the " Patient and all parties understand the risks and agree with anesthesia plan.  All questions answered.   ASA Score: 3  Day of Surgery Review of History & Physical: H&P Update referred to the surgeon/provider.    Ready For Surgery From Anesthesia Perspective.     .

## 2024-01-10 NOTE — PROVATION PATIENT INSTRUCTIONS
Discharge Summary/Instructions after an Endoscopic Procedure  Patient Name: Melly Hwang  Patient MRN: [id number]  Patient YOB: 1980  Wednesday, January 10, 2024  Reynaldo Lynch MD  Dear patient,  As a result of recent federal legislation (The Federal Cures Act), you may   receive lab or pathology results from your procedure in your [facility]   account before your physician is able to contact you. Your physician or   their representative will relay the results to you with their   recommendations at their soonest availability.  Thank you,  RESTRICTIONS:  During your procedure today, you received medications for sedation.  These   medications may affect your judgment, balance and coordination.  Therefore,   for 24 hours, you have the following restrictions:   - DO NOT drive a car, operate machinery, make legal/financial decisions,   sign important papers or drink alcohol.    ACTIVITY:  Today: no heavy lifting, straining or running due to procedural   sedation/anesthesia.  The following day: return to full activity including work.  DIET:  Eat and drink normally unless instructed otherwise.     TREATMENT FOR COMMON SIDE EFFECTS:  - Mild abdominal pain, nausea, belching, bloating or excessive gas:  rest,   eat lightly and use a heating pad.  - Sore Throat: treat with throat lozenges and/or gargle with warm salt   water.  - Because air was used during the procedure, expelling large amounts of air   from your rectum or belching is normal.  - If a bowel prep was taken, you may not have a bowel movement for 1-3 days.    This is normal.  SYMPTOMS TO WATCH FOR AND REPORT TO YOUR PHYSICIAN:  1. Abdominal pain or bloating, other than gas cramps.  2. Chest pain.  3. Back pain.  4. Signs of infection such as: chills or fever occurring within 24 hours   after the procedure.  5. Rectal bleeding, which would show as bright red, maroon, or black stools.   (A tablespoon of blood from the rectum is not serious, especially  if   hemorrhoids are present.)  6. Vomiting.  7. Weakness or dizziness.  GO DIRECTLY TO THE NEAREST EMERGENCY ROOM IF YOU HAVE ANY OF THE FOLLOWING:      Difficulty breathing              Chills and/or fever over 101 F   Persistent vomiting and/or vomiting blood   Severe abdominal pain   Severe chest pain   Black, tarry stools   Bleeding- more than one tablespoon   Any other symptom or condition that you feel may need urgent attention  Your doctor recommends these additional instructions:  If any biopsies were taken, your doctors clinic will contact you in 1 to 2   weeks with any results.  - Discharge patient to home (with escort).   - Resume previous diet today.   - Await pathology results.   - Continue present medications.   - Patient has a contact number available for emergencies.  The signs and   symptoms of potential delayed complications were discussed with the   patient.  Return to normal activities tomorrow.  Written discharge   instructions were provided to the patient.  For questions, problems or results please call your physician - Reynaldo Lynch MD at Work:  [phone].  [facility] [place], EMERGENCY ROOM PHONE NUMBER: [phone]  IF A COMPLICATION OR EMERGENCY SITUATION ARISES AND YOU ARE UNABLE TO REACH   YOUR PHYSICIAN - GO DIRECTLY TO THE EMERGENCY ROOM.  Reynaldo Lynch MD  1/10/2024 9:50:36 AM  This report has been verified and signed electronically.  Dear patient,  As a result of recent federal legislation (The Federal Cures Act), you may   receive lab or pathology results from your procedure in your [facility]   account before your physician is able to contact you. Your physician or   their representative will relay the results to you with their   recommendations at their soonest availability.  Thank you,  [employer]OVATION

## 2024-01-10 NOTE — ANESTHESIA POSTPROCEDURE EVALUATION
Anesthesia Post Evaluation    Patient: Melly Hwang    Procedure(s) Performed: Procedure(s) (LRB):  ESOPHAGOGASTRODUODENOSCOPY (EGD) (N/A)    Final Anesthesia Type: general      Patient location during evaluation: PACU  Patient participation: Yes- Able to Participate  Level of consciousness: awake and alert  Post-procedure vital signs: reviewed and stable  Pain management: adequate  Airway patency: patent  KEVIN mitigation strategies: Multimodal analgesia, Preoperative use of mandibular advancement devices or oral appliances and Intraoperative administration of CPAP, nasopharyngeal airway, or oral appliance during sedation  PONV status at discharge: No PONV  Anesthetic complications: no      Cardiovascular status: blood pressure returned to baseline, hemodynamically stable and stable  Respiratory status: unassisted and spontaneous ventilation  Hydration status: euvolemic  Follow-up not needed.              Vitals Value Taken Time   /79 01/10/24 1017   Temp 36.8 °C (98.2 °F) 01/10/24 0951   Pulse 65 01/10/24 1016   Resp 29 01/10/24 1015   SpO2 98 % 01/10/24 1016   Vitals shown include unvalidated device data.      No case tracking events are documented in the log.      Pain/Lizbet Score: Lizbet Score: 10 (1/10/2024 10:15 AM)           08-Jan-2024 18:11

## 2024-01-10 NOTE — TRANSFER OF CARE
"Anesthesia Transfer of Care Note    Patient: Melly Hwang    Procedure(s) Performed: Procedure(s) (LRB):  ESOPHAGOGASTRODUODENOSCOPY (EGD) (N/A)    Patient location: PACU    Anesthesia Type: general    Transport from OR: Transported from OR on room air with adequate spontaneous ventilation    Post pain: adequate analgesia    Post assessment: no apparent anesthetic complications and tolerated procedure well    Post vital signs: stable    Level of consciousness: awake, alert and oriented    Nausea/Vomiting: no nausea/vomiting    Complications: none    Transfer of care protocol was followed      Last vitals: Visit Vitals  /51 (Patient Position: Lying)   Pulse 60   Temp 36.7 °C (98 °F) (Temporal)   Resp 16   Ht 5' 11" (1.803 m)   Wt 68.9 kg (152 lb)   LMP  (LMP Unknown)   SpO2 100%   Breastfeeding No   BMI 21.20 kg/m²     "

## 2024-01-15 DIAGNOSIS — E03.2 HYPOTHYROIDISM DUE TO MEDICATION: ICD-10-CM

## 2024-01-15 DIAGNOSIS — C22.0 HEPATOCELLULAR CARCINOMA: Primary | ICD-10-CM

## 2024-01-22 DIAGNOSIS — E03.2 HYPOTHYROIDISM DUE TO MEDICATION: Primary | ICD-10-CM

## 2024-01-25 RX ORDER — SODIUM CHLORIDE 9 MG/ML
INJECTION, SOLUTION INTRAVENOUS CONTINUOUS
Status: CANCELLED | OUTPATIENT
Start: 2024-01-25

## 2024-01-25 RX ORDER — LIDOCAINE HYDROCHLORIDE 10 MG/ML
1 INJECTION, SOLUTION EPIDURAL; INFILTRATION; INTRACAUDAL; PERINEURAL ONCE AS NEEDED
Status: CANCELLED | OUTPATIENT
Start: 2024-01-25 | End: 2035-06-23

## 2024-01-26 ENCOUNTER — LAB VISIT (OUTPATIENT)
Dept: LAB | Facility: HOSPITAL | Age: 44
End: 2024-01-26
Attending: INTERNAL MEDICINE
Payer: COMMERCIAL

## 2024-01-26 ENCOUNTER — INFUSION (OUTPATIENT)
Dept: INFUSION THERAPY | Facility: HOSPITAL | Age: 44
End: 2024-01-26
Payer: COMMERCIAL

## 2024-01-26 ENCOUNTER — OFFICE VISIT (OUTPATIENT)
Dept: HEMATOLOGY/ONCOLOGY | Facility: CLINIC | Age: 44
End: 2024-01-26
Payer: COMMERCIAL

## 2024-01-26 VITALS
TEMPERATURE: 99 F | RESPIRATION RATE: 18 BRPM | WEIGHT: 153 LBS | OXYGEN SATURATION: 100 % | BODY MASS INDEX: 21.42 KG/M2 | HEART RATE: 75 BPM | HEIGHT: 71 IN | DIASTOLIC BLOOD PRESSURE: 82 MMHG | SYSTOLIC BLOOD PRESSURE: 132 MMHG

## 2024-01-26 VITALS
HEIGHT: 71 IN | WEIGHT: 153 LBS | DIASTOLIC BLOOD PRESSURE: 88 MMHG | RESPIRATION RATE: 14 BRPM | OXYGEN SATURATION: 100 % | SYSTOLIC BLOOD PRESSURE: 150 MMHG | BODY MASS INDEX: 21.42 KG/M2 | HEART RATE: 67 BPM | TEMPERATURE: 99 F

## 2024-01-26 DIAGNOSIS — C77.9 REGIONAL LYMPH NODE METASTASIS PRESENT: ICD-10-CM

## 2024-01-26 DIAGNOSIS — D70.9 NEUTROPENIA, UNSPECIFIED TYPE: ICD-10-CM

## 2024-01-26 DIAGNOSIS — C22.0 HEPATOCELLULAR CARCINOMA: Primary | ICD-10-CM

## 2024-01-26 DIAGNOSIS — M89.9 BONE LESION: ICD-10-CM

## 2024-01-26 DIAGNOSIS — C22.0 HCC (HEPATOCELLULAR CARCINOMA): ICD-10-CM

## 2024-01-26 DIAGNOSIS — C78.01 MALIGNANT NEOPLASM METASTATIC TO RIGHT LUNG: Primary | ICD-10-CM

## 2024-01-26 DIAGNOSIS — M54.12 CERVICAL RADICULOPATHY: ICD-10-CM

## 2024-01-26 DIAGNOSIS — M25.511 ACUTE PAIN OF RIGHT SHOULDER: ICD-10-CM

## 2024-01-26 DIAGNOSIS — C22.0 HEPATOCELLULAR CARCINOMA: ICD-10-CM

## 2024-01-26 DIAGNOSIS — D18.03 LIVER HEMANGIOMA: ICD-10-CM

## 2024-01-26 DIAGNOSIS — E03.2 HYPOTHYROIDISM DUE TO MEDICATION: ICD-10-CM

## 2024-01-26 DIAGNOSIS — K21.9 GASTROESOPHAGEAL REFLUX DISEASE, UNSPECIFIED WHETHER ESOPHAGITIS PRESENT: ICD-10-CM

## 2024-01-26 LAB
AFP SERPL-MCNC: 9.8 NG/ML (ref 0–8.4)
ALBUMIN SERPL BCP-MCNC: 3.6 G/DL (ref 3.5–5.2)
ALP SERPL-CCNC: 73 U/L (ref 55–135)
ALT SERPL W/O P-5'-P-CCNC: 14 U/L (ref 10–44)
ANION GAP SERPL CALC-SCNC: 7 MMOL/L (ref 8–16)
AST SERPL-CCNC: 15 U/L (ref 10–40)
BILIRUB SERPL-MCNC: 0.7 MG/DL (ref 0.1–1)
BUN SERPL-MCNC: 11 MG/DL (ref 6–20)
CALCIUM SERPL-MCNC: 9.3 MG/DL (ref 8.7–10.5)
CHLORIDE SERPL-SCNC: 106 MMOL/L (ref 95–110)
CO2 SERPL-SCNC: 26 MMOL/L (ref 23–29)
CREAT SERPL-MCNC: 0.9 MG/DL (ref 0.5–1.4)
ERYTHROCYTE [DISTWIDTH] IN BLOOD BY AUTOMATED COUNT: 12 % (ref 11.5–14.5)
EST. GFR  (NO RACE VARIABLE): >60 ML/MIN/1.73 M^2
GLUCOSE SERPL-MCNC: 90 MG/DL (ref 70–110)
HCT VFR BLD AUTO: 39.5 % (ref 37–48.5)
HGB BLD-MCNC: 13.2 G/DL (ref 12–16)
IMM GRANULOCYTES # BLD AUTO: 0.01 K/UL (ref 0–0.04)
MCH RBC QN AUTO: 31.4 PG (ref 27–31)
MCHC RBC AUTO-ENTMCNC: 33.4 G/DL (ref 32–36)
MCV RBC AUTO: 94 FL (ref 82–98)
NEUTROPHILS # BLD AUTO: 2.6 K/UL (ref 1.8–7.7)
PLATELET # BLD AUTO: 240 K/UL (ref 150–450)
PMV BLD AUTO: 9.6 FL (ref 9.2–12.9)
POTASSIUM SERPL-SCNC: 4.3 MMOL/L (ref 3.5–5.1)
PROT SERPL-MCNC: 7.4 G/DL (ref 6–8.4)
RBC # BLD AUTO: 4.21 M/UL (ref 4–5.4)
SODIUM SERPL-SCNC: 139 MMOL/L (ref 136–145)
T4 FREE SERPL-MCNC: 0.86 NG/DL (ref 0.71–1.51)
TSH SERPL DL<=0.005 MIU/L-ACNC: 1.94 UIU/ML (ref 0.4–4)
WBC # BLD AUTO: 3.92 K/UL (ref 3.9–12.7)

## 2024-01-26 PROCEDURE — 36415 COLL VENOUS BLD VENIPUNCTURE: CPT | Performed by: INTERNAL MEDICINE

## 2024-01-26 PROCEDURE — 96361 HYDRATE IV INFUSION ADD-ON: CPT

## 2024-01-26 PROCEDURE — 3077F SYST BP >= 140 MM HG: CPT | Mod: CPTII,S$GLB,, | Performed by: INTERNAL MEDICINE

## 2024-01-26 PROCEDURE — 82105 ALPHA-FETOPROTEIN SERUM: CPT | Performed by: INTERNAL MEDICINE

## 2024-01-26 PROCEDURE — 80053 COMPREHEN METABOLIC PANEL: CPT | Performed by: INTERNAL MEDICINE

## 2024-01-26 PROCEDURE — 99999 PR PBB SHADOW E&M-EST. PATIENT-LVL IV: CPT | Mod: PBBFAC,,, | Performed by: INTERNAL MEDICINE

## 2024-01-26 PROCEDURE — 96413 CHEMO IV INFUSION 1 HR: CPT

## 2024-01-26 PROCEDURE — 85027 COMPLETE CBC AUTOMATED: CPT | Performed by: INTERNAL MEDICINE

## 2024-01-26 PROCEDURE — 25000003 PHARM REV CODE 250: Performed by: INTERNAL MEDICINE

## 2024-01-26 PROCEDURE — 3079F DIAST BP 80-89 MM HG: CPT | Mod: CPTII,S$GLB,, | Performed by: INTERNAL MEDICINE

## 2024-01-26 PROCEDURE — 63600175 PHARM REV CODE 636 W HCPCS: Mod: JZ,JG | Performed by: INTERNAL MEDICINE

## 2024-01-26 PROCEDURE — 84439 ASSAY OF FREE THYROXINE: CPT | Performed by: INTERNAL MEDICINE

## 2024-01-26 PROCEDURE — 99215 OFFICE O/P EST HI 40 MIN: CPT | Mod: S$GLB,,, | Performed by: INTERNAL MEDICINE

## 2024-01-26 PROCEDURE — 3008F BODY MASS INDEX DOCD: CPT | Mod: CPTII,S$GLB,, | Performed by: INTERNAL MEDICINE

## 2024-01-26 PROCEDURE — 84443 ASSAY THYROID STIM HORMONE: CPT | Performed by: INTERNAL MEDICINE

## 2024-01-26 RX ORDER — PROCHLORPERAZINE EDISYLATE 5 MG/ML
5 INJECTION INTRAMUSCULAR; INTRAVENOUS ONCE AS NEEDED
Status: DISCONTINUED | OUTPATIENT
Start: 2024-01-26 | End: 2024-01-26 | Stop reason: HOSPADM

## 2024-01-26 RX ORDER — EPINEPHRINE 0.3 MG/.3ML
0.3 INJECTION SUBCUTANEOUS ONCE AS NEEDED
Status: DISCONTINUED | OUTPATIENT
Start: 2024-01-26 | End: 2024-01-26 | Stop reason: HOSPADM

## 2024-01-26 RX ORDER — DIPHENHYDRAMINE HYDROCHLORIDE 50 MG/ML
50 INJECTION INTRAMUSCULAR; INTRAVENOUS ONCE AS NEEDED
Status: DISCONTINUED | OUTPATIENT
Start: 2024-01-26 | End: 2024-01-26 | Stop reason: HOSPADM

## 2024-01-26 RX ORDER — SODIUM CHLORIDE, SODIUM LACTATE, POTASSIUM CHLORIDE, CALCIUM CHLORIDE 600; 310; 30; 20 MG/100ML; MG/100ML; MG/100ML; MG/100ML
INJECTION, SOLUTION INTRAVENOUS CONTINUOUS
Status: CANCELLED
Start: 2024-01-26

## 2024-01-26 RX ORDER — PROCHLORPERAZINE EDISYLATE 5 MG/ML
5 INJECTION INTRAMUSCULAR; INTRAVENOUS ONCE AS NEEDED
Status: CANCELLED
Start: 2024-01-26

## 2024-01-26 RX ORDER — SODIUM CHLORIDE, SODIUM LACTATE, POTASSIUM CHLORIDE, CALCIUM CHLORIDE 600; 310; 30; 20 MG/100ML; MG/100ML; MG/100ML; MG/100ML
INJECTION, SOLUTION INTRAVENOUS CONTINUOUS
Status: DISCONTINUED | OUTPATIENT
Start: 2024-01-26 | End: 2024-01-26 | Stop reason: HOSPADM

## 2024-01-26 RX ORDER — HEPARIN 100 UNIT/ML
500 SYRINGE INTRAVENOUS
Status: DISCONTINUED | OUTPATIENT
Start: 2024-01-26 | End: 2024-01-26 | Stop reason: HOSPADM

## 2024-01-26 RX ORDER — SODIUM CHLORIDE 0.9 % (FLUSH) 0.9 %
10 SYRINGE (ML) INJECTION
Status: DISCONTINUED | OUTPATIENT
Start: 2024-01-26 | End: 2024-01-26 | Stop reason: HOSPADM

## 2024-01-26 RX ORDER — EPINEPHRINE 0.3 MG/.3ML
0.3 INJECTION SUBCUTANEOUS ONCE AS NEEDED
Status: CANCELLED | OUTPATIENT
Start: 2024-01-26

## 2024-01-26 RX ORDER — SODIUM CHLORIDE 0.9 % (FLUSH) 0.9 %
10 SYRINGE (ML) INJECTION
Status: CANCELLED | OUTPATIENT
Start: 2024-01-26

## 2024-01-26 RX ORDER — DIPHENHYDRAMINE HYDROCHLORIDE 50 MG/ML
50 INJECTION INTRAMUSCULAR; INTRAVENOUS ONCE AS NEEDED
Status: CANCELLED | OUTPATIENT
Start: 2024-01-26

## 2024-01-26 RX ORDER — HEPARIN 100 UNIT/ML
500 SYRINGE INTRAVENOUS
Status: CANCELLED | OUTPATIENT
Start: 2024-01-26

## 2024-01-26 RX ADMIN — SODIUM CHLORIDE, SODIUM LACTATE, POTASSIUM CHLORIDE, AND CALCIUM CHLORIDE: 600; 310; 30; 20 INJECTION, SOLUTION INTRAVENOUS at 08:01

## 2024-01-26 RX ADMIN — SODIUM CHLORIDE 1500 MG: 9 INJECTION, SOLUTION INTRAVENOUS at 09:01

## 2024-01-26 NOTE — PLAN OF CARE
Pt received Imfinzi & 1L LR today and tolerated well, without complications. VSS. Educated patient about  Imfinzi & 1L (indications, side effects, possible reactions, chemotherapy precautions) and verbalized understanding. PIV positive for blood return, saline locked and removed prior to DC, catheter tip intact. Pt DC with no distress noted, ambulated off of unit, via self, w/o event, pleased.

## 2024-01-26 NOTE — PROGRESS NOTES
MEDICAL ONCOLOGY - ESTABLISHED PATIENT VISIT    Reason for visit: Scirrhous HCC    Best Contact Phone Number(s): [phone] (home)      Cancer/Stage/TNM:    Cancer Staging   Hepatocellular carcinoma  Staging form: Liver, AJCC 8th Edition  - Clinical stage from 4/10/2023: Stage IVB (rcT1b, cN0, pM1) - Signed by Philippe Carrasco MD on 8/7/2023       Oncology History   Hepatocellular carcinoma   6/9/2021 Initial Diagnosis    Hepatocellular carcinoma     4/10/2023 Cancer Staged    Staging form: Liver, AJCC 8th Edition  - Clinical stage from 4/10/2023: Stage IVB (rcT1b, cN0, pM1)     7/11/2023 -  Chemotherapy    Treatment Summary   Plan Name: OP TREMELIMUMAB 300 MG (X 1) + DURVALUMAB 1500 MG Q4W  Treatment Goal: Control  Status: Active  Start Date: 7/11/2023  End Date: 6/10/2024 (Planned)  Provider: Philippe Crarasco MD  Chemotherapy: [No matching medication found in this treatment plan]     9/11/2023 Genetic Testing    Genetic counseling done. Hereditary syndrome unlikely. Patient offered testing, but declined due to cost.      10/23/2023 - 11/3/2023 Radiation Therapy    Treating physician: yamil villa    Course: C1 Chst/Abd 2023  Treatment Site Ref. ID Energy Dose/Fx (Gy) #Fx Dose Correction (Gy) Total Dose (Gy) Start Date End Date Elapsed Days   SB Lung_R PTV_Lung 6X 10 5 / 5 0 50 10/23/2023 11/1/2023 9   SB Abdomen PTV_PortalLN 6X 10 5 / 5 0 50 10/23/2023 11/3/2023 11         Interim History:   43 y.o. 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 Howell with the same pathology, negative margins, 0 of 8 lymph nodes positive and + for vascular and perineural invasion. We saw her in 2021 for evaluation for persistent CA 19-9 but there was no radiographic evidence of HCC disease recurrence or alternative reason for CA 19-9 elevation.  CT chest on 3/9/23 showed an enlarging RLL nodule measuring 1.1 cm, increased from 0.8 cm in size.  IR biopsy of this on  4/10/23 confirmed metastatic HCC.  Since then in mid-June she has also had an MRI abdomen that showed an enlarging portocaval lymph node that is consistent with metastatic disease.  She completed SBRT at the beginning of November to her lung metastasis and to her portocaval lymph node.    She presents today for cycle 8 of durvalumab + tremelimumab as part of the STRiDE regimen.  She is scheduled for Y-90 on 2/1/24.  She feels well.  No abdominal pain as long as she is on pantoprazole.  EGD with biopsy showed mild chronic inactive gastritis.      Presents alone today.  ECOG PS 0.     ROS:   Review of Systems   Constitutional:  Negative for chills, fever, malaise/fatigue and weight loss.   HENT:  Negative for sore throat.    Eyes:  Negative for blurred vision and pain.   Respiratory:  Negative for cough and shortness of breath.    Cardiovascular:  Negative for chest pain and leg swelling.   Gastrointestinal:  Positive for constipation. Negative for abdominal pain, diarrhea, nausea and vomiting.   Genitourinary:  Negative for dysuria and frequency.   Musculoskeletal:  Negative for back pain, falls and myalgias.   Skin:  Negative for rash.   Neurological:  Negative for dizziness, weakness and headaches.   Endo/Heme/Allergies:  Does not bruise/bleed easily.   Psychiatric/Behavioral:  Negative for depression. The patient is not nervous/anxious.    All other systems reviewed and are negative.      Past Medical History:   Past Medical History:   Diagnosis Date    VENKATA positive 08/20/2020    COVID-19     Deviated septum 2011    Hepatocellular carcinoma 05/07/2021    Hypertrophy of inferior nasal turbinate     Pericardial effusion     Premature menopause         Past Surgical History:   Past Surgical History:   Procedure Laterality Date    COLONOSCOPY N/A 09/21/2020    Procedure: COLONOSCOPY;  Surgeon: GIOVANNI Crane MD;  Location: [facility] ([address] [place] [address]);  Service: Endoscopy;  Laterality: N/A;  + covid 4/22     ESOPHAGOGASTRODUODENOSCOPY N/A 1/10/2024    Procedure: ESOPHAGOGASTRODUODENOSCOPY (EGD);  Surgeon: Reynaldo Lynch MD;  Location: [facility] ([facility] F[facility]);  Service: Endoscopy;  Laterality: N/A;  referred by daryl carrasco rocedure: EGD Diagnosis: Abnormal finding on GI tract imaging, Abdominal pain, and GERD  Procedure Timin-4 weeks Provider: Any GI provider Location: [facility], [facility], and [facility]  Additional Scheduling Informat    HERNIA REPAIR      LIVER SURGERY N/A 2021    NASAL SEPTUM SURGERY      PERICARDIAL WINDOW N/A 2021    Procedure: CREATION, PERICARDIAL WINDOW;  Surgeon: Ajay Cantor MD;  [facility];  Service: Cardiovascular;  Laterality: N/A;    PERICARDIOCENTESIS N/A 2020    Procedure: Pericardiocentesis;  Surgeon: Dickson Dangelo MD;  Location: [facility] CATH LAB;  Service: Cardiology;  Laterality: N/A;    TONSILLECTOMY          Family History:   Family History   Problem Relation Age of Onset    Diabetes Mother     Liver disease Mother         fatty liver    Heart disease Father     Macular degeneration Father     Diabetes Father     Hypertension Father     Hyperlipidemia Father     Heart disease Sister     Lung cancer Maternal Aunt         heavy smoker    Cerebral aneurysm Paternal Aunt     Cataracts Neg Hx     Glaucoma Neg Hx     Retinal detachment Neg Hx     Stroke Neg Hx     Thyroid disease Neg Hx     Melanoma Neg Hx     Heart attack Neg Hx         Social History:   Social History     Tobacco Use    Smoking status: Never    Smokeless tobacco: Never   Substance Use Topics    Alcohol use: Yes     Comment: rare        I have reviewed and updated the patient's past medical, surgical, family and social histories.    Allergies:   Review of patient's allergies indicates:   Allergen Reactions    No known drug allergies         Medications:   Current Outpatient Medications   Medication Sig Dispense Refill    ALPRAZolam (XANAX) 0.5 MG tablet TAKE 1 TABLET  BY MOUTH EVERY DAY AS NEEDED FOR ANXIETY 30 tablet 0    cetirizine (ZYRTEC) 10 MG tablet Take 1 tablet (10 mg total) by mouth once daily. (Patient taking differently: Take 10 mg by mouth daily as needed.) 90 tablet 0    cyanocobalamin (VITAMIN B-12) 1000 MCG tablet Take 1 tablet (1,000 mcg total) by mouth once daily. 30 tablet 12    cyclobenzaprine (FLEXERIL) 10 MG tablet SMARTSI Tablet(s) By Mouth Every Evening PRN      fluticasone propionate (FLONASE) 50 mcg/actuation nasal spray 2 sprays (100 mcg total) by Each Nostril route once daily. (Patient taking differently: 2 sprays by Each Nostril route daily as needed.) 18.2 mL 6    multivitamin capsule Take by mouth. 1 capsule Oral Every morning      norethindrone-ethinyl estradiol (MICROGESTIN 1/20) 1-20 mg-mcg per tablet Take 1 tablet by mouth once daily. 63 tablet 4    ondansetron (ZOFRAN-ODT) 4 MG TbDL Take 1 tablet (4 mg total) by mouth 2 (two) times daily. 2 tablet 0    ondansetron (ZOFRAN-ODT) 4 MG TbDL Take 1 tablet (4 mg total) by mouth every 12 (twelve) hours as needed (nausea). 20 tablet 0    pantoprazole (PROTONIX) 40 MG tablet Take 1 tablet (40 mg total) by mouth once daily. (Patient taking differently: Take 40 mg by mouth every morning.) 30 tablet 3    tiZANidine (ZANAFLEX) 4 MG tablet Take 1 tablet (4 mg total) by mouth every 6 (six) hours as needed (pain/spasm). 30 tablet 1    triamcinolone acetonide 0.1% (KENALOG) 0.1 % cream Apply topically 2 (two) times daily as needed (scaling at external ears). Avoid use on face, body folds, groin/genitalia. 45 g 3     No current facility-administered medications for this visit.     Facility-Administered Medications Ordered in Other Visits   Medication Dose Route Frequency Provider Last Rate Last Admin    alteplase injection 2 mg  2 mg Intra-Catheter PRN Philippe Carrasco MD        diphenhydrAMINE injection 50 mg  50 mg Intravenous Once PRN Philippe Carrasco MD        durvalumab (IMFINZI) 1,500 mg in  "sodium chloride 0.9% SolP 315 mL chemo infusion  1,500 mg Intravenous 1 time in Clinic/HOD Philippe Carrasco MD        EPINEPHrine (EPIPEN) 0.3 mg/0.3 mL pen injection 0.3 mg  0.3 mg Intramuscular Once PRN Philippe Carrasco MD        heparin, porcine (PF) 100 unit/mL injection flush 500 Units  500 Units Intravenous PRN Philippe Carrasco MD        hydrocortisone sodium succinate injection 100 mg  100 mg Intravenous Once PRN Philippe Carrasco MD        lactated ringers infusion   Intravenous Continuous Philippe Carrasco MD        prochlorperazine injection Soln 5 mg  5 mg Intravenous Once PRN Philippe Carrasco MD        sodium chloride 0.9% 100 mL flush bag   Intravenous 1 time in Clinic/Philippe Hernández MD        sodium chloride 0.9% flush 10 mL  10 mL Intravenous PRN Philippe Carrasco MD            Physical Exam:   BP (!) 150/88 (BP Location: Left arm, Patient Position: Sitting, BP Method: Medium (Automatic))   Pulse 67   Temp 99 °F (37.2 °C) (Oral)   Resp 14   Ht 5' 11" (1.803 m)   Wt 69.4 kg (153 lb)   LMP  (LMP Unknown)   SpO2 100%   BMI 21.34 kg/m²      ECOG Performance status: 0            Physical Exam  Vitals reviewed.   Constitutional:       General: She is not in acute distress.     Appearance: Normal appearance. She is normal weight.   HENT:      Head: Normocephalic and atraumatic.      Right Ear: External ear normal.      Left Ear: External ear normal.      Nose: Nose normal.      Mouth/Throat:      Mouth: Mucous membranes are moist.      Pharynx: Oropharynx is clear. No posterior oropharyngeal erythema.   Eyes:      General: No scleral icterus.     Extraocular Movements: Extraocular movements intact.      Conjunctiva/sclera: Conjunctivae normal.      Pupils: Pupils are equal, round, and reactive to light.   Cardiovascular:      Rate and Rhythm: Normal rate and regular rhythm.      Pulses: Normal pulses.      Heart sounds: Normal heart sounds.   Pulmonary:      " Effort: Pulmonary effort is normal.      Breath sounds: Normal breath sounds. No wheezing or rales.   Abdominal:      General: Bowel sounds are normal. There is no distension.      Palpations: Abdomen is soft.      Tenderness: There is no abdominal tenderness.   Musculoskeletal:         General: No swelling. Normal range of motion.      Cervical back: Normal range of motion and neck supple.   Skin:     General: Skin is warm.      Coloration: Skin is not jaundiced.      Findings: No erythema or rash.   Neurological:      General: No focal deficit present.      Mental Status: She is alert and oriented to person, place, and time. Mental status is at baseline.      Gait: Gait normal.   Psychiatric:         Mood and Affect: Mood normal.         Behavior: Behavior normal.         Thought Content: Thought content normal.         Judgment: Judgment normal.           Labs:   Recent Results (from the past 48 hour(s))   CBC Oncology    Collection Time: 24  7:22 AM   Result Value Ref Range    WBC 3.92 3.90 - 12.70 K/uL    RBC 4.21 4.00 - 5.40 M/uL    Hemoglobin 13.2 12.0 - 16.0 g/dL    Hematocrit 39.5 37.0 - 48.5 %    MCV 94 82 - 98 fL    MCH 31.4 (H) 27.0 - 31.0 pg    MCHC 33.4 32.0 - 36.0 g/dL    RDW 12.0 11.5 - 14.5 %    Platelets 240 150 - 450 K/uL    MPV 9.6 9.2 - 12.9 fL    Gran # (ANC) 2.6 1.8 - 7.7 K/uL    Immature Grans (Abs) 0.01 0.00 - 0.04 K/uL     I have reviewed the pertinent labs.  Normal blood counts.    Imagin/29/23: CT chest    Impression:     1. Interval decrease in size of right lower lobe pulmonary dominant nodule.  2. Additional pulmonary nodules unchanged.  No new nodes.    Path:   21: partial hepatectomy:    Final Pathologic Diagnosis 1.  Gallbladder (cholecystectomy):   - Benign gallbladder with cholecystitis   2. Right lobe (partial hepatectomy):   - Positive for malignancy, see synoptic report below   - Separate hemangioma, 7.3 cm   3. Lymph nodes, portal (regional resection):    - 8 lymph nodes, negative for tumor (0/8)   ______________________________________________________________________________   ______   Hepatocellular carcinoma synoptic report   - Procedure:  Partial hepatectomy, right lobe   - Histologic type:  Hepatocellular carcinoma, scirrhous   - Histologic grade:  Moderately differentiated, grade 2   - Tumor focality:  Solitary   - Tumor characteristics:   - Tumor site:  Right lobe   - Tumor size:  3.3 cm   - Treatment effect:  No known pre-surgical therapy   - Satellitosis:  Not identified   - Tumor extent:  Confined to liver   - Vascular invasion:  Present, Small vessel   - Perineural invasion:  Present   - Margins:   - All margins are negative for invasive carcinoma   - Closest margin to invasive carcinoma:  Parenchymal   - Distance from invasive  carcinoma to closest margin:  5 mm   - Regional lymph nodes:   - Number of lymph nodes with tumor:  0   - Number of lymph nodes examined:  8   - Pathology: pT2 N0 MX   - Additional findings:   - No steatosis   - Trichrome stain:  Periportal fibrosis with early septa, stage 1-2   - Iron stain:  Negative   - Iron and trichrome stains with appropriate controls   Tumor blocks:  2A, 2B, 2 C    Comment: Interp By Madeline Carlos MD, Signed on 07/08/2021 at 16:47       Assessment:       1. Malignant neoplasm metastatic to right lung    2. Neutropenia, unspecified type    3. Hepatocellular carcinoma    4. HCC (hepatocellular carcinoma)    5. Regional lymph node metastasis present    6. Bone lesion    7. Liver hemangioma    8. Gastroesophageal reflux disease, unspecified whether esophagitis present    9. Hypothyroidism due to medication    10. Cervical radiculopathy            Plan:             # HCC  Scirrhous subtype, which is very uncommon (~ 1% of all HCC); prognosis is likely similar to typical HCC.  No clear underlying liver pathology in this patient.  Had margin negative resection with negative lymph node eval on 6/28/21 with  Dr. Howell.  Unfortunately she has biopsy proven recurrent metastatic disease to her RLL s/p IR biopsy 4/10/23.   She was discussed at thoracic tumor board with potential plan for surgical resection with Dr. Tadeo.  She had a concerning bone lesion on PET from 4/26 at T2.  This was biopsied and proven to be benign.  In the interim she had a repeat abdominal MRI on 6/13/23 which demonstrated growth of a portacaval lymph node that was very concerning for metastatic disease when we reviewed her imaging at liver conference.  Meanwhile her RLL met has grown slightly on 6/21/23 CT as well.  We discussed her case with Valerie Dhillon and Shwetha and we were all in agreement with proceeding with systemic therapy rather than surgery or radiation given multifocal progression.    We discussed this with her and she is agreeable with starting systemic therapy.  Reviewed possibilities of atezo + magaly or durva + treme.  She wished to proceed with STRIDE regimen: durvalumab + tremelimumab.    Received cycle 1 on 7/11/23.  Repeat CT CAP after cycle 3 demonstrates mild growth in albert hepatis lymph node.  Stable disease elsewhere.    Discussed with Dr. Mendiola and she was deemed eligible for SBRT to her abdominal lymph node and growing lung nodule.  She completed SBRT to both 11/3/23.    Meanwhile she developed a new concerning area of possible recurrence near her liver resection site.  Discussed with Dr. Lala and Dr. Dhillon.  Dr. Dhillon was unable to visualize it during radiation simulation.  We will thus proceed with Y-90 to this area with IR. Underwent mapping on 1/4 and scheduled for treatment next week.    - doing well with immunotherapy. Lab work has been reviewed and adequate for treatment.   - Will continue with STRIDE regimen, meanwhile.  Patient agreeable.  - Proceed with cycle 8 today - durvalumab alone.  - Will give IVF per her request with infusion today.    Consider switching systemic therapy only if significant progression  given likely side effects of TKI.    Saw Dr. Sabillon of cardiology who recommended troponin and ECG monitoring given her cardiac history.  She is asymptomatic at present.    Will plan to bring her back in 4 weeks for cycle 9. Will repeat CT imaging and CA 19-9 when she is due for her MRI abdomen post-Y90.    # Neutropenia, cervical radiculopathy  Resolved.  Has experienced in past.  Likely benign etiology.  Continue to f/u with Orthopedics    # Liver hemangiomas, GERD  Continue monitoring as indicated with Dr. Rogers.  Continue to f/u with GI team for GERD symptoms. Continue with medication management with Protonix    Follow up: 4 weeks.    The above information has been reviewed with the patient and all questions have been answered to their apparent satisfaction.  They understand that they can call the clinic with any questions.    Philippe Carrasco MD  Hematology/Oncology  [facility]    Route Chart for Scheduling    Med Onc Chart Routing      Follow up with physician . As scheduled; please schedule out April infusion in AM with labs prior; durvalumab after   Follow up with MELA    Infusion scheduling note    Injection scheduling note    Labs CBC, CMP and CA 19-9   Scheduling:  Preferred lab:  Lab interval:  & AfP   Imaging CT chest abdomen pelvis   CT chest in March   Pharmacy appointment    Other referrals                  Treatment Plan Information   OP TREMELIMUMAB 300 MG (X 1) + DURVALUMAB 1500 MG Q4W   Philippe Carrasco MD   Upcoming Treatment Dates - OP TREMELIMUMAB 300 MG (X 1) + DURVALUMAB 1500 MG Q4W    2/19/2024       Chemotherapy       durvalumab (IMFINZI) 1,500 mg in sodium chloride 0.9% SolP 280 mL chemo infusion       Antiemetics       prochlorperazine injection Soln 5 mg  3/18/2024       Chemotherapy       durvalumab (IMFINZI) 1,500 mg in sodium chloride 0.9% SolP 280 mL chemo infusion       Antiemetics       prochlorperazine injection Soln 5 mg  4/15/2024       Chemotherapy        durvalumab (IMFINZI) 1,500 mg in sodium chloride 0.9% SolP 280 mL chemo infusion       Antiemetics       prochlorperazine injection Soln 5 mg  5/13/2024       Chemotherapy       durvalumab (IMFINZI) 1,500 mg in sodium chloride 0.9% SolP 280 mL chemo infusion       Antiemetics       prochlorperazine injection Soln 5 mg

## 2024-01-28 ENCOUNTER — PATIENT MESSAGE (OUTPATIENT)
Dept: GASTROENTEROLOGY | Facility: CLINIC | Age: 44
End: 2024-01-28
Payer: COMMERCIAL

## 2024-01-30 ENCOUNTER — TELEPHONE (OUTPATIENT)
Dept: INTERVENTIONAL RADIOLOGY/VASCULAR | Facility: HOSPITAL | Age: 44
End: 2024-01-30
Payer: COMMERCIAL

## 2024-01-30 NOTE — NURSING
Pre-procedure call complete.  Pt instructed not to eat or drink anything after midnight the night before procedure.  Pt aware will need someone to provide transport home and monitor pt 8 hours post procedure.  No driving for 3 days after procedure.   Patient reports she does not take blood pressure, heart medications, seizure and anti-rejection medications.  Do not take sleep medication (including OTC) and anxiety medication the night before procedure.  Arrival time and location given.  Expected length of stay reviewed.  Covid screening completed.  Pt verbalized understanding of all pre-procedure instructions.

## 2024-02-01 ENCOUNTER — HOSPITAL ENCOUNTER (OUTPATIENT)
Dept: RADIOLOGY | Facility: HOSPITAL | Age: 44
Discharge: HOME | End: 2024-02-01
Attending: PHYSICIAN ASSISTANT
Payer: COMMERCIAL

## 2024-02-01 ENCOUNTER — HOSPITAL ENCOUNTER (OUTPATIENT)
Dept: INTERVENTIONAL RADIOLOGY/VASCULAR | Facility: HOSPITAL | Age: 44
Discharge: HOME | End: 2024-02-01
Attending: PHYSICIAN ASSISTANT
Payer: COMMERCIAL

## 2024-02-01 DIAGNOSIS — C22.0 HCC (HEPATOCELLULAR CARCINOMA): Primary | ICD-10-CM

## 2024-02-01 DIAGNOSIS — C22.0 HCC (HEPATOCELLULAR CARCINOMA): ICD-10-CM

## 2024-02-01 LAB
B-HCG UR QL: NEGATIVE
CTP QC/QA: YES

## 2024-02-01 PROCEDURE — 78201 LIVER IMAGING STATIC ONLY: CPT | Mod: 26,,, | Performed by: STUDENT IN AN ORGANIZED HEALTH CARE EDUCATION/TRAINING PROGRAM

## 2024-02-01 PROCEDURE — 77300 RADIATION THERAPY DOSE PLAN: CPT | Mod: 26,,, | Performed by: RADIOLOGY

## 2024-02-01 PROCEDURE — 63600175 PHARM REV CODE 636 W HCPCS: Performed by: RADIOLOGY

## 2024-02-01 PROCEDURE — 25000003 PHARM REV CODE 250: Performed by: STUDENT IN AN ORGANIZED HEALTH CARE EDUCATION/TRAINING PROGRAM

## 2024-02-01 PROCEDURE — 81025 URINE PREGNANCY TEST: CPT | Performed by: RADIOLOGY

## 2024-02-01 PROCEDURE — 78830 RP LOCLZJ TUM SPECT W/CT 1: CPT | Mod: TC

## 2024-02-01 PROCEDURE — 36248 INS CATH ABD/L-EXT ART ADDL: CPT | Mod: ,,, | Performed by: RADIOLOGY

## 2024-02-01 PROCEDURE — 79445 NUCLEAR RX INTRA-ARTERIAL: CPT | Mod: 26,,, | Performed by: RADIOLOGY

## 2024-02-01 PROCEDURE — 37243 VASC EMBOLIZE/OCCLUDE ORGAN: CPT | Performed by: RADIOLOGY

## 2024-02-01 PROCEDURE — 76380 CAT SCAN FOLLOW-UP STUDY: CPT | Mod: TC | Performed by: RADIOLOGY

## 2024-02-01 PROCEDURE — 79445 NUCLEAR RX INTRA-ARTERIAL: CPT | Mod: TC | Performed by: RADIOLOGY

## 2024-02-01 PROCEDURE — 78201 LIVER IMAGING STATIC ONLY: CPT | Mod: TC

## 2024-02-01 PROCEDURE — 25500020 PHARM REV CODE 255: Performed by: RADIOLOGY

## 2024-02-01 PROCEDURE — G0269 OCCLUSIVE DEVICE IN VEIN ART: HCPCS | Performed by: RADIOLOGY

## 2024-02-01 PROCEDURE — 75774 ARTERY X-RAY EACH VESSEL: CPT | Mod: 26,59,, | Performed by: RADIOLOGY

## 2024-02-01 PROCEDURE — 99152 MOD SED SAME PHYS/QHP 5/>YRS: CPT | Performed by: RADIOLOGY

## 2024-02-01 PROCEDURE — 76377 3D RENDER W/INTRP POSTPROCES: CPT | Mod: 26,,, | Performed by: RADIOLOGY

## 2024-02-01 PROCEDURE — 75774 ARTERY X-RAY EACH VESSEL: CPT | Mod: TC | Performed by: RADIOLOGY

## 2024-02-01 PROCEDURE — 36248 INS CATH ABD/L-EXT ART ADDL: CPT | Performed by: RADIOLOGY

## 2024-02-01 PROCEDURE — 36247 INS CATH ABD/L-EXT ART 3RD: CPT | Performed by: RADIOLOGY

## 2024-02-01 PROCEDURE — 76380 CAT SCAN FOLLOW-UP STUDY: CPT | Mod: 26,59,, | Performed by: RADIOLOGY

## 2024-02-01 PROCEDURE — 99153 MOD SED SAME PHYS/QHP EA: CPT | Performed by: RADIOLOGY

## 2024-02-01 PROCEDURE — 75726 ARTERY X-RAYS ABDOMEN: CPT | Mod: 26,59,, | Performed by: RADIOLOGY

## 2024-02-01 PROCEDURE — 75726 ARTERY X-RAYS ABDOMEN: CPT | Mod: TC | Performed by: RADIOLOGY

## 2024-02-01 PROCEDURE — 77300 RADIATION THERAPY DOSE PLAN: CPT | Mod: TC | Performed by: RADIOLOGY

## 2024-02-01 PROCEDURE — C1769 GUIDE WIRE: HCPCS

## 2024-02-01 PROCEDURE — 78830 RP LOCLZJ TUM SPECT W/CT 1: CPT | Mod: 26,,, | Performed by: STUDENT IN AN ORGANIZED HEALTH CARE EDUCATION/TRAINING PROGRAM

## 2024-02-01 PROCEDURE — 36247 INS CATH ABD/L-EXT ART 3RD: CPT | Mod: 51,,, | Performed by: RADIOLOGY

## 2024-02-01 PROCEDURE — 76377 3D RENDER W/INTRP POSTPROCES: CPT | Mod: TC | Performed by: RADIOLOGY

## 2024-02-01 RX ORDER — DIPHENHYDRAMINE HYDROCHLORIDE 50 MG/ML
INJECTION INTRAMUSCULAR; INTRAVENOUS
Status: COMPLETED | OUTPATIENT
Start: 2024-02-01 | End: 2024-02-01

## 2024-02-01 RX ORDER — NITROGLYCERIN 20 MG/100ML
INJECTION INTRAVENOUS
Status: COMPLETED | OUTPATIENT
Start: 2024-02-01 | End: 2024-02-01

## 2024-02-01 RX ORDER — ONDANSETRON HYDROCHLORIDE 2 MG/ML
INJECTION, SOLUTION INTRAVENOUS
Status: COMPLETED | OUTPATIENT
Start: 2024-02-01 | End: 2024-02-01

## 2024-02-01 RX ORDER — NITROGLYCERIN 40 MG/100ML
INJECTION INTRAVENOUS
Status: COMPLETED | OUTPATIENT
Start: 2024-02-01 | End: 2024-02-01

## 2024-02-01 RX ORDER — ONDANSETRON 8 MG/1
8 TABLET, ORALLY DISINTEGRATING ORAL ONCE
Status: COMPLETED | OUTPATIENT
Start: 2024-02-01 | End: 2024-02-01

## 2024-02-01 RX ORDER — ONDANSETRON 8 MG/1
TABLET, ORALLY DISINTEGRATING ORAL
Status: DISCONTINUED
Start: 2024-02-01 | End: 2024-02-02 | Stop reason: HOSPADM

## 2024-02-01 RX ORDER — FENTANYL CITRATE 50 UG/ML
INJECTION, SOLUTION INTRAMUSCULAR; INTRAVENOUS
Status: COMPLETED | OUTPATIENT
Start: 2024-02-01 | End: 2024-02-01

## 2024-02-01 RX ORDER — MIDAZOLAM HYDROCHLORIDE 1 MG/ML
INJECTION INTRAMUSCULAR; INTRAVENOUS
Status: COMPLETED | OUTPATIENT
Start: 2024-02-01 | End: 2024-02-01

## 2024-02-01 RX ADMIN — FENTANYL CITRATE 25 MCG: 50 INJECTION, SOLUTION INTRAMUSCULAR; INTRAVENOUS at 03:02

## 2024-02-01 RX ADMIN — ONDANSETRON 4 MG: 2 INJECTION INTRAMUSCULAR; INTRAVENOUS at 02:02

## 2024-02-01 RX ADMIN — DIPHENHYDRAMINE HYDROCHLORIDE 25 MG: 50 INJECTION, SOLUTION INTRAMUSCULAR; INTRAVENOUS at 02:02

## 2024-02-01 RX ADMIN — MIDAZOLAM HYDROCHLORIDE 1 MG: 2 INJECTION, SOLUTION INTRAMUSCULAR; INTRAVENOUS at 02:02

## 2024-02-01 RX ADMIN — IOHEXOL 100 ML: 300 INJECTION, SOLUTION INTRAVENOUS at 04:02

## 2024-02-01 RX ADMIN — NITROGLYCERIN 200 MCG: 20 INJECTION INTRAVENOUS at 03:02

## 2024-02-01 RX ADMIN — FENTANYL CITRATE 25 MCG: 50 INJECTION, SOLUTION INTRAMUSCULAR; INTRAVENOUS at 02:02

## 2024-02-01 RX ADMIN — MIDAZOLAM HYDROCHLORIDE 1 MG: 2 INJECTION, SOLUTION INTRAMUSCULAR; INTRAVENOUS at 03:02

## 2024-02-01 RX ADMIN — ONDANSETRON 8 MG: 8 TABLET, ORALLY DISINTEGRATING ORAL at 07:02

## 2024-02-01 RX ADMIN — ONDANSETRON 4 MG: 2 INJECTION INTRAMUSCULAR; INTRAVENOUS at 04:02

## 2024-02-01 NOTE — PLAN OF CARE
Pt arrived to IR room 189 via stretcher w/ RN. JOSE x4. Name//allergies/procedure verified. Pt will be monitored by RN @ bedside throughout procedure/sedation

## 2024-02-01 NOTE — PLAN OF CARE
Pt transferred to MPU room 6 via stretcher w/ Rn for remainder of 4 hr recovery. Post procedure bedside report given to MPU  RN, Dov.   Principal Discharge DX:	Rash  Secondary Diagnosis:	Abdominal pain  Secondary Diagnosis:	Influenza

## 2024-02-01 NOTE — PLAN OF CARE
5 Frm vascade deployed to right femoral artery @ 1605. Pressure held for 15 min,. Hemostasis @ 1520. Pt to reman flat for 4 hrs. Recovery end @ 2020

## 2024-02-01 NOTE — H&P
Vascular and Interventional Radiology History & Physical    Date:  2024    Chief Complaint:   Liver mass    History of Present Illness:  Melly Hwang is a 43 y.o. female who presents for Y90 radioembolization.    Past Medical History:  Past Medical History:   Diagnosis Date    VENKATA positive 2020    COVID-19     Deviated septum 2011    Hepatocellular carcinoma 2021    Hypertrophy of inferior nasal turbinate     Pericardial effusion     Premature menopause        Past Surgical History:  Past Surgical History:   Procedure Laterality Date    COLONOSCOPY N/A 2020    Procedure: COLONOSCOPY;  Surgeon: GIOVANNI Crane MD;  Location: [facility] (4TH FLR);  Service: Endoscopy;  Laterality: N/A;  + covid     ESOPHAGOGASTRODUODENOSCOPY N/A 1/10/2024    Procedure: ESOPHAGOGASTRODUODENOSCOPY (EGD);  Surgeon: Reynaldo Lynch MD;  Location: [facility] (2ND FLR);  Service: Endoscopy;  Laterality: N/A;  referred by daryl carrasco rocedure: EGD Diagnosis: Abnormal finding on GI tract imaging, Abdominal pain, and GERD  Procedure Timin-4 weeks Provider: Any GI provider Location: [facility], [facility], and [facility]  Additional Scheduling Informat    HERNIA REPAIR      LIVER SURGERY N/A 2021    NASAL SEPTUM SURGERY      PERICARDIAL WINDOW N/A 2021    Procedure: CREATION, PERICARDIAL WINDOW;  Surgeon: Ajay Cantor MD;  [facility];  Service: Cardiovascular;  Laterality: N/A;    PERICARDIOCENTESIS N/A 2020    Procedure: Pericardiocentesis;  Surgeon: Dickson Dangelo MD;  Location: [facility] CATH LAB;  Service: Cardiology;  Laterality: N/A;    TONSILLECTOMY          Sedation History:    Denies any adverse reactions.  Denies problems laying flat.    Social History:  Social History     Tobacco Use    Smoking status: Never    Smokeless tobacco: Never   Substance Use Topics    Alcohol use: Yes     Comment: rare    Drug use: No        Home Medications:   Prior to  Admission medications    Medication Sig Start Date End Date Taking? Authorizing Provider   ALPRAZolam (XANAX) 0.5 MG tablet TAKE 1 TABLET BY MOUTH EVERY DAY AS NEEDED FOR ANXIETY 23  Yes Melly Sahu MD   cetirizine (ZYRTEC) 10 MG tablet Take 1 tablet (10 mg total) by mouth once daily.  Patient taking differently: Take 10 mg by mouth daily as needed. 23  Yes Mari Nayak MD   cyanocobalamin (VITAMIN B-12) 1000 MCG tablet Take 1 tablet (1,000 mcg total) by mouth once daily. 23  Yes Melly Sahu MD   fluticasone propionate (FLONASE) 50 mcg/actuation nasal spray 2 sprays (100 mcg total) by Each Nostril route once daily.  Patient taking differently: 2 sprays by Each Nostril route daily as needed. 23  Yes Miriam Trejo PA-C   multivitamin capsule Take by mouth. 1 capsule Oral Every morning   Yes Provider, Historical   norethindrone-ethinyl estradiol (MICROGESTIN ) 1-20 mg-mcg per tablet Take 1 tablet by mouth once daily. 3/9/23  Yes Asaf Mejia MD   pantoprazole (PROTONIX) 40 MG tablet Take 1 tablet (40 mg total) by mouth once daily.  Patient taking differently: Take 40 mg by mouth every morning. 23 Yes Aleah Joiner NP   cyclobenzaprine (FLEXERIL) 10 MG tablet SMARTSI Tablet(s) By Mouth Every Evening PRN 23   Provider, Historical   ondansetron (ZOFRAN-ODT) 4 MG TbDL Take 1 tablet (4 mg total) by mouth 2 (two) times daily. 3/16/23   Inés Hilliard PA-C   ondansetron (ZOFRAN-ODT) 4 MG TbDL Take 1 tablet (4 mg total) by mouth every 12 (twelve) hours as needed (nausea). 10/25/23   Armando Mendiola MD   tiZANidine (ZANAFLEX) 4 MG tablet Take 1 tablet (4 mg total) by mouth every 6 (six) hours as needed (pain/spasm). 8/15/23   Kelsey Castro MD   triamcinolone acetonide 0.1% (KENALOG) 0.1 % cream Apply topically 2 (two) times daily as needed (scaling at external ears). Avoid use on face, body folds, groin/genitalia. 10/18/21    Yolande Heredia MD       Inpatient Medications:    Current Outpatient Medications:     ALPRAZolam (XANAX) 0.5 MG tablet, TAKE 1 TABLET BY MOUTH EVERY DAY AS NEEDED FOR ANXIETY, Disp: 30 tablet, Rfl: 0    cetirizine (ZYRTEC) 10 MG tablet, Take 1 tablet (10 mg total) by mouth once daily. (Patient taking differently: Take 10 mg by mouth daily as needed.), Disp: 90 tablet, Rfl: 0    cyanocobalamin (VITAMIN B-12) 1000 MCG tablet, Take 1 tablet (1,000 mcg total) by mouth once daily., Disp: 30 tablet, Rfl: 12    fluticasone propionate (FLONASE) 50 mcg/actuation nasal spray, 2 sprays (100 mcg total) by Each Nostril route once daily. (Patient taking differently: 2 sprays by Each Nostril route daily as needed.), Disp: 18.2 mL, Rfl: 6    multivitamin capsule, Take by mouth. 1 capsule Oral Every morning, Disp: , Rfl:     norethindrone-ethinyl estradiol (MICROGESTIN 1/20) 1-20 mg-mcg per tablet, Take 1 tablet by mouth once daily., Disp: 63 tablet, Rfl: 4    pantoprazole (PROTONIX) 40 MG tablet, Take 1 tablet (40 mg total) by mouth once daily. (Patient taking differently: Take 40 mg by mouth every morning.), Disp: 30 tablet, Rfl: 3    cyclobenzaprine (FLEXERIL) 10 MG tablet, SMARTSI Tablet(s) By Mouth Every Evening PRN, Disp: , Rfl:     ondansetron (ZOFRAN-ODT) 4 MG TbDL, Take 1 tablet (4 mg total) by mouth 2 (two) times daily., Disp: 2 tablet, Rfl: 0    ondansetron (ZOFRAN-ODT) 4 MG TbDL, Take 1 tablet (4 mg total) by mouth every 12 (twelve) hours as needed (nausea)., Disp: 20 tablet, Rfl: 0    tiZANidine (ZANAFLEX) 4 MG tablet, Take 1 tablet (4 mg total) by mouth every 6 (six) hours as needed (pain/spasm)., Disp: 30 tablet, Rfl: 1    triamcinolone acetonide 0.1% (KENALOG) 0.1 % cream, Apply topically 2 (two) times daily as needed (scaling at external ears). Avoid use on face, body folds, groin/genitalia., Disp: 45 g, Rfl: 3     Anticoagulants/Antiplatelets:   no anticoagulation    Allergies:   Review of patient's  allergies indicates:   Allergen Reactions    No known drug allergies        Review of Systems:   As documented in primary provider H&P.    Vital Signs (Most Recent):  Temp: 97.8 °F (36.6 °C) (02/01/24 1010)  Pulse: 61 (02/01/24 1010)  Resp: 16 (02/01/24 1010)  BP: 132/79 (02/01/24 1011)  SpO2: 100 % (02/01/24 1010)    Physical Exam:  No acute distress, laying comfortably in bed, pleasant and cooperative  Regular rate and rhythm  Breathing unlabored  Abdomen benign  Extremities warm and well perfused    Sedation Exam:  ASA: II - Patient appears to have mild systemic disease, adequately controlled   Mallampati: II (hard and soft palate, upper portion of tonsils anduvula visible)     Laboratory:  Lab Results   Component Value Date    INR 1.0 02/01/2024       Lab Results   Component Value Date    WBC 3.50 (L) 02/01/2024    HGB 13.1 02/01/2024    HCT 39.5 02/01/2024    MCV 96 02/01/2024     02/01/2024      Lab Results   Component Value Date    GLU 90 01/26/2024     01/26/2024    K 4.3 01/26/2024     01/26/2024    CO2 26 01/26/2024    BUN 11 01/26/2024    CREATININE 0.9 01/26/2024    CALCIUM 9.3 01/26/2024    MG 1.7 09/18/2023    ALT 14 01/26/2024    AST 15 01/26/2024    ALBUMIN 3.6 01/26/2024    BILITOT 0.7 01/26/2024    BILIDIR 0.4 (H) 01/04/2024       Imaging:  Reviewed.      ASSESSMENT/PLAN:   Melly Hwang is a 44 yo woman who presents for Y90 radioembolization.    Risks and benefits of the procedure were discussed with the patient. Informed consent was obtained.                      Sedation Plan: moderate sedation  Patient will undergo: Y90 radioembolization      Adilson Gayle MD  R[facility] Interventional/Diagnostic Radiology

## 2024-02-01 NOTE — DISCHARGE INSTRUCTIONS
Post-Yttrium (Y90) instructions:    Dont drive for 3 days.  Avoid walking, bending, and taking stairs for 24 hours.  No heavy lifting (gallon of milk) or strenuous exercise for 10 days.  Keep small children, pregnant women, and pets 3 feet away for 3 days.  For international flights, the radiation may set off the security scans.   Keep your dressing clean and dry for 24 hours. Do not submerge insertion site in water (bath tub, swimming pool) until fully healed.  Be sure to follow any other instructions from your doctor.      Call your doctor if you have any of the following:    Fever of 100.4 (38C) or higher lasting for 24 to 48 hours  Bleeding, swelling, or a large lump at the insertion site  Sharp or increasing pain at the insertion site  Leg pain, numbness, or a cold leg or foot  Any other symptoms your provider instructed you to report based on your medical condition.    Contact information:    For immediate concerns that are not emergent, you may call our interventional radiology clinic at [phone] or [phone]    ** After hours and weekends: Call the paging  at [phone] and ask for the Radiology Resident on call**        Please call with any questions or concerns.      Monday thru Friday 8:00 am - 4:30 pm    Interventional Radiology   [phone]    After Hours    Ask for the Radiology Intern on call  [phone]

## 2024-02-01 NOTE — PLAN OF CARE
Pt to be transferred to [facility] for post procedure scan prior to transfer to [facility] for remainder of 4 hr recovery.

## 2024-02-01 NOTE — PROCEDURES
VIR Post-Procedure Note    Pre Op Diagnosis: Hepatocellular carcinoma    Post Op Diagnosis: Same    Procedure: Yttrium treatment    Procedure performed by:  Ninfa Ovalles MD /  Du Alford MD    Written Informed Consent Obtained:  Yes    Specimen Removed: No    Estimated Blood Loss:  Minimal    Findings:    RCFA access and celiac/SMA/hepatic angiography    Successful delivery of Y90 into subsegment 8.     Patient tolerated procedure well.      Ninfa Ovalles MD  VIR Fellow

## 2024-02-01 NOTE — PLAN OF CARE
Patient ambulated on unit today by herself. Pt states her mother will be here later to pick her up.  Denies pain or SOB.  Verbalized an understanding of procedure today.  UPT negative this am.  Oriented to unit and call bell provided.  Will continue to monitor.

## 2024-02-02 NOTE — NURSING
Recovery complete. VSS. PIV removed. Right groin site without hematoma or bleeding. AVS reviewed. PT to DC home with family.

## 2024-02-05 ENCOUNTER — HOSPITAL ENCOUNTER (OUTPATIENT)
Dept: RADIOLOGY | Facility: HOSPITAL | Age: 44
Discharge: HOME | End: 2024-02-05
Attending: PHYSICIAN ASSISTANT
Payer: COMMERCIAL

## 2024-02-05 DIAGNOSIS — C78.01 MALIGNANT NEOPLASM METASTATIC TO RIGHT LUNG: ICD-10-CM

## 2024-02-05 DIAGNOSIS — M25.511 ACUTE PAIN OF RIGHT SHOULDER: ICD-10-CM

## 2024-02-05 PROCEDURE — 73030 X-RAY EXAM OF SHOULDER: CPT | Mod: TC,RT

## 2024-02-05 PROCEDURE — 73030 X-RAY EXAM OF SHOULDER: CPT | Mod: 26,RT,, | Performed by: RADIOLOGY

## 2024-02-06 VITALS
WEIGHT: 152 LBS | SYSTOLIC BLOOD PRESSURE: 116 MMHG | RESPIRATION RATE: 19 BRPM | OXYGEN SATURATION: 99 % | DIASTOLIC BLOOD PRESSURE: 70 MMHG | BODY MASS INDEX: 21.28 KG/M2 | HEIGHT: 71 IN | TEMPERATURE: 98 F | HEART RATE: 78 BPM

## 2024-02-06 LAB
FINAL PATHOLOGIC DIAGNOSIS: NORMAL
FINAL PATHOLOGIC DIAGNOSIS: NORMAL
GROSS: NORMAL
GROSS: NORMAL
Lab: NORMAL
Lab: NORMAL
SUPPLEMENTAL DIAGNOSIS: NORMAL
SUPPLEMENTAL DIAGNOSIS: NORMAL

## 2024-02-07 DIAGNOSIS — C22.0 HCC (HEPATOCELLULAR CARCINOMA): Primary | ICD-10-CM

## 2024-02-08 ENCOUNTER — PATIENT MESSAGE (OUTPATIENT)
Dept: HEMATOLOGY/ONCOLOGY | Facility: CLINIC | Age: 44
End: 2024-02-08
Payer: COMMERCIAL

## 2024-02-08 LAB
FINAL PATHOLOGIC DIAGNOSIS: NORMAL
GROSS: NORMAL
Lab: NORMAL
MICROSCOPIC EXAM: NORMAL
SUPPLEMENTAL DIAGNOSIS: NORMAL

## 2024-02-08 RX ORDER — CETIRIZINE HYDROCHLORIDE 10 MG/1
10 TABLET ORAL
Qty: 90 TABLET | Refills: 2 | Status: SHIPPED | OUTPATIENT
Start: 2024-02-08 | End: 2024-06-18 | Stop reason: SDUPTHER

## 2024-02-08 NOTE — TELEPHONE ENCOUNTER
Refill Routing Note   Medication(s) are not appropriate for processing by [facility] for the following reason(s):        No active prescription written by provider    [facility] action(s):  Defer               Appointments  past 12m or future 3m with PCP    Date Provider   Last Visit   9/18/2023 Melly Sahu MD   Next Visit   3/25/2024 Melly Sahu MD   ED visits in past 90 days: 0        Note composed:5:08 AM 02/08/2024

## 2024-02-08 NOTE — TELEPHONE ENCOUNTER
Called patient and discussed upcoming appointments and 2nd CT ordered in error.  Scheduled CT Chest with patient and patient verbalized understanding.

## 2024-02-08 NOTE — TELEPHONE ENCOUNTER
Spoke to Dr. Carrasco regarding CT Chest.  Rescheduled CT chest following MRI for Y90.  Cancelled CT Chest abd pelvis per MD.

## 2024-02-08 NOTE — TELEPHONE ENCOUNTER
No care due was identified.  [facility] Embedded Care Due Messages. Reference number: [id number].   2/08/2024 12:06:09 AM CST

## 2024-02-15 ENCOUNTER — PATIENT MESSAGE (OUTPATIENT)
Dept: HEMATOLOGY/ONCOLOGY | Facility: CLINIC | Age: 44
End: 2024-02-15
Payer: COMMERCIAL

## 2024-02-19 ENCOUNTER — PATIENT MESSAGE (OUTPATIENT)
Dept: ORTHOPEDICS | Facility: CLINIC | Age: 44
End: 2024-02-19
Payer: COMMERCIAL

## 2024-02-19 ENCOUNTER — TELEPHONE (OUTPATIENT)
Dept: PAIN MEDICINE | Facility: CLINIC | Age: 44
End: 2024-02-19
Payer: COMMERCIAL

## 2024-02-19 DIAGNOSIS — M54.12 CERVICAL RADICULOPATHY: Primary | ICD-10-CM

## 2024-02-19 DIAGNOSIS — M54.12 CERVICAL RADICULITIS: Primary | ICD-10-CM

## 2024-02-19 NOTE — PROGRESS NOTES
Spoke with pt virtually. Pt was last seen 12/28/23 and continues to have neck and bilateral pain. Pt has tried home exercises and a medrol dose pack with no relief. Pain is 8/10. I have provided the patient with a home exercise program. It is the [employer] cervical exercise program. Exercises include walking erectly with neutral head position, supine neutral head position, supine retraction, sitting or standing neck retraction, posture training, forward/backward/sideward isometric strengthening, prone head lifts, supine head lifts, scapular retraction, and neck rotation. Pt completes each exercise daily for one hour with worsening of pain. 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  Will order IL C6-7 FLORENTINO with pain management. Pt will fu if pain persists..

## 2024-02-21 ENCOUNTER — PATIENT MESSAGE (OUTPATIENT)
Dept: GASTROENTEROLOGY | Facility: CLINIC | Age: 44
End: 2024-02-21
Payer: COMMERCIAL

## 2024-02-23 ENCOUNTER — INFUSION (OUTPATIENT)
Dept: INFUSION THERAPY | Facility: HOSPITAL | Age: 44
End: 2024-02-23
Payer: COMMERCIAL

## 2024-02-23 ENCOUNTER — LAB VISIT (OUTPATIENT)
Dept: LAB | Facility: HOSPITAL | Age: 44
End: 2024-02-23
Payer: COMMERCIAL

## 2024-02-23 ENCOUNTER — OFFICE VISIT (OUTPATIENT)
Dept: HEMATOLOGY/ONCOLOGY | Facility: CLINIC | Age: 44
End: 2024-02-23
Payer: COMMERCIAL

## 2024-02-23 VITALS — SYSTOLIC BLOOD PRESSURE: 140 MMHG | HEART RATE: 60 BPM | DIASTOLIC BLOOD PRESSURE: 85 MMHG

## 2024-02-23 VITALS
HEIGHT: 71 IN | TEMPERATURE: 99 F | HEART RATE: 88 BPM | BODY MASS INDEX: 21.36 KG/M2 | SYSTOLIC BLOOD PRESSURE: 151 MMHG | WEIGHT: 152.56 LBS | DIASTOLIC BLOOD PRESSURE: 85 MMHG | OXYGEN SATURATION: 100 %

## 2024-02-23 DIAGNOSIS — C77.9 REGIONAL LYMPH NODE METASTASIS PRESENT: ICD-10-CM

## 2024-02-23 DIAGNOSIS — E03.2 HYPOTHYROIDISM DUE TO MEDICATION: ICD-10-CM

## 2024-02-23 DIAGNOSIS — C22.0 HEPATOCELLULAR CARCINOMA: Primary | ICD-10-CM

## 2024-02-23 DIAGNOSIS — C22.0 HCC (HEPATOCELLULAR CARCINOMA): ICD-10-CM

## 2024-02-23 DIAGNOSIS — D18.03 LIVER HEMANGIOMA: ICD-10-CM

## 2024-02-23 DIAGNOSIS — K21.9 GASTROESOPHAGEAL REFLUX DISEASE, UNSPECIFIED WHETHER ESOPHAGITIS PRESENT: ICD-10-CM

## 2024-02-23 DIAGNOSIS — D70.9 NEUTROPENIA, UNSPECIFIED TYPE: ICD-10-CM

## 2024-02-23 DIAGNOSIS — C78.01 MALIGNANT NEOPLASM METASTATIC TO RIGHT LUNG: ICD-10-CM

## 2024-02-23 DIAGNOSIS — M54.12 CERVICAL RADICULOPATHY: ICD-10-CM

## 2024-02-23 DIAGNOSIS — M25.519 SHOULDER PAIN, UNSPECIFIED CHRONICITY, UNSPECIFIED LATERALITY: ICD-10-CM

## 2024-02-23 DIAGNOSIS — M89.9 BONE LESION: ICD-10-CM

## 2024-02-23 DIAGNOSIS — C22.0 HCC (HEPATOCELLULAR CARCINOMA): Primary | ICD-10-CM

## 2024-02-23 LAB
AFP SERPL-MCNC: 12 NG/ML (ref 0–8.4)
ALBUMIN SERPL BCP-MCNC: 3.6 G/DL (ref 3.5–5.2)
ALP SERPL-CCNC: 74 U/L (ref 55–135)
ALT SERPL W/O P-5'-P-CCNC: 15 U/L (ref 10–44)
ANION GAP SERPL CALC-SCNC: 7 MMOL/L (ref 8–16)
AST SERPL-CCNC: 15 U/L (ref 10–40)
BASOPHILS # BLD AUTO: 0.02 K/UL (ref 0–0.2)
BASOPHILS NFR BLD: 0.4 % (ref 0–1.9)
BILIRUB SERPL-MCNC: 0.7 MG/DL (ref 0.1–1)
BUN SERPL-MCNC: 11 MG/DL (ref 6–20)
CALCIUM SERPL-MCNC: 9.6 MG/DL (ref 8.7–10.5)
CHLORIDE SERPL-SCNC: 105 MMOL/L (ref 95–110)
CO2 SERPL-SCNC: 25 MMOL/L (ref 23–29)
CREAT SERPL-MCNC: 0.9 MG/DL (ref 0.5–1.4)
DIFFERENTIAL METHOD BLD: ABNORMAL
EOSINOPHIL # BLD AUTO: 0.1 K/UL (ref 0–0.5)
EOSINOPHIL NFR BLD: 2.4 % (ref 0–8)
ERYTHROCYTE [DISTWIDTH] IN BLOOD BY AUTOMATED COUNT: 12 % (ref 11.5–14.5)
EST. GFR  (NO RACE VARIABLE): >60 ML/MIN/1.73 M^2
GLUCOSE SERPL-MCNC: 127 MG/DL (ref 70–110)
HCT VFR BLD AUTO: 41.1 % (ref 37–48.5)
HGB BLD-MCNC: 13.2 G/DL (ref 12–16)
IMM GRANULOCYTES # BLD AUTO: 0.01 K/UL (ref 0–0.04)
IMM GRANULOCYTES NFR BLD AUTO: 0.2 % (ref 0–0.5)
LYMPHOCYTES # BLD AUTO: 0.5 K/UL (ref 1–4.8)
LYMPHOCYTES NFR BLD: 11 % (ref 18–48)
MCH RBC QN AUTO: 31 PG (ref 27–31)
MCHC RBC AUTO-ENTMCNC: 32.1 G/DL (ref 32–36)
MCV RBC AUTO: 97 FL (ref 82–98)
MONOCYTES # BLD AUTO: 0.4 K/UL (ref 0.3–1)
MONOCYTES NFR BLD: 7.9 % (ref 4–15)
NEUTROPHILS # BLD AUTO: 3.5 K/UL (ref 1.8–7.7)
NEUTROPHILS NFR BLD: 78.1 % (ref 38–73)
NRBC BLD-RTO: 0 /100 WBC
PLATELET # BLD AUTO: 247 K/UL (ref 150–450)
PMV BLD AUTO: 9.3 FL (ref 9.2–12.9)
POTASSIUM SERPL-SCNC: 3.9 MMOL/L (ref 3.5–5.1)
PROT SERPL-MCNC: 7.5 G/DL (ref 6–8.4)
RBC # BLD AUTO: 4.26 M/UL (ref 4–5.4)
SODIUM SERPL-SCNC: 137 MMOL/L (ref 136–145)
T4 FREE SERPL-MCNC: 0.84 NG/DL (ref 0.71–1.51)
TSH SERPL DL<=0.005 MIU/L-ACNC: 1.26 UIU/ML (ref 0.4–4)
WBC # BLD AUTO: 4.54 K/UL (ref 3.9–12.7)

## 2024-02-23 PROCEDURE — 84443 ASSAY THYROID STIM HORMONE: CPT | Performed by: PHYSICIAN ASSISTANT

## 2024-02-23 PROCEDURE — 80053 COMPREHEN METABOLIC PANEL: CPT | Performed by: PHYSICIAN ASSISTANT

## 2024-02-23 PROCEDURE — 82105 ALPHA-FETOPROTEIN SERUM: CPT | Performed by: PHYSICIAN ASSISTANT

## 2024-02-23 PROCEDURE — 99999 PR PBB SHADOW E&M-EST. PATIENT-LVL IV: CPT | Mod: PBBFAC,,, | Performed by: INTERNAL MEDICINE

## 2024-02-23 PROCEDURE — 3008F BODY MASS INDEX DOCD: CPT | Mod: CPTII,S$GLB,, | Performed by: INTERNAL MEDICINE

## 2024-02-23 PROCEDURE — 85025 COMPLETE CBC W/AUTO DIFF WBC: CPT | Performed by: PHYSICIAN ASSISTANT

## 2024-02-23 PROCEDURE — 3077F SYST BP >= 140 MM HG: CPT | Mod: CPTII,S$GLB,, | Performed by: INTERNAL MEDICINE

## 2024-02-23 PROCEDURE — 96367 TX/PROPH/DG ADDL SEQ IV INF: CPT

## 2024-02-23 PROCEDURE — 25000003 PHARM REV CODE 250: Performed by: INTERNAL MEDICINE

## 2024-02-23 PROCEDURE — 3079F DIAST BP 80-89 MM HG: CPT | Mod: CPTII,S$GLB,, | Performed by: INTERNAL MEDICINE

## 2024-02-23 PROCEDURE — 84439 ASSAY OF FREE THYROXINE: CPT | Performed by: PHYSICIAN ASSISTANT

## 2024-02-23 PROCEDURE — 99215 OFFICE O/P EST HI 40 MIN: CPT | Mod: S$GLB,,, | Performed by: INTERNAL MEDICINE

## 2024-02-23 PROCEDURE — 96413 CHEMO IV INFUSION 1 HR: CPT

## 2024-02-23 PROCEDURE — 36415 COLL VENOUS BLD VENIPUNCTURE: CPT | Performed by: PHYSICIAN ASSISTANT

## 2024-02-23 PROCEDURE — 63600175 PHARM REV CODE 636 W HCPCS: Performed by: INTERNAL MEDICINE

## 2024-02-23 RX ORDER — SODIUM CHLORIDE, SODIUM LACTATE, POTASSIUM CHLORIDE, CALCIUM CHLORIDE 600; 310; 30; 20 MG/100ML; MG/100ML; MG/100ML; MG/100ML
INJECTION, SOLUTION INTRAVENOUS CONTINUOUS
Status: CANCELLED
Start: 2024-02-23

## 2024-02-23 RX ORDER — PROCHLORPERAZINE EDISYLATE 5 MG/ML
5 INJECTION INTRAMUSCULAR; INTRAVENOUS ONCE AS NEEDED
Status: DISCONTINUED | OUTPATIENT
Start: 2024-02-23 | End: 2024-02-23 | Stop reason: HOSPADM

## 2024-02-23 RX ORDER — DIPHENHYDRAMINE HYDROCHLORIDE 50 MG/ML
50 INJECTION INTRAMUSCULAR; INTRAVENOUS ONCE AS NEEDED
Status: DISCONTINUED | OUTPATIENT
Start: 2024-02-23 | End: 2024-02-23 | Stop reason: HOSPADM

## 2024-02-23 RX ORDER — PROCHLORPERAZINE EDISYLATE 5 MG/ML
5 INJECTION INTRAMUSCULAR; INTRAVENOUS ONCE AS NEEDED
Status: CANCELLED
Start: 2024-02-23

## 2024-02-23 RX ORDER — HEPARIN 100 UNIT/ML
500 SYRINGE INTRAVENOUS
Status: DISCONTINUED | OUTPATIENT
Start: 2024-02-23 | End: 2024-02-23 | Stop reason: HOSPADM

## 2024-02-23 RX ORDER — DIPHENHYDRAMINE HYDROCHLORIDE 50 MG/ML
50 INJECTION INTRAMUSCULAR; INTRAVENOUS ONCE AS NEEDED
Status: CANCELLED | OUTPATIENT
Start: 2024-02-23

## 2024-02-23 RX ORDER — SODIUM CHLORIDE 0.9 % (FLUSH) 0.9 %
10 SYRINGE (ML) INJECTION
Status: CANCELLED | OUTPATIENT
Start: 2024-02-23

## 2024-02-23 RX ORDER — EPINEPHRINE 0.3 MG/.3ML
0.3 INJECTION SUBCUTANEOUS ONCE AS NEEDED
Status: DISCONTINUED | OUTPATIENT
Start: 2024-02-23 | End: 2024-02-23 | Stop reason: HOSPADM

## 2024-02-23 RX ORDER — SODIUM CHLORIDE 0.9 % (FLUSH) 0.9 %
10 SYRINGE (ML) INJECTION
Status: DISCONTINUED | OUTPATIENT
Start: 2024-02-23 | End: 2024-02-23 | Stop reason: HOSPADM

## 2024-02-23 RX ORDER — SODIUM CHLORIDE, SODIUM LACTATE, POTASSIUM CHLORIDE, CALCIUM CHLORIDE 600; 310; 30; 20 MG/100ML; MG/100ML; MG/100ML; MG/100ML
INJECTION, SOLUTION INTRAVENOUS CONTINUOUS
Status: DISCONTINUED | OUTPATIENT
Start: 2024-02-23 | End: 2024-02-23 | Stop reason: HOSPADM

## 2024-02-23 RX ORDER — HEPARIN 100 UNIT/ML
500 SYRINGE INTRAVENOUS
Status: CANCELLED | OUTPATIENT
Start: 2024-02-23

## 2024-02-23 RX ORDER — EPINEPHRINE 0.3 MG/.3ML
0.3 INJECTION SUBCUTANEOUS ONCE AS NEEDED
Status: CANCELLED | OUTPATIENT
Start: 2024-02-23

## 2024-02-23 RX ADMIN — SODIUM CHLORIDE 1500 MG: 9 INJECTION, SOLUTION INTRAVENOUS at 09:02

## 2024-02-23 RX ADMIN — SODIUM CHLORIDE, POTASSIUM CHLORIDE, SODIUM LACTATE AND CALCIUM CHLORIDE: 600; 310; 30; 20 INJECTION, SOLUTION INTRAVENOUS at 09:02

## 2024-02-23 RX ADMIN — SODIUM CHLORIDE, POTASSIUM CHLORIDE, SODIUM LACTATE AND CALCIUM CHLORIDE: 600; 310; 30; 20 INJECTION, SOLUTION INTRAVENOUS at 10:02

## 2024-02-23 RX ADMIN — SODIUM CHLORIDE: 9 INJECTION, SOLUTION INTRAVENOUS at 09:02

## 2024-02-23 NOTE — PLAN OF CARE
Problem: Anemia (Chemotherapy Effects)  Goal: Anemia Symptom Improvement  Outcome: Ongoing, Progressing     Problem: Urinary Bleeding Risk or Actual (Chemotherapy Effects)  Goal: Absence of Hematuria  Outcome: Ongoing, Progressing     Problem: Nausea and Vomiting (Chemotherapy Effects)  Goal: Fluid and Electrolyte Balance  Outcome: Ongoing, Progressing     Problem: Neurotoxicity (Chemotherapy Effects)  Goal: Neurotoxicity Symptom Control  Outcome: Ongoing, Progressing     Problem: Neutropenia (Chemotherapy Effects)  Goal: Absence of Infection  Outcome: Ongoing, Progressing     Problem: Oral Mucositis (Chemotherapy Effects)  Goal: Improved Oral Mucous Membrane Integrity  Outcome: Ongoing, Progressing     Problem: Thrombocytopenia Bleeding Risk (Chemotherapy Effects)  Goal: Absence of Bleeding  Outcome: Ongoing, Progressing     Problem: Fatigue  Goal: Improved Activity Tolerance  Outcome: Ongoing, Progressing     Problem: Anemia  Goal: Anemia Symptom Improvement  Outcome: Ongoing, Progressing     Problem: Infection  Goal: Absence of Infection Signs and Symptoms  Outcome: Ongoing, Progressing   Pt completed C9 imfinzi and 1000 mL LR via PIV without adverse effects. VS remained stable and pt discharged AAOx4/ambulatory

## 2024-02-23 NOTE — PROGRESS NOTES
MEDICAL ONCOLOGY - ESTABLISHED PATIENT VISIT    Reason for visit: Scirrhous HCC    Best Contact Phone Number(s): [phone] (home)      Cancer/Stage/TNM:    Cancer Staging   Hepatocellular carcinoma  Staging form: Liver, AJCC 8th Edition  - Clinical stage from 4/10/2023: Stage IVB (rcT1b, cN0, pM1) - Signed by Philippe Carrasco MD on 8/7/2023       Oncology History   Hepatocellular carcinoma   6/9/2021 Initial Diagnosis    Hepatocellular carcinoma     4/10/2023 Cancer Staged    Staging form: Liver, AJCC 8th Edition  - Clinical stage from 4/10/2023: Stage IVB (rcT1b, cN0, pM1)     7/11/2023 -  Chemotherapy    Treatment Summary   Plan Name: OP TREMELIMUMAB 300 MG (X 1) + DURVALUMAB 1500 MG Q4W  Treatment Goal: Control  Status: Active  Start Date: 7/11/2023  End Date: 6/10/2024 (Planned)  Provider: Philippe Carrasco MD  Chemotherapy: [No matching medication found in this treatment plan]     9/11/2023 Genetic Testing    Genetic counseling done. Hereditary syndrome unlikely. Patient offered testing, but declined due to cost.      10/23/2023 - 11/3/2023 Radiation Therapy    Treating physician: yamil villa    Course: C1 Chst/Abd 2023  Treatment Site Ref. ID Energy Dose/Fx (Gy) #Fx Dose Correction (Gy) Total Dose (Gy) Start Date End Date Elapsed Days   SB Lung_R PTV_Lung 6X 10 5 / 5 0 50 10/23/2023 11/1/2023 9   SB Abdomen PTV_PortalLN 6X 10 5 / 5 0 50 10/23/2023 11/3/2023 11         Interim History:   43 y.o. 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 Howell with the same pathology, negative margins, 0 of 8 lymph nodes positive and + for vascular and perineural invasion. We saw her in 2021 for evaluation for persistent CA 19-9 but there was no radiographic evidence of HCC disease recurrence or alternative reason for CA 19-9 elevation.  CT chest on 3/9/23 showed an enlarging RLL nodule measuring 1.1 cm, increased from 0.8 cm in size.  IR biopsy of this on  4/10/23 confirmed metastatic HCC.  Since then in mid-June she has also had an MRI abdomen that showed an enlarging portocaval lymph node that is consistent with metastatic disease.  She completed SBRT at the beginning of November to her lung metastasis and to her portocaval lymph node.    She presents today for cycle 9 of durvalumab + tremelimumab as part of the STRiDE regimen.  She underwent Y-90 on 2/1/24.  Her R shoulder pain has resolved.  She has no other new complaints.  She continues on pantoprazole which is helpful. No dyspnea or diarrhea.    Presents alone today.  ECOG PS 0.     ROS:   Review of Systems   Constitutional:  Negative for chills, fever, malaise/fatigue and weight loss.   HENT:  Negative for sore throat.    Eyes:  Negative for blurred vision and pain.   Respiratory:  Negative for cough and shortness of breath.    Cardiovascular:  Negative for chest pain and leg swelling.   Gastrointestinal:  Positive for constipation. Negative for abdominal pain, diarrhea, nausea and vomiting.   Genitourinary:  Negative for dysuria and frequency.   Musculoskeletal:  Negative for back pain, falls and myalgias.   Skin:  Negative for rash.   Neurological:  Negative for dizziness, weakness and headaches.   Endo/Heme/Allergies:  Does not bruise/bleed easily.   Psychiatric/Behavioral:  Negative for depression. The patient is not nervous/anxious.    All other systems reviewed and are negative.      Past Medical History:   Past Medical History:   Diagnosis Date    VENKATA positive 08/20/2020    COVID-19     Deviated septum 2011    Hepatocellular carcinoma 05/07/2021    Hypertrophy of inferior nasal turbinate     Pericardial effusion     Premature menopause         Past Surgical History:   Past Surgical History:   Procedure Laterality Date    COLONOSCOPY N/A 09/21/2020    Procedure: COLONOSCOPY;  Surgeon: GIOVANNI Crane MD;  Location: [facility] ([address] [place] [address]);  Service: Endoscopy;  Laterality: N/A;  + covid 4/22     ESOPHAGOGASTRODUODENOSCOPY N/A 1/10/2024    Procedure: ESOPHAGOGASTRODUODENOSCOPY (EGD);  Surgeon: Reynaldo Lynch MD;  Location: [facility] ([facility] FLR);  Service: Endoscopy;  Laterality: N/A;  referred by daryl carrasco rocedure: EGD Diagnosis: Abnormal finding on GI tract imaging, Abdominal pain, and GERD  Procedure Timin-4 weeks Provider: Any GI provider Location: [facility], [facility], and [facility]  Additional Scheduling Informat    HERNIA REPAIR      LIVER SURGERY N/A 2021    NASAL SEPTUM SURGERY      PERICARDIAL WINDOW N/A 2021    Procedure: CREATION, PERICARDIAL WINDOW;  Surgeon: Ajay Cantor MD;  [facility];  Service: Cardiovascular;  Laterality: N/A;    PERICARDIOCENTESIS N/A 2020    Procedure: Pericardiocentesis;  Surgeon: Dickson Dangelo MD;  Location: [facility] CATH LAB;  Service: Cardiology;  Laterality: N/A;    TONSILLECTOMY          Family History:   Family History   Problem Relation Age of Onset    Diabetes Mother     Liver disease Mother         fatty liver    Heart disease Father     Macular degeneration Father     Diabetes Father     Hypertension Father     Hyperlipidemia Father     Heart disease Sister     Lung cancer Maternal Aunt         heavy smoker    Cerebral aneurysm Paternal Aunt     Cataracts Neg Hx     Glaucoma Neg Hx     Retinal detachment Neg Hx     Stroke Neg Hx     Thyroid disease Neg Hx     Melanoma Neg Hx     Heart attack Neg Hx         Social History:   Social History     Tobacco Use    Smoking status: Never    Smokeless tobacco: Never   Substance Use Topics    Alcohol use: Yes     Comment: rare        I have reviewed and updated the patient's past medical, surgical, family and social histories.    Allergies:   Review of patient's allergies indicates:   Allergen Reactions    No known drug allergies         Medications:   Current Outpatient Medications   Medication Sig Dispense Refill    ALPRAZolam (XANAX) 0.5 MG tablet TAKE 1 TABLET  BY MOUTH EVERY DAY AS NEEDED FOR ANXIETY 30 tablet 0    cetirizine (ZYRTEC) 10 MG tablet TAKE 1 TABLET BY MOUTH EVERY DAY 90 tablet 2    cyanocobalamin (VITAMIN B-12) 1000 MCG tablet Take 1 tablet (1,000 mcg total) by mouth once daily. 30 tablet 12    cyclobenzaprine (FLEXERIL) 10 MG tablet SMARTSI Tablet(s) By Mouth Every Evening PRN      fluticasone propionate (FLONASE) 50 mcg/actuation nasal spray 2 sprays (100 mcg total) by Each Nostril route once daily. (Patient taking differently: 2 sprays by Each Nostril route daily as needed.) 18.2 mL 6    multivitamin capsule Take by mouth. 1 capsule Oral Every morning      norethindrone-ethinyl estradiol (MICROGESTIN ) 1-20 mg-mcg per tablet Take 1 tablet by mouth once daily. 63 tablet 4    omeprazole magnesium (PRILOSEC ORAL) Take by mouth.      ondansetron (ZOFRAN-ODT) 4 MG TbDL Take 1 tablet (4 mg total) by mouth 2 (two) times daily. 2 tablet 0    ondansetron (ZOFRAN-ODT) 4 MG TbDL Take 1 tablet (4 mg total) by mouth every 12 (twelve) hours as needed (nausea). 20 tablet 0    triamcinolone acetonide 0.1% (KENALOG) 0.1 % cream Apply topically 2 (two) times daily as needed (scaling at external ears). Avoid use on face, body folds, groin/genitalia. 45 g 3    pantoprazole (PROTONIX) 40 MG tablet Take 1 tablet (40 mg total) by mouth once daily. (Patient not taking: Reported on 2024) 30 tablet 3    tiZANidine (ZANAFLEX) 4 MG tablet Take 1 tablet (4 mg total) by mouth every 6 (six) hours as needed (pain/spasm). (Patient not taking: Reported on 2024) 30 tablet 1     No current facility-administered medications for this visit.     Facility-Administered Medications Ordered in Other Visits   Medication Dose Route Frequency Provider Last Rate Last Admin    alteplase injection 2 mg  2 mg Intra-Catheter PRN Philippe Carrasco MD        diphenhydrAMINE injection 50 mg  50 mg Intravenous Once PRN Philippe Carrasco MD        EPINEPHrine (EPIPEN) 0.3 mg/0.3 mL pen  "injection 0.3 mg  0.3 mg Intramuscular Once PRN Philippe Carrasco MD        heparin, porcine (PF) 100 unit/mL injection flush 500 Units  500 Units Intravenous PRN Philippe Carrasco MD        hydrocortisone sodium succinate injection 100 mg  100 mg Intravenous Once PRN Philippe Carrasco MD        lactated ringers infusion   Intravenous Continuous Philippe Carrasco MD 1,000 mL/hr at 02/23/24 1056 New Bag at 02/23/24 1056    prochlorperazine injection Soln 5 mg  5 mg Intravenous Once PRN Philippe Carrasco MD        sodium chloride 0.9% flush 10 mL  10 mL Intravenous PRN Philippe Carrasco MD            Physical Exam:   BP (!) 151/85 (BP Location: Right arm, Patient Position: Sitting, BP Method: Medium (Automatic))   Pulse 88   Temp 98.5 °F (36.9 °C) (Oral)   Ht 5' 11" (1.803 m)   Wt 69.2 kg (152 lb 8.9 oz)   LMP  (LMP Unknown)   SpO2 100%   BMI 21.28 kg/m²      ECOG Performance status: 0            Physical Exam  Vitals reviewed.   Constitutional:       General: She is not in acute distress.     Appearance: Normal appearance. She is normal weight.   HENT:      Head: Normocephalic and atraumatic.      Right Ear: External ear normal.      Left Ear: External ear normal.      Nose: Nose normal.      Mouth/Throat:      Mouth: Mucous membranes are moist.      Pharynx: Oropharynx is clear. No posterior oropharyngeal erythema.   Eyes:      General: No scleral icterus.     Extraocular Movements: Extraocular movements intact.      Conjunctiva/sclera: Conjunctivae normal.      Pupils: Pupils are equal, round, and reactive to light.   Cardiovascular:      Rate and Rhythm: Normal rate and regular rhythm.      Pulses: Normal pulses.      Heart sounds: Normal heart sounds.   Pulmonary:      Effort: Pulmonary effort is normal.      Breath sounds: Normal breath sounds. No wheezing or rales.   Abdominal:      General: Bowel sounds are normal. There is no distension.      Palpations: Abdomen is soft.      " Tenderness: There is no abdominal tenderness.   Musculoskeletal:         General: No swelling. Normal range of motion.      Cervical back: Normal range of motion and neck supple.   Skin:     General: Skin is warm.      Coloration: Skin is not jaundiced.      Findings: No erythema or rash.   Neurological:      General: No focal deficit present.      Mental Status: She is alert and oriented to person, place, and time. Mental status is at baseline.      Gait: Gait normal.   Psychiatric:         Mood and Affect: Mood normal.         Behavior: Behavior normal.         Thought Content: Thought content normal.         Judgment: Judgment normal.           Labs:   Recent Results (from the past 48 hour(s))   CBC W/ AUTO DIFFERENTIAL    Collection Time: 02/23/24  7:27 AM   Result Value Ref Range    WBC 4.54 3.90 - 12.70 K/uL    RBC 4.26 4.00 - 5.40 M/uL    Hemoglobin 13.2 12.0 - 16.0 g/dL    Hematocrit 41.1 37.0 - 48.5 %    MCV 97 82 - 98 fL    MCH 31.0 27.0 - 31.0 pg    MCHC 32.1 32.0 - 36.0 g/dL    RDW 12.0 11.5 - 14.5 %    Platelets 247 150 - 450 K/uL    MPV 9.3 9.2 - 12.9 fL    Immature Granulocytes 0.2 0.0 - 0.5 %    Gran # (ANC) 3.5 1.8 - 7.7 K/uL    Immature Grans (Abs) 0.01 0.00 - 0.04 K/uL    Lymph # 0.5 (L) 1.0 - 4.8 K/uL    Mono # 0.4 0.3 - 1.0 K/uL    Eos # 0.1 0.0 - 0.5 K/uL    Baso # 0.02 0.00 - 0.20 K/uL    nRBC 0 0 /100 WBC    Gran % 78.1 (H) 38.0 - 73.0 %    Lymph % 11.0 (L) 18.0 - 48.0 %    Mono % 7.9 4.0 - 15.0 %    Eosinophil % 2.4 0.0 - 8.0 %    Basophil % 0.4 0.0 - 1.9 %    Differential Method Automated    Comprehensive Metabolic Panel    Collection Time: 02/23/24  7:27 AM   Result Value Ref Range    Sodium 137 136 - 145 mmol/L    Potassium 3.9 3.5 - 5.1 mmol/L    Chloride 105 95 - 110 mmol/L    CO2 25 23 - 29 mmol/L    Glucose 127 (H) 70 - 110 mg/dL    BUN 11 6 - 20 mg/dL    Creatinine 0.9 0.5 - 1.4 mg/dL    Calcium 9.6 8.7 - 10.5 mg/dL    Total Protein 7.5 6.0 - 8.4 g/dL    Albumin 3.6 3.5 - 5.2 g/dL     Total Bilirubin 0.7 0.1 - 1.0 mg/dL    Alkaline Phosphatase 74 55 - 135 U/L    AST 15 10 - 40 U/L    ALT 15 10 - 44 U/L    eGFR >60.0 >60 mL/min/1.73 m^2    Anion Gap 7 (L) 8 - 16 mmol/L   AFP Tumor Marker    Collection Time: 24  7:27 AM   Result Value Ref Range    AFP 12 (H) 0.0 - 8.4 ng/mL   TSH    Collection Time: 24  7:27 AM   Result Value Ref Range    TSH 1.259 0.400 - 4.000 uIU/mL   T4, FREE    Collection Time: 24  7:27 AM   Result Value Ref Range    Free T4 0.84 0.71 - 1.51 ng/dL     I have reviewed the pertinent labs.  Normal blood counts.  AFP 12.    Imagin/29/23: CT chest    Impression:     1. Interval decrease in size of right lower lobe pulmonary dominant nodule.  2. Additional pulmonary nodules unchanged.  No new nodes.    Path:   21: partial hepatectomy:    Final Pathologic Diagnosis 1.  Gallbladder (cholecystectomy):   - Benign gallbladder with cholecystitis   2. Right lobe (partial hepatectomy):   - Positive for malignancy, see synoptic report below   - Separate hemangioma, 7.3 cm   3. Lymph nodes, portal (regional resection):   - 8 lymph nodes, negative for tumor (0/8)   ______________________________________________________________________________   ______   Hepatocellular carcinoma synoptic report   - Procedure:  Partial hepatectomy, right lobe   - Histologic type:  Hepatocellular carcinoma, scirrhous   - Histologic grade:  Moderately differentiated, grade 2   - Tumor focality:  Solitary   - Tumor characteristics:   - Tumor site:  Right lobe   - Tumor size:  3.3 cm   - Treatment effect:  No known pre-surgical therapy   - Satellitosis:  Not identified   - Tumor extent:  Confined to liver   - Vascular invasion:  Present, Small vessel   - Perineural invasion:  Present   - Margins:   - All margins are negative for invasive carcinoma   - Closest margin to invasive carcinoma:  Parenchymal   - Distance from invasive  carcinoma to closest margin:  5 mm   - Regional lymph  nodes:   - Number of lymph nodes with tumor:  0   - Number of lymph nodes examined:  8   - Pathology: pT2 N0 MX   - Additional findings:   - No steatosis   - Trichrome stain:  Periportal fibrosis with early septa, stage 1-2   - Iron stain:  Negative   - Iron and trichrome stains with appropriate controls   Tumor blocks:  2A, 2B, 2 C    Comment: Interp By Madeline Carlos MD, Signed on 07/08/2021 at 16:47       Assessment:       1. HCC (hepatocellular carcinoma)    2. Regional lymph node metastasis present    3. Malignant neoplasm metastatic to right lung    4. Neutropenia, unspecified type    5. Bone lesion    6. Cervical radiculopathy    7. Liver hemangioma    8. Gastroesophageal reflux disease, unspecified whether esophagitis present              Plan:             # HCC  Scirrhous subtype, which is very uncommon (~ 1% of all HCC); prognosis is likely similar to typical HCC.  No clear underlying liver pathology in this patient.  Had margin negative resection with negative lymph node eval on 6/28/21 with Dr. Howell.  Unfortunately she has biopsy proven recurrent metastatic disease to her RLL s/p IR biopsy 4/10/23.   She was discussed at thoracic tumor board with potential plan for surgical resection with Dr. Tadeo.  She had a concerning bone lesion on PET from 4/26 at T2.  This was biopsied and proven to be benign.  In the interim she had a repeat abdominal MRI on 6/13/23 which demonstrated growth of a portacaval lymph node that was very concerning for metastatic disease when we reviewed her imaging at liver conference.  Meanwhile her RLL met has grown slightly on 6/21/23 CT as well.  We discussed her case with Valerie Dhillon and Shwetha and we were all in agreement with proceeding with systemic therapy rather than surgery or radiation given multifocal progression.    We discussed this with her and she is agreeable with starting systemic therapy.  Reviewed possibilities of atezo + magaly or durva + treme.  She wished to  proceed with STRIDE regimen: durvalumab + tremelimumab.    Received cycle 1 on 7/11/23.  Repeat CT CAP after cycle 3 demonstrates mild growth in albert hepatis lymph node.  Stable disease elsewhere.    Discussed with Dr. Mendiola and she was deemed eligible for SBRT to her abdominal lymph node and growing lung nodule.  She completed SBRT to both 11/3/23.    Meanwhile she developed a new concerning area of possible recurrence near her liver resection site.  Discussed with Dr. Lala and Dr. Dhillon.  Dr. Dhillon was unable to visualize it during radiation simulation.  We recommended to proceed with Y-90 which was administered 2/1/24.    - doing well with immunotherapy. Lab work has been reviewed and adequate for treatment.   - Will continue with STRIDE regimen, meanwhile.   - Proceed with cycle 9 today - durvalumab alone.  - Will give IVF per her request with infusion today.    Consider switching systemic therapy only if significant progression given likely side effects of TKI.    Saw Dr. Sabillon of cardiology who recommended troponin and ECG monitoring given her cardiac history.  She is asymptomatic at present.    Will plan to bring her back in 4 weeks for cycle 10.  Repeat MRI and CT chest scheduled prior to f/u.    # Neutropenia, cervical radiculopathy  Resolved.  Has experienced in past.  Likely benign etiology.  Continue to f/u with Orthopedics    # Liver hemangiomas, GERD  Continue monitoring as indicated with Dr. Rogers.  Continue to f/u with GI team for GERD symptoms. Continue with medication management with Protonix.    Follow up: 4 weeks.    The above information has been reviewed with the patient and all questions have been answered to their apparent satisfaction.  They understand that they can call the clinic with any questions.    Philippe Carrasco MD  Hematology/Oncology  [facility]    Route Chart for Scheduling    Med Onc Chart Routing      Follow up with physician 4 weeks. for  durvalumab   Follow up with MELA    Infusion scheduling note    Injection scheduling note    Labs CBC, CMP and TSH   Scheduling:  Preferred lab:  Lab interval: every 4 weeks  & AFP   Imaging    Pharmacy appointment    Other referrals                  Treatment Plan Information   OP TREMELIMUMAB 300 MG (X 1) + DURVALUMAB 1500 MG Q4W   Philippe Carrasco MD   Upcoming Treatment Dates - OP TREMELIMUMAB 300 MG (X 1) + DURVALUMAB 1500 MG Q4W    3/18/2024       Chemotherapy       durvalumab (IMFINZI) 1,500 mg in sodium chloride 0.9% SolP 280 mL chemo infusion       Antiemetics       prochlorperazine injection Soln 5 mg  4/15/2024       Chemotherapy       durvalumab (IMFINZI) 1,500 mg in sodium chloride 0.9% SolP 280 mL chemo infusion       Antiemetics       prochlorperazine injection Soln 5 mg  5/13/2024       Chemotherapy       durvalumab (IMFINZI) 1,500 mg in sodium chloride 0.9% SolP 280 mL chemo infusion       Antiemetics       prochlorperazine injection Soln 5 mg  6/10/2024       Chemotherapy       durvalumab (IMFINZI) 1,500 mg in sodium chloride 0.9% SolP 280 mL chemo infusion       Antiemetics       prochlorperazine injection Soln 5 mg

## 2024-02-26 ENCOUNTER — TELEPHONE (OUTPATIENT)
Dept: PAIN MEDICINE | Facility: CLINIC | Age: 44
End: 2024-02-26
Payer: COMMERCIAL

## 2024-02-27 ENCOUNTER — TELEPHONE (OUTPATIENT)
Dept: OBSTETRICS AND GYNECOLOGY | Facility: CLINIC | Age: 44
End: 2024-02-27
Payer: COMMERCIAL

## 2024-02-27 RX ORDER — NORETHINDRONE ACETATE AND ETHINYL ESTRADIOL .02; 1 MG/1; MG/1
1 TABLET ORAL
Qty: 63 TABLET | Refills: 4 | OUTPATIENT
Start: 2024-02-27

## 2024-02-27 RX ORDER — NORETHINDRONE ACETATE AND ETHINYL ESTRADIOL .02; 1 MG/1; MG/1
1 TABLET ORAL DAILY
Qty: 63 TABLET | Refills: 0 | Status: SHIPPED | OUTPATIENT
Start: 2024-02-27 | End: 2024-03-08 | Stop reason: SDUPTHER

## 2024-02-27 NOTE — TELEPHONE ENCOUNTER
Interface, Surescripts In  P Jackie FELDER Staff  Caller: Unspecified (Today, 12:07 AM)         Requested Renewals     Name from pharmacy: [facility] ESTRAD 1-0.02 MG         Will file in chart as: norethindrone-ethinyl estradiol (MICROGESTIN 1/20) 1-20 mg-mcg per tablet    Sig: TAKE 1 TABLET BY MOUTH EVERY DAY    Disp: 63 tablet    Refills: 4    Start: 2/27/2024    Class: Normal    Last ordered: 11 months ago (3/9/2023) by Asaf Mejia MD    Last refill: 12/20/2023    Rx #: [id number]    OB/GYN:  Contraceptives [id number]/27/2024 12:07 AM   Protocol Details Matches previous order    Valid OB/GYN Encounter within 15 months    Patient is at least 18 years old    No positive pregnancy tests documented in last 360 days    Patient is not an active smoker    Negative Pregnancy Status Check    No contraindicated diagnoses on problem list    BP completed in the last 450 days      To be filled at: [facility] #[id number] - [place], [place] - [address]

## 2024-02-29 ENCOUNTER — HOSPITAL ENCOUNTER (OUTPATIENT)
Dept: RADIOLOGY | Facility: HOSPITAL | Age: 44
Discharge: HOME | End: 2024-02-29
Attending: PHYSICIAN ASSISTANT
Payer: COMMERCIAL

## 2024-02-29 ENCOUNTER — HOSPITAL ENCOUNTER (OUTPATIENT)
Dept: RADIOLOGY | Facility: HOSPITAL | Age: 44
Discharge: HOME | End: 2024-02-29
Attending: INTERNAL MEDICINE
Payer: COMMERCIAL

## 2024-02-29 DIAGNOSIS — C22.0 HCC (HEPATOCELLULAR CARCINOMA): ICD-10-CM

## 2024-02-29 DIAGNOSIS — C78.01 MALIGNANT NEOPLASM METASTATIC TO RIGHT LUNG: ICD-10-CM

## 2024-02-29 PROCEDURE — 71250 CT THORAX DX C-: CPT | Mod: 26,,, | Performed by: RADIOLOGY

## 2024-02-29 PROCEDURE — 25500020 PHARM REV CODE 255: Performed by: PHYSICIAN ASSISTANT

## 2024-02-29 PROCEDURE — 71250 CT THORAX DX C-: CPT | Mod: TC

## 2024-02-29 PROCEDURE — A9585 GADOBUTROL INJECTION: HCPCS | Performed by: PHYSICIAN ASSISTANT

## 2024-02-29 PROCEDURE — 74183 MRI ABD W/O CNTR FLWD CNTR: CPT | Mod: TC

## 2024-02-29 PROCEDURE — 74183 MRI ABD W/O CNTR FLWD CNTR: CPT | Mod: 26,,, | Performed by: RADIOLOGY

## 2024-02-29 RX ORDER — GADOBUTROL 604.72 MG/ML
10 INJECTION INTRAVENOUS
Status: COMPLETED | OUTPATIENT
Start: 2024-02-29 | End: 2024-02-29

## 2024-02-29 RX ADMIN — GADOBUTROL 10 ML: 604.72 INJECTION INTRAVENOUS at 07:02

## 2024-03-05 ENCOUNTER — PATIENT MESSAGE (OUTPATIENT)
Dept: ORTHOPEDICS | Facility: CLINIC | Age: 44
End: 2024-03-05
Payer: COMMERCIAL

## 2024-03-05 ENCOUNTER — CLINICAL SUPPORT (OUTPATIENT)
Dept: INTERVENTIONAL RADIOLOGY/VASCULAR | Facility: CLINIC | Age: 44
End: 2024-03-05
Payer: COMMERCIAL

## 2024-03-05 DIAGNOSIS — C22.0 HCC (HEPATOCELLULAR CARCINOMA): Primary | ICD-10-CM

## 2024-03-05 DIAGNOSIS — R22.41 LEG MASS, RIGHT: ICD-10-CM

## 2024-03-05 PROCEDURE — 99213 OFFICE O/P EST LOW 20 MIN: CPT | Mod: 95,,, | Performed by: FAMILY MEDICINE

## 2024-03-05 NOTE — PROGRESS NOTES
"Subjective     Patient ID: Melly Hwang is a 43 y.o. female.    Chief Complaint: Hepatocellular Carcinoma    Virtual visit with patient for follow up of hepatocellular carcinoma. She has a history of cirrhosis HCC s/p resection with R liver partial hepatectomy on 6/28/21. She was subsequently referred to Oncology in 2021 for evaluation for persistent CA 19-9 but there was no radiographic evidence of HCC disease recurrence or alternative reason for CA 19-9 elevation.  CT chest on 3/9/23 showed an enlarging RLL nodule measuring 1.1 cm, increased from 0.8 cm in size.  IR biopsy of this on 4/10/23 confirmed metastatic HCC.  Since then in mid-June she has also had an MRI abdomen that showed an enlarging portocaval lymph node that is consistent with metastatic disease.  She completed SBRT at the beginning of November to her lung metastasis and to her portocaval lymph node. MRI obtained on 11/21/2024 noted "Increased size of nodular enhancement about the resection margin compared to prior MR 06/13/2023, without pseudo capsule or washout.  Finding may reflect evolving postoperative scarring, however threshold growth is concerning for recurrence." This recurrence was treated with radioembolization on 2/1/2024.     Today she denies any abdominal pain or distention. She has complaints of right sided neck pain that radiates to her shoulder and down her arm. She also has complaints of a "knot at the site of femoral access. She had a follow up MRI on 2/29/2024.      Review of Systems   Constitutional:  Negative for activity change, appetite change, chills, fatigue and fever.   Respiratory:  Negative for cough, shortness of breath, wheezing and stridor.    Cardiovascular:  Negative for chest pain, palpitations and leg swelling.   Gastrointestinal:  Negative for abdominal distention, abdominal pain, constipation, diarrhea, nausea and vomiting.   Musculoskeletal:  Positive for neck pain.   Integumentary:  Positive for " "mole/lesion ("knot" in the right groin).          Objective     Physical Exam  Constitutional:       General: She is not in acute distress.     Appearance: She is well-developed. She is not diaphoretic.   HENT:      Head: Normocephalic and atraumatic.   Pulmonary:      Effort: Pulmonary effort is normal. No respiratory distress.   Neurological:      Mental Status: She is alert and oriented to person, place, and time.   Psychiatric:         Behavior: Behavior normal.         Thought Content: Thought content normal.         Judgment: Judgment normal.       MRI 2/29/2024  FINDINGS:  Inferior Thorax: Partially visualized heart and lungs are without significant abnormality.     Liver: Hepatomegaly with normal signal intensity.  Postoperative change partial right hepatectomy.  Status post recent IR embolization at hepatic segment 8.  The residual treatment cavity demonstrates areas of peripheral enhancement.  No areas of nodular enhancement to suggest tumor recurrence at this site.     Indeterminate homogeneously enhancing round lesion in the left hepatic lobe measuring 1.5 cm (series 11, image 22), which becomes isointense on portal venous and delayed phases.  This lesion demonstrates interval growth, previously measured 1.2 cm on 11/21/2023 MRI and 0.9 cm on 09/09/2022 MRI.  No washout or pseudo capsule.     Two stable T2 hyperintense lesions in the right hepatic lobe which demonstrate peripheral nodular discontinuous enhancement compatible with hemangiomas, largest measuring 2.2 cm (series 11, images 31 and 63).     Stable subcentimeter T2 hyperintense focus in the right hepatic lobe (series 11, image 63), probable small hemangioma or cyst.     No new hepatic lesions.  Portal hepatic veins are patent.     Biliary: No gallstones.  No intrahepatic or extrahepatic ductal dilatation.     Pancreas: No enhancing mass or pancreatic duct dilatation.     Spleen: Normal size.     Adrenals: No significant abnormality.   "   Kidneys/Ureters: Kidneys enhance symmetrically.  No enhancing mass. No hydronephrosis.     GI tract/Mesentery: Partially visualized small and large bowel are without obstruction or inflammation.     Peritoneal Space: No upper abdominal ascites.     Lymph nodes: Interval decrease in size of pericaval lymph node now measuring 0.8 cm (previously 1.5 cm).  No new or enlarging lymphadenopathy.     Abdominal wall: No significant abnormality.     Vasculature: Aorta maintains normal course and caliber.     Bones: Stable degenerative changes.  No marrow replacing lesions.     Impression:     1. Status post IR embolization at hepatic segment 8.  Residual treatment cavity demonstrates areas of peripheral enhancement.  No convincing evidence of local recurrence at this site.  2. Indeterminate homogeneously enhancing round lesion in the left hepatic lobe demonstrating slow interval growth compared to the previous 2 MRIs (now measuring 1.5 cm, previsouly 0.9 cm on 2022 MRI).  3. Two lesions in the right hepatic lobe compatible with hemangiomas.  4. No new focal hepatic lesions.  5. Previous pericaval lymph node has decreased in size.  No new or enlarging lymphadenopathy.  6. Other findings above.    Reviewed oncology progress note.    ECO  MELD 3.0: 7 at 2024  9:32 AM  MELD-Na: 6 at 2024  9:32 AM  Calculated from:  Serum Creatinine: 0.9 mg/dL (Using min of 1 mg/dL) at 2024  9:32 AM  Serum Sodium: 138 mmol/L (Using max of 137 mmol/L) at 2024  9:32 AM  Total Bilirubin: 0.9 mg/dL (Using min of 1 mg/dL) at 2024  9:32 AM  Serum Albumin: 3.6 g/dL (Using max of 3.5 g/dL) at 2024  9:32 AM  INR(ratio): 1.0 at 2024  9:32 AM  Age at listing (hypothetical): 43 years  Sex: Female at 2024  9:32 AM  Transplant Status: not evaluated       Assessment and Plan     1. HCC (hepatocellular carcinoma)    2. Leg mass, right  -     US Soft Tissue, Groin Right; Future; Expected date: 2024        The  "patient location is: [place]  The chief complaint leading to consultation is: HCC    Visit type: audiovisual    Face to Face time with patient: 20 minutes  25 minutes of total time spent on the encounter, which includes face to face time and non-face to face time preparing to see the patient (eg, review of tests), Obtaining and/or reviewing separately obtained history, Documenting clinical information in the electronic or other health record, Independently interpreting results (not separately reported) and communicating results to the patient/family/caregiver, or Care coordination (not separately reported).         Each patient to whom he or she provides medical services by telemedicine is:  (1) informed of the relationship between the physician and patient and the respective role of any other health care provider with respect to management of the patient; and (2) notified that he or she may decline to receive medical services by telemedicine and may withdraw from such care at any time.    Notes:   Reviewed MRI with Dr. Alford. Explained to patient MRI shows good response to treatment and recommendation is to continue with surveillance with repeat MRI in at least 3 months. This can be done through oncology. Patient expresses concern regarding indeterminate lesion. I will discuss with Dr. Alford and call her back.    Recommended follow up with either orthopedics or PCP regarding neck/shoulder/arm pain. Reassured patient that while Y90 can cause referred patient from the liver to the right shoulder, this should be resolved at this point. Therefore, recommend further workup to r/o anything musculoskeletal.    Will check "knot" in groin with ultrasound. Message sent to schedulers to contact patient. I will call her with results.    Late Entry: dicussed with Dr. Alford regarding indeterminate lesion. He confirms there are no concerning features regarding interval growth or washout. Telephoned patient and notified her " of his reassurance. No further questions at this time.

## 2024-03-06 ENCOUNTER — TELEPHONE (OUTPATIENT)
Dept: HEMATOLOGY/ONCOLOGY | Facility: CLINIC | Age: 44
End: 2024-03-06
Payer: COMMERCIAL

## 2024-03-08 ENCOUNTER — OFFICE VISIT (OUTPATIENT)
Dept: OBSTETRICS AND GYNECOLOGY | Facility: CLINIC | Age: 44
End: 2024-03-08
Attending: OBSTETRICS & GYNECOLOGY
Payer: COMMERCIAL

## 2024-03-08 VITALS
SYSTOLIC BLOOD PRESSURE: 138 MMHG | WEIGHT: 149.06 LBS | HEIGHT: 71 IN | DIASTOLIC BLOOD PRESSURE: 76 MMHG | BODY MASS INDEX: 20.87 KG/M2

## 2024-03-08 DIAGNOSIS — E28.39 PREMATURE OVARIAN FAILURE: ICD-10-CM

## 2024-03-08 DIAGNOSIS — Z12.4 PAP SMEAR FOR CERVICAL CANCER SCREENING: ICD-10-CM

## 2024-03-08 DIAGNOSIS — Z01.419 WOMEN'S ANNUAL ROUTINE GYNECOLOGICAL EXAMINATION: Primary | ICD-10-CM

## 2024-03-08 DIAGNOSIS — C22.0 HEPATOCELLULAR CARCINOMA: ICD-10-CM

## 2024-03-08 DIAGNOSIS — Z12.31 ENCOUNTER FOR SCREENING MAMMOGRAM FOR MALIGNANT NEOPLASM OF BREAST: ICD-10-CM

## 2024-03-08 PROCEDURE — 3078F DIAST BP <80 MM HG: CPT | Mod: CPTII,S$GLB,, | Performed by: OBSTETRICS & GYNECOLOGY

## 2024-03-08 PROCEDURE — 1159F MED LIST DOCD IN RCRD: CPT | Mod: CPTII,S$GLB,, | Performed by: OBSTETRICS & GYNECOLOGY

## 2024-03-08 PROCEDURE — 3075F SYST BP GE 130 - 139MM HG: CPT | Mod: CPTII,S$GLB,, | Performed by: OBSTETRICS & GYNECOLOGY

## 2024-03-08 PROCEDURE — 87624 HPV HI-RISK TYP POOLED RSLT: CPT | Performed by: OBSTETRICS & GYNECOLOGY

## 2024-03-08 PROCEDURE — 99999 PR PBB SHADOW E&M-EST. PATIENT-LVL III: CPT | Mod: PBBFAC,,, | Performed by: OBSTETRICS & GYNECOLOGY

## 2024-03-08 PROCEDURE — 88175 CYTOPATH C/V AUTO FLUID REDO: CPT | Performed by: OBSTETRICS & GYNECOLOGY

## 2024-03-08 PROCEDURE — 3008F BODY MASS INDEX DOCD: CPT | Mod: CPTII,S$GLB,, | Performed by: OBSTETRICS & GYNECOLOGY

## 2024-03-08 PROCEDURE — 99396 PREV VISIT EST AGE 40-64: CPT | Mod: S$GLB,,, | Performed by: OBSTETRICS & GYNECOLOGY

## 2024-03-08 RX ORDER — NORETHINDRONE ACETATE AND ETHINYL ESTRADIOL .02; 1 MG/1; MG/1
1 TABLET ORAL DAILY
Qty: 63 TABLET | Refills: 4 | Status: SHIPPED | OUTPATIENT
Start: 2024-03-08

## 2024-03-08 NOTE — PROGRESS NOTES
"Melly Hwang is a 43 y.o. female  who presents for annual exam.  She has a history of premature menopause and has been receiving hormonal supplementation with OCPs.  She is taking Microgestin  on a continual basis with very infrequent breakthrough bleeding.  Denies flashes / sweats.  She has hepatocellular carcinoma, followed by Dr. Carrasco.  No GYN complaints.   No LMP recorded (lmp unknown). Patient is postmenopausal.    Blood pressure 138/76, height 5' 11" (1.803 m), weight 67.6 kg (149 lb 0.5 oz).    3/9/23 Pap: Negative, HPV: Negative    23 Mammogram: Incomplete  23 Diagnostic mammogram: Negative    Past Medical History:   Diagnosis Date    VENKATA positive 2020    COVID-19     Deviated septum     Hepatocellular carcinoma 2021    Hypertrophy of inferior nasal turbinate     Pericardial effusion     Premature menopause        Past Surgical History:   Procedure Laterality Date    COLONOSCOPY N/A 2020    Procedure: COLONOSCOPY;  Surgeon: GIOVANNI Crane MD;  Location: [facility] (4TH FLR);  Service: Endoscopy;  Laterality: N/A;  + covid     ESOPHAGOGASTRODUODENOSCOPY N/A 1/10/2024    Procedure: ESOPHAGOGASTRODUODENOSCOPY (EGD);  Surgeon: Reynaldo Lynch MD;  Location: [facility] (2ND FLR);  Service: Endoscopy;  Laterality: N/A;  referred by daryl carrasco rocedure: EGD Diagnosis: Abnormal finding on GI tract imaging, Abdominal pain, and GERD  Procedure Timin-4 weeks Provider: Any GI provider [place] [facility] [facility] [place] [facility] [facility] [place] [facility]  Additional Scheduling Informat    HERNIA REPAIR      LIVER SURGERY N/A 2021    NASAL SEPTUM SURGERY      PERICARDIAL WINDOW N/A 2021    Procedure: CREATION, PERICARDIAL WINDOW;  Surgeon: Ajay Cantor MD;  [facility];  Service: Cardiovascular;  Laterality: N/A;    PERICARDIOCENTESIS N/A 2020    Procedure: Pericardiocentesis;  Surgeon: Dickson Dangelo MD;  Location: [facility] " LAB;  Service: Cardiology;  Laterality: N/A;    TONSILLECTOMY         OB History          2    Para   2    Term   1            AB        Living   1         SAB        IAB        Ectopic        Multiple        Live Births   1                 ROS:  GENERAL: Feeling well overall.   SKIN: Denies rash or lesions.   HEAD: Denies head injury or headache.   NODES: Denies enlarged lymph nodes.   CHEST: Denies chest pain or shortness of breath.   CARDIOVASCULAR: Denies palpitations or left sided chest pain.   ABDOMEN: No abdominal pain, nausea, vomiting or rectal bleeding.   URINARY: No dysuria or hematuria.  REPRODUCTIVE: See HPI.   BREASTS: Denies pain, lumps, or nipple discharge.   HEMATOLOGIC: No easy bruisability or excessive bleeding.   NEUROLOGIC: Denies syncope or weakness.   PSYCHIATRIC: Reports stress.    PE:   (chaperone present during entire exam)  APPEARANCE: Well nourished, well developed, in no acute distress.  BREASTS: Symmetrical, no skin changes or visible lesions. No palpable masses, nipple discharge or adenopathy bilaterally.  ABDOMEN: Soft. No tenderness or masses.  VULVA: No lesions. Normal female genitalia.  URETHRAL MEATUS: Normal size and location, no lesions, no prolapse.  URETHRA: No masses, tenderness, prolapse or scarring.  VAGINA: Moist and well rugated, no abnormal discharge, no significant cystocele or rectocele.  CERVIX: No lesions and discharge. PAP done.  UTERUS: Normal size, regular shape, mobile, non-tender, bladder base nontender.  ADNEXA: No masses, tenderness or CDS nodularity.  ANUS PERINEUM: Normal.      Diagnosis:  1. Women's annual routine gynecological examination    2. Pap smear for cervical cancer screening    3. Encounter for screening mammogram for malignant neoplasm of breast    4. Premature ovarian failure    5. Hepatocellular carcinoma          PLAN:    Orders Placed This Encounter    HPV High Risk Genotypes, PCR    Mammo Digital Screening Bilat w/ Josue     Liquid-Based Pap Smear, Screening    norethindrone-ethinyl estradiol (MICROGESTIN 1/20) 1-20 mg-mcg per tablet       Patient was counseled today on the need for annual gyn exams.  We reviewed her usage of OCPs: r / b for hormonal supplementation.    Follow-up in 1 year.

## 2024-03-11 ENCOUNTER — HOSPITAL ENCOUNTER (OUTPATIENT)
Dept: RADIOLOGY | Facility: HOSPITAL | Age: 44
Discharge: HOME | End: 2024-03-11
Attending: FAMILY MEDICINE
Payer: COMMERCIAL

## 2024-03-11 DIAGNOSIS — R22.41 LEG MASS, RIGHT: ICD-10-CM

## 2024-03-11 PROCEDURE — 76882 US LMTD JT/FCL EVL NVASC XTR: CPT | Mod: 26,RT,, | Performed by: STUDENT IN AN ORGANIZED HEALTH CARE EDUCATION/TRAINING PROGRAM

## 2024-03-11 PROCEDURE — 76882 US LMTD JT/FCL EVL NVASC XTR: CPT | Mod: TC,RT

## 2024-03-12 ENCOUNTER — TELEPHONE (OUTPATIENT)
Dept: PAIN MEDICINE | Facility: CLINIC | Age: 44
End: 2024-03-12

## 2024-03-12 ENCOUNTER — OFFICE VISIT (OUTPATIENT)
Dept: PAIN MEDICINE | Facility: CLINIC | Age: 44
End: 2024-03-12
Payer: COMMERCIAL

## 2024-03-12 VITALS
DIASTOLIC BLOOD PRESSURE: 94 MMHG | HEART RATE: 81 BPM | SYSTOLIC BLOOD PRESSURE: 146 MMHG | BODY MASS INDEX: 20.87 KG/M2 | WEIGHT: 149.06 LBS | HEIGHT: 71 IN

## 2024-03-12 DIAGNOSIS — M50.30 DDD (DEGENERATIVE DISC DISEASE), CERVICAL: ICD-10-CM

## 2024-03-12 DIAGNOSIS — M54.12 CERVICAL RADICULITIS: Primary | ICD-10-CM

## 2024-03-12 DIAGNOSIS — M54.12 CERVICAL RADICULOPATHY: Primary | ICD-10-CM

## 2024-03-12 PROCEDURE — 1159F MED LIST DOCD IN RCRD: CPT | Mod: CPTII,S$GLB,, | Performed by: EMERGENCY MEDICINE

## 2024-03-12 PROCEDURE — 3080F DIAST BP >= 90 MM HG: CPT | Mod: CPTII,S$GLB,, | Performed by: EMERGENCY MEDICINE

## 2024-03-12 PROCEDURE — 3077F SYST BP >= 140 MM HG: CPT | Mod: CPTII,S$GLB,, | Performed by: EMERGENCY MEDICINE

## 2024-03-12 PROCEDURE — 3008F BODY MASS INDEX DOCD: CPT | Mod: CPTII,S$GLB,, | Performed by: EMERGENCY MEDICINE

## 2024-03-12 PROCEDURE — 99204 OFFICE O/P NEW MOD 45 MIN: CPT | Mod: S$GLB,,, | Performed by: EMERGENCY MEDICINE

## 2024-03-12 PROCEDURE — 99999 PR PBB SHADOW E&M-EST. PATIENT-LVL III: CPT | Mod: PBBFAC,,, | Performed by: EMERGENCY MEDICINE

## 2024-03-12 NOTE — PROGRESS NOTES
..This note was completed with dictation software and grammatical errors may exist.    PCP: Melly Sahu MD    Chief Complaint   Patient presents with    Neck Pain        Original HPI: Melly Hwang is a 43 y.o. year old female patient who has a past medical history of VENKATA positive, COVID-19, Deviated septum, Hepatocellular carcinoma, Hypertrophy of inferior nasal turbinate, Pericardial effusion, and Premature menopause. She presents in referral from No ref. provider found for neck pain.    Original Pain Description:  The pain is located in the neck and is radiating to the right shoulder, right elbow . The pain is described as aching, burning, and throbbing. Exacerbating factors: Laying. Mitigating factors medications. Symptoms interfere with daily activity. The patient feels like symptoms have been unchanged. Patient denies loss of sensations. Patient reports that she had this pain for several years on the left, but now it started on the right. She had a procedure planned, but cancelled due to resolution of the left sideed pain.     PAIN SCORES:  Best: Pain is 0  Current: Pain is 5  Worst: Pain is 10        3/12/2024     9:13 AM   Last 3 PDI Scores   Pain Disability Index (PDI) 35       6 weeks of Conservative therapy:  PT: completed  Chiro:  HEP: Participating      Treatments / Medications: (Ice/Heat/NSAIDS/APAP/etc):  Xanax 0.5mg - not taken in several months  Flexeril 10mg qhs  Tizanidine 4mg - did not work  Motrin q6hr  Tylenol q4 hr    Antiplatelets/Anticoagulants:  None    Interventional Pain Procedures: (Previous injections)  None    IMAGING:    EMG 10/13/23: Impression   This is a minimally abnormal study. There is electrophysiologic evidence of:   1. Very mild right carpal tunnel syndrome (median neuropathy across the     MRI CERVICAL SPINE WITHOUT CONTRAST   11/28/2022  C2-C3: Minimal bilateral facet joint arthropathy.  No spinal canal stenosis or neural foraminal narrowing.  C3-C4: Minimal  posterior disc osteophyte complex with mild effacement of the ventral thecal sac.  No spinal canal stenosis or neural foraminal narrowing.  C4-C5: Minimal posterior disc osteophyte complex with mild effacement of the ventral thecal sac.  Bilateral facet joint arthropathy.  No spinal canal stenosis or neural foraminal narrowing.  C5-C6: Minimal posterior disc osteophyte complex with mild effacement of the ventral thecal sac.  No spinal canal stenosis or neural foraminal narrowing.  C6-C7: Posterior disc osteophyte complex, left worse than right.  The deformation of the spinal cord at this level without signal change.  Moderate spinal canal stenosis face mint of anterior thecal sac margins and mild flattening of the cord.  Mild left neural foraminal narrowing.  C7-T1: No spinal canal stenosis or neural foraminal narrowing.     Past Surgical History:   Procedure Laterality Date    COLONOSCOPY N/A 2020    Procedure: COLONOSCOPY;  Surgeon: GIOVANNI Crane MD;  Location: [facility] (4TH FLR);  Service: Endoscopy;  Laterality: N/A;  + covid     ESOPHAGOGASTRODUODENOSCOPY N/A 1/10/2024    Procedure: ESOPHAGOGASTRODUODENOSCOPY (EGD);  Surgeon: Reynaldo Lynch MD;  Location: [facility] (2ND FLR);  Service: Endoscopy;  Laterality: N/A;  referred by daryl davisure: EGD Diagnosis: Abnormal finding on GI tract imaging, Abdominal pain, and GERD  Procedure Timin-4 weeks Provider: Any GI provider Location: [facility], [facility]Endo, and [facility] 4-Endo  Additional Scheduling Informat    HERNIA REPAIR      LIVER SURGERY N/A 2021    NASAL SEPTUM SURGERY      PERICARDIAL WINDOW N/A 2021    Procedure: CREATION, PERICARDIAL WINDOW;  Surgeon: Ajay Cantor MD;  Location: [facility] OR 2ND FLR;  Service: Cardiovascular;  Laterality: N/A;    PERICARDIOCENTESIS N/A 2020    Procedure: Pericardiocentesis;  Surgeon: Dickson Dangelo MD;  Location: [facility] CATH LAB;  Service: Cardiology;  Laterality: N/A;     TONSILLECTOMY         Social History     Socioeconomic History    Marital status: Single    Number of children: 1   Occupational History    Occupation:      Employer: [employer]   Tobacco Use    Smoking status: Never    Smokeless tobacco: Never   Substance and Sexual Activity    Alcohol use: Yes     Comment: rare    Drug use: No    Sexual activity: Yes     Partners: Male     Birth control/protection: OCP   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)     Social Determinants of Health     Financial Resource Strain: Patient Declined (1/21/2024)    Overall Financial Resource Strain (CARDIA)     Difficulty of Paying Living Expenses: Patient declined   Food Insecurity: No Food Insecurity (1/21/2024)    Hunger Vital Sign     Worried About Running Out of Food in the Last Year: Never true     Ran Out of Food in the Last Year: Never true   Transportation Needs: No Transportation Needs (1/21/2024)    PRAPARE - Transportation     Lack of Transportation (Medical): No     Lack of Transportation (Non-Medical): No   Physical Activity: Inactive (1/21/2024)    Exercise Vital Sign     Days of Exercise per Week: 0 days     Minutes of Exercise per Session: 0 min   Stress: No Stress Concern Present (1/21/2024)    [employer] - Occupational Stress Questionnaire     Feeling of Stress : Only a little   Recent Concern: Stress - Stress Concern Present (12/19/2023)    [employer] - Occupational Stress Questionnaire     Feeling of Stress : To some extent   Social Connections: Unknown (1/21/2024)    Social Connection and Isolation Panel [NHANES]     Frequency of Communication with Friends and Family: Twice a week     Frequency of Social Gatherings with Friends and Family: Once a week     Active Member of Clubs or Organizations: No     Attends Club or Organization Meetings: Never     Marital Status: Never    Housing Stability: Low Risk   "(1/21/2024)    Housing Stability Vital Sign     Unable to Pay for Housing in the Last Year: No     Number of Places Lived in the Last Year: 0     Unstable Housing in the Last Year: No       Medications/Allergies: See med card    ROS:  GENERAL: No fever. No chills. No fatigue. Denies weight loss. Denies weight gain.  Back / musculoskeletal / neuro : See HPI    VITALS:   Vitals:    03/12/24 0915   BP: (!) 146/94   Pulse: 81   Weight: 67.6 kg (149 lb 0.5 oz)   Height: 5' 11" (1.803 m)   PainSc:   3   PainLoc: Neck     Body mass index is 20.79 kg/m².      3/12/2024     9:13 AM   Last 3 PDI Scores   Pain Disability Index (PDI) 35       PHYSICAL EXAM:   GENERAL: Well appearing, in no acute distress, alert and oriented x3.  PSYCH:  Mood and affect appropriate.  SKIN: Skin color, texture, turgor normal, no rashes or lesions.  HEENT:  Normocephalic, atraumatic. Cranial nerves grossly intact.  NECK: No pain to palpation over the cervical paraspinous muscles. No pain to palpation over facets.  Spurling's positive.  Pain with neck extension and rotation.    PULM: No evidence of respiratory difficulty, symmetric chest rise.  GI:  Non-distended  BACK: Normal range of motion. No pain to palpation over the spinous processes. No pain to palpation over facet joints. There is no pain with palpation over the sacroiliac joints bilaterally.   EXTREMITIES: No deformities, edema, or skin discoloration.   MUSCULOSKELETAL: Shoulder, hip, and knee provocative maneuvers are negative. No atrophy is noted.  NEURO: Sensation is equal and appropriate bilaterally. Bilateral upper and lower extremity strength is normal and symmetric. Bilateral upper and lower extremity coordination and muscle stretch reflexes are physiologic and symmetric. Plantar response are downgoing. Straight leg raising in the supine position is negative to radicular pain.  Negative Zulma's on right.  GAIT: normal.    LABS:    Lab Results   Component Value Date    HGBA1C 5.1 " 09/18/2023       Lab Results   Component Value Date    WBC 4.54 02/23/2024    HGB 13.2 02/23/2024    HCT 41.1 02/23/2024    MCV 97 02/23/2024     02/23/2024       ASSESSMENT: 43 y.o. year old female with pain, consistent with:    Encounter Diagnoses   Name Primary?    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  - Patient can continue with medications for now since they are providing benefits, using them appropriately, and without side effects.  - Counseled patient regarding the importance of activity modification and constant sleeping habits.  - Interventions: Schedule for cervical interlaminar epidural steroid injection at C7-T1 to help with pain and to progress with a home exercise program. Explained the risks and benefits of the procedure in detail with the patient today in clinic along with alternative treatment options, and the patient elected to pursue the intervention at this time.    - Anticoagulation use: No no anticoagulation  - Imaging: Reviewed available imaging with patient and answered any questions they had regarding study.  - The patient's pathophysiology was explained in detail with reference to x-rays, models, other visual aids as appropriate.   - Follow up visit: return to clinic in 2 weeks after procedure    Cezar Bailey MD  03/12/2024

## 2024-03-13 ENCOUNTER — TELEPHONE (OUTPATIENT)
Dept: INTERVENTIONAL RADIOLOGY/VASCULAR | Facility: CLINIC | Age: 44
End: 2024-03-13
Payer: COMMERCIAL

## 2024-03-13 NOTE — TELEPHONE ENCOUNTER
Called patient to review results of ultrasound. Reassured patient small hematoma should resolve on its own and no intervention is warranted at this time. Patient verbalized understanding and agreement. No further questions at this time.

## 2024-03-14 ENCOUNTER — HOSPITAL ENCOUNTER (EMERGENCY)
Facility: HOSPITAL | Age: 44
Discharge: HOME | End: 2024-03-15
Attending: STUDENT IN AN ORGANIZED HEALTH CARE EDUCATION/TRAINING PROGRAM
Payer: COMMERCIAL

## 2024-03-14 DIAGNOSIS — R07.89 CHEST WALL PAIN: Primary | ICD-10-CM

## 2024-03-14 DIAGNOSIS — R07.9 CHEST PAIN: ICD-10-CM

## 2024-03-14 LAB
BASOPHILS # BLD AUTO: 0.04 K/UL (ref 0–0.2)
BASOPHILS NFR BLD: 0.7 % (ref 0–1.9)
DIFFERENTIAL METHOD BLD: ABNORMAL
EOSINOPHIL # BLD AUTO: 0.2 K/UL (ref 0–0.5)
EOSINOPHIL NFR BLD: 3.3 % (ref 0–8)
ERYTHROCYTE [DISTWIDTH] IN BLOOD BY AUTOMATED COUNT: 11.9 % (ref 11.5–14.5)
HCT VFR BLD AUTO: 39 % (ref 37–48.5)
HGB BLD-MCNC: 12.9 G/DL (ref 12–16)
IMM GRANULOCYTES # BLD AUTO: 0.02 K/UL (ref 0–0.04)
IMM GRANULOCYTES NFR BLD AUTO: 0.3 % (ref 0–0.5)
LYMPHOCYTES # BLD AUTO: 0.9 K/UL (ref 1–4.8)
LYMPHOCYTES NFR BLD: 15 % (ref 18–48)
MCH RBC QN AUTO: 31.9 PG (ref 27–31)
MCHC RBC AUTO-ENTMCNC: 33.1 G/DL (ref 32–36)
MCV RBC AUTO: 96 FL (ref 82–98)
MONOCYTES # BLD AUTO: 0.5 K/UL (ref 0.3–1)
MONOCYTES NFR BLD: 9.1 % (ref 4–15)
NEUTROPHILS # BLD AUTO: 4.2 K/UL (ref 1.8–7.7)
NEUTROPHILS NFR BLD: 71.6 % (ref 38–73)
NRBC BLD-RTO: 0 /100 WBC
PLATELET # BLD AUTO: 254 K/UL (ref 150–450)
PMV BLD AUTO: 9.7 FL (ref 9.2–12.9)
RBC # BLD AUTO: 4.05 M/UL (ref 4–5.4)
WBC # BLD AUTO: 5.8 K/UL (ref 3.9–12.7)

## 2024-03-14 PROCEDURE — 93005 ELECTROCARDIOGRAM TRACING: CPT

## 2024-03-14 PROCEDURE — 83880 ASSAY OF NATRIURETIC PEPTIDE: CPT

## 2024-03-14 PROCEDURE — 63600175 PHARM REV CODE 636 W HCPCS

## 2024-03-14 PROCEDURE — 96374 THER/PROPH/DIAG INJ IV PUSH: CPT

## 2024-03-14 PROCEDURE — 84484 ASSAY OF TROPONIN QUANT: CPT

## 2024-03-14 PROCEDURE — 85025 COMPLETE CBC W/AUTO DIFF WBC: CPT

## 2024-03-14 PROCEDURE — 93010 ELECTROCARDIOGRAM REPORT: CPT | Mod: ,,, | Performed by: INTERNAL MEDICINE

## 2024-03-14 PROCEDURE — 94761 N-INVAS EAR/PLS OXIMETRY MLT: CPT

## 2024-03-14 PROCEDURE — 87389 HIV-1 AG W/HIV-1&-2 AB AG IA: CPT | Performed by: PHYSICIAN ASSISTANT

## 2024-03-14 PROCEDURE — 99285 EMERGENCY DEPT VISIT HI MDM: CPT | Mod: 25

## 2024-03-14 PROCEDURE — 85379 FIBRIN DEGRADATION QUANT: CPT

## 2024-03-14 PROCEDURE — 86803 HEPATITIS C AB TEST: CPT | Performed by: PHYSICIAN ASSISTANT

## 2024-03-14 PROCEDURE — 80053 COMPREHEN METABOLIC PANEL: CPT

## 2024-03-14 RX ORDER — KETOROLAC TROMETHAMINE 30 MG/ML
15 INJECTION, SOLUTION INTRAMUSCULAR; INTRAVENOUS
Status: COMPLETED | OUTPATIENT
Start: 2024-03-14 | End: 2024-03-14

## 2024-03-14 RX ADMIN — KETOROLAC TROMETHAMINE 15 MG: 30 INJECTION, SOLUTION INTRAMUSCULAR; INTRAVENOUS at 11:03

## 2024-03-14 NOTE — Clinical Note
"Melly Varghesee" Jaiden was seen and treated in our emergency department on 3/14/2024.  She may return to work on 03/19/2024.       If you have any questions or concerns, please don't hesitate to call.      Carin Bruno MD"

## 2024-03-15 ENCOUNTER — PATIENT MESSAGE (OUTPATIENT)
Dept: HEMATOLOGY/ONCOLOGY | Facility: CLINIC | Age: 44
End: 2024-03-15
Payer: COMMERCIAL

## 2024-03-15 LAB
ALBUMIN SERPL BCP-MCNC: 3.5 G/DL (ref 3.5–5.2)
ALP SERPL-CCNC: 62 U/L (ref 55–135)
ALT SERPL W/O P-5'-P-CCNC: 13 U/L (ref 10–44)
ANION GAP SERPL CALC-SCNC: 7 MMOL/L (ref 8–16)
AST SERPL-CCNC: 17 U/L (ref 10–40)
BILIRUB SERPL-MCNC: 0.5 MG/DL (ref 0.1–1)
BNP SERPL-MCNC: <10 PG/ML (ref 0–99)
BUN SERPL-MCNC: 10 MG/DL (ref 6–20)
CALCIUM SERPL-MCNC: 9.2 MG/DL (ref 8.7–10.5)
CHLORIDE SERPL-SCNC: 107 MMOL/L (ref 95–110)
CO2 SERPL-SCNC: 23 MMOL/L (ref 23–29)
CREAT SERPL-MCNC: 0.9 MG/DL (ref 0.5–1.4)
D DIMER PPP IA.FEU-MCNC: 0.41 MG/L FEU
EST. GFR  (NO RACE VARIABLE): >60 ML/MIN/1.73 M^2
GLUCOSE SERPL-MCNC: 87 MG/DL (ref 70–110)
HCV AB SERPL QL IA: NORMAL
HIV 1+2 AB+HIV1 P24 AG SERPL QL IA: NORMAL
HPV HR 12 DNA SPEC QL NAA+PROBE: NEGATIVE
HPV16 AG SPEC QL: NEGATIVE
HPV18 DNA SPEC QL NAA+PROBE: NEGATIVE
OHS QRS DURATION: 80 MS
OHS QTC CALCULATION: 437 MS
POTASSIUM SERPL-SCNC: 4.1 MMOL/L (ref 3.5–5.1)
PROT SERPL-MCNC: 7.1 G/DL (ref 6–8.4)
SODIUM SERPL-SCNC: 137 MMOL/L (ref 136–145)
TROPONIN I SERPL DL<=0.01 NG/ML-MCNC: <0.006 NG/ML (ref 0–0.03)
TROPONIN I SERPL DL<=0.01 NG/ML-MCNC: <0.006 NG/ML (ref 0–0.03)

## 2024-03-15 PROCEDURE — 84484 ASSAY OF TROPONIN QUANT: CPT

## 2024-03-15 RX ORDER — PANTOPRAZOLE SODIUM 40 MG/1
40 TABLET, DELAYED RELEASE ORAL
Qty: 90 TABLET | Refills: 1 | Status: SHIPPED | OUTPATIENT
Start: 2024-03-15

## 2024-03-15 RX ORDER — NAPROXEN 500 MG/1
500 TABLET ORAL 2 TIMES DAILY WITH MEALS
Qty: 60 TABLET | Refills: 0 | Status: SHIPPED | OUTPATIENT
Start: 2024-03-15

## 2024-03-15 NOTE — DISCHARGE INSTRUCTIONS
Diagnosis:   1. Chest wall pain    2. Chest pain        Tests you had showed:   Labs Reviewed   CBC W/ AUTO DIFFERENTIAL - Abnormal; Notable for the following components:       Result Value    MCH 31.9 (*)     Lymph # 0.9 (*)     Lymph % 15.0 (*)     All other components within normal limits    Narrative:     Release to patient->Immediate   COMPREHENSIVE METABOLIC PANEL - Abnormal; Notable for the following components:    Anion Gap 7 (*)     All other components within normal limits    Narrative:     Release to patient->Immediate   HIV 1 / 2 ANTIBODY    Narrative:     Release to patient->Immediate   HEPATITIS C ANTIBODY    Narrative:     Release to patient->Immediate   TROPONIN I    Narrative:     Release to patient->Immediate   B-TYPE NATRIURETIC PEPTIDE    Narrative:     Release to patient->Immediate   D DIMER, QUANTITATIVE    Narrative:     Release to patient->Immediate   TROPONIN I   POCT TROPONIN   POCT TROPONIN      X-Ray Chest AP Portable   Final Result      Mild hazy bibasilar edema.      Blunting of the CP angle suggesting small effusions.         Electronically signed by: Walt Mas MD   Date:    03/14/2024   Time:    23:21          Treatments you received were:   Medications   ketorolac injection 15 mg (15 mg Intravenous Given 3/14/24 5806)       Home Care Instructions:  - Medications: Continue taking your home medications as prescribed    Follow-Up Plan:  - Follow-up with: Primary care doctor within 5  days  - Additional testing and/or evaluation will be directed by your primary doctor    Return to the Emergency Department for symptoms including but not limited to: worsening symptoms, severe back pain, shortness of breath or chest pain, vomiting with inability to hold down fluids, blood in vomit or poop, fevers greater than 100.4°F, passing out/fainting/unconsciousness, or other concerning symptoms.     If you do not have a primary care doctor, you may contact the one listed on your discharge  paperwork or you may also call the [facility] Appointment Desk at [phone] to schedule an appointment and establish care with one. It is important to your health that you have a primary care doctor.    Please take all medications as directed. All medications may potentially have side-effects and it is impossible to predict which medications may give you side-effects or what side-effects (if any) they will give you. If you feel that you are having a negative effect or side-effect of any medication you should immediately stop taking them and seek medical attention. If you feel that you are having a life-threatening reaction call 911.

## 2024-03-15 NOTE — ED TRIAGE NOTES
Melly Hwang, a 43 y.o. female presents to the ED w/ complaint of right side chest pain radiating to her right arm/back.      Hx pericardial effusions and had a pericardial window performed on 02/01.   Patient reports today she developed this pain and it is 10/10 when she moves her arms or her trunk. Reports sharp pain when she inhales but denies shortness of breath otherwise. No nausea, vomiting, lightheadedness, other symptoms. Tried tylenol and a muscle relaxer at home to no avail    Triage note:  Chief Complaint   Patient presents with    Chest Pain     Right sided CP began around 5pm. Reports tachypnea.  hx pericardial window     Review of patient's allergies indicates:   Allergen Reactions    No known drug allergies      Past Medical History:   Diagnosis Date    VENKATA positive 08/20/2020    COVID-19     Deviated septum 2011    Hepatocellular carcinoma 05/07/2021    Hypertrophy of inferior nasal turbinate     Pericardial effusion     Premature menopause

## 2024-03-15 NOTE — ED PROVIDER NOTES
Encounter Date: 3/14/2024       History     Chief Complaint   Patient presents with    Chest Pain     Right sided CP began around 5pm. Reports tachypnea.  hx pericardial window     This patient is a 43 y.o. year old female who presents to the [facility] ED with chief complaint of chest pain that is 10/10 in severity that radiates to the right shoulder and is worsened by inspiration.  She first noticed this pain this morning in his been persistent throughout the day and has not diminished or worsened. past medical history of VENKATA positive, COVID-19, Deviated septum, Hepatocellular carcinoma, Hypertrophy of inferior nasal turbinate, Pericardial effusion, and postmenopausal.  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  Patient states that she underwent pericardial window creation in 2021 for pericardial effusion with no complications.  She has since been in remission for her hepatocellular carcinoma and is currently undergoing immuno therapy for maintenance.  Patient denies headache, shortness of breath, nausea/vomiting, abdominal pain, faintness or dizziness and is afebrile.    The history is provided by the patient and medical records. No  was used.     Review of patient's allergies indicates:   Allergen Reactions    No known drug allergies      Past Medical History:   Diagnosis Date    VENKATA positive 08/20/2020    COVID-19     Deviated septum 2011    Hepatocellular carcinoma 05/07/2021    Hypertrophy of inferior nasal turbinate     Pericardial effusion     Premature menopause      Past Surgical History:   Procedure Laterality Date    COLONOSCOPY N/A 09/21/2020    Procedure: COLONOSCOPY;  Surgeon: GIOVANNI Crane MD;  Location: [facility] (4TH FLR);  Service: Endoscopy;  Laterality: N/A;  + covid 4/22    ESOPHAGOGASTRODUODENOSCOPY N/A 1/10/2024    Procedure: ESOPHAGOGASTRODUODENOSCOPY (EGD);  Surgeon: Reynaldo Lynch MD;  Location: [facility] (2ND FLR);  Service: Endoscopy;  Laterality: N/A;   referred by daryl carrasco rocedure: EGD Diagnosis: Abnormal finding on GI tract imaging, Abdominal pain, and GERD  Procedure Timin-4 weeks Provider: Any GI provider Location: [facility], [facility] 2-Endo, and [facility] 4-Endo  Additional Scheduling Informat    HERNIA REPAIR      LIVER SURGERY N/A 2021    NASAL SEPTUM SURGERY      PERICARDIAL WINDOW N/A 2021    Procedure: CREATION, PERICARDIAL WINDOW;  Surgeon: Ajay Cantor MD;  Location: [facility] OR [address] [place] [address];  Service: Cardiovascular;  Laterality: N/A;    PERICARDIOCENTESIS N/A 2020    Procedure: Pericardiocentesis;  Surgeon: Dickson Dangelo MD;  Location: [facility] CATH LAB;  Service: Cardiology;  Laterality: N/A;    TONSILLECTOMY       Family History   Problem Relation Age of Onset    Diabetes Mother     Liver disease Mother         fatty liver    Heart disease Father     Macular degeneration Father     Diabetes Father     Hypertension Father     Hyperlipidemia Father     Heart disease Sister     Lung cancer Maternal Aunt         heavy smoker    Cerebral aneurysm Paternal Aunt     Cataracts Neg Hx     Glaucoma Neg Hx     Retinal detachment Neg Hx     Stroke Neg Hx     Thyroid disease Neg Hx     Melanoma Neg Hx     Heart attack Neg Hx      Social History     Tobacco Use    Smoking status: Never    Smokeless tobacco: Never   Substance Use Topics    Alcohol use: Yes     Comment: rare    Drug use: No     Review of Systems    Physical Exam     Initial Vitals [24 2201]   BP Pulse Resp Temp SpO2   (!) 149/89 89 15 98.1 °F (36.7 °C) 100 %      MAP       --         Physical Exam    Nursing note and vitals reviewed.  Constitutional: She appears well-developed and well-nourished. She is not diaphoretic. No distress.   HENT:   Head: Normocephalic and atraumatic.   Right Ear: External ear normal.   Left Ear: External ear normal.   Nose: Nose normal.   Mouth/Throat: Oropharynx is clear and moist.   Eyes: Conjunctivae and EOM are normal. Pupils are equal,  round, and reactive to light.   Neck: Neck supple.   Normal range of motion.  Cardiovascular:  Normal rate, regular rhythm, normal heart sounds and intact distal pulses.           Pulmonary/Chest: Breath sounds normal.   Abdominal: Abdomen is soft. Bowel sounds are normal.   Subxiphoid ventral incisional scar present   Musculoskeletal:         General: Normal range of motion.      Cervical back: Normal range of motion and neck supple.     Neurological: She is alert and oriented to person, place, and time. She has normal strength. GCS score is 15. GCS eye subscore is 4. GCS verbal subscore is 5. GCS motor subscore is 6.   Skin: Skin is warm and dry. Capillary refill takes less than 2 seconds.   Psychiatric: She has a normal mood and affect. Her behavior is normal. Judgment and thought content normal.         ED Course   Procedures  Labs Reviewed   CBC W/ AUTO DIFFERENTIAL - Abnormal; Notable for the following components:       Result Value    MCH 31.9 (*)     Lymph # 0.9 (*)     Lymph % 15.0 (*)     All other components within normal limits    Narrative:     Release to patient->Immediate   COMPREHENSIVE METABOLIC PANEL - Abnormal; Notable for the following components:    Anion Gap 7 (*)     All other components within normal limits    Narrative:     Release to patient->Immediate   HIV 1 / 2 ANTIBODY    Narrative:     Release to patient->Immediate   HEPATITIS C ANTIBODY    Narrative:     Release to patient->Immediate   TROPONIN I    Narrative:     Release to patient->Immediate   B-TYPE NATRIURETIC PEPTIDE    Narrative:     Release to patient->Immediate   D DIMER, QUANTITATIVE    Narrative:     Release to patient->Immediate   TROPONIN I     EKG Readings: (Independently Interpreted)   Initial Reading: No STEMI. Rhythm: Normal Sinus Rhythm. Ectopy: No Ectopy. Conduction: Normal. ST Segments: Normal ST Segments. T Waves: Normal. Clinical Impression: Normal Sinus Rhythm     ECG Results              EKG 12-lead (Final  result)        Collection Time Result Time QRS Duration OHS QTC Calculation    03/14/24 22:05:34 03/15/24 09:27:25 80 437                     Final result by Interface, Lab In [facility] (03/15/24 09:27:34)                   Narrative:    Test Reason : R07.9,    Vent. Rate : 090 BPM     Atrial Rate : 090 BPM     P-R Int : 126 ms          QRS Dur : 080 ms      QT Int : 358 ms       P-R-T Axes : 082 091 079 degrees     QTc Int : 437 ms    Normal sinus rhythm  Possible Left atrial enlargement  Rightward axis  Borderline Abnormal ECG  When compared with ECG of 06-DEC-2023 19:56,  Vent. rate has increased BY  34 BPM  Confirmed by Edmund NOBLE MD ([id number]) on 3/15/2024 9:27:19 AM    Referred By: [email or web address]   SELF           Confirmed By:Edmund NOBLE MD                                  Imaging Results              X-Ray Chest AP Portable (Final result)  Result time 03/14/24 23:21:32      Final result by Walt Mas MD (03/14/24 23:21:32)                   Impression:      Mild hazy bibasilar edema.    Blunting of the CP angle suggesting small effusions.      Electronically signed by: Walt Mas MD  Date:    03/14/2024  Time:    23:21               Narrative:    EXAMINATION:  XR CHEST AP PORTABLE    CLINICAL HISTORY:  Chest Pain;    TECHNIQUE:  Single frontal view of the chest was performed.    COMPARISON:  04/10/2023.    FINDINGS:  Blunting of the CP angle suggesting small effusions.  Mild hazy bibasilar edema.    Heart and lungs otherwise appear unchanged when allowing for differences in technique and positioning.                                    X-Rays:   Independently Interpreted Readings:   Chest X-Ray: Normal heart size.  No infiltrates.  No acute abnormalities.     Medications   ketorolac injection 15 mg (15 mg Intravenous Given 3/14/24 8684)     Medical Decision Making  43-year-old female with the aforementioned past medical history presenting with midsternal chest pain radiating to the right shoulder for the  past 12 hours.  No alleviating or aggravating factors.  Patient denies shortness of breath, other pain worsened by inspiration.  Physical exam otherwise unremarkable with very moderate pericardial effusion on bedside echo.  VSS/HDS and afebrile D-dimer negative, low suspicion for PE at this time.  Trop wanted to within normal limits.  EKG normal sinus rhythm with no ST elevations or depressions.  All other labs and imaging unremarkable.  Given that the symptoms subsided post ketorolac this most likely MSK in nature.  At this time I find no need for hospital admission for this patient and can follow-up outpatient with primary care character for ongoing care.  Patient is updated and agreed with care plan and will follow up with primary care provider.    Amount and/or Complexity of Data Reviewed  Labs: ordered. Decision-making details documented in ED Course.  Radiology: ordered. Decision-making details documented in ED Course.  ECG/medicine tests:  Decision-making details documented in ED Course.    Risk  Prescription drug management.  Parenteral controlled substances.               ED Course as of 03/15/24 2321   Thu Mar 14, 2024   2229 EKG 12-lead  Normal sinus rhythm, rate of 90.  No STEMI [CH]   2353 CBC auto differential(!)  Normal H&H.  No leukocytosis or left shift appreciated. [JL]   Fri Mar 15, 2024   0010 X-Ray Chest AP Portable  As per my interpretation no pulmonary vascular pathologies appreciated on chest x-ray. [JL]   0010 Comprehensive metabolic panel(!)  No metabolic or electrolyte derangements appreciated. [JL]   0010 Troponin I #1  First troponin within normal limits we will follow up with 2nd.  Low suspicion of ACS at this time with normal EKG. [JL]   0011 D dimer, quantitative  D-dimer negative low suspicion for PE as contributing factor to patient's symptoms. [JL]   0247 B-Type natriuretic peptide (BNP)  BNP within normal limits [JL]   0341 Troponin I #2  Second trop negative [JL]      ED Course  User Index  [CH] Adelia Cole MD  [JL] Carin Bruno MD                             Clinical Impression:  Final diagnoses:  [R07.9] Chest pain  [R07.89] Chest wall pain (Primary)          ED Disposition Condition    Discharge Stable          ED Prescriptions       Medication Sig Dispense Start Date End Date Auth. Provider    naproxen (NAPROSYN) 500 MG tablet Take 1 tablet (500 mg total) by mouth 2 (two) times daily with meals. 60 tablet 3/15/2024 -- Carin Bruno MD          Follow-up Information       Follow up With Specialties Details Why Contact Info    Melly Sahu MD Internal Medicine Schedule an appointment as soon as possible for a visit in 1 week  [address]  [place] [address]  [phone]      [facility] - Emergency Dept Emergency Medicine  As needed, If symptoms worsen [address] [facility] [id number] [phone]             Carin Bruno MD  Resident  03/15/24 3458

## 2024-03-16 VITALS
OXYGEN SATURATION: 96 % | HEART RATE: 71 BPM | BODY MASS INDEX: 20.78 KG/M2 | DIASTOLIC BLOOD PRESSURE: 67 MMHG | TEMPERATURE: 98 F | RESPIRATION RATE: 15 BRPM | WEIGHT: 149 LBS | SYSTOLIC BLOOD PRESSURE: 116 MMHG

## 2024-03-18 ENCOUNTER — PATIENT MESSAGE (OUTPATIENT)
Dept: OBSTETRICS AND GYNECOLOGY | Facility: CLINIC | Age: 44
End: 2024-03-18
Payer: COMMERCIAL

## 2024-03-18 LAB
FINAL PATHOLOGIC DIAGNOSIS: NORMAL
Lab: NORMAL

## 2024-03-19 ENCOUNTER — PATIENT MESSAGE (OUTPATIENT)
Dept: INTERNAL MEDICINE | Facility: CLINIC | Age: 44
End: 2024-03-19
Payer: COMMERCIAL

## 2024-03-21 NOTE — PROGRESS NOTES
MEDICAL ONCOLOGY - ESTABLISHED PATIENT VISIT    Reason for visit: Scirrhous HCC    Best Contact Phone Number(s): [phone] (home)      Cancer/Stage/TNM:    Cancer Staging   Hepatocellular carcinoma  Staging form: Liver, AJCC 8th Edition  - Clinical stage from 4/10/2023: Stage IVB (rcT1b, cN0, pM1) - Signed by Philippe Carrasco MD on 8/7/2023       Oncology History   Hepatocellular carcinoma   6/9/2021 Initial Diagnosis    Hepatocellular carcinoma     4/10/2023 Cancer Staged    Staging form: Liver, AJCC 8th Edition  - Clinical stage from 4/10/2023: Stage IVB (rcT1b, cN0, pM1)     7/11/2023 -  Chemotherapy    Treatment Summary   Plan Name: OP TREMELIMUMAB 300 MG (X 1) + DURVALUMAB 1500 MG Q4W  Treatment Goal: Control  Status: Active  Start Date: 7/11/2023  End Date: 6/10/2024 (Planned)  Provider: Philippe Carrasco MD  Chemotherapy: [No matching medication found in this treatment plan]     9/11/2023 Genetic Testing    Genetic counseling done. Hereditary syndrome unlikely. Patient offered testing, but declined due to cost.      10/23/2023 - 11/3/2023 Radiation Therapy    Treating physician: yamil villa    Course: C1 Chst/Abd 2023  Treatment Site Ref. ID Energy Dose/Fx (Gy) #Fx Dose Correction (Gy) Total Dose (Gy) Start Date End Date Elapsed Days   SB Lung_R PTV_Lung 6X 10 5 / 5 0 50 10/23/2023 11/1/2023 9   SB Abdomen PTV_PortalLN 6X 10 5 / 5 0 50 10/23/2023 11/3/2023 11         Interim History:   43 y.o. 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 Howell with the same pathology, negative margins, 0 of 8 lymph nodes positive and + for vascular and perineural invasion. We saw her in 2021 for evaluation for persistent CA 19-9 but there was no radiographic evidence of HCC disease recurrence or alternative reason for CA 19-9 elevation.  CT chest on 3/9/23 showed an enlarging RLL nodule measuring 1.1 cm, increased from 0.8 cm in size.  IR biopsy of this on  4/10/23 confirmed metastatic HCC.  Since then in mid-June she has also had an MRI abdomen that showed an enlarging portocaval lymph node that is consistent with metastatic disease.  She completed SBRT at the beginning of November to her lung metastasis and to her portocaval lymph node.    She presents today for cycle 10 of durvalumab + tremelimumab as part of the STRiDE regimen.  She underwent Y-90 on 2/1/24.  She was in the ER on 3/14/24 for substernal chest pain.  It then progressed to her R arm and her low back.  Her workup in the ER was unremarkable.  She had tried a muscle relaxer and Tylenol at home prior to going.  She got a dose of Toradol in the ER which helped her pain.  Has not recurred.  Has been taking naproxen at home but recently stopped because she wasn't having any pain.  Still on Protonix.    Presents alone today.  ECOG PS 0.     ROS:   Review of Systems   Constitutional:  Negative for chills, fever, malaise/fatigue and weight loss.   HENT:  Negative for sore throat.    Eyes:  Negative for blurred vision and pain.   Respiratory:  Negative for cough and shortness of breath.    Cardiovascular:  Negative for chest pain and leg swelling.   Gastrointestinal:  Positive for constipation. Negative for abdominal pain, diarrhea, nausea and vomiting.   Genitourinary:  Negative for dysuria and frequency.   Musculoskeletal:  Negative for back pain, falls and myalgias.   Skin:  Negative for rash.   Neurological:  Negative for dizziness, weakness and headaches.   Endo/Heme/Allergies:  Does not bruise/bleed easily.   Psychiatric/Behavioral:  Negative for depression. The patient is not nervous/anxious.    All other systems reviewed and are negative.      Past Medical History:   Past Medical History:   Diagnosis Date    VENKATA positive 08/20/2020    COVID-19     Deviated septum 2011    Hepatocellular carcinoma 05/07/2021    Hypertrophy of inferior nasal turbinate     Pericardial effusion     Premature menopause          Past Surgical History:   Past Surgical History:   Procedure Laterality Date    COLONOSCOPY N/A 2020    Procedure: COLONOSCOPY;  Surgeon: GIOVANNI Crane MD;  Location: [facility] ENDO (4TH FLR);  Service: Endoscopy;  Laterality: N/A;  + covid     ESOPHAGOGASTRODUODENOSCOPY N/A 1/10/2024    Procedure: ESOPHAGOGASTRODUODENOSCOPY (EGD);  Surgeon: Reynaldo Lynch MD;  Location: [facility] ENDO (2ND FLR);  Service: Endoscopy;  Laterality: N/A;  referred by daryl carrasco rocedure: EGD Diagnosis: Abnormal finding on GI tract imaging, Abdominal pain, and GERD  Procedure Timin-4 weeks Provider: Any GI provider Location: [facility], [facility], and [facility]  Additional Scheduling Informat    HERNIA REPAIR      LIVER SURGERY N/A 2021    NASAL SEPTUM SURGERY      PERICARDIAL WINDOW N/A 2021    Procedure: CREATION, PERICARDIAL WINDOW;  Surgeon: Ajay Cantor MD;  Location: [facility] OR 2ND FLR;  Service: Cardiovascular;  Laterality: N/A;    PERICARDIOCENTESIS N/A 2020    Procedure: Pericardiocentesis;  Surgeon: Dickson Dangelo MD;  Location: [facility] CATH LAB;  Service: Cardiology;  Laterality: N/A;    TONSILLECTOMY          Family History:   Family History   Problem Relation Age of Onset    Diabetes Mother     Liver disease Mother         fatty liver    Heart disease Father     Macular degeneration Father     Diabetes Father     Hypertension Father     Hyperlipidemia Father     Heart disease Sister     Lung cancer Maternal Aunt         heavy smoker    Cerebral aneurysm Paternal Aunt     Cataracts Neg Hx     Glaucoma Neg Hx     Retinal detachment Neg Hx     Stroke Neg Hx     Thyroid disease Neg Hx     Melanoma Neg Hx     Heart attack Neg Hx         Social History:   Social History     Tobacco Use    Smoking status: Never    Smokeless tobacco: Never   Substance Use Topics    Alcohol use: Yes     Comment: rare        I have reviewed and updated the patient's past medical, surgical, family and  social histories.    Allergies:   Review of patient's allergies indicates:   Allergen Reactions    No known drug allergies         Medications:   Current Outpatient Medications   Medication Sig Dispense Refill    cetirizine (ZYRTEC) 10 MG tablet TAKE 1 TABLET BY MOUTH EVERY DAY 90 tablet 2    cyanocobalamin (VITAMIN B-12) 1000 MCG tablet Take 1 tablet (1,000 mcg total) by mouth once daily. 30 tablet 12    cyclobenzaprine (FLEXERIL) 10 MG tablet SMARTSI Tablet(s) By Mouth Every Evening PRN      fluticasone propionate (FLONASE) 50 mcg/actuation nasal spray 2 sprays (100 mcg total) by Each Nostril route once daily. (Patient taking differently: 2 sprays by Each Nostril route daily as needed.) 18.2 mL 6    multivitamin capsule Take by mouth. 1 capsule Oral Every morning      naproxen (NAPROSYN) 500 MG tablet Take 1 tablet (500 mg total) by mouth 2 (two) times daily with meals. 60 tablet 0    norethindrone-ethinyl estradiol (MICROGESTIN /20) 1-20 mg-mcg per tablet Take 1 tablet by mouth once daily. 63 tablet 4    ondansetron (ZOFRAN-ODT) 4 MG TbDL Take 1 tablet (4 mg total) by mouth 2 (two) times daily. 2 tablet 0    ondansetron (ZOFRAN-ODT) 4 MG TbDL Take 1 tablet (4 mg total) by mouth every 12 (twelve) hours as needed (nausea). 20 tablet 0    pantoprazole (PROTONIX) 40 MG tablet TAKE 1 TABLET BY MOUTH EVERY DAY 90 tablet 1    tiZANidine (ZANAFLEX) 4 MG tablet Take 1 tablet (4 mg total) by mouth every 6 (six) hours as needed (pain/spasm). 30 tablet 1    triamcinolone acetonide 0.1% (KENALOG) 0.1 % cream Apply topically 2 (two) times daily as needed (scaling at external ears). Avoid use on face, body folds, groin/genitalia. 45 g 3    ALPRAZolam (XANAX) 0.5 MG tablet Take 1 tablet (0.5 mg total) by mouth daily as needed for Anxiety. 30 tablet 1     No current facility-administered medications for this visit.        Physical Exam:   /76 (BP Location: Left arm, Patient Position: Sitting, BP Method: Medium  "(Automatic))   Pulse 104   Temp 98 °F (36.7 °C) (Oral)   Ht 5' 11.75" (1.822 m)   Wt 70.4 kg (155 lb 3.3 oz)   LMP  (LMP Unknown)   SpO2 98%   BMI 21.20 kg/m²      ECOG Performance status: 0            Physical Exam  Vitals reviewed.   Constitutional:       General: She is not in acute distress.     Appearance: Normal appearance. She is normal weight.   HENT:      Head: Normocephalic and atraumatic.      Right Ear: External ear normal.      Left Ear: External ear normal.      Nose: Nose normal.      Mouth/Throat:      Mouth: Mucous membranes are moist.      Pharynx: Oropharynx is clear. No posterior oropharyngeal erythema.   Eyes:      General: No scleral icterus.     Extraocular Movements: Extraocular movements intact.      Conjunctiva/sclera: Conjunctivae normal.      Pupils: Pupils are equal, round, and reactive to light.   Cardiovascular:      Rate and Rhythm: Normal rate and regular rhythm.      Pulses: Normal pulses.      Heart sounds: Normal heart sounds.   Pulmonary:      Effort: Pulmonary effort is normal.      Breath sounds: Normal breath sounds. No wheezing or rales.   Abdominal:      General: Bowel sounds are normal. There is no distension.      Palpations: Abdomen is soft.      Tenderness: There is no abdominal tenderness.   Musculoskeletal:         General: No swelling. Normal range of motion.      Cervical back: Normal range of motion and neck supple.   Skin:     General: Skin is warm.      Coloration: Skin is not jaundiced.      Findings: No erythema or rash.   Neurological:      General: No focal deficit present.      Mental Status: She is alert and oriented to person, place, and time. Mental status is at baseline.      Gait: Gait normal.   Psychiatric:         Mood and Affect: Mood normal.         Behavior: Behavior normal.         Thought Content: Thought content normal.         Judgment: Judgment normal.           Labs:   No results found for this or any previous visit (from the past 48 " hour(s)).    I have reviewed the pertinent labs.  Normal blood counts.  AFP 10.    Imagin/29/24: MRI Abd:  Impression:     1. Status post IR embolization at hepatic segment 8.  Residual treatment cavity demonstrates areas of peripheral enhancement.  No convincing evidence of local recurrence at this site.  2. Indeterminate homogeneously enhancing round lesion in the left hepatic lobe demonstrating slow interval growth compared to the previous 2 MRIs (now measuring 1.5 cm, previsouly 0.9 cm on 2022 MRI).  3. Two lesions in the right hepatic lobe compatible with hemangiomas.  4. No new focal hepatic lesions.  5. Previous pericaval lymph node has decreased in size.  No new or enlarging lymphadenopathy.  6. Other findings above.    Path:   21: partial hepatectomy:    Final Pathologic Diagnosis 1.  Gallbladder (cholecystectomy):   - Benign gallbladder with cholecystitis   2. Right lobe (partial hepatectomy):   - Positive for malignancy, see synoptic report below   - Separate hemangioma, 7.3 cm   3. Lymph nodes, portal (regional resection):   - 8 lymph nodes, negative for tumor (0/8)   ______________________________________________________________________________   ______   Hepatocellular carcinoma synoptic report   - Procedure:  Partial hepatectomy, right lobe   - Histologic type:  Hepatocellular carcinoma, scirrhous   - Histologic grade:  Moderately differentiated, grade 2   - Tumor focality:  Solitary   - Tumor characteristics:   - Tumor site:  Right lobe   - Tumor size:  3.3 cm   - Treatment effect:  No known pre-surgical therapy   - Satellitosis:  Not identified   - Tumor extent:  Confined to liver   - Vascular invasion:  Present, Small vessel   - Perineural invasion:  Present   - Margins:   - All margins are negative for invasive carcinoma   - Closest margin to invasive carcinoma:  Parenchymal   - Distance from invasive  carcinoma to closest margin:  5 mm   - Regional lymph nodes:   - Number of  lymph nodes with tumor:  0   - Number of lymph nodes examined:  8   - Pathology: pT2 N0 MX   - Additional findings:   - No steatosis   - Trichrome stain:  Periportal fibrosis with early septa, stage 1-2   - Iron stain:  Negative   - Iron and trichrome stains with appropriate controls   Tumor blocks:  2A, 2B, 2 C    Comment: Interp By Madeline Carlos MD, Signed on 07/08/2021 at 16:47       Assessment:       1. HCC (hepatocellular carcinoma)    2. Regional lymph node metastasis present    3. Malignant neoplasm metastatic to right lung    4. Neutropenia, unspecified type    5. Bone lesion    6. Cervical radiculopathy    7. Liver hemangioma    8. Gastroesophageal reflux disease, unspecified whether esophagitis present    9. Shoulder pain, unspecified chronicity, unspecified laterality    10. Anxiety about health                Plan:             # HCC  Scirrhous subtype, which is very uncommon (~ 1% of all HCC); prognosis is likely similar to typical HCC.  No clear underlying liver pathology in this patient.  Had margin negative resection with negative lymph node eval on 6/28/21 with Dr. Hwoell.  Unfortunately she has biopsy proven recurrent metastatic disease to her RLL s/p IR biopsy 4/10/23.   She was discussed at thoracic tumor board with potential plan for surgical resection with Dr. Tadeo.  She had a concerning bone lesion on PET from 4/26 at T2.  This was biopsied and proven to be benign.  In the interim she had a repeat abdominal MRI on 6/13/23 which demonstrated growth of a portacaval lymph node that was very concerning for metastatic disease when we reviewed her imaging at liver conference.  Meanwhile her RLL met has grown slightly on 6/21/23 CT as well.  We discussed her case with Valerie Dhillon and Shwetha and we were all in agreement with proceeding with systemic therapy rather than surgery or radiation given multifocal progression.    We discussed this with her and she is agreeable with starting systemic  therapy.  Reviewed possibilities of atezo + magaly or durva + treme.  She wished to proceed with STRIDE regimen: durvalumab + tremelimumab.    Received cycle 1 on 7/11/23.  Repeat CT CAP after cycle 3 demonstrates mild growth in albert hepatis lymph node.  Stable disease elsewhere.    Discussed with Dr. Mendiola and she was deemed eligible for SBRT to her abdominal lymph node and growing lung nodule.  She completed SBRT to both 11/3/23.    Meanwhile she developed a new concerning area of possible recurrence near her liver resection site.  Discussed with Dr. Lala and Dr. Dhillon.  Dr. Dhillon was unable to visualize it during radiation simulation.  We recommended to proceed with Y-90 which was administered 2/1/24.    MRI 2/29/24 shows good response to Y-90 treated lesion.  Indeterminate lesion reviewed by Dr. Alford with no concerning features.  Will continue current regimen.    - doing well with immunotherapy. Lab work has been reviewed and adequate for treatment.   - Will continue with STRIDE regimen, meanwhile.   - Proceed with cycle 10 today - durvalumab alone.  - Will give LR per her request with infusion today.    Consider switching systemic therapy only if significant progression given likely side effects of TKI.    Saw Dr. Sabillon of cardiology who recommended troponin and ECG monitoring given her cardiac history.  She is asymptomatic at present.    Will plan to bring her back in 4 weeks for cycle 11.  Repeat MRI in late May, early June.    # Neutropenia, cervical radiculopathy  Resolved.  Has experienced in past.  Likely benign etiology.  Continue to f/u with Orthopedics    # Liver hemangiomas, GERD  Continue monitoring as indicated with Dr. Rogers.  Continue to f/u with GI team for GERD symptoms. Continue with medication management with Protonix.    Follow up: 4 weeks.    The above information has been reviewed with the patient and all questions have been answered to their apparent satisfaction.  They understand  that they can call the clinic with any questions.    Philippe Carrasco MD  Hematology/Oncology  [facility]    Route Chart for Scheduling    Med Onc Chart Routing      Follow up with physician 2 months. for durvalumab   Follow up with MELA 4 weeks. for durvalumab   Infusion scheduling note    Injection scheduling note    Labs CBC, CMP and TSH   Scheduling:  Preferred lab:  Lab interval:  & AFP   Imaging MRI   MRI last week of May or first week of June   Pharmacy appointment    Other referrals                  Treatment Plan Information   OP TREMELIMUMAB 300 MG (X 1) + DURVALUMAB 1500 MG Q4W   Philippe Carrasco MD   Upcoming Treatment Dates - OP TREMELIMUMAB 300 MG (X 1) + DURVALUMAB 1500 MG Q4W    3/18/2024       Chemotherapy       durvalumab (IMFINZI) 1,500 mg in sodium chloride 0.9% SolP 280 mL chemo infusion       Antiemetics       prochlorperazine injection Soln 5 mg  4/15/2024       Chemotherapy       durvalumab (IMFINZI) 1,500 mg in sodium chloride 0.9% SolP 280 mL chemo infusion       Antiemetics       prochlorperazine injection Soln 5 mg  5/13/2024       Chemotherapy       durvalumab (IMFINZI) 1,500 mg in sodium chloride 0.9% SolP 280 mL chemo infusion       Antiemetics       prochlorperazine injection Soln 5 mg  6/10/2024       Chemotherapy       durvalumab (IMFINZI) 1,500 mg in sodium chloride 0.9% SolP 280 mL chemo infusion       Antiemetics       prochlorperazine injection Soln 5 mg

## 2024-03-22 ENCOUNTER — OFFICE VISIT (OUTPATIENT)
Dept: HEMATOLOGY/ONCOLOGY | Facility: CLINIC | Age: 44
End: 2024-03-22
Payer: COMMERCIAL

## 2024-03-22 ENCOUNTER — INFUSION (OUTPATIENT)
Dept: INFUSION THERAPY | Facility: HOSPITAL | Age: 44
End: 2024-03-22
Payer: COMMERCIAL

## 2024-03-22 VITALS
HEIGHT: 72 IN | WEIGHT: 155.19 LBS | OXYGEN SATURATION: 98 % | DIASTOLIC BLOOD PRESSURE: 76 MMHG | SYSTOLIC BLOOD PRESSURE: 122 MMHG | HEART RATE: 104 BPM | TEMPERATURE: 98 F | BODY MASS INDEX: 21.02 KG/M2

## 2024-03-22 DIAGNOSIS — C22.0 HCC (HEPATOCELLULAR CARCINOMA): Primary | ICD-10-CM

## 2024-03-22 DIAGNOSIS — D70.9 NEUTROPENIA, UNSPECIFIED TYPE: ICD-10-CM

## 2024-03-22 DIAGNOSIS — C77.9 REGIONAL LYMPH NODE METASTASIS PRESENT: ICD-10-CM

## 2024-03-22 DIAGNOSIS — C22.0 HEPATOCELLULAR CARCINOMA: Primary | ICD-10-CM

## 2024-03-22 DIAGNOSIS — M89.9 BONE LESION: ICD-10-CM

## 2024-03-22 DIAGNOSIS — M25.519 SHOULDER PAIN, UNSPECIFIED CHRONICITY, UNSPECIFIED LATERALITY: ICD-10-CM

## 2024-03-22 DIAGNOSIS — K21.9 GASTROESOPHAGEAL REFLUX DISEASE, UNSPECIFIED WHETHER ESOPHAGITIS PRESENT: ICD-10-CM

## 2024-03-22 DIAGNOSIS — C78.01 MALIGNANT NEOPLASM METASTATIC TO RIGHT LUNG: ICD-10-CM

## 2024-03-22 DIAGNOSIS — D18.03 LIVER HEMANGIOMA: ICD-10-CM

## 2024-03-22 DIAGNOSIS — M54.12 CERVICAL RADICULOPATHY: ICD-10-CM

## 2024-03-22 DIAGNOSIS — R45.89 ANXIETY ABOUT HEALTH: ICD-10-CM

## 2024-03-22 PROCEDURE — 25000003 PHARM REV CODE 250: Performed by: INTERNAL MEDICINE

## 2024-03-22 PROCEDURE — 3074F SYST BP LT 130 MM HG: CPT | Mod: CPTII,S$GLB,, | Performed by: INTERNAL MEDICINE

## 2024-03-22 PROCEDURE — 3008F BODY MASS INDEX DOCD: CPT | Mod: CPTII,S$GLB,, | Performed by: INTERNAL MEDICINE

## 2024-03-22 PROCEDURE — 3078F DIAST BP <80 MM HG: CPT | Mod: CPTII,S$GLB,, | Performed by: INTERNAL MEDICINE

## 2024-03-22 PROCEDURE — 96361 HYDRATE IV INFUSION ADD-ON: CPT

## 2024-03-22 PROCEDURE — 96413 CHEMO IV INFUSION 1 HR: CPT

## 2024-03-22 PROCEDURE — 63600175 PHARM REV CODE 636 W HCPCS: Mod: JZ,JG | Performed by: INTERNAL MEDICINE

## 2024-03-22 PROCEDURE — 99999 PR PBB SHADOW E&M-EST. PATIENT-LVL IV: CPT | Mod: PBBFAC,,, | Performed by: INTERNAL MEDICINE

## 2024-03-22 PROCEDURE — 99215 OFFICE O/P EST HI 40 MIN: CPT | Mod: S$GLB,,, | Performed by: INTERNAL MEDICINE

## 2024-03-22 PROCEDURE — 1159F MED LIST DOCD IN RCRD: CPT | Mod: CPTII,S$GLB,, | Performed by: INTERNAL MEDICINE

## 2024-03-22 RX ORDER — ALPRAZOLAM 0.5 MG/1
0.5 TABLET ORAL DAILY PRN
Qty: 30 TABLET | Refills: 1 | Status: SHIPPED | OUTPATIENT
Start: 2024-03-22

## 2024-03-22 RX ORDER — PROCHLORPERAZINE EDISYLATE 5 MG/ML
5 INJECTION INTRAMUSCULAR; INTRAVENOUS ONCE AS NEEDED
Status: DISCONTINUED | OUTPATIENT
Start: 2024-03-22 | End: 2024-03-22 | Stop reason: HOSPADM

## 2024-03-22 RX ORDER — EPINEPHRINE 0.3 MG/.3ML
0.3 INJECTION SUBCUTANEOUS ONCE AS NEEDED
Status: CANCELLED | OUTPATIENT
Start: 2024-03-22

## 2024-03-22 RX ORDER — HEPARIN 100 UNIT/ML
500 SYRINGE INTRAVENOUS
Status: CANCELLED | OUTPATIENT
Start: 2024-03-22

## 2024-03-22 RX ORDER — SODIUM CHLORIDE, SODIUM LACTATE, POTASSIUM CHLORIDE, CALCIUM CHLORIDE 600; 310; 30; 20 MG/100ML; MG/100ML; MG/100ML; MG/100ML
INJECTION, SOLUTION INTRAVENOUS
Status: COMPLETED | OUTPATIENT
Start: 2024-03-22 | End: 2024-03-22

## 2024-03-22 RX ORDER — DIPHENHYDRAMINE HYDROCHLORIDE 50 MG/ML
50 INJECTION INTRAMUSCULAR; INTRAVENOUS ONCE AS NEEDED
Status: CANCELLED | OUTPATIENT
Start: 2024-03-22

## 2024-03-22 RX ORDER — SODIUM CHLORIDE 0.9 % (FLUSH) 0.9 %
10 SYRINGE (ML) INJECTION
Status: CANCELLED | OUTPATIENT
Start: 2024-03-22

## 2024-03-22 RX ORDER — PROCHLORPERAZINE EDISYLATE 5 MG/ML
5 INJECTION INTRAMUSCULAR; INTRAVENOUS ONCE AS NEEDED
Status: CANCELLED
Start: 2024-03-22

## 2024-03-22 RX ORDER — HEPARIN 100 UNIT/ML
500 SYRINGE INTRAVENOUS
Status: DISCONTINUED | OUTPATIENT
Start: 2024-03-22 | End: 2024-03-22 | Stop reason: HOSPADM

## 2024-03-22 RX ORDER — DIPHENHYDRAMINE HYDROCHLORIDE 50 MG/ML
50 INJECTION INTRAMUSCULAR; INTRAVENOUS ONCE AS NEEDED
Status: DISCONTINUED | OUTPATIENT
Start: 2024-03-22 | End: 2024-03-22 | Stop reason: HOSPADM

## 2024-03-22 RX ORDER — EPINEPHRINE 0.3 MG/.3ML
0.3 INJECTION SUBCUTANEOUS ONCE AS NEEDED
Status: DISCONTINUED | OUTPATIENT
Start: 2024-03-22 | End: 2024-03-22 | Stop reason: HOSPADM

## 2024-03-22 RX ORDER — SODIUM CHLORIDE, SODIUM LACTATE, POTASSIUM CHLORIDE, CALCIUM CHLORIDE 600; 310; 30; 20 MG/100ML; MG/100ML; MG/100ML; MG/100ML
INJECTION, SOLUTION INTRAVENOUS CONTINUOUS
Status: CANCELLED
Start: 2024-03-22

## 2024-03-22 RX ORDER — SODIUM CHLORIDE 0.9 % (FLUSH) 0.9 %
10 SYRINGE (ML) INJECTION
Status: DISCONTINUED | OUTPATIENT
Start: 2024-03-22 | End: 2024-03-22 | Stop reason: HOSPADM

## 2024-03-22 RX ADMIN — DURVALUMAB 1500 MG: 500 INJECTION, SOLUTION INTRAVENOUS at 10:03

## 2024-03-22 RX ADMIN — SODIUM CHLORIDE, POTASSIUM CHLORIDE, SODIUM LACTATE AND CALCIUM CHLORIDE: 600; 310; 30; 20 INJECTION, SOLUTION INTRAVENOUS at 08:03

## 2024-03-22 NOTE — PLAN OF CARE
0806 Labs, hx, and medications reviewed, pt meets parameters for treatment today. Assessment completed and plan of care reviewed. Pt verbalized understanding. PIV accessed with no complications. Pt voices no new complaints or concerns, will continue to monitor for safety.

## 2024-03-22 NOTE — PLAN OF CARE
1115 Pt tolerated infusion well today, no complaints or complications,. VSS. Pt aware to call provider with any questions or concerns and is aware of upcoming appts. Pt ambulatory from clinic with steady gait, no distress noted.

## 2024-04-11 ENCOUNTER — TELEPHONE (OUTPATIENT)
Dept: PAIN MEDICINE | Facility: CLINIC | Age: 44
End: 2024-04-11
Payer: COMMERCIAL

## 2024-04-16 NOTE — PRE-PROCEDURE INSTRUCTIONS
Patient reviewed on 4/16/2024.  Okay to proceed at [facility]. The following pre-procedure instructions and arrival time have been reviewed with patient via phone and sent to patient portal for review.  Patient verbalized an understanding.  Pt to be accompanied by Venus day of procedure as responsible .    Dear Melly,     You are scheduled for a procedure with Dr. Bailey on 04/18/2024  Your scheduled arrival time is 6 am.  This arrival time is roughly 1 hour before your anticipated procedure time to allow sufficient time for pre-op..  Please wear comfortable clothes.  Most patients do not need to change into a gown.  Please do not wear a dress.  This procedure will take place at the [facility] at the corner of [facility] and [facility].  It is in the [facility] next to [facility].  The address is:     [address] [place] [address].  [place] [id number]     After entering the building, you will proceed to the second floor where you can check in with registration. You should take any medications that you routinely take for blood pressure, heart medications, thyroid, cholesterol, etc.      The fasting restrictions are dependent on whether or not you are receiving sedation.  Sedation is not available for all procedures.      Your fasting instructions are as follow:  IV sedation. You should not eat for 8 hours and can only drink clear liquids (water or black coffee without cream/sugar) up until 2 hours before your scheduled time.  You CANNOT drive yourself and must have a .     If you are on blood thinners, you need to follow the anticoagulation instructions that had been discussed previously.  You should only stop the blood thinners if it was approved by your primary care physician or your cardiologist.  In the event that you are not able to stop your blood thinners, a blood thinner was not listed on your medication list, or we were not able to get clearance from  your cardiologist, then the procedure may have to be postponed/canceled.      IF you were told to stop your blood thinners, this is how long you should generally hold some of the more common ones.  Remember that stopping blood thinners is only necessary for certain procedures. If you are unsure of your instructions, please call us.   Aspirin - 5 days  Plavix/Clopidogrel - 7 days  Warfarin / Coumadin - 5 days  Eliquis - 3 days  Pradaxa/Dabigatran - 4 days  Xarelto/Rivaroxaban - 3 days     If you are a diabetic, do not take your medication if you will be fasting, but bring it with you. Please plan on being here for roughly 2 hours.     Please call us if you have been sick (running fever, having any flu-like symptoms) or have been taking antibiotics in the past 2 weeks or had any outpatient procedures other than with us (colonoscopy, endoscopy, OBGYN, dental, etc.). If you have been previously COVID positive, you will need to hold off on your procedure until you are symptom free for 10 days. If you did not have any symptoms, you can have your procedure 10 days from your positive test result.       *HOLD ALL VITAMINS, MINERALS, HERBS (INCLUDING HERBAL TEAS) AND SUPPLEMENTS  *SHOWER WITH ANTIBACTERIAL SOAP (EX. DIAL) NIGHT BEFORE AND MORNING OF PROCEDURE  *DO NOT APPLY ANY LOTIONS, OILS, POWDERS, PERFUME/COLOGNE, OINTMENTS, GELS, CREAMS, MAKEUP OR DEODORANT TO YOUR SKIN MORNING OF PROCEDURE  *LEAVE JEWELRY AND ANY VALUABLES AT HOME  *WEAR LOOSE COMFORTABLE CLOTHING (PREFERABLY A BUTTON UP SHIRT)        Thank you,  [facility]  [place], LPN [facility]  Pre-Admit       This Patient Portal message has not been

## 2024-04-18 ENCOUNTER — HOSPITAL ENCOUNTER (OUTPATIENT)
Facility: HOSPITAL | Age: 44
Discharge: HOME | End: 2024-04-18
Attending: EMERGENCY MEDICINE | Admitting: EMERGENCY MEDICINE
Payer: COMMERCIAL

## 2024-04-18 VITALS
SYSTOLIC BLOOD PRESSURE: 148 MMHG | DIASTOLIC BLOOD PRESSURE: 79 MMHG | HEART RATE: 63 BPM | RESPIRATION RATE: 18 BRPM | HEIGHT: 71 IN | OXYGEN SATURATION: 100 % | TEMPERATURE: 98 F | BODY MASS INDEX: 21.28 KG/M2 | WEIGHT: 152 LBS

## 2024-04-18 DIAGNOSIS — M50.30 DDD (DEGENERATIVE DISC DISEASE), CERVICAL: ICD-10-CM

## 2024-04-18 DIAGNOSIS — G89.29 CHRONIC PAIN: ICD-10-CM

## 2024-04-18 DIAGNOSIS — M54.12 CERVICAL RADICULOPATHY: Primary | ICD-10-CM

## 2024-04-18 PROCEDURE — 25000003 PHARM REV CODE 250: Performed by: EMERGENCY MEDICINE

## 2024-04-18 PROCEDURE — 25500020 PHARM REV CODE 255: Performed by: EMERGENCY MEDICINE

## 2024-04-18 PROCEDURE — 99152 MOD SED SAME PHYS/QHP 5/>YRS: CPT | Performed by: EMERGENCY MEDICINE

## 2024-04-18 PROCEDURE — 62321 NJX INTERLAMINAR CRV/THRC: CPT | Performed by: EMERGENCY MEDICINE

## 2024-04-18 PROCEDURE — 62321 NJX INTERLAMINAR CRV/THRC: CPT | Mod: ,,, | Performed by: EMERGENCY MEDICINE

## 2024-04-18 PROCEDURE — 99152 MOD SED SAME PHYS/QHP 5/>YRS: CPT | Mod: ,,, | Performed by: EMERGENCY MEDICINE

## 2024-04-18 PROCEDURE — 63600175 PHARM REV CODE 636 W HCPCS: Performed by: EMERGENCY MEDICINE

## 2024-04-18 RX ORDER — DEXAMETHASONE SODIUM PHOSPHATE 10 MG/ML
INJECTION INTRAMUSCULAR; INTRAVENOUS
Status: DISCONTINUED | OUTPATIENT
Start: 2024-04-18 | End: 2024-04-18 | Stop reason: HOSPADM

## 2024-04-18 RX ORDER — FENTANYL CITRATE 50 UG/ML
INJECTION, SOLUTION INTRAMUSCULAR; INTRAVENOUS
Status: DISCONTINUED | OUTPATIENT
Start: 2024-04-18 | End: 2024-04-18 | Stop reason: HOSPADM

## 2024-04-18 RX ORDER — MIDAZOLAM HYDROCHLORIDE 1 MG/ML
INJECTION INTRAMUSCULAR; INTRAVENOUS
Status: DISCONTINUED | OUTPATIENT
Start: 2024-04-18 | End: 2024-04-18 | Stop reason: HOSPADM

## 2024-04-18 RX ORDER — LIDOCAINE HYDROCHLORIDE 20 MG/ML
INJECTION, SOLUTION EPIDURAL; INFILTRATION; INTRACAUDAL; PERINEURAL
Status: DISCONTINUED | OUTPATIENT
Start: 2024-04-18 | End: 2024-04-18 | Stop reason: HOSPADM

## 2024-04-18 RX ORDER — ONDANSETRON HYDROCHLORIDE 2 MG/ML
INJECTION, SOLUTION INTRAVENOUS
Status: DISCONTINUED | OUTPATIENT
Start: 2024-04-18 | End: 2024-04-18 | Stop reason: HOSPADM

## 2024-04-18 NOTE — DISCHARGE INSTRUCTIONS
[facility] [place]  Dr. Cezar Bailey  Messaging service # [phone]    POST-PROCEDURE INSTRUCTIONS:    Today you had an injection that included a steroid medications.  The steroid may or may not have been mixed with a local anesthetic when it was injected.   If the injection was in the neck, you may feel some pressure, numbness, or slight weakness in the arm after the procedure for a short period of time (this is a normal response), if this persists for longer than 1 day please contact our office or go to the emergency room.  If the injection was in the low back, you may feel some pressure, numbness, or slight weakness in the leg after the procedure for a short period of time (this is a normal response), if this persists for longer than 1 day please contact our office or go to the emergency room.  You may get side effects from the steroid.  This is not uncommon.  Symptoms include: elevated blood sugar, elevated blood pressure, headache, flushing, nausea, insomnia.  These symptoms are transient and will resolve within 1-3 days.  If symptoms last longer than this please contact our office or head to the emergency room.  Steroid medications can take anywhere from 3-14 days to take effect (rarely longer).  You may notice that your pain worsens for a short period of time after the injection, this would not be unusual due to the pressure and trauma from the needle.    If you do not have a follow up appointment scheduled, please contact my office (or the office of the physician who referred you for the procedure) to get a post-procedure follow up scheduled 2-4 weeks after the procedure.  This can be done as a virtual visit if that is more convenient for you.      What you need to do:    Keep a record of your response to the injection you had today.    How much relief did you get?   When did the relief start and how long did it last?  Were you able to decrease the use of any of your pain  medications?  Were you able to increase your level of activity?  How long did the relief last?    What to watch out for:    If you experience any of the following symptoms after your procedure, please notify the messaging service immediately (see above for contact information):   fever (increased oral temperature)   bleeding or swelling at the injection site,    drainage, rash or redness at the injection site    possible signs of infection    increased pain at the injection site   worsening of your usual pain   severe headache   new or worsening numbness    new arm and/or leg weakness, or    changes in bowel and/or bladder function: urinating or defecating on yourself and not knowing that you did it.    PLEASE FOLLOW ALL INSTRUCTIONS CAREFULLY     Do not engage in strenuous activity (e.g., lifting or pushing heavy objects or repeated bending) for 24 hours.     Do not take a bath, swim or use Jacuzzi for 24 hours after procedure. (A shower is fine).   Remove any Band-Aids when you get home.    Use cold/ice, as needed for comfort.  We recommend the use of cold therapy alternating on for 20 minutes, off for 20 minutes.    Do not apply direct heat (heating pad or heat packs) to the injection site for 24 hours.     Resume your usual medications, unless instructed otherwise by your Pain Physician.     If you are on warfarin (Coumadin) or other blood thinner, resume this medication as instructed by your prescribing Physician.    IF AT ANY POINT YOU ARE VERY CONCERNED ABOUT YOUR SYMPTOMS, PLEASE GO TO THE EMERGENCY ROOM.    If you develop worsening pain, weakness, numbness, lose bowel or bladder control (i.e., having an accident where you did not even know you had to go to the bathroom and suddenly noticed you soiled yourself), saddle anesthesia (a loss of sensation restricted to the area of the buttocks, anus and between the legs -- i.e., those parts of your body that would touch a saddle if you were sitting on one) you  need to go immediately to the emergency department for evaluation and treatment.    ----------------------------------------------------------------------------------------------------------------------------------------------------------------  If you received Sedation please read the following instructions:  POST SEDATION INSTRUCTIONS    Today you received intravenous medication (also known as sedation) that was used to help you relax and/or decrease discomfort during your procedure. This medication will be acting in your body for the next 24 hours, so you might feel a little tired or sleepy. This feeling will slowly wear off.   Common side effects associated with these medications include: drowsiness, dizziness, sleepiness, confusion, feeling excited, difficulty remembering things, lack of steadiness with walking or balance, loss of fine muscle control, slowed reflexes, difficulty focusing, and blurred vision.  Some over-the-counter and prescription medications (e.g., muscle relaxants, opioids, mood-altering medications, sedatives/hypnotics, antihistamines) can interact with the intravenous medication you received and cause an increased risk of the side effects listed above in addition to other potentially life threatening side effects. Use extreme caution if you are taking such medications, and consult with your Pain Physician or prescribing physician if you have any questions.  For the next 12-24 hours:    DO NOT--Drive a car, operate machinery or power tools   DO NOT--Drink any alcoholic beverages (not even beer), they may dangerously increase the risk of side effects.    DO NOT--Make any important legal or business decisions or sign important documents.  We advise you to have someone to assist you at home. Move slowly and carefully. Do not make sudden changes in position. Be aware of dizziness or light-headedness and move accordingly.   If you seek medical treatment within 24 hours, let the nurse or doctor  caring for you know that you have received the above medications. If you have any questions or concerns related to your sedation or treatment today please contact us.

## 2024-04-18 NOTE — OP NOTE
Cervical Interlaminar Epidural Steroid Injection under Fluoroscopic Guidance    The procedure, risks, benefits, and options were discussed with the patient. There are no contraindications to the procedure. The patent expressed understanding and agreed to the procedure. Informed written consent was obtained prior to the start of the procedure and can be found in the patient's chart.     PATIENT NAME: Melly Hwang   MRN: [id number]     DATE OF PROCEDURE: 04/18/2024    PROCEDURE: Cervical Interlaminar Epidural Steroid Injection C7/T1 under Fluoroscopic Guidance    PRE-OP DIAGNOSIS: Cervical radiculitis [M54.12]  DDD (degenerative disc disease), cervical [M50.30] Cervical radiculopathy [M54.12]    POST-OP DIAGNOSIS: Same    PHYSICIAN: Cezar Bailey MD     MEDICATIONS INJECTED: Preservative-free Decadron 10mg with 3cc of preservative free normal saline    LOCAL ANESTHETIC INJECTED: Xylocaine 2%     SEDATION: Versed 2mg and Fentanyl 50mcg                                                                                                                                                                                     Conscious sedation ordered by M.D. Patient re-evaluation prior to administration of conscious sedation. No changes noted in patient's status from initial evaluation. The patient's vital signs were monitored by RN and patient remained hemodynamically stable throughout the procedure.    Event Time In   Sedation Start 0710   Sedation End 0726       ESTIMATED BLOOD LOSS: None    COMPLICATIONS: None    TECHNIQUE: Time-out was performed to identify the patient and procedure to be performed. With the patient laying in a prone position, the surgical area was prepped and draped in the usual sterile fashion using ChloraPrep and a fenestrated drape. The level was determined under fluoroscopy guidance. Skin anesthesia was achieved by injecting Lidocaine 2% over the injection site.  The interlaminar space was then  approached with a 20 gauge, 3.5 inch Tuohy needle that was introduced under fluoroscopic guidance with AP, lateral and/or contralateral oblique imaging. Once the Ligamentum flavum was encountered loss of resistance to saline was used to enter the epidural space. With positive loss of resistance and negative aspiration for CSF or Blood, contrast dye  Omnipaque (300mg/mL) was injected to confirm placement and there was no vascular runoff. Then 4 mL of the medication mixture listed above was then injected slowly. Displacement of the radio opaque contrast after injection of the medication confirmed that the medication went into the area of the epidural space. The needles were removed, and bleeding was nil. A sterile dressing was applied. No specimens collected. The patient tolerated the procedure well.     The patient was monitored after the procedure in the recovery area. They were given post-procedure and discharge instructions to follow at home. The patient was discharged in a stable condition.      Cezar Bailey MD

## 2024-04-18 NOTE — DISCHARGE SUMMARY
Discharge Note  Short Stay      SUMMARY     Admit Date: 2024    Attending Physician: Cezar Bailey      Discharge Physician: Cezar Bailey      Discharge Date: 2024 7:28 AM    Procedure(s) (LRB):  FLORENTINO C7/T1 (N/A)    Final Diagnosis: Cervical radiculitis [M54.12]  DDD (degenerative disc disease), cervical [M50.30]    Disposition: Home or self care    Patient Instructions:   Current Discharge Medication List        CONTINUE these medications which have NOT CHANGED    Details   ALPRAZolam (XANAX) 0.5 MG tablet Take 1 tablet (0.5 mg total) by mouth daily as needed for Anxiety.  Qty: 30 tablet, Refills: 1    Associated Diagnoses: Anxiety about health      cetirizine (ZYRTEC) 10 MG tablet TAKE 1 TABLET BY MOUTH EVERY DAY  Qty: 90 tablet, Refills: 2      cyanocobalamin (VITAMIN B-12) 1000 MCG tablet Take 1 tablet (1,000 mcg total) by mouth once daily.  Qty: 30 tablet, Refills: 12      cyclobenzaprine (FLEXERIL) 10 MG tablet SMARTSI Tablet(s) By Mouth Every Evening PRN      fluticasone propionate (FLONASE) 50 mcg/actuation nasal spray 2 sprays (100 mcg total) by Each Nostril route once daily.  Qty: 18.2 mL, Refills: 6      multivitamin capsule Take by mouth. 1 capsule Oral Every morning      naproxen (NAPROSYN) 500 MG tablet Take 1 tablet (500 mg total) by mouth 2 (two) times daily with meals.  Qty: 60 tablet, Refills: 0      norethindrone-ethinyl estradiol (MICROGESTIN 1/20) 1-20 mg-mcg per tablet Take 1 tablet by mouth once daily.  Qty: 63 tablet, Refills: 4      pantoprazole (PROTONIX) 40 MG tablet TAKE 1 TABLET BY MOUTH EVERY DAY  Qty: 90 tablet, Refills: 1      !! ondansetron (ZOFRAN-ODT) 4 MG TbDL Take 1 tablet (4 mg total) by mouth 2 (two) times daily.  Qty: 2 tablet, Refills: 0      !! ondansetron (ZOFRAN-ODT) 4 MG TbDL Take 1 tablet (4 mg total) by mouth every 12 (twelve) hours as needed (nausea).  Qty: 20 tablet, Refills: 0    Associated Diagnoses: HCC (hepatocellular carcinoma)      tiZANidine  (ZANAFLEX) 4 MG tablet Take 1 tablet (4 mg total) by mouth every 6 (six) hours as needed (pain/spasm).  Qty: 30 tablet, Refills: 1      triamcinolone acetonide 0.1% (KENALOG) 0.1 % cream Apply topically 2 (two) times daily as needed (scaling at external ears). Avoid use on face, body folds, groin/genitalia.  Qty: 45 g, Refills: 3    Associated Diagnoses: Seborrheic dermatitis       !! - Potential duplicate medications found. Please discuss with provider.              Discharge Diagnosis: Cervical radiculitis [M54.12]  DDD (degenerative disc disease), cervical [M50.30]  Condition on Discharge: Stable with no complications to procedure   Diet on Discharge: Same as before.  Activity: as per instruction sheet.  Discharge to: Home with a responsible adult.  Follow up: 2-4 weeks       Please call my office or pager at [phone] if experienced any weakness or loss of sensation, fever > 101.5, pain uncontrolled with oral medications, persistent nausea/vomiting/or diarrhea, redness or drainage from the incisions, or any other worrisome concerns. If physician on call was not reached or could not communicate with our office for any reason please go to the nearest emergency department

## 2024-04-18 NOTE — H&P
"HPI  Patient presenting for Procedure(s) (LRB):  FLORENTINO C7/T1 (N/A)     Patient on Anti-coagulation No    No health changes since previous encounter    Past Medical History:   Diagnosis Date    VENKATA positive 2020    COVID-19     Deviated septum     Hepatocellular carcinoma 2021    Hypertrophy of inferior nasal turbinate     Pericardial effusion     Premature menopause      Past Surgical History:   Procedure Laterality Date    COLONOSCOPY N/A 2020    Procedure: COLONOSCOPY;  Surgeon: GIOVANNI Crane MD;  Location: [facility] ENDO (4TH FLR);  Service: Endoscopy;  Laterality: N/A;  + covid     ESOPHAGOGASTRODUODENOSCOPY N/A 1/10/2024    Procedure: ESOPHAGOGASTRODUODENOSCOPY (EGD);  Surgeon: Reynaldo Lynch MD;  Location: [facility] ENDO (2ND FLR);  Service: Endoscopy;  Laterality: N/A;  referred by daryl carrasco rocedure: EGD Diagnosis: Abnormal finding on GI tract imaging, Abdominal pain, and GERD  Procedure Timin-4 weeks Provider: Any GI provider Location: [facility], [facility], and [facility]  Additional Scheduling Informat    HERNIA REPAIR      LIVER SURGERY N/A 2021    NASAL SEPTUM SURGERY      PERICARDIAL WINDOW N/A 2021    Procedure: CREATION, PERICARDIAL WINDOW;  Surgeon: Ajay Cantor MD;  Location: [facility] OR 2ND FLR;  Service: Cardiovascular;  Laterality: N/A;    PERICARDIOCENTESIS N/A 2020    Procedure: Pericardiocentesis;  Surgeon: Dickson Dangelo MD;  Location: [facility] CATH LAB;  Service: Cardiology;  Laterality: N/A;    TONSILLECTOMY       Review of patient's allergies indicates:   Allergen Reactions    No known drug allergies       No current facility-administered medications for this encounter.       PMHx, PSHx, Allergies, Medications reviewed in epic    ROS negative except pain complaints in HPI    OBJECTIVE:    BP (!) 143/84 (BP Location: Right arm, Patient Position: Lying)   Pulse 60   Temp 98.4 °F (36.9 °C) (Temporal)   Resp 14   Ht 5' 11" (1.803 m)  "  Wt 68.9 kg (152 lb)   LMP  (LMP Unknown)   SpO2 100%   Breastfeeding No   BMI 21.20 kg/m²     PHYSICAL EXAMINATION:    GENERAL: Well appearing, in no acute distress, alert and oriented x3.  PSYCH:  Mood and affect appropriate.  SKIN: Skin color, texture, turgor normal, no rashes or lesions which will impact the procedure.  CV: RRR with palpation of the radial artery.  PULM: No evidence of respiratory difficulty, symmetric chest rise. Clear to auscultation.  NEURO: Cranial nerves grossly intact.    Plan:    Proceed with procedure as planned Procedure(s) (LRB):  FLORENTINO C7/T1 (N/A)    Cezar Bailey  04/18/2024

## 2024-04-19 ENCOUNTER — OFFICE VISIT (OUTPATIENT)
Dept: HEMATOLOGY/ONCOLOGY | Facility: CLINIC | Age: 44
End: 2024-04-19
Payer: COMMERCIAL

## 2024-04-19 ENCOUNTER — PATIENT MESSAGE (OUTPATIENT)
Dept: HEMATOLOGY/ONCOLOGY | Facility: CLINIC | Age: 44
End: 2024-04-19
Payer: COMMERCIAL

## 2024-04-19 ENCOUNTER — INFUSION (OUTPATIENT)
Dept: INFUSION THERAPY | Facility: HOSPITAL | Age: 44
End: 2024-04-19
Payer: COMMERCIAL

## 2024-04-19 ENCOUNTER — HOSPITAL ENCOUNTER (OUTPATIENT)
Dept: RADIOLOGY | Facility: HOSPITAL | Age: 44
Discharge: HOME | End: 2024-04-19
Attending: PHYSICIAN ASSISTANT
Payer: COMMERCIAL

## 2024-04-19 VITALS
HEIGHT: 71 IN | DIASTOLIC BLOOD PRESSURE: 83 MMHG | BODY MASS INDEX: 21.62 KG/M2 | HEART RATE: 84 BPM | TEMPERATURE: 97 F | OXYGEN SATURATION: 99 % | SYSTOLIC BLOOD PRESSURE: 120 MMHG | WEIGHT: 154.44 LBS

## 2024-04-19 VITALS
TEMPERATURE: 99 F | HEIGHT: 71 IN | BODY MASS INDEX: 21.62 KG/M2 | HEART RATE: 59 BPM | SYSTOLIC BLOOD PRESSURE: 148 MMHG | DIASTOLIC BLOOD PRESSURE: 73 MMHG | RESPIRATION RATE: 18 BRPM | WEIGHT: 154.44 LBS

## 2024-04-19 DIAGNOSIS — R22.9 SKIN NODULE: ICD-10-CM

## 2024-04-19 DIAGNOSIS — M89.9 BONE LESION: ICD-10-CM

## 2024-04-19 DIAGNOSIS — C78.01 MALIGNANT NEOPLASM METASTATIC TO RIGHT LUNG: ICD-10-CM

## 2024-04-19 DIAGNOSIS — C22.0 HCC (HEPATOCELLULAR CARCINOMA): Primary | ICD-10-CM

## 2024-04-19 DIAGNOSIS — C77.9 REGIONAL LYMPH NODE METASTASIS PRESENT: ICD-10-CM

## 2024-04-19 DIAGNOSIS — C22.0 HEPATOCELLULAR CARCINOMA: Primary | ICD-10-CM

## 2024-04-19 DIAGNOSIS — D70.9 NEUTROPENIA, UNSPECIFIED TYPE: ICD-10-CM

## 2024-04-19 PROCEDURE — 76882 US LMTD JT/FCL EVL NVASC XTR: CPT | Mod: TC,RT

## 2024-04-19 PROCEDURE — 96361 HYDRATE IV INFUSION ADD-ON: CPT

## 2024-04-19 PROCEDURE — 63600175 PHARM REV CODE 636 W HCPCS: Performed by: PHYSICIAN ASSISTANT

## 2024-04-19 PROCEDURE — 76882 US LMTD JT/FCL EVL NVASC XTR: CPT | Mod: 26,RT,, | Performed by: STUDENT IN AN ORGANIZED HEALTH CARE EDUCATION/TRAINING PROGRAM

## 2024-04-19 PROCEDURE — 1160F RVW MEDS BY RX/DR IN RCRD: CPT | Mod: CPTII,S$GLB,, | Performed by: PHYSICIAN ASSISTANT

## 2024-04-19 PROCEDURE — 99215 OFFICE O/P EST HI 40 MIN: CPT | Mod: S$GLB,,, | Performed by: PHYSICIAN ASSISTANT

## 2024-04-19 PROCEDURE — G2211 COMPLEX E/M VISIT ADD ON: HCPCS | Mod: S$GLB,,, | Performed by: PHYSICIAN ASSISTANT

## 2024-04-19 PROCEDURE — 1159F MED LIST DOCD IN RCRD: CPT | Mod: CPTII,S$GLB,, | Performed by: PHYSICIAN ASSISTANT

## 2024-04-19 PROCEDURE — 96413 CHEMO IV INFUSION 1 HR: CPT

## 2024-04-19 PROCEDURE — 3079F DIAST BP 80-89 MM HG: CPT | Mod: CPTII,S$GLB,, | Performed by: PHYSICIAN ASSISTANT

## 2024-04-19 PROCEDURE — 25000003 PHARM REV CODE 250: Performed by: PHYSICIAN ASSISTANT

## 2024-04-19 PROCEDURE — 3008F BODY MASS INDEX DOCD: CPT | Mod: CPTII,S$GLB,, | Performed by: PHYSICIAN ASSISTANT

## 2024-04-19 PROCEDURE — 99999 PR PBB SHADOW E&M-EST. PATIENT-LVL V: CPT | Mod: PBBFAC,,, | Performed by: PHYSICIAN ASSISTANT

## 2024-04-19 PROCEDURE — 3074F SYST BP LT 130 MM HG: CPT | Mod: CPTII,S$GLB,, | Performed by: PHYSICIAN ASSISTANT

## 2024-04-19 RX ORDER — PROCHLORPERAZINE EDISYLATE 5 MG/ML
5 INJECTION INTRAMUSCULAR; INTRAVENOUS ONCE AS NEEDED
Status: CANCELLED
Start: 2024-04-19

## 2024-04-19 RX ORDER — HEPARIN 100 UNIT/ML
500 SYRINGE INTRAVENOUS
Status: CANCELLED | OUTPATIENT
Start: 2024-04-19

## 2024-04-19 RX ORDER — DIPHENHYDRAMINE HYDROCHLORIDE 50 MG/ML
50 INJECTION INTRAMUSCULAR; INTRAVENOUS ONCE AS NEEDED
Status: DISCONTINUED | OUTPATIENT
Start: 2024-04-19 | End: 2024-04-19 | Stop reason: HOSPADM

## 2024-04-19 RX ORDER — EPINEPHRINE 0.3 MG/.3ML
0.3 INJECTION SUBCUTANEOUS ONCE AS NEEDED
Status: CANCELLED | OUTPATIENT
Start: 2024-04-19

## 2024-04-19 RX ORDER — EPINEPHRINE 0.3 MG/.3ML
0.3 INJECTION SUBCUTANEOUS ONCE AS NEEDED
Status: DISCONTINUED | OUTPATIENT
Start: 2024-04-19 | End: 2024-04-19 | Stop reason: HOSPADM

## 2024-04-19 RX ORDER — DIPHENHYDRAMINE HYDROCHLORIDE 50 MG/ML
50 INJECTION INTRAMUSCULAR; INTRAVENOUS ONCE AS NEEDED
Status: CANCELLED | OUTPATIENT
Start: 2024-04-19

## 2024-04-19 RX ORDER — SODIUM CHLORIDE 0.9 % (FLUSH) 0.9 %
10 SYRINGE (ML) INJECTION
Status: CANCELLED | OUTPATIENT
Start: 2024-04-19

## 2024-04-19 RX ORDER — SODIUM CHLORIDE, SODIUM LACTATE, POTASSIUM CHLORIDE, CALCIUM CHLORIDE 600; 310; 30; 20 MG/100ML; MG/100ML; MG/100ML; MG/100ML
INJECTION, SOLUTION INTRAVENOUS CONTINUOUS
Status: DISCONTINUED | OUTPATIENT
Start: 2024-04-19 | End: 2024-04-19 | Stop reason: HOSPADM

## 2024-04-19 RX ORDER — SODIUM CHLORIDE 0.9 % (FLUSH) 0.9 %
10 SYRINGE (ML) INJECTION
Status: DISCONTINUED | OUTPATIENT
Start: 2024-04-19 | End: 2024-04-19 | Stop reason: HOSPADM

## 2024-04-19 RX ORDER — SODIUM CHLORIDE, SODIUM LACTATE, POTASSIUM CHLORIDE, CALCIUM CHLORIDE 600; 310; 30; 20 MG/100ML; MG/100ML; MG/100ML; MG/100ML
INJECTION, SOLUTION INTRAVENOUS CONTINUOUS
Status: CANCELLED
Start: 2024-04-19

## 2024-04-19 RX ORDER — PROCHLORPERAZINE EDISYLATE 5 MG/ML
5 INJECTION INTRAMUSCULAR; INTRAVENOUS ONCE AS NEEDED
Status: DISCONTINUED | OUTPATIENT
Start: 2024-04-19 | End: 2024-04-19 | Stop reason: HOSPADM

## 2024-04-19 RX ADMIN — SODIUM CHLORIDE 1500 MG: 9 INJECTION, SOLUTION INTRAVENOUS at 10:04

## 2024-04-19 RX ADMIN — SODIUM CHLORIDE: 9 INJECTION, SOLUTION INTRAVENOUS at 10:04

## 2024-04-19 RX ADMIN — SODIUM CHLORIDE, POTASSIUM CHLORIDE, SODIUM LACTATE AND CALCIUM CHLORIDE: 600; 310; 30; 20 INJECTION, SOLUTION INTRAVENOUS at 09:04

## 2024-04-19 NOTE — PLAN OF CARE
1150  Infusion completed, pt tolerated; pt instructed to increase water hydration daily; discussed when to contact MD, when to report to ED; pt verbalized understanding of all discussed and when to report next

## 2024-04-19 NOTE — NURSING
0913  Pt here for 1L LR, Imfinzi, no complaints or concerns, reports LR prevents diarrhea with good results; discussed treatment plan for today, all questions answered and pt agrees to proceed

## 2024-04-19 NOTE — PROGRESS NOTES
MEDICAL ONCOLOGY - ESTABLISHED PATIENT VISIT    Reason for visit: Scirrhous HCC    Best Contact Phone Number(s): [phone] (home)      Cancer/Stage/TNM:    Cancer Staging   Hepatocellular carcinoma  Staging form: Liver, AJCC 8th Edition  - Clinical stage from 4/10/2023: Stage IVB (rcT1b, cN0, pM1) - Signed by Philippe Carrasco MD on 8/7/2023       Oncology History   Hepatocellular carcinoma   6/9/2021 Initial Diagnosis    Hepatocellular carcinoma     4/10/2023 Cancer Staged    Staging form: Liver, AJCC 8th Edition  - Clinical stage from 4/10/2023: Stage IVB (rcT1b, cN0, pM1)     7/11/2023 -  Chemotherapy    Treatment Summary   Plan Name: OP TREMELIMUMAB 300 MG (X 1) + DURVALUMAB 1500 MG Q4W  Treatment Goal: Control  Status: Active  Start Date: 7/11/2023  End Date: 6/10/2024 (Planned)  Provider: Philippe Carrasco MD  Chemotherapy: [No matching medication found in this treatment plan]     9/11/2023 Genetic Testing    Genetic counseling done. Hereditary syndrome unlikely. Patient offered testing, but declined due to cost.      10/23/2023 - 11/3/2023 Radiation Therapy    Treating physician: yamil villa    Course: C1 Chst/Abd 2023  Treatment Site Ref. ID Energy Dose/Fx (Gy) #Fx Dose Correction (Gy) Total Dose (Gy) Start Date End Date Elapsed Days   SB Lung_R PTV_Lung 6X 10 5 / 5 0 50 10/23/2023 11/1/2023 9   SB Abdomen PTV_PortalLN 6X 10 5 / 5 0 50 10/23/2023 11/3/2023 11           Interim History:   43 y.o. 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 Howell with the same pathology, negative margins, 0 of 8 lymph nodes positive and + for vascular and perineural invasion. We saw her in 2021 for evaluation for persistent CA 19-9 but there was no radiographic evidence of HCC disease recurrence or alternative reason for CA 19-9 elevation.  CT chest on 3/9/23 showed an enlarging RLL nodule measuring 1.1 cm, increased from 0.8 cm in size.  IR biopsy of this on  4/10/23 confirmed metastatic HCC.  Since then in mid-June she has also had an MRI abdomen that showed an enlarging portocaval lymph node that is consistent with metastatic disease.  She completed SBRT at the beginning of November to her lung metastasis and to her portocaval lymph node.    She presents today for cycle 11 of durvalumab + tremelimumab as part of the STRiDE regimen. She is feeling pretty good today. She underwent an epidural yesterday for her pain. She tolerating the procedure well. She underwent Y-90 on 2/1/24.  She was in the ER on 3/14/24 for substernal chest pain.  It then progressed to her R arm and her low back.  Her workup in the ER was unremarkable.  She had tried a muscle relaxer and Tylenol at home prior to going.  She got a dose of Toradol in the ER which helped her pain.  Has not recurred.  Has been taking naproxen at home but recently stopped because she wasn't having any pain.  Still on Protonix. Has an appointment with her PCP on Monday, this was originally scheduled to evaluate a skin rash to the R arm. She applied Hibiclens to the area and it resolved. This has not recurred.     Does state that she has a small nodule underneath the skin of her lower R extremity, above the ankle. The nodule does not hurt. Overall doing well.     Presents alone today.  ECOG PS 0. Looking forward to her trip to [facility] on 5/18    ROS:   Review of Systems   Constitutional:  Negative for chills, fever, malaise/fatigue and weight loss.   HENT:  Negative for sore throat.    Eyes:  Negative for blurred vision and pain.   Respiratory:  Negative for cough and shortness of breath.    Cardiovascular:  Negative for chest pain and leg swelling.   Gastrointestinal:  Positive for constipation. Negative for abdominal pain, diarrhea, nausea and vomiting.   Genitourinary:  Negative for dysuria and frequency.   Musculoskeletal:  Negative for back pain, falls and myalgias.   Skin:  Negative for rash.        Nodule to the  lower R leg   Neurological:  Negative for dizziness, weakness and headaches.   Endo/Heme/Allergies:  Does not bruise/bleed easily.   Psychiatric/Behavioral:  Negative for depression. The patient is not nervous/anxious.    All other systems reviewed and are negative.      Past Medical History:   Past Medical History:   Diagnosis Date    VENKATA positive 2020    COVID-19     Deviated septum     Hepatocellular carcinoma 2021    Hypertrophy of inferior nasal turbinate     Pericardial effusion     Premature menopause         Past Surgical History:   Past Surgical History:   Procedure Laterality Date    COLONOSCOPY N/A 2020    Procedure: COLONOSCOPY;  Surgeon: GIOVANNI Crane MD;  Location: [facility] (4TH FLR);  Service: Endoscopy;  Laterality: N/A;  + covid     EPIDURAL STEROID INJECTION INTO CERVICAL SPINE N/A 2024    Procedure: FLORENTINO C7/T1;  Surgeon: Cezar Bailey MD;  Location: [facility] PAIN MANAGEMENT;  Service: Pain Management;  Laterality: N/A;  20mins-No ac    ESOPHAGOGASTRODUODENOSCOPY N/A 1/10/2024    Procedure: ESOPHAGOGASTRODUODENOSCOPY (EGD);  Surgeon: Reynaldo Lynch MD;  Location: [facility] (2ND FLR);  Service: Endoscopy;  Laterality: N/A;  referred by daryl carrasco rocedure: EGD Diagnosis: Abnormal finding on GI tract imaging, Abdominal pain, and GERD  Procedure Timin-4 weeks Provider: Any GI provider Location: [facility], [facility], [facility] 2-Endo, and [facility] 4-Endo  Additional Scheduling Informat    HERNIA REPAIR      LIVER SURGERY N/A 2021    NASAL SEPTUM SURGERY      PERICARDIAL WINDOW N/A 2021    Procedure: CREATION, PERICARDIAL WINDOW;  Surgeon: Ajay Cantor MD;  [facility];  Service: Cardiovascular;  Laterality: N/A;    PERICARDIOCENTESIS N/A 2020    Procedure: Pericardiocentesis;  Surgeon: Dickson Dangelo MD;  Location: [facility] CATH LAB;  Service: Cardiology;  Laterality: N/A;    TONSILLECTOMY          Family History:   Family History    Problem Relation Name Age of Onset    Diabetes Mother      Liver disease Mother          fatty liver    Heart disease Father      Macular degeneration Father      Diabetes Father      Hypertension Father      Hyperlipidemia Father      Heart disease Sister      Lung cancer Maternal Aunt          heavy smoker    Cerebral aneurysm Paternal Aunt      Cataracts Neg Hx      Glaucoma Neg Hx      Retinal detachment Neg Hx      Stroke Neg Hx      Thyroid disease Neg Hx      Melanoma Neg Hx      Heart attack Neg Hx          Social History:   Social History     Tobacco Use    Smoking status: Never    Smokeless tobacco: Never   Substance Use Topics    Alcohol use: Yes     Comment: rare        I have reviewed and updated the patient's past medical, surgical, family and social histories.    Allergies:   Review of patient's allergies indicates:   Allergen Reactions    No known drug allergies         Medications:   Current Outpatient Medications   Medication Sig Dispense Refill    ALPRAZolam (XANAX) 0.5 MG tablet Take 1 tablet (0.5 mg total) by mouth daily as needed for Anxiety. 30 tablet 1    cetirizine (ZYRTEC) 10 MG tablet TAKE 1 TABLET BY MOUTH EVERY DAY 90 tablet 2    cyanocobalamin (VITAMIN B-12) 1000 MCG tablet Take 1 tablet (1,000 mcg total) by mouth once daily. 30 tablet 12    cyclobenzaprine (FLEXERIL) 10 MG tablet SMARTSI Tablet(s) By Mouth Every Evening PRN      fluticasone propionate (FLONASE) 50 mcg/actuation nasal spray 2 sprays (100 mcg total) by Each Nostril route once daily. (Patient taking differently: 2 sprays by Each Nostril route daily as needed.) 18.2 mL 6    multivitamin capsule Take by mouth. 1 capsule Oral Every morning      naproxen (NAPROSYN) 500 MG tablet Take 1 tablet (500 mg total) by mouth 2 (two) times daily with meals. 60 tablet 0    norethindrone-ethinyl estradiol (MICROGESTIN 1/20) 1-20 mg-mcg per tablet Take 1 tablet by mouth once daily. 63 tablet 4    ondansetron (ZOFRAN-ODT) 4 MG TbDL  "Take 1 tablet (4 mg total) by mouth 2 (two) times daily. 2 tablet 0    ondansetron (ZOFRAN-ODT) 4 MG TbDL Take 1 tablet (4 mg total) by mouth every 12 (twelve) hours as needed (nausea). 20 tablet 0    pantoprazole (PROTONIX) 40 MG tablet TAKE 1 TABLET BY MOUTH EVERY DAY 90 tablet 1    tiZANidine (ZANAFLEX) 4 MG tablet Take 1 tablet (4 mg total) by mouth every 6 (six) hours as needed (pain/spasm). 30 tablet 1    triamcinolone acetonide 0.1% (KENALOG) 0.1 % cream Apply topically 2 (two) times daily as needed (scaling at external ears). Avoid use on face, body folds, groin/genitalia. 45 g 3     No current facility-administered medications for this visit.     Facility-Administered Medications Ordered in Other Visits   Medication Dose Route Frequency Provider Last Rate Last Admin    diphenhydrAMINE injection 50 mg  50 mg Intravenous Once PRN Willow Almendarez PA-C        EPINEPHrine (EPIPEN) 0.3 mg/0.3 mL pen injection 0.3 mg  0.3 mg Intramuscular Once PRN Willow Almendarez PA-C        hydrocortisone sodium succinate injection 100 mg  100 mg Intravenous Once PRN Willow Almendarez PA-C        lactated ringers infusion   Intravenous Continuous Willow Almendarez PA-C   Stopped at 04/19/24 1038    prochlorperazine injection Soln 5 mg  5 mg Intravenous Once PRN Willow Almendarez PA-C        sodium chloride 0.9% 100 mL flush bag   Intravenous 1 time in Clinic/HOD Willow Almendarez PA-C        sodium chloride 0.9% flush 10 mL  10 mL Intravenous PRN Willow Almendarez PA-C            Physical Exam:   /83 (BP Location: Left arm, Patient Position: Sitting, BP Method: Medium (Automatic))   Pulse 84   Temp 97.3 °F (36.3 °C) (Oral)   Ht 5' 11" (1.803 m)   Wt 70.1 kg (154 lb 6.9 oz)   LMP  (LMP Unknown)   SpO2 99%   BMI 21.54 kg/m²      ECOG Performance status: 0            Physical Exam  Vitals reviewed.   Constitutional:       General: She is not in acute distress.     Appearance: Normal appearance. She is normal weight. "   HENT:      Head: Normocephalic and atraumatic.      Right Ear: External ear normal.      Left Ear: External ear normal.      Nose: Nose normal.      Mouth/Throat:      Mouth: Mucous membranes are moist.      Pharynx: Oropharynx is clear. No posterior oropharyngeal erythema.   Eyes:      General: No scleral icterus.     Extraocular Movements: Extraocular movements intact.      Conjunctiva/sclera: Conjunctivae normal.      Pupils: Pupils are equal, round, and reactive to light.   Cardiovascular:      Rate and Rhythm: Normal rate and regular rhythm.      Pulses: Normal pulses.      Heart sounds: Normal heart sounds.   Pulmonary:      Effort: Pulmonary effort is normal.      Breath sounds: Normal breath sounds. No wheezing or rales.   Abdominal:      General: Bowel sounds are normal. There is no distension.      Palpations: Abdomen is soft.      Tenderness: There is no abdominal tenderness.   Musculoskeletal:         General: No swelling. Normal range of motion.      Cervical back: Normal range of motion and neck supple.   Skin:     General: Skin is warm.      Coloration: Skin is not jaundiced.      Findings: No erythema or rash.   Neurological:      General: No focal deficit present.      Mental Status: She is alert and oriented to person, place, and time. Mental status is at baseline.      Gait: Gait normal.   Psychiatric:         Mood and Affect: Mood normal.         Behavior: Behavior normal.         Thought Content: Thought content normal.         Judgment: Judgment normal.           Labs:   Recent Results (from the past 48 hour(s))   Cancer Antigen 19-9    Collection Time: 04/19/24  7:18 AM   Result Value Ref Range    CA 19-9 81.7 (H) 0.0 - 40.0 U/mL   CBC Oncology    Collection Time: 04/19/24  7:18 AM   Result Value Ref Range    WBC 7.52 3.90 - 12.70 K/uL    RBC 4.43 4.00 - 5.40 M/uL    Hemoglobin 13.8 12.0 - 16.0 g/dL    Hematocrit 42.3 37.0 - 48.5 %    MCV 96 82 - 98 fL    MCH 31.2 (H) 27.0 - 31.0 pg    MCHC  32.6 32.0 - 36.0 g/dL    RDW 12.2 11.5 - 14.5 %    Platelets 223 150 - 450 K/uL    MPV 9.8 9.2 - 12.9 fL    Gran # (ANC) 5.9 1.8 - 7.7 K/uL    Immature Grans (Abs) 0.03 0.00 - 0.04 K/uL   Comprehensive Metabolic Panel    Collection Time: 24  7:18 AM   Result Value Ref Range    Sodium 138 136 - 145 mmol/L    Potassium 4.0 3.5 - 5.1 mmol/L    Chloride 104 95 - 110 mmol/L    CO2 25 23 - 29 mmol/L    Glucose 133 (H) 70 - 110 mg/dL    BUN 12 6 - 20 mg/dL    Creatinine 1.0 0.5 - 1.4 mg/dL    Calcium 9.7 8.7 - 10.5 mg/dL    Total Protein 7.3 6.0 - 8.4 g/dL    Albumin 3.6 3.5 - 5.2 g/dL    Total Bilirubin 0.7 0.1 - 1.0 mg/dL    Alkaline Phosphatase 56 55 - 135 U/L    AST 15 10 - 40 U/L    ALT 16 10 - 44 U/L    eGFR >60.0 >60 mL/min/1.73 m^2    Anion Gap 9 8 - 16 mmol/L   AFP Tumor Marker    Collection Time: 24  7:18 AM   Result Value Ref Range    AFP 11 (H) 0.0 - 8.4 ng/mL       I have reviewed the pertinent labs.  Normal blood counts.  AFP 11.    Imagin/29/24: MRI Abd:  Impression:     1. Status post IR embolization at hepatic segment 8.  Residual treatment cavity demonstrates areas of peripheral enhancement.  No convincing evidence of local recurrence at this site.  2. Indeterminate homogeneously enhancing round lesion in the left hepatic lobe demonstrating slow interval growth compared to the previous 2 MRIs (now measuring 1.5 cm, previsouly 0.9 cm on 2022 MRI).  3. Two lesions in the right hepatic lobe compatible with hemangiomas.  4. No new focal hepatic lesions.  5. Previous pericaval lymph node has decreased in size.  No new or enlarging lymphadenopathy.  6. Other findings above.    Path:   21: partial hepatectomy:    Final Pathologic Diagnosis 1.  Gallbladder (cholecystectomy):   - Benign gallbladder with cholecystitis   2. Right lobe (partial hepatectomy):   - Positive for malignancy, see synoptic report below   - Separate hemangioma, 7.3 cm   3. Lymph nodes, portal (regional  resection):   - 8 lymph nodes, negative for tumor (0/8)   ______________________________________________________________________________   ______   Hepatocellular carcinoma synoptic report   - Procedure:  Partial hepatectomy, right lobe   - Histologic type:  Hepatocellular carcinoma, scirrhous   - Histologic grade:  Moderately differentiated, grade 2   - Tumor focality:  Solitary   - Tumor characteristics:   - Tumor site:  Right lobe   - Tumor size:  3.3 cm   - Treatment effect:  No known pre-surgical therapy   - Satellitosis:  Not identified   - Tumor extent:  Confined to liver   - Vascular invasion:  Present, Small vessel   - Perineural invasion:  Present   - Margins:   - All margins are negative for invasive carcinoma   - Closest margin to invasive carcinoma:  Parenchymal   - Distance from invasive  carcinoma to closest margin:  5 mm   - Regional lymph nodes:   - Number of lymph nodes with tumor:  0   - Number of lymph nodes examined:  8   - Pathology: pT2 N0 MX   - Additional findings:   - No steatosis   - Trichrome stain:  Periportal fibrosis with early septa, stage 1-2   - Iron stain:  Negative   - Iron and trichrome stains with appropriate controls   Tumor blocks:  2A, 2B, 2 C    Comment: Interp By Madeline Carlos MD, Signed on 07/08/2021 at 16:47       Assessment:       1. HCC (hepatocellular carcinoma)    2. Skin nodule    3. Regional lymph node metastasis present    4. Malignant neoplasm metastatic to right lung    5. Neutropenia, unspecified type    6. Bone lesion                  Plan:             # HCC  Scirrhous subtype, which is very uncommon (~ 1% of all HCC); prognosis is likely similar to typical HCC.  No clear underlying liver pathology in this patient.  Had margin negative resection with negative lymph node eval on 6/28/21 with Dr. Howell.  Unfortunately she has biopsy proven recurrent metastatic disease to her RLL s/p IR biopsy 4/10/23.   She was discussed at thoracic tumor board with potential  plan for surgical resection with Dr. Tadeo.  She had a concerning bone lesion on PET from 4/26 at T2.  This was biopsied and proven to be benign.  In the interim she had a repeat abdominal MRI on 6/13/23 which demonstrated growth of a portacaval lymph node that was very concerning for metastatic disease when we reviewed her imaging at liver conference.  Meanwhile her RLL met has grown slightly on 6/21/23 CT as well.  We discussed her case with Valerie Dhillon and Shwetha and we were all in agreement with proceeding with systemic therapy rather than surgery or radiation given multifocal progression.    We discussed this with her and she is agreeable with starting systemic therapy.  Reviewed possibilities of atezo + magaly or durva + treme.  She wished to proceed with STRIDE regimen: durvalumab + tremelimumab.    Received cycle 1 on 7/11/23.  Repeat CT CAP after cycle 3 demonstrates mild growth in albert hepatis lymph node.  Stable disease elsewhere.    Discussed with Dr. Mendiola and she was deemed eligible for SBRT to her abdominal lymph node and growing lung nodule.  She completed SBRT to both 11/3/23.    Meanwhile she developed a new concerning area of possible recurrence near her liver resection site.  Discussed with Dr. Lala and Dr. Dhillon.  Dr. Dhillon was unable to visualize it during radiation simulation.  We recommended to proceed with Y-90 which was administered 2/1/24.    MRI 2/29/24 shows good response to Y-90 treated lesion.  Indeterminate lesion reviewed by Dr. Alford with no concerning features.  Will continue current regimen.    - doing well with immunotherapy. Lab work has been reviewed and adequate for treatment. AFP 11  - Will continue with STRIDE regimen, meanwhile.   - Proceed with cycle 11 today - durvalumab alone.  - Will give LR per her request with infusion today.    Consider switching systemic therapy only if significant progression given likely side effects of TKI.    Saw Dr. Sabillon of cardiology who  recommended troponin and ECG monitoring given her cardiac history.  She is asymptomatic at present.    Will plan to bring her back in 4 weeks for cycle 12.  Repeat CT chest and MRI in late May, early June. Will be going to [facility] on 5/18    # Neutropenia, cervical radiculopathy  Resolved.  Has experienced in past.  Likely benign etiology.  Continue to f/u with Orthopedics    # Liver hemangiomas, GERD, nodule to the RLE  Continue monitoring as indicated with Dr. Rogers.  Continue to f/u with GI team for GERD symptoms. Continue with medication management with Protonix.  Will order US of the RLE    Follow up: 4 weeks.    The above information has been reviewed with the patient and all questions have been answered to their apparent satisfaction.  They understand that they can call the clinic with any questions. Discussed case with Dr. Carrasco        Route Chart for Scheduling    Med Onc Chart Routing      Follow up with physician . Please schedule appointments with Dr. Carrasco on Fridays, per patient preference. If Geoff Aguero is not available, please schedule with Janette   Follow up with MELA    Infusion scheduling note    Injection scheduling note    Labs CBC, CMP, TSH, free T4 and CA 19-9   Scheduling:  Preferred lab:  Lab interval: every 4 weeks  AFP   Imaging MRI   Scheduled.   Pharmacy appointment    Other referrals                  Treatment Plan Information   OP TREMELIMUMAB 300 MG (X 1) + DURVALUMAB 1500 MG Q4W   Philippe Carrasco MD   Upcoming Treatment Dates - OP TREMELIMUMAB 300 MG (X 1) + DURVALUMAB 1500 MG Q4W    5/13/2024       Chemotherapy       durvalumab (IMFINZI) 1,500 mg in sodium chloride 0.9% SolP 280 mL chemo infusion       Antiemetics       prochlorperazine injection Soln 5 mg  6/10/2024       Chemotherapy       durvalumab (IMFINZI) 1,500 mg in sodium chloride 0.9% SolP 280 mL chemo infusion       Antiemetics       prochlorperazine injection Soln 5 mg

## 2024-04-23 ENCOUNTER — PATIENT MESSAGE (OUTPATIENT)
Dept: GASTROENTEROLOGY | Facility: CLINIC | Age: 44
End: 2024-04-23
Payer: COMMERCIAL

## 2024-04-23 ENCOUNTER — PATIENT MESSAGE (OUTPATIENT)
Dept: HEMATOLOGY/ONCOLOGY | Facility: CLINIC | Age: 44
End: 2024-04-23
Payer: COMMERCIAL

## 2024-04-23 DIAGNOSIS — R10.10 UPPER ABDOMINAL PAIN: Primary | ICD-10-CM

## 2024-04-26 ENCOUNTER — HOSPITAL ENCOUNTER (OUTPATIENT)
Dept: RADIOLOGY | Facility: HOSPITAL | Age: 44
Discharge: HOME | End: 2024-04-26
Payer: COMMERCIAL

## 2024-04-26 DIAGNOSIS — R10.10 UPPER ABDOMINAL PAIN: ICD-10-CM

## 2024-04-26 PROCEDURE — 76700 US EXAM ABDOM COMPLETE: CPT | Mod: 26,,, | Performed by: RADIOLOGY

## 2024-04-26 PROCEDURE — 76700 US EXAM ABDOM COMPLETE: CPT | Mod: TC

## 2024-04-30 ENCOUNTER — PATIENT MESSAGE (OUTPATIENT)
Dept: PSYCHIATRY | Facility: CLINIC | Age: 44
End: 2024-04-30
Payer: COMMERCIAL

## 2024-05-03 ENCOUNTER — OFFICE VISIT (OUTPATIENT)
Dept: PSYCHIATRY | Facility: CLINIC | Age: 44
End: 2024-05-03
Payer: COMMERCIAL

## 2024-05-03 VITALS
WEIGHT: 156 LBS | HEART RATE: 84 BPM | SYSTOLIC BLOOD PRESSURE: 143 MMHG | BODY MASS INDEX: 21.75 KG/M2 | DIASTOLIC BLOOD PRESSURE: 78 MMHG

## 2024-05-03 DIAGNOSIS — F06.4 ANXIETY DISORDER DUE TO GENERAL MEDICAL CONDITION: Primary | ICD-10-CM

## 2024-05-03 PROCEDURE — 90792 PSYCH DIAG EVAL W/MED SRVCS: CPT | Mod: S$GLB,,,

## 2024-05-03 PROCEDURE — 1160F RVW MEDS BY RX/DR IN RCRD: CPT | Mod: CPTII,S$GLB,,

## 2024-05-03 PROCEDURE — 1159F MED LIST DOCD IN RCRD: CPT | Mod: CPTII,S$GLB,,

## 2024-05-03 PROCEDURE — 3077F SYST BP >= 140 MM HG: CPT | Mod: CPTII,S$GLB,,

## 2024-05-03 PROCEDURE — 99999 PR PBB SHADOW E&M-EST. PATIENT-LVL III: CPT | Mod: PBBFAC,,,

## 2024-05-03 PROCEDURE — 3078F DIAST BP <80 MM HG: CPT | Mod: CPTII,S$GLB,,

## 2024-05-03 NOTE — PROGRESS NOTES
Outpatient Psychiatry Initial Visit (MD/NP)    5/3/2024    Melly Hwang, a 43 y.o. female, presenting for initial evaluation visit. Met with patient.    Reason for Encounter: self-referral. Patient complains of No chief complaint on file.      Current medications:  Alprazolam 0.5 mg daily PRN    Previous medication trials:  none  .  History of Present Illness: Patient presents alert, casually dressed, euthymic affect. Reports symptoms of anxiety and claustrophobia that started in 2020. States 2020 through 2023 she has been diagnosed and treated for lung and liver cancer and she underwent  pericardio window surgery. Reports she had a bone biopsy and was inadvertently told she had bone cancer.     She has a trip planned in 2 weeks with her daughter and other family members to [facility]. She is extremely worried about her plane trip.   Reports she has anxiety about  medical conditions returning.   In March she went to ED for chest pain and cardiac work up was negative.   MRI scheduled in May.    Denies any consistent depression symptoms over the past 2 weeks including decreased interest/motivation/pleasure/energy/appetite/concentration/sleep. States overall sleep is ok. Reports no difficulty with initiating or remaining asleep. Denies difficulty with interests and motivation. Denies feelings of guilt. Overall energy level is ok. Ability to concentrate is ok. Currently appetite is ok. No recent signficant fluctuations in weight over the past few months. Denies psychomotor retardation or agitation. Denies anhedonia.    Denies suicide/homicide ideations. Denies A/V/H.    Psychiatric History:    Denies any history of manic symptoms, including decreased need for sleep, increased energy, increased goal oriented behavior, risky behavior, racing thoughts.     Denies any history of psychotic symptoms, including AVH, paranoia, thought insertion/broadcasting/withdrawal, delusions.     Endorses SARABJIT symptoms including  excess worry, tension, insomnia. Endorses panic attacks.     Denies history of PTSD symptoms including flashbacks, nightmares, hypervigilance.    Denies OCD and eating disorder symptoms.    Psychiatric Hospitalizations:Denies    Head Trauma with loss of consciousness:Denies    History of Seizures:Denies    Substance Use alcohol/illicit:Denies; a glass of wine yearly if that. Denies any illicit substances.     Rehab:Denies    Access to a Gun:Denies    Trauma History (physical, emotional, sexual):Denies    Suicide attempts/means/Self Harm:Denies    Out patient psychotherapy:Once during her cancer treatment in 2023.       Review Of Systems:     Review of Systems   Psychiatric/Behavioral:  The patient is nervous/anxious.    All other systems reviewed and are negative.        Current Evaluation:     Nutritional Screening: Considering the patient's height and weight, medications, medical history and preferences, should a referral be made to the dietitian?     Constitutional  Vitals:  Most recent vital signs, dated  90 days prior to this appointment,  reviewed.    There were no vitals filed for this visit.     General:  unremarkable, age appropriate, casually dressed, neatly groomed     Musculoskeletal  Muscle Strength/Tone:  not examined   Gait & Station:  non-ataxic     Psychiatric  Speech:  no latency; no press, spontaneous   Mood & Affect:  anxious  congruent and appropriate   Thought Process:  normal and logical   Associations:  intact   Thought Content:  normal, no suicidality, no homicidality, delusions, or paranoia   Insight:  intact, has awareness of illness   Judgement: behavior is adequate to circumstances   Orientation:  grossly intact, person, place, situation, month of year, year   Memory: intact for content of interview, grossly intact, memory >3 objects at five mins, able to remember recent events- Yes, able to remember remote events- Yes   Language: grossly intact, able to name, able to repeat   Attention  Span & Concentration:  able to focus   Fund of Knowledge:  intact and appropriate to age and level of education, 3 of 4 recent presidents       Relevant Elements of Neurological Exam: normal gait    Functioning in Relationships:  Spouse/partner: Single  Peers: Endorses not having a lot of support from friends or faly   Employers: [employer]    Laboratory Data  Lab Visit on 04/19/2024   Component Date Value Ref Range Status    CA 19-9 04/19/2024 81.7 (H)  0.0 - 40.0 U/mL Final    WBC 04/19/2024 7.52  3.90 - 12.70 K/uL Final    RBC 04/19/2024 4.43  4.00 - 5.40 M/uL Final    Hemoglobin 04/19/2024 13.8  12.0 - 16.0 g/dL Final    Hematocrit 04/19/2024 42.3  37.0 - 48.5 % Final    MCV 04/19/2024 96  82 - 98 fL Final    MCH 04/19/2024 31.2 (H)  27.0 - 31.0 pg Final    MCHC 04/19/2024 32.6  32.0 - 36.0 g/dL Final    RDW 04/19/2024 12.2  11.5 - 14.5 % Final    Platelets 04/19/2024 223  150 - 450 K/uL Final    MPV 04/19/2024 9.8  9.2 - 12.9 fL Final    Gran # (ANC) 04/19/2024 5.9  1.8 - 7.7 K/uL Final    Immature Grans (Abs) 04/19/2024 0.03  0.00 - 0.04 K/uL Final    Sodium 04/19/2024 138  136 - 145 mmol/L Final    Potassium 04/19/2024 4.0  3.5 - 5.1 mmol/L Final    Chloride 04/19/2024 104  95 - 110 mmol/L Final    CO2 04/19/2024 25  23 - 29 mmol/L Final    Glucose 04/19/2024 133 (H)  70 - 110 mg/dL Final    BUN 04/19/2024 12  6 - 20 mg/dL Final    Creatinine 04/19/2024 1.0  0.5 - 1.4 mg/dL Final    Calcium 04/19/2024 9.7  8.7 - 10.5 mg/dL Final    Total Protein 04/19/2024 7.3  6.0 - 8.4 g/dL Final    Albumin 04/19/2024 3.6  3.5 - 5.2 g/dL Final    Total Bilirubin 04/19/2024 0.7  0.1 - 1.0 mg/dL Final    Alkaline Phosphatase 04/19/2024 56  55 - 135 U/L Final    AST 04/19/2024 15  10 - 40 U/L Final    ALT 04/19/2024 16  10 - 44 U/L Final    eGFR 04/19/2024 >60.0  >60 mL/min/1.73 m^2 Final    Anion Gap 04/19/2024 9  8 - 16 mmol/L Final    AFP 04/19/2024 11 (H)  0.0 - 8.4 ng/mL Final         Medications  Outpatient Encounter  Medications as of 5/3/2024   Medication Sig Dispense Refill    ALPRAZolam (XANAX) 0.5 MG tablet Take 1 tablet (0.5 mg total) by mouth daily as needed for Anxiety. 30 tablet 1    cetirizine (ZYRTEC) 10 MG tablet TAKE 1 TABLET BY MOUTH EVERY DAY 90 tablet 2    cyanocobalamin (VITAMIN B-12) 1000 MCG tablet Take 1 tablet (1,000 mcg total) by mouth once daily. 30 tablet 12    cyclobenzaprine (FLEXERIL) 10 MG tablet SMARTSI Tablet(s) By Mouth Every Evening PRN      fluticasone propionate (FLONASE) 50 mcg/actuation nasal spray 2 sprays (100 mcg total) by Each Nostril route once daily. (Patient taking differently: 2 sprays by Each Nostril route daily as needed.) 18.2 mL 6    multivitamin capsule Take by mouth. 1 capsule Oral Every morning      naproxen (NAPROSYN) 500 MG tablet Take 1 tablet (500 mg total) by mouth 2 (two) times daily with meals. 60 tablet 0    norethindrone-ethinyl estradiol (MICROGESTIN 1/20) 1-20 mg-mcg per tablet Take 1 tablet by mouth once daily. 63 tablet 4    ondansetron (ZOFRAN-ODT) 4 MG TbDL Take 1 tablet (4 mg total) by mouth 2 (two) times daily. 2 tablet 0    ondansetron (ZOFRAN-ODT) 4 MG TbDL Take 1 tablet (4 mg total) by mouth every 12 (twelve) hours as needed (nausea). 20 tablet 0    pantoprazole (PROTONIX) 40 MG tablet TAKE 1 TABLET BY MOUTH EVERY DAY 90 tablet 1    tiZANidine (ZANAFLEX) 4 MG tablet Take 1 tablet (4 mg total) by mouth every 6 (six) hours as needed (pain/spasm). 30 tablet 1    triamcinolone acetonide 0.1% (KENALOG) 0.1 % cream Apply topically 2 (two) times daily as needed (scaling at external ears). Avoid use on face, body folds, groin/genitalia. 45 g 3     No facility-administered encounter medications on file as of 5/3/2024.           Assessment - Diagnosis - Goals:     Impression: 43 y.o. 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 Discussed evidenced  based treatment for anxiety and panic disorders and she is agreeable for a referral to psychotherapy and wishes to continue current medication (xanax). Counseled on risks and benefits of benzodiazepine medications.       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    Treatment Goals:  Specify outcomes written in observable, behavioral terms:   Anxiety: modifying schemata of threat/vulnerability/need for control    Treatment Plan/Recommendations:   Medication Management: Continue current medications. The risks and benefits of medication were discussed with the patient.  Benzodiazepines: discussed and educated the patient that benzodiazepines are generally not intended for prolonged use. They are likely to cause tolerance and dependence over time, and can cause disinhibition, cognitive impairment, and increased risk of falls. They are not recommended to be used concurrently with narcotics, hypnotics, other benzodiazepines, or alcohol, as this can increase the risk of profound sedation, respiratory depression, coma, and even death. A plan to taper benzodiazepines over time was also discussed, along with options for alternative anxiolytics as suitable replacements.    Referral to [facility]      Return to Clinic: 3 months    MICHAEL Smyth

## 2024-05-08 ENCOUNTER — PATIENT MESSAGE (OUTPATIENT)
Dept: PSYCHIATRY | Facility: CLINIC | Age: 44
End: 2024-05-08
Payer: COMMERCIAL

## 2024-05-09 RX ORDER — FLUOXETINE HYDROCHLORIDE 20 MG/1
20 CAPSULE ORAL DAILY
Qty: 30 CAPSULE | Refills: 2 | Status: SHIPPED | OUTPATIENT
Start: 2024-05-09 | End: 2024-05-10 | Stop reason: ALTCHOICE

## 2024-05-10 RX ORDER — ESCITALOPRAM OXALATE 10 MG/1
10 TABLET ORAL DAILY
Qty: 90 TABLET | Refills: 1 | Status: SHIPPED | OUTPATIENT
Start: 2024-05-10

## 2024-05-13 ENCOUNTER — PATIENT MESSAGE (OUTPATIENT)
Dept: PSYCHIATRY | Facility: CLINIC | Age: 44
End: 2024-05-13
Payer: COMMERCIAL

## 2024-05-15 RX ORDER — FLUTICASONE PROPIONATE 50 MCG
2 SPRAY, SUSPENSION (ML) NASAL
Qty: 48 ML | Refills: 2 | Status: SHIPPED | OUTPATIENT
Start: 2024-05-15

## 2024-05-16 ENCOUNTER — LAB VISIT (OUTPATIENT)
Dept: LAB | Facility: HOSPITAL | Age: 44
End: 2024-05-16
Attending: INTERNAL MEDICINE
Payer: COMMERCIAL

## 2024-05-16 ENCOUNTER — PATIENT MESSAGE (OUTPATIENT)
Dept: HEMATOLOGY/ONCOLOGY | Facility: CLINIC | Age: 44
End: 2024-05-16

## 2024-05-16 ENCOUNTER — INFUSION (OUTPATIENT)
Dept: INFUSION THERAPY | Facility: HOSPITAL | Age: 44
End: 2024-05-16
Payer: COMMERCIAL

## 2024-05-16 ENCOUNTER — OFFICE VISIT (OUTPATIENT)
Dept: HEMATOLOGY/ONCOLOGY | Facility: CLINIC | Age: 44
End: 2024-05-16
Payer: COMMERCIAL

## 2024-05-16 VITALS
OXYGEN SATURATION: 100 % | DIASTOLIC BLOOD PRESSURE: 79 MMHG | WEIGHT: 153.88 LBS | BODY MASS INDEX: 21.54 KG/M2 | HEIGHT: 71 IN | HEART RATE: 90 BPM | TEMPERATURE: 98 F | SYSTOLIC BLOOD PRESSURE: 134 MMHG

## 2024-05-16 VITALS
HEIGHT: 71 IN | SYSTOLIC BLOOD PRESSURE: 135 MMHG | BODY MASS INDEX: 21.54 KG/M2 | WEIGHT: 153.88 LBS | TEMPERATURE: 98 F | OXYGEN SATURATION: 100 % | HEART RATE: 90 BPM | RESPIRATION RATE: 16 BRPM | DIASTOLIC BLOOD PRESSURE: 78 MMHG

## 2024-05-16 DIAGNOSIS — C22.0 HEPATOCELLULAR CARCINOMA: Primary | ICD-10-CM

## 2024-05-16 DIAGNOSIS — C22.0 HCC (HEPATOCELLULAR CARCINOMA): Primary | ICD-10-CM

## 2024-05-16 DIAGNOSIS — M54.12 CERVICAL RADICULOPATHY: ICD-10-CM

## 2024-05-16 DIAGNOSIS — M89.9 BONE LESION: ICD-10-CM

## 2024-05-16 DIAGNOSIS — C77.9 REGIONAL LYMPH NODE METASTASIS PRESENT: ICD-10-CM

## 2024-05-16 DIAGNOSIS — C78.01 MALIGNANT NEOPLASM METASTATIC TO RIGHT LUNG: ICD-10-CM

## 2024-05-16 DIAGNOSIS — C22.0 HCC (HEPATOCELLULAR CARCINOMA): ICD-10-CM

## 2024-05-16 DIAGNOSIS — E03.2 HYPOTHYROIDISM DUE TO MEDICATION: ICD-10-CM

## 2024-05-16 DIAGNOSIS — K21.9 GASTROESOPHAGEAL REFLUX DISEASE, UNSPECIFIED WHETHER ESOPHAGITIS PRESENT: ICD-10-CM

## 2024-05-16 DIAGNOSIS — D18.03 LIVER HEMANGIOMA: ICD-10-CM

## 2024-05-16 DIAGNOSIS — D70.9 NEUTROPENIA, UNSPECIFIED TYPE: ICD-10-CM

## 2024-05-16 LAB
AFP SERPL-MCNC: 8.6 NG/ML (ref 0–8.4)
ALBUMIN SERPL BCP-MCNC: 3.4 G/DL (ref 3.5–5.2)
ALP SERPL-CCNC: 60 U/L (ref 55–135)
ALT SERPL W/O P-5'-P-CCNC: 14 U/L (ref 10–44)
ANION GAP SERPL CALC-SCNC: 10 MMOL/L (ref 8–16)
AST SERPL-CCNC: 14 U/L (ref 10–40)
BILIRUB SERPL-MCNC: 0.6 MG/DL (ref 0.1–1)
BUN SERPL-MCNC: 9 MG/DL (ref 6–20)
CALCIUM SERPL-MCNC: 9 MG/DL (ref 8.7–10.5)
CANCER AG19-9 SERPL-ACNC: 147.9 U/ML (ref 0–40)
CHLORIDE SERPL-SCNC: 104 MMOL/L (ref 95–110)
CO2 SERPL-SCNC: 24 MMOL/L (ref 23–29)
CREAT SERPL-MCNC: 0.9 MG/DL (ref 0.5–1.4)
ERYTHROCYTE [DISTWIDTH] IN BLOOD BY AUTOMATED COUNT: 12.5 % (ref 11.5–14.5)
EST. GFR  (NO RACE VARIABLE): >60 ML/MIN/1.73 M^2
GLUCOSE SERPL-MCNC: 103 MG/DL (ref 70–110)
HCT VFR BLD AUTO: 39.3 % (ref 37–48.5)
HGB BLD-MCNC: 13.3 G/DL (ref 12–16)
IMM GRANULOCYTES # BLD AUTO: 0.01 K/UL (ref 0–0.04)
MCH RBC QN AUTO: 31.6 PG (ref 27–31)
MCHC RBC AUTO-ENTMCNC: 33.8 G/DL (ref 32–36)
MCV RBC AUTO: 93 FL (ref 82–98)
NEUTROPHILS # BLD AUTO: 2.4 K/UL (ref 1.8–7.7)
PLATELET # BLD AUTO: 222 K/UL (ref 150–450)
PMV BLD AUTO: 10 FL (ref 9.2–12.9)
POTASSIUM SERPL-SCNC: 3.9 MMOL/L (ref 3.5–5.1)
PROT SERPL-MCNC: 6.8 G/DL (ref 6–8.4)
RBC # BLD AUTO: 4.21 M/UL (ref 4–5.4)
SODIUM SERPL-SCNC: 138 MMOL/L (ref 136–145)
T4 FREE SERPL-MCNC: 0.81 NG/DL (ref 0.71–1.51)
TSH SERPL DL<=0.005 MIU/L-ACNC: 1.16 UIU/ML (ref 0.4–4)
WBC # BLD AUTO: 3.59 K/UL (ref 3.9–12.7)

## 2024-05-16 PROCEDURE — 80053 COMPREHEN METABOLIC PANEL: CPT | Performed by: INTERNAL MEDICINE

## 2024-05-16 PROCEDURE — 84443 ASSAY THYROID STIM HORMONE: CPT | Performed by: INTERNAL MEDICINE

## 2024-05-16 PROCEDURE — 82105 ALPHA-FETOPROTEIN SERUM: CPT | Performed by: INTERNAL MEDICINE

## 2024-05-16 PROCEDURE — 86301 IMMUNOASSAY TUMOR CA 19-9: CPT | Performed by: INTERNAL MEDICINE

## 2024-05-16 PROCEDURE — 96413 CHEMO IV INFUSION 1 HR: CPT

## 2024-05-16 PROCEDURE — 3078F DIAST BP <80 MM HG: CPT | Mod: CPTII,S$GLB,, | Performed by: INTERNAL MEDICINE

## 2024-05-16 PROCEDURE — G2211 COMPLEX E/M VISIT ADD ON: HCPCS | Mod: S$GLB,,, | Performed by: INTERNAL MEDICINE

## 2024-05-16 PROCEDURE — 3075F SYST BP GE 130 - 139MM HG: CPT | Mod: CPTII,S$GLB,, | Performed by: INTERNAL MEDICINE

## 2024-05-16 PROCEDURE — 99215 OFFICE O/P EST HI 40 MIN: CPT | Mod: S$GLB,,, | Performed by: INTERNAL MEDICINE

## 2024-05-16 PROCEDURE — 84439 ASSAY OF FREE THYROXINE: CPT | Performed by: INTERNAL MEDICINE

## 2024-05-16 PROCEDURE — 63600175 PHARM REV CODE 636 W HCPCS: Mod: JZ,JG | Performed by: INTERNAL MEDICINE

## 2024-05-16 PROCEDURE — 36415 COLL VENOUS BLD VENIPUNCTURE: CPT | Performed by: INTERNAL MEDICINE

## 2024-05-16 PROCEDURE — 85027 COMPLETE CBC AUTOMATED: CPT | Performed by: INTERNAL MEDICINE

## 2024-05-16 PROCEDURE — 99999 PR PBB SHADOW E&M-EST. PATIENT-LVL IV: CPT | Mod: PBBFAC,,, | Performed by: INTERNAL MEDICINE

## 2024-05-16 PROCEDURE — 96361 HYDRATE IV INFUSION ADD-ON: CPT

## 2024-05-16 PROCEDURE — 3008F BODY MASS INDEX DOCD: CPT | Mod: CPTII,S$GLB,, | Performed by: INTERNAL MEDICINE

## 2024-05-16 PROCEDURE — 25000003 PHARM REV CODE 250: Performed by: INTERNAL MEDICINE

## 2024-05-16 PROCEDURE — 1159F MED LIST DOCD IN RCRD: CPT | Mod: CPTII,S$GLB,, | Performed by: INTERNAL MEDICINE

## 2024-05-16 RX ORDER — PROCHLORPERAZINE EDISYLATE 5 MG/ML
5 INJECTION INTRAMUSCULAR; INTRAVENOUS ONCE AS NEEDED
Status: CANCELLED
Start: 2024-05-16

## 2024-05-16 RX ORDER — EPINEPHRINE 0.3 MG/.3ML
0.3 INJECTION SUBCUTANEOUS ONCE AS NEEDED
Status: CANCELLED | OUTPATIENT
Start: 2024-05-16

## 2024-05-16 RX ORDER — SODIUM CHLORIDE, SODIUM LACTATE, POTASSIUM CHLORIDE, CALCIUM CHLORIDE 600; 310; 30; 20 MG/100ML; MG/100ML; MG/100ML; MG/100ML
INJECTION, SOLUTION INTRAVENOUS CONTINUOUS
Status: CANCELLED
Start: 2024-05-16

## 2024-05-16 RX ORDER — DIPHENHYDRAMINE HYDROCHLORIDE 50 MG/ML
50 INJECTION INTRAMUSCULAR; INTRAVENOUS ONCE AS NEEDED
Status: DISCONTINUED | OUTPATIENT
Start: 2024-05-16 | End: 2024-05-16 | Stop reason: HOSPADM

## 2024-05-16 RX ORDER — HEPARIN 100 UNIT/ML
500 SYRINGE INTRAVENOUS
Status: DISCONTINUED | OUTPATIENT
Start: 2024-05-16 | End: 2024-05-16 | Stop reason: HOSPADM

## 2024-05-16 RX ORDER — PROCHLORPERAZINE EDISYLATE 5 MG/ML
5 INJECTION INTRAMUSCULAR; INTRAVENOUS ONCE AS NEEDED
Status: DISCONTINUED | OUTPATIENT
Start: 2024-05-16 | End: 2024-05-16 | Stop reason: HOSPADM

## 2024-05-16 RX ORDER — HEPARIN 100 UNIT/ML
500 SYRINGE INTRAVENOUS
Status: CANCELLED | OUTPATIENT
Start: 2024-05-16

## 2024-05-16 RX ORDER — SODIUM CHLORIDE 0.9 % (FLUSH) 0.9 %
10 SYRINGE (ML) INJECTION
Status: CANCELLED | OUTPATIENT
Start: 2024-05-16

## 2024-05-16 RX ORDER — SODIUM CHLORIDE, SODIUM LACTATE, POTASSIUM CHLORIDE, CALCIUM CHLORIDE 600; 310; 30; 20 MG/100ML; MG/100ML; MG/100ML; MG/100ML
INJECTION, SOLUTION INTRAVENOUS CONTINUOUS
Status: DISCONTINUED | OUTPATIENT
Start: 2024-05-16 | End: 2024-05-16 | Stop reason: HOSPADM

## 2024-05-16 RX ORDER — EPINEPHRINE 0.3 MG/.3ML
0.3 INJECTION SUBCUTANEOUS ONCE AS NEEDED
Status: DISCONTINUED | OUTPATIENT
Start: 2024-05-16 | End: 2024-05-16 | Stop reason: HOSPADM

## 2024-05-16 RX ORDER — SODIUM CHLORIDE 0.9 % (FLUSH) 0.9 %
10 SYRINGE (ML) INJECTION
Status: DISCONTINUED | OUTPATIENT
Start: 2024-05-16 | End: 2024-05-16 | Stop reason: HOSPADM

## 2024-05-16 RX ORDER — DIPHENHYDRAMINE HYDROCHLORIDE 50 MG/ML
50 INJECTION INTRAMUSCULAR; INTRAVENOUS ONCE AS NEEDED
Status: CANCELLED | OUTPATIENT
Start: 2024-05-16

## 2024-05-16 RX ADMIN — SODIUM CHLORIDE: 9 INJECTION, SOLUTION INTRAVENOUS at 09:05

## 2024-05-16 RX ADMIN — SODIUM CHLORIDE 1500 MG: 9 INJECTION, SOLUTION INTRAVENOUS at 10:05

## 2024-05-16 RX ADMIN — SODIUM CHLORIDE, POTASSIUM CHLORIDE, SODIUM LACTATE AND CALCIUM CHLORIDE: 600; 310; 30; 20 INJECTION, SOLUTION INTRAVENOUS at 09:05

## 2024-05-16 NOTE — PLAN OF CARE
Patient seated in chair, VSS, assessment done.  PIV inserted, flushed, blood return noted, started NS @ 25 cc/hr KVO while waiting for LR & Imfinzi from pharmacy.  WCTM for safety

## 2024-05-16 NOTE — PLAN OF CARE
1140  Patient completed 1 liter LR & Imfinzi infusions, tolerated well.  PIV discontinued without issue.  RTC 6/14/24  Patient ambulated off floor accompanied by friend.

## 2024-05-16 NOTE — PROGRESS NOTES
MEDICAL ONCOLOGY - ESTABLISHED PATIENT VISIT    Reason for visit: Scirrhous HCC    Best Contact Phone Number(s): [phone] (home)      Cancer/Stage/TNM:    Cancer Staging   Hepatocellular carcinoma  Staging form: Liver, AJCC 8th Edition  - Clinical stage from 4/10/2023: Stage IVB (rcT1b, cN0, pM1) - Signed by Philippe Carrasco MD on 8/7/2023       Oncology History   Hepatocellular carcinoma   6/9/2021 Initial Diagnosis    Hepatocellular carcinoma     4/10/2023 Cancer Staged    Staging form: Liver, AJCC 8th Edition  - Clinical stage from 4/10/2023: Stage IVB (rcT1b, cN0, pM1)     7/11/2023 -  Chemotherapy    Treatment Summary   Plan Name: OP TREMELIMUMAB 300 MG (X 1) + DURVALUMAB 1500 MG Q4W  Treatment Goal: Control  Status: Active  Start Date: 7/11/2023  End Date: 6/10/2024 (Planned)  Provider: Philippe Carrasco MD  Chemotherapy: [No matching medication found in this treatment plan]     9/11/2023 Genetic Testing    Genetic counseling done. Hereditary syndrome unlikely. Patient offered testing, but declined due to cost.      10/23/2023 - 11/3/2023 Radiation Therapy    Treating physician: yamil villa    Course: C1 Chst/Abd 2023  Treatment Site Ref. ID Energy Dose/Fx (Gy) #Fx Dose Correction (Gy) Total Dose (Gy) Start Date End Date Elapsed Days   SB Lung_R PTV_Lung 6X 10 5 / 5 0 50 10/23/2023 11/1/2023 9   SB Abdomen PTV_PortalLN 6X 10 5 / 5 0 50 10/23/2023 11/3/2023 11           Interim History:   43 y.o. 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 Howell with the same pathology, negative margins, 0 of 8 lymph nodes positive and + for vascular and perineural invasion. We saw her in 2021 for evaluation for persistent CA 19-9 but there was no radiographic evidence of HCC disease recurrence or alternative reason for CA 19-9 elevation.  CT chest on 3/9/23 showed an enlarging RLL nodule measuring 1.1 cm, increased from 0.8 cm in size.  IR biopsy of this on  4/10/23 confirmed metastatic HCC.  Since then in mid-June she has also had an MRI abdomen that showed an enlarging portocaval lymph node that is consistent with metastatic disease.  She completed SBRT at the beginning of November to her lung metastasis and to her portocaval lymph node. She underwent Y-90 on 2/1/24.    She presents today for cycle 12 of durvalumab + tremelimumab as part of the STRiDE regimen. She is feeling well physically.  Had some mild LUQ discomfort and R shoulder pain after moving her daughter out of her college apartment.  Still with a R achilles nodule that is not growing or erythematous.    Presents alone today.  ECOG PS 0. Looking forward to her trip to [place] next week.    ROS:   Review of Systems   Constitutional:  Negative for chills, fever, malaise/fatigue and weight loss.   HENT:  Negative for sore throat.    Eyes:  Negative for blurred vision and pain.   Respiratory:  Negative for cough and shortness of breath.    Cardiovascular:  Negative for chest pain and leg swelling.   Gastrointestinal:  Positive for constipation. Negative for abdominal pain, diarrhea, nausea and vomiting.   Genitourinary:  Negative for dysuria and frequency.   Musculoskeletal:  Negative for back pain, falls and myalgias.   Skin:  Negative for rash.        Nodule to the lower R leg   Neurological:  Negative for dizziness, weakness and headaches.   Endo/Heme/Allergies:  Does not bruise/bleed easily.   Psychiatric/Behavioral:  Negative for depression. The patient is not nervous/anxious.    All other systems reviewed and are negative.      Past Medical History:   Past Medical History:   Diagnosis Date    VENKATA positive 08/20/2020    COVID-19     Deviated septum 2011    Hepatocellular carcinoma 05/07/2021    Hypertrophy of inferior nasal turbinate     Pericardial effusion     Premature menopause         Past Surgical History:   Past Surgical History:   Procedure Laterality Date    COLONOSCOPY N/A 09/21/2020     Procedure: COLONOSCOPY;  Surgeon: GIOVANNI Crane MD;  Location: [facility] ENDO (4TH FLR);  Service: Endoscopy;  Laterality: N/A;  + covid     EPIDURAL STEROID INJECTION INTO CERVICAL SPINE N/A 2024    Procedure: FLORENTINO C7/T1;  Surgeon: Cezar Bailey MD;  Location: [facility] PAIN MANAGEMENT;  Service: Pain Management;  Laterality: N/A;  20mins-No ac    ESOPHAGOGASTRODUODENOSCOPY N/A 1/10/2024    Procedure: ESOPHAGOGASTRODUODENOSCOPY (EGD);  Surgeon: Reynaldo Lynch MD;  Location: [facility] ENDO (2ND FLR);  Service: Endoscopy;  Laterality: N/A;  referred by daryl carrasco rocedure: EGD Diagnosis: Abnormal finding on GI tract imaging, Abdominal pain, and GERD  Procedure Timin-4 weeks Provider: Any GI provider Location: [place] [place] [facility], [facility] 2-Endo, and [facility] 4-Endo  Additional Scheduling Informat    HERNIA REPAIR      LIVER SURGERY N/A 2021    NASAL SEPTUM SURGERY      PERICARDIAL WINDOW N/A 2021    Procedure: CREATION, PERICARDIAL WINDOW;  Surgeon: Ajay Cantor MD;  Location: [facility] OR 2ND FLR;  Service: Cardiovascular;  Laterality: N/A;    PERICARDIOCENTESIS N/A 2020    Procedure: Pericardiocentesis;  Surgeon: Dickson Dangelo MD;  Location: [facility] CATH LAB;  Service: Cardiology;  Laterality: N/A;    TONSILLECTOMY          Family History:   Family History   Problem Relation Name Age of Onset    Diabetes Mother      Liver disease Mother          fatty liver    Heart disease Father      Macular degeneration Father      Diabetes Father      Hypertension Father      Hyperlipidemia Father      Heart disease Sister      Adjustment disorder with mixed anxiety and depressed mood Sister      Lung cancer Maternal Aunt          heavy smoker    Cerebral aneurysm Paternal Aunt      Cataracts Neg Hx      Glaucoma Neg Hx      Retinal detachment Neg Hx      Stroke Neg Hx      Thyroid disease Neg Hx      Melanoma Neg Hx      Heart attack Neg Hx          Social History:   Social History     Tobacco Use    Smoking  status: Never    Smokeless tobacco: Never   Substance Use Topics    Alcohol use: Yes     Comment: rare        I have reviewed and updated the patient's past medical, surgical, family and social histories.    Allergies:   Review of patient's allergies indicates:   Allergen Reactions    No known drug allergies         Medications:   Current Outpatient Medications   Medication Sig Dispense Refill    ALPRAZolam (XANAX) 0.5 MG tablet Take 1 tablet (0.5 mg total) by mouth daily as needed for Anxiety. 30 tablet 1    cetirizine (ZYRTEC) 10 MG tablet TAKE 1 TABLET BY MOUTH EVERY DAY 90 tablet 2    cyanocobalamin (VITAMIN B-12) 1000 MCG tablet Take 1 tablet (1,000 mcg total) by mouth once daily. 30 tablet 12    cyclobenzaprine (FLEXERIL) 10 MG tablet SMARTSI Tablet(s) By Mouth Every Evening PRN      EScitalopram oxalate (LEXAPRO) 10 MG tablet Take 1 tablet (10 mg total) by mouth once daily. 90 tablet 1    fluticasone propionate (FLONASE) 50 mcg/actuation nasal spray SPRAY 2 SPRAYS BY EACH NOSTRIL ROUTE ONCE DAILY. 48 mL 2    multivitamin capsule Take by mouth. 1 capsule Oral Every morning      naproxen (NAPROSYN) 500 MG tablet Take 1 tablet (500 mg total) by mouth 2 (two) times daily with meals. 60 tablet 0    norethindrone-ethinyl estradiol (MICROGESTIN 1/20) 1-20 mg-mcg per tablet Take 1 tablet by mouth once daily. 63 tablet 4    ondansetron (ZOFRAN-ODT) 4 MG TbDL Take 1 tablet (4 mg total) by mouth every 12 (twelve) hours as needed (nausea). 20 tablet 0    pantoprazole (PROTONIX) 40 MG tablet TAKE 1 TABLET BY MOUTH EVERY DAY 90 tablet 1    tiZANidine (ZANAFLEX) 4 MG tablet Take 1 tablet (4 mg total) by mouth every 6 (six) hours as needed (pain/spasm). 30 tablet 1    ondansetron (ZOFRAN-ODT) 4 MG TbDL Take 1 tablet (4 mg total) by mouth 2 (two) times daily. (Patient not taking: Reported on 5/3/2024) 2 tablet 0     No current facility-administered medications for this visit.     Facility-Administered Medications Ordered  "in Other Visits   Medication Dose Route Frequency Provider Last Rate Last Admin    alteplase injection 2 mg  2 mg Intra-Catheter PRN Philippe Carrasco MD        diphenhydrAMINE injection 50 mg  50 mg Intravenous Once PRN Philippe Carrasco MD        durvalumab (IMFINZI) 1,500 mg in sodium chloride 0.9% SolP 315 mL chemo infusion  1,500 mg Intravenous 1 time in Clinic/HOD Philippe Carrasco MD        EPINEPHrine (EPIPEN) 0.3 mg/0.3 mL pen injection 0.3 mg  0.3 mg Intramuscular Once PRN Philippe Carrasco MD        heparin, porcine (PF) 100 unit/mL injection flush 500 Units  500 Units Intravenous PRN Philippe Carrasco MD        hydrocortisone sodium succinate injection 100 mg  100 mg Intravenous Once PRN Philippe Carrasco MD        lactated ringers infusion   Intravenous Continuous Philippe Carrasco MD        prochlorperazine injection Soln 5 mg  5 mg Intravenous Once PRN Philippe Carrasco MD        sodium chloride 0.9% 100 mL flush bag   Intravenous 1 time in Clinic/HOD Philippe Carrasco MD        sodium chloride 0.9% flush 10 mL  10 mL Intravenous PRN Philippe Carrasco MD            Physical Exam:   /79 (BP Location: Right arm, Patient Position: Sitting, BP Method: Medium (Automatic))   Pulse 90   Temp 98.4 °F (36.9 °C) (Oral)   Ht 5' 11" (1.803 m)   Wt 69.8 kg (153 lb 14.1 oz)   LMP  (LMP Unknown)   SpO2 100%   BMI 21.46 kg/m²                Physical Exam  Vitals reviewed.   Constitutional:       General: She is not in acute distress.     Appearance: Normal appearance. She is normal weight.   HENT:      Head: Normocephalic and atraumatic.      Right Ear: External ear normal.      Left Ear: External ear normal.      Nose: Nose normal.      Mouth/Throat:      Mouth: Mucous membranes are moist.      Pharynx: Oropharynx is clear. No posterior oropharyngeal erythema.   Eyes:      General: No scleral icterus.     Extraocular Movements: Extraocular movements intact.      " Conjunctiva/sclera: Conjunctivae normal.      Pupils: Pupils are equal, round, and reactive to light.   Cardiovascular:      Rate and Rhythm: Normal rate and regular rhythm.      Pulses: Normal pulses.      Heart sounds: Normal heart sounds.   Pulmonary:      Effort: Pulmonary effort is normal.      Breath sounds: Normal breath sounds. No wheezing or rales.   Abdominal:      General: Bowel sounds are normal. There is no distension.      Palpations: Abdomen is soft.      Tenderness: There is no abdominal tenderness.   Musculoskeletal:         General: No swelling. Normal range of motion.      Cervical back: Normal range of motion and neck supple.      Comments: < 1 cm R achilles soft tissue nodule   Skin:     General: Skin is warm.      Coloration: Skin is not jaundiced.      Findings: No erythema or rash.   Neurological:      General: No focal deficit present.      Mental Status: She is alert and oriented to person, place, and time. Mental status is at baseline.      Gait: Gait normal.   Psychiatric:         Mood and Affect: Mood normal.         Behavior: Behavior normal.         Thought Content: Thought content normal.         Judgment: Judgment normal.           Labs:   Recent Results (from the past 48 hour(s))   CBC Oncology    Collection Time: 05/16/24  7:20 AM   Result Value Ref Range    WBC 3.59 (L) 3.90 - 12.70 K/uL    RBC 4.21 4.00 - 5.40 M/uL    Hemoglobin 13.3 12.0 - 16.0 g/dL    Hematocrit 39.3 37.0 - 48.5 %    MCV 93 82 - 98 fL    MCH 31.6 (H) 27.0 - 31.0 pg    MCHC 33.8 32.0 - 36.0 g/dL    RDW 12.5 11.5 - 14.5 %    Platelets 222 150 - 450 K/uL    MPV 10.0 9.2 - 12.9 fL    Gran # (ANC) 2.4 1.8 - 7.7 K/uL    Immature Grans (Abs) 0.01 0.00 - 0.04 K/uL   Comprehensive Metabolic Panel    Collection Time: 05/16/24  7:20 AM   Result Value Ref Range    Sodium 138 136 - 145 mmol/L    Potassium 3.9 3.5 - 5.1 mmol/L    Chloride 104 95 - 110 mmol/L    CO2 24 23 - 29 mmol/L    Glucose 103 70 - 110 mg/dL    BUN 9 6 -  20 mg/dL    Creatinine 0.9 0.5 - 1.4 mg/dL    Calcium 9.0 8.7 - 10.5 mg/dL    Total Protein 6.8 6.0 - 8.4 g/dL    Albumin 3.4 (L) 3.5 - 5.2 g/dL    Total Bilirubin 0.6 0.1 - 1.0 mg/dL    Alkaline Phosphatase 60 55 - 135 U/L    AST 14 10 - 40 U/L    ALT 14 10 - 44 U/L    eGFR >60.0 >60 mL/min/1.73 m^2    Anion Gap 10 8 - 16 mmol/L   AFP Tumor Marker    Collection Time: 24  7:20 AM   Result Value Ref Range    AFP 8.6 (H) 0.0 - 8.4 ng/mL   TSH    Collection Time: 24  7:20 AM   Result Value Ref Range    TSH 1.157 0.400 - 4.000 uIU/mL   FREE T4    Collection Time: 24  7:20 AM   Result Value Ref Range    Free T4 0.81 0.71 - 1.51 ng/dL   Cancer Antigen 19-9    Collection Time: 24  7:20 AM   Result Value Ref Range    CA 19-9 147.9 (H) 0.0 - 40.0 U/mL       I have reviewed the pertinent labs.  Mild leukopenia, AFP 8.6    Imagin/29/24: MRI Abd:  Impression:     1. Status post IR embolization at hepatic segment 8.  Residual treatment cavity demonstrates areas of peripheral enhancement.  No convincing evidence of local recurrence at this site.  2. Indeterminate homogeneously enhancing round lesion in the left hepatic lobe demonstrating slow interval growth compared to the previous 2 MRIs (now measuring 1.5 cm, previsouly 0.9 cm on 2022 MRI).  3. Two lesions in the right hepatic lobe compatible with hemangiomas.  4. No new focal hepatic lesions.  5. Previous pericaval lymph node has decreased in size.  No new or enlarging lymphadenopathy.  6. Other findings above.    Path:   21: partial hepatectomy:    Final Pathologic Diagnosis 1.  Gallbladder (cholecystectomy):   - Benign gallbladder with cholecystitis   2. Right lobe (partial hepatectomy):   - Positive for malignancy, see synoptic report below   - Separate hemangioma, 7.3 cm   3. Lymph nodes, portal (regional resection):   - 8 lymph nodes, negative for tumor (0/8)    ______________________________________________________________________________   ______   Hepatocellular carcinoma synoptic report   - Procedure:  Partial hepatectomy, right lobe   - Histologic type:  Hepatocellular carcinoma, scirrhous   - Histologic grade:  Moderately differentiated, grade 2   - Tumor focality:  Solitary   - Tumor characteristics:   - Tumor site:  Right lobe   - Tumor size:  3.3 cm   - Treatment effect:  No known pre-surgical therapy   - Satellitosis:  Not identified   - Tumor extent:  Confined to liver   - Vascular invasion:  Present, Small vessel   - Perineural invasion:  Present   - Margins:   - All margins are negative for invasive carcinoma   - Closest margin to invasive carcinoma:  Parenchymal   - Distance from invasive  carcinoma to closest margin:  5 mm   - Regional lymph nodes:   - Number of lymph nodes with tumor:  0   - Number of lymph nodes examined:  8   - Pathology: pT2 N0 MX   - Additional findings:   - No steatosis   - Trichrome stain:  Periportal fibrosis with early septa, stage 1-2   - Iron stain:  Negative   - Iron and trichrome stains with appropriate controls   Tumor blocks:  2A, 2B, 2 C    Comment: Interp By Madeline Carlos MD, Signed on 07/08/2021 at 16:47       Assessment:       1. HCC (hepatocellular carcinoma)    2. Regional lymph node metastasis present    3. Malignant neoplasm metastatic to right lung    4. Neutropenia, unspecified type    5. Bone lesion    6. Cervical radiculopathy    7. Liver hemangioma    8. Gastroesophageal reflux disease, unspecified whether esophagitis present                Plan:             # HCC  Scirrhous subtype, which is very uncommon (~ 1% of all HCC); prognosis is likely similar to typical HCC.  No clear underlying liver pathology in this patient.  Had margin negative resection with negative lymph node eval on 6/28/21 with Dr. Howell.  Unfortunately she has biopsy proven recurrent metastatic disease to her RLL s/p IR biopsy 4/10/23.    She was discussed at thoracic tumor board with potential plan for surgical resection with Dr. Tadeo.  She had a concerning bone lesion on PET from 4/26 at T2.  This was biopsied and proven to be benign.  In the interim she had a repeat abdominal MRI on 6/13/23 which demonstrated growth of a portacaval lymph node that was very concerning for metastatic disease when we reviewed her imaging at liver conference.  Meanwhile her RLL met has grown slightly on 6/21/23 CT as well.  We discussed her case with Valerie hDillon and Shwetha and we were all in agreement with proceeding with systemic therapy rather than surgery or radiation given multifocal progression.    We discussed this with her and she is agreeable with starting systemic therapy.  Reviewed possibilities of atezo + magaly or durva + treme.  She wished to proceed with STRIDE regimen: durvalumab + tremelimumab.    Received cycle 1 on 7/11/23.  Repeat CT CAP after cycle 3 demonstrates mild growth in albert hepatis lymph node.  Stable disease elsewhere.    Discussed with Dr. Mendiola and she was deemed eligible for SBRT to her abdominal lymph node and growing lung nodule.  She completed SBRT to both 11/3/23.    Meanwhile she developed a new concerning area of possible recurrence near her liver resection site.  Discussed with Dr. Lala and Dr. Dhillon.  Dr. Dhillon was unable to visualize it during radiation simulation.  We recommended to proceed with Y-90 which was administered 2/1/24.    MRI 2/29/24 shows good response to Y-90 treated lesion.  Indeterminate lesion reviewed by Dr. Alford with no concerning features.  Will continue current regimen.    - doing well with immunotherapy. Lab work has been reviewed and adequate for treatment. AFP stable.  - Will continue with STRIDE regimen, meanwhile.   - Proceed with cycle 12 today - durvalumab alone.  - Will give LR per her request with infusion today.    Consider switching systemic therapy only if significant progression given  likely side effects of TKI.    Saw Dr. Sabillon of cardiology who recommended troponin and ECG monitoring given her cardiac history.  She is asymptomatic at present.    Will plan to bring her back in 4 weeks for cycle 13.  Repeat MRI prior to cycle 13.    # Neutropenia, cervical radiculopathy  Neutropenia intermittent.  Likely benign etiology.  Continue to f/u with Orthopedics.  Had recent FLORENTINO.    # Liver hemangiomas, GERD  Continue monitoring as indicated with Dr. Rogers.  Continue to f/u with GI team for GERD symptoms. Continue with medication management with Protonix.    # RLE achilles nodule  Likely benign growth such as lipoma based on US and clinically appearance.  Monitor.    Follow up: 4 weeks.    The above information has been reviewed with the patient and all questions have been answered to their apparent satisfaction.  They understand that they can call the clinic with any questions. Discussed case with Dr. Luna Carrasco MD  Hematology/Oncology  [facility]      Route Chart for Scheduling    Med Onc Chart Routing      Follow up with physician 1 month. for durvalumab   Follow up with MELA    Infusion scheduling note    Injection scheduling note    Labs CBC, CMP, CA 19-9 and TSH   Scheduling:  Preferred lab:  Lab interval:  & AFP   Imaging    Pharmacy appointment    Other referrals                  Treatment Plan Information   OP TREMELIMUMAB 300 MG (X 1) + DURVALUMAB 1500 MG Q4W   Philippe Carrasco MD   Upcoming Treatment Dates - OP TREMELIMUMAB 300 MG (X 1) + DURVALUMAB 1500 MG Q4W    6/10/2024       Chemotherapy       durvalumab (IMFINZI) 1,500 mg in sodium chloride 0.9% SolP 280 mL chemo infusion       Antiemetics       prochlorperazine injection Soln 5 mg

## 2024-05-27 ENCOUNTER — HOSPITAL ENCOUNTER (OUTPATIENT)
Dept: RADIOLOGY | Facility: HOSPITAL | Age: 44
Discharge: HOME | End: 2024-05-27
Attending: OBSTETRICS & GYNECOLOGY
Payer: COMMERCIAL

## 2024-05-27 ENCOUNTER — HOSPITAL ENCOUNTER (OUTPATIENT)
Dept: RADIOLOGY | Facility: HOSPITAL | Age: 44
Discharge: HOME | End: 2024-05-27
Attending: INTERNAL MEDICINE
Payer: COMMERCIAL

## 2024-05-27 DIAGNOSIS — Z12.31 ENCOUNTER FOR SCREENING MAMMOGRAM FOR MALIGNANT NEOPLASM OF BREAST: ICD-10-CM

## 2024-05-27 DIAGNOSIS — C22.0 HCC (HEPATOCELLULAR CARCINOMA): ICD-10-CM

## 2024-05-27 PROCEDURE — 77063 BREAST TOMOSYNTHESIS BI: CPT | Mod: 26,,, | Performed by: RADIOLOGY

## 2024-05-27 PROCEDURE — A9585 GADOBUTROL INJECTION: HCPCS | Performed by: INTERNAL MEDICINE

## 2024-05-27 PROCEDURE — 74183 MRI ABD W/O CNTR FLWD CNTR: CPT | Mod: 26,,, | Performed by: RADIOLOGY

## 2024-05-27 PROCEDURE — 77067 SCR MAMMO BI INCL CAD: CPT | Mod: 26,,, | Performed by: RADIOLOGY

## 2024-05-27 PROCEDURE — 74183 MRI ABD W/O CNTR FLWD CNTR: CPT | Mod: TC

## 2024-05-27 PROCEDURE — 77063 BREAST TOMOSYNTHESIS BI: CPT | Mod: TC

## 2024-05-27 PROCEDURE — 25500020 PHARM REV CODE 255: Performed by: INTERNAL MEDICINE

## 2024-05-27 RX ORDER — GADOBUTROL 604.72 MG/ML
10 INJECTION INTRAVENOUS
Status: COMPLETED | OUTPATIENT
Start: 2024-05-27 | End: 2024-05-27

## 2024-05-27 RX ADMIN — GADOBUTROL 10 ML: 604.72 INJECTION INTRAVENOUS at 09:05

## 2024-05-31 ENCOUNTER — OFFICE VISIT (OUTPATIENT)
Dept: INTERNAL MEDICINE | Facility: CLINIC | Age: 44
End: 2024-05-31
Payer: COMMERCIAL

## 2024-05-31 ENCOUNTER — PATIENT MESSAGE (OUTPATIENT)
Dept: HEMATOLOGY/ONCOLOGY | Facility: CLINIC | Age: 44
End: 2024-05-31
Payer: COMMERCIAL

## 2024-05-31 VITALS
WEIGHT: 149.94 LBS | HEIGHT: 71 IN | DIASTOLIC BLOOD PRESSURE: 65 MMHG | BODY MASS INDEX: 20.99 KG/M2 | SYSTOLIC BLOOD PRESSURE: 118 MMHG

## 2024-05-31 DIAGNOSIS — C78.01 MALIGNANT NEOPLASM METASTATIC TO RIGHT LUNG: ICD-10-CM

## 2024-05-31 DIAGNOSIS — L72.9 SUBCUTANEOUS CYST: Primary | ICD-10-CM

## 2024-05-31 DIAGNOSIS — C22.0 HEPATOCELLULAR CARCINOMA: ICD-10-CM

## 2024-05-31 PROCEDURE — 3008F BODY MASS INDEX DOCD: CPT | Mod: CPTII,S$GLB,, | Performed by: INTERNAL MEDICINE

## 2024-05-31 PROCEDURE — 1159F MED LIST DOCD IN RCRD: CPT | Mod: CPTII,S$GLB,, | Performed by: INTERNAL MEDICINE

## 2024-05-31 PROCEDURE — 99213 OFFICE O/P EST LOW 20 MIN: CPT | Mod: S$GLB,,, | Performed by: INTERNAL MEDICINE

## 2024-05-31 PROCEDURE — 99999 PR PBB SHADOW E&M-EST. PATIENT-LVL III: CPT | Mod: PBBFAC,,, | Performed by: INTERNAL MEDICINE

## 2024-05-31 PROCEDURE — 3074F SYST BP LT 130 MM HG: CPT | Mod: CPTII,S$GLB,, | Performed by: INTERNAL MEDICINE

## 2024-05-31 PROCEDURE — 1160F RVW MEDS BY RX/DR IN RCRD: CPT | Mod: CPTII,S$GLB,, | Performed by: INTERNAL MEDICINE

## 2024-05-31 PROCEDURE — 3078F DIAST BP <80 MM HG: CPT | Mod: CPTII,S$GLB,, | Performed by: INTERNAL MEDICINE

## 2024-05-31 RX ORDER — CEPHALEXIN 500 MG/1
500 CAPSULE ORAL EVERY 8 HOURS
Qty: 21 CAPSULE | Refills: 0 | Status: SHIPPED | OUTPATIENT
Start: 2024-05-31

## 2024-05-31 NOTE — PROGRESS NOTES
Patient ID: Melly Hwang is a 43 y.o. female.    Chief Complaint: Leg Pain      Assessment:       1. Subcutaneous cyst    2. Hepatocellular carcinoma    3. Malignant neoplasm metastatic to right lung          Plan:           1. Subcutaneous cyst  -     cephALEXin (KEFLEX) 500 MG capsule; Take 1 capsule (500 mg total) by mouth every 8 (eight) hours.  Dispense: 21 capsule; Refill: 0    2. Hepatocellular carcinoma    3. Malignant neoplasm metastatic to right lung        Will treat empirically for possible infection, alarm symptoms and ED cautions reviewed, follow-up poor results   May need additional imaging   Keep hematology oncology follow-up  Subjective:     Urgent visit     Nodule right lower extremity.  Has had for about a month.  No recalled trauma or injury.  Was seen in urgent care for this and had an ultrasound done which revealed a soft tissue nodule that appeared benign.  It is not really painful but she is aware of it when she presses in that area.  There has been no erythema or drainage.  She has had no fever, chills or sweats.      Medical problems have been stable.  She follows closely in Hematology-Oncology and is getting infusions for her cancer.    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 pylori infection: tx 2014 stool negative     Liver hemangioma     Pericardial effusion     VENKATA positive     Elevated bilirubin     S/P pericardial window creation     Hepatocellular carcinoma     Hypophosphatemia     Elevated CA 19-9 level     Pleural effusion     Decreased ROM of intervertebral discs of cervical spine     Lung nodule 9/22; enlarged 7/23     Neutropenia, unspecified type     Low serum vitamin B12     Iron excess     Carpal tunnel syndrome of right wrist: mild, see  EMG 10/23     Anxiety about health     Malignant neoplasm metastatic to right lung           Review of Systems   Constitutional:  Negative for activity change and unexpected weight change.   HENT:  Negative for hearing loss, rhinorrhea and trouble swallowing.    Eyes:  Negative for discharge and visual disturbance.   Respiratory:  Negative for chest tightness and wheezing.    Cardiovascular:  Negative for chest pain and palpitations.   Gastrointestinal:  Negative for blood in stool, constipation, diarrhea and vomiting.   Endocrine: Negative for polydipsia and polyuria.   Genitourinary:  Negative for difficulty urinating, dysuria, hematuria and menstrual problem.   Musculoskeletal:  Negative for arthralgias, joint swelling and neck pain.          See HPI   Neurological:  Negative for weakness and headaches.   Psychiatric/Behavioral:  Negative for confusion and dysphoric mood.          Objective:      Physical Exam  Constitutional:       Appearance: She is well-developed.   HENT:      Head: Normocephalic and atraumatic.   Eyes:      Extraocular Movements: Extraocular movements intact.      Conjunctiva/sclera: Conjunctivae normal.   Neck:      Thyroid: No thyromegaly.   Pulmonary:      Effort: No respiratory distress.   Musculoskeletal:      Right lower leg: No edema.      Left lower leg: No edema.      Comments:  Nodule posterior aspect of the right lower extremity, fully mobile   Neurological:      General: No focal deficit present.      Mental Status: She is alert and oriented to person, place, and time.   Psychiatric:         Mood and Affect: Mood normal.         Behavior: Behavior normal.         Thought Content: Thought content normal.         Judgment: Judgment normal.             Health Maintenance Due   Topic Date Due    COVID-19 Vaccine (4 - 2023-24 season) 09/01/2023    Mammogram  05/29/2024

## 2024-06-03 ENCOUNTER — PATIENT MESSAGE (OUTPATIENT)
Dept: OBSTETRICS AND GYNECOLOGY | Facility: CLINIC | Age: 44
End: 2024-06-03
Payer: COMMERCIAL

## 2024-06-10 ENCOUNTER — PATIENT MESSAGE (OUTPATIENT)
Dept: INTERNAL MEDICINE | Facility: CLINIC | Age: 44
End: 2024-06-10
Payer: COMMERCIAL

## 2024-06-10 DIAGNOSIS — C22.0 HCC (HEPATOCELLULAR CARCINOMA): Primary | ICD-10-CM

## 2024-06-14 ENCOUNTER — PATIENT MESSAGE (OUTPATIENT)
Dept: HEMATOLOGY/ONCOLOGY | Facility: CLINIC | Age: 44
End: 2024-06-14

## 2024-06-14 ENCOUNTER — INFUSION (OUTPATIENT)
Dept: INFUSION THERAPY | Facility: HOSPITAL | Age: 44
End: 2024-06-14
Payer: COMMERCIAL

## 2024-06-14 ENCOUNTER — LAB VISIT (OUTPATIENT)
Dept: LAB | Facility: HOSPITAL | Age: 44
End: 2024-06-14
Payer: COMMERCIAL

## 2024-06-14 ENCOUNTER — OFFICE VISIT (OUTPATIENT)
Dept: HEMATOLOGY/ONCOLOGY | Facility: CLINIC | Age: 44
End: 2024-06-14
Payer: COMMERCIAL

## 2024-06-14 VITALS
RESPIRATION RATE: 14 BRPM | HEART RATE: 69 BPM | BODY MASS INDEX: 21.82 KG/M2 | WEIGHT: 155.88 LBS | DIASTOLIC BLOOD PRESSURE: 77 MMHG | HEIGHT: 71 IN | TEMPERATURE: 98 F | SYSTOLIC BLOOD PRESSURE: 136 MMHG

## 2024-06-14 VITALS
SYSTOLIC BLOOD PRESSURE: 130 MMHG | DIASTOLIC BLOOD PRESSURE: 81 MMHG | TEMPERATURE: 98 F | BODY MASS INDEX: 21.82 KG/M2 | OXYGEN SATURATION: 98 % | RESPIRATION RATE: 14 BRPM | HEIGHT: 71 IN | WEIGHT: 155.88 LBS | HEART RATE: 86 BPM

## 2024-06-14 DIAGNOSIS — C22.0 HCC (HEPATOCELLULAR CARCINOMA): ICD-10-CM

## 2024-06-14 DIAGNOSIS — M89.9 BONE LESION: ICD-10-CM

## 2024-06-14 DIAGNOSIS — C22.0 HCC (HEPATOCELLULAR CARCINOMA): Primary | ICD-10-CM

## 2024-06-14 DIAGNOSIS — K21.9 GASTROESOPHAGEAL REFLUX DISEASE, UNSPECIFIED WHETHER ESOPHAGITIS PRESENT: ICD-10-CM

## 2024-06-14 DIAGNOSIS — C22.0 HEPATOCELLULAR CARCINOMA: Primary | ICD-10-CM

## 2024-06-14 DIAGNOSIS — M54.12 CERVICAL RADICULOPATHY: ICD-10-CM

## 2024-06-14 DIAGNOSIS — D70.9 NEUTROPENIA, UNSPECIFIED TYPE: ICD-10-CM

## 2024-06-14 DIAGNOSIS — R22.9 SKIN NODULE: ICD-10-CM

## 2024-06-14 DIAGNOSIS — C78.01 MALIGNANT NEOPLASM METASTATIC TO RIGHT LUNG: ICD-10-CM

## 2024-06-14 DIAGNOSIS — C77.9 REGIONAL LYMPH NODE METASTASIS PRESENT: ICD-10-CM

## 2024-06-14 DIAGNOSIS — D18.03 LIVER HEMANGIOMA: ICD-10-CM

## 2024-06-14 LAB
AFP SERPL-MCNC: 9.1 NG/ML (ref 0–8.4)
ALBUMIN SERPL BCP-MCNC: 3.5 G/DL (ref 3.5–5.2)
ALP SERPL-CCNC: 51 U/L (ref 55–135)
ALT SERPL W/O P-5'-P-CCNC: 15 U/L (ref 10–44)
ANION GAP SERPL CALC-SCNC: 5 MMOL/L (ref 8–16)
AST SERPL-CCNC: 15 U/L (ref 10–40)
BASOPHILS # BLD AUTO: 0.04 K/UL (ref 0–0.2)
BASOPHILS NFR BLD: 1.1 % (ref 0–1.9)
BILIRUB SERPL-MCNC: 1 MG/DL (ref 0.1–1)
BUN SERPL-MCNC: 12 MG/DL (ref 6–20)
CALCIUM SERPL-MCNC: 9.4 MG/DL (ref 8.7–10.5)
CANCER AG19-9 SERPL-ACNC: 110 U/ML (ref 0–40)
CHLORIDE SERPL-SCNC: 104 MMOL/L (ref 95–110)
CO2 SERPL-SCNC: 27 MMOL/L (ref 23–29)
CREAT SERPL-MCNC: 0.9 MG/DL (ref 0.5–1.4)
DIFFERENTIAL METHOD BLD: ABNORMAL
EOSINOPHIL # BLD AUTO: 0.1 K/UL (ref 0–0.5)
EOSINOPHIL NFR BLD: 2.4 % (ref 0–8)
ERYTHROCYTE [DISTWIDTH] IN BLOOD BY AUTOMATED COUNT: 12.6 % (ref 11.5–14.5)
EST. GFR  (NO RACE VARIABLE): >60 ML/MIN/1.73 M^2
GLUCOSE SERPL-MCNC: 107 MG/DL (ref 70–110)
HCT VFR BLD AUTO: 38.7 % (ref 37–48.5)
HGB BLD-MCNC: 12.7 G/DL (ref 12–16)
IMM GRANULOCYTES # BLD AUTO: 0.01 K/UL (ref 0–0.04)
IMM GRANULOCYTES NFR BLD AUTO: 0.3 % (ref 0–0.5)
LYMPHOCYTES # BLD AUTO: 0.7 K/UL (ref 1–4.8)
LYMPHOCYTES NFR BLD: 19.8 % (ref 18–48)
MCH RBC QN AUTO: 31.4 PG (ref 27–31)
MCHC RBC AUTO-ENTMCNC: 32.8 G/DL (ref 32–36)
MCV RBC AUTO: 96 FL (ref 82–98)
MONOCYTES # BLD AUTO: 0.3 K/UL (ref 0.3–1)
MONOCYTES NFR BLD: 8.4 % (ref 4–15)
NEUTROPHILS # BLD AUTO: 2.5 K/UL (ref 1.8–7.7)
NEUTROPHILS NFR BLD: 68 % (ref 38–73)
NRBC BLD-RTO: 0 /100 WBC
PLATELET # BLD AUTO: 221 K/UL (ref 150–450)
PMV BLD AUTO: 9.4 FL (ref 9.2–12.9)
POTASSIUM SERPL-SCNC: 4 MMOL/L (ref 3.5–5.1)
PROT SERPL-MCNC: 6.8 G/DL (ref 6–8.4)
RBC # BLD AUTO: 4.04 M/UL (ref 4–5.4)
SODIUM SERPL-SCNC: 136 MMOL/L (ref 136–145)
TSH SERPL DL<=0.005 MIU/L-ACNC: 0.87 UIU/ML (ref 0.4–4)
WBC # BLD AUTO: 3.68 K/UL (ref 3.9–12.7)

## 2024-06-14 PROCEDURE — 3075F SYST BP GE 130 - 139MM HG: CPT | Mod: CPTII,S$GLB,, | Performed by: INTERNAL MEDICINE

## 2024-06-14 PROCEDURE — G2211 COMPLEX E/M VISIT ADD ON: HCPCS | Mod: S$GLB,,, | Performed by: INTERNAL MEDICINE

## 2024-06-14 PROCEDURE — 96413 CHEMO IV INFUSION 1 HR: CPT

## 2024-06-14 PROCEDURE — 85025 COMPLETE CBC W/AUTO DIFF WBC: CPT | Performed by: PHYSICIAN ASSISTANT

## 2024-06-14 PROCEDURE — 84443 ASSAY THYROID STIM HORMONE: CPT | Performed by: INTERNAL MEDICINE

## 2024-06-14 PROCEDURE — 86301 IMMUNOASSAY TUMOR CA 19-9: CPT | Performed by: PHYSICIAN ASSISTANT

## 2024-06-14 PROCEDURE — 99999 PR PBB SHADOW E&M-EST. PATIENT-LVL IV: CPT | Mod: PBBFAC,,, | Performed by: INTERNAL MEDICINE

## 2024-06-14 PROCEDURE — 99215 OFFICE O/P EST HI 40 MIN: CPT | Mod: S$GLB,,, | Performed by: INTERNAL MEDICINE

## 2024-06-14 PROCEDURE — 3008F BODY MASS INDEX DOCD: CPT | Mod: CPTII,S$GLB,, | Performed by: INTERNAL MEDICINE

## 2024-06-14 PROCEDURE — 80053 COMPREHEN METABOLIC PANEL: CPT | Performed by: PHYSICIAN ASSISTANT

## 2024-06-14 PROCEDURE — 36415 COLL VENOUS BLD VENIPUNCTURE: CPT | Performed by: PHYSICIAN ASSISTANT

## 2024-06-14 PROCEDURE — 3079F DIAST BP 80-89 MM HG: CPT | Mod: CPTII,S$GLB,, | Performed by: INTERNAL MEDICINE

## 2024-06-14 PROCEDURE — 63600175 PHARM REV CODE 636 W HCPCS: Mod: JZ,JG | Performed by: INTERNAL MEDICINE

## 2024-06-14 PROCEDURE — 25000003 PHARM REV CODE 250: Performed by: INTERNAL MEDICINE

## 2024-06-14 PROCEDURE — 82105 ALPHA-FETOPROTEIN SERUM: CPT | Performed by: PHYSICIAN ASSISTANT

## 2024-06-14 PROCEDURE — 1159F MED LIST DOCD IN RCRD: CPT | Mod: CPTII,S$GLB,, | Performed by: INTERNAL MEDICINE

## 2024-06-14 PROCEDURE — 96361 HYDRATE IV INFUSION ADD-ON: CPT

## 2024-06-14 RX ORDER — SODIUM CHLORIDE, SODIUM LACTATE, POTASSIUM CHLORIDE, CALCIUM CHLORIDE 600; 310; 30; 20 MG/100ML; MG/100ML; MG/100ML; MG/100ML
INJECTION, SOLUTION INTRAVENOUS CONTINUOUS
Status: CANCELLED
Start: 2024-06-14

## 2024-06-14 RX ORDER — DIPHENHYDRAMINE HYDROCHLORIDE 50 MG/ML
50 INJECTION INTRAMUSCULAR; INTRAVENOUS ONCE AS NEEDED
Status: CANCELLED | OUTPATIENT
Start: 2024-06-14

## 2024-06-14 RX ORDER — EPINEPHRINE 0.3 MG/.3ML
0.3 INJECTION SUBCUTANEOUS ONCE AS NEEDED
Status: DISCONTINUED | OUTPATIENT
Start: 2024-06-14 | End: 2024-06-14 | Stop reason: HOSPADM

## 2024-06-14 RX ORDER — DIPHENHYDRAMINE HYDROCHLORIDE 50 MG/ML
50 INJECTION INTRAMUSCULAR; INTRAVENOUS ONCE AS NEEDED
Status: DISCONTINUED | OUTPATIENT
Start: 2024-06-14 | End: 2024-06-14 | Stop reason: HOSPADM

## 2024-06-14 RX ORDER — HEPARIN 100 UNIT/ML
500 SYRINGE INTRAVENOUS
Status: CANCELLED | OUTPATIENT
Start: 2024-06-14

## 2024-06-14 RX ORDER — EPINEPHRINE 0.3 MG/.3ML
0.3 INJECTION SUBCUTANEOUS ONCE AS NEEDED
Status: CANCELLED | OUTPATIENT
Start: 2024-06-14

## 2024-06-14 RX ORDER — SODIUM CHLORIDE, SODIUM LACTATE, POTASSIUM CHLORIDE, CALCIUM CHLORIDE 600; 310; 30; 20 MG/100ML; MG/100ML; MG/100ML; MG/100ML
INJECTION, SOLUTION INTRAVENOUS CONTINUOUS
Status: DISCONTINUED | OUTPATIENT
Start: 2024-06-14 | End: 2024-06-14 | Stop reason: HOSPADM

## 2024-06-14 RX ORDER — PROCHLORPERAZINE EDISYLATE 5 MG/ML
5 INJECTION INTRAMUSCULAR; INTRAVENOUS ONCE AS NEEDED
Status: DISCONTINUED | OUTPATIENT
Start: 2024-06-14 | End: 2024-06-14 | Stop reason: HOSPADM

## 2024-06-14 RX ORDER — SODIUM CHLORIDE 0.9 % (FLUSH) 0.9 %
10 SYRINGE (ML) INJECTION
Status: DISCONTINUED | OUTPATIENT
Start: 2024-06-14 | End: 2024-06-14 | Stop reason: HOSPADM

## 2024-06-14 RX ORDER — HEPARIN 100 UNIT/ML
500 SYRINGE INTRAVENOUS
Status: DISCONTINUED | OUTPATIENT
Start: 2024-06-14 | End: 2024-06-14 | Stop reason: HOSPADM

## 2024-06-14 RX ORDER — SODIUM CHLORIDE 0.9 % (FLUSH) 0.9 %
10 SYRINGE (ML) INJECTION
Status: CANCELLED | OUTPATIENT
Start: 2024-06-14

## 2024-06-14 RX ORDER — PROCHLORPERAZINE EDISYLATE 5 MG/ML
5 INJECTION INTRAMUSCULAR; INTRAVENOUS ONCE AS NEEDED
Status: CANCELLED
Start: 2024-06-14

## 2024-06-14 RX ADMIN — SODIUM CHLORIDE 1500 MG: 9 INJECTION, SOLUTION INTRAVENOUS at 10:06

## 2024-06-14 RX ADMIN — SODIUM CHLORIDE: 9 INJECTION, SOLUTION INTRAVENOUS at 09:06

## 2024-06-14 RX ADMIN — SODIUM CHLORIDE, POTASSIUM CHLORIDE, SODIUM LACTATE AND CALCIUM CHLORIDE: 600; 310; 30; 20 INJECTION, SOLUTION INTRAVENOUS at 08:06

## 2024-06-14 NOTE — PLAN OF CARE
Patient tolerated LR + imfinzi today. NAD noted. VSS. PIV + blood return upon deaccess. Discharged home

## 2024-06-14 NOTE — PROGRESS NOTES
MEDICAL ONCOLOGY - ESTABLISHED PATIENT VISIT    Reason for visit: Scirrhous HCC    Best Contact Phone Number(s): [phone] (home)      Cancer/Stage/TNM:    Cancer Staging   Hepatocellular carcinoma  Staging form: Liver, AJCC 8th Edition  - Clinical stage from 4/10/2023: Stage IVB (rcT1b, cN0, pM1) - Signed by Philippe Carrasco MD on 8/7/2023       Oncology History   Hepatocellular carcinoma   6/9/2021 Initial Diagnosis    Hepatocellular carcinoma     4/10/2023 Cancer Staged    Staging form: Liver, AJCC 8th Edition  - Clinical stage from 4/10/2023: Stage IVB (rcT1b, cN0, pM1)     7/11/2023 -  Chemotherapy    Treatment Summary   Plan Name: OP TREMELIMUMAB 300 MG (X 1) + DURVALUMAB 1500 MG Q4W  Treatment Goal: Control  Status: Active  Start Date: 7/11/2023  End Date: 12/23/2024 (Planned)  Provider: Philippe Carrasco MD  Chemotherapy: [No matching medication found in this treatment plan]     9/11/2023 Genetic Testing    Genetic counseling done. Hereditary syndrome unlikely. Patient offered testing, but declined due to cost.      10/23/2023 - 11/3/2023 Radiation Therapy    Treating physician: yamil villa    Course: C1 Chst/Abd 2023  Treatment Site Ref. ID Energy Dose/Fx (Gy) #Fx Dose Correction (Gy) Total Dose (Gy) Start Date End Date Elapsed Days   SB Lung_R PTV_Lung 6X 10 5 / 5 0 50 10/23/2023 11/1/2023 9   SB Abdomen PTV_PortalLN 6X 10 5 / 5 0 50 10/23/2023 11/3/2023 11           Interim History:   43 y.o. 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 Howell with the same pathology, negative margins, 0 of 8 lymph nodes positive and + for vascular and perineural invasion. We saw her in 2021 for evaluation for persistent CA 19-9 but there was no radiographic evidence of HCC disease recurrence or alternative reason for CA 19-9 elevation.  CT chest on 3/9/23 showed an enlarging RLL nodule measuring 1.1 cm, increased from 0.8 cm in size.  IR biopsy of this on  4/10/23 confirmed metastatic HCC.  Since then in mid-June she has also had an MRI abdomen that showed an enlarging portocaval lymph node that is consistent with metastatic disease.  She completed SBRT at the beginning of November to her lung metastasis and to her portocaval lymph node. She underwent Y-90 on 2/1/24.    She presents today for cycle 13 of durvalumab + tremelimumab as part of the STRiDE regimen. She is feeling well.  She has intermittent gas pains in her abdomen.    She otherwise has no new complaints.    Presents alone today.  ECOG PS 0.    ROS:   Review of Systems   Constitutional:  Negative for chills, fever, malaise/fatigue and weight loss.   HENT:  Negative for sore throat.    Eyes:  Negative for blurred vision and pain.   Respiratory:  Negative for cough and shortness of breath.    Cardiovascular:  Negative for chest pain and leg swelling.   Gastrointestinal:  Positive for constipation. Negative for abdominal pain, diarrhea, nausea and vomiting.   Genitourinary:  Negative for dysuria and frequency.   Musculoskeletal:  Negative for back pain, falls and myalgias.   Skin:  Negative for rash.   Neurological:  Negative for dizziness, weakness and headaches.   Endo/Heme/Allergies:  Does not bruise/bleed easily.   Psychiatric/Behavioral:  Negative for depression. The patient is not nervous/anxious.    All other systems reviewed and are negative.      Past Medical History:   Past Medical History:   Diagnosis Date    VENKATA positive 08/20/2020    COVID-19     Deviated septum 2011    Hepatocellular carcinoma 05/07/2021    Hypertrophy of inferior nasal turbinate     Pericardial effusion     Premature menopause         Past Surgical History:   Past Surgical History:   Procedure Laterality Date    COLONOSCOPY N/A 09/21/2020    Procedure: COLONOSCOPY;  Surgeon: GIOVANNI Crane MD;  Location: [facility] ([address] [place] [address]);  Service: Endoscopy;  Laterality: N/A;  + covid 4/22    EPIDURAL STEROID INJECTION INTO CERVICAL SPINE  N/A 2024    Procedure: FLORENTINO C7/T1;  Surgeon: Cezar Bailey MD;  Location: [facility] PAIN [facility]NAG[facility];  Service: Pain Management;  Laterality: N/A;  20mins-No ac    ESOPHAGOGASTRODUODENOSCOPY N/A 1/10/2024    Procedure: ESOPHAGOGASTRODUODENOSCOPY (EGD);  Surgeon: Reynaldo Lynch MD;  Location: [facility] ENDO (2ND FLR);  Service: Endoscopy;  Laterality: N/A;  referred by daryl carrasco rocedure: EGD Diagnosis: Abnormal finding on GI tract imaging, Abdominal pain, and GERD  Procedure Timin-4 weeks Provider: Any GI provider Location: [facility], and [facility]  Additional Scheduling Informat    HERNIA REPAIR      LIVER SURGERY N/A 2021    NASAL SEPTUM SURGERY      PERICARDIAL WINDOW N/A 2021    Procedure: CREATION, PERICARDIAL WINDOW;  Surgeon: Ajay Cantor MD;  Location: [facility] OR [facility] FLR;  Service: Cardiovascular;  Laterality: N/A;    PERICARDIOCENTESIS N/A 2020    Procedure: Pericardiocentesis;  Surgeon: Dickson Dangelo MD;  Location: [facility] CATH LAB;  Service: Cardiology;  Laterality: N/A;    TONSILLECTOMY          Family History:   Family History   Problem Relation Name Age of Onset    Diabetes Mother      Liver disease Mother          fatty liver    Heart disease Father      Macular degeneration Father      Diabetes Father      Hypertension Father      Hyperlipidemia Father      Heart disease Sister      Adjustment disorder with mixed anxiety and depressed mood Sister      Lung cancer Maternal Aunt          heavy smoker    Cerebral aneurysm Paternal Aunt      Cataracts Neg Hx      Glaucoma Neg Hx      Retinal detachment Neg Hx      Stroke Neg Hx      Thyroid disease Neg Hx      Melanoma Neg Hx      Heart attack Neg Hx          Social History:   Social History     Tobacco Use    Smoking status: Never    Smokeless tobacco: Never   Substance Use Topics    Alcohol use: Yes     Comment: rare        I have reviewed and updated the patient's past medical, surgical, family and  "social histories.    Allergies:   Review of patient's allergies indicates:   Allergen Reactions    No known drug allergies         Medications:   Current Outpatient Medications   Medication Sig Dispense Refill    ALPRAZolam (XANAX) 0.5 MG tablet Take 1 tablet (0.5 mg total) by mouth daily as needed for Anxiety. 30 tablet 1    cetirizine (ZYRTEC) 10 MG tablet TAKE 1 TABLET BY MOUTH EVERY DAY 90 tablet 2    cyanocobalamin (VITAMIN B-12) 1000 MCG tablet Take 1 tablet (1,000 mcg total) by mouth once daily. 30 tablet 12    cyclobenzaprine (FLEXERIL) 10 MG tablet SMARTSI Tablet(s) By Mouth Every Evening PRN      EScitalopram oxalate (LEXAPRO) 10 MG tablet Take 1 tablet (10 mg total) by mouth once daily. 90 tablet 1    fluticasone propionate (FLONASE) 50 mcg/actuation nasal spray SPRAY 2 SPRAYS BY EACH NOSTRIL ROUTE ONCE DAILY. 48 mL 2    multivitamin capsule Take by mouth. 1 capsule Oral Every morning      naproxen (NAPROSYN) 500 MG tablet Take 1 tablet (500 mg total) by mouth 2 (two) times daily with meals. 60 tablet 0    norethindrone-ethinyl estradiol (MICROGESTIN 1/20) 1-20 mg-mcg per tablet Take 1 tablet by mouth once daily. 63 tablet 4    ondansetron (ZOFRAN-ODT) 4 MG TbDL Take 1 tablet (4 mg total) by mouth every 12 (twelve) hours as needed (nausea). 20 tablet 0    pantoprazole (PROTONIX) 40 MG tablet TAKE 1 TABLET BY MOUTH EVERY DAY 90 tablet 1    tiZANidine (ZANAFLEX) 4 MG tablet Take 1 tablet (4 mg total) by mouth every 6 (six) hours as needed (pain/spasm). 30 tablet 1    cephALEXin (KEFLEX) 500 MG capsule Take 1 capsule (500 mg total) by mouth every 8 (eight) hours. 21 capsule 0     No current facility-administered medications for this visit.        Physical Exam:   /81 (BP Location: Right arm, Patient Position: Sitting, BP Method: Large (Automatic))   Pulse 86   Temp 98.3 °F (36.8 °C) (Oral)   Resp 14   Ht 5' 11" (1.803 m)   Wt 70.7 kg (155 lb 13.8 oz)   LMP  (LMP Unknown)   SpO2 98%   BMI " 21.74 kg/m²                Physical Exam  Vitals reviewed.   Constitutional:       General: She is not in acute distress.     Appearance: Normal appearance. She is normal weight.   HENT:      Head: Normocephalic and atraumatic.      Right Ear: External ear normal.      Left Ear: External ear normal.      Nose: Nose normal.      Mouth/Throat:      Mouth: Mucous membranes are moist.      Pharynx: Oropharynx is clear. No posterior oropharyngeal erythema.   Eyes:      General: No scleral icterus.     Extraocular Movements: Extraocular movements intact.      Conjunctiva/sclera: Conjunctivae normal.      Pupils: Pupils are equal, round, and reactive to light.   Cardiovascular:      Rate and Rhythm: Normal rate and regular rhythm.      Pulses: Normal pulses.      Heart sounds: Normal heart sounds.   Pulmonary:      Effort: Pulmonary effort is normal.      Breath sounds: Normal breath sounds. No wheezing or rales.   Abdominal:      General: Bowel sounds are normal. There is no distension.      Palpations: Abdomen is soft.      Tenderness: There is no abdominal tenderness.   Musculoskeletal:         General: No swelling. Normal range of motion.      Cervical back: Normal range of motion and neck supple.   Skin:     General: Skin is warm.      Coloration: Skin is not jaundiced.      Findings: No erythema or rash.   Neurological:      General: No focal deficit present.      Mental Status: She is alert and oriented to person, place, and time. Mental status is at baseline.      Gait: Gait normal.   Psychiatric:         Mood and Affect: Mood normal.         Behavior: Behavior normal.         Thought Content: Thought content normal.         Judgment: Judgment normal.           Labs:   Recent Results (from the past 48 hour(s))   TSH    Collection Time: 06/14/24  7:23 AM   Result Value Ref Range    TSH 0.871 0.400 - 4.000 uIU/mL   CANCER ANTIGEN 19-9    Collection Time: 06/14/24  7:23 AM   Result Value Ref Range    CA 19-9 110.0 (H)  0.0 - 40.0 U/mL   CBC W/ AUTO DIFFERENTIAL    Collection Time: 06/14/24  7:23 AM   Result Value Ref Range    WBC 3.68 (L) 3.90 - 12.70 K/uL    RBC 4.04 4.00 - 5.40 M/uL    Hemoglobin 12.7 12.0 - 16.0 g/dL    Hematocrit 38.7 37.0 - 48.5 %    MCV 96 82 - 98 fL    MCH 31.4 (H) 27.0 - 31.0 pg    MCHC 32.8 32.0 - 36.0 g/dL    RDW 12.6 11.5 - 14.5 %    Platelets 221 150 - 450 K/uL    MPV 9.4 9.2 - 12.9 fL    Immature Granulocytes 0.3 0.0 - 0.5 %    Gran # (ANC) 2.5 1.8 - 7.7 K/uL    Immature Grans (Abs) 0.01 0.00 - 0.04 K/uL    Lymph # 0.7 (L) 1.0 - 4.8 K/uL    Mono # 0.3 0.3 - 1.0 K/uL    Eos # 0.1 0.0 - 0.5 K/uL    Baso # 0.04 0.00 - 0.20 K/uL    nRBC 0 0 /100 WBC    Gran % 68.0 38.0 - 73.0 %    Lymph % 19.8 18.0 - 48.0 %    Mono % 8.4 4.0 - 15.0 %    Eosinophil % 2.4 0.0 - 8.0 %    Basophil % 1.1 0.0 - 1.9 %    Differential Method Automated    Comprehensive Metabolic Panel    Collection Time: 06/14/24  7:23 AM   Result Value Ref Range    Sodium 136 136 - 145 mmol/L    Potassium 4.0 3.5 - 5.1 mmol/L    Chloride 104 95 - 110 mmol/L    CO2 27 23 - 29 mmol/L    Glucose 107 70 - 110 mg/dL    BUN 12 6 - 20 mg/dL    Creatinine 0.9 0.5 - 1.4 mg/dL    Calcium 9.4 8.7 - 10.5 mg/dL    Total Protein 6.8 6.0 - 8.4 g/dL    Albumin 3.5 3.5 - 5.2 g/dL    Total Bilirubin 1.0 0.1 - 1.0 mg/dL    Alkaline Phosphatase 51 (L) 55 - 135 U/L    AST 15 10 - 40 U/L    ALT 15 10 - 44 U/L    eGFR >60.0 >60 mL/min/1.73 m^2    Anion Gap 5 (L) 8 - 16 mmol/L   AFP Tumor Marker    Collection Time: 06/14/24  7:23 AM   Result Value Ref Range    AFP 9.1 (H) 0.0 - 8.4 ng/mL       I have reviewed the pertinent labs.  Mild leukopenia, AFP pending.    Imaging:       MRI - 5/27/24:    Impression:     Postsurgical changes of partial right hepatectomy for HCC resection.  Evolving area of post treatment change in hepatic segment 8 without nodular masslike enhancement to suggest residual or recurrent disease.     Redemonstration of arterial enhancing lesion in  hepatic segment 4A that continues to increase in size compared to prior exams and has associated washout.  Findings concerning for HCC.     Stable hepatic hemangiomas.    Path:   6/28/21: partial hepatectomy:    Final Pathologic Diagnosis 1.  Gallbladder (cholecystectomy):   - Benign gallbladder with cholecystitis   2. Right lobe (partial hepatectomy):   - Positive for malignancy, see synoptic report below   - Separate hemangioma, 7.3 cm   3. Lymph nodes, portal (regional resection):   - 8 lymph nodes, negative for tumor (0/8)   ______________________________________________________________________________   ______   Hepatocellular carcinoma synoptic report   - Procedure:  Partial hepatectomy, right lobe   - Histologic type:  Hepatocellular carcinoma, scirrhous   - Histologic grade:  Moderately differentiated, grade 2   - Tumor focality:  Solitary   - Tumor characteristics:   - Tumor site:  Right lobe   - Tumor size:  3.3 cm   - Treatment effect:  No known pre-surgical therapy   - Satellitosis:  Not identified   - Tumor extent:  Confined to liver   - Vascular invasion:  Present, Small vessel   - Perineural invasion:  Present   - Margins:   - All margins are negative for invasive carcinoma   - Closest margin to invasive carcinoma:  Parenchymal   - Distance from invasive  carcinoma to closest margin:  5 mm   - Regional lymph nodes:   - Number of lymph nodes with tumor:  0   - Number of lymph nodes examined:  8   - Pathology: pT2 N0 MX   - Additional findings:   - No steatosis   - Trichrome stain:  Periportal fibrosis with early septa, stage 1-2   - Iron stain:  Negative   - Iron and trichrome stains with appropriate controls   Tumor blocks:  2A, 2B, 2 C    Comment: Interp By Madeline Carlos MD, Signed on 07/08/2021 at 16:47       Assessment:       1. HCC (hepatocellular carcinoma)    2. Regional lymph node metastasis present    3. Malignant neoplasm metastatic to right lung    4. Neutropenia, unspecified type    5.  Bone lesion    6. Cervical radiculopathy    7. Liver hemangioma    8. Gastroesophageal reflux disease, unspecified whether esophagitis present    9. Skin nodule                Plan:             # HCC  Scirrhous subtype, which is very uncommon (~ 1% of all HCC); prognosis is likely similar to typical HCC.  No clear underlying liver pathology in this patient.  Had margin negative resection with negative lymph node eval on 6/28/21 with Dr. Howell.  Unfortunately she has biopsy proven recurrent metastatic disease to her RLL s/p IR biopsy 4/10/23.   She was discussed at thoracic tumor board with potential plan for surgical resection with Dr. Tadeo.  She had a concerning bone lesion on PET from 4/26 at T2.  This was biopsied and proven to be benign.  In the interim she had a repeat abdominal MRI on 6/13/23 which demonstrated growth of a portacaval lymph node that was very concerning for metastatic disease when we reviewed her imaging at liver conference.  Meanwhile her RLL met has grown slightly on 6/21/23 CT as well.  We discussed her case with Valerie Dhillon and Shwetha and we were all in agreement with proceeding with systemic therapy rather than surgery or radiation given multifocal progression.    We discussed this with her and she is agreeable with starting systemic therapy.  Reviewed possibilities of atezo + magaly or durva + treme.  She wished to proceed with STRIDE regimen: durvalumab + tremelimumab.    Received cycle 1 on 7/11/23.  Repeat CT CAP after cycle 3 demonstrates mild growth in albert hepatis lymph node.  Stable disease elsewhere.    Discussed with Dr. Mendiola and she was deemed eligible for SBRT to her abdominal lymph node and growing lung nodule.  She completed SBRT to both 11/3/23.    Meanwhile she developed a new concerning area of possible recurrence near her liver resection site.  Discussed with Dr. Lala and Dr. Dhillon.  Dr. Dhillon was unable to visualize it during radiation simulation.  We recommended to  proceed with Y-90 which was administered 2/1/24.    MRI 2/29/24 shows good response to Y-90 treated lesion.  Indeterminate lesion reviewed by Dr. Alford with no concerning features.  Will continue current regimen.  Consider switching systemic therapy only if significant progression given likely side effects of TKI.    Saw Dr. Sabillon of cardiology who recommended troponin and ECG monitoring given her cardiac history.  She is asymptomatic at present.    MRI after cycle 12 shows mild growth in left hepatic lobe lesion.  She will get Y90 to this.  Will proceed with cycle 13 today.  Will tentatively plan on adding back treme for one re-priming dose for cycle 15.    CT chest in July.  Repeat MRI after Y90.    Will plan to bring her back in 4 weeks for cycle 14.     # Neutropenia, cervical radiculopathy  Neutropenia intermittent.  Likely benign etiology.  Continue to f/u with Orthopedics.  Had recent FLORENTINO.    # Liver hemangiomas, GERD  Continue monitoring as indicated with Dr. Rogers.  Continue to f/u with GI team for GERD symptoms. Continue with medication management with Protonix.    # RLE achilles nodule  Likely benign growth such as lipoma based on US and clinically appearance.  Monitor.    Follow up: 4 weeks.    The above information has been reviewed with the patient and all questions have been answered to their apparent satisfaction.  They understand that they can call the clinic with any questions.    Philippe Carrasco MD  Hematology/Oncology  [facility]      Route Chart for Scheduling    Med Onc Chart Routing      Follow up with physician . As scheduled   Follow up with MELA    Infusion scheduling note    Injection scheduling note    Labs    Imaging    Pharmacy appointment    Other referrals                  Treatment Plan Information   OP TREMELIMUMAB 300 MG (X 1) + DURVALUMAB 1500 MG Q4W   Philippe Carrasco MD   Upcoming Treatment Dates - OP TREMELIMUMAB 300 MG (X 1) + DURVALUMAB 1500 MG  Q4W    6/10/2024       Chemotherapy       durvalumab (IMFINZI) 1,500 mg in sodium chloride 0.9% SolP 280 mL chemo infusion       Antiemetics       prochlorperazine injection Soln 5 mg  7/8/2024       Chemotherapy       durvalumab (IMFINZI) 1,500 mg in sodium chloride 0.9% SolP 280 mL chemo infusion       Antiemetics       prochlorperazine injection Soln 5 mg  8/5/2024       Chemotherapy       durvalumab (IMFINZI) 1,500 mg in sodium chloride 0.9% SolP 280 mL chemo infusion       Antiemetics       prochlorperazine injection Soln 5 mg  9/2/2024       Chemotherapy       durvalumab (IMFINZI) 1,500 mg in sodium chloride 0.9% SolP 280 mL chemo infusion       Antiemetics       prochlorperazine injection Soln 5 mg

## 2024-06-25 ENCOUNTER — OFFICE VISIT (OUTPATIENT)
Dept: INTERNAL MEDICINE | Facility: CLINIC | Age: 44
End: 2024-06-25
Payer: COMMERCIAL

## 2024-06-25 VITALS
TEMPERATURE: 98 F | HEIGHT: 71 IN | BODY MASS INDEX: 21.58 KG/M2 | SYSTOLIC BLOOD PRESSURE: 122 MMHG | HEART RATE: 83 BPM | OXYGEN SATURATION: 98 % | DIASTOLIC BLOOD PRESSURE: 78 MMHG | WEIGHT: 154.13 LBS

## 2024-06-25 DIAGNOSIS — J32.9 SINUSITIS, UNSPECIFIED CHRONICITY, UNSPECIFIED LOCATION: Primary | ICD-10-CM

## 2024-06-25 PROCEDURE — 3008F BODY MASS INDEX DOCD: CPT | Mod: CPTII,S$GLB,, | Performed by: INTERNAL MEDICINE

## 2024-06-25 PROCEDURE — 3078F DIAST BP <80 MM HG: CPT | Mod: CPTII,S$GLB,, | Performed by: INTERNAL MEDICINE

## 2024-06-25 PROCEDURE — 99213 OFFICE O/P EST LOW 20 MIN: CPT | Mod: S$GLB,,, | Performed by: INTERNAL MEDICINE

## 2024-06-25 PROCEDURE — 1159F MED LIST DOCD IN RCRD: CPT | Mod: CPTII,S$GLB,, | Performed by: INTERNAL MEDICINE

## 2024-06-25 PROCEDURE — 99999 PR PBB SHADOW E&M-EST. PATIENT-LVL IV: CPT | Mod: PBBFAC,,, | Performed by: INTERNAL MEDICINE

## 2024-06-25 PROCEDURE — 3074F SYST BP LT 130 MM HG: CPT | Mod: CPTII,S$GLB,, | Performed by: INTERNAL MEDICINE

## 2024-06-25 RX ORDER — AZITHROMYCIN 250 MG/1
TABLET, FILM COATED ORAL
Qty: 6 TABLET | Refills: 0 | Status: SHIPPED | OUTPATIENT
Start: 2024-06-25 | End: 2024-06-30

## 2024-06-25 NOTE — PROGRESS NOTES
Subjective:       Patient ID: Melly Hwang is a 43 y.o. female.    Chief Complaint: Sinus Problem, Sore Throat, and Otalgia    Sinus Problem  Associated symptoms include ear pain and a sore throat. Pertinent negatives include no headaches or shortness of breath (PND or orthopnea).   Sore Throat   Associated symptoms include ear pain. Pertinent negatives include no abdominal pain, diarrhea, headaches, shortness of breath (PND or orthopnea) or vomiting.   Otalgia   Associated symptoms include a sore throat. Pertinent negatives include no abdominal pain, diarrhea, headaches or vomiting.   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  About 8 days ago developed congestion, scratchy throat, nasal drainge and now slight cough.  No fever or chills.  No sweats or aches.    Review of Systems   HENT:  Positive for ear pain and sore throat.    Respiratory:  Negative for shortness of breath (PND or orthopnea).    Cardiovascular:  Negative for chest pain (arm pain or jaw pain).   Gastrointestinal:  Negative for abdominal pain, diarrhea, nausea and vomiting.   Genitourinary:  Negative for dysuria.   Neurological:  Negative for seizures, syncope and headaches.       Objective:      Physical Exam  Constitutional:       General: She is not in acute distress.     Appearance: She is well-developed.   HENT:      Head: Normocephalic.      Ears:      Comments: TMs both with fluid behind them  Eyes:      Pupils: Pupils are equal, round, and reactive to light.   Neck:      Thyroid: No thyromegaly.      Vascular: No JVD.   Cardiovascular:      Rate and Rhythm: Normal rate and regular rhythm.      Heart sounds: Normal heart sounds. No murmur heard.     No friction rub. No gallop.   Pulmonary:      Effort: Pulmonary effort is normal.      Breath sounds: Normal breath sounds. No wheezing or rales.   Abdominal:      General: Bowel sounds are normal. There is no distension.      Palpations: Abdomen is  soft. There is no mass.      Tenderness: There is no abdominal tenderness. There is no guarding or rebound.   Musculoskeletal:      Cervical back: Neck supple.   Lymphadenopathy:      Cervical: No cervical adenopathy.   Skin:     General: Skin is warm and dry.   Neurological:      Mental Status: She is alert and oriented to person, place, and time.      Deep Tendon Reflexes: Reflexes are normal and symmetric.   Psychiatric:         Behavior: Behavior normal.         Thought Content: Thought content normal.         Judgment: Judgment normal.         Assessment:       1. Sinusitis, unspecified chronicity, unspecified location        Plan:   Sinusitis, unspecified chronicity, unspecified location/Otitis    Other orders  -     azithromycin (Z-JUSTIN) 250 MG tablet; Take 2 tablets by mouth on day 1; Take 1 tablet by mouth on days 2-5  Dispense: 6 tablet; Refill: 0    She declined COVID swab since symptoms have been there for 8 days.

## 2024-06-28 ENCOUNTER — PATIENT MESSAGE (OUTPATIENT)
Dept: INTERNAL MEDICINE | Facility: CLINIC | Age: 44
End: 2024-06-28
Payer: COMMERCIAL

## 2024-07-02 ENCOUNTER — PATIENT MESSAGE (OUTPATIENT)
Dept: HEMATOLOGY/ONCOLOGY | Facility: CLINIC | Age: 44
End: 2024-07-02
Payer: COMMERCIAL

## 2024-07-03 ENCOUNTER — TELEPHONE (OUTPATIENT)
Dept: DERMATOLOGY | Facility: CLINIC | Age: 44
End: 2024-07-03

## 2024-07-03 ENCOUNTER — OFFICE VISIT (OUTPATIENT)
Dept: DERMATOLOGY | Facility: CLINIC | Age: 44
End: 2024-07-03
Payer: COMMERCIAL

## 2024-07-03 DIAGNOSIS — L21.9 SEBORRHEIC DERMATITIS, UNSPECIFIED: Primary | ICD-10-CM

## 2024-07-03 DIAGNOSIS — L81.0 POST-INFLAMMATORY HYPERPIGMENTATION: ICD-10-CM

## 2024-07-03 DIAGNOSIS — L82.1 SK (SEBORRHEIC KERATOSIS): ICD-10-CM

## 2024-07-03 PROCEDURE — 1159F MED LIST DOCD IN RCRD: CPT | Mod: CPTII,95,, | Performed by: DERMATOLOGY

## 2024-07-03 PROCEDURE — 99214 OFFICE O/P EST MOD 30 MIN: CPT | Mod: 95,,, | Performed by: DERMATOLOGY

## 2024-07-03 PROCEDURE — 1160F RVW MEDS BY RX/DR IN RCRD: CPT | Mod: CPTII,95,, | Performed by: DERMATOLOGY

## 2024-07-03 RX ORDER — KETOCONAZOLE 20 MG/G
CREAM TOPICAL
Qty: 30 G | Refills: 3 | Status: SHIPPED | OUTPATIENT
Start: 2024-07-03

## 2024-07-03 NOTE — PATIENT INSTRUCTIONS
Seborrheic dermatitis, unspecified  Recommend wash face daily with Sulfa-lo or sebamed or DHS zinc shampoo which can be purchased over the counter to face nightly then rinse off. You may find these in local drugstores or search online. Afterwards apply ketoconzole cream 2% once or twice daily.     SK (seborrheic keratosis)- R breast   These are benign inherited growths without a malignant potential. Reassurance given to patient. No treatment is necessary.     Post-inflammatory hyperpigmentation- shoulder and nose  Secondary to trauma/friction to the skin   Can try OTC fade cream with active ingredient hydroquinone 2 %

## 2024-07-03 NOTE — PROGRESS NOTES
Subjective:      Patient ID:  Melly Hwang is a 44 y.o. female who presents for No chief complaint on file.    The patient location is: [place]  The chief complaint leading to consultation is: lesions    Visit type: audiovisual    Face to Face time with patient: 6 minutes  13  minutes of total time spent on the encounter, which includes face to face time and non-face to face time preparing to see the patient (eg, review of tests), Obtaining and/or reviewing separately obtained history, Documenting clinical information in the electronic or other health record, Independently interpreting results (not separately reported) and communicating results to the patient/family/caregiver, or Care coordination (not separately reported).         Each patient to whom he or she provides medical services by telemedicine is:  (1) informed of the relationship between the physician and patient and the respective role of any other health care provider with respect to management of the patient; and (2) notified that he or she may decline to receive medical services by telemedicine and may withdraw from such care at any time.    Notes:   Pt on virtual with concerns of lesion on right nose. Feels she popped a pimple on right nose and it left a dark munira - used triamcinolone  and this helps with the scaling but still present; only right side has the scaling and peeling; also gets rash and scaly behind the ears  Also has dark area on  right breast for a few days.   Also had a bone biopsy 1 mirian ago. This left a dark munira.          Review of Systems    Objective:   Physical Exam   Skin:   Areas Examined (abnormalities noted in diagram):   Head / Face Inspection Performed  RUE Inspected                 Diagram Legend     Erythematous scaling macule/papule c/w actinic keratosis       Vascular papule c/w angioma      Pigmented verrucoid papule/plaque c/w seborrheic keratosis      Yellow umbilicated papule c/w sebaceous hyperplasia       Irregularly shaped tan macule c/w lentigo     1-2 mm smooth white papules consistent with Milia      Movable subcutaneous cyst with punctum c/w epidermal inclusion cyst      Subcutaneous movable cyst c/w pilar cyst      Firm pink to brown papule c/w dermatofibroma      Pedunculated fleshy papule(s) c/w skin tag(s)      Evenly pigmented macule c/w junctional nevus     Mildly variegated pigmented, slightly irregular-bordered macule c/w mildly atypical nevus      Flesh colored to evenly pigmented papule c/w intradermal nevus       Pink pearly papule/plaque c/w basal cell carcinoma      Erythematous hyperkeratotic cursted plaque c/w SCC      Surgical scar with no sign of skin cancer recurrence      Open and closed comedones      Inflammatory papules and pustules      Verrucoid papule consistent consistent with wart     Erythematous eczematous patches and plaques     Dystrophic onycholytic nail with subungual debris c/w onychomycosis     Umbilicated papule    Erythematous-base heme-crusted tan verrucoid plaque consistent with inflamed seborrheic keratosis     Erythematous Silvery Scaling Plaque c/w Psoriasis     See annotation            Assessment / Plan:        Seborrheic dermatitis, unspecified  Recommend wash face daily with Sulfa-lo or sebamed or DHS zinc shampoo which can be purchased over the counter to face nightly then rinse off. You may find these in local drugstores or search online. Afterwards apply ketoconzole cream 2% once or twice daily.     SK (seborrheic keratosis)  These are benign inherited growths without a malignant potential. Reassurance given to patient. No treatment is necessary.     Post-inflammatory hyperpigmentation- shoulder and nose  Secondary to trauma/friction to the skin   Can try OTC fade cream with active ingredient hydroquinone 2 %              Follow up in about 2 months (around 9/3/2024) for recheck R nose and , Medication Management.

## 2024-07-10 ENCOUNTER — TELEPHONE (OUTPATIENT)
Dept: INTERVENTIONAL RADIOLOGY/VASCULAR | Facility: HOSPITAL | Age: 44
End: 2024-07-10
Payer: COMMERCIAL

## 2024-07-10 ENCOUNTER — LAB VISIT (OUTPATIENT)
Dept: LAB | Facility: HOSPITAL | Age: 44
End: 2024-07-10
Attending: INTERNAL MEDICINE
Payer: COMMERCIAL

## 2024-07-10 DIAGNOSIS — C22.0 HCC (HEPATOCELLULAR CARCINOMA): ICD-10-CM

## 2024-07-10 LAB
AFP SERPL-MCNC: 9.1 NG/ML (ref 0–8.4)
ALBUMIN SERPL BCP-MCNC: 3.7 G/DL (ref 3.5–5.2)
ALP SERPL-CCNC: 51 U/L (ref 55–135)
ALT SERPL W/O P-5'-P-CCNC: 21 U/L (ref 10–44)
ANION GAP SERPL CALC-SCNC: 6 MMOL/L (ref 8–16)
AST SERPL-CCNC: 18 U/L (ref 10–40)
BASOPHILS # BLD AUTO: 0.04 K/UL (ref 0–0.2)
BASOPHILS NFR BLD: 1 % (ref 0–1.9)
BILIRUB DIRECT SERPL-MCNC: 0.2 MG/DL (ref 0.1–0.3)
BILIRUB SERPL-MCNC: 0.6 MG/DL (ref 0.1–1)
BUN SERPL-MCNC: 9 MG/DL (ref 6–20)
CALCIUM SERPL-MCNC: 9 MG/DL (ref 8.7–10.5)
CANCER AG19-9 SERPL-ACNC: 118.3 U/ML (ref 0–40)
CHLORIDE SERPL-SCNC: 103 MMOL/L (ref 95–110)
CO2 SERPL-SCNC: 27 MMOL/L (ref 23–29)
CREAT SERPL-MCNC: 0.9 MG/DL (ref 0.5–1.4)
DIFFERENTIAL METHOD BLD: ABNORMAL
EOSINOPHIL # BLD AUTO: 0.1 K/UL (ref 0–0.5)
EOSINOPHIL NFR BLD: 2.6 % (ref 0–8)
ERYTHROCYTE [DISTWIDTH] IN BLOOD BY AUTOMATED COUNT: 12.7 % (ref 11.5–14.5)
EST. GFR  (NO RACE VARIABLE): >60 ML/MIN/1.73 M^2
GLUCOSE SERPL-MCNC: 90 MG/DL (ref 70–110)
HCT VFR BLD AUTO: 38.6 % (ref 37–48.5)
HGB BLD-MCNC: 12.5 G/DL (ref 12–16)
IMM GRANULOCYTES # BLD AUTO: 0.02 K/UL (ref 0–0.04)
IMM GRANULOCYTES NFR BLD AUTO: 0.5 % (ref 0–0.5)
INR PPP: 1 (ref 0.8–1.2)
LYMPHOCYTES # BLD AUTO: 0.9 K/UL (ref 1–4.8)
LYMPHOCYTES NFR BLD: 23.1 % (ref 18–48)
MCH RBC QN AUTO: 31.1 PG (ref 27–31)
MCHC RBC AUTO-ENTMCNC: 32.4 G/DL (ref 32–36)
MCV RBC AUTO: 96 FL (ref 82–98)
MONOCYTES # BLD AUTO: 0.4 K/UL (ref 0.3–1)
MONOCYTES NFR BLD: 9.3 % (ref 4–15)
NEUTROPHILS # BLD AUTO: 2.5 K/UL (ref 1.8–7.7)
NEUTROPHILS NFR BLD: 63.5 % (ref 38–73)
NRBC BLD-RTO: 0 /100 WBC
PLATELET # BLD AUTO: 259 K/UL (ref 150–450)
PMV BLD AUTO: 8.8 FL (ref 9.2–12.9)
POTASSIUM SERPL-SCNC: 3.9 MMOL/L (ref 3.5–5.1)
PROT SERPL-MCNC: 7.2 G/DL (ref 6–8.4)
PROTHROMBIN TIME: 10.7 SEC (ref 9–12.5)
RBC # BLD AUTO: 4.02 M/UL (ref 4–5.4)
SODIUM SERPL-SCNC: 136 MMOL/L (ref 136–145)
WBC # BLD AUTO: 3.89 K/UL (ref 3.9–12.7)

## 2024-07-10 PROCEDURE — 82105 ALPHA-FETOPROTEIN SERUM: CPT | Performed by: INTERNAL MEDICINE

## 2024-07-10 PROCEDURE — 80053 COMPREHEN METABOLIC PANEL: CPT

## 2024-07-10 PROCEDURE — 82248 BILIRUBIN DIRECT: CPT

## 2024-07-10 PROCEDURE — 86301 IMMUNOASSAY TUMOR CA 19-9: CPT | Performed by: INTERNAL MEDICINE

## 2024-07-10 PROCEDURE — 85025 COMPLETE CBC W/AUTO DIFF WBC: CPT

## 2024-07-10 PROCEDURE — 85610 PROTHROMBIN TIME: CPT

## 2024-07-10 PROCEDURE — 36415 COLL VENOUS BLD VENIPUNCTURE: CPT

## 2024-07-10 NOTE — NURSING
Pre-procedure call complete.  2 patient identifier used (name and ).  Pt instructed not to eat or drink anything after midnight the night before procedure.  Pt aware will need someone to provide transport home and monitor pt 8 hours post procedure.  No driving for 3 days after procedure.   Patient verbalized aware of which medications to take.  Do not take  sleep medication (including OTC) and anxiety medication the night before procedure.  Arrival time and location given.  Expected length of stay reviewed.  Covid screening completed.  Pt verbalized understanding of all pre-procedure instructions.  Written instructions and directions sent to patient in Mychart/portal.

## 2024-07-11 ENCOUNTER — HOSPITAL ENCOUNTER (OUTPATIENT)
Dept: INTERVENTIONAL RADIOLOGY/VASCULAR | Facility: HOSPITAL | Age: 44
Discharge: HOME | End: 2024-07-11
Payer: COMMERCIAL

## 2024-07-11 ENCOUNTER — HOSPITAL ENCOUNTER (OUTPATIENT)
Dept: RADIOLOGY | Facility: HOSPITAL | Age: 44
Discharge: HOME | End: 2024-07-11
Payer: COMMERCIAL

## 2024-07-11 VITALS
TEMPERATURE: 97 F | DIASTOLIC BLOOD PRESSURE: 74 MMHG | BODY MASS INDEX: 21.28 KG/M2 | SYSTOLIC BLOOD PRESSURE: 121 MMHG | WEIGHT: 152 LBS | HEART RATE: 67 BPM | RESPIRATION RATE: 18 BRPM | HEIGHT: 71 IN | OXYGEN SATURATION: 100 %

## 2024-07-11 DIAGNOSIS — C22.0 HCC (HEPATOCELLULAR CARCINOMA): ICD-10-CM

## 2024-07-11 LAB
B-HCG UR QL: NEGATIVE
CTP QC/QA: YES

## 2024-07-11 PROCEDURE — 78201 LIVER IMAGING STATIC ONLY: CPT | Mod: 26,,, | Performed by: NUCLEAR MEDICINE

## 2024-07-11 PROCEDURE — 25500020 PHARM REV CODE 255: Performed by: RADIOLOGY

## 2024-07-11 PROCEDURE — C1760 CLOSURE DEV, VASC: HCPCS

## 2024-07-11 PROCEDURE — 81025 URINE PREGNANCY TEST: CPT

## 2024-07-11 PROCEDURE — 78830 RP LOCLZJ TUM SPECT W/CT 1: CPT | Mod: 26,,, | Performed by: NUCLEAR MEDICINE

## 2024-07-11 PROCEDURE — 78201 LIVER IMAGING STATIC ONLY: CPT | Mod: TC

## 2024-07-11 PROCEDURE — 63600175 PHARM REV CODE 636 W HCPCS: Performed by: RADIOLOGY

## 2024-07-11 PROCEDURE — 78830 RP LOCLZJ TUM SPECT W/CT 1: CPT | Mod: TC

## 2024-07-11 PROCEDURE — A9540 TC99M MAA: HCPCS

## 2024-07-11 RX ORDER — OMEPRAZOLE 20 MG/1
20 CAPSULE, DELAYED RELEASE ORAL DAILY PRN
COMMUNITY
Start: 2024-06-18

## 2024-07-11 RX ORDER — SODIUM CHLORIDE 9 MG/ML
INJECTION, SOLUTION INTRAVENOUS CONTINUOUS
Status: DISCONTINUED | OUTPATIENT
Start: 2024-07-11 | End: 2024-07-12 | Stop reason: HOSPADM

## 2024-07-11 RX ORDER — MIDAZOLAM HYDROCHLORIDE 1 MG/ML
INJECTION, SOLUTION INTRAMUSCULAR; INTRAVENOUS
Status: COMPLETED | OUTPATIENT
Start: 2024-07-11 | End: 2024-07-11

## 2024-07-11 RX ORDER — LIDOCAINE HYDROCHLORIDE 10 MG/ML
1 INJECTION, SOLUTION EPIDURAL; INFILTRATION; INTRACAUDAL; PERINEURAL ONCE AS NEEDED
Status: DISCONTINUED | OUTPATIENT
Start: 2024-07-11 | End: 2024-07-12 | Stop reason: HOSPADM

## 2024-07-11 RX ORDER — FENTANYL CITRATE 50 UG/ML
INJECTION, SOLUTION INTRAMUSCULAR; INTRAVENOUS
Status: COMPLETED | OUTPATIENT
Start: 2024-07-11 | End: 2024-07-11

## 2024-07-11 RX ADMIN — MIDAZOLAM HYDROCHLORIDE 1 MG: 2 INJECTION, SOLUTION INTRAMUSCULAR; INTRAVENOUS at 08:07

## 2024-07-11 RX ADMIN — FENTANYL CITRATE 25 MCG: 50 INJECTION, SOLUTION INTRAMUSCULAR; INTRAVENOUS at 09:07

## 2024-07-11 RX ADMIN — IOHEXOL 80 ML: 300 INJECTION, SOLUTION INTRAVENOUS at 10:07

## 2024-07-11 RX ADMIN — MIDAZOLAM HYDROCHLORIDE 0.5 MG: 2 INJECTION, SOLUTION INTRAMUSCULAR; INTRAVENOUS at 09:07

## 2024-07-11 RX ADMIN — FENTANYL CITRATE 50 MCG: 50 INJECTION, SOLUTION INTRAMUSCULAR; INTRAVENOUS at 08:07

## 2024-07-11 RX ADMIN — KIT FOR THE PREPARATION OF TECHNETIUM TC 99M ALBUMIN AGGREGATED 5.15 MILLICURIE: 2 INJECTION, POWDER, LYOPHILIZED, FOR SUSPENSION INTRAPERITONEAL; INTRAVENOUS at 11:07

## 2024-07-11 NOTE — CARE UPDATE
2 hour post procedure monitoring complete at this time, dressing to right groin remains CDI, discharge instructions reviewed with patient, educational handouts/AVS also provided for reference, IV to left forearm removed without difficulty, catheter tip intact, patient transported to [facility].

## 2024-07-11 NOTE — DISCHARGE INSTRUCTIONS
"For questions or concerns that are non-emergent, you may call our Radiology Clinic at [phone].    **After hours and weekends call [phone] and ask for "Radiology Resident on Call."          Post-Yttrium (Y90) instructions:    Dont drive for 3 days.  Avoid walking, bending, and taking stairs for 24 hours.  No heavy lifting (gallon of milk) or strenuous exercise for 10 days.  Keep small children, pregnant women, and pets 3 feet away for 3 days.  For international flights, the radiation may set off the security scans.   Keep your dressing clean and dry for 24 hours. Do not submerge insertion site in water (bath tub, swimming pool) until fully healed.  Be sure to follow any other instructions from your doctor.      Call your doctor if you have any of the following:    Fever of 100.4 (38C) or higher lasting for 24 to 48 hours  Bleeding, swelling, or a large lump at the insertion site  Sharp or increasing pain at the insertion site  Leg pain, numbness, or a cold leg or foot  Any other symptoms your provider instructed you to report based on your medical condition.    Contact information:    For immediate concerns that are not emergent, you may call our interventional radiology clinic at [phone] or [phone]    ** After hours and weekends: Call the paging  at [phone] and ask for the Radiology Resident on call**     "

## 2024-07-11 NOTE — PLAN OF CARE
Pt arrived to 189 for pre y. Pt oriented to unit and staff, Pt safely transferred from stretcher to procedural table. Fall risk reviewed and comfort measures utilized with interventions. Safety strap applied, position pillows to minimize pressure points. Blankets applied. Pt prepped and draped utilizing standard sterile technique. Patient placed on continuous monitoring, as required by sedation policy. Timeouts implemented utilizing standard universal time-out per department and facility policy. RN to remain at bedside with continuous monitoring. Pt resting comfortably. Denies pain/discomfort. Will continue to monitor. See flow sheets for monitoring, medication administration, and updates. patient verbalizes understanding.

## 2024-07-11 NOTE — CARE UPDATE
patient arrived to MPU 7 via stretcher in [facility], report received from CHOCO Adhikari, patient to remain flat until 1151, see chart for vital signs and assessment, will continue to monitor patient until recovery complete.

## 2024-07-11 NOTE — H&P
Radiology History & Physical      SUBJECTIVE:     Chief Complaint: HCC    History of Present Illness:  Melly Hwang is a 44 y.o. female who presents for pre Y90 mapping.    Past Medical History:   Diagnosis Date    VENKATA positive 2020    COVID-19     Deviated septum 2011    Hepatocellular carcinoma 2021    Hypertrophy of inferior nasal turbinate     Pericardial effusion     Premature menopause      Past Surgical History:   Procedure Laterality Date    COLONOSCOPY N/A 2020    Procedure: COLONOSCOPY;  Surgeon: GIOVANNI Crane MD;  Location: [facility] (4TH FLR);  Service: Endoscopy;  Laterality: N/A;  + covid     EPIDURAL STEROID INJECTION INTO CERVICAL SPINE N/A 2024    Procedure: FLORENTINO C7/T1;  Surgeon: Cezar Bailey MD;  Location: [facility] PAIN MANAGEMENT;  Service: Pain Management;  Laterality: N/A;  20mins-No ac    ESOPHAGOGASTRODUODENOSCOPY N/A 1/10/2024    Procedure: ESOPHAGOGASTRODUODENOSCOPY (EGD);  Surgeon: Reynaldo Lynch MD;  Location: [facility] (2ND FLR);  Service: Endoscopy;  Laterality: N/A;  referred by daryl carrasco rocedure: EGD Diagnosis: Abnormal finding on GI tract imaging, Abdominal pain, and GERD  Procedure Timin-4 weeks Provider: Any GI provider Location: [facility], [facility], and [facility]  Additional Scheduling Informat    HERNIA REPAIR      LIVER SURGERY N/A 2021    NASAL SEPTUM SURGERY      PERICARDIAL WINDOW N/A 2021    Procedure: CREATION, PERICARDIAL WINDOW;  Surgeon: Ajay Cantor MD;  [facility];  Service: Cardiovascular;  Laterality: N/A;    PERICARDIOCENTESIS N/A 2020    Procedure: Pericardiocentesis;  Surgeon: Dickson Dangelo MD;  Location: [facility] CATH LAB;  Service: Cardiology;  Laterality: N/A;    TONSILLECTOMY         Home Meds:   Prior to Admission medications    Medication Sig Start Date End Date Taking? Authorizing Provider   ALPRAZolam (XANAX) 0.5 MG tablet Take 1 tablet (0.5 mg total) by  mouth daily as needed for Anxiety. 3/22/24   Philippe Carrasco MD   cetirizine (ZYRTEC) 10 MG tablet Take 1 tablet (10 mg total) by mouth once daily. 24   Melly Sahu MD   cyanocobalamin (VITAMIN B-12) 1000 MCG tablet Take 1 tablet (1,000 mcg total) by mouth once daily. 23   Melly Sahu MD   cyclobenzaprine (FLEXERIL) 10 MG tablet SMARTSI Tablet(s) By Mouth Every Evening PRN 23   Provider, Historical   EScitalopram oxalate (LEXAPRO) 10 MG tablet Take 1 tablet (10 mg total) by mouth once daily. 5/10/24   Chrissy White NP   fluticasone propionate (FLONASE) 50 mcg/actuation nasal spray SPRAY 2 SPRAYS BY EACH NOSTRIL ROUTE ONCE DAILY. 5/15/24   Miriam Trejo, PA-C   ketoconazole (NIZORAL) 2 % cream aaa on face and behind ears qd-bid prn flare 7/3/24   Roxana Maria MD   multivitamin capsule Take by mouth. 1 capsule Oral Every morning    Provider, Historical   naproxen (NAPROSYN) 500 MG tablet Take 1 tablet (500 mg total) by mouth 2 (two) times daily with meals. 3/15/24   Carin Bruno MD   norethindrone-ethinyl estradiol (MICROGESTIN ) 1-20 mg-mcg per tablet Take 1 tablet by mouth once daily. 3/8/24   Asaf Mejia MD   ondansetron (ZOFRAN-ODT) 4 MG TbDL Take 1 tablet (4 mg total) by mouth every 12 (twelve) hours as needed (nausea). 10/25/23   Armando Mendiola MD   pantoprazole (PROTONIX) 40 MG tablet TAKE 1 TABLET BY MOUTH EVERY DAY 3/15/24   Aleah Joiner NP   tiZANidine (ZANAFLEX) 4 MG tablet Take 1 tablet (4 mg total) by mouth every 6 (six) hours as needed (pain/spasm). 8/15/23   Kelsey Castro MD     Anticoagulants/Antiplatelets: no anticoagulation    Allergies:   Review of patient's allergies indicates:   Allergen Reactions    No known drug allergies      Sedation History:  no adverse reactions    Review of Systems:   Hematological: no known coagulopathies  Respiratory: no shortness of breath  Cardiovascular: no chest  pain  Gastrointestinal: no abdominal pain  Genito-Urinary: no dysuria  Musculoskeletal: negative  Neurological: no TIA or stroke symptoms         OBJECTIVE:     Vital Signs (Most Recent)       Physical Exam:  ASA: 1  Mallampati: 2    General: no acute distress  Mental Status: alert and oriented to person, place and time  HEENT: normocephalic, atraumatic  Chest: unlabored breathing  Abdomen: nondistended  Extremity: moves all extremities    Laboratory  Lab Results   Component Value Date    INR 1.0 07/10/2024       Lab Results   Component Value Date    WBC 3.89 (L) 07/10/2024    HGB 12.5 07/10/2024    HCT 38.6 07/10/2024    MCV 96 07/10/2024     07/10/2024      Lab Results   Component Value Date    GLU 90 07/10/2024     07/10/2024    K 3.9 07/10/2024     07/10/2024    CO2 27 07/10/2024    BUN 9 07/10/2024    CREATININE 0.9 07/10/2024    CALCIUM 9.0 07/10/2024    MG 1.7 09/18/2023    ALT 21 07/10/2024    AST 18 07/10/2024    ALBUMIN 3.7 07/10/2024    BILITOT 0.6 07/10/2024    BILIDIR 0.2 07/10/2024       ASSESSMENT/PLAN:     Sedation Plan: up to moderate.  Patient will undergo pre Y90 mapping.    Dominick Thomas MD  Department of Radiology, PGY-3

## 2024-07-11 NOTE — PLAN OF CARE
Pt tolerated pre y procedure well, rg groin dressing c/d/I, 5fr vascade closure device place, hemostasis obtain @ 0951, bed rest up @ 1151, [facility] report given @ bedside.

## 2024-07-11 NOTE — PLAN OF CARE
Patient ambulated on unit this morning by herself.  Pt states that her mother will be here later to take her home.  Denies pain or SOB.  Verbalized an understanding of procedure.  Oriented to unit and call bell provided.  Will continue to monitor. UPT negative this am.

## 2024-07-11 NOTE — LETTER
"Melly Varghesee" Jaiden was seen and treated in our Interventional Radiology Department on 7/11/2024.      Restrictions following the procedure:     - Patient can not lift over 10 lbs for 10 days.     - Patient can not drive for 3 days.        If you have any questions or concerns, please don't hesitate to call.    Zohra Hagen PA-C  Interventional Radiology  [phone]    " [facility]  [address] [address]      Courtney Bright :1962 MRN:[id number]    2022 Time Session Began: 09:00  Time Session Ended: 10:05    Due to COVID-19 precautions, this visit was performed via live interactive two-way Video visit with patient's verbal consent.   Clinician Location:Home.  Patient Location: Parked car.  Verified patient identity:  [x] Yes    Session Type:Therapy 53+ minutes (58283)    Others Present: no    Intervention: Behavioral, Cognitive, Insight, Supportive    Suicide/Homicide/Violence Ideation: No    If Yes, explain: n/a    Current Outpatient Medications   Medication Sig   • amphetamine-dextroamphetamine (ADDERALL) 10 MG tablet Take 1 tablet by mouth daily. in am.   • amphetamine-dextroamphetamine (Adderall) 5 MG tablet Take 1 tablet by mouth daily. Take at noon.   • omeprazole (PriLOSEC) 40 MG capsule Take 1 capsule by mouth daily.   • meloxicam (MOBIC) 15 MG tablet Take 1 tablet by mouth daily as needed (joint pain). With food   • turmeric 500 MG capsule Take 500 mg by mouth daily.    • hydroCORTisone (ANUSOL-HC) 2.5 % rectal cream Place 1 application rectally 2 times daily.   • estradiol (ESTRACE VAGINAL) 0.1 MG/GM vaginal cream Place 1 Gram vaginally a day a week   • valACYclovir (VALTREX) 500 MG tablet TAKE 4 TABLETS BY MOUTH TWICE( 12 HOURS APART), THEN 1 TABLET TWICE DAILY AS DIRECTED   • Omega-3 Fatty Acids (Fish Oil) 1200 MG CAPSULE DELAYED RELEASE Take 1,290 mg by mouth daily.   • Thiamine Mononitrate (vitamin B-1) 100 MG tablet Take 100 mg by mouth daily.   • magnesium 250 MG tablet Take 250 mg by mouth nightly.   • vitamin B-12 (CYANOCOBALAMIN) 1000 MCG tablet Take 1,000 mcg by mouth daily.   • fluticasone (Flonase) 50 MCG/ACT nasal spray Spray 2 sprays in each nostril daily.   • fluticasone (FLONASE) 50 MCG/ACT nasal spray Spray 2 sprays in each nostril daily.   • mometasone (ELOCON) 0.1 %  [employer]alism, English speaking,  ([place]) support, validation, empathic responding, and reflection for what was shared.   R:  Pt appeared generally oriented and alert. Pt openly discussed areas of concern. Pt participated in session and appeared attentive and cooperative. Speech was within normal volume and rate. Mood appeared anxious, affect broad, congruent to content. Pt became tearful. Thought process appeared goal-directed and linear. Remote and recent memory appeared intact. Insight and judgment appeared good. Pt endorsed follow through. Pt receptive towards intervention and recommendation.    P:  Follow up psychotherapy session. Will provide psychoeducation and integrative approach to address Pt’s concerns.        Need for Community Resources Assessed: Yes     Resources Needed: No     If Yes, what resources: n/a     Diagnosis: Generalized anxiety disorder  (primary encounter diagnosis)  Chronic post-traumatic stress disorder (PTSD)  Moderate episode of recurrent major depressive disorder (CMS/HCC)     Treatment Plan: See Treatment Plan     Discharge Plan: Strategies Discussed to Maintain Gains    Next Appointment: 01/28/2022  Page Renteria PSYD

## 2024-07-12 ENCOUNTER — INFUSION (OUTPATIENT)
Dept: INFUSION THERAPY | Facility: HOSPITAL | Age: 44
End: 2024-07-12
Payer: COMMERCIAL

## 2024-07-12 ENCOUNTER — PATIENT MESSAGE (OUTPATIENT)
Dept: HEMATOLOGY/ONCOLOGY | Facility: CLINIC | Age: 44
End: 2024-07-12
Payer: COMMERCIAL

## 2024-07-12 VITALS
DIASTOLIC BLOOD PRESSURE: 80 MMHG | RESPIRATION RATE: 20 BRPM | TEMPERATURE: 98 F | SYSTOLIC BLOOD PRESSURE: 129 MMHG | HEART RATE: 74 BPM

## 2024-07-12 DIAGNOSIS — C22.0 HEPATOCELLULAR CARCINOMA: Primary | ICD-10-CM

## 2024-07-12 PROCEDURE — 25000003 PHARM REV CODE 250: Performed by: INTERNAL MEDICINE

## 2024-07-12 PROCEDURE — 63600175 PHARM REV CODE 636 W HCPCS: Performed by: INTERNAL MEDICINE

## 2024-07-12 RX ORDER — SODIUM CHLORIDE, SODIUM LACTATE, POTASSIUM CHLORIDE, CALCIUM CHLORIDE 600; 310; 30; 20 MG/100ML; MG/100ML; MG/100ML; MG/100ML
INJECTION, SOLUTION INTRAVENOUS
Status: COMPLETED | OUTPATIENT
Start: 2024-07-12 | End: 2024-07-12

## 2024-07-12 RX ORDER — PROCHLORPERAZINE EDISYLATE 5 MG/ML
5 INJECTION INTRAMUSCULAR; INTRAVENOUS ONCE AS NEEDED
Status: DISCONTINUED | OUTPATIENT
Start: 2024-07-12 | End: 2024-07-12 | Stop reason: HOSPADM

## 2024-07-12 RX ORDER — HEPARIN 100 UNIT/ML
500 SYRINGE INTRAVENOUS
Status: DISCONTINUED | OUTPATIENT
Start: 2024-07-12 | End: 2024-07-12 | Stop reason: HOSPADM

## 2024-07-12 RX ORDER — DIPHENHYDRAMINE HYDROCHLORIDE 50 MG/ML
50 INJECTION INTRAMUSCULAR; INTRAVENOUS ONCE AS NEEDED
Status: DISCONTINUED | OUTPATIENT
Start: 2024-07-12 | End: 2024-07-12 | Stop reason: HOSPADM

## 2024-07-12 RX ORDER — EPINEPHRINE 0.3 MG/.3ML
0.3 INJECTION SUBCUTANEOUS ONCE AS NEEDED
Status: DISCONTINUED | OUTPATIENT
Start: 2024-07-12 | End: 2024-07-12 | Stop reason: HOSPADM

## 2024-07-12 RX ORDER — SODIUM CHLORIDE 0.9 % (FLUSH) 0.9 %
10 SYRINGE (ML) INJECTION
Status: DISCONTINUED | OUTPATIENT
Start: 2024-07-12 | End: 2024-07-12 | Stop reason: HOSPADM

## 2024-07-12 RX ADMIN — SODIUM CHLORIDE 1500 MG: 9 INJECTION, SOLUTION INTRAVENOUS at 12:07

## 2024-07-12 RX ADMIN — SODIUM CHLORIDE, POTASSIUM CHLORIDE, SODIUM LACTATE AND CALCIUM CHLORIDE: 600; 310; 30; 20 INJECTION, SOLUTION INTRAVENOUS at 11:07

## 2024-07-12 RX ADMIN — SODIUM CHLORIDE: 9 INJECTION, SOLUTION INTRAVENOUS at 11:07

## 2024-07-12 NOTE — PLAN OF CARE
Problem: Chemotherapy Effects  Goal: Anemia Symptom Improvement  Outcome: Progressing  Goal: Safety Maintained  Outcome: Progressing  Goal: Absence of Hematuria  Outcome: Progressing  Goal: Nausea and Vomiting Relief  Outcome: Progressing  Goal: Neurotoxicity Symptom Control  Outcome: Progressing  Goal: Absence of Infection  Outcome: Progressing  Goal: Absence of Bleeding  Outcome: Progressing     Problem: Fatigue  Goal: Improved Activity Tolerance  Outcome: Progressing     Problem: Anemia  Goal: Anemia Symptom Improvement  Outcome: Progressing     Problem: Infection  Goal: Absence of Infection Signs and Symptoms  Outcome: Progressing   Pt completed Imfinizi/LR via PIV without adverse effects. VS WNL, discharged AAOX4 and ambulatory

## 2024-07-13 ENCOUNTER — PATIENT MESSAGE (OUTPATIENT)
Dept: HEMATOLOGY/ONCOLOGY | Facility: CLINIC | Age: 44
End: 2024-07-13
Payer: COMMERCIAL

## 2024-07-14 ENCOUNTER — HOSPITAL ENCOUNTER (OUTPATIENT)
Dept: RADIOLOGY | Facility: HOSPITAL | Age: 44
Discharge: HOME | End: 2024-07-14
Attending: INTERNAL MEDICINE
Payer: COMMERCIAL

## 2024-07-14 DIAGNOSIS — C22.0 HCC (HEPATOCELLULAR CARCINOMA): ICD-10-CM

## 2024-07-14 PROCEDURE — 71250 CT THORAX DX C-: CPT | Mod: TC

## 2024-07-14 PROCEDURE — 71250 CT THORAX DX C-: CPT | Mod: 26,,, | Performed by: RADIOLOGY

## 2024-07-22 DIAGNOSIS — C22.0 HCC (HEPATOCELLULAR CARCINOMA): Primary | ICD-10-CM

## 2024-07-26 ENCOUNTER — OFFICE VISIT (OUTPATIENT)
Dept: INTERNAL MEDICINE | Facility: CLINIC | Age: 44
End: 2024-07-26
Payer: COMMERCIAL

## 2024-07-26 VITALS
BODY MASS INDEX: 21.79 KG/M2 | HEART RATE: 83 BPM | SYSTOLIC BLOOD PRESSURE: 124 MMHG | OXYGEN SATURATION: 100 % | HEIGHT: 71 IN | WEIGHT: 155.63 LBS | DIASTOLIC BLOOD PRESSURE: 78 MMHG

## 2024-07-26 DIAGNOSIS — J06.9 VIRAL URI WITH COUGH: Primary | ICD-10-CM

## 2024-07-26 PROCEDURE — 99999 PR PBB SHADOW E&M-EST. PATIENT-LVL IV: CPT | Mod: PBBFAC,,, | Performed by: FAMILY MEDICINE

## 2024-07-26 NOTE — PROGRESS NOTES
Subjective:       Patient ID: Melly Hwang is a 44 y.o. female.    Chief Complaint: Cough    HPI    Melly Hwang is a 44 y.o. female for same day visit. PCP Dr. Sahu.    Upper respiratory symptoms x wks. Seen by another provider last month and given zpak. Cough seemed to come on last week, staying about the same. Using flonase and zyrtec. Has tried mucinex, other otc options without much change. No fever, n/v  Chronic sinus issues and seen by ENT w/ h/o surgery. No changes nasally currently     Review of Systems   Constitutional:  Negative for chills and fever.   HENT:  Positive for congestion. Negative for sore throat and trouble swallowing.    Respiratory:  Positive for cough. Negative for shortness of breath and wheezing.    Cardiovascular:  Negative for chest pain.   Gastrointestinal:  Negative for abdominal pain, nausea and vomiting.         Past Medical History:   Diagnosis Date    VENKATA positive 2020    COVID-19     Deviated septum     Hepatocellular carcinoma 2021    Hypertrophy of inferior nasal turbinate     Pericardial effusion     Premature menopause         Prior to Admission medications    Medication Sig Start Date End Date Taking? Authorizing Provider   ALPRAZolam (XANAX) 0.5 MG tablet Take 1 tablet (0.5 mg total) by mouth daily as needed for Anxiety. 3/22/24   Philippe Carrasco MD   cetirizine (ZYRTEC) 10 MG tablet Take 1 tablet (10 mg total) by mouth once daily. 24   Melly Sahu MD   cyanocobalamin (VITAMIN B-12) 1000 MCG tablet Take 1 tablet (1,000 mcg total) by mouth once daily. 23   Melly Sahu MD   cyclobenzaprine (FLEXERIL) 10 MG tablet SMARTSI Tablet(s) By Mouth Every Evening PRN 23   Provider, Historical   EScitalopram oxalate (LEXAPRO) 10 MG tablet Take 1 tablet (10 mg total) by mouth once daily. 5/10/24   Chrissy White, HUSEYIN   fluticasone propionate (FLONASE) 50 mcg/actuation nasal spray SPRAY 2 SPRAYS BY EACH NOSTRIL  "ROUTE ONCE DAILY. 5/15/24   Miriam Trejo, PATeodoroC   ketoconazole (NIZORAL) 2 % cream aaa on face and behind ears qd-bid prn flare 7/3/24   Roxana Maria MD   multivitamin capsule Take by mouth. 1 capsule Oral Every morning    Provider, Historical   naproxen (NAPROSYN) 500 MG tablet Take 1 tablet (500 mg total) by mouth 2 (two) times daily with meals. 3/15/24   Carin Bruno MD   norethindrone-ethinyl estradiol (MICROGESTIN 1/20) 1-20 mg-mcg per tablet Take 1 tablet by mouth once daily. 3/8/24   Asaf Mejia MD   omeprazole (PRILOSEC) 20 MG capsule Take 20 mg by mouth daily as needed. 6/18/24   Provider, Historical   ondansetron (ZOFRAN-ODT) 4 MG TbDL Take 1 tablet (4 mg total) by mouth every 12 (twelve) hours as needed (nausea). 10/25/23   Armando Mendiola MD   pantoprazole (PROTONIX) 40 MG tablet TAKE 1 TABLET BY MOUTH EVERY DAY 3/15/24   Aleah Joiner NP   tiZANidine (ZANAFLEX) 4 MG tablet Take 1 tablet (4 mg total) by mouth every 6 (six) hours as needed (pain/spasm). 8/15/23   Kelsey Castro MD        Past medical history, surgical history, and family medical history reviewed and updated as appropriate.    Medications and allergies reviewed.     Objective:          Vitals:    07/26/24 1304   BP: 124/78   BP Location: Left arm   Patient Position: Sitting   BP Method: Medium (Manual)   Pulse: 83   SpO2: 100%   Weight: 70.6 kg (155 lb 10.3 oz)   Height: 5' 11" (1.803 m)     Body mass index is 21.71 kg/m².  Physical Exam  Vitals and nursing note reviewed.   HENT:      Right Ear: Tympanic membrane, ear canal and external ear normal. There is no impacted cerumen.      Left Ear: Tympanic membrane, ear canal and external ear normal. There is no impacted cerumen.      Nose: Congestion present.      Mouth/Throat:      Pharynx: No oropharyngeal exudate or posterior oropharyngeal erythema.   Cardiovascular:      Rate and Rhythm: Normal rate.   Pulmonary:      Effort: Pulmonary " effort is normal. No respiratory distress.      Breath sounds: Normal breath sounds. No wheezing.   Neurological:      Mental Status: She is alert and oriented to person, place, and time.   Psychiatric:         Mood and Affect: Mood normal.         Behavior: Behavior normal.         Lab Results   Component Value Date    WBC 3.89 (L) 07/10/2024    HGB 12.5 07/10/2024    HCT 38.6 07/10/2024     07/10/2024    CHOL 195 09/18/2023    TRIG 140 09/18/2023    HDL 58 09/18/2023    ALT 21 07/10/2024    AST 18 07/10/2024     07/10/2024    K 3.9 07/10/2024     07/10/2024    CREATININE 0.9 07/10/2024    BUN 9 07/10/2024    CO2 27 07/10/2024    TSH 0.871 06/14/2024    INR 1.0 07/10/2024    HGBA1C 5.1 09/18/2023       Assessment:       1. Viral URI with cough          Plan:   1. Viral URI with cough      Otc options discussed. Lung exam normal today    Follow up should symptoms persist or worsen. If symptoms become severe, please report immediately to urgent care or emergency room for further evaluation. Patient voiced understanding.      No follow-ups on file.    Reynaldo Hadley MD  Family Medicine / Primary Care  [facility]  7/26/2024

## 2024-07-31 ENCOUNTER — PATIENT MESSAGE (OUTPATIENT)
Dept: INTERVENTIONAL RADIOLOGY/VASCULAR | Facility: CLINIC | Age: 44
End: 2024-07-31
Payer: COMMERCIAL

## 2024-07-31 ENCOUNTER — OFFICE VISIT (OUTPATIENT)
Dept: OPTOMETRY | Facility: CLINIC | Age: 44
End: 2024-07-31
Payer: COMMERCIAL

## 2024-07-31 ENCOUNTER — PATIENT MESSAGE (OUTPATIENT)
Dept: HEMATOLOGY/ONCOLOGY | Facility: CLINIC | Age: 44
End: 2024-07-31
Payer: COMMERCIAL

## 2024-07-31 DIAGNOSIS — H52.13 MYOPIA OF BOTH EYES: Primary | ICD-10-CM

## 2024-07-31 DIAGNOSIS — Z98.890 HX OF LASIK: ICD-10-CM

## 2024-07-31 PROCEDURE — 92014 COMPRE OPH EXAM EST PT 1/>: CPT | Mod: S$GLB,,, | Performed by: OPTOMETRIST

## 2024-07-31 PROCEDURE — 99999 PR PBB SHADOW E&M-EST. PATIENT-LVL III: CPT | Mod: PBBFAC,,, | Performed by: OPTOMETRIST

## 2024-07-31 NOTE — PROGRESS NOTES
HPI     Concerns About Ocular Health     Additional comments: 1 yr chk           Comments    ARNOLDO: 7/31/2023  Chief complaint (CC): Pt states she still experiences glare trouble when   driving at night- nothing new.  Glasses? +  Contacts? -  H/o eye surgery, injections or laser: Lasik OU  H/o eye injury: -  Known eye conditions? See chart note  Family h/o eye conditions? -Father with AMD  Eye gtts? -      (-) Flashes (-)  Floaters (-) Mucous   (-)  Tearing (-) Itching (-) Burning   (-) Headaches (-) Eye Pain/discomfort (-) Irritation   (-)  Redness (-) Double vision (-) Blurry vision    Diabetic? -  A1c? -              Last edited by Lance Castro on 7/31/2024  8:31 AM.            Assessment /Plan     For exam results, see Encounter Report.    Myopia of both eyes    Hx of LASIK      No SRx indicated at this time.. Normal ocular health. RTC 1 year for routine exam.

## 2024-08-01 ENCOUNTER — TELEPHONE (OUTPATIENT)
Dept: INTERVENTIONAL RADIOLOGY/VASCULAR | Facility: HOSPITAL | Age: 44
End: 2024-08-01
Payer: COMMERCIAL

## 2024-08-01 ENCOUNTER — TELEPHONE (OUTPATIENT)
Dept: INTERVENTIONAL RADIOLOGY/VASCULAR | Facility: CLINIC | Age: 44
End: 2024-08-01
Payer: COMMERCIAL

## 2024-08-01 DIAGNOSIS — T14.8XXA HEMATOMA: Primary | ICD-10-CM

## 2024-08-01 NOTE — NURSING
Pt called clinic,S/P Y-90 mapping on 7/11/24 reports hematoma to right groin golf ball size noticed 2 wks ago denies numbness tingling has some intermittent pain 3/10 with ambulation and tenderness when pressing and touching the right groin, her Y-90 delivery is on 8/15/24 she was asking will this hematoma interfere with procedure? Discussed with Dr. Lala advised  it would be good to bring patient to clinic and get a right groin US to rule out pseudoaneurysm. Patient informed of US order and if symptoms get worse to the nearest ER for evaluation, she verbalized understanding has IR clinic and after hours numbers.

## 2024-08-07 ENCOUNTER — HOSPITAL ENCOUNTER (OUTPATIENT)
Dept: RADIOLOGY | Facility: HOSPITAL | Age: 44
Discharge: HOME | End: 2024-08-07
Payer: COMMERCIAL

## 2024-08-07 DIAGNOSIS — T14.8XXA HEMATOMA: ICD-10-CM

## 2024-08-07 PROCEDURE — 76882 US LMTD JT/FCL EVL NVASC XTR: CPT | Mod: TC,RT

## 2024-08-07 PROCEDURE — 76882 US LMTD JT/FCL EVL NVASC XTR: CPT | Mod: 26,RT,, | Performed by: STUDENT IN AN ORGANIZED HEALTH CARE EDUCATION/TRAINING PROGRAM

## 2024-08-08 ENCOUNTER — TELEPHONE (OUTPATIENT)
Dept: INTERVENTIONAL RADIOLOGY/VASCULAR | Facility: HOSPITAL | Age: 44
End: 2024-08-08
Payer: COMMERCIAL

## 2024-08-08 ENCOUNTER — PATIENT MESSAGE (OUTPATIENT)
Dept: INTERNAL MEDICINE | Facility: CLINIC | Age: 44
End: 2024-08-08
Payer: COMMERCIAL

## 2024-08-09 ENCOUNTER — LAB VISIT (OUTPATIENT)
Dept: LAB | Facility: HOSPITAL | Age: 44
End: 2024-08-09
Attending: INTERNAL MEDICINE
Payer: COMMERCIAL

## 2024-08-09 ENCOUNTER — INFUSION (OUTPATIENT)
Dept: INFUSION THERAPY | Facility: HOSPITAL | Age: 44
End: 2024-08-09
Payer: COMMERCIAL

## 2024-08-09 VITALS
DIASTOLIC BLOOD PRESSURE: 83 MMHG | HEIGHT: 71 IN | OXYGEN SATURATION: 99 % | SYSTOLIC BLOOD PRESSURE: 134 MMHG | BODY MASS INDEX: 21.73 KG/M2 | HEART RATE: 68 BPM | RESPIRATION RATE: 18 BRPM | WEIGHT: 155.19 LBS

## 2024-08-09 DIAGNOSIS — C22.0 HCC (HEPATOCELLULAR CARCINOMA): ICD-10-CM

## 2024-08-09 DIAGNOSIS — C22.0 HEPATOCELLULAR CARCINOMA: Primary | ICD-10-CM

## 2024-08-09 LAB
AFP SERPL-MCNC: 9.1 NG/ML (ref 0–8.4)
ALBUMIN SERPL BCP-MCNC: 3.4 G/DL (ref 3.5–5.2)
ALP SERPL-CCNC: 58 U/L (ref 55–135)
ALT SERPL W/O P-5'-P-CCNC: 15 U/L (ref 10–44)
ANION GAP SERPL CALC-SCNC: 8 MMOL/L (ref 8–16)
AST SERPL-CCNC: 13 U/L (ref 10–40)
BILIRUB SERPL-MCNC: 0.7 MG/DL (ref 0.1–1)
BUN SERPL-MCNC: 11 MG/DL (ref 6–20)
CALCIUM SERPL-MCNC: 9.3 MG/DL (ref 8.7–10.5)
CANCER AG19-9 SERPL-ACNC: 91.5 U/ML (ref 0–40)
CHLORIDE SERPL-SCNC: 105 MMOL/L (ref 95–110)
CO2 SERPL-SCNC: 23 MMOL/L (ref 23–29)
CREAT SERPL-MCNC: 0.9 MG/DL (ref 0.5–1.4)
ERYTHROCYTE [DISTWIDTH] IN BLOOD BY AUTOMATED COUNT: 12.3 % (ref 11.5–14.5)
EST. GFR  (NO RACE VARIABLE): >60 ML/MIN/1.73 M^2
GLUCOSE SERPL-MCNC: 105 MG/DL (ref 70–110)
HCT VFR BLD AUTO: 41.1 % (ref 37–48.5)
HGB BLD-MCNC: 13.2 G/DL (ref 12–16)
IMM GRANULOCYTES # BLD AUTO: 0.02 K/UL (ref 0–0.04)
MCH RBC QN AUTO: 31.7 PG (ref 27–31)
MCHC RBC AUTO-ENTMCNC: 32.1 G/DL (ref 32–36)
MCV RBC AUTO: 99 FL (ref 82–98)
NEUTROPHILS # BLD AUTO: 3.7 K/UL (ref 1.8–7.7)
PLATELET # BLD AUTO: 267 K/UL (ref 150–450)
PMV BLD AUTO: 9.8 FL (ref 9.2–12.9)
POTASSIUM SERPL-SCNC: 4.6 MMOL/L (ref 3.5–5.1)
PROT SERPL-MCNC: 7 G/DL (ref 6–8.4)
RBC # BLD AUTO: 4.17 M/UL (ref 4–5.4)
SODIUM SERPL-SCNC: 136 MMOL/L (ref 136–145)
WBC # BLD AUTO: 5.18 K/UL (ref 3.9–12.7)

## 2024-08-09 PROCEDURE — 80053 COMPREHEN METABOLIC PANEL: CPT | Performed by: INTERNAL MEDICINE

## 2024-08-09 PROCEDURE — 96417 CHEMO IV INFUS EACH ADDL SEQ: CPT

## 2024-08-09 PROCEDURE — 36415 COLL VENOUS BLD VENIPUNCTURE: CPT | Performed by: INTERNAL MEDICINE

## 2024-08-09 PROCEDURE — 85027 COMPLETE CBC AUTOMATED: CPT | Performed by: INTERNAL MEDICINE

## 2024-08-09 PROCEDURE — 82105 ALPHA-FETOPROTEIN SERUM: CPT | Performed by: INTERNAL MEDICINE

## 2024-08-09 PROCEDURE — 86301 IMMUNOASSAY TUMOR CA 19-9: CPT | Performed by: INTERNAL MEDICINE

## 2024-08-09 PROCEDURE — 63600175 PHARM REV CODE 636 W HCPCS: Mod: JZ,JG | Performed by: PHYSICIAN ASSISTANT

## 2024-08-09 PROCEDURE — 25000003 PHARM REV CODE 250: Performed by: PHYSICIAN ASSISTANT

## 2024-08-09 RX ORDER — DIPHENHYDRAMINE HYDROCHLORIDE 50 MG/ML
50 INJECTION INTRAMUSCULAR; INTRAVENOUS ONCE AS NEEDED
Status: DISCONTINUED | OUTPATIENT
Start: 2024-08-09 | End: 2024-08-09 | Stop reason: HOSPADM

## 2024-08-09 RX ORDER — SODIUM CHLORIDE, SODIUM LACTATE, POTASSIUM CHLORIDE, CALCIUM CHLORIDE 600; 310; 30; 20 MG/100ML; MG/100ML; MG/100ML; MG/100ML
INJECTION, SOLUTION INTRAVENOUS CONTINUOUS
Status: CANCELLED
Start: 2024-08-09

## 2024-08-09 RX ORDER — SODIUM CHLORIDE 0.9 % (FLUSH) 0.9 %
10 SYRINGE (ML) INJECTION
Status: CANCELLED | OUTPATIENT
Start: 2024-08-09

## 2024-08-09 RX ORDER — SODIUM CHLORIDE, SODIUM LACTATE, POTASSIUM CHLORIDE, CALCIUM CHLORIDE 600; 310; 30; 20 MG/100ML; MG/100ML; MG/100ML; MG/100ML
INJECTION, SOLUTION INTRAVENOUS CONTINUOUS
Status: DISCONTINUED | OUTPATIENT
Start: 2024-08-09 | End: 2024-08-09 | Stop reason: HOSPADM

## 2024-08-09 RX ORDER — DIPHENHYDRAMINE HYDROCHLORIDE 50 MG/ML
50 INJECTION INTRAMUSCULAR; INTRAVENOUS ONCE AS NEEDED
Status: CANCELLED | OUTPATIENT
Start: 2024-08-09

## 2024-08-09 RX ORDER — EPINEPHRINE 0.3 MG/.3ML
0.3 INJECTION SUBCUTANEOUS ONCE AS NEEDED
Status: DISCONTINUED | OUTPATIENT
Start: 2024-08-09 | End: 2024-08-09 | Stop reason: HOSPADM

## 2024-08-09 RX ORDER — HEPARIN 100 UNIT/ML
500 SYRINGE INTRAVENOUS
Status: DISCONTINUED | OUTPATIENT
Start: 2024-08-09 | End: 2024-08-09 | Stop reason: HOSPADM

## 2024-08-09 RX ORDER — EPINEPHRINE 0.3 MG/.3ML
0.3 INJECTION SUBCUTANEOUS ONCE AS NEEDED
Status: CANCELLED | OUTPATIENT
Start: 2024-08-09

## 2024-08-09 RX ORDER — PROCHLORPERAZINE EDISYLATE 5 MG/ML
5 INJECTION INTRAMUSCULAR; INTRAVENOUS ONCE AS NEEDED
Status: CANCELLED
Start: 2024-08-09

## 2024-08-09 RX ORDER — HEPARIN 100 UNIT/ML
500 SYRINGE INTRAVENOUS
Status: CANCELLED | OUTPATIENT
Start: 2024-08-09

## 2024-08-09 RX ORDER — PROCHLORPERAZINE EDISYLATE 5 MG/ML
5 INJECTION INTRAMUSCULAR; INTRAVENOUS ONCE AS NEEDED
Status: DISCONTINUED | OUTPATIENT
Start: 2024-08-09 | End: 2024-08-09 | Stop reason: HOSPADM

## 2024-08-09 RX ORDER — CYCLOBENZAPRINE HCL 10 MG
10 TABLET ORAL NIGHTLY PRN
Qty: 30 TABLET | Refills: 1 | Status: SHIPPED | OUTPATIENT
Start: 2024-08-09

## 2024-08-09 RX ORDER — SODIUM CHLORIDE 0.9 % (FLUSH) 0.9 %
10 SYRINGE (ML) INJECTION
Status: DISCONTINUED | OUTPATIENT
Start: 2024-08-09 | End: 2024-08-09 | Stop reason: HOSPADM

## 2024-08-09 RX ADMIN — TREMELIMUMAB 300 MG: 300 INJECTION, SOLUTION INTRAVENOUS at 10:08

## 2024-08-09 RX ADMIN — SODIUM CHLORIDE: 9 INJECTION, SOLUTION INTRAVENOUS at 08:08

## 2024-08-09 RX ADMIN — SODIUM CHLORIDE 1500 MG: 9 INJECTION, SOLUTION INTRAVENOUS at 12:08

## 2024-08-09 RX ADMIN — SODIUM CHLORIDE, POTASSIUM CHLORIDE, SODIUM LACTATE AND CALCIUM CHLORIDE: 600; 310; 30; 20 INJECTION, SOLUTION INTRAVENOUS at 08:08

## 2024-08-12 ENCOUNTER — PATIENT MESSAGE (OUTPATIENT)
Dept: INTERVENTIONAL RADIOLOGY/VASCULAR | Facility: HOSPITAL | Age: 44
End: 2024-08-12
Payer: COMMERCIAL

## 2024-08-13 ENCOUNTER — TELEPHONE (OUTPATIENT)
Dept: INTERVENTIONAL RADIOLOGY/VASCULAR | Facility: HOSPITAL | Age: 44
End: 2024-08-13
Payer: COMMERCIAL

## 2024-08-13 NOTE — NURSING
Pre-procedure call complete.  2 patient identifier used (name and ).  Pt instructed not to eat or drink anything after midnight the night before procedure.  Pt aware will need someone to provide transport home and monitor pt 8 hours post procedure.  No driving for at least 24 hours after procedure.   Patient reports she does not take blood pressure, heart medications, seizure and anti-rejection medications.   Do not take sleep medication (including OTC) and anxiety medication the night before procedure.  Arrival time and location given.  Expected length of stay reviewed.  Covid screening completed.  Pt verbalized understanding of all pre-procedure instructions.  Written instructions and directions sent to patient in [email or web address].

## 2024-08-14 ENCOUNTER — LAB VISIT (OUTPATIENT)
Dept: LAB | Facility: HOSPITAL | Age: 44
End: 2024-08-14
Payer: COMMERCIAL

## 2024-08-14 DIAGNOSIS — C22.0 HCC (HEPATOCELLULAR CARCINOMA): ICD-10-CM

## 2024-08-14 LAB
ALBUMIN SERPL BCP-MCNC: 3.6 G/DL (ref 3.5–5.2)
ALP SERPL-CCNC: 63 U/L (ref 55–135)
ALT SERPL W/O P-5'-P-CCNC: 16 U/L (ref 10–44)
ANION GAP SERPL CALC-SCNC: 9 MMOL/L (ref 8–16)
AST SERPL-CCNC: 18 U/L (ref 10–40)
BASOPHILS # BLD AUTO: 0.02 K/UL (ref 0–0.2)
BASOPHILS NFR BLD: 0.5 % (ref 0–1.9)
BILIRUB DIRECT SERPL-MCNC: 0.2 MG/DL (ref 0.1–0.3)
BILIRUB SERPL-MCNC: 0.5 MG/DL (ref 0.1–1)
BUN SERPL-MCNC: 10 MG/DL (ref 6–20)
CALCIUM SERPL-MCNC: 8.8 MG/DL (ref 8.7–10.5)
CHLORIDE SERPL-SCNC: 106 MMOL/L (ref 95–110)
CO2 SERPL-SCNC: 22 MMOL/L (ref 23–29)
CREAT SERPL-MCNC: 0.9 MG/DL (ref 0.5–1.4)
DIFFERENTIAL METHOD BLD: ABNORMAL
EOSINOPHIL # BLD AUTO: 0.1 K/UL (ref 0–0.5)
EOSINOPHIL NFR BLD: 2.1 % (ref 0–8)
ERYTHROCYTE [DISTWIDTH] IN BLOOD BY AUTOMATED COUNT: 12 % (ref 11.5–14.5)
EST. GFR  (NO RACE VARIABLE): >60 ML/MIN/1.73 M^2
GLUCOSE SERPL-MCNC: 96 MG/DL (ref 70–110)
HCT VFR BLD AUTO: 39.8 % (ref 37–48.5)
HGB BLD-MCNC: 12.9 G/DL (ref 12–16)
IMM GRANULOCYTES # BLD AUTO: 0.01 K/UL (ref 0–0.04)
IMM GRANULOCYTES NFR BLD AUTO: 0.3 % (ref 0–0.5)
INR PPP: 1 (ref 0.8–1.2)
LYMPHOCYTES # BLD AUTO: 0.7 K/UL (ref 1–4.8)
LYMPHOCYTES NFR BLD: 18.7 % (ref 18–48)
MCH RBC QN AUTO: 32 PG (ref 27–31)
MCHC RBC AUTO-ENTMCNC: 32.4 G/DL (ref 32–36)
MCV RBC AUTO: 99 FL (ref 82–98)
MONOCYTES # BLD AUTO: 0.5 K/UL (ref 0.3–1)
MONOCYTES NFR BLD: 12.4 % (ref 4–15)
NEUTROPHILS # BLD AUTO: 2.5 K/UL (ref 1.8–7.7)
NEUTROPHILS NFR BLD: 66 % (ref 38–73)
NRBC BLD-RTO: 0 /100 WBC
PLATELET # BLD AUTO: 238 K/UL (ref 150–450)
PMV BLD AUTO: 9.4 FL (ref 9.2–12.9)
POTASSIUM SERPL-SCNC: 4.2 MMOL/L (ref 3.5–5.1)
PROT SERPL-MCNC: 7 G/DL (ref 6–8.4)
PROTHROMBIN TIME: 11 SEC (ref 9–12.5)
RBC # BLD AUTO: 4.03 M/UL (ref 4–5.4)
SODIUM SERPL-SCNC: 137 MMOL/L (ref 136–145)
WBC # BLD AUTO: 3.8 K/UL (ref 3.9–12.7)

## 2024-08-14 PROCEDURE — 80053 COMPREHEN METABOLIC PANEL: CPT

## 2024-08-14 PROCEDURE — 36415 COLL VENOUS BLD VENIPUNCTURE: CPT

## 2024-08-14 PROCEDURE — 82248 BILIRUBIN DIRECT: CPT

## 2024-08-14 PROCEDURE — 85610 PROTHROMBIN TIME: CPT

## 2024-08-14 PROCEDURE — 85025 COMPLETE CBC W/AUTO DIFF WBC: CPT

## 2024-08-15 ENCOUNTER — HOSPITAL ENCOUNTER (OUTPATIENT)
Dept: RADIOLOGY | Facility: HOSPITAL | Age: 44
Discharge: HOME | End: 2024-08-15
Payer: COMMERCIAL

## 2024-08-15 ENCOUNTER — HOSPITAL ENCOUNTER (OUTPATIENT)
Dept: INTERVENTIONAL RADIOLOGY/VASCULAR | Facility: HOSPITAL | Age: 44
Discharge: HOME | End: 2024-08-15
Payer: COMMERCIAL

## 2024-08-15 VITALS
TEMPERATURE: 98 F | DIASTOLIC BLOOD PRESSURE: 72 MMHG | RESPIRATION RATE: 21 BRPM | SYSTOLIC BLOOD PRESSURE: 122 MMHG | HEART RATE: 78 BPM | WEIGHT: 155 LBS | HEIGHT: 71 IN | BODY MASS INDEX: 21.7 KG/M2 | OXYGEN SATURATION: 98 %

## 2024-08-15 DIAGNOSIS — C22.0 HCC (HEPATOCELLULAR CARCINOMA): Primary | ICD-10-CM

## 2024-08-15 DIAGNOSIS — C22.0 HCC (HEPATOCELLULAR CARCINOMA): ICD-10-CM

## 2024-08-15 PROCEDURE — C1757 CATH, THROMBECTOMY/EMBOLECT: HCPCS

## 2024-08-15 PROCEDURE — 78201 LIVER IMAGING STATIC ONLY: CPT | Mod: TC

## 2024-08-15 PROCEDURE — C2616 BRACHYTX, NON-STR,YTTRIUM-90: HCPCS

## 2024-08-15 PROCEDURE — 25500020 PHARM REV CODE 255: Performed by: RADIOLOGY

## 2024-08-15 PROCEDURE — C1769 GUIDE WIRE: HCPCS

## 2024-08-15 PROCEDURE — C1894 INTRO/SHEATH, NON-LASER: HCPCS

## 2024-08-15 PROCEDURE — 63600175 PHARM REV CODE 636 W HCPCS: Performed by: RADIOLOGY

## 2024-08-15 PROCEDURE — 78830 RP LOCLZJ TUM SPECT W/CT 1: CPT | Mod: 26,,, | Performed by: NUCLEAR MEDICINE

## 2024-08-15 PROCEDURE — 25000003 PHARM REV CODE 250: Performed by: RADIOLOGY

## 2024-08-15 PROCEDURE — 78201 LIVER IMAGING STATIC ONLY: CPT | Mod: 26,,, | Performed by: NUCLEAR MEDICINE

## 2024-08-15 PROCEDURE — 78830 RP LOCLZJ TUM SPECT W/CT 1: CPT | Mod: TC

## 2024-08-15 RX ORDER — DEXAMETHASONE SODIUM PHOSPHATE 4 MG/ML
INJECTION, SOLUTION INTRA-ARTICULAR; INTRALESIONAL; INTRAMUSCULAR; INTRAVENOUS; SOFT TISSUE
Status: COMPLETED | OUTPATIENT
Start: 2024-08-15 | End: 2024-08-15

## 2024-08-15 RX ORDER — LIDOCAINE HYDROCHLORIDE 10 MG/ML
INJECTION, SOLUTION INFILTRATION; PERINEURAL
Status: COMPLETED | OUTPATIENT
Start: 2024-08-15 | End: 2024-08-15

## 2024-08-15 RX ORDER — SODIUM CHLORIDE 9 MG/ML
INJECTION, SOLUTION INTRAVENOUS CONTINUOUS
Status: DISCONTINUED | OUTPATIENT
Start: 2024-08-15 | End: 2024-08-16 | Stop reason: HOSPADM

## 2024-08-15 RX ORDER — FENTANYL CITRATE 50 UG/ML
INJECTION, SOLUTION INTRAMUSCULAR; INTRAVENOUS
Status: COMPLETED | OUTPATIENT
Start: 2024-08-15 | End: 2024-08-15

## 2024-08-15 RX ORDER — MIDAZOLAM HYDROCHLORIDE 1 MG/ML
INJECTION, SOLUTION INTRAMUSCULAR; INTRAVENOUS
Status: COMPLETED | OUTPATIENT
Start: 2024-08-15 | End: 2024-08-15

## 2024-08-15 RX ORDER — LIDOCAINE HYDROCHLORIDE 10 MG/ML
1 INJECTION, SOLUTION EPIDURAL; INFILTRATION; INTRACAUDAL; PERINEURAL ONCE AS NEEDED
Status: DISCONTINUED | OUTPATIENT
Start: 2024-08-15 | End: 2024-08-16 | Stop reason: HOSPADM

## 2024-08-15 RX ADMIN — FENTANYL CITRATE 25 MCG: 50 INJECTION, SOLUTION INTRAMUSCULAR; INTRAVENOUS at 11:08

## 2024-08-15 RX ADMIN — FENTANYL CITRATE 50 MCG: 50 INJECTION, SOLUTION INTRAMUSCULAR; INTRAVENOUS at 10:08

## 2024-08-15 RX ADMIN — IOHEXOL 80 ML: 300 INJECTION, SOLUTION INTRAVENOUS at 11:08

## 2024-08-15 RX ADMIN — MIDAZOLAM HYDROCHLORIDE 0.5 MG: 2 INJECTION, SOLUTION INTRAMUSCULAR; INTRAVENOUS at 11:08

## 2024-08-15 RX ADMIN — LIDOCAINE HYDROCHLORIDE 4 ML: 10 INJECTION, SOLUTION INFILTRATION; PERINEURAL at 10:08

## 2024-08-15 RX ADMIN — MIDAZOLAM HYDROCHLORIDE 0.5 MG: 2 INJECTION, SOLUTION INTRAMUSCULAR; INTRAVENOUS at 10:08

## 2024-08-15 RX ADMIN — FENTANYL CITRATE 25 MCG: 50 INJECTION, SOLUTION INTRAMUSCULAR; INTRAVENOUS at 10:08

## 2024-08-15 RX ADMIN — DEXAMETHASONE SODIUM PHOSPHATE 20 MG: 4 INJECTION, SOLUTION INTRAMUSCULAR; INTRAVENOUS at 11:08

## 2024-08-15 RX ADMIN — MIDAZOLAM HYDROCHLORIDE 1 MG: 2 INJECTION, SOLUTION INTRAMUSCULAR; INTRAVENOUS at 10:08

## 2024-08-15 NOTE — PLAN OF CARE
Pt arrived to IR for y-90. Pt oriented to unit and staff. Plan of care reviewed with patient, patient verbalizes understanding. Comfort measures utilized. Pt safely transferred from stretcher to procedural table. Fall risk reviewed with patient, fall risk interventions maintained. Safety strap applied, positioner pillows utilized to minimize pressure points. Blankets applied. Pt prepped and draped utilizing standard sterile technique. Patient placed on continuous monitoring, as required by sedation policy. Timeouts completed utilizing standard universal time-out, per department and facility policy. RN to remain at bedside, continuous monitoring maintained. Pt resting comfortably. Denies pain/discomfort. Will continue to monitor. See flow sheets for monitoring, medication administration, and updates.

## 2024-08-15 NOTE — DISCHARGE INSTRUCTIONS
"For questions or concerns that are non-emergent, you may call our Radiology Clinic at [phone].    **After hours and weekends call [phone] and ask for "Radiology Resident on Call."          Post-Yttrium (Y90) instructions:    Dont drive for 3 days.  Avoid walking, bending, and taking stairs for 24 hours.  No heavy lifting (gallon of milk) or strenuous exercise for 10 days.  Keep small children, pregnant women, and pets 3 feet away for 3 days.  For international flights, the radiation may set off the security scans.   Keep your dressing clean and dry for 24 hours. Do not submerge insertion site in water (bath tub, swimming pool) until fully healed.  Be sure to follow any other instructions from your doctor.      Call your doctor if you have any of the following:    Fever of 100.4 (38C) or higher lasting for 24 to 48 hours  Bleeding, swelling, or a large lump at the insertion site  Sharp or increasing pain at the insertion site  Leg pain, numbness, or a cold leg or foot  Any other symptoms your provider instructed you to report based on your medical condition.    "

## 2024-08-15 NOTE — Clinical Note
Bilateral: Groin.   Scrubbed with ChloroPrep With Tint.    Hair: N/A.  Skin prep dry before draping.  Prepped by: Milady Barker RT 8/15/2024 10:13 AM.

## 2024-08-15 NOTE — LETTER
August 15, 2024                 [facility] 2nd [facility]  [address]  [facility] [address]  Phone: [phone]  Fax: [phone] August 15, 2024     Patient: Melly Hwang    YOB: 1980   Date of Visit: 8/15/2024       To Whom It May Concern:    It is my medical opinion that Melly Hwang  should not lift anything greater than 10 pounds for the next 10 days.    If you have any questions or concerns, please don't hesitate to call.    Sincerely,        Du Alford MD

## 2024-08-15 NOTE — H&P
Radiology History & Physical      SUBJECTIVE:     Chief Complaint: liver tumors    History of Present Illness:  Melly Hwang is a 44 y.o. female with PMHx including HCC who presents for Y90 treatment. She underwent pre-Y90 mapping on 24, with liver lung shunt fraction of 2.73 % without any extrahepatic activity.     Past Medical History:   Diagnosis Date    VENKATA positive 2020    COVID-19     Deviated septum 2011    Hepatocellular carcinoma 2021    Hypertrophy of inferior nasal turbinate     Pericardial effusion     Premature menopause      Past Surgical History:   Procedure Laterality Date    COLONOSCOPY N/A 2020    Procedure: COLONOSCOPY;  Surgeon: GIOVANNI Crane MD;  Location: [facility] ENDO (4TH FLR);  Service: Endoscopy;  Laterality: N/A;  + covid     EPIDURAL STEROID INJECTION INTO CERVICAL SPINE N/A 2024    Procedure: FLORENTINO C7/T1;  Surgeon: Cezar Bailey MD;  Location: [facility] PAIN MANAGEMENT;  Service: Pain Management;  Laterality: N/A;  20mins-No ac    ESOPHAGOGASTRODUODENOSCOPY N/A 1/10/2024    Procedure: ESOPHAGOGASTRODUODENOSCOPY (EGD);  Surgeon: Reynaldo Lynch MD;  Location: [facility] (2ND FLR);  Service: Endoscopy;  Laterality: N/A;  referred by daryl carrasco rocedure: EGD Diagnosis: Abnormal finding on GI tract imaging, Abdominal pain, and GERD  Procedure Timin-4 weeks Provider: Any GI provider Location: [facility], [facility], and [facility]  Additional Scheduling Informat    HERNIA REPAIR      LIVER SURGERY N/A 2021    NASAL SEPTUM SURGERY      PERICARDIAL WINDOW N/A 2021    Procedure: CREATION, PERICARDIAL WINDOW;  Surgeon: Ajay Cantor MD;  Location: [facility] OR 2ND FLR;  Service: Cardiovascular;  Laterality: N/A;    PERICARDIOCENTESIS N/A 2020    Procedure: Pericardiocentesis;  Surgeon: Dickson Dangelo MD;  Location: [facility] CATH LAB;  Service: Cardiology;  Laterality: N/A;    TONSILLECTOMY         Home Meds:   Prior to  Admission medications    Medication Sig Start Date End Date Taking? Authorizing Provider   cyanocobalamin (VITAMIN B-12) 1000 MCG tablet Take 1 tablet (1,000 mcg total) by mouth once daily. 9/21/23  Yes Melly Sahu MD   cyclobenzaprine (FLEXERIL) 10 MG tablet Take 1 tablet (10 mg total) by mouth nightly as needed for Muscle spasms. 8/9/24  Yes Melly Sahu MD   ketoconazole (NIZORAL) 2 % cream aaa on face and behind ears qd-bid prn flare 7/3/24  Yes Roxana Maria MD   multivitamin capsule Take by mouth. 1 capsule Oral Every morning   Yes Provider, Historical   naproxen (NAPROSYN) 500 MG tablet Take 1 tablet (500 mg total) by mouth 2 (two) times daily with meals. 3/15/24  Yes Carni Bruno MD   norethindrone-ethinyl estradiol (MICROGESTIN 1/20) 1-20 mg-mcg per tablet Take 1 tablet by mouth once daily. 3/8/24  Yes Asaf Mejia MD   omeprazole (PRILOSEC) 20 MG capsule Take 20 mg by mouth daily as needed. 6/18/24  Yes Provider, Historical   ALPRAZolam (XANAX) 0.5 MG tablet Take 1 tablet (0.5 mg total) by mouth daily as needed for Anxiety. 3/22/24   Philippe Carrasco MD   cetirizine (ZYRTEC) 10 MG tablet Take 1 tablet (10 mg total) by mouth once daily. 6/19/24   Melly Sahu MD   EScitalopram oxalate (LEXAPRO) 10 MG tablet Take 1 tablet (10 mg total) by mouth once daily. 5/10/24   Chrissy White NP   fluticasone propionate (FLONASE) 50 mcg/actuation nasal spray SPRAY 2 SPRAYS BY EACH NOSTRIL ROUTE ONCE DAILY. 5/15/24   Miriam Trejo, PARAMONA   ondansetron (ZOFRAN-ODT) 4 MG TbDL Take 1 tablet (4 mg total) by mouth every 12 (twelve) hours as needed (nausea). 10/25/23   Armando Mendiola MD   pantoprazole (PROTONIX) 40 MG tablet TAKE 1 TABLET BY MOUTH EVERY DAY 3/15/24   Aleah Joiner, NP     Anticoagulants/Antiplatelets: no anticoagulation    Allergies:   Review of patient's allergies indicates:   Allergen Reactions    No known drug allergies      Sedation History:  no  adverse reactions    Review of Systems:   Hematological: no known coagulopathies  Respiratory: no shortness of breath  Cardiovascular: no chest pain  Gastrointestinal: no abdominal pain  Genito-Urinary: no dysuria  Musculoskeletal: negative  Neurological: no TIA or stroke symptoms         OBJECTIVE:     Vital Signs (Most Recent)  Temp: 98.1 °F (36.7 °C) (08/15/24 0856)  Pulse: (!) 58 (08/15/24 0856)  Resp: 20 (08/15/24 0856)  BP: 125/69 (08/15/24 0856)  SpO2: 100 % (08/15/24 0856)    Physical Exam:  ASA: class 3  Mallampati: class 2    General: no acute distress  Mental Status: alert and oriented to person, place and time  HEENT: normocephalic, atraumatic  Chest: unlabored breathing  Heart: regular heart rate  Abdomen: nondistended  Extremity: moves all extremities    Laboratory  Lab Results   Component Value Date    INR 1.0 08/14/2024       Lab Results   Component Value Date    WBC 3.80 (L) 08/14/2024    HGB 12.9 08/14/2024    HCT 39.8 08/14/2024    MCV 99 (H) 08/14/2024     08/14/2024      Lab Results   Component Value Date    GLU 96 08/14/2024     08/14/2024    K 4.2 08/14/2024     08/14/2024    CO2 22 (L) 08/14/2024    BUN 10 08/14/2024    CREATININE 0.9 08/14/2024    CALCIUM 8.8 08/14/2024    MG 1.7 09/18/2023    ALT 16 08/14/2024    AST 18 08/14/2024    ALBUMIN 3.6 08/14/2024    BILITOT 0.5 08/14/2024    BILIDIR 0.2 08/14/2024       ASSESSMENT/PLAN:     Sedation Plan: up to moderate    After thorough discussion with the patient regarding the risks and benefits of the procedure, she elected to proceed, and formal consent was obtained. Patient will undergo Y90 treatment for her hepatocellular carcinoma.    Cade Salvador MD PGY-2  Department of Radiology  [facility]

## 2024-08-15 NOTE — PLAN OF CARE
Physical Therapy Progress Note      Visit Type: Daily Treatment Note- Progress Note  Visit: 10  Referring Provider: MICHAEL Keyes  Medical Diagnosis (from order): I69.354 - Hemiparesis affecting left side as late effect of cerebrovascular accident (CMD)   Patient alert and oriented X3.    SUBJECTIVE                                                                                                               Agree to observer: ex wife.    Not doing any exercises at home, states he doesn't have any written exercises. Hasn't gotten the brace yet, will potentially get it next Wednesday      OBJECTIVE                                                                                                                     Strength  (out of 5 unless noted, standard test position unless noted)   Hip:    - Flexion:        • Left: 3+    - Abduction:        • Left: 2+  Knee:    - Flexion:        • Left: 2+    - Extension:        • Left: 2+, 3-  Ankle:    - Dorsiflexion:        • Left: 0    - Plantar Flexion:        • Left: 0    - Inversion:        • Left: 0     - Eversion:        • Left: 0             Bed Mobility  Supervision and cues for safety  Transfers  Supervision due to impulsivity and left negect    Ambulation / Gait  - Assistive device: 2-wheeled walker  Steppage gait left, left neglect        Outcome/Assessments  Outcome Measures:   Lower Extremity Functional Scale: LEFS Calculated Total: 20 (0=extreme difficulty; 80=no difficulty) see flowsheet for additional documentation        Treatment     Gait belt applied and used throughout session.  Therapeutic Exercise  Testing as above  Training and instruction with cues for form and technique with:   *supine heel slides  *hip flexion on and off of mat table left  *ASLR, cues to prevent extensor lag  *bridging  *side lying hip abduction  *side lying hip flexion/extension      Therapeutic Activity                Skilled input: verbal instruction/cues and tactile  Y90 procedure completed. Patient tolerated well. Patient AAOx3, no distress noted, respirations even and unlabored, will continue to monitor. VSS. 5Fr vascade closure device deployed in right femoral artery; hemostasis achieved at 1130. Patient to lay flat for 2 hours until 1330 per MD. Right groin site clean, dry, and intact; no bleeding or hematoma noted. Patient to be transferred to nuclear medicine for post-procedural scan then to MPU for recovery per MD. Report to be given at bedside to RN.      instruction/cues    Writer verbally educated and received verbal consent for hand placement, positioning of patient, and techniques to be performed today from patient for therapist position for techniques and hand placement and palpation for techniques as described above and how they are pertinent to the patient's plan of care.  Home Exercise Program  Access Code: [email or web address]  URL: [email or web address]  Date: 02/13/2024  Prepared by: Krista Teixeira    Exercises  - Seated Long Arc Quad  - 1 x daily - 7 x weekly - 3 sets - 10 reps  - Seated Toe Raise  - 1 x daily - 7 x weekly - 3 sets - 10 reps  - Seated Heel Raise  - 1 x daily - 7 x weekly - 3 sets - 10 reps  - Seated March  - 1 x daily - 7 x weekly - 3 sets - 10 reps  - Supine Bridge  - 1 x daily - 7 x weekly - 1 sets - 10 reps - 3 hold  - Supine Active Straight Leg Raise  - 1 x daily - 7 x weekly - 1 sets - 10 reps - 3 hold  - Supine Heel Slide  - 1 x daily - 7 x weekly - 1 sets - 10 reps - 3 hold  - Sidelying Hip Abduction  - 1 x daily - 7 x weekly - 1 sets - 10 reps - 3 hold  - Sidelying Hip Extension in Abduction  - 1 x daily - 7 x weekly - 1 sets - 10 reps - 3 hold      ASSESSMENT                                                                                                          To date the patient has made gains not as expected due to Strength deficits, left neglect. Slow progress.  Patient continues to have impairments and functional deficits as noted.  Patient will continue to benefit from skilled care as outlined.    No significant change in LLE strength. Left neglect and impulsivity persisting  Education:   - Results of above outlined education: Verbalizes understanding, Needs reinforcement and Demonstrates understanding    PLAN                                                                                                                          Continue interventions, frequency and duration established at initial  evaluation.  Requesting additional 10 visits.    Suggestions for next session as indicated: Progress per plan of care  continue with functional activities: transfers, gait, strengthening, home program progression, gait training when patient receives brace    Goals  Improve global left lower extremity strength to at least 4/5 in all major muscle groups. Not met  Improved timed up and go from 44.44 seconds to <13 seconds NT due to impulsivity and safety concerns  Goal for DGI or lala balance to be established     The above improvements in impairments to assist in obtaining goals listed below  Long Term Goals: to be met by end of plan of care  1. Patient will demonstrate ability to negotiate level and unlevel surfaces at variable velocities, including change of direction without increased pain or instability to return to age appropriate and community activities at prior level of function with least restrictive device.  Status: progressing/ongoing  2. Patient will report/demonstrate ability to perform independent bed mobility and all types of transfers including floor transfers for decreased caregiver burden while at home.  Status: progressing/ongoingSupevision with bed mobility, minimal assist for floor to/from stand  3. Patient will ascend and descend 10 steps reciprocally for safe home and community access.  Status: progressing/ongoing  4. Patient will be independent with progressed and modified home exercise program.  Status: progressing/ongoing  5. Lower Extremity Functional Scale: Patient will score 55 or higher on The Lower Extremity Functional Scale to indicate a decreased level of difficulty with walking and moving around. (minimal clinically important difference: 9 points change) Status: progressing/ongoing      Therapy procedure time and total treatment time can be found documented on the Time Entry flowsheet

## 2024-08-15 NOTE — PLAN OF CARE
Went over discharged instruction, no question or concerns noted, pt wheel chair off unit to front enterance

## 2024-08-15 NOTE — CARE UPDATE
patient arrived to MPU 7 via stretcher in [facility], report received from CHOCO Ambrocio, patient to remain flat until 1330, see chart for vital signs and assessment, will continue to monitor patient until recovery complete.

## 2024-08-18 ENCOUNTER — PATIENT MESSAGE (OUTPATIENT)
Dept: HEMATOLOGY/ONCOLOGY | Facility: CLINIC | Age: 44
End: 2024-08-18
Payer: COMMERCIAL

## 2024-08-18 ENCOUNTER — PATIENT MESSAGE (OUTPATIENT)
Dept: GASTROENTEROLOGY | Facility: CLINIC | Age: 44
End: 2024-08-18
Payer: COMMERCIAL

## 2024-08-19 ENCOUNTER — HOSPITAL ENCOUNTER (EMERGENCY)
Facility: HOSPITAL | Age: 44
Discharge: HOME | End: 2024-08-19
Attending: EMERGENCY MEDICINE
Payer: COMMERCIAL

## 2024-08-19 VITALS
HEIGHT: 71 IN | TEMPERATURE: 98 F | OXYGEN SATURATION: 100 % | DIASTOLIC BLOOD PRESSURE: 76 MMHG | WEIGHT: 155 LBS | RESPIRATION RATE: 20 BRPM | HEART RATE: 90 BPM | SYSTOLIC BLOOD PRESSURE: 118 MMHG | BODY MASS INDEX: 21.7 KG/M2

## 2024-08-19 DIAGNOSIS — R07.9 CHEST PAIN: ICD-10-CM

## 2024-08-19 DIAGNOSIS — R10.13 EPIGASTRIC ABDOMINAL PAIN: Primary | ICD-10-CM

## 2024-08-19 DIAGNOSIS — R11.0 NAUSEA: ICD-10-CM

## 2024-08-19 DIAGNOSIS — R74.01 TRANSAMINITIS: ICD-10-CM

## 2024-08-19 LAB
ALBUMIN SERPL BCP-MCNC: 3.2 G/DL (ref 3.5–5.2)
ALP SERPL-CCNC: 88 U/L (ref 55–135)
ALT SERPL W/O P-5'-P-CCNC: 192 U/L (ref 10–44)
ANION GAP SERPL CALC-SCNC: 10 MMOL/L (ref 8–16)
AST SERPL-CCNC: 134 U/L (ref 10–40)
BACTERIA #/AREA URNS AUTO: ABNORMAL /HPF
BASOPHILS # BLD AUTO: 0.02 K/UL (ref 0–0.2)
BASOPHILS NFR BLD: 0.4 % (ref 0–1.9)
BILIRUB SERPL-MCNC: 1.2 MG/DL (ref 0.1–1)
BILIRUB UR QL STRIP: NEGATIVE
BNP SERPL-MCNC: 20 PG/ML (ref 0–99)
BUN SERPL-MCNC: 6 MG/DL (ref 6–20)
CALCIUM SERPL-MCNC: 9 MG/DL (ref 8.7–10.5)
CHLORIDE SERPL-SCNC: 101 MMOL/L (ref 95–110)
CLARITY UR REFRACT.AUTO: ABNORMAL
CO2 SERPL-SCNC: 24 MMOL/L (ref 23–29)
COLOR UR AUTO: YELLOW
CREAT SERPL-MCNC: 0.8 MG/DL (ref 0.5–1.4)
DIFFERENTIAL METHOD BLD: ABNORMAL
EOSINOPHIL # BLD AUTO: 0.1 K/UL (ref 0–0.5)
EOSINOPHIL NFR BLD: 2.4 % (ref 0–8)
ERYTHROCYTE [DISTWIDTH] IN BLOOD BY AUTOMATED COUNT: 12.1 % (ref 11.5–14.5)
EST. GFR  (NO RACE VARIABLE): >60 ML/MIN/1.73 M^2
GLUCOSE SERPL-MCNC: 97 MG/DL (ref 70–110)
GLUCOSE UR QL STRIP: NEGATIVE
HCT VFR BLD AUTO: 38.8 % (ref 37–48.5)
HGB BLD-MCNC: 12.7 G/DL (ref 12–16)
HGB UR QL STRIP: NEGATIVE
IMM GRANULOCYTES # BLD AUTO: 0.02 K/UL (ref 0–0.04)
IMM GRANULOCYTES NFR BLD AUTO: 0.4 % (ref 0–0.5)
KETONES UR QL STRIP: ABNORMAL
LEUKOCYTE ESTERASE UR QL STRIP: ABNORMAL
LIPASE SERPL-CCNC: 18 U/L (ref 4–60)
LYMPHOCYTES # BLD AUTO: 0.5 K/UL (ref 1–4.8)
LYMPHOCYTES NFR BLD: 11.4 % (ref 18–48)
MCH RBC QN AUTO: 31.5 PG (ref 27–31)
MCHC RBC AUTO-ENTMCNC: 32.7 G/DL (ref 32–36)
MCV RBC AUTO: 96 FL (ref 82–98)
MICROSCOPIC COMMENT: ABNORMAL
MONOCYTES # BLD AUTO: 0.6 K/UL (ref 0.3–1)
MONOCYTES NFR BLD: 13.6 % (ref 4–15)
NEUTROPHILS # BLD AUTO: 3.3 K/UL (ref 1.8–7.7)
NEUTROPHILS NFR BLD: 71.8 % (ref 38–73)
NITRITE UR QL STRIP: NEGATIVE
NRBC BLD-RTO: 0 /100 WBC
PH UR STRIP: 7 [PH] (ref 5–8)
PLATELET # BLD AUTO: 204 K/UL (ref 150–450)
PMV BLD AUTO: 9.8 FL (ref 9.2–12.9)
POTASSIUM SERPL-SCNC: 3.7 MMOL/L (ref 3.5–5.1)
PROT SERPL-MCNC: 7 G/DL (ref 6–8.4)
PROT UR QL STRIP: NEGATIVE
RBC # BLD AUTO: 4.03 M/UL (ref 4–5.4)
RBC #/AREA URNS AUTO: 2 /HPF (ref 0–4)
SODIUM SERPL-SCNC: 135 MMOL/L (ref 136–145)
SP GR UR STRIP: 1.01 (ref 1–1.03)
SQUAMOUS #/AREA URNS AUTO: 8 /HPF
TROPONIN I SERPL DL<=0.01 NG/ML-MCNC: <0.006 NG/ML (ref 0–0.03)
TROPONIN I SERPL DL<=0.01 NG/ML-MCNC: <0.006 NG/ML (ref 0–0.03)
URN SPEC COLLECT METH UR: ABNORMAL
WBC # BLD AUTO: 4.63 K/UL (ref 3.9–12.7)
WBC #/AREA URNS AUTO: 19 /HPF (ref 0–5)

## 2024-08-19 PROCEDURE — 87186 SC STD MICRODIL/AGAR DIL: CPT | Performed by: EMERGENCY MEDICINE

## 2024-08-19 PROCEDURE — 87086 URINE CULTURE/COLONY COUNT: CPT | Performed by: EMERGENCY MEDICINE

## 2024-08-19 PROCEDURE — 93010 ELECTROCARDIOGRAM REPORT: CPT | Mod: ,,, | Performed by: INTERNAL MEDICINE

## 2024-08-19 PROCEDURE — 83690 ASSAY OF LIPASE: CPT | Performed by: EMERGENCY MEDICINE

## 2024-08-19 PROCEDURE — 84484 ASSAY OF TROPONIN QUANT: CPT | Mod: 91 | Performed by: EMERGENCY MEDICINE

## 2024-08-19 PROCEDURE — 99285 EMERGENCY DEPT VISIT HI MDM: CPT | Mod: 25

## 2024-08-19 PROCEDURE — 25000003 PHARM REV CODE 250: Performed by: EMERGENCY MEDICINE

## 2024-08-19 PROCEDURE — 25500020 PHARM REV CODE 255: Performed by: EMERGENCY MEDICINE

## 2024-08-19 PROCEDURE — 96375 TX/PRO/DX INJ NEW DRUG ADDON: CPT

## 2024-08-19 PROCEDURE — 96374 THER/PROPH/DIAG INJ IV PUSH: CPT

## 2024-08-19 PROCEDURE — 80053 COMPREHEN METABOLIC PANEL: CPT | Performed by: EMERGENCY MEDICINE

## 2024-08-19 PROCEDURE — 83880 ASSAY OF NATRIURETIC PEPTIDE: CPT | Performed by: EMERGENCY MEDICINE

## 2024-08-19 PROCEDURE — 63600175 PHARM REV CODE 636 W HCPCS: Performed by: EMERGENCY MEDICINE

## 2024-08-19 PROCEDURE — 93005 ELECTROCARDIOGRAM TRACING: CPT

## 2024-08-19 PROCEDURE — 87088 URINE BACTERIA CULTURE: CPT | Performed by: EMERGENCY MEDICINE

## 2024-08-19 PROCEDURE — 81001 URINALYSIS AUTO W/SCOPE: CPT | Performed by: EMERGENCY MEDICINE

## 2024-08-19 PROCEDURE — 85025 COMPLETE CBC W/AUTO DIFF WBC: CPT | Performed by: EMERGENCY MEDICINE

## 2024-08-19 RX ORDER — SUCRALFATE 1 G/10ML
1 SUSPENSION ORAL 4 TIMES DAILY
Qty: 414 ML | Refills: 0 | Status: SHIPPED | OUTPATIENT
Start: 2024-08-19

## 2024-08-19 RX ORDER — KETOROLAC TROMETHAMINE 30 MG/ML
10 INJECTION, SOLUTION INTRAMUSCULAR; INTRAVENOUS
Status: COMPLETED | OUTPATIENT
Start: 2024-08-19 | End: 2024-08-19

## 2024-08-19 RX ORDER — SUCRALFATE 1 G/10ML
2 SUSPENSION ORAL
Status: COMPLETED | OUTPATIENT
Start: 2024-08-19 | End: 2024-08-19

## 2024-08-19 RX ORDER — FAMOTIDINE 10 MG/ML
20 INJECTION INTRAVENOUS
Status: COMPLETED | OUTPATIENT
Start: 2024-08-19 | End: 2024-08-19

## 2024-08-19 RX ORDER — DROPERIDOL 2.5 MG/ML
2.5 INJECTION, SOLUTION INTRAMUSCULAR; INTRAVENOUS ONCE
Status: COMPLETED | OUTPATIENT
Start: 2024-08-19 | End: 2024-08-19

## 2024-08-19 RX ORDER — METOCLOPRAMIDE HYDROCHLORIDE 5 MG/ML
5 INJECTION INTRAMUSCULAR; INTRAVENOUS
Status: COMPLETED | OUTPATIENT
Start: 2024-08-19 | End: 2024-08-19

## 2024-08-19 RX ORDER — LIDOCAINE HYDROCHLORIDE 20 MG/ML
15 SOLUTION OROPHARYNGEAL ONCE
Status: COMPLETED | OUTPATIENT
Start: 2024-08-19 | End: 2024-08-19

## 2024-08-19 RX ORDER — ALUMINUM HYDROXIDE, MAGNESIUM HYDROXIDE, AND SIMETHICONE 1200; 120; 1200 MG/30ML; MG/30ML; MG/30ML
30 SUSPENSION ORAL ONCE
Status: COMPLETED | OUTPATIENT
Start: 2024-08-19 | End: 2024-08-19

## 2024-08-19 RX ORDER — SIMETHICONE 125 MG
125 TABLET,CHEWABLE ORAL EVERY 6 HOURS PRN
Qty: 30 TABLET | Refills: 0 | Status: SHIPPED | OUTPATIENT
Start: 2024-08-19

## 2024-08-19 RX ADMIN — FAMOTIDINE 20 MG: 10 INJECTION, SOLUTION INTRAVENOUS at 05:08

## 2024-08-19 RX ADMIN — SUCRALFATE 2 G: 1 SUSPENSION ORAL at 09:08

## 2024-08-19 RX ADMIN — METOCLOPRAMIDE 5 MG: 5 INJECTION, SOLUTION INTRAMUSCULAR; INTRAVENOUS at 06:08

## 2024-08-19 RX ADMIN — ALUMINUM HYDROXIDE, MAGNESIUM HYDROXIDE, AND SIMETHICONE 30 ML: 200; 200; 20 SUSPENSION ORAL at 05:08

## 2024-08-19 RX ADMIN — IOHEXOL 75 ML: 350 INJECTION, SOLUTION INTRAVENOUS at 06:08

## 2024-08-19 RX ADMIN — LIDOCAINE HYDROCHLORIDE 15 ML: 20 SOLUTION ORAL at 05:08

## 2024-08-19 RX ADMIN — KETOROLAC TROMETHAMINE 10 MG: 30 INJECTION, SOLUTION INTRAMUSCULAR; INTRAVENOUS at 06:08

## 2024-08-19 RX ADMIN — DROPERIDOL 2.5 MG: 2.5 INJECTION, SOLUTION INTRAMUSCULAR; INTRAVENOUS at 09:08

## 2024-08-19 NOTE — ED PROVIDER NOTES
History:  Melly Hwang is a 44 y.o. female who presents to the ED with Chest Pain (Patient reports CP after radiation last week. Under left breast. Receives radiation for Liver tumor.)    Described as 44-year-old female with a history of metastatic hepatocellular carcinoma on radiation therapy, no chemo, pericardial effusion status post pericardial window presenting to the emergency department with chest pain.  She reports she was had a persistent pain in her left lower chest, sharp, worse with deep inspiration for the past 3 days.  She also has a mild epigastric abdominal pain with associated nausea.  She denies any fevers or chills.  No vomiting.  She has tried over-the-counter pain medications, gas medications, antacid medications without improvement at home.    Review of Systems: All systems reviewed and are negative except as per history of present illness.    Medications:   Previous Medications    ALPRAZOLAM (XANAX) 0.5 MG TABLET    Take 1 tablet (0.5 mg total) by mouth daily as needed for Anxiety.    CETIRIZINE (ZYRTEC) 10 MG TABLET    Take 1 tablet (10 mg total) by mouth once daily.    CYANOCOBALAMIN (VITAMIN B-12) 1000 MCG TABLET    Take 1 tablet (1,000 mcg total) by mouth once daily.    CYCLOBENZAPRINE (FLEXERIL) 10 MG TABLET    Take 1 tablet (10 mg total) by mouth nightly as needed for Muscle spasms.    ESCITALOPRAM OXALATE (LEXAPRO) 10 MG TABLET    Take 1 tablet (10 mg total) by mouth once daily.    FLUTICASONE PROPIONATE (FLONASE) 50 MCG/ACTUATION NASAL SPRAY    SPRAY 2 SPRAYS BY EACH NOSTRIL ROUTE ONCE DAILY.    KETOCONAZOLE (NIZORAL) 2 % CREAM    aaa on face and behind ears qd-bid prn flare    MULTIVITAMIN CAPSULE    Take by mouth. 1 capsule Oral Every morning    NAPROXEN (NAPROSYN) 500 MG TABLET    Take 1 tablet (500 mg total) by mouth 2 (two) times daily with meals.    NORETHINDRONE-ETHINYL ESTRADIOL (MICROGESTIN 1/20) 1-20 MG-MCG PER TABLET    Take 1 tablet by mouth once daily.     OMEPRAZOLE (PRILOSEC) 20 MG CAPSULE    Take 20 mg by mouth daily as needed.    ONDANSETRON (ZOFRAN-ODT) 4 MG TBDL    Take 1 tablet (4 mg total) by mouth every 12 (twelve) hours as needed (nausea).    PANTOPRAZOLE (PROTONIX) 40 MG TABLET    TAKE 1 TABLET BY MOUTH EVERY DAY       PMH:   Past Medical History:   Diagnosis Date    VENKATA positive 2020    COVID-19     Deviated septum     Hepatocellular carcinoma 2021    Hypertrophy of inferior nasal turbinate     Pericardial effusion     Premature menopause      PSH:   Past Surgical History:   Procedure Laterality Date    COLONOSCOPY N/A 2020    Procedure: COLONOSCOPY;  Surgeon: GIOVANNI Crane MD;  Location: [facility] ENDO (4TH FLR);  Service: Endoscopy;  Laterality: N/A;  + covid     EPIDURAL STEROID INJECTION INTO CERVICAL SPINE N/A 2024    Procedure: FLORENTINO C7/T1;  Surgeon: Cezar Bailey MD;  Location: [facility];  Service: Pain Management;  Laterality: N/A;  20mins-No ac    ESOPHAGOGASTRODUODENOSCOPY N/A 1/10/2024    Procedure: ESOPHAGOGASTRODUODENOSCOPY (EGD);  Surgeon: Reynaldo Lynch MD;  Location: [facility] (2ND FLR);  Service: Endoscopy;  Laterality: N/A;  referred by daryl carrasco rocedure: EGD Diagnosis: Abnormal finding on GI tract imaging, Abdominal pain, and GERD  Procedure Timin-4 weeks Provider: Any GI provider Location: [facility], [facility], and [facility]  Additional Scheduling Informat    HERNIA REPAIR      LIVER SURGERY N/A 2021    NASAL SEPTUM SURGERY      PERICARDIAL WINDOW N/A 2021    Procedure: CREATION, PERICARDIAL WINDOW;  Surgeon: Ajay Cantor MD;  Location: [facility] OR 2ND FLR;  Service: Cardiovascular;  Laterality: N/A;    PERICARDIOCENTESIS N/A 2020    Procedure: Pericardiocentesis;  Surgeon: Dickson Dangelo MD;  Location: [facility] CATH LAB;  Service: Cardiology;  Laterality: N/A;    TONSILLECTOMY       Allergies: She is allergic to no known drug allergies.  Social History:  "Marital Status: single. She  reports that she has never smoked. She has never used smokeless tobacco.. She  reports current alcohol use..       Exam:  VITAL SIGNS:   Vitals:    08/19/24 0455 08/19/24 0507 08/19/24 0624   BP: 124/74  134/75   BP Location: Right arm  Left arm   Patient Position:   Lying   Pulse: 80  78   Resp: 18  18   Temp: 98.6 °F (37 °C)  98.2 °F (36.8 °C)   TempSrc: Oral  Oral   SpO2: 99%     Weight:  70.3 kg (155 lb)    Height:  5' 11" (1.803 m)      Const: Awake and alert, NAD   Head: Atraumatic  Eyes: Normal Conjunctiva  ENT: Normal External Ears, Nose and Mouth.  Neck: Full range of motion. No meningismus.  Resp: Normal respiratory effort, No distress. CTAB  Cardio: Equal and intact distal pulses, RRR  Abd: Soft, tenderness to palpation epigastric region, no rebound or guarding, no masses   Skin: No petechiae or rashes  Ext: No cyanosis, or edema  Neur: Awake and alert  Psych: Normal Mood and Affect    Data:  Results for orders placed or performed during the hospital encounter of 08/19/24   CBC auto differential   Result Value Ref Range    WBC 4.63 3.90 - 12.70 K/uL    RBC 4.03 4.00 - 5.40 M/uL    Hemoglobin 12.7 12.0 - 16.0 g/dL    Hematocrit 38.8 37.0 - 48.5 %    MCV 96 82 - 98 fL    MCH 31.5 (H) 27.0 - 31.0 pg    MCHC 32.7 32.0 - 36.0 g/dL    RDW 12.1 11.5 - 14.5 %    Platelets 204 150 - 450 K/uL    MPV 9.8 9.2 - 12.9 fL    Immature Granulocytes 0.4 0.0 - 0.5 %    Gran # (ANC) 3.3 1.8 - 7.7 K/uL    Immature Grans (Abs) 0.02 0.00 - 0.04 K/uL    Lymph # 0.5 (L) 1.0 - 4.8 K/uL    Mono # 0.6 0.3 - 1.0 K/uL    Eos # 0.1 0.0 - 0.5 K/uL    Baso # 0.02 0.00 - 0.20 K/uL    nRBC 0 0 /100 WBC    Gran % 71.8 38.0 - 73.0 %    Lymph % 11.4 (L) 18.0 - 48.0 %    Mono % 13.6 4.0 - 15.0 %    Eosinophil % 2.4 0.0 - 8.0 %    Basophil % 0.4 0.0 - 1.9 %    Differential Method Automated    Comprehensive metabolic panel   Result Value Ref Range    Sodium 135 (L) 136 - 145 mmol/L    Potassium 3.7 3.5 - 5.1 mmol/L "    Chloride 101 95 - 110 mmol/L    CO2 24 23 - 29 mmol/L    Glucose 97 70 - 110 mg/dL    BUN 6 6 - 20 mg/dL    Creatinine 0.8 0.5 - 1.4 mg/dL    Calcium 9.0 8.7 - 10.5 mg/dL    Total Protein 7.0 6.0 - 8.4 g/dL    Albumin 3.2 (L) 3.5 - 5.2 g/dL    Total Bilirubin 1.2 (H) 0.1 - 1.0 mg/dL    Alkaline Phosphatase 88 55 - 135 U/L     (H) 10 - 40 U/L     (H) 10 - 44 U/L    eGFR >60.0 >60 mL/min/1.73 m^2    Anion Gap 10 8 - 16 mmol/L   Troponin I #1   Result Value Ref Range    Troponin I <0.006 0.000 - 0.026 ng/mL   B-Type natriuretic peptide (BNP)   Result Value Ref Range    BNP 20 0 - 99 pg/mL     *Note: Due to a large number of results and/or encounters for the requested time period, some results have not been displayed. A complete set of results can be found in Results Review.     Imaging Results              X-Ray Chest AP Portable (Final result)  Result time 08/19/24 06:07:26      Final result by Ryan Andujar MD (08/19/24 06:07:26)                   Impression:      No acute cardiopulmonary process.    Electronically signed by resident: Keanu Drake  Date:    08/19/2024  Time:    05:27    Electronically signed by: Ryan Andujar MD  Date:    08/19/2024  Time:    06:07               Narrative:    EXAMINATION:  XR CHEST AP PORTABLE    CLINICAL HISTORY:  Chest Pain;    TECHNIQUE:  Single frontal view of the chest was performed.    COMPARISON:  Chest x-ray 03/14/2024    FINDINGS:  LINES & TUBES: Cardiac monitoring leads overlie the bilateral reagan-thoraces    HEART & MEDIASTINUM: Mediastinal structures are midline.  Cardiac silhouette is normal in size.  Hilar and mediastinal contours are unremarkable.    PLEURA: No pleural effusion.  No pneumothorax.    LUNGS: Lungs are symmetrically expanded and clear.    SOFT TISSUE / BONES:   Thoracic a skeleton is unremarkable.  No pneumoperitoneum.                                    12-LEAD EKG INTERPRETATION BY ME:  Rate/Rhythm: Normal Sinus Rhythm with  rate of 77 beats per minute  QRS, ST, T-waves: No changes consistent with acute ischemia  Impression:  No evidence of ischemia or arrhythmia     Labs & Imaging studies were reviewed independently by me.     Medical Decision Makin-year-old female presenting to the emergency department with chest pain and epigastric pain as well as nausea.  She was a history of cancer, pleuritic chest pain, concern for possible PE, CTA chest ordered.  She also has abdominal pain and nausea, will obtain CT abdomen pelvis.  Basic laboratory studies are generally unremarkable though she does have acute worsening of her liver function tests mildly.  No pain specifically over her right upper quadrant.  She initially believed that her pain is related to acid reflux, was given a GI cocktail and IV Pepcid with no improvement in pain.  She declined any narcotic pain medication.  She was given Toradol and Reglan for further pain control.  Vital signs remained stable throughout her stay in the emergency room.  EKG shows no acute ischemic changes, troponin negative x1, low suspicion for ACS.  Given her history of pericarditis, bedside ultrasound was performed with no significant pericardial effusion.  No right heart strain.  Patient was signed out pending CTA chest and CT abdomen pelvis with IV contrast as well as UA.    Clinical Impression:  1. Chest pain    2. Transaminitis    3. Epigastric abdominal pain    4. Nausea                 Connie Salinas MD  24 2005

## 2024-08-19 NOTE — PROVIDER PROGRESS NOTES - EMERGENCY DEPT.
Encounter Date: 8/19/2024    ED Physician Progress Notes          STAFF PHYSICIAN F/U NOTE:  Melly Hwang has been evaluated and treated by Dr. Salinas. S/O pending CTA and Sx resolution. She reports much improvement of Sx and is ready to return home.  On my exam, her pain is epigastric elicited with palpation, trying to perform a sit-up/abdominal contracture.    Imaging Results              CTA Chest Non-Coronary (PE Studies) (Final result)  Result time 08/19/24 07:22:07      Final result by Ryan Andujar MD (08/19/24 07:22:07)                   Impression:      No pulmonary embolism.    New post radioembolization change in the left hepatic lobe.  Increased periportal edema.    Nonspecific small volume pelvic free fluid.    Multiple stable subcentimeter solid pulmonary nodules.    Similar ill-defined hypodensity in the right hepatic lobe.    Additional findings as above    Electronically signed by resident: Keanu Drake  Date:    08/19/2024  Time:    06:44    Electronically signed by: Ryan Andujar MD  Date:    08/19/2024  Time:    07:22               Narrative:    EXAMINATION:  CT ABDOMEN PELVIS WITH IV CONTRAST; CTA CHEST NON CORONARY (PE STUDIES)    CLINICAL HISTORY:  Epigastric pain;hx HCC, elevated liver enzymes;; Pulmonary embolism (PE) suspected, high prob;    TECHNIQUE:  Low dose axial images, sagittal and coronal reformations were obtained from the thoracic inlet to the pubic symphysis following the IV administration of 100 mL of Omnipaque 350.  Contrast timing was optimized to evaluate the pulmonary arteries.  MIP images were performed.    COMPARISON:  SPECT CT 08/15/2024, CT 07/14/2024, 10/27/2023    FINDINGS:  SOFT TISSUES:  No axillary or subpectoral lymphadenopathy. The visualized thyroid gland is unremarkable.    HEART & MEDIASTINUM: Pulmonary arteries are patent to the subsegmental level.  Heart is normal in size.  No pericardial effusion.  No mediastinal or hilar  lymphadenopathy.    PLEURA:  No pleural effusion or pneumothorax.    LUNGS AND AIRWAYS:  Airways are patent. Stable 6 mm solid pulmonary nodule in the posterior segment of the right upper lobe (axial series 2, image 234).  Stable 5 mm solid pulmonary nodule in the right lower lobe (axial series 2, image 349) with similar surrounding ground-glass attenuation.  Stable 5 mm solid pulmonary nodule in the left upper lobe (axial series 2, image 135).  No new pulmonary nodules.    HEPATOBILIARY: Stable hepatomegaly measuring 23 cm craniocaudal with stable postsurgical change of partial right hepatectomy.  New hypodensity in the left hepatic lobe corresponding to site of recent radioembolization measuring 2.4 x 2.1 cm (axial series 3, image 30).  Similar 1.7 cm hypodensity in the right hepatic lobe.  Increased periportal edema.Cholecystectomy.    SPLEEN:  No splenomegaly.    PANCREAS:  No focal masses. No ductal dilatation.    ADRENALS:  No adrenal nodules.    KIDNEYS/URETERS: Kidneys enhance symmetrically.  No hydronephrosis, stones or solid mass lesions. Ureters are unremarkable.    PELVIC ORGANS/BLADDER:  Unremarkable.    PERITONEUM / RETROPERITONEUM:  No free air. Small volume pelvic free fluid.    LYMPH NODES:  No lymphadenopathy.    VESSELS:  Unremarkable.    GI TRACT:  No distention or wall thickening.    BONES:  Soft tissues are unremarkable.  No fractures or focal osseous lesions.                                       CT Abdomen Pelvis With IV Contrast NO Oral Contrast (Final result)  Result time 08/19/24 07:22:07      Final result by Ryan Andujar MD (08/19/24 07:22:07)                   Impression:      No pulmonary embolism.    New post radioembolization change in the left hepatic lobe.  Increased periportal edema.    Nonspecific small volume pelvic free fluid.    Multiple stable subcentimeter solid pulmonary nodules.    Similar ill-defined hypodensity in the right hepatic lobe.    Additional findings as  above    Electronically signed by resident: Keanu Drake  Date:    08/19/2024  Time:    06:44    Electronically signed by: Ryan Andujar MD  Date:    08/19/2024  Time:    07:22               Narrative:    EXAMINATION:  CT ABDOMEN PELVIS WITH IV CONTRAST; CTA CHEST NON CORONARY (PE STUDIES)    CLINICAL HISTORY:  Epigastric pain;hx HCC, elevated liver enzymes;; Pulmonary embolism (PE) suspected, high prob;    TECHNIQUE:  Low dose axial images, sagittal and coronal reformations were obtained from the thoracic inlet to the pubic symphysis following the IV administration of 100 mL of Omnipaque 350.  Contrast timing was optimized to evaluate the pulmonary arteries.  MIP images were performed.    COMPARISON:  SPECT CT 08/15/2024, CT 07/14/2024, 10/27/2023    FINDINGS:  SOFT TISSUES:  No axillary or subpectoral lymphadenopathy. The visualized thyroid gland is unremarkable.    HEART & MEDIASTINUM: Pulmonary arteries are patent to the subsegmental level.  Heart is normal in size.  No pericardial effusion.  No mediastinal or hilar lymphadenopathy.    PLEURA:  No pleural effusion or pneumothorax.    LUNGS AND AIRWAYS:  Airways are patent. Stable 6 mm solid pulmonary nodule in the posterior segment of the right upper lobe (axial series 2, image 234).  Stable 5 mm solid pulmonary nodule in the right lower lobe (axial series 2, image 349) with similar surrounding ground-glass attenuation.  Stable 5 mm solid pulmonary nodule in the left upper lobe (axial series 2, image 135).  No new pulmonary nodules.    HEPATOBILIARY: Stable hepatomegaly measuring 23 cm craniocaudal with stable postsurgical change of partial right hepatectomy.  New hypodensity in the left hepatic lobe corresponding to site of recent radioembolization measuring 2.4 x 2.1 cm (axial series 3, image 30).  Similar 1.7 cm hypodensity in the right hepatic lobe.  Increased periportal edema.Cholecystectomy.    SPLEEN:  No splenomegaly.    PANCREAS:  No focal  masses. No ductal dilatation.    ADRENALS:  No adrenal nodules.    KIDNEYS/URETERS: Kidneys enhance symmetrically.  No hydronephrosis, stones or solid mass lesions. Ureters are unremarkable.    PELVIC ORGANS/BLADDER:  Unremarkable.    PERITONEUM / RETROPERITONEUM:  No free air. Small volume pelvic free fluid.    LYMPH NODES:  No lymphadenopathy.    VESSELS:  Unremarkable.    GI TRACT:  No distention or wall thickening.    BONES:  Soft tissues are unremarkable.  No fractures or focal osseous lesions.                                       X-Ray Chest AP Portable (Final result)  Result time 08/19/24 06:07:26      Final result by Ryan Andujar MD (08/19/24 06:07:26)                   Impression:      No acute cardiopulmonary process.    Electronically signed by resident: Keanu Drake  Date:    08/19/2024  Time:    05:27    Electronically signed by: Ryan Andujar MD  Date:    08/19/2024  Time:    06:07               Narrative:    EXAMINATION:  XR CHEST AP PORTABLE    CLINICAL HISTORY:  Chest Pain;    TECHNIQUE:  Single frontal view of the chest was performed.    COMPARISON:  Chest x-ray 03/14/2024    FINDINGS:  LINES & TUBES: Cardiac monitoring leads overlie the bilateral reagan-thoraces    HEART & MEDIASTINUM: Mediastinal structures are midline.  Cardiac silhouette is normal in size.  Hilar and mediastinal contours are unremarkable.    PLEURA: No pleural effusion.  No pneumothorax.    LUNGS: Lungs are symmetrically expanded and clear.    SOFT TISSUE / BONES:   Thoracic a skeleton is unremarkable.  No pneumoperitoneum.                                  Currently patient reports no new Sx and is tolerating PO challenge. We discussed Sx warranting immediate ED return, which were acknowledged. I recommended F/U and discussion of ED visit with primary care physician.  ____________________  Jeanmarie Diallo MD, [facility]  Emergency Medicine Staff  7:06 AM 8/19/2024

## 2024-08-19 NOTE — ED NOTES
Pt identifiers Melly Hwang were checked and are correct  LOC: The patient is awake, alert, aware of environment with an appropriate affect. Oriented x4, speaking appropriately  APPEARANCE: Pt rates abd pain a 6/10 , in no acute distress, pt is clean and well groomed, clothing properly fastened  SKIN: Skin warm, dry and intact, normal skin turgor, moist mucus membranes  RESPIRATORY: Airway is open and patent, respirations are spontaneous, even and unlabored, normal effort and rate  CARDIAC: Normal rate and rhythm, no peripheral edema noted, capillary refill < 3 seconds, bilateral radial pulses 2+  ABDOMEN: slightly distended  Bowel sounds present to all four quad of abd on auscultation  NEUROLOGIC: PERRL, facial expression is symmetrical, patient moving all extremities spontaneously, normal sensation in all extremities when touched with a finger.  Follows all commands appropriately  MUSCULOSKELETAL: No obvious deformities.

## 2024-08-19 NOTE — PROVIDER PROGRESS NOTES - EMERGENCY DEPT.
"Encounter Date: 8/19/2024    ED Physician Progress Notes          ED STAFF ATTENDING PHYSICIAN HAND-OFF NOTE:  6:25 AM 8/19/2024  Melly Hwang is a 44 y.o. female who presented to the ED on 8/19/2024 and has been managed by , who reports patient C/O pleuritic chest pain. I assumed care of patient from off-going ED physician team at 6:25 AM pending CTA, Trop & disposition.    On my evaluation, Melly Hwang appears well, hemodynamically stable and in NAD. Thus far, Melly Hwang has received:  Medications   famotidine (PF) injection 20 mg (20 mg Intravenous Given 8/19/24 0546)   aluminum-magnesium hydroxide-simethicone 200-200-20 mg/5 mL suspension 30 mL (30 mLs Oral Given 8/19/24 0544)     And   LIDOcaine viscous HCl 2% oral solution 15 mL (15 mLs Oral Given 8/19/24 0544)   ketorolac injection 9.999 mg (9.999 mg Intravenous Given 8/19/24 0619)   metoclopramide injection 5 mg (5 mg Intravenous Given 8/19/24 0615)     ____________________  Jeanmarie Diallo MD, [facility]  Emergency Medicine Staff  6:26 AM 8/19/2024        On my exam, I appreciate:  /74 (BP Location: Right arm)   Pulse 80   Temp 98.6 °F (37 °C) (Oral)   Resp 18   Ht 5' 11" (1.803 m)   Wt 70.3 kg (155 lb)   LMP  (LMP Unknown)   SpO2 99%   Breastfeeding No   BMI 21.62 kg/m²   Constitutional: Well-appearing; Well-Nourished; Non-Toxic-appearing and in NAD.  Head/Face: AT/NC & No Facial asymmetry.  Oropharynx: Speaking Full Sentences with No drooling or slurring of speech.  Cardiovascular: Reg Rate; Reg Rhythm; No Murmurs.  Pulmonary/Chest: AT Thorax with Lungs CTA B/L.  Abdominal: Soft, ND, NT.  Musculoskeletal: FROM, NML Gait.  Neuro/Psych: Calm; Cooperative, Following Command, Answering Questions Appropriately, and No Gait/Balance deficits.           "

## 2024-08-19 NOTE — DISCHARGE INSTRUCTIONS
Future Appointments   Date Time Provider Department Center   9/6/2024  7:10 AM LAB, [facility]G [facility]   9/6/2024  8:00 AM Philippe Carrasco MD [facility] HEMCICT [address]   9/6/2024  9:00 AM NURSE 11, [facility]   9/12/2024  1:30 PM Aleah Joiner, NP [facility] GASTRO [address]   9/16/2024  3:20 PM Roxana Maria MD [facility] DERM [place]   9/20/2024  7:30 AM Melly Sahu MD [facility] IM [address] [facility]   9/20/2024 10:20 AM Reynaldo Hadley MD [facility] IM [address] PC[facility]   10/3/2024  7:10 [facility]   10/3/2024  8:00 AM CHEMO 6 [facility] [place] [facility]     Imaging Results              CTA Chest Non-Coronary (PE Studies) (Final result)  Result time 08/19/24 07:22:07      Final result by Ryan Andujar MD (08/19/24 07:22:07)                   Impression:      No pulmonary embolism.    New post radioembolization change in the left hepatic lobe.  Increased periportal edema.    Nonspecific small volume pelvic free fluid.    Multiple stable subcentimeter solid pulmonary nodules.    Similar ill-defined hypodensity in the right hepatic lobe.    Additional findings as above    Electronically signed by resident: Keanu Drake  Date:    08/19/2024  Time:    06:44    Electronically signed by: Ryan Andujar MD  Date:    08/19/2024  Time:    07:22               Narrative:    EXAMINATION:  CT ABDOMEN PELVIS WITH IV CONTRAST; CTA CHEST NON CORONARY (PE STUDIES)    CLINICAL HISTORY:  Epigastric pain;hx HCC, elevated liver enzymes;; Pulmonary embolism (PE) suspected, high prob;    TECHNIQUE:  Low dose axial images, sagittal and coronal reformations were obtained from the thoracic inlet to the pubic symphysis following the IV administration of 100 mL of Omnipaque 350.  Contrast timing was optimized to evaluate the pulmonary arteries.  MIP images were performed.    COMPARISON:  SPECT CT 08/15/2024, CT 07/14/2024, 10/27/2023    FINDINGS:  SOFT  TISSUES:  No axillary or subpectoral lymphadenopathy. The visualized thyroid gland is unremarkable.    HEART & MEDIASTINUM: Pulmonary arteries are patent to the subsegmental level.  Heart is normal in size.  No pericardial effusion.  No mediastinal or hilar lymphadenopathy.    PLEURA:  No pleural effusion or pneumothorax.    LUNGS AND AIRWAYS:  Airways are patent. Stable 6 mm solid pulmonary nodule in the posterior segment of the right upper lobe (axial series 2, image 234).  Stable 5 mm solid pulmonary nodule in the right lower lobe (axial series 2, image 349) with similar surrounding ground-glass attenuation.  Stable 5 mm solid pulmonary nodule in the left upper lobe (axial series 2, image 135).  No new pulmonary nodules.    HEPATOBILIARY: Stable hepatomegaly measuring 23 cm craniocaudal with stable postsurgical change of partial right hepatectomy.  New hypodensity in the left hepatic lobe corresponding to site of recent radioembolization measuring 2.4 x 2.1 cm (axial series 3, image 30).  Similar 1.7 cm hypodensity in the right hepatic lobe.  Increased periportal edema.Cholecystectomy.    SPLEEN:  No splenomegaly.    PANCREAS:  No focal masses. No ductal dilatation.    ADRENALS:  No adrenal nodules.    KIDNEYS/URETERS: Kidneys enhance symmetrically.  No hydronephrosis, stones or solid mass lesions. Ureters are unremarkable.    PELVIC ORGANS/BLADDER:  Unremarkable.    PERITONEUM / RETROPERITONEUM:  No free air. Small volume pelvic free fluid.    LYMPH NODES:  No lymphadenopathy.    VESSELS:  Unremarkable.    GI TRACT:  No distention or wall thickening.    BONES:  Soft tissues are unremarkable.  No fractures or focal osseous lesions.                                       CT Abdomen Pelvis With IV Contrast NO Oral Contrast (Final result)  Result time 08/19/24 07:22:07      Final result by Ryan Andujar MD (08/19/24 07:22:07)                   Impression:      No pulmonary embolism.    New post  radioembolization change in the left hepatic lobe.  Increased periportal edema.    Nonspecific small volume pelvic free fluid.    Multiple stable subcentimeter solid pulmonary nodules.    Similar ill-defined hypodensity in the right hepatic lobe.    Additional findings as above    Electronically signed by resident: Keanu Drake  Date:    08/19/2024  Time:    06:44    Electronically signed by: Ryan Andujar MD  Date:    08/19/2024  Time:    07:22               Narrative:    EXAMINATION:  CT ABDOMEN PELVIS WITH IV CONTRAST; CTA CHEST NON CORONARY (PE STUDIES)    CLINICAL HISTORY:  Epigastric pain;hx HCC, elevated liver enzymes;; Pulmonary embolism (PE) suspected, high prob;    TECHNIQUE:  Low dose axial images, sagittal and coronal reformations were obtained from the thoracic inlet to the pubic symphysis following the IV administration of 100 mL of Omnipaque 350.  Contrast timing was optimized to evaluate the pulmonary arteries.  MIP images were performed.    COMPARISON:  SPECT CT 08/15/2024, CT 07/14/2024, 10/27/2023    FINDINGS:  SOFT TISSUES:  No axillary or subpectoral lymphadenopathy. The visualized thyroid gland is unremarkable.    HEART & MEDIASTINUM: Pulmonary arteries are patent to the subsegmental level.  Heart is normal in size.  No pericardial effusion.  No mediastinal or hilar lymphadenopathy.    PLEURA:  No pleural effusion or pneumothorax.    LUNGS AND AIRWAYS:  Airways are patent. Stable 6 mm solid pulmonary nodule in the posterior segment of the right upper lobe (axial series 2, image 234).  Stable 5 mm solid pulmonary nodule in the right lower lobe (axial series 2, image 349) with similar surrounding ground-glass attenuation.  Stable 5 mm solid pulmonary nodule in the left upper lobe (axial series 2, image 135).  No new pulmonary nodules.    HEPATOBILIARY: Stable hepatomegaly measuring 23 cm craniocaudal with stable postsurgical change of partial right hepatectomy.  New hypodensity in the left  hepatic lobe corresponding to site of recent radioembolization measuring 2.4 x 2.1 cm (axial series 3, image 30).  Similar 1.7 cm hypodensity in the right hepatic lobe.  Increased periportal edema.Cholecystectomy.    SPLEEN:  No splenomegaly.    PANCREAS:  No focal masses. No ductal dilatation.    ADRENALS:  No adrenal nodules.    KIDNEYS/URETERS: Kidneys enhance symmetrically.  No hydronephrosis, stones or solid mass lesions. Ureters are unremarkable.    PELVIC ORGANS/BLADDER:  Unremarkable.    PERITONEUM / RETROPERITONEUM:  No free air. Small volume pelvic free fluid.    LYMPH NODES:  No lymphadenopathy.    VESSELS:  Unremarkable.    GI TRACT:  No distention or wall thickening.    BONES:  Soft tissues are unremarkable.  No fractures or focal osseous lesions.                                       X-Ray Chest AP Portable (Final result)  Result time 08/19/24 06:07:26      Final result by Ryan Andujar MD (08/19/24 06:07:26)                   Impression:      No acute cardiopulmonary process.    Electronically signed by resident: Keanu Drake  Date:    08/19/2024  Time:    05:27    Electronically signed by: Ryan Andujar MD  Date:    08/19/2024  Time:    06:07               Narrative:    EXAMINATION:  XR CHEST AP PORTABLE    CLINICAL HISTORY:  Chest Pain;    TECHNIQUE:  Single frontal view of the chest was performed.    COMPARISON:  Chest x-ray 03/14/2024    FINDINGS:  LINES & TUBES: Cardiac monitoring leads overlie the bilateral reagan-thoraces    HEART & MEDIASTINUM: Mediastinal structures are midline.  Cardiac silhouette is normal in size.  Hilar and mediastinal contours are unremarkable.    PLEURA: No pleural effusion.  No pneumothorax.    LUNGS: Lungs are symmetrically expanded and clear.    SOFT TISSUE / BONES:   Thoracic a skeleton is unremarkable.  No pneumoperitoneum.                                    CONTINUE TAKING YOUR 40 MG OF PROTONIX EVERYDAY.  WE WILL ADD SUCRALFATE/CARAFATE PRESCRIPTION TO  USE PRN.  ADDITIONALLY SIMETHICONE PRESCRIPTION TO USE PRN.  PLEASE ASSURE YOU FOLLOW-UP WITH ALL YOUR CARE PROVIDERS AND RETURN TO THE EMERGENCY DEPARTMENT IF SYMPTOMS WORSEN, PERSISTENT VOMITING UNABLE TO DRINK OR EAT, BLACK OR BLOODY STOOL OR ANY NEW SYMPTOMS.

## 2024-08-20 ENCOUNTER — TELEPHONE (OUTPATIENT)
Dept: INTERVENTIONAL RADIOLOGY/VASCULAR | Facility: HOSPITAL | Age: 44
End: 2024-08-20
Payer: COMMERCIAL

## 2024-08-20 DIAGNOSIS — C22.0 HEPATOCELLULAR CARCINOMA: Primary | ICD-10-CM

## 2024-08-20 LAB
OHS QRS DURATION: 78 MS
OHS QTC CALCULATION: 416 MS

## 2024-08-20 RX ORDER — METHYLPREDNISOLONE 4 MG/1
TABLET ORAL
Qty: 21 EACH | Refills: 0 | Status: SHIPPED | OUTPATIENT
Start: 2024-08-20 | End: 2024-09-10

## 2024-08-20 NOTE — NURSING
Patient S/p Y-90 on 8/15. Patient c/o sharp LUQ pain that started Friday. She went to ED yesterday, imaging was performed and she was discharged. Patient calling to speak with a physician. Today she reports decreased appetite, sharp pain to LUQ, pain worsens when walking and when she belch's. Message sent to Dr. Alford.  Dr. Alford states he will call her when done with his first case this morning.  Patient aware  will be calling and will keep her phone near.

## 2024-08-21 DIAGNOSIS — C22.0 HCC (HEPATOCELLULAR CARCINOMA): Primary | ICD-10-CM

## 2024-08-21 LAB
BACTERIA UR CULT: ABNORMAL
BACTERIA UR CULT: ABNORMAL

## 2024-08-22 ENCOUNTER — LAB VISIT (OUTPATIENT)
Dept: LAB | Facility: HOSPITAL | Age: 44
End: 2024-08-22
Attending: PHYSICIAN ASSISTANT
Payer: COMMERCIAL

## 2024-08-22 ENCOUNTER — TELEPHONE (OUTPATIENT)
Dept: INTERNAL MEDICINE | Facility: CLINIC | Age: 44
End: 2024-08-22
Payer: COMMERCIAL

## 2024-08-22 DIAGNOSIS — C22.0 HCC (HEPATOCELLULAR CARCINOMA): Primary | ICD-10-CM

## 2024-08-22 DIAGNOSIS — N39.0 URINARY TRACT INFECTION WITHOUT HEMATURIA, SITE UNSPECIFIED: ICD-10-CM

## 2024-08-22 DIAGNOSIS — N30.00 ACUTE CYSTITIS WITHOUT HEMATURIA: Primary | ICD-10-CM

## 2024-08-22 DIAGNOSIS — C22.0 HCC (HEPATOCELLULAR CARCINOMA): ICD-10-CM

## 2024-08-22 LAB
BACTERIA #/AREA URNS HPF: NORMAL /HPF
BILIRUB UR QL STRIP: NEGATIVE
CLARITY UR: CLEAR
COLOR UR: YELLOW
GLUCOSE UR QL STRIP: NEGATIVE
HGB UR QL STRIP: NEGATIVE
KETONES UR QL STRIP: NEGATIVE
LEUKOCYTE ESTERASE UR QL STRIP: ABNORMAL
MICROSCOPIC COMMENT: NORMAL
NITRITE UR QL STRIP: NEGATIVE
PH UR STRIP: 6 [PH] (ref 5–8)
PROT UR QL STRIP: ABNORMAL
RBC #/AREA URNS HPF: 1 /HPF (ref 0–4)
SP GR UR STRIP: >1.03 (ref 1–1.03)
SQUAMOUS #/AREA URNS HPF: 3 /HPF
URN SPEC COLLECT METH UR: ABNORMAL
UROBILINOGEN UR STRIP-ACNC: NEGATIVE EU/DL
WBC #/AREA URNS HPF: 3 /HPF (ref 0–5)

## 2024-08-22 PROCEDURE — 81000 URINALYSIS NONAUTO W/SCOPE: CPT | Performed by: PHYSICIAN ASSISTANT

## 2024-08-22 NOTE — TELEPHONE ENCOUNTER
Okay to call her or send a my [facility] message.  She was in the ER and they did a urine, they are recommending a repeat urinalysis which I have ordered, she can do this at any time

## 2024-08-26 ENCOUNTER — PATIENT MESSAGE (OUTPATIENT)
Dept: GASTROENTEROLOGY | Facility: CLINIC | Age: 44
End: 2024-08-26
Payer: COMMERCIAL

## 2024-08-26 ENCOUNTER — TELEPHONE (OUTPATIENT)
Dept: INTERVENTIONAL RADIOLOGY/VASCULAR | Facility: CLINIC | Age: 44
End: 2024-08-26
Payer: COMMERCIAL

## 2024-08-26 NOTE — TELEPHONE ENCOUNTER
"Spoke to pt on phone, she is declining the appt for the follow up, says "it would be a waste of time". Verbally understood, thanks  "

## 2024-08-27 ENCOUNTER — TELEPHONE (OUTPATIENT)
Dept: INTERVENTIONAL RADIOLOGY/VASCULAR | Facility: HOSPITAL | Age: 44
End: 2024-08-27

## 2024-08-27 ENCOUNTER — TELEPHONE (OUTPATIENT)
Dept: PAIN MEDICINE | Facility: CLINIC | Age: 44
End: 2024-08-27
Payer: COMMERCIAL

## 2024-08-27 ENCOUNTER — PATIENT MESSAGE (OUTPATIENT)
Dept: ORTHOPEDICS | Facility: CLINIC | Age: 44
End: 2024-08-27
Payer: COMMERCIAL

## 2024-08-27 DIAGNOSIS — M54.12 CERVICAL RADICULOPATHY: Primary | ICD-10-CM

## 2024-08-27 NOTE — PROGRESS NOTES
Spoke with pt virtually. Pt was last seen 2/19/24 and continues to have neck and bilateral pain. Pt has tried home exercises and a medrol dose pack with no relief. Pain is 8/10. I have provided the patient with a home exercise program. It is the [employer] cervical exercise program. Exercises include walking erectly with neutral head position, supine neutral head position, supine retraction, sitting or standing neck retraction, posture training, forward/backward/sideward isometric strengthening, prone head lifts, supine head lifts, scapular retraction, and neck rotation. Pt completes each exercise daily for one hour with worsening of pain. 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  Pt had a C6-7 FLORENTINO on 4/18/24 with 75% relief for 4 months. Will repeat C6-7 FLORENTINO. Pt will fu if pain persists.

## 2024-08-27 NOTE — TELEPHONE ENCOUNTER
----- Message from Ladi Roman PA-C sent at 2024  9:50 AM CDT -----  Regarding: Order for MERLINE HIRSCH    Patient Name: MERLINE HIRSCH([id number])  Sex: Female  : 1980      PCP: MERLINE ALVAREZ    Center: [facility] CENTRAL BILLING OFFICE     Types of orders made on 2024: Procedure Request    Order Date:2024  Ordering User:LADI ROMAN [[id number]]  Encounter Provider:Ladi Roman PA-C   [[id number]]  Authorizing Provider: Ladi Roman PA-C [[id number]]  Supervising Provider:STEVEN PATEL [[id number]]  Type of Supervision:Supervision Required  Department:[facility][[id number]]    Common Order Information  Procedure -> Epidural Injection (specify level) Cmt: C6-7    Order Specific Information  Order: Procedure Order to Pain Management [Custom: [id number]]  Order #:          [id number]: 1 FUTURE      Priority: Routine  Class: Clinic Performed    Future Order Information      Expires on:2025            Expected by:2024                   Associated Diagnoses      M54.12 Cervical radiculopathy      Facility Name: -> [facility]           Priority: Routine  Class: Clinic Performed    Future Order Information      Expires on:2025            Expected by:20                   Associated Diagnoses      M54.12 Cervical radiculopathy      Procedure -> Epidural Injection (specify level) Cmt: C6-7        Facility Name: -> [facility]

## 2024-08-27 NOTE — NURSING
Patient s/p Y-90 on 8/15. She reports 7/10 pain to mid-abdomen and LUQ, nausea, pain when bending from the waste and some radiating pain to her shoulders. Taking between 500-1000mg of tylenol. Today she had to take a 1000mg dose. SOB d/t pain and discomfort when taking a deep breath. She has completed her steroids which reports did help a little. Discussed with Dr. Alford.  Per Dr. Alford patient can alternate with Aleve or Ibuprofen, Symptoms should resolve over next few weeks.  Patient aware can alternate with Aleve or Ibuprofen and symptoms should resolve over  next few weeks.  Patient verbalized understanding and will call for any further questions, new or worsening symptoms.

## 2024-09-06 ENCOUNTER — OFFICE VISIT (OUTPATIENT)
Dept: HEMATOLOGY/ONCOLOGY | Facility: CLINIC | Age: 44
End: 2024-09-06
Payer: COMMERCIAL

## 2024-09-06 ENCOUNTER — INFUSION (OUTPATIENT)
Dept: INFUSION THERAPY | Facility: HOSPITAL | Age: 44
End: 2024-09-06
Payer: COMMERCIAL

## 2024-09-06 ENCOUNTER — LAB VISIT (OUTPATIENT)
Dept: LAB | Facility: HOSPITAL | Age: 44
End: 2024-09-06
Payer: COMMERCIAL

## 2024-09-06 VITALS
SYSTOLIC BLOOD PRESSURE: 142 MMHG | TEMPERATURE: 99 F | HEART RATE: 74 BPM | DIASTOLIC BLOOD PRESSURE: 79 MMHG | BODY MASS INDEX: 21.39 KG/M2 | OXYGEN SATURATION: 99 % | WEIGHT: 152.75 LBS | HEIGHT: 71 IN

## 2024-09-06 VITALS
SYSTOLIC BLOOD PRESSURE: 123 MMHG | TEMPERATURE: 98 F | DIASTOLIC BLOOD PRESSURE: 74 MMHG | RESPIRATION RATE: 16 BRPM | OXYGEN SATURATION: 99 % | HEART RATE: 76 BPM

## 2024-09-06 DIAGNOSIS — C77.9 REGIONAL LYMPH NODE METASTASIS PRESENT: ICD-10-CM

## 2024-09-06 DIAGNOSIS — R11.0 NAUSEA: ICD-10-CM

## 2024-09-06 DIAGNOSIS — C22.0 HCC (HEPATOCELLULAR CARCINOMA): ICD-10-CM

## 2024-09-06 DIAGNOSIS — D18.03 LIVER HEMANGIOMA: ICD-10-CM

## 2024-09-06 DIAGNOSIS — C22.0 HEPATOCELLULAR CARCINOMA: Primary | ICD-10-CM

## 2024-09-06 DIAGNOSIS — M54.12 CERVICAL RADICULOPATHY: ICD-10-CM

## 2024-09-06 DIAGNOSIS — C78.01 MALIGNANT NEOPLASM METASTATIC TO RIGHT LUNG: ICD-10-CM

## 2024-09-06 DIAGNOSIS — R22.9 SKIN NODULE: ICD-10-CM

## 2024-09-06 DIAGNOSIS — D70.9 NEUTROPENIA, UNSPECIFIED TYPE: ICD-10-CM

## 2024-09-06 DIAGNOSIS — K21.9 GASTROESOPHAGEAL REFLUX DISEASE, UNSPECIFIED WHETHER ESOPHAGITIS PRESENT: ICD-10-CM

## 2024-09-06 DIAGNOSIS — C22.0 HCC (HEPATOCELLULAR CARCINOMA): Primary | ICD-10-CM

## 2024-09-06 LAB
AFP SERPL-MCNC: 8.5 NG/ML (ref 0–8.4)
ALBUMIN SERPL BCP-MCNC: 3 G/DL (ref 3.5–5.2)
ALP SERPL-CCNC: 111 U/L (ref 55–135)
ALT SERPL W/O P-5'-P-CCNC: 26 U/L (ref 10–44)
ANION GAP SERPL CALC-SCNC: 5 MMOL/L (ref 8–16)
AST SERPL-CCNC: 18 U/L (ref 10–40)
BILIRUB SERPL-MCNC: 0.4 MG/DL (ref 0.1–1)
BUN SERPL-MCNC: 9 MG/DL (ref 6–20)
CALCIUM SERPL-MCNC: 9.4 MG/DL (ref 8.7–10.5)
CANCER AG19-9 SERPL-ACNC: 90 U/ML (ref 0–40)
CHLORIDE SERPL-SCNC: 106 MMOL/L (ref 95–110)
CO2 SERPL-SCNC: 27 MMOL/L (ref 23–29)
CREAT SERPL-MCNC: 0.8 MG/DL (ref 0.5–1.4)
ERYTHROCYTE [DISTWIDTH] IN BLOOD BY AUTOMATED COUNT: 12 % (ref 11.5–14.5)
EST. GFR  (NO RACE VARIABLE): >60 ML/MIN/1.73 M^2
GLUCOSE SERPL-MCNC: 99 MG/DL (ref 70–110)
HCT VFR BLD AUTO: 35.1 % (ref 37–48.5)
HGB BLD-MCNC: 11.4 G/DL (ref 12–16)
IMM GRANULOCYTES # BLD AUTO: 0.03 K/UL (ref 0–0.04)
MCH RBC QN AUTO: 30.7 PG (ref 27–31)
MCHC RBC AUTO-ENTMCNC: 32.5 G/DL (ref 32–36)
MCV RBC AUTO: 95 FL (ref 82–98)
NEUTROPHILS # BLD AUTO: 2.4 K/UL (ref 1.8–7.7)
PLATELET # BLD AUTO: 269 K/UL (ref 150–450)
PMV BLD AUTO: 8.5 FL (ref 9.2–12.9)
POTASSIUM SERPL-SCNC: 4.6 MMOL/L (ref 3.5–5.1)
PROT SERPL-MCNC: 6.6 G/DL (ref 6–8.4)
RBC # BLD AUTO: 3.71 M/UL (ref 4–5.4)
SODIUM SERPL-SCNC: 138 MMOL/L (ref 136–145)
WBC # BLD AUTO: 3.95 K/UL (ref 3.9–12.7)

## 2024-09-06 PROCEDURE — 86301 IMMUNOASSAY TUMOR CA 19-9: CPT | Performed by: INTERNAL MEDICINE

## 2024-09-06 PROCEDURE — 96361 HYDRATE IV INFUSION ADD-ON: CPT

## 2024-09-06 PROCEDURE — 36415 COLL VENOUS BLD VENIPUNCTURE: CPT | Performed by: INTERNAL MEDICINE

## 2024-09-06 PROCEDURE — 63600175 PHARM REV CODE 636 W HCPCS: Mod: JZ,JG | Performed by: INTERNAL MEDICINE

## 2024-09-06 PROCEDURE — 99999 PR PBB SHADOW E&M-EST. PATIENT-LVL III: CPT | Mod: PBBFAC,,, | Performed by: INTERNAL MEDICINE

## 2024-09-06 PROCEDURE — 85027 COMPLETE CBC AUTOMATED: CPT | Performed by: INTERNAL MEDICINE

## 2024-09-06 PROCEDURE — 25000003 PHARM REV CODE 250: Performed by: INTERNAL MEDICINE

## 2024-09-06 PROCEDURE — 96413 CHEMO IV INFUSION 1 HR: CPT

## 2024-09-06 PROCEDURE — 82105 ALPHA-FETOPROTEIN SERUM: CPT | Performed by: INTERNAL MEDICINE

## 2024-09-06 PROCEDURE — 80053 COMPREHEN METABOLIC PANEL: CPT | Performed by: INTERNAL MEDICINE

## 2024-09-06 RX ORDER — PROCHLORPERAZINE EDISYLATE 5 MG/ML
5 INJECTION INTRAMUSCULAR; INTRAVENOUS ONCE AS NEEDED
Status: CANCELLED
Start: 2024-09-06

## 2024-09-06 RX ORDER — EPINEPHRINE 0.3 MG/.3ML
0.3 INJECTION SUBCUTANEOUS ONCE AS NEEDED
Status: DISCONTINUED | OUTPATIENT
Start: 2024-09-06 | End: 2024-09-06 | Stop reason: HOSPADM

## 2024-09-06 RX ORDER — PROCHLORPERAZINE EDISYLATE 5 MG/ML
5 INJECTION INTRAMUSCULAR; INTRAVENOUS ONCE AS NEEDED
Status: DISCONTINUED | OUTPATIENT
Start: 2024-09-06 | End: 2024-09-06 | Stop reason: HOSPADM

## 2024-09-06 RX ORDER — HEPARIN 100 UNIT/ML
500 SYRINGE INTRAVENOUS
Status: DISCONTINUED | OUTPATIENT
Start: 2024-09-06 | End: 2024-09-06 | Stop reason: HOSPADM

## 2024-09-06 RX ORDER — SODIUM CHLORIDE, SODIUM LACTATE, POTASSIUM CHLORIDE, CALCIUM CHLORIDE 600; 310; 30; 20 MG/100ML; MG/100ML; MG/100ML; MG/100ML
1000 INJECTION, SOLUTION INTRAVENOUS
Status: COMPLETED | OUTPATIENT
Start: 2024-09-06 | End: 2024-09-06

## 2024-09-06 RX ORDER — SODIUM CHLORIDE 0.9 % (FLUSH) 0.9 %
10 SYRINGE (ML) INJECTION
Status: CANCELLED | OUTPATIENT
Start: 2024-09-06

## 2024-09-06 RX ORDER — PROCHLORPERAZINE MALEATE 10 MG
10 TABLET ORAL EVERY 6 HOURS PRN
Qty: 60 TABLET | Refills: 1 | Status: SHIPPED | OUTPATIENT
Start: 2024-09-06 | End: 2025-09-06

## 2024-09-06 RX ORDER — DIPHENHYDRAMINE HYDROCHLORIDE 50 MG/ML
50 INJECTION INTRAMUSCULAR; INTRAVENOUS ONCE AS NEEDED
Status: DISCONTINUED | OUTPATIENT
Start: 2024-09-06 | End: 2024-09-06 | Stop reason: HOSPADM

## 2024-09-06 RX ORDER — DIPHENHYDRAMINE HYDROCHLORIDE 50 MG/ML
50 INJECTION INTRAMUSCULAR; INTRAVENOUS ONCE AS NEEDED
Status: CANCELLED | OUTPATIENT
Start: 2024-09-06

## 2024-09-06 RX ORDER — PREDNISONE 10 MG/1
TABLET ORAL
Qty: 11 TABLET | Refills: 0 | Status: SHIPPED | OUTPATIENT
Start: 2024-09-06 | End: 2024-09-15

## 2024-09-06 RX ORDER — HEPARIN 100 UNIT/ML
500 SYRINGE INTRAVENOUS
Status: CANCELLED | OUTPATIENT
Start: 2024-09-06

## 2024-09-06 RX ORDER — EPINEPHRINE 0.3 MG/.3ML
0.3 INJECTION SUBCUTANEOUS ONCE AS NEEDED
Status: CANCELLED | OUTPATIENT
Start: 2024-09-06

## 2024-09-06 RX ORDER — SODIUM CHLORIDE, SODIUM LACTATE, POTASSIUM CHLORIDE, CALCIUM CHLORIDE 600; 310; 30; 20 MG/100ML; MG/100ML; MG/100ML; MG/100ML
INJECTION, SOLUTION INTRAVENOUS CONTINUOUS
Status: CANCELLED
Start: 2024-09-06

## 2024-09-06 RX ORDER — SODIUM CHLORIDE 0.9 % (FLUSH) 0.9 %
10 SYRINGE (ML) INJECTION
Status: DISCONTINUED | OUTPATIENT
Start: 2024-09-06 | End: 2024-09-06 | Stop reason: HOSPADM

## 2024-09-06 RX ADMIN — SODIUM CHLORIDE, POTASSIUM CHLORIDE, SODIUM LACTATE AND CALCIUM CHLORIDE 1000 ML: 600; 310; 30; 20 INJECTION, SOLUTION INTRAVENOUS at 08:09

## 2024-09-06 RX ADMIN — SODIUM CHLORIDE: 9 INJECTION, SOLUTION INTRAVENOUS at 09:09

## 2024-09-06 RX ADMIN — SODIUM CHLORIDE 1500 MG: 9 INJECTION, SOLUTION INTRAVENOUS at 09:09

## 2024-09-06 NOTE — H&P (VIEW-ONLY)
MEDICAL ONCOLOGY - ESTABLISHED PATIENT VISIT    Reason for visit: Scirrhous HCC    Best Contact Phone Number(s): [phone] (home)      Cancer/Stage/TNM:    Cancer Staging   Hepatocellular carcinoma  Staging form: Liver, AJCC 8th Edition  - Clinical stage from 4/10/2023: Stage IVB (rcT1b, cN0, pM1) - Signed by Philippe Carrasco MD on 8/7/2023       Oncology History   Hepatocellular carcinoma   6/9/2021 Initial Diagnosis    Hepatocellular carcinoma     4/10/2023 Cancer Staged    Staging form: Liver, AJCC 8th Edition  - Clinical stage from 4/10/2023: Stage IVB (rcT1b, cN0, pM1)     7/11/2023 -  Chemotherapy    Treatment Summary   Plan Name: OP TREMELIMUMAB 300 MG (X 1) + DURVALUMAB 1500 MG Q4W  Treatment Goal: Control  Status: Active  Start Date: 7/11/2023  End Date: 1/24/2025 (Planned)  Provider: Philippe Carrasco MD  Chemotherapy: [No matching medication found in this treatment plan]     9/11/2023 Genetic Testing    Genetic counseling done. Hereditary syndrome unlikely. Patient offered testing, but declined due to cost.      10/23/2023 - 11/3/2023 Radiation Therapy    Treating physician: yamil villa    Course: C1 Chst/Abd 2023  Treatment Site Ref. ID Energy Dose/Fx (Gy) #Fx Dose Correction (Gy) Total Dose (Gy) Start Date End Date Elapsed Days   SB Lung_R PTV_Lung 6X 10 5 / 5 0 50 10/23/2023 11/1/2023 9   SB Abdomen PTV_PortalLN 6X 10 5 / 5 0 50 10/23/2023 11/3/2023 11           Interim History:   44 y.o. 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 Howell with the same pathology, negative margins, 0 of 8 lymph nodes positive and + for vascular and perineural invasion. We saw her in 2021 for evaluation for persistent CA 19-9 but there was no radiographic evidence of HCC disease recurrence or alternative reason for CA 19-9 elevation.  CT chest on 3/9/23 showed an enlarging RLL nodule measuring 1.1 cm, increased from 0.8 cm in size.  IR biopsy of this on  4/10/23 confirmed metastatic HCC.  Since then in mid-June she has also had an MRI abdomen that showed an enlarging portocaval lymph node that is consistent with metastatic disease.  She completed SBRT at the beginning of November to her lung metastasis and to her portocaval lymph node. She underwent Y-90 on 2/1/24.  She underwent Y-90 again to a left lobe hepatic tumor on 8/15/24.    She presents today for cycle 16 of durvalumab + tremelimumab as part of the STRiDE regimen. She received re-priming with tremelimumab with cycle 15.  She is still experiencing pain in her LUQ with pleuritic type symptoms.  Has persistent nausea, neither of these are changed with eating.  She continues on Protonix, not eating as much because of nausea.  Denies any other sites of pain.  Was in ER 8/19 with CTA which was negative for PE, no other acute findings on CT abdomen either.    No persistent diarrhea, dyspnea.    Presents alone today.  ECOG PS 0.    ROS:   Review of Systems   Constitutional:  Positive for weight loss. Negative for chills, fever and malaise/fatigue.   HENT:  Negative for sore throat.    Eyes:  Negative for blurred vision and pain.   Respiratory:  Negative for cough and shortness of breath.    Cardiovascular:  Negative for chest pain and leg swelling.   Gastrointestinal:  Positive for abdominal pain and constipation. Negative for diarrhea, nausea and vomiting.   Genitourinary:  Negative for dysuria and frequency.   Musculoskeletal:  Negative for back pain, falls and myalgias.   Skin:  Negative for rash.   Neurological:  Negative for dizziness, weakness and headaches.   Endo/Heme/Allergies:  Does not bruise/bleed easily.   Psychiatric/Behavioral:  Negative for depression. The patient is not nervous/anxious.    All other systems reviewed and are negative.      Past Medical History:   Past Medical History:   Diagnosis Date    VENKATA positive 08/20/2020    COVID-19     Deviated septum 2011    Hepatocellular carcinoma  2021    Hypertrophy of inferior nasal turbinate     Pericardial effusion     Premature menopause         Past Surgical History:   Past Surgical History:   Procedure Laterality Date    COLONOSCOPY N/A 2020    Procedure: COLONOSCOPY;  Surgeon: GIOVANNI Crane MD;  Location: [facility] (4TH FLR);  Service: Endoscopy;  Laterality: N/A;  + covid     EPIDURAL STEROID INJECTION INTO CERVICAL SPINE N/A 2024    Procedure: FLORENTINO C7/T1;  Surgeon: Cezar Bailey MD;  Location: [facility];  Service: Pain Management;  Laterality: N/A;  20mins-No ac    ESOPHAGOGASTRODUODENOSCOPY N/A 1/10/2024    Procedure: ESOPHAGOGASTRODUODENOSCOPY (EGD);  Surgeon: Reynaldo Lynch MD;  Location: [facility] (2ND FLR);  Service: Endoscopy;  Laterality: N/A;  referred by daryl carrasco rocedure: EGD Diagnosis: Abnormal finding on GI tract imaging, Abdominal pain, and GERD  Procedure Timin-4 weeks Provider: Any GI provider Location: [facility], [facility] 2-Endo, and [facility] 4-Endo  Additional Scheduling Informat    HERNIA REPAIR      LIVER SURGERY N/A 2021    NASAL SEPTUM SURGERY      PERICARDIAL WINDOW N/A 2021    Procedure: CREATION, PERICARDIAL WINDOW;  Surgeon: Ajay Cantor MD;  Location: [facility] OR 2ND FLR;  Service: Cardiovascular;  Laterality: N/A;    PERICARDIOCENTESIS N/A 2020    Procedure: Pericardiocentesis;  Surgeon: Dickson Dangelo MD;  Location: [facility] CATH LAB;  Service: Cardiology;  Laterality: N/A;    TONSILLECTOMY          Family History:   Family History   Problem Relation Name Age of Onset    Diabetes Mother      Liver disease Mother          fatty liver    Heart disease Father      Macular degeneration Father      Diabetes Father      Hypertension Father      Hyperlipidemia Father      Heart disease Sister      Adjustment disorder with mixed anxiety and depressed mood Sister      Lung cancer Maternal Aunt          heavy smoker    Cerebral aneurysm Paternal Aunt      Cataracts  Neg Hx      Glaucoma Neg Hx      Retinal detachment Neg Hx      Stroke Neg Hx      Thyroid disease Neg Hx      Melanoma Neg Hx      Heart attack Neg Hx          Social History:   Social History     Tobacco Use    Smoking status: Never    Smokeless tobacco: Never   Substance Use Topics    Alcohol use: Yes     Comment: rare        I have reviewed and updated the patient's past medical, surgical, family and social histories.    Allergies:   Review of patient's allergies indicates:   Allergen Reactions    No known drug allergies         Medications:   Current Outpatient Medications   Medication Sig Dispense Refill    ALPRAZolam (XANAX) 0.5 MG tablet Take 1 tablet (0.5 mg total) by mouth daily as needed for Anxiety. 30 tablet 1    cetirizine (ZYRTEC) 10 MG tablet Take 1 tablet (10 mg total) by mouth once daily. 90 tablet 3    cyanocobalamin (VITAMIN B-12) 1000 MCG tablet Take 1 tablet (1,000 mcg total) by mouth once daily. 30 tablet 12    cyclobenzaprine (FLEXERIL) 10 MG tablet Take 1 tablet (10 mg total) by mouth nightly as needed for Muscle spasms. 30 tablet 1    EScitalopram oxalate (LEXAPRO) 10 MG tablet Take 1 tablet (10 mg total) by mouth once daily. 90 tablet 1    fluticasone propionate (FLONASE) 50 mcg/actuation nasal spray SPRAY 2 SPRAYS BY EACH NOSTRIL ROUTE ONCE DAILY. 48 mL 2    ketoconazole (NIZORAL) 2 % cream aaa on face and behind ears qd-bid prn flare 30 g 3    multivitamin capsule Take by mouth. 1 capsule Oral Every morning      naproxen (NAPROSYN) 500 MG tablet Take 1 tablet (500 mg total) by mouth 2 (two) times daily with meals. 60 tablet 0    norethindrone-ethinyl estradiol (MICROGESTIN 1/20) 1-20 mg-mcg per tablet Take 1 tablet by mouth once daily. 63 tablet 4    omeprazole (PRILOSEC) 20 MG capsule Take 20 mg by mouth daily as needed.      ondansetron (ZOFRAN-ODT) 4 MG TbDL Take 1 tablet (4 mg total) by mouth every 12 (twelve) hours as needed (nausea). 20 tablet 0    pantoprazole (PROTONIX) 40 MG  "tablet TAKE 1 TABLET BY MOUTH EVERY DAY 90 tablet 1    predniSONE (DELTASONE) 10 MG tablet Take 2 tablets (20 mg total) by mouth once daily for 3 days, THEN 1 tablet (10 mg total) once daily for 3 days, THEN 0.5 tablets (5 mg total) once daily for 3 days. 11 tablet 0    prochlorperazine (COMPAZINE) 10 MG tablet Take 1 tablet (10 mg total) by mouth every 6 (six) hours as needed (nausea). 60 tablet 1    simethicone (MYLICON) 125 MG chewable tablet Take 1 tablet (125 mg total) by mouth every 6 (six) hours as needed for Flatulence (BURPING/BELCHING). 30 tablet 0    sucralfate (CARAFATE) 100 mg/mL suspension Take 10 mLs (1 g total) by mouth 4 (four) times daily. 414 mL 0     No current facility-administered medications for this visit.        Physical Exam:   BP (!) 142/79 (BP Location: Right arm, Patient Position: Sitting, BP Method: Medium (Automatic))   Pulse 74   Temp 99.1 °F (37.3 °C) (Oral)   Ht 5' 11" (1.803 m)   Wt 69.3 kg (152 lb 12.5 oz)   LMP  (LMP Unknown)   SpO2 99%   BMI 21.31 kg/m²                Physical Exam  Vitals reviewed.   Constitutional:       General: She is not in acute distress.     Appearance: Normal appearance. She is normal weight.   HENT:      Head: Normocephalic and atraumatic.      Right Ear: External ear normal.      Left Ear: External ear normal.      Nose: Nose normal.      Mouth/Throat:      Mouth: Mucous membranes are moist.      Pharynx: Oropharynx is clear. No posterior oropharyngeal erythema.   Eyes:      General: No scleral icterus.     Extraocular Movements: Extraocular movements intact.      Conjunctiva/sclera: Conjunctivae normal.      Pupils: Pupils are equal, round, and reactive to light.   Cardiovascular:      Rate and Rhythm: Normal rate and regular rhythm.      Pulses: Normal pulses.      Heart sounds: Normal heart sounds.   Pulmonary:      Effort: Pulmonary effort is normal.      Breath sounds: Normal breath sounds. No wheezing or rales.   Abdominal:      General: " Bowel sounds are normal. There is no distension.      Palpations: Abdomen is soft.      Tenderness: There is no abdominal tenderness.   Musculoskeletal:         General: No swelling. Normal range of motion.      Cervical back: Normal range of motion and neck supple.   Skin:     General: Skin is warm.      Coloration: Skin is not jaundiced.      Findings: No erythema or rash.   Neurological:      General: No focal deficit present.      Mental Status: She is alert and oriented to person, place, and time. Mental status is at baseline.      Gait: Gait normal.   Psychiatric:         Mood and Affect: Mood normal.         Behavior: Behavior normal.         Thought Content: Thought content normal.         Judgment: Judgment normal.           Labs:   Recent Results (from the past 48 hour(s))   CBC Oncology    Collection Time: 09/06/24  7:12 AM   Result Value Ref Range    WBC 3.95 3.90 - 12.70 K/uL    RBC 3.71 (L) 4.00 - 5.40 M/uL    Hemoglobin 11.4 (L) 12.0 - 16.0 g/dL    Hematocrit 35.1 (L) 37.0 - 48.5 %    MCV 95 82 - 98 fL    MCH 30.7 27.0 - 31.0 pg    MCHC 32.5 32.0 - 36.0 g/dL    RDW 12.0 11.5 - 14.5 %    Platelets 269 150 - 450 K/uL    MPV 8.5 (L) 9.2 - 12.9 fL    Gran # (ANC) 2.4 1.8 - 7.7 K/uL    Immature Grans (Abs) 0.03 0.00 - 0.04 K/uL   Comprehensive Metabolic Panel    Collection Time: 09/06/24  7:12 AM   Result Value Ref Range    Sodium 138 136 - 145 mmol/L    Potassium 4.6 3.5 - 5.1 mmol/L    Chloride 106 95 - 110 mmol/L    CO2 27 23 - 29 mmol/L    Glucose 99 70 - 110 mg/dL    BUN 9 6 - 20 mg/dL    Creatinine 0.8 0.5 - 1.4 mg/dL    Calcium 9.4 8.7 - 10.5 mg/dL    Total Protein 6.6 6.0 - 8.4 g/dL    Albumin 3.0 (L) 3.5 - 5.2 g/dL    Total Bilirubin 0.4 0.1 - 1.0 mg/dL    Alkaline Phosphatase 111 55 - 135 U/L    AST 18 10 - 40 U/L    ALT 26 10 - 44 U/L    eGFR >60.0 >60 mL/min/1.73 m^2    Anion Gap 5 (L) 8 - 16 mmol/L   Cancer Antigen 19-9    Collection Time: 09/06/24  7:12 AM   Result Value Ref Range    CA 19-9  90.0 (H) 0.0 - 40.0 U/mL   AFP Tumor Marker    Collection Time: 09/06/24  7:12 AM   Result Value Ref Range    AFP 8.5 (H) 0.0 - 8.4 ng/mL         I have reviewed the pertinent labs.  Mild anemia, AFP & CA 19-9 slightly decreased.    Imaging:       CTA Chest - 8/19/24:    Impression:     No pulmonary embolism.     New post radioembolization change in the left hepatic lobe.  Increased periportal edema.     Nonspecific small volume pelvic free fluid.     Multiple stable subcentimeter solid pulmonary nodules.     Similar ill-defined hypodensity in the right hepatic lobe.     Additional findings as above    Path:   6/28/21: partial hepatectomy:    Final Pathologic Diagnosis 1.  Gallbladder (cholecystectomy):   - Benign gallbladder with cholecystitis   2. Right lobe (partial hepatectomy):   - Positive for malignancy, see synoptic report below   - Separate hemangioma, 7.3 cm   3. Lymph nodes, portal (regional resection):   - 8 lymph nodes, negative for tumor (0/8)   ______________________________________________________________________________   ______   Hepatocellular carcinoma synoptic report   - Procedure:  Partial hepatectomy, right lobe   - Histologic type:  Hepatocellular carcinoma, scirrhous   - Histologic grade:  Moderately differentiated, grade 2   - Tumor focality:  Solitary   - Tumor characteristics:   - Tumor site:  Right lobe   - Tumor size:  3.3 cm   - Treatment effect:  No known pre-surgical therapy   - Satellitosis:  Not identified   - Tumor extent:  Confined to liver   - Vascular invasion:  Present, Small vessel   - Perineural invasion:  Present   - Margins:   - All margins are negative for invasive carcinoma   - Closest margin to invasive carcinoma:  Parenchymal   - Distance from invasive  carcinoma to closest margin:  5 mm   - Regional lymph nodes:   - Number of lymph nodes with tumor:  0   - Number of lymph nodes examined:  8   - Pathology: pT2 N0 MX   - Additional findings:   - No steatosis   -  Trichrome stain:  Periportal fibrosis with early septa, stage 1-2   - Iron stain:  Negative   - Iron and trichrome stains with appropriate controls   Tumor blocks:  2A, 2B, 2 C    Comment: Interp By Madeline Carlos MD, Signed on 07/08/2021 at 16:47       Assessment:       1. HCC (hepatocellular carcinoma)    2. Regional lymph node metastasis present    3. Malignant neoplasm metastatic to right lung    4. Neutropenia, unspecified type    5. Cervical radiculopathy    6. Liver hemangioma    7. Gastroesophageal reflux disease, unspecified whether esophagitis present    8. Skin nodule    9. Nausea            Plan:             # HCC  Scirrhous subtype, which is very uncommon (~ 1% of all HCC); prognosis is likely similar to typical HCC.  No clear underlying liver pathology in this patient.  Had margin negative resection with negative lymph node eval on 6/28/21 with Dr. Howell.  Unfortunately she has biopsy proven recurrent metastatic disease to her RLL s/p IR biopsy 4/10/23.   She was discussed at thoracic tumor board with potential plan for surgical resection with Dr. Tadeo.  She had a concerning bone lesion on PET from 4/26 at T2.  This was biopsied and proven to be benign.  In the interim she had a repeat abdominal MRI on 6/13/23 which demonstrated growth of a portacaval lymph node that was very concerning for metastatic disease when we reviewed her imaging at liver conference.  Meanwhile her RLL met has grown slightly on 6/21/23 CT as well.  We discussed her case with Valerie Dhillon and Shwetha and we were all in agreement with proceeding with systemic therapy rather than surgery or radiation given multifocal progression.    We discussed this with her and she is agreeable with starting systemic therapy.  Reviewed possibilities of atezo + magaly or durva + treme.  She wished to proceed with STRIDE regimen: durvalumab + tremelimumab.    Received cycle 1 on 7/11/23.  Repeat CT CAP after cycle 3 demonstrates mild growth in  albert hepatis lymph node.  Stable disease elsewhere.    Discussed with Dr. Mendiola and she was deemed eligible for SBRT to her abdominal lymph node and growing lung nodule.  She completed SBRT to both 11/3/23.    Meanwhile she developed a new concerning area of possible recurrence near her liver resection site.  Discussed with Dr. Lala and Dr. Dhillon.  Dr. Dhillon was unable to visualize it during radiation simulation.  We recommended to proceed with Y-90 which was administered 2/1/24.    MRI 2/29/24 shows good response to Y-90 treated lesion.  Indeterminate lesion reviewed by Dr. Alford with no concerning features.  Will continue current regimen.  Consider switching systemic therapy only if significant progression given likely side effects of TKI.    Saw Dr. Sabillon of cardiology who recommended troponin and ECG monitoring given her cardiac history.  She is asymptomatic at present.    MRI after cycle 12 showed mild growth in left hepatic lobe lesion.    She received Y90 to this 8/15/24 with Dr. Alford.  Meanwhile continued with durvalumab.    Gave additional one time re-priming dose of tremelimumab with cycle 15.  Presents today for cycle 16 of durvalumab only.  Labs acceptable to proceed.  Continue LR with infusions per her request.    Repeat MRI abd later September.    Will plan to bring her back in 4 weeks for cycle 17 and clinic visit for cycle 18.     # Neutropenia, cervical radiculopathy  Neutropenia intermittent.  Likely benign etiology.  Continue to f/u with Orthopedics.  Had recent FLORENTINO.    # Liver hemangiomas, GERD  Continue monitoring as indicated with Dr. Rogers.  Continue to f/u with GI team for GERD symptoms. Continue with medication management with Protonix.  For nausea/diaphragmatic pain, discussed with Dr. Alford.  Will send steroids again with taper.  Also sent Compazine.    # RLE achilles nodule  Likely benign growth such as lipoma based on US and clinically appearance.  Monitor.    Follow up: 4  weeks.    The above information has been reviewed with the patient and all questions have been answered to their apparent satisfaction.  They understand that they can call the clinic with any questions.    Philippe Carrasco MD  Hematology/Oncology  [facility]      Route Chart for Scheduling    Med Onc Chart Routing      Follow up with physician 2 months. durvalumab every 4 weeks; see us every other time in clinic   Follow up with MELA    Infusion scheduling note    Injection scheduling note    Labs CBC, CMP, CA 19-9 and TSH   Scheduling:  Preferred lab:  Lab interval:  & AFP   Imaging    Pharmacy appointment    Other referrals                  Treatment Plan Information   OP TREMELIMUMAB 300 MG (X 1) + DURVALUMAB 1500 MG Q4W Philippe Carrasco MD   Associated diagnosis: Hepatocellular carcinoma Stage IVB rcT1b, cN0, pM1 noted on 6/9/2021   Line of treatment: First Line  Treatment Goal: Control     Upcoming Treatment Dates - OP TREMELIMUMAB 300 MG (X 1) + DURVALUMAB 1500 MG Q4W    9/6/2024       Chemotherapy       durvalumab (IMFINZI) 1,500 mg in 0.9% NaCl SolP 280 mL chemo infusion       Antiemetics       prochlorperazine injection Soln 5 mg  10/4/2024       Chemotherapy       durvalumab (IMFINZI) 1,500 mg in sodium chloride 0.9% SolP 280 mL chemo infusion       Antiemetics       prochlorperazine injection Soln 5 mg  11/1/2024       Chemotherapy       durvalumab (IMFINZI) 1,500 mg in sodium chloride 0.9% SolP 280 mL chemo infusion       Antiemetics       prochlorperazine injection Soln 5 mg  11/29/2024       Chemotherapy       durvalumab (IMFINZI) 1,500 mg in sodium chloride 0.9% SolP 280 mL chemo infusion       Antiemetics       prochlorperazine injection Soln 5 mg

## 2024-09-06 NOTE — PLAN OF CARE
1120 - Pt tolerated 1L LR bolus and Imfinzi infusion well today, no complaints or complications. VSS throughout treatment. Pt aware to call provider with any questions or concerns and is aware of upcoming appts. Pt ambulatory from clinic with steady gait, no distress noted.

## 2024-09-06 NOTE — PLAN OF CARE
0900 - Labs, hx, and medications reviewed, pt meets parameters for treatment today. Assessment completed and plan of care reviewed. Pt verbalized understanding. PIV placed with no complications. Pt voices no new complaints or concerns, will continue to monitor for safety.

## 2024-09-06 NOTE — PROGRESS NOTES
MEDICAL ONCOLOGY - ESTABLISHED PATIENT VISIT    Reason for visit: Scirrhous HCC    Best Contact Phone Number(s): [phone] (home)      Cancer/Stage/TNM:    Cancer Staging   Hepatocellular carcinoma  Staging form: Liver, AJCC 8th Edition  - Clinical stage from 4/10/2023: Stage IVB (rcT1b, cN0, pM1) - Signed by Philippe Carrasco MD on 8/7/2023       Oncology History   Hepatocellular carcinoma   6/9/2021 Initial Diagnosis    Hepatocellular carcinoma     4/10/2023 Cancer Staged    Staging form: Liver, AJCC 8th Edition  - Clinical stage from 4/10/2023: Stage IVB (rcT1b, cN0, pM1)     7/11/2023 -  Chemotherapy    Treatment Summary   Plan Name: OP TREMELIMUMAB 300 MG (X 1) + DURVALUMAB 1500 MG Q4W  Treatment Goal: Control  Status: Active  Start Date: 7/11/2023  End Date: 1/24/2025 (Planned)  Provider: Philippe Carrasco MD  Chemotherapy: [No matching medication found in this treatment plan]     9/11/2023 Genetic Testing    Genetic counseling done. Hereditary syndrome unlikely. Patient offered testing, but declined due to cost.      10/23/2023 - 11/3/2023 Radiation Therapy    Treating physician: yamil villa    Course: C1 Chst/Abd 2023  Treatment Site Ref. ID Energy Dose/Fx (Gy) #Fx Dose Correction (Gy) Total Dose (Gy) Start Date End Date Elapsed Days   SB Lung_R PTV_Lung 6X 10 5 / 5 0 50 10/23/2023 11/1/2023 9   SB Abdomen PTV_PortalLN 6X 10 5 / 5 0 50 10/23/2023 11/3/2023 11           Interim History:   44 y.o. 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 Howell with the same pathology, negative margins, 0 of 8 lymph nodes positive and + for vascular and perineural invasion. We saw her in 2021 for evaluation for persistent CA 19-9 but there was no radiographic evidence of HCC disease recurrence or alternative reason for CA 19-9 elevation.  CT chest on 3/9/23 showed an enlarging RLL nodule measuring 1.1 cm, increased from 0.8 cm in size.  IR biopsy of this on  4/10/23 confirmed metastatic HCC.  Since then in mid-June she has also had an MRI abdomen that showed an enlarging portocaval lymph node that is consistent with metastatic disease.  She completed SBRT at the beginning of November to her lung metastasis and to her portocaval lymph node. She underwent Y-90 on 2/1/24.  She underwent Y-90 again to a left lobe hepatic tumor on 8/15/24.    She presents today for cycle 16 of durvalumab + tremelimumab as part of the STRiDE regimen. She received re-priming with tremelimumab with cycle 15.  She is still experiencing pain in her LUQ with pleuritic type symptoms.  Has persistent nausea, neither of these are changed with eating.  She continues on Protonix, not eating as much because of nausea.  Denies any other sites of pain.  Was in ER 8/19 with CTA which was negative for PE, no other acute findings on CT abdomen either.    No persistent diarrhea, dyspnea.    Presents alone today.  ECOG PS 0.    ROS:   Review of Systems   Constitutional:  Positive for weight loss. Negative for chills, fever and malaise/fatigue.   HENT:  Negative for sore throat.    Eyes:  Negative for blurred vision and pain.   Respiratory:  Negative for cough and shortness of breath.    Cardiovascular:  Negative for chest pain and leg swelling.   Gastrointestinal:  Positive for abdominal pain and constipation. Negative for diarrhea, nausea and vomiting.   Genitourinary:  Negative for dysuria and frequency.   Musculoskeletal:  Negative for back pain, falls and myalgias.   Skin:  Negative for rash.   Neurological:  Negative for dizziness, weakness and headaches.   Endo/Heme/Allergies:  Does not bruise/bleed easily.   Psychiatric/Behavioral:  Negative for depression. The patient is not nervous/anxious.    All other systems reviewed and are negative.      Past Medical History:   Past Medical History:   Diagnosis Date    VENKATA positive 08/20/2020    COVID-19     Deviated septum 2011    Hepatocellular carcinoma  2021    Hypertrophy of inferior nasal turbinate     Pericardial effusion     Premature menopause         Past Surgical History:   Past Surgical History:   Procedure Laterality Date    COLONOSCOPY N/A 2020    Procedure: COLONOSCOPY;  Surgeon: GIOVANNI Crane MD;  Location: [facility] (4TH FLR);  Service: Endoscopy;  Laterality: N/A;  + covid     EPIDURAL STEROID INJECTION INTO CERVICAL SPINE N/A 2024    Procedure: FLORENTINO C7/T1;  Surgeon: Cezar Bailey MD;  Location: [facility] PAIN [facility]NA[facility];  Service: Pain Management;  Laterality: N/A;  20mins-No ac    ESOPHAGOGASTRODUODENOSCOPY N/A 1/10/2024    Procedure: ESOPHAGOGASTRODUODENOSCOPY (EGD);  Surgeon: Reynaldo Lynch MD;  Location: [facility] (2ND FLR);  Service: Endoscopy;  Laterality: N/A;  referred by daryl carrasco rocedure: EGD Diagnosis: Abnormal finding on GI tract imaging, Abdominal pain, and GERD  Procedure Timin-4 weeks Provider: Any GI provider Location: [facility], [facility] 2-Endo, and [facility] 4-Endo  Additional Scheduling Informat    HERNIA REPAIR      LIVER SURGERY N/A 2021    NASAL SEPTUM SURGERY      PERICARDIAL WINDOW N/A 2021    Procedure: CREATION, PERICARDIAL WINDOW;  Surgeon: Ajay Cantor MD;  Location: [facility] OR 2ND FLR;  Service: Cardiovascular;  Laterality: N/A;    PERICARDIOCENTESIS N/A 2020    Procedure: Pericardiocentesis;  Surgeon: Dickson Dangelo MD;  Location: [facility] CATH LAB;  Service: Cardiology;  Laterality: N/A;    TONSILLECTOMY          Family History:   Family History   Problem Relation Name Age of Onset    Diabetes Mother      Liver disease Mother          fatty liver    Heart disease Father      Macular degeneration Father      Diabetes Father      Hypertension Father      Hyperlipidemia Father      Heart disease Sister      Adjustment disorder with mixed anxiety and depressed mood Sister      Lung cancer Maternal Aunt          heavy smoker    Cerebral aneurysm Paternal Aunt      Cataracts  Neg Hx      Glaucoma Neg Hx      Retinal detachment Neg Hx      Stroke Neg Hx      Thyroid disease Neg Hx      Melanoma Neg Hx      Heart attack Neg Hx          Social History:   Social History     Tobacco Use    Smoking status: Never    Smokeless tobacco: Never   Substance Use Topics    Alcohol use: Yes     Comment: rare        I have reviewed and updated the patient's past medical, surgical, family and social histories.    Allergies:   Review of patient's allergies indicates:   Allergen Reactions    No known drug allergies         Medications:   Current Outpatient Medications   Medication Sig Dispense Refill    ALPRAZolam (XANAX) 0.5 MG tablet Take 1 tablet (0.5 mg total) by mouth daily as needed for Anxiety. 30 tablet 1    cetirizine (ZYRTEC) 10 MG tablet Take 1 tablet (10 mg total) by mouth once daily. 90 tablet 3    cyanocobalamin (VITAMIN B-12) 1000 MCG tablet Take 1 tablet (1,000 mcg total) by mouth once daily. 30 tablet 12    cyclobenzaprine (FLEXERIL) 10 MG tablet Take 1 tablet (10 mg total) by mouth nightly as needed for Muscle spasms. 30 tablet 1    EScitalopram oxalate (LEXAPRO) 10 MG tablet Take 1 tablet (10 mg total) by mouth once daily. 90 tablet 1    fluticasone propionate (FLONASE) 50 mcg/actuation nasal spray SPRAY 2 SPRAYS BY EACH NOSTRIL ROUTE ONCE DAILY. 48 mL 2    ketoconazole (NIZORAL) 2 % cream aaa on face and behind ears qd-bid prn flare 30 g 3    multivitamin capsule Take by mouth. 1 capsule Oral Every morning      naproxen (NAPROSYN) 500 MG tablet Take 1 tablet (500 mg total) by mouth 2 (two) times daily with meals. 60 tablet 0    norethindrone-ethinyl estradiol (MICROGESTIN 1/20) 1-20 mg-mcg per tablet Take 1 tablet by mouth once daily. 63 tablet 4    omeprazole (PRILOSEC) 20 MG capsule Take 20 mg by mouth daily as needed.      ondansetron (ZOFRAN-ODT) 4 MG TbDL Take 1 tablet (4 mg total) by mouth every 12 (twelve) hours as needed (nausea). 20 tablet 0    pantoprazole (PROTONIX) 40 MG  "tablet TAKE 1 TABLET BY MOUTH EVERY DAY 90 tablet 1    predniSONE (DELTASONE) 10 MG tablet Take 2 tablets (20 mg total) by mouth once daily for 3 days, THEN 1 tablet (10 mg total) once daily for 3 days, THEN 0.5 tablets (5 mg total) once daily for 3 days. 11 tablet 0    prochlorperazine (COMPAZINE) 10 MG tablet Take 1 tablet (10 mg total) by mouth every 6 (six) hours as needed (nausea). 60 tablet 1    simethicone (MYLICON) 125 MG chewable tablet Take 1 tablet (125 mg total) by mouth every 6 (six) hours as needed for Flatulence (BURPING/BELCHING). 30 tablet 0    sucralfate (CARAFATE) 100 mg/mL suspension Take 10 mLs (1 g total) by mouth 4 (four) times daily. 414 mL 0     No current facility-administered medications for this visit.        Physical Exam:   BP (!) 142/79 (BP Location: Right arm, Patient Position: Sitting, BP Method: Medium (Automatic))   Pulse 74   Temp 99.1 °F (37.3 °C) (Oral)   Ht 5' 11" (1.803 m)   Wt 69.3 kg (152 lb 12.5 oz)   LMP  (LMP Unknown)   SpO2 99%   BMI 21.31 kg/m²                Physical Exam  Vitals reviewed.   Constitutional:       General: She is not in acute distress.     Appearance: Normal appearance. She is normal weight.   HENT:      Head: Normocephalic and atraumatic.      Right Ear: External ear normal.      Left Ear: External ear normal.      Nose: Nose normal.      Mouth/Throat:      Mouth: Mucous membranes are moist.      Pharynx: Oropharynx is clear. No posterior oropharyngeal erythema.   Eyes:      General: No scleral icterus.     Extraocular Movements: Extraocular movements intact.      Conjunctiva/sclera: Conjunctivae normal.      Pupils: Pupils are equal, round, and reactive to light.   Cardiovascular:      Rate and Rhythm: Normal rate and regular rhythm.      Pulses: Normal pulses.      Heart sounds: Normal heart sounds.   Pulmonary:      Effort: Pulmonary effort is normal.      Breath sounds: Normal breath sounds. No wheezing or rales.   Abdominal:      General: " Bowel sounds are normal. There is no distension.      Palpations: Abdomen is soft.      Tenderness: There is no abdominal tenderness.   Musculoskeletal:         General: No swelling. Normal range of motion.      Cervical back: Normal range of motion and neck supple.   Skin:     General: Skin is warm.      Coloration: Skin is not jaundiced.      Findings: No erythema or rash.   Neurological:      General: No focal deficit present.      Mental Status: She is alert and oriented to person, place, and time. Mental status is at baseline.      Gait: Gait normal.   Psychiatric:         Mood and Affect: Mood normal.         Behavior: Behavior normal.         Thought Content: Thought content normal.         Judgment: Judgment normal.           Labs:   Recent Results (from the past 48 hour(s))   CBC Oncology    Collection Time: 09/06/24  7:12 AM   Result Value Ref Range    WBC 3.95 3.90 - 12.70 K/uL    RBC 3.71 (L) 4.00 - 5.40 M/uL    Hemoglobin 11.4 (L) 12.0 - 16.0 g/dL    Hematocrit 35.1 (L) 37.0 - 48.5 %    MCV 95 82 - 98 fL    MCH 30.7 27.0 - 31.0 pg    MCHC 32.5 32.0 - 36.0 g/dL    RDW 12.0 11.5 - 14.5 %    Platelets 269 150 - 450 K/uL    MPV 8.5 (L) 9.2 - 12.9 fL    Gran # (ANC) 2.4 1.8 - 7.7 K/uL    Immature Grans (Abs) 0.03 0.00 - 0.04 K/uL   Comprehensive Metabolic Panel    Collection Time: 09/06/24  7:12 AM   Result Value Ref Range    Sodium 138 136 - 145 mmol/L    Potassium 4.6 3.5 - 5.1 mmol/L    Chloride 106 95 - 110 mmol/L    CO2 27 23 - 29 mmol/L    Glucose 99 70 - 110 mg/dL    BUN 9 6 - 20 mg/dL    Creatinine 0.8 0.5 - 1.4 mg/dL    Calcium 9.4 8.7 - 10.5 mg/dL    Total Protein 6.6 6.0 - 8.4 g/dL    Albumin 3.0 (L) 3.5 - 5.2 g/dL    Total Bilirubin 0.4 0.1 - 1.0 mg/dL    Alkaline Phosphatase 111 55 - 135 U/L    AST 18 10 - 40 U/L    ALT 26 10 - 44 U/L    eGFR >60.0 >60 mL/min/1.73 m^2    Anion Gap 5 (L) 8 - 16 mmol/L   Cancer Antigen 19-9    Collection Time: 09/06/24  7:12 AM   Result Value Ref Range    CA 19-9  90.0 (H) 0.0 - 40.0 U/mL   AFP Tumor Marker    Collection Time: 09/06/24  7:12 AM   Result Value Ref Range    AFP 8.5 (H) 0.0 - 8.4 ng/mL         I have reviewed the pertinent labs.  Mild anemia, AFP & CA 19-9 slightly decreased.    Imaging:       CTA Chest - 8/19/24:    Impression:     No pulmonary embolism.     New post radioembolization change in the left hepatic lobe.  Increased periportal edema.     Nonspecific small volume pelvic free fluid.     Multiple stable subcentimeter solid pulmonary nodules.     Similar ill-defined hypodensity in the right hepatic lobe.     Additional findings as above    Path:   6/28/21: partial hepatectomy:    Final Pathologic Diagnosis 1.  Gallbladder (cholecystectomy):   - Benign gallbladder with cholecystitis   2. Right lobe (partial hepatectomy):   - Positive for malignancy, see synoptic report below   - Separate hemangioma, 7.3 cm   3. Lymph nodes, portal (regional resection):   - 8 lymph nodes, negative for tumor (0/8)   ______________________________________________________________________________   ______   Hepatocellular carcinoma synoptic report   - Procedure:  Partial hepatectomy, right lobe   - Histologic type:  Hepatocellular carcinoma, scirrhous   - Histologic grade:  Moderately differentiated, grade 2   - Tumor focality:  Solitary   - Tumor characteristics:   - Tumor site:  Right lobe   - Tumor size:  3.3 cm   - Treatment effect:  No known pre-surgical therapy   - Satellitosis:  Not identified   - Tumor extent:  Confined to liver   - Vascular invasion:  Present, Small vessel   - Perineural invasion:  Present   - Margins:   - All margins are negative for invasive carcinoma   - Closest margin to invasive carcinoma:  Parenchymal   - Distance from invasive  carcinoma to closest margin:  5 mm   - Regional lymph nodes:   - Number of lymph nodes with tumor:  0   - Number of lymph nodes examined:  8   - Pathology: pT2 N0 MX   - Additional findings:   - No steatosis   -  Trichrome stain:  Periportal fibrosis with early septa, stage 1-2   - Iron stain:  Negative   - Iron and trichrome stains with appropriate controls   Tumor blocks:  2A, 2B, 2 C    Comment: Interp By Madeline Carlos MD, Signed on 07/08/2021 at 16:47       Assessment:       1. HCC (hepatocellular carcinoma)    2. Regional lymph node metastasis present    3. Malignant neoplasm metastatic to right lung    4. Neutropenia, unspecified type    5. Cervical radiculopathy    6. Liver hemangioma    7. Gastroesophageal reflux disease, unspecified whether esophagitis present    8. Skin nodule    9. Nausea            Plan:             # HCC  Scirrhous subtype, which is very uncommon (~ 1% of all HCC); prognosis is likely similar to typical HCC.  No clear underlying liver pathology in this patient.  Had margin negative resection with negative lymph node eval on 6/28/21 with Dr. Howell.  Unfortunately she has biopsy proven recurrent metastatic disease to her RLL s/p IR biopsy 4/10/23.   She was discussed at thoracic tumor board with potential plan for surgical resection with Dr. Tadeo.  She had a concerning bone lesion on PET from 4/26 at T2.  This was biopsied and proven to be benign.  In the interim she had a repeat abdominal MRI on 6/13/23 which demonstrated growth of a portacaval lymph node that was very concerning for metastatic disease when we reviewed her imaging at liver conference.  Meanwhile her RLL met has grown slightly on 6/21/23 CT as well.  We discussed her case with Valerie Dhillon and Shwetha and we were all in agreement with proceeding with systemic therapy rather than surgery or radiation given multifocal progression.    We discussed this with her and she is agreeable with starting systemic therapy.  Reviewed possibilities of atezo + magaly or durva + treme.  She wished to proceed with STRIDE regimen: durvalumab + tremelimumab.    Received cycle 1 on 7/11/23.  Repeat CT CAP after cycle 3 demonstrates mild growth in  albert hepatis lymph node.  Stable disease elsewhere.    Discussed with Dr. Mendiola and she was deemed eligible for SBRT to her abdominal lymph node and growing lung nodule.  She completed SBRT to both 11/3/23.    Meanwhile she developed a new concerning area of possible recurrence near her liver resection site.  Discussed with Dr. Lala and Dr. Dhillon.  Dr. Dhillon was unable to visualize it during radiation simulation.  We recommended to proceed with Y-90 which was administered 2/1/24.    MRI 2/29/24 shows good response to Y-90 treated lesion.  Indeterminate lesion reviewed by Dr. Alford with no concerning features.  Will continue current regimen.  Consider switching systemic therapy only if significant progression given likely side effects of TKI.    Saw Dr. Sabillon of cardiology who recommended troponin and ECG monitoring given her cardiac history.  She is asymptomatic at present.    MRI after cycle 12 showed mild growth in left hepatic lobe lesion.    She received Y90 to this 8/15/24 with Dr. Alford.  Meanwhile continued with durvalumab.    Gave additional one time re-priming dose of tremelimumab with cycle 15.  Presents today for cycle 16 of durvalumab only.  Labs acceptable to proceed.  Continue LR with infusions per her request.    Repeat MRI abd later September.    Will plan to bring her back in 4 weeks for cycle 17 and clinic visit for cycle 18.     # Neutropenia, cervical radiculopathy  Neutropenia intermittent.  Likely benign etiology.  Continue to f/u with Orthopedics.  Had recent FLORENTINO.    # Liver hemangiomas, GERD  Continue monitoring as indicated with Dr. Rogers.  Continue to f/u with GI team for GERD symptoms. Continue with medication management with Protonix.  For nausea/diaphragmatic pain, discussed with Dr. Alford.  Will send steroids again with taper.  Also sent Compazine.    # RLE achilles nodule  Likely benign growth such as lipoma based on US and clinically appearance.  Monitor.    Follow up: 4  weeks.    The above information has been reviewed with the patient and all questions have been answered to their apparent satisfaction.  They understand that they can call the clinic with any questions.    Philippe Carrasco MD  Hematology/Oncology  [facility]      Route Chart for Scheduling    Med Onc Chart Routing      Follow up with physician 2 months. durvalumab every 4 weeks; see us every other time in clinic   Follow up with MELA    Infusion scheduling note    Injection scheduling note    Labs CBC, CMP, CA 19-9 and TSH   Scheduling:  Preferred lab:  Lab interval:  & AFP   Imaging    Pharmacy appointment    Other referrals                  Treatment Plan Information   OP TREMELIMUMAB 300 MG (X 1) + DURVALUMAB 1500 MG Q4W Philippe Carrasco MD   Associated diagnosis: Hepatocellular carcinoma Stage IVB rcT1b, cN0, pM1 noted on 6/9/2021   Line of treatment: First Line  Treatment Goal: Control     Upcoming Treatment Dates - OP TREMELIMUMAB 300 MG (X 1) + DURVALUMAB 1500 MG Q4W    9/6/2024       Chemotherapy       durvalumab (IMFINZI) 1,500 mg in 0.9% NaCl SolP 280 mL chemo infusion       Antiemetics       prochlorperazine injection Soln 5 mg  10/4/2024       Chemotherapy       durvalumab (IMFINZI) 1,500 mg in sodium chloride 0.9% SolP 280 mL chemo infusion       Antiemetics       prochlorperazine injection Soln 5 mg  11/1/2024       Chemotherapy       durvalumab (IMFINZI) 1,500 mg in sodium chloride 0.9% SolP 280 mL chemo infusion       Antiemetics       prochlorperazine injection Soln 5 mg  11/29/2024       Chemotherapy       durvalumab (IMFINZI) 1,500 mg in sodium chloride 0.9% SolP 280 mL chemo infusion       Antiemetics       prochlorperazine injection Soln 5 mg

## 2024-09-16 ENCOUNTER — PATIENT MESSAGE (OUTPATIENT)
Dept: GASTROENTEROLOGY | Facility: CLINIC | Age: 44
End: 2024-09-16
Payer: COMMERCIAL

## 2024-09-16 DIAGNOSIS — K21.9 GASTROESOPHAGEAL REFLUX DISEASE, UNSPECIFIED WHETHER ESOPHAGITIS PRESENT: Primary | ICD-10-CM

## 2024-09-16 RX ORDER — SUCRALFATE 1 G/10ML
1 SUSPENSION ORAL 4 TIMES DAILY
Qty: 414 ML | Refills: 0 | Status: SHIPPED | OUTPATIENT
Start: 2024-09-16

## 2024-09-17 ENCOUNTER — TELEPHONE (OUTPATIENT)
Dept: PAIN MEDICINE | Facility: CLINIC | Age: 44
End: 2024-09-17
Payer: COMMERCIAL

## 2024-09-17 NOTE — TELEPHONE ENCOUNTER
----- Message from Fariba Solis sent at 9/17/2024  1:24 PM CDT -----  Regarding: Pt called states she need to resche procedure scheduled for 10/03  Contact: [phone]  Name of Who is Calling:MERLINE HIRSCH [[id number]]        What is the request in detail:Pt called states she need to resche procedure scheduled for 10/03. Please advise         Can the clinic reply by [facility]:No        What Number to Call Back if not in [employer]: Telephone Information:  Mobile          [phone]

## 2024-09-18 ENCOUNTER — TELEPHONE (OUTPATIENT)
Dept: ENDOSCOPY | Facility: HOSPITAL | Age: 44
End: 2024-09-18
Payer: COMMERCIAL

## 2024-09-18 NOTE — TELEPHONE ENCOUNTER
"----- Message from Celeste Knutson sent at 9/17/2024  9:58 AM CDT -----  Regarding: FW: EGD    ----- Message -----  From: Aleah Joiner NP  Sent: 9/16/2024   9:45 AM CDT  To: [facility]  Subject: EGD                                              Procedure: EGD    Diagnosis: Abdominal pain, GERD, and Nausea/Vomiting    Procedure Timing: Within 4 weeks (Urgent)    #If within 4 weeks selected, please munira as high priority#    #If greater than 12 weeks, please select "5-12 weeks" and delay sending until 3 months prior to requested date#     Location: Any Site    Additional Scheduling Information: No scheduling concerns    Prep Specifications:N/A    Is the patient taking a GLP-1 Agonist:no    Have you attached a patient to this message: yes  "

## 2024-09-19 ENCOUNTER — TELEPHONE (OUTPATIENT)
Dept: INTERVENTIONAL RADIOLOGY/VASCULAR | Facility: CLINIC | Age: 44
End: 2024-09-19
Payer: COMMERCIAL

## 2024-09-20 ENCOUNTER — TELEPHONE (OUTPATIENT)
Dept: INTERVENTIONAL RADIOLOGY/VASCULAR | Facility: CLINIC | Age: 44
End: 2024-09-20
Payer: COMMERCIAL

## 2024-09-20 ENCOUNTER — TELEPHONE (OUTPATIENT)
Dept: ENDOSCOPY | Facility: HOSPITAL | Age: 44
End: 2024-09-20
Payer: COMMERCIAL

## 2024-09-20 ENCOUNTER — OFFICE VISIT (OUTPATIENT)
Dept: INTERNAL MEDICINE | Facility: CLINIC | Age: 44
End: 2024-09-20
Payer: COMMERCIAL

## 2024-09-20 ENCOUNTER — PATIENT MESSAGE (OUTPATIENT)
Dept: INTERNAL MEDICINE | Facility: CLINIC | Age: 44
End: 2024-09-20

## 2024-09-20 VITALS
HEIGHT: 71 IN | SYSTOLIC BLOOD PRESSURE: 120 MMHG | DIASTOLIC BLOOD PRESSURE: 65 MMHG | WEIGHT: 149 LBS | BODY MASS INDEX: 20.86 KG/M2

## 2024-09-20 VITALS — WEIGHT: 150 LBS | BODY MASS INDEX: 21 KG/M2 | HEIGHT: 71 IN

## 2024-09-20 DIAGNOSIS — C78.01 MALIGNANT NEOPLASM METASTATIC TO RIGHT LUNG: ICD-10-CM

## 2024-09-20 DIAGNOSIS — C22.0 HEPATOCELLULAR CARCINOMA: ICD-10-CM

## 2024-09-20 DIAGNOSIS — Z00.00 ANNUAL PHYSICAL EXAM: Primary | ICD-10-CM

## 2024-09-20 DIAGNOSIS — R11.2 NAUSEA AND VOMITING, UNSPECIFIED VOMITING TYPE: ICD-10-CM

## 2024-09-20 DIAGNOSIS — R10.9 ABDOMINAL PAIN, UNSPECIFIED ABDOMINAL LOCATION: ICD-10-CM

## 2024-09-20 DIAGNOSIS — K21.9 GASTROESOPHAGEAL REFLUX DISEASE, UNSPECIFIED WHETHER ESOPHAGITIS PRESENT: Primary | ICD-10-CM

## 2024-09-20 DIAGNOSIS — M54.9 DORSALGIA, UNSPECIFIED: ICD-10-CM

## 2024-09-20 DIAGNOSIS — F41.9 ANXIETY: ICD-10-CM

## 2024-09-20 PROBLEM — E83.19 IRON EXCESS: Status: RESOLVED | Noted: 2023-09-21 | Resolved: 2024-09-20

## 2024-09-20 PROCEDURE — 99999 PR PBB SHADOW E&M-EST. PATIENT-LVL IV: CPT | Mod: PBBFAC,,, | Performed by: INTERNAL MEDICINE

## 2024-09-20 RX ORDER — ESCITALOPRAM OXALATE 10 MG/1
10 TABLET ORAL DAILY
Qty: 90 TABLET | Refills: 3 | Status: SHIPPED | OUTPATIENT
Start: 2024-09-20

## 2024-09-20 NOTE — TELEPHONE ENCOUNTER
"----- Message from Celeste Knutson sent at 9/17/2024  9:58 AM CDT -----  Regarding: FW: EGD    ----- Message -----  From: Aleah Joiner NP  Sent: 9/16/2024   9:45 AM CDT  To: [facility]  Subject: EGD                                              Procedure: EGD    Diagnosis: Abdominal pain, GERD, and Nausea/Vomiting    Procedure Timing: Within 4 weeks (Urgent)    #If within 4 weeks selected, please munira as high priority#    #If greater than 12 weeks, please select "5-12 weeks" and delay sending until 3 months prior to requested date#     Location: Any Site    Additional Scheduling Information: No scheduling concerns    Prep Specifications:N/A    Is the patient taking a GLP-1 Agonist:no    Have you attached a patient to this message: yes  "

## 2024-09-20 NOTE — TELEPHONE ENCOUNTER
Spoke to Melly to schedule procedure(s) Upper Endoscopy (EGD)       Physician to perform procedure(s) Dr. CAITLYN Lynch  Date of Procedure (s) 9/30/24  Arrival Time 1:45 PM  Time of Procedure(s) 2:45 PM   Location of Procedure(s) [address]  Type of Rx Prep sent to patient: Other  Instructions provided to patient via [facility]    Patient was informed on the following information and verbalized understanding. Screening questionnaire reviewed with patient and complete. If procedure requires anesthesia, a responsible adult needs to be present to accompany the patient home, patient cannot drive after receiving anesthesia. Appointment details are tentative, especially check-in time. Patient will receive a prep-op call 7 days prior to confirm check-in time for procedure. If applicable the patient should contact their pharmacy to verify Rx for procedure prep is ready for pick-up. Patient was advised to call the scheduling department at [phone] if pharmacy states no Rx is available. Patient was advised to call the endoscopy scheduling department if any questions or concerns arise.      [facility] Scheduling Department

## 2024-09-20 NOTE — PROGRESS NOTES
" Patient ID: Melly Hwang is a 44 y.o. female.    Chief Complaint: Annual Exam      Assessment:       1. Annual physical exam    2. Dorsalgia, unspecified    3. Hepatocellular carcinoma    4. Malignant neoplasm metastatic to right lung    5. Anxiety          Plan:           1. Annual physical exam  -     Lipid Panel; Future; Expected date: 09/20/2024  -     TSH; Future; Expected date: 09/20/2024  -     Vitamin D; Future; Expected date: 09/20/2024  -     Vitamin B12; Future; Expected date: 09/20/2024  -     Hemoglobin A1C; Future; Expected date: 09/20/2024    2. Dorsalgia, unspecified  -     X-Ray Lumbar Spine Ap And Lateral    3. Hepatocellular carcinoma    4. Malignant neoplasm metastatic to right lung    5. Anxiety    Other orders  -     EScitalopram oxalate (LEXAPRO) 10 MG tablet; Take 1 tablet (10 mg total) by mouth once daily.  Dispense: 90 tablet; Refill: 3       Labs and review  Continue current regimen  Keep oncology follow-up  Back x-ray and review, consider physical therapy  She will get flu shot through work  She is ambivalent about COVID booster currently    Subjective:   Annual exam    Following closely in Oncology with regard to her HCC, see last notes:    "scirrhous HCC s/p resection with R liver partial hepatectomy on 6/28/21 with Dr. Howell with the same pathology, negative margins, 0 of 8 lymph nodes positive and + for vascular and perineural invasion. We saw her in 2021 for evaluation for persistent CA 19-9 but there was no radiographic evidence of HCC disease recurrence or alternative reason for CA 19-9 elevation.  CT chest on 3/9/23 showed an enlarging RLL nodule measuring 1.1 cm, increased from 0.8 cm in size.  IR biopsy of this on 4/10/23 confirmed metastatic HCC.  Since then in mid-June she has also had an MRI abdomen that showed an enlarging portocaval lymph node that is consistent with metastatic disease.  She completed SBRT at the beginning of November to her lung metastasis and " "to her portocaval lymph node. She underwent Y-90 on 2/1/24.  She underwent Y-90 again to a left lobe hepatic tumor on 8/15/24.     She presents today for cycle 16 of durvalumab + tremelimumab as part of the STRiDE regimen. She received re-priming with tremelimumab with cycle 15.  She is still experiencing pain in her LUQ with pleuritic type symptoms.  Has persistent nausea, neither of these are changed with eating.  She continues on Protonix, not eating as much because of nausea.  Denies any other sites of pain.  Was in ER 8/19 with CTA which was negative for PE, no other acute findings on CT abdomen either."    Overall stable still with occasional pleuritic discomfort and some GERD.  Eating and drinking well for the most part.              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 pylori infection: tx 2014 stool negative    Liver hemangioma    Pericardial effusion    VENKATA positive    Elevated bilirubin    S/P pericardial window creation    Hepatocellular carcinoma    Hypophosphatemia    Elevated CA 19-9 level    Pleural effusion    Decreased ROM of intervertebral discs of cervical spine    Lung nodule 9/22; enlarged 7/23    Neutropenia, unspecified type    Low serum vitamin B12    Carpal tunnel syndrome of right wrist: mild, see EMG 10/23    Anxiety about health    Malignant neoplasm metastatic to right lung    Anxiety        Review of Systems   Constitutional:  Negative for activity change and unexpected weight change.   HENT:  Negative for hearing loss, rhinorrhea and trouble swallowing.    Eyes:  Negative for discharge and visual disturbance.   Respiratory:  Negative for chest tightness and wheezing.    Cardiovascular:  Negative for chest pain and palpitations.   Gastrointestinal:  " Negative for blood in stool, constipation, diarrhea and vomiting.   Endocrine: Negative for polydipsia and polyuria.   Genitourinary:  Negative for difficulty urinating, dysuria, hematuria and menstrual problem.   Musculoskeletal:  Positive for back pain. Negative for arthralgias, joint swelling and neck pain.        Has had some ongoing back pain in the lumbar area with no radiation or weakness   Neurological:  Negative for weakness and headaches.   Psychiatric/Behavioral:  Negative for confusion and dysphoric mood.          Objective:      Physical Exam  Vitals and nursing note reviewed.   Constitutional:       Appearance: She is well-developed.   HENT:      Head: Normocephalic and atraumatic.      Right Ear: External ear normal.      Left Ear: External ear normal.      Nose: Nose normal.      Mouth/Throat:      Pharynx: No oropharyngeal exudate.   Eyes:      General: No scleral icterus.     Extraocular Movements: Extraocular movements intact.      Conjunctiva/sclera: Conjunctivae normal.   Neck:      Thyroid: No thyromegaly.      Vascular: No JVD.   Cardiovascular:      Rate and Rhythm: Normal rate and regular rhythm.      Heart sounds: Normal heart sounds. No murmur heard.     No gallop.   Pulmonary:      Effort: Pulmonary effort is normal. No respiratory distress.      Breath sounds: Normal breath sounds. No wheezing.   Abdominal:      General: Bowel sounds are normal. There is no distension.      Palpations: Abdomen is soft. There is no mass.      Tenderness: There is no abdominal tenderness. There is no guarding or rebound.   Musculoskeletal:         General: No tenderness. Normal range of motion.      Cervical back: Normal range of motion and neck supple.   Lymphadenopathy:      Cervical: No cervical adenopathy.   Skin:     General: Skin is warm.      Findings: No erythema or rash.   Neurological:      General: No focal deficit present.      Mental Status: She is alert and oriented to person, place, and  time.      Cranial Nerves: No cranial nerve deficit.      Coordination: Coordination normal.   Psychiatric:         Behavior: Behavior normal.         Thought Content: Thought content normal.         Judgment: Judgment normal.             Health Maintenance Due   Topic Date Due    Influenza Vaccine (1) 09/01/2024    COVID-19 Vaccine (4 - 2023-24 season) 09/01/2024

## 2024-09-21 ENCOUNTER — HOSPITAL ENCOUNTER (OUTPATIENT)
Dept: RADIOLOGY | Facility: HOSPITAL | Age: 44
Discharge: HOME | End: 2024-09-21
Payer: COMMERCIAL

## 2024-09-21 ENCOUNTER — HOSPITAL ENCOUNTER (OUTPATIENT)
Dept: RADIOLOGY | Facility: HOSPITAL | Age: 44
Discharge: HOME | End: 2024-09-21
Attending: INTERNAL MEDICINE
Payer: COMMERCIAL

## 2024-09-21 DIAGNOSIS — C22.0 HCC (HEPATOCELLULAR CARCINOMA): ICD-10-CM

## 2024-09-21 PROCEDURE — A9585 GADOBUTROL INJECTION: HCPCS

## 2024-09-21 PROCEDURE — 72100 X-RAY EXAM L-S SPINE 2/3 VWS: CPT | Mod: TC

## 2024-09-21 PROCEDURE — 74183 MRI ABD W/O CNTR FLWD CNTR: CPT | Mod: 26,,, | Performed by: RADIOLOGY

## 2024-09-21 PROCEDURE — 74183 MRI ABD W/O CNTR FLWD CNTR: CPT | Mod: TC

## 2024-09-21 PROCEDURE — 72100 X-RAY EXAM L-S SPINE 2/3 VWS: CPT | Mod: 26,,, | Performed by: RADIOLOGY

## 2024-09-21 PROCEDURE — 25500020 PHARM REV CODE 255

## 2024-09-21 RX ORDER — GADOBUTROL 604.72 MG/ML
10 INJECTION INTRAVENOUS
Status: COMPLETED | OUTPATIENT
Start: 2024-09-21 | End: 2024-09-21

## 2024-09-21 RX ADMIN — GADOBUTROL 10 ML: 604.72 INJECTION INTRAVENOUS at 08:09

## 2024-09-23 ENCOUNTER — TELEPHONE (OUTPATIENT)
Dept: ENDOSCOPY | Facility: HOSPITAL | Age: 44
End: 2024-09-23
Payer: COMMERCIAL

## 2024-09-23 ENCOUNTER — TELEPHONE (OUTPATIENT)
Dept: INTERVENTIONAL RADIOLOGY/VASCULAR | Facility: CLINIC | Age: 44
End: 2024-09-23
Payer: COMMERCIAL

## 2024-09-23 NOTE — TELEPHONE ENCOUNTER
.Spoke to patient for pre-call to confirm scheduled Upper Endoscopy (EGD) and patient verbalized understanding of the following:       Date of Procedure (s)  verified 9/30/24  Arrival Time 1345 verified.  Location of Procedure(s) [address] verified.  NPO status reinforced. Ok to continue clear liquids up until 4 hours prior to the Endoscopy procedure.     Pt confirmed receipt of prep instructions and Rx prep (if applicable).  Instructions provided to patient via [facility]  Pt confirmed ride home after procedure if procedure requires anesthesia.   Pre-call screening questionnaire reviewed and completed with patient.   Appointment details are tentative, including check-in time.  If the patient begins taking any blood thinning medications, injectable weight loss/diabetes medications (other than insulin), or Adipex (phentermine) patient was instructed to contact the endoscopy scheduling department as soon as possible.  Patient was advised to call the endoscopy scheduling department if any questions or concerns arise.        Endoscopy Scheduling Department

## 2024-09-30 ENCOUNTER — ANESTHESIA EVENT (OUTPATIENT)
Dept: ENDOSCOPY | Facility: HOSPITAL | Age: 44
End: 2024-09-30
Payer: COMMERCIAL

## 2024-09-30 ENCOUNTER — TELEPHONE (OUTPATIENT)
Dept: PAIN MEDICINE | Facility: CLINIC | Age: 44
End: 2024-09-30
Payer: COMMERCIAL

## 2024-09-30 ENCOUNTER — ANESTHESIA (OUTPATIENT)
Dept: ENDOSCOPY | Facility: HOSPITAL | Age: 44
End: 2024-09-30
Payer: COMMERCIAL

## 2024-09-30 ENCOUNTER — HOSPITAL ENCOUNTER (OUTPATIENT)
Facility: HOSPITAL | Age: 44
Discharge: HOME | End: 2024-09-30
Attending: INTERNAL MEDICINE | Admitting: INTERNAL MEDICINE
Payer: COMMERCIAL

## 2024-09-30 VITALS
BODY MASS INDEX: 21.7 KG/M2 | DIASTOLIC BLOOD PRESSURE: 81 MMHG | HEART RATE: 73 BPM | HEIGHT: 71 IN | OXYGEN SATURATION: 100 % | RESPIRATION RATE: 20 BRPM | TEMPERATURE: 99 F | WEIGHT: 155 LBS | SYSTOLIC BLOOD PRESSURE: 123 MMHG

## 2024-09-30 DIAGNOSIS — R10.13 DYSPEPSIA: Primary | ICD-10-CM

## 2024-09-30 PROCEDURE — 88305 TISSUE EXAM BY PATHOLOGIST: CPT | Performed by: PATHOLOGY

## 2024-09-30 PROCEDURE — 37000008 HC ANESTHESIA 1ST 15 MINUTES: Performed by: INTERNAL MEDICINE

## 2024-09-30 PROCEDURE — 27201012 HC FORCEPS, HOT/COLD, DISP: Performed by: INTERNAL MEDICINE

## 2024-09-30 PROCEDURE — 43239 EGD BIOPSY SINGLE/MULTIPLE: CPT | Mod: ,,, | Performed by: INTERNAL MEDICINE

## 2024-09-30 PROCEDURE — 25000003 PHARM REV CODE 250: Performed by: NURSE ANESTHETIST, CERTIFIED REGISTERED

## 2024-09-30 PROCEDURE — 37000009 HC ANESTHESIA EA ADD 15 MINS: Performed by: INTERNAL MEDICINE

## 2024-09-30 PROCEDURE — 43239 EGD BIOPSY SINGLE/MULTIPLE: CPT | Performed by: INTERNAL MEDICINE

## 2024-09-30 PROCEDURE — 88342 IMHCHEM/IMCYTCHM 1ST ANTB: CPT | Performed by: PATHOLOGY

## 2024-09-30 PROCEDURE — 63600175 PHARM REV CODE 636 W HCPCS: Performed by: NURSE ANESTHETIST, CERTIFIED REGISTERED

## 2024-09-30 RX ORDER — PROPOFOL 10 MG/ML
INJECTION, EMULSION INTRAVENOUS CONTINUOUS PRN
Status: DISCONTINUED | OUTPATIENT
Start: 2024-09-30 | End: 2024-09-30

## 2024-09-30 RX ORDER — GLUCAGON 1 MG
1 KIT INJECTION
Status: DISCONTINUED | OUTPATIENT
Start: 2024-09-30 | End: 2024-09-30 | Stop reason: HOSPADM

## 2024-09-30 RX ORDER — ONDANSETRON HYDROCHLORIDE 2 MG/ML
INJECTION, SOLUTION INTRAVENOUS
Status: DISCONTINUED | OUTPATIENT
Start: 2024-09-30 | End: 2024-09-30

## 2024-09-30 RX ORDER — SODIUM CHLORIDE 0.9 % (FLUSH) 0.9 %
10 SYRINGE (ML) INJECTION
Status: DISCONTINUED | OUTPATIENT
Start: 2024-09-30 | End: 2024-09-30 | Stop reason: HOSPADM

## 2024-09-30 RX ORDER — SODIUM CHLORIDE 9 MG/ML
INJECTION, SOLUTION INTRAVENOUS CONTINUOUS
Status: DISCONTINUED | OUTPATIENT
Start: 2024-09-30 | End: 2024-09-30 | Stop reason: HOSPADM

## 2024-09-30 RX ORDER — LIDOCAINE HYDROCHLORIDE 20 MG/ML
INJECTION INTRAVENOUS
Status: DISCONTINUED | OUTPATIENT
Start: 2024-09-30 | End: 2024-09-30

## 2024-09-30 RX ORDER — PROPOFOL 10 MG/ML
VIAL (ML) INTRAVENOUS
Status: DISCONTINUED | OUTPATIENT
Start: 2024-09-30 | End: 2024-09-30

## 2024-09-30 RX ADMIN — ONDANSETRON 4 MG: 2 INJECTION INTRAMUSCULAR; INTRAVENOUS at 08:09

## 2024-09-30 RX ADMIN — PROPOFOL 150 MCG/KG/MIN: 10 INJECTION, EMULSION INTRAVENOUS at 08:09

## 2024-09-30 RX ADMIN — LIDOCAINE HYDROCHLORIDE 100 MG: 20 INJECTION INTRAVENOUS at 08:09

## 2024-09-30 RX ADMIN — PROPOFOL 100 MG: 10 INJECTION, EMULSION INTRAVENOUS at 08:09

## 2024-09-30 RX ADMIN — SODIUM CHLORIDE: 0.9 INJECTION, SOLUTION INTRAVENOUS at 07:09

## 2024-09-30 NOTE — ANESTHESIA PREPROCEDURE EVALUATION
2024  Melly Hwang is a 44 y.o., female.  Patient Active Problem List   Diagnosis    Premature ovarian failure    Cardiac enlargement: Echo  normal cardiac chamber size with EF 55%; stable     Bradycardia    Leukopenia    Nutcracker phenomenon of renal vein: see MRI - seen in Vascular stable     H. pylori infection: tx  stool negative    Liver hemangioma    Pericardial effusion    VENKATA positive    Elevated bilirubin    S/P pericardial window creation    Hepatocellular carcinoma    Hypophosphatemia    Elevated CA 19-9 level    Pleural effusion    Decreased ROM of intervertebral discs of cervical spine    Lung nodule ; enlarged     Neutropenia, unspecified type    Low serum vitamin B12    Carpal tunnel syndrome of right wrist: mild, see EMG 10/23    Anxiety about health    Malignant neoplasm metastatic to right lung    Anxiety     Past Surgical History:   Procedure Laterality Date    COLONOSCOPY N/A 2020    Procedure: COLONOSCOPY;  Surgeon: GIOVANNI Crane MD;  Location: [facility] (4TH FLR);  Service: Endoscopy;  Laterality: N/A;  + covid     EPIDURAL STEROID INJECTION INTO CERVICAL SPINE N/A 2024    Procedure: FLORENTINO C7/T1;  Surgeon: Cezar Bailey MD;  Location: [facility] PAIN MANAGEMENT;  Service: Pain Management;  Laterality: N/A;  20mins-No ac    ESOPHAGOGASTRODUODENOSCOPY N/A 1/10/2024    Procedure: ESOPHAGOGASTRODUODENOSCOPY (EGD);  Surgeon: Reynaldo Lynch MD;  Location: [facility] (2ND FLR);  Service: Endoscopy;  Laterality: N/A;  referred by daryl carrasco rocedure: EGD Diagnosis: Abnormal finding on GI tract imaging, Abdominal pain, and GERD  Procedure Timin-4 weeks Provider: Any GI provider Location: [facility], [facility], and [facility]-Endo  Additional Scheduling Informat    HERNIA REPAIR      LIVER SURGERY N/A 2021    NASAL SEPTUM  SURGERY      PERICARDIAL WINDOW N/A 02/22/2021    Procedure: CREATION, PERICARDIAL WINDOW;  Surgeon: Ajay Cantor MD;  [facility];  Service: Cardiovascular;  Laterality: N/A;    PERICARDIOCENTESIS N/A 05/02/2020    Procedure: Pericardiocentesis;  Surgeon: Dickson Dangelo MD;  Location: [facility] CATH LAB;  Service: Cardiology;  Laterality: N/A;    TONSILLECTOMY           Pre-op Assessment    I have reviewed the Patient Summary Reports.     I have reviewed the Nursing Notes. I have reviewed the NPO Status.   I have reviewed the Medications.     Review of Systems      Physical Exam  General: Well nourished    Airway:  Mallampati: II   Mouth Opening: Normal  TM Distance: Normal  Tongue: Normal  Neck ROM: Normal ROM        Anesthesia Plan  Type of Anesthesia, risks & benefits discussed:    Anesthesia Type: Gen Natural Airway, MAC  Intra-op Monitoring Plan: Standard ASA Monitors  Post Op Pain Control Plan: multimodal analgesia  Induction:  IV  Informed Consent: Patient consented to blood products? No  ASA Score: 2  Day of Surgery Review of History & Physical: H&P Update referred to the surgeon/provider.    Ready For Surgery From Anesthesia Perspective.     .

## 2024-09-30 NOTE — PROVATION PATIENT INSTRUCTIONS
Discharge Summary/Instructions after an Endoscopic Procedure  Patient Name: Melly Hwang  Patient MRN: [id number]  Patient YOB: 1980  Monday, September 30, 2024  Reynaldo Lynch MD  Dear patient,  As a result of recent federal legislation (The Federal Cures Act), you may   receive lab or pathology results from your procedure in your [facility]   account before your physician is able to contact you. Your physician or   their representative will relay the results to you with their   recommendations at their soonest availability.  Thank you,  RESTRICTIONS:  During your procedure today, you received medications for sedation.  These   medications may affect your judgment, balance and coordination.  Therefore,   for 24 hours, you have the following restrictions:   - DO NOT drive a car, operate machinery, make legal/financial decisions,   sign important papers or drink alcohol.    ACTIVITY:  Today: no heavy lifting, straining or running due to procedural   sedation/anesthesia.  The following day: return to full activity including work.  DIET:  Eat and drink normally unless instructed otherwise.     TREATMENT FOR COMMON SIDE EFFECTS:  - Mild abdominal pain, nausea, belching, bloating or excessive gas:  rest,   eat lightly and use a heating pad.  - Sore Throat: treat with throat lozenges and/or gargle with warm salt   water.  - Because air was used during the procedure, expelling large amounts of air   from your rectum or belching is normal.  - If a bowel prep was taken, you may not have a bowel movement for 1-3 days.    This is normal.  SYMPTOMS TO WATCH FOR AND REPORT TO YOUR PHYSICIAN:  1. Abdominal pain or bloating, other than gas cramps.  2. Chest pain.  3. Back pain.  4. Signs of infection such as: chills or fever occurring within 24 hours   after the procedure.  5. Rectal bleeding, which would show as bright red, maroon, or black stools.   (A tablespoon of blood from the rectum is not serious, especially  if   hemorrhoids are present.)  6. Vomiting.  7. Weakness or dizziness.  GO DIRECTLY TO THE NEAREST EMERGENCY ROOM IF YOU HAVE ANY OF THE FOLLOWING:      Difficulty breathing              Chills and/or fever over 101 F   Persistent vomiting and/or vomiting blood   Severe abdominal pain   Severe chest pain   Black, tarry stools   Bleeding- more than one tablespoon   Any other symptom or condition that you feel may need urgent attention  Your doctor recommends these additional instructions:  If any biopsies were taken, your doctors clinic will contact you in 1 to 2   weeks with any results.  - Discharge patient to home.   - Patient has a contact number available for emergencies.  The signs and   symptoms of potential delayed complications were discussed with the   patient.  Return to normal activities tomorrow.  Written discharge   instructions were provided to the patient.   - Resume previous diet.   - Continue present medications.   - Await pathology results.  For questions, problems or results please call your physician - Reynaldo Lynch MD at Work:  [phone].  [facility], EMERGENCY ROOM PHONE NUMBER: [phone]  IF A COMPLICATION OR EMERGENCY SITUATION ARISES AND YOU ARE UNABLE TO REACH   YOUR PHYSICIAN - GO DIRECTLY TO THE EMERGENCY ROOM.  Reynaldo Lynch MD  9/30/2024 8:34:32 AM  This report has been verified and signed electronically.  Dear patient,  As a result of recent federal legislation (The Federal Cures Act), you may   receive lab or pathology results from your procedure in your [facility]   account before your physician is able to contact you. Your physician or   their representative will relay the results to you with their   recommendations at their soonest availability.  Thank you,  PROVATION

## 2024-09-30 NOTE — INTERVAL H&P NOTE
The patient has been examined and the H&P has been reviewed:    Pre-Procedure H and P Addendum    Patient seen and examined.  History and exam unchanged from prior history and physical.      Procedure: EGD  Indication: Dyspepsia, GERD, abdominal pain, nausea  ASA Class: per anesthesiology  Airway: normal  Neck Mobility: full range of motion  Mallampatti score: per anesthesia  History of anesthesia problems: no  Family history of anesthesia problems: no  Anesthesia Plan: MAC

## 2024-09-30 NOTE — TRANSFER OF CARE
"Anesthesia Transfer of Care Note    Patient: Melly Hwang    Procedure(s) Performed: Procedure(s) (LRB):  EGD (ESOPHAGOGASTRODUODENOSCOPY) (N/A)    Patient location: [facility]    Anesthesia Type: general    Transport from OR: Transported from OR on room air with adequate spontaneous ventilation    Post pain: adequate analgesia    Post assessment: no apparent anesthetic complications and tolerated procedure well    Post vital signs: stable    Level of consciousness: awake and alert    Nausea/Vomiting: no nausea/vomiting    Complications: none    Transfer of care protocol was followed    Last vitals: Visit Vitals  /68   Pulse 80   Temp 36.7 °C (98.1 °F) (Temporal)   Resp 18   Ht 5' 11" (1.803 m)   Wt 70.3 kg (155 lb)   LMP  (LMP Unknown)   SpO2 98%   Breastfeeding No   BMI 21.62 kg/m²     "

## 2024-10-02 LAB
FINAL PATHOLOGIC DIAGNOSIS: NORMAL
GROSS: NORMAL
Lab: NORMAL
MICROSCOPIC EXAM: NORMAL

## 2024-10-02 NOTE — ANESTHESIA POSTPROCEDURE EVALUATION
Anesthesia Post Evaluation    Patient: Melly Hwang    Procedure(s) Performed: Procedure(s) (LRB):  EGD (ESOPHAGOGASTRODUODENOSCOPY) (N/A)    Final Anesthesia Type: general      Patient location during evaluation: PACU  Patient participation: Yes- Able to Participate  Level of consciousness: awake and alert and oriented  Pain management: adequate  Airway patency: patent    PONV status at discharge: No PONV  Anesthetic complications: no      Cardiovascular status: blood pressure returned to baseline and hemodynamically stable  Respiratory status: unassisted  Hydration status: euvolemic  Follow-up not needed.              Vitals Value Taken Time   /81 09/30/24 0900   Temp 37.2 °C (98.9 °F) 09/30/24 0900   Pulse 73 09/30/24 0900   Resp 20 09/30/24 0900   SpO2 100 % 09/30/24 0900         No case tracking events are documented in the log.      Pain/Lizbet Score: No data recorded

## 2024-10-03 ENCOUNTER — TELEPHONE (OUTPATIENT)
Dept: PAIN MEDICINE | Facility: CLINIC | Age: 44
End: 2024-10-03
Payer: COMMERCIAL

## 2024-10-03 NOTE — TELEPHONE ENCOUNTER
----- Message from Michaela sent at 10/3/2024  9:58 AM CDT -----  Regarding: Appt Access  Contact: pt [phone]  Patient calling to cancel upcoming injection on 10/7

## 2024-10-04 ENCOUNTER — LAB VISIT (OUTPATIENT)
Dept: LAB | Facility: HOSPITAL | Age: 44
End: 2024-10-04
Attending: INTERNAL MEDICINE
Payer: COMMERCIAL

## 2024-10-04 ENCOUNTER — INFUSION (OUTPATIENT)
Dept: INFUSION THERAPY | Facility: HOSPITAL | Age: 44
End: 2024-10-04
Payer: COMMERCIAL

## 2024-10-04 VITALS
SYSTOLIC BLOOD PRESSURE: 135 MMHG | OXYGEN SATURATION: 100 % | WEIGHT: 155 LBS | HEIGHT: 71 IN | TEMPERATURE: 98 F | HEART RATE: 72 BPM | BODY MASS INDEX: 21.7 KG/M2 | RESPIRATION RATE: 16 BRPM | DIASTOLIC BLOOD PRESSURE: 70 MMHG

## 2024-10-04 DIAGNOSIS — Z00.00 ANNUAL PHYSICAL EXAM: ICD-10-CM

## 2024-10-04 DIAGNOSIS — C22.0 HEPATOCELLULAR CARCINOMA: Primary | ICD-10-CM

## 2024-10-04 DIAGNOSIS — C22.0 HCC (HEPATOCELLULAR CARCINOMA): ICD-10-CM

## 2024-10-04 LAB
25(OH)D3+25(OH)D2 SERPL-MCNC: 40 NG/ML (ref 30–96)
AFP SERPL-MCNC: 9.8 NG/ML (ref 0–8.4)
ALBUMIN SERPL BCP-MCNC: 3.4 G/DL (ref 3.5–5.2)
ALP SERPL-CCNC: 91 U/L (ref 55–135)
ALT SERPL W/O P-5'-P-CCNC: 16 U/L (ref 10–44)
ANION GAP SERPL CALC-SCNC: 7 MMOL/L (ref 8–16)
AST SERPL-CCNC: 14 U/L (ref 10–40)
BILIRUB SERPL-MCNC: 0.6 MG/DL (ref 0.1–1)
BUN SERPL-MCNC: 10 MG/DL (ref 6–20)
CALCIUM SERPL-MCNC: 9.2 MG/DL (ref 8.7–10.5)
CANCER AG19-9 SERPL-ACNC: 102.5 U/ML (ref 0–40)
CHLORIDE SERPL-SCNC: 105 MMOL/L (ref 95–110)
CHOLEST SERPL-MCNC: 175 MG/DL (ref 120–199)
CHOLEST/HDLC SERPL: 2.9 {RATIO} (ref 2–5)
CO2 SERPL-SCNC: 25 MMOL/L (ref 23–29)
CREAT SERPL-MCNC: 0.9 MG/DL (ref 0.5–1.4)
ERYTHROCYTE [DISTWIDTH] IN BLOOD BY AUTOMATED COUNT: 12.5 % (ref 11.5–14.5)
EST. GFR  (NO RACE VARIABLE): >60 ML/MIN/1.73 M^2
ESTIMATED AVG GLUCOSE: 108 MG/DL (ref 68–131)
GLUCOSE SERPL-MCNC: 133 MG/DL (ref 70–110)
HBA1C MFR BLD: 5.4 % (ref 4–5.6)
HCT VFR BLD AUTO: 39.6 % (ref 37–48.5)
HDLC SERPL-MCNC: 61 MG/DL (ref 40–75)
HDLC SERPL: 34.9 % (ref 20–50)
HGB BLD-MCNC: 12.2 G/DL (ref 12–16)
IMM GRANULOCYTES # BLD AUTO: 0.02 K/UL (ref 0–0.04)
LDLC SERPL CALC-MCNC: 100.8 MG/DL (ref 63–159)
MCH RBC QN AUTO: 30.3 PG (ref 27–31)
MCHC RBC AUTO-ENTMCNC: 30.8 G/DL (ref 32–36)
MCV RBC AUTO: 99 FL (ref 82–98)
NEUTROPHILS # BLD AUTO: 2.8 K/UL (ref 1.8–7.7)
NONHDLC SERPL-MCNC: 114 MG/DL
PLATELET # BLD AUTO: 273 K/UL (ref 150–450)
PMV BLD AUTO: 9.7 FL (ref 9.2–12.9)
POTASSIUM SERPL-SCNC: 4.3 MMOL/L (ref 3.5–5.1)
PROT SERPL-MCNC: 7.2 G/DL (ref 6–8.4)
RBC # BLD AUTO: 4.02 M/UL (ref 4–5.4)
SODIUM SERPL-SCNC: 137 MMOL/L (ref 136–145)
TRIGL SERPL-MCNC: 66 MG/DL (ref 30–150)
TSH SERPL DL<=0.005 MIU/L-ACNC: 0.89 UIU/ML (ref 0.4–4)
VIT B12 SERPL-MCNC: 566 PG/ML (ref 210–950)
WBC # BLD AUTO: 3.84 K/UL (ref 3.9–12.7)

## 2024-10-04 PROCEDURE — 36415 COLL VENOUS BLD VENIPUNCTURE: CPT | Performed by: INTERNAL MEDICINE

## 2024-10-04 PROCEDURE — 96413 CHEMO IV INFUSION 1 HR: CPT

## 2024-10-04 PROCEDURE — 80053 COMPREHEN METABOLIC PANEL: CPT | Performed by: INTERNAL MEDICINE

## 2024-10-04 PROCEDURE — 80061 LIPID PANEL: CPT | Performed by: INTERNAL MEDICINE

## 2024-10-04 PROCEDURE — 82105 ALPHA-FETOPROTEIN SERUM: CPT | Performed by: INTERNAL MEDICINE

## 2024-10-04 PROCEDURE — 83036 HEMOGLOBIN GLYCOSYLATED A1C: CPT | Performed by: INTERNAL MEDICINE

## 2024-10-04 PROCEDURE — 84443 ASSAY THYROID STIM HORMONE: CPT | Performed by: INTERNAL MEDICINE

## 2024-10-04 PROCEDURE — 25000003 PHARM REV CODE 250: Performed by: PHYSICIAN ASSISTANT

## 2024-10-04 PROCEDURE — 82306 VITAMIN D 25 HYDROXY: CPT | Performed by: INTERNAL MEDICINE

## 2024-10-04 PROCEDURE — 85027 COMPLETE CBC AUTOMATED: CPT | Performed by: INTERNAL MEDICINE

## 2024-10-04 PROCEDURE — 82607 VITAMIN B-12: CPT | Performed by: INTERNAL MEDICINE

## 2024-10-04 PROCEDURE — 86301 IMMUNOASSAY TUMOR CA 19-9: CPT | Performed by: INTERNAL MEDICINE

## 2024-10-04 PROCEDURE — 96361 HYDRATE IV INFUSION ADD-ON: CPT

## 2024-10-04 PROCEDURE — 63600175 PHARM REV CODE 636 W HCPCS: Mod: JZ,JG | Performed by: PHYSICIAN ASSISTANT

## 2024-10-04 RX ORDER — HEPARIN 100 UNIT/ML
500 SYRINGE INTRAVENOUS
Status: DISCONTINUED | OUTPATIENT
Start: 2024-10-04 | End: 2024-10-04 | Stop reason: HOSPADM

## 2024-10-04 RX ORDER — DIPHENHYDRAMINE HYDROCHLORIDE 50 MG/ML
50 INJECTION INTRAMUSCULAR; INTRAVENOUS ONCE AS NEEDED
Status: DISCONTINUED | OUTPATIENT
Start: 2024-10-04 | End: 2024-10-04 | Stop reason: HOSPADM

## 2024-10-04 RX ORDER — SODIUM CHLORIDE 0.9 % (FLUSH) 0.9 %
10 SYRINGE (ML) INJECTION
Status: DISCONTINUED | OUTPATIENT
Start: 2024-10-04 | End: 2024-10-04 | Stop reason: HOSPADM

## 2024-10-04 RX ORDER — PROCHLORPERAZINE EDISYLATE 5 MG/ML
5 INJECTION INTRAMUSCULAR; INTRAVENOUS ONCE AS NEEDED
Status: DISCONTINUED | OUTPATIENT
Start: 2024-10-04 | End: 2024-10-04 | Stop reason: HOSPADM

## 2024-10-04 RX ORDER — SODIUM CHLORIDE, SODIUM LACTATE, POTASSIUM CHLORIDE, CALCIUM CHLORIDE 600; 310; 30; 20 MG/100ML; MG/100ML; MG/100ML; MG/100ML
INJECTION, SOLUTION INTRAVENOUS CONTINUOUS
Status: DISCONTINUED | OUTPATIENT
Start: 2024-10-04 | End: 2024-10-04 | Stop reason: HOSPADM

## 2024-10-04 RX ORDER — EPINEPHRINE 0.3 MG/.3ML
0.3 INJECTION SUBCUTANEOUS ONCE AS NEEDED
Status: DISCONTINUED | OUTPATIENT
Start: 2024-10-04 | End: 2024-10-04 | Stop reason: HOSPADM

## 2024-10-04 RX ADMIN — SODIUM CHLORIDE 1500 MG: 9 INJECTION, SOLUTION INTRAVENOUS at 09:10

## 2024-10-04 RX ADMIN — SODIUM CHLORIDE: 9 INJECTION, SOLUTION INTRAVENOUS at 09:10

## 2024-10-04 RX ADMIN — SODIUM CHLORIDE, POTASSIUM CHLORIDE, SODIUM LACTATE AND CALCIUM CHLORIDE: 600; 310; 30; 20 INJECTION, SOLUTION INTRAVENOUS at 08:10

## 2024-10-04 NOTE — PROGRESS NOTES
Subjective:       Patient ID: Melly Hwang is a 44 y.o. female.    Chief Complaint: Chemotherapy      History:  Past Medical History:   Diagnosis Date    VENKATA positive 2020    COVID-19     Deviated septum     Hepatocellular carcinoma 2021    Hypertrophy of inferior nasal turbinate     Pericardial effusion     Premature menopause      Past Surgical History:   Procedure Laterality Date    COLONOSCOPY N/A 2020    Procedure: COLONOSCOPY;  Surgeon: GIOVANNI Crane MD;  Location: [facility] ENDO (4TH FLR);  Service: Endoscopy;  Laterality: N/A;  + covid     EPIDURAL STEROID INJECTION INTO CERVICAL SPINE N/A 2024    Procedure: FLORENTINO C7/T1;  Surgeon: Cezar Bailey MD;  Location: [facility] PAIN MANAGEMENT;  Service: Pain Management;  Laterality: N/A;  20mins-No ac    ESOPHAGOGASTRODUODENOSCOPY N/A 1/10/2024    Procedure: ESOPHAGOGASTRODUODENOSCOPY (EGD);  Surgeon: Reynaldo Lynch MD;  Location: [facility] (2ND FLR);  Service: Endoscopy;  Laterality: N/A;  referred by daryl carrasco rocedure: EGD Diagnosis: Abnormal finding on GI tract imaging, Abdominal pain, and GERD  Procedure Timin-4 weeks Provider: Any GI provider Location: [facility], [facility], and [facility]  Additional Scheduling Informat    ESOPHAGOGASTRODUODENOSCOPY N/A 2024    Procedure: EGD (ESOPHAGOGASTRODUODENOSCOPY);  Surgeon: Reynaldo Lynch MD;  Location: [facility] (2ND FLR);  Service: Endoscopy;  Laterality: N/A;  prep inst sent to pt via portal / referral a labat/   precall complete/instructions sent via portal cd  -moved pt to 730 procedure time. pt aware and notified of 630a arrival. did not want instructions resent, [facility]    HERNIA REPAIR      LIVER SURGERY N/A 2021    NASAL SEPTUM SURGERY      PERICARDIAL WINDOW N/A 2021    Procedure: CREATION, PERICARDIAL WINDOW;  Surgeon: Ajay Cantor MD;  Location: [facility] OR 2ND FLR;  Service: Cardiovascular;  Laterality: N/A;     PERICARDIOCENTESIS N/A 05/02/2020    Procedure: Pericardiocentesis;  Surgeon: Dickson Dangelo MD;  Location: [facility] CATH LAB;  Service: Cardiology;  Laterality: N/A;    TONSILLECTOMY        Oncology History   Hepatocellular carcinoma   6/9/2021 Initial Diagnosis    Hepatocellular carcinoma     4/10/2023 Cancer Staged    Staging form: Liver, AJCC 8th Edition  - Clinical stage from 4/10/2023: Stage IVB (rcT1b, cN0, pM1)     7/11/2023 -  Chemotherapy    Treatment Summary   Plan Name: OP TREMELIMUMAB 300 MG (X 1) + DURVALUMAB 1500 MG Q4W  Treatment Goal: Control  Status: Active  Start Date: 7/11/2023  End Date: 1/24/2025 (Planned)  Provider: Philippe Carrasco MD  Chemotherapy: [No matching medication found in this treatment plan]     9/11/2023 Genetic Testing    Genetic counseling done. Hereditary syndrome unlikely. Patient offered testing, but declined due to cost.      10/23/2023 - 11/3/2023 Radiation Therapy    Treating physician: yamil villa    Course: C1 Chst/Abd 2023  Treatment Site Ref. ID Energy Dose/Fx (Gy) #Fx Dose Correction (Gy) Total Dose (Gy) Start Date End Date Elapsed Days   SB Lung_R PTV_Lung 6X 10 5 / 5 0 50 10/23/2023 11/1/2023 9   SB Abdomen PTV_PortalLN 6X 10 5 / 5 0 50 10/23/2023 11/3/2023 11                  Durvalumab signed             Physical Exam    Results:     Recent Results (from the past 48 hours)   CBC Oncology    Collection Time: 10/04/24  7:08 AM   Result Value Ref Range    WBC 3.84 (L) 3.90 - 12.70 K/uL    RBC 4.02 4.00 - 5.40 M/uL    Hemoglobin 12.2 12.0 - 16.0 g/dL    Hematocrit 39.6 37.0 - 48.5 %    MCV 99 (H) 82 - 98 fL    MCH 30.3 27.0 - 31.0 pg    MCHC 30.8 (L) 32.0 - 36.0 g/dL    RDW 12.5 11.5 - 14.5 %    Platelets 273 150 - 450 K/uL    MPV 9.7 9.2 - 12.9 fL    Gran # (ANC) 2.8 1.8 - 7.7 K/uL    Immature Grans (Abs) 0.02 0.00 - 0.04 K/uL   Comprehensive Metabolic Panel    Collection Time: 10/04/24  7:08 AM   Result Value Ref Range    Sodium 137 136 - 145 mmol/L     Potassium 4.3 3.5 - 5.1 mmol/L    Chloride 105 95 - 110 mmol/L    CO2 25 23 - 29 mmol/L    Glucose 133 (H) 70 - 110 mg/dL    BUN 10 6 - 20 mg/dL    Creatinine 0.9 0.5 - 1.4 mg/dL    Calcium 9.2 8.7 - 10.5 mg/dL    Total Protein 7.2 6.0 - 8.4 g/dL    Albumin 3.4 (L) 3.5 - 5.2 g/dL    Total Bilirubin 0.6 0.1 - 1.0 mg/dL    Alkaline Phosphatase 91 55 - 135 U/L    AST 14 10 - 40 U/L    ALT 16 10 - 44 U/L    eGFR >60.0 >60 mL/min/1.73 m^2    Anion Gap 7 (L) 8 - 16 mmol/L   Cancer Antigen 19-9    Collection Time: 10/04/24  7:08 AM   Result Value Ref Range    CA 19-9 102.5 (H) 0.0 - 40.0 U/mL   AFP Tumor Marker    Collection Time: 10/04/24  7:08 AM   Result Value Ref Range    AFP 9.8 (H) 0.0 - 8.4 ng/mL   Lipid Panel    Collection Time: 10/04/24  7:08 AM   Result Value Ref Range    Cholesterol 175 120 - 199 mg/dL    Triglycerides 66 30 - 150 mg/dL    HDL 61 40 - 75 mg/dL    LDL Cholesterol 100.8 63.0 - 159.0 mg/dL    HDL/Cholesterol Ratio 34.9 20.0 - 50.0 %    Total Cholesterol/HDL Ratio 2.9 2.0 - 5.0    Non-HDL Cholesterol 114 mg/dL   TSH    Collection Time: 10/04/24  7:08 AM   Result Value Ref Range    TSH 0.891 0.400 - 4.000 uIU/mL   Vitamin D    Collection Time: 10/04/24  7:08 AM   Result Value Ref Range    Vit D, 25-Hydroxy 40 30 - 96 ng/mL   Vitamin B12    Collection Time: 10/04/24  7:08 AM   Result Value Ref Range    Vitamin B-12 566 210 - 950 pg/mL   Hemoglobin A1C    Collection Time: 10/04/24  7:08 AM   Result Value Ref Range    Hemoglobin A1C 5.4 4.0 - 5.6 %    Estimated Avg Glucose 108 68 - 131 mg/dL          Assessment:       Problem List Items Addressed This Visit          Oncology    Hepatocellular carcinoma - Primary    Relevant Medications    lactated ringers infusion    durvalumab (IMFINZI) 1,500 mg in 0.9% NaCl SolP 315 mL chemo infusion    prochlorperazine injection Soln 5 mg    EPINEPHrine (EPIPEN) 0.3 mg/0.3 mL pen injection 0.3 mg    diphenhydrAMINE injection 50 mg    hydrocortisone sodium succinate  injection 100 mg    0.9% NaCl 100 mL flush bag    sodium chloride 0.9% flush 10 mL    heparin, porcine (PF) 100 unit/mL injection flush 500 Units    alteplase injection 2 mg    Other Relevant Orders    Physician communication order (Completed)    Treatment conditions (Completed)    Nursing Order (Completed)    Monitor Vital Signs (Completed)    Nursing communication (Completed)    Nursing communication (Completed)    Nursing communication (Completed)    Oxygen Continuous    Access an existing port or device (Completed)    Deaccess existing port or device (Completed)    Insert peripheral IV    Discontinue IV       Plan:       Proceed with Durvalumab

## 2024-10-16 ENCOUNTER — TELEPHONE (OUTPATIENT)
Dept: HEMATOLOGY/ONCOLOGY | Facility: CLINIC | Age: 44
End: 2024-10-16
Payer: COMMERCIAL

## 2024-10-16 DIAGNOSIS — R10.9 ABDOMINAL PAIN, UNSPECIFIED ABDOMINAL LOCATION: ICD-10-CM

## 2024-10-16 DIAGNOSIS — C22.0 HCC (HEPATOCELLULAR CARCINOMA): Primary | ICD-10-CM

## 2024-10-21 ENCOUNTER — HOSPITAL ENCOUNTER (OUTPATIENT)
Dept: RADIOLOGY | Facility: HOSPITAL | Age: 44
Discharge: HOME | End: 2024-10-21
Attending: PHYSICIAN ASSISTANT
Payer: COMMERCIAL

## 2024-10-21 DIAGNOSIS — R10.9 ABDOMINAL PAIN, UNSPECIFIED ABDOMINAL LOCATION: ICD-10-CM

## 2024-10-21 DIAGNOSIS — C22.0 HCC (HEPATOCELLULAR CARCINOMA): ICD-10-CM

## 2024-10-21 PROCEDURE — 76700 US EXAM ABDOM COMPLETE: CPT | Mod: TC

## 2024-10-21 PROCEDURE — 76700 US EXAM ABDOM COMPLETE: CPT | Mod: 26,,, | Performed by: STUDENT IN AN ORGANIZED HEALTH CARE EDUCATION/TRAINING PROGRAM

## 2024-11-01 ENCOUNTER — HOSPITAL ENCOUNTER (OUTPATIENT)
Dept: RADIOLOGY | Facility: HOSPITAL | Age: 44
Discharge: HOME | End: 2024-11-01
Attending: PHYSICIAN ASSISTANT
Payer: COMMERCIAL

## 2024-11-01 ENCOUNTER — INFUSION (OUTPATIENT)
Dept: INFUSION THERAPY | Facility: HOSPITAL | Age: 44
End: 2024-11-01
Payer: COMMERCIAL

## 2024-11-01 ENCOUNTER — OFFICE VISIT (OUTPATIENT)
Dept: HEMATOLOGY/ONCOLOGY | Facility: CLINIC | Age: 44
End: 2024-11-01
Payer: COMMERCIAL

## 2024-11-01 VITALS
HEIGHT: 71 IN | BODY MASS INDEX: 21.62 KG/M2 | HEART RATE: 79 BPM | RESPIRATION RATE: 16 BRPM | TEMPERATURE: 99 F | SYSTOLIC BLOOD PRESSURE: 150 MMHG | OXYGEN SATURATION: 100 % | DIASTOLIC BLOOD PRESSURE: 86 MMHG

## 2024-11-01 DIAGNOSIS — C22.0 HCC (HEPATOCELLULAR CARCINOMA): ICD-10-CM

## 2024-11-01 DIAGNOSIS — I26.99 BILATERAL PULMONARY EMBOLISM: Primary | ICD-10-CM

## 2024-11-01 DIAGNOSIS — C22.0 HCC (HEPATOCELLULAR CARCINOMA): Primary | ICD-10-CM

## 2024-11-01 DIAGNOSIS — C22.0 HEPATOCELLULAR CARCINOMA: Primary | ICD-10-CM

## 2024-11-01 PROCEDURE — 25000003 PHARM REV CODE 250: Performed by: PHYSICIAN ASSISTANT

## 2024-11-01 PROCEDURE — 99999 PR PBB SHADOW E&M-EST. PATIENT-LVL IV: CPT | Mod: PBBFAC,,, | Performed by: PHYSICIAN ASSISTANT

## 2024-11-01 PROCEDURE — A9698 NON-RAD CONTRAST MATERIALNOC: HCPCS | Performed by: PHYSICIAN ASSISTANT

## 2024-11-01 PROCEDURE — 74177 CT ABD & PELVIS W/CONTRAST: CPT | Mod: TC

## 2024-11-01 PROCEDURE — 63600175 PHARM REV CODE 636 W HCPCS: Performed by: PHYSICIAN ASSISTANT

## 2024-11-01 PROCEDURE — 74177 CT ABD & PELVIS W/CONTRAST: CPT | Mod: 26,,, | Performed by: RADIOLOGY

## 2024-11-01 PROCEDURE — 25500020 PHARM REV CODE 255: Performed by: PHYSICIAN ASSISTANT

## 2024-11-01 PROCEDURE — 71260 CT THORAX DX C+: CPT | Mod: 26,,, | Performed by: RADIOLOGY

## 2024-11-01 RX ORDER — DIPHENHYDRAMINE HYDROCHLORIDE 50 MG/ML
50 INJECTION INTRAMUSCULAR; INTRAVENOUS ONCE AS NEEDED
Status: DISCONTINUED | OUTPATIENT
Start: 2024-11-01 | End: 2024-11-01 | Stop reason: HOSPADM

## 2024-11-01 RX ORDER — SODIUM CHLORIDE, SODIUM LACTATE, POTASSIUM CHLORIDE, CALCIUM CHLORIDE 600; 310; 30; 20 MG/100ML; MG/100ML; MG/100ML; MG/100ML
INJECTION, SOLUTION INTRAVENOUS CONTINUOUS
Status: CANCELLED
Start: 2024-11-01

## 2024-11-01 RX ORDER — EPINEPHRINE 0.3 MG/.3ML
0.3 INJECTION SUBCUTANEOUS ONCE AS NEEDED
Status: CANCELLED | OUTPATIENT
Start: 2024-11-01

## 2024-11-01 RX ORDER — PROCHLORPERAZINE EDISYLATE 5 MG/ML
5 INJECTION INTRAMUSCULAR; INTRAVENOUS ONCE AS NEEDED
Status: DISCONTINUED | OUTPATIENT
Start: 2024-11-01 | End: 2024-11-01 | Stop reason: HOSPADM

## 2024-11-01 RX ORDER — SODIUM CHLORIDE 0.9 % (FLUSH) 0.9 %
10 SYRINGE (ML) INJECTION
Status: CANCELLED | OUTPATIENT
Start: 2024-11-01

## 2024-11-01 RX ORDER — DIPHENHYDRAMINE HYDROCHLORIDE 50 MG/ML
50 INJECTION INTRAMUSCULAR; INTRAVENOUS ONCE AS NEEDED
Status: CANCELLED | OUTPATIENT
Start: 2024-11-01

## 2024-11-01 RX ORDER — SODIUM CHLORIDE, SODIUM LACTATE, POTASSIUM CHLORIDE, CALCIUM CHLORIDE 600; 310; 30; 20 MG/100ML; MG/100ML; MG/100ML; MG/100ML
INJECTION, SOLUTION INTRAVENOUS CONTINUOUS
Status: DISCONTINUED | OUTPATIENT
Start: 2024-11-01 | End: 2024-11-01 | Stop reason: HOSPADM

## 2024-11-01 RX ORDER — SODIUM CHLORIDE 0.9 % (FLUSH) 0.9 %
10 SYRINGE (ML) INJECTION
Status: DISCONTINUED | OUTPATIENT
Start: 2024-11-01 | End: 2024-11-01 | Stop reason: HOSPADM

## 2024-11-01 RX ORDER — HEPARIN 100 UNIT/ML
500 SYRINGE INTRAVENOUS
Status: CANCELLED | OUTPATIENT
Start: 2024-11-01

## 2024-11-01 RX ORDER — HEPARIN 100 UNIT/ML
500 SYRINGE INTRAVENOUS
Status: DISCONTINUED | OUTPATIENT
Start: 2024-11-01 | End: 2024-11-01 | Stop reason: HOSPADM

## 2024-11-01 RX ORDER — PROCHLORPERAZINE EDISYLATE 5 MG/ML
5 INJECTION INTRAMUSCULAR; INTRAVENOUS ONCE AS NEEDED
Status: CANCELLED
Start: 2024-11-01

## 2024-11-01 RX ORDER — EPINEPHRINE 0.3 MG/.3ML
0.3 INJECTION SUBCUTANEOUS ONCE AS NEEDED
Status: DISCONTINUED | OUTPATIENT
Start: 2024-11-01 | End: 2024-11-01 | Stop reason: HOSPADM

## 2024-11-01 RX ADMIN — SODIUM CHLORIDE, POTASSIUM CHLORIDE, SODIUM LACTATE AND CALCIUM CHLORIDE: 600; 310; 30; 20 INJECTION, SOLUTION INTRAVENOUS at 09:11

## 2024-11-01 RX ADMIN — BARIUM SULFATE 450 ML: 20 SUSPENSION ORAL at 03:11

## 2024-11-01 RX ADMIN — IOHEXOL 100 ML: 350 INJECTION, SOLUTION INTRAVENOUS at 03:11

## 2024-11-01 RX ADMIN — SODIUM CHLORIDE 1500 MG: 9 INJECTION, SOLUTION INTRAVENOUS at 10:11

## 2024-11-19 ENCOUNTER — LAB VISIT (OUTPATIENT)
Dept: LAB | Facility: HOSPITAL | Age: 44
End: 2024-11-19
Attending: INTERNAL MEDICINE
Payer: COMMERCIAL

## 2024-11-19 ENCOUNTER — PATIENT MESSAGE (OUTPATIENT)
Dept: RESEARCH | Facility: HOSPITAL | Age: 44
End: 2024-11-19
Payer: COMMERCIAL

## 2024-11-19 DIAGNOSIS — N39.0 URINARY TRACT INFECTION WITHOUT HEMATURIA, SITE UNSPECIFIED: Primary | ICD-10-CM

## 2024-11-19 DIAGNOSIS — N30.00 ACUTE CYSTITIS WITHOUT HEMATURIA: ICD-10-CM

## 2024-11-19 LAB
BACTERIA #/AREA URNS HPF: ABNORMAL /HPF
BILIRUB UR QL STRIP: NEGATIVE
CLARITY UR: ABNORMAL
COLOR UR: YELLOW
GLUCOSE UR QL STRIP: NEGATIVE
HGB UR QL STRIP: NEGATIVE
KETONES UR QL STRIP: NEGATIVE
LEUKOCYTE ESTERASE UR QL STRIP: ABNORMAL
MICROSCOPIC COMMENT: ABNORMAL
NITRITE UR QL STRIP: NEGATIVE
NON-SQ EPI CELLS #/AREA URNS HPF: 2 /HPF
PH UR STRIP: 7 [PH] (ref 5–8)
PROT UR QL STRIP: NEGATIVE
RBC #/AREA URNS HPF: 4 /HPF (ref 0–4)
SP GR UR STRIP: 1.01 (ref 1–1.03)
SQUAMOUS #/AREA URNS HPF: 26 /HPF
URN SPEC COLLECT METH UR: ABNORMAL
UROBILINOGEN UR STRIP-ACNC: NEGATIVE EU/DL
WBC #/AREA URNS HPF: 50 /HPF (ref 0–5)

## 2024-11-19 PROCEDURE — 81000 URINALYSIS NONAUTO W/SCOPE: CPT | Performed by: INTERNAL MEDICINE

## 2024-11-19 PROCEDURE — 87086 URINE CULTURE/COLONY COUNT: CPT | Performed by: INTERNAL MEDICINE

## 2024-11-19 RX ORDER — CIPROFLOXACIN 500 MG/1
500 TABLET ORAL EVERY 12 HOURS
Qty: 20 TABLET | Refills: 0 | Status: SHIPPED | OUTPATIENT
Start: 2024-11-19 | End: 2024-11-30

## 2024-11-21 LAB — BACTERIA UR CULT: NORMAL

## 2024-11-22 ENCOUNTER — PATIENT MESSAGE (OUTPATIENT)
Dept: INTERNAL MEDICINE | Facility: CLINIC | Age: 44
End: 2024-11-22
Payer: COMMERCIAL

## 2024-11-25 DIAGNOSIS — R11.0 NAUSEA: ICD-10-CM

## 2024-11-25 DIAGNOSIS — K21.9 GASTRO-ESOPHAGEAL REFLUX DISEASE WITHOUT ESOPHAGITIS: ICD-10-CM

## 2024-11-25 RX ORDER — OMEPRAZOLE 20 MG/1
CAPSULE, DELAYED RELEASE ORAL
Qty: 90 CAPSULE | Refills: 3 | Status: SHIPPED | OUTPATIENT
Start: 2024-11-25

## 2024-11-25 NOTE — TELEPHONE ENCOUNTER
Refill Routing Note   Medication(s) are not appropriate for processing by [facility] for the following reason(s):        No active prescription written by provider  Outside of protocol    [facility] action(s):  Defer  Route        Medication Therapy Plan: PRN usage outside [facility] protocol      Appointments  past 12m or future 3m with PCP    Date Provider   Last Visit   9/20/2024 Melly Sahu MD   Next Visit   Visit date not found Melly Sahu MD   ED visits in past 90 days: 0        Note composed:3:35 AM 11/25/2024

## 2024-11-25 NOTE — TELEPHONE ENCOUNTER
Not sure if she wants to be seen for this back pain?  Perhaps Mili can see her this week since I am out  thanks

## 2024-11-25 NOTE — TELEPHONE ENCOUNTER
No care due was identified.  [facility] Embedded Care Due Messages. Reference number: [id number].   11/25/2024 12:17:18 AM CST

## 2024-11-28 NOTE — PROGRESS NOTES
MEDICAL ONCOLOGY - ESTABLISHED PATIENT VISIT    Reason for visit: Scirrhous HCC    Best Contact Phone Number(s): [phone] (home)      Cancer/Stage/TNM:    Cancer Staging   Hepatocellular carcinoma  Staging form: Liver, AJCC 8th Edition  - Clinical stage from 4/10/2023: Stage IVB (rcT1b, cN0, pM1) - Signed by Philippe Carrasco MD on 8/7/2023       Oncology History   Hepatocellular carcinoma   6/9/2021 Initial Diagnosis    Hepatocellular carcinoma     4/10/2023 Cancer Staged    Staging form: Liver, AJCC 8th Edition  - Clinical stage from 4/10/2023: Stage IVB (rcT1b, cN0, pM1)     7/11/2023 -  Chemotherapy    Treatment Summary   Plan Name: OP TREMELIMUMAB 300 MG (X 1) + DURVALUMAB 1500 MG Q4W  Treatment Goal: Control  Status: Active  Start Date: 7/11/2023  End Date: 1/24/2025 (Planned)  Provider: Philippe Carrasco MD  Chemotherapy: [No matching medication found in this treatment plan]     9/11/2023 Genetic Testing    Genetic counseling done. Hereditary syndrome unlikely. Patient offered testing, but declined due to cost.      10/23/2023 - 11/3/2023 Radiation Therapy    Treating physician: yamil villa    Course: C1 Chst/Abd 2023  Treatment Site Ref. ID Energy Dose/Fx (Gy) #Fx Dose Correction (Gy) Total Dose (Gy) Start Date End Date Elapsed Days   SB Lung_R PTV_Lung 6X 10 5 / 5 0 50 10/23/2023 11/1/2023 9   SB Abdomen PTV_PortalLN 6X 10 5 / 5 0 50 10/23/2023 11/3/2023 11           Interim History:   44 y.o. 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 Howell with the same pathology, negative margins, 0 of 8 lymph nodes positive and + for vascular and perineural invasion. We saw her in 2021 for evaluation for persistent CA 19-9 but there was no radiographic evidence of HCC disease recurrence or alternative reason for CA 19-9 elevation.  CT chest on 3/9/23 showed an enlarging RLL nodule measuring 1.1 cm, increased from 0.8 cm in size.  IR biopsy of this on  4/10/23 confirmed metastatic HCC.  Since then in mid-June she has also had an MRI abdomen that showed an enlarging portocaval lymph node that is consistent with metastatic disease.  She completed SBRT at the beginning of November to her lung metastasis and to her portocaval lymph node. She underwent Y-90 on 2/1/24.  She underwent Y-90 again to a left lobe hepatic tumor on 8/15/24.    She presents today for cycle 19 of durvalumab alone today, previously + tremelimumab as part of the STRiDE regimen. She received re-priming with tremelimumab with cycle 15.      She has noticed about two weeks of pain that began as R flank pain with some radiation to the mid back, but now is just focused in the mid back.  Taking Tylenol with relief.  Denies any relation to deep breaths.  She is interested in taking a break from durvalumab and monitoring off therapy.    Presents alone today.  ECOG PS 0.    ROS:   Review of Systems   Constitutional:  Negative for chills, fever, malaise/fatigue and weight loss.   HENT:  Negative for sore throat.    Eyes:  Negative for blurred vision and pain.   Respiratory:  Negative for cough and shortness of breath.    Cardiovascular:  Negative for chest pain and leg swelling.   Gastrointestinal:  Positive for constipation. Negative for abdominal pain, diarrhea, nausea and vomiting.   Genitourinary:  Negative for dysuria and frequency.   Musculoskeletal:  Positive for back pain. Negative for falls and myalgias.   Skin:  Negative for rash.   Neurological:  Negative for dizziness, weakness and headaches.   Endo/Heme/Allergies:  Does not bruise/bleed easily.   Psychiatric/Behavioral:  Negative for depression. The patient is not nervous/anxious.    All other systems reviewed and are negative.      Past Medical History:   Past Medical History:   Diagnosis Date    VENKATA positive 08/20/2020    COVID-19     Deviated septum 2011    Hepatocellular carcinoma 05/07/2021    Hypertrophy of inferior nasal turbinate      Pericardial effusion     Premature menopause         Past Surgical History:   Past Surgical History:   Procedure Laterality Date    COLONOSCOPY N/A 2020    Procedure: COLONOSCOPY;  Surgeon: GIOVANNI Crane MD;  Location: [facility] ENDO (4TH FLR);  Service: Endoscopy;  Laterality: N/A;  + covid     EPIDURAL STEROID INJECTION INTO CERVICAL SPINE N/A 2024    Procedure: FLORENTINO C7/T1;  Surgeon: Cezar Bailey MD;  Location: [facility] PAIN MANAGEMENT;  Service: Pain Management;  Laterality: N/A;  20mins-No ac    ESOPHAGOGASTRODUODENOSCOPY N/A 1/10/2024    Procedure: ESOPHAGOGASTRODUODENOSCOPY (EGD);  Surgeon: Reynaldo Lynch MD;  Location: [facility] (2ND FLR);  Service: Endoscopy;  Laterality: N/A;  referred by daryl carrasco rocedure: EGD Diagnosis: Abnormal finding on GI tract imaging, Abdominal pain, and GERD  Procedure Timin-4 weeks Provider: Any GI provider Location: [facility], [facility] 2-Endo, and [facility] 4-Endo  Additional Scheduling Informat    ESOPHAGOGASTRODUODENOSCOPY N/A 2024    Procedure: EGD (ESOPHAGOGASTRODUODENOSCOPY);  Surgeon: Reynaldo Lynch MD;  Location: [facility] (2ND FLR);  Service: Endoscopy;  Laterality: N/A;  prep inst sent to pt via portal / referral a labat/   precall complete/instructions sent via portal cd  -moved pt to 730 procedure time. pt aware and notified of 630a arrival. did not want instructions resent, KL OC    HERNIA REPAIR      LIVER SURGERY N/A 2021    NASAL SEPTUM SURGERY      PERICARDIAL WINDOW N/A 2021    Procedure: CREATION, PERICARDIAL WINDOW;  Surgeon: Ajay Cantor MD;  Location: [facility] OR 2ND FLR;  Service: Cardiovascular;  Laterality: N/A;    PERICARDIOCENTESIS N/A 2020    Procedure: Pericardiocentesis;  Surgeon: Dickson Dangelo MD;  Location: [facility] CATH LAB;  Service: Cardiology;  Laterality: N/A;    TONSILLECTOMY          Family History:   Family History   Problem Relation Name Age of Onset    Diabetes Mother      Liver  disease Mother          fatty liver    Heart disease Father      Macular degeneration Father      Diabetes Father      Hypertension Father      Hyperlipidemia Father      Heart disease Sister      Adjustment disorder with mixed anxiety and depressed mood Sister      Cancer Maternal Aunt          lung    Lung cancer Maternal Aunt          heavy smoker    Cerebral aneurysm Paternal Aunt      Cataracts Neg Hx      Glaucoma Neg Hx      Retinal detachment Neg Hx      Stroke Neg Hx      Thyroid disease Neg Hx      Melanoma Neg Hx      Heart attack Neg Hx          Social History:   Social History     Tobacco Use    Smoking status: Never    Smokeless tobacco: Never   Substance Use Topics    Alcohol use: Yes     Comment: rare        I have reviewed and updated the patient's past medical, surgical, family and social histories.    Allergies:   Review of patient's allergies indicates:   Allergen Reactions    No known drug allergies         Medications:   Current Outpatient Medications   Medication Sig Dispense Refill    ALPRAZolam (XANAX) 0.5 MG tablet Take 1 tablet (0.5 mg total) by mouth daily as needed for Anxiety. 30 tablet 1    apixaban (ELIQUIS) 5 mg Tab Take 2 tabs, 10 mg BID x 7 days, then take 1 tab 5 mg BID 60 tablet 3    cetirizine (ZYRTEC) 10 MG tablet Take 1 tablet (10 mg total) by mouth once daily. 90 tablet 3    cyanocobalamin (VITAMIN B-12) 1000 MCG tablet Take 1 tablet (1,000 mcg total) by mouth once daily. 30 tablet 12    cyclobenzaprine (FLEXERIL) 10 MG tablet Take 1 tablet (10 mg total) by mouth nightly as needed for Muscle spasms. 30 tablet 1    EScitalopram oxalate (LEXAPRO) 10 MG tablet Take 1 tablet (10 mg total) by mouth once daily. 90 tablet 3    fluticasone propionate (FLONASE) 50 mcg/actuation nasal spray SPRAY 2 SPRAYS BY EACH NOSTRIL ROUTE ONCE DAILY. 48 mL 2    naproxen (NAPROSYN) 500 MG tablet Take 1 tablet (500 mg total) by mouth 2 (two) times daily with meals. 60 tablet 0     "norethindrone-ethinyl estradiol (MICROGESTIN 1/20) 1-20 mg-mcg per tablet Take 1 tablet by mouth once daily. 63 tablet 4    omeprazole (PRILOSEC) 20 MG capsule TAKE 1 CAPSULE BY MOUTH ONCE DAILY AS NEEDED FOR GERD 90 capsule 3    prochlorperazine (COMPAZINE) 10 MG tablet Take 1 tablet (10 mg total) by mouth every 6 (six) hours as needed (nausea). 60 tablet 1    simethicone (MYLICON) 125 MG chewable tablet Take 1 tablet (125 mg total) by mouth every 6 (six) hours as needed for Flatulence (BURPING/BELCHING). 30 tablet 0    ciprofloxacin HCl (CIPRO) 500 MG tablet Take 1 tablet (500 mg total) by mouth every 12 (twelve) hours. for 10 days (Patient not taking: Reported on 11/29/2024) 20 tablet 0    ketoconazole (NIZORAL) 2 % cream aaa on face and behind ears qd-bid prn flare (Patient not taking: Reported on 11/29/2024) 30 g 3    multivitamin capsule Take by mouth. 1 capsule Oral Every morning (Patient not taking: Reported on 11/29/2024)      ondansetron (ZOFRAN-ODT) 4 MG TbDL Take 1 tablet (4 mg total) by mouth every 12 (twelve) hours as needed (nausea). (Patient not taking: Reported on 11/29/2024) 20 tablet 0    pantoprazole (PROTONIX) 40 MG tablet TAKE 1 TABLET BY MOUTH EVERY DAY (Patient not taking: Reported on 11/29/2024) 90 tablet 1    sucralfate (CARAFATE) 100 mg/mL suspension Take 10 mLs (1 g total) by mouth 4 (four) times daily. 414 mL 0     No current facility-administered medications for this visit.        Physical Exam:   BP (!) 150/88 (BP Location: Left arm, Patient Position: Sitting)   Pulse 72   Temp 98.4 °F (36.9 °C) (Oral)   Resp 18   Ht 5' 11" (1.803 m)   Wt 70.5 kg (155 lb 6.8 oz)   LMP  (LMP Unknown)   SpO2 99%   BMI 21.68 kg/m²                Physical Exam  Vitals reviewed.   Constitutional:       General: She is not in acute distress.     Appearance: Normal appearance. She is normal weight.   HENT:      Head: Normocephalic and atraumatic.      Right Ear: External ear normal.      Left Ear: " External ear normal.      Nose: Nose normal.      Mouth/Throat:      Mouth: Mucous membranes are moist.      Pharynx: Oropharynx is clear. No posterior oropharyngeal erythema.   Eyes:      General: No scleral icterus.     Extraocular Movements: Extraocular movements intact.      Conjunctiva/sclera: Conjunctivae normal.      Pupils: Pupils are equal, round, and reactive to light.   Cardiovascular:      Rate and Rhythm: Normal rate and regular rhythm.      Pulses: Normal pulses.      Heart sounds: Normal heart sounds.   Pulmonary:      Effort: Pulmonary effort is normal.      Breath sounds: Normal breath sounds. No wheezing or rales.   Abdominal:      General: Bowel sounds are normal. There is no distension.      Palpations: Abdomen is soft.      Tenderness: There is no abdominal tenderness.   Musculoskeletal:         General: No swelling. Normal range of motion.      Cervical back: Normal range of motion and neck supple.   Skin:     General: Skin is warm.      Coloration: Skin is not jaundiced.      Findings: No erythema or rash.   Neurological:      General: No focal deficit present.      Mental Status: She is alert and oriented to person, place, and time. Mental status is at baseline.      Gait: Gait normal.   Psychiatric:         Mood and Affect: Mood normal.         Behavior: Behavior normal.         Thought Content: Thought content normal.         Judgment: Judgment normal.           Labs:   Recent Results (from the past 48 hours)   CBC Oncology    Collection Time: 11/29/24  7:26 AM   Result Value Ref Range    WBC 3.82 (L) 3.90 - 12.70 K/uL    RBC 4.00 4.00 - 5.40 M/uL    Hemoglobin 12.4 12.0 - 16.0 g/dL    Hematocrit 38.3 37.0 - 48.5 %    MCV 96 82 - 98 fL    MCH 31.0 27.0 - 31.0 pg    MCHC 32.4 32.0 - 36.0 g/dL    RDW 12.5 11.5 - 14.5 %    Platelets 238 150 - 450 K/uL    MPV 9.7 9.2 - 12.9 fL    Gran # (ANC) 2.7 1.8 - 7.7 K/uL    Immature Grans (Abs) 0.02 0.00 - 0.04 K/uL   Comprehensive Metabolic Panel     Collection Time: 11/29/24  7:26 AM   Result Value Ref Range    Sodium 137 136 - 145 mmol/L    Potassium 3.6 3.5 - 5.1 mmol/L    Chloride 106 95 - 110 mmol/L    CO2 25 23 - 29 mmol/L    Glucose 94 70 - 110 mg/dL    BUN 9 6 - 20 mg/dL    Creatinine 0.9 0.5 - 1.4 mg/dL    Calcium 8.9 8.7 - 10.5 mg/dL    Total Protein 6.8 6.0 - 8.4 g/dL    Albumin 3.2 (L) 3.5 - 5.2 g/dL    Total Bilirubin 0.7 0.1 - 1.0 mg/dL    Alkaline Phosphatase 68 40 - 150 U/L    AST 15 10 - 40 U/L    ALT 13 10 - 44 U/L    eGFR >60.0 >60 mL/min/1.73 m^2    Anion Gap 6 (L) 8 - 16 mmol/L   Cancer Antigen 19-9    Collection Time: 11/29/24  7:26 AM   Result Value Ref Range    CA 19-9 83.4 (H) 0.0 - 40.0 U/mL   AFP Tumor Marker    Collection Time: 11/29/24  7:26 AM   Result Value Ref Range    AFP 8.5 (H) 0.0 - 8.4 ng/mL       I have reviewed the pertinent labs. AFP stable. CA 19-9 downtrending.    Imaging:       CT CAP: 11/1/24:    Impression:     Acute pulmonary emboli involving the lobes and likely the right upper lobe.     Post radioembolization changes of the left hepatic lobe.  No new hepatic lesion.     Stable pulmonary nodules measuring up to 8 mm.     Stable lucent lesion within the T2 lamina on the left which underwent prior biopsy.  No new suspicious osseous lesions.     This report was flagged in Epic as abnormal.    Path:   6/28/21: partial hepatectomy:    Final Pathologic Diagnosis 1.  Gallbladder (cholecystectomy):   - Benign gallbladder with cholecystitis   2. Right lobe (partial hepatectomy):   - Positive for malignancy, see synoptic report below   - Separate hemangioma, 7.3 cm   3. Lymph nodes, portal (regional resection):   - 8 lymph nodes, negative for tumor (0/8)   ______________________________________________________________________________   ______   Hepatocellular carcinoma synoptic report   - Procedure:  Partial hepatectomy, right lobe   - Histologic type:  Hepatocellular carcinoma, scirrhous   - Histologic grade:  Moderately  differentiated, grade 2   - Tumor focality:  Solitary   - Tumor characteristics:   - Tumor site:  Right lobe   - Tumor size:  3.3 cm   - Treatment effect:  No known pre-surgical therapy   - Satellitosis:  Not identified   - Tumor extent:  Confined to liver   - Vascular invasion:  Present, Small vessel   - Perineural invasion:  Present   - Margins:   - All margins are negative for invasive carcinoma   - Closest margin to invasive carcinoma:  Parenchymal   - Distance from invasive  carcinoma to closest margin:  5 mm   - Regional lymph nodes:   - Number of lymph nodes with tumor:  0   - Number of lymph nodes examined:  8   - Pathology: pT2 N0 MX   - Additional findings:   - No steatosis   - Trichrome stain:  Periportal fibrosis with early septa, stage 1-2   - Iron stain:  Negative   - Iron and trichrome stains with appropriate controls   Tumor blocks:  2A, 2B, 2 C    Comment: Interp By Madeline Carlos MD, Signed on 07/08/2021 at 16:47       Assessment:       1. HCC (hepatocellular carcinoma)    2. Malignant neoplasm metastatic to right lung    3. Regional lymph node metastasis present    4. Bilateral pulmonary embolism    5. Neutropenia, unspecified type    6. Cervical radiculopathy    7. Liver hemangioma    8. Gastroesophageal reflux disease, unspecified whether esophagitis present                Plan:             # HCC  Scirrhous subtype, which is very uncommon (~ 1% of all HCC); prognosis is likely similar to typical HCC.  No clear underlying liver pathology in this patient.  Had margin negative resection with negative lymph node eval on 6/28/21 with Dr. Howell.  Unfortunately she has biopsy proven recurrent metastatic disease to her RLL s/p IR biopsy 4/10/23.   She was discussed at thoracic tumor board with potential plan for surgical resection with Dr. Tadeo.  She had a concerning bone lesion on PET from 4/26 at T2.  This was biopsied and proven to be benign.  In the interim she had a repeat abdominal MRI on  6/13/23 which demonstrated growth of a portacaval lymph node that was very concerning for metastatic disease when we reviewed her imaging at liver conference.  Meanwhile her RLL met has grown slightly on 6/21/23 CT as well.  We discussed her case with Valerie Dhillon and Shwetha and we were all in agreement with proceeding with systemic therapy rather than surgery or radiation given multifocal progression.    We discussed this with her and she is agreeable with starting systemic therapy.  Reviewed possibilities of atezo + magaly or durva + treme.  She wished to proceed with STRIDE regimen: durvalumab + tremelimumab.    Received cycle 1 on 7/11/23.  Repeat CT CAP after cycle 3 demonstrates mild growth in albert hepatis lymph node.  Stable disease elsewhere.    Discussed with Dr. Mendiola and she was deemed eligible for SBRT to her abdominal lymph node and growing lung nodule.  She completed SBRT to both 11/3/23.    Meanwhile she developed a new concerning area of possible recurrence near her liver resection site.  Discussed with Dr. Lala and Dr. Dhillon.  Dr. Dhillon was unable to visualize it during radiation simulation.  We recommended to proceed with Y-90 which was administered 2/1/24.    MRI 2/29/24 shows good response to Y-90 treated lesion.  Indeterminate lesion reviewed by Dr. Alford with no concerning features.  Will continue current regimen.  Consider switching systemic therapy only if significant progression given likely side effects of TKI.    Saw Dr. Sabillon of cardiology who recommended troponin and ECG monitoring given her cardiac history.  She is asymptomatic at present.    MRI after cycle 12 showed mild growth in left hepatic lobe lesion.    She received Y90 to this 8/15/24 with Dr. Alford.  Meanwhile continued with durvalumab.    Gave additional one time re-priming dose of tremelimumab with cycle 15 that she tolerated well.     CT CAP after cycle 18 dose showed stable disease (stable small lung mets and no new  liver lesions) but incidental PE.    Long discussion about plan of care.  She wants to proceed with observation off systemic therapy to assess disease status and trajectory. This is reasonable.  Will prioritize locoregional therapies if at all possible if there is progression.    Labs q4 weeks per her request.  Repeat CT in early Feb and visit with me.    # PE  Seen incidentally on restaging CT on 11/1/24 - involvement of bilateral lower lobe arteries and potentially RUL arteries.  Started on apixaban.    # Neutropenia, cervical radiculopathy, HTN  Neutropenia intermittent.  Likely benign etiology.  Continue to f/u with Orthopedics.  Had recent FLORENTINO.  Now with mid-back pain without clear etiology. She will reach out to ortho.  BP slightly elevated. Feeling well overall    # Liver hemangiomas, GERD  Continue monitoring as indicated with Dr. Rogers.  Continue to f/u with GI team for GERD symptoms. Continue with medication management with Protonix.    Follow up: early Feb.    Pte and family members displayed understanding of the above encounter and treatment plan. All thoughtful questions were answered to their satisfaction. Pte was advised to notify the care team or proceed to the ER if signs and symptoms worsen.     Visit today included increased complexity associated with the care of the episodic problem chemotherapy  addressed and managing the longitudinal care of the patient due to the serious and/or complex managed problem(s) GI malignancies/cancer    Philippe Carrasco MD  Hematology/Oncology  [facility]           Route Chart for Scheduling    Med Onc Chart Routing      Follow up with physician . See me early Feb with CT and labs   Follow up with MELA    Infusion scheduling note   can cancel all infusions   Injection scheduling note    Labs CBC, CMP, CA 19-9 and TSH   Scheduling:  Preferred lab:  Lab interval:  & AFP; labs alone at [facility] on 12/27 without visit   Imaging CT chest abdomen pelvis       Pharmacy appointment    Other referrals                Treatment Plan Information   OP TREMELIMUMAB 300 MG (X 1) + DURVALUMAB 1500 MG Q4W Philippe Carrasco MD   Associated diagnosis: Hepatocellular carcinoma Stage IVB rcT1b, cN0, pM1 noted on 6/9/2021   Line of treatment: First Line  Treatment Goal: Control     Upcoming Treatment Dates - OP TREMELIMUMAB 300 MG (X 1) + DURVALUMAB 1500 MG Q4W    11/29/2024       Chemotherapy       durvalumab (IMFINZI) 1,500 mg in 0.9% NaCl SolP 280 mL chemo infusion       Antiemetics       prochlorperazine injection Soln 5 mg  12/27/2024       Chemotherapy       durvalumab (IMFINZI) 1,500 mg in sodium chloride 0.9% SolP 280 mL chemo infusion       Antiemetics       prochlorperazine injection Soln 5 mg  1/24/2025       Chemotherapy       durvalumab (IMFINZI) 1,500 mg in sodium chloride 0.9% SolP 280 mL chemo infusion       Antiemetics       prochlorperazine injection Soln 5 mg

## 2024-11-29 ENCOUNTER — OFFICE VISIT (OUTPATIENT)
Dept: HEMATOLOGY/ONCOLOGY | Facility: CLINIC | Age: 44
End: 2024-11-29
Payer: COMMERCIAL

## 2024-11-29 ENCOUNTER — LAB VISIT (OUTPATIENT)
Dept: LAB | Facility: HOSPITAL | Age: 44
End: 2024-11-29
Attending: INTERNAL MEDICINE
Payer: COMMERCIAL

## 2024-11-29 VITALS
OXYGEN SATURATION: 99 % | TEMPERATURE: 98 F | SYSTOLIC BLOOD PRESSURE: 150 MMHG | HEART RATE: 72 BPM | RESPIRATION RATE: 18 BRPM | BODY MASS INDEX: 21.76 KG/M2 | HEIGHT: 71 IN | DIASTOLIC BLOOD PRESSURE: 88 MMHG | WEIGHT: 155.44 LBS

## 2024-11-29 DIAGNOSIS — D70.9 NEUTROPENIA, UNSPECIFIED TYPE: ICD-10-CM

## 2024-11-29 DIAGNOSIS — C22.0 HCC (HEPATOCELLULAR CARCINOMA): ICD-10-CM

## 2024-11-29 DIAGNOSIS — C78.01 MALIGNANT NEOPLASM METASTATIC TO RIGHT LUNG: ICD-10-CM

## 2024-11-29 DIAGNOSIS — C22.0 HCC (HEPATOCELLULAR CARCINOMA): Primary | ICD-10-CM

## 2024-11-29 DIAGNOSIS — I26.99 BILATERAL PULMONARY EMBOLISM: ICD-10-CM

## 2024-11-29 DIAGNOSIS — K21.9 GASTROESOPHAGEAL REFLUX DISEASE, UNSPECIFIED WHETHER ESOPHAGITIS PRESENT: ICD-10-CM

## 2024-11-29 DIAGNOSIS — D18.03 LIVER HEMANGIOMA: ICD-10-CM

## 2024-11-29 DIAGNOSIS — M54.12 CERVICAL RADICULOPATHY: ICD-10-CM

## 2024-11-29 DIAGNOSIS — C77.9 REGIONAL LYMPH NODE METASTASIS PRESENT: ICD-10-CM

## 2024-11-29 LAB
AFP SERPL-MCNC: 8.5 NG/ML (ref 0–8.4)
ALBUMIN SERPL BCP-MCNC: 3.2 G/DL (ref 3.5–5.2)
ALP SERPL-CCNC: 68 U/L (ref 40–150)
ALT SERPL W/O P-5'-P-CCNC: 13 U/L (ref 10–44)
ANION GAP SERPL CALC-SCNC: 6 MMOL/L (ref 8–16)
AST SERPL-CCNC: 15 U/L (ref 10–40)
BILIRUB SERPL-MCNC: 0.7 MG/DL (ref 0.1–1)
BUN SERPL-MCNC: 9 MG/DL (ref 6–20)
CALCIUM SERPL-MCNC: 8.9 MG/DL (ref 8.7–10.5)
CANCER AG19-9 SERPL-ACNC: 83.4 U/ML (ref 0–40)
CHLORIDE SERPL-SCNC: 106 MMOL/L (ref 95–110)
CO2 SERPL-SCNC: 25 MMOL/L (ref 23–29)
CREAT SERPL-MCNC: 0.9 MG/DL (ref 0.5–1.4)
ERYTHROCYTE [DISTWIDTH] IN BLOOD BY AUTOMATED COUNT: 12.5 % (ref 11.5–14.5)
EST. GFR  (NO RACE VARIABLE): >60 ML/MIN/1.73 M^2
GLUCOSE SERPL-MCNC: 94 MG/DL (ref 70–110)
HCT VFR BLD AUTO: 38.3 % (ref 37–48.5)
HGB BLD-MCNC: 12.4 G/DL (ref 12–16)
IMM GRANULOCYTES # BLD AUTO: 0.02 K/UL (ref 0–0.04)
MCH RBC QN AUTO: 31 PG (ref 27–31)
MCHC RBC AUTO-ENTMCNC: 32.4 G/DL (ref 32–36)
MCV RBC AUTO: 96 FL (ref 82–98)
NEUTROPHILS # BLD AUTO: 2.7 K/UL (ref 1.8–7.7)
PLATELET # BLD AUTO: 238 K/UL (ref 150–450)
PMV BLD AUTO: 9.7 FL (ref 9.2–12.9)
POTASSIUM SERPL-SCNC: 3.6 MMOL/L (ref 3.5–5.1)
PROT SERPL-MCNC: 6.8 G/DL (ref 6–8.4)
RBC # BLD AUTO: 4 M/UL (ref 4–5.4)
SODIUM SERPL-SCNC: 137 MMOL/L (ref 136–145)
WBC # BLD AUTO: 3.82 K/UL (ref 3.9–12.7)

## 2024-11-29 PROCEDURE — 85027 COMPLETE CBC AUTOMATED: CPT | Performed by: INTERNAL MEDICINE

## 2024-11-29 PROCEDURE — 80053 COMPREHEN METABOLIC PANEL: CPT | Performed by: INTERNAL MEDICINE

## 2024-11-29 PROCEDURE — 36415 COLL VENOUS BLD VENIPUNCTURE: CPT | Performed by: INTERNAL MEDICINE

## 2024-11-29 PROCEDURE — 86301 IMMUNOASSAY TUMOR CA 19-9: CPT | Performed by: INTERNAL MEDICINE

## 2024-11-29 PROCEDURE — 82105 ALPHA-FETOPROTEIN SERUM: CPT | Performed by: INTERNAL MEDICINE

## 2024-11-29 PROCEDURE — 99999 PR PBB SHADOW E&M-EST. PATIENT-LVL V: CPT | Mod: PBBFAC,,, | Performed by: INTERNAL MEDICINE

## 2024-12-04 ENCOUNTER — PATIENT MESSAGE (OUTPATIENT)
Dept: ORTHOPEDICS | Facility: CLINIC | Age: 44
End: 2024-12-04
Payer: COMMERCIAL

## 2024-12-05 ENCOUNTER — OFFICE VISIT (OUTPATIENT)
Dept: SPINE | Facility: CLINIC | Age: 44
End: 2024-12-05
Payer: COMMERCIAL

## 2024-12-05 VITALS — BODY MASS INDEX: 21.79 KG/M2 | HEIGHT: 71 IN | WEIGHT: 155.63 LBS

## 2024-12-05 DIAGNOSIS — S39.012A ACUTE MYOFASCIAL STRAIN OF LUMBAR REGION, INITIAL ENCOUNTER: Primary | ICD-10-CM

## 2024-12-05 PROCEDURE — 3008F BODY MASS INDEX DOCD: CPT | Mod: CPTII,S$GLB,, | Performed by: ORTHOPAEDIC SURGERY

## 2024-12-05 PROCEDURE — 1159F MED LIST DOCD IN RCRD: CPT | Mod: CPTII,S$GLB,, | Performed by: ORTHOPAEDIC SURGERY

## 2024-12-05 PROCEDURE — 3044F HG A1C LEVEL LT 7.0%: CPT | Mod: CPTII,S$GLB,, | Performed by: ORTHOPAEDIC SURGERY

## 2024-12-05 PROCEDURE — 99213 OFFICE O/P EST LOW 20 MIN: CPT | Mod: S$GLB,,, | Performed by: ORTHOPAEDIC SURGERY

## 2024-12-05 PROCEDURE — 99999 PR PBB SHADOW E&M-EST. PATIENT-LVL III: CPT | Mod: PBBFAC,,, | Performed by: ORTHOPAEDIC SURGERY

## 2024-12-05 RX ORDER — METHYLPREDNISOLONE 4 MG/1
TABLET ORAL
Qty: 1 EACH | Refills: 0 | Status: SHIPPED | OUTPATIENT
Start: 2024-12-05 | End: 2024-12-26

## 2024-12-05 NOTE — PROGRESS NOTES
"DATE: 12/5/2024  PATIENT: Melly Hwang    Attending Physician: Kervin Storey M.D.    HISTORY:  Melly Hwang is a 44 y.o. female who returns to me today for follow up.  She was last seen by me 12/28/2023.  Today she is doing well but notes she has been having mid/low back pain for 2 weeks without injury. She denies leg pain, no numbness, tingling, weakness.    The Patient denies myelopathic symptoms such as handwriting changes or difficulty with buttons/coins/keys. Denies perineal paresthesias, bowel/bladder dysfunction.      EXAM:  Ht 5' 11" (1.803 m)   Wt 70.6 kg (155 lb 10.3 oz)   LMP  (LMP Unknown)   BMI 21.71 kg/m²     My physical examination was notable for the following findings:     No TTP.      IMAGING:    Today I personally reviewed CT A/P that demonstrates minimal degenerative changes    Body mass index is 21.71 kg/m².    Hemoglobin A1C   Date Value Ref Range Status   10/04/2024 5.4 4.0 - 5.6 % Final     Comment:     ADA Screening Guidelines:  5.7-6.4%  Consistent with prediabetes  >or=6.5%  Consistent with diabetes    High levels of fetal hemoglobin interfere with the HbA1C  assay. Heterozygous hemoglobin variants (HbS, HgC, etc)do  not significantly interfere with this assay.   However, presence of multiple variants may affect accuracy.     09/18/2023 5.1 4.0 - 5.6 % Final     Comment:     ADA Screening Guidelines:  5.7-6.4%  Consistent with prediabetes  >or=6.5%  Consistent with diabetes    High levels of fetal hemoglobin interfere with the HbA1C  assay. Heterozygous hemoglobin variants (HbS, HgC, etc)do  not significantly interfere with this assay.   However, presence of multiple variants may affect accuracy.     03/22/2022 5.2 4.0 - 5.6 % Final     Comment:     ADA Screening Guidelines:  5.7-6.4%  Consistent with prediabetes  >or=6.5%  Consistent with diabetes    High levels of fetal hemoglobin interfere with the HbA1C  assay. Heterozygous hemoglobin variants (HbS, HgC, " etc)do  not significantly interfere with this assay.   However, presence of multiple variants may affect accuracy.           ASSESSMENT/PLAN:    There are no diagnoses linked to this encounter.    Today we discussed at length all of the different treatment options including anti-inflammatories, acetaminophen, rest, ice, heat, physical therapy including strengthening and stretching exercises, home exercises, ROM, aerobic conditioning, aqua therapy, other modalities including ultrasound, massage, and dry needling, epidural steroid injections and finally surgical intervention.      Pt presents with acute low back pain. Will send medrol dose pack to pharmacy. She is scheduled with pain management next week, will see about possible TPIs

## 2024-12-06 ENCOUNTER — PATIENT MESSAGE (OUTPATIENT)
Dept: SPINE | Facility: CLINIC | Age: 44
End: 2024-12-06
Payer: COMMERCIAL

## 2024-12-23 NOTE — TELEPHONE ENCOUNTER
No care due was identified.  [facility] Embedded Care Due Messages. Reference number: [id number].   12/23/2024 3:04:39 PM CST

## 2024-12-26 ENCOUNTER — LAB VISIT (OUTPATIENT)
Dept: LAB | Facility: HOSPITAL | Age: 44
End: 2024-12-26
Attending: PHYSICIAN ASSISTANT
Payer: COMMERCIAL

## 2024-12-26 DIAGNOSIS — C22.0 HCC (HEPATOCELLULAR CARCINOMA): ICD-10-CM

## 2024-12-26 LAB
AFP SERPL-MCNC: 9.2 NG/ML (ref 0–8.4)
ALBUMIN SERPL BCP-MCNC: 3.6 G/DL (ref 3.5–5.2)
ALP SERPL-CCNC: 79 U/L (ref 40–150)
ALT SERPL W/O P-5'-P-CCNC: 21 U/L (ref 10–44)
ANION GAP SERPL CALC-SCNC: 11 MMOL/L (ref 8–16)
AST SERPL-CCNC: 16 U/L (ref 10–40)
BASOPHILS # BLD AUTO: 0.05 K/UL (ref 0–0.2)
BASOPHILS NFR BLD: 1 % (ref 0–1.9)
BILIRUB SERPL-MCNC: 0.6 MG/DL (ref 0.1–1)
BUN SERPL-MCNC: 9 MG/DL (ref 6–20)
CALCIUM SERPL-MCNC: 9 MG/DL (ref 8.7–10.5)
CANCER AG19-9 SERPL-ACNC: 87.3 U/ML (ref 0–40)
CHLORIDE SERPL-SCNC: 105 MMOL/L (ref 95–110)
CO2 SERPL-SCNC: 23 MMOL/L (ref 23–29)
CREAT SERPL-MCNC: 0.8 MG/DL (ref 0.5–1.4)
DIFFERENTIAL METHOD BLD: ABNORMAL
EOSINOPHIL # BLD AUTO: 0.2 K/UL (ref 0–0.5)
EOSINOPHIL NFR BLD: 3.7 % (ref 0–8)
ERYTHROCYTE [DISTWIDTH] IN BLOOD BY AUTOMATED COUNT: 12.4 % (ref 11.5–14.5)
EST. GFR  (NO RACE VARIABLE): >60 ML/MIN/1.73 M^2
GLUCOSE SERPL-MCNC: 78 MG/DL (ref 70–110)
HCT VFR BLD AUTO: 36.6 % (ref 37–48.5)
HGB BLD-MCNC: 11.8 G/DL (ref 12–16)
IMM GRANULOCYTES # BLD AUTO: 0.01 K/UL (ref 0–0.04)
IMM GRANULOCYTES NFR BLD AUTO: 0.2 % (ref 0–0.5)
LYMPHOCYTES # BLD AUTO: 0.9 K/UL (ref 1–4.8)
LYMPHOCYTES NFR BLD: 18.7 % (ref 18–48)
MCH RBC QN AUTO: 30.2 PG (ref 27–31)
MCHC RBC AUTO-ENTMCNC: 32.2 G/DL (ref 32–36)
MCV RBC AUTO: 94 FL (ref 82–98)
MONOCYTES # BLD AUTO: 0.5 K/UL (ref 0.3–1)
MONOCYTES NFR BLD: 9.3 % (ref 4–15)
NEUTROPHILS # BLD AUTO: 3.3 K/UL (ref 1.8–7.7)
NEUTROPHILS NFR BLD: 67.1 % (ref 38–73)
NRBC BLD-RTO: 0 /100 WBC
PLATELET # BLD AUTO: 224 K/UL (ref 150–450)
PMV BLD AUTO: 9.3 FL (ref 9.2–12.9)
POTASSIUM SERPL-SCNC: 3.9 MMOL/L (ref 3.5–5.1)
PROT SERPL-MCNC: 7.2 G/DL (ref 6–8.4)
RBC # BLD AUTO: 3.91 M/UL (ref 4–5.4)
SODIUM SERPL-SCNC: 139 MMOL/L (ref 136–145)
WBC # BLD AUTO: 4.93 K/UL (ref 3.9–12.7)

## 2024-12-26 PROCEDURE — 36415 COLL VENOUS BLD VENIPUNCTURE: CPT | Performed by: PHYSICIAN ASSISTANT

## 2024-12-26 PROCEDURE — 85025 COMPLETE CBC W/AUTO DIFF WBC: CPT | Performed by: PHYSICIAN ASSISTANT

## 2024-12-26 PROCEDURE — 86301 IMMUNOASSAY TUMOR CA 19-9: CPT | Performed by: PHYSICIAN ASSISTANT

## 2024-12-26 PROCEDURE — 80053 COMPREHEN METABOLIC PANEL: CPT | Performed by: PHYSICIAN ASSISTANT

## 2024-12-26 PROCEDURE — 82105 ALPHA-FETOPROTEIN SERUM: CPT | Performed by: PHYSICIAN ASSISTANT

## 2024-12-26 RX ORDER — CYCLOBENZAPRINE HCL 10 MG
TABLET ORAL
Qty: 30 TABLET | Refills: 1 | Status: SHIPPED | OUTPATIENT
Start: 2024-12-26

## 2025-02-07 ENCOUNTER — PATIENT MESSAGE (OUTPATIENT)
Dept: HEMATOLOGY/ONCOLOGY | Facility: CLINIC | Age: 45
End: 2025-02-07
Payer: COMMERCIAL

## 2025-02-07 ENCOUNTER — HOSPITAL ENCOUNTER (OUTPATIENT)
Dept: RADIOLOGY | Facility: HOSPITAL | Age: 45
Discharge: HOME | End: 2025-02-07
Attending: INTERNAL MEDICINE
Payer: COMMERCIAL

## 2025-02-07 DIAGNOSIS — C22.0 HCC (HEPATOCELLULAR CARCINOMA): Primary | ICD-10-CM

## 2025-02-07 DIAGNOSIS — C22.0 HCC (HEPATOCELLULAR CARCINOMA): ICD-10-CM

## 2025-02-07 DIAGNOSIS — R53.83 FATIGUE, UNSPECIFIED TYPE: ICD-10-CM

## 2025-02-07 PROCEDURE — 71260 CT THORAX DX C+: CPT | Mod: 26,,, | Performed by: RADIOLOGY

## 2025-02-07 PROCEDURE — 25500020 PHARM REV CODE 255: Performed by: INTERNAL MEDICINE

## 2025-02-07 PROCEDURE — 74177 CT ABD & PELVIS W/CONTRAST: CPT | Mod: 26,,, | Performed by: RADIOLOGY

## 2025-02-07 PROCEDURE — 71260 CT THORAX DX C+: CPT | Mod: TC

## 2025-02-07 RX ADMIN — IOHEXOL 100 ML: 350 INJECTION, SOLUTION INTRAVENOUS at 08:02

## 2025-02-11 DIAGNOSIS — I26.99 BILATERAL PULMONARY EMBOLISM: ICD-10-CM

## 2025-02-17 ENCOUNTER — LAB VISIT (OUTPATIENT)
Dept: LAB | Facility: HOSPITAL | Age: 45
End: 2025-02-17
Attending: INTERNAL MEDICINE
Payer: COMMERCIAL

## 2025-02-17 ENCOUNTER — OFFICE VISIT (OUTPATIENT)
Dept: HEMATOLOGY/ONCOLOGY | Facility: CLINIC | Age: 45
End: 2025-02-17
Payer: COMMERCIAL

## 2025-02-17 VITALS
HEIGHT: 71 IN | SYSTOLIC BLOOD PRESSURE: 145 MMHG | RESPIRATION RATE: 18 BRPM | WEIGHT: 156.44 LBS | HEART RATE: 75 BPM | OXYGEN SATURATION: 100 % | TEMPERATURE: 99 F | BODY MASS INDEX: 21.9 KG/M2 | DIASTOLIC BLOOD PRESSURE: 83 MMHG

## 2025-02-17 DIAGNOSIS — I26.99 BILATERAL PULMONARY EMBOLISM: ICD-10-CM

## 2025-02-17 DIAGNOSIS — C77.9 REGIONAL LYMPH NODE METASTASIS PRESENT: ICD-10-CM

## 2025-02-17 DIAGNOSIS — D70.9 NEUTROPENIA, UNSPECIFIED TYPE: ICD-10-CM

## 2025-02-17 DIAGNOSIS — C22.0 HCC (HEPATOCELLULAR CARCINOMA): ICD-10-CM

## 2025-02-17 DIAGNOSIS — R53.83 FATIGUE, UNSPECIFIED TYPE: ICD-10-CM

## 2025-02-17 DIAGNOSIS — C78.01 MALIGNANT NEOPLASM METASTATIC TO RIGHT LUNG: ICD-10-CM

## 2025-02-17 DIAGNOSIS — M54.12 CERVICAL RADICULOPATHY: ICD-10-CM

## 2025-02-17 DIAGNOSIS — C22.0 HCC (HEPATOCELLULAR CARCINOMA): Primary | ICD-10-CM

## 2025-02-17 DIAGNOSIS — K21.9 GASTROESOPHAGEAL REFLUX DISEASE, UNSPECIFIED WHETHER ESOPHAGITIS PRESENT: ICD-10-CM

## 2025-02-17 DIAGNOSIS — D18.03 LIVER HEMANGIOMA: ICD-10-CM

## 2025-02-17 LAB
AFP SERPL-MCNC: 8.9 NG/ML (ref 0–8.4)
ALBUMIN SERPL BCP-MCNC: 3.3 G/DL (ref 3.5–5.2)
ALP SERPL-CCNC: 90 U/L (ref 40–150)
ALT SERPL W/O P-5'-P-CCNC: 19 U/L (ref 10–44)
ANION GAP SERPL CALC-SCNC: 6 MMOL/L (ref 8–16)
AST SERPL-CCNC: 21 U/L (ref 10–40)
BASOPHILS # BLD AUTO: 0.06 K/UL (ref 0–0.2)
BASOPHILS NFR BLD: 1.7 % (ref 0–1.9)
BILIRUB SERPL-MCNC: 0.7 MG/DL (ref 0.1–1)
BUN SERPL-MCNC: 11 MG/DL (ref 6–20)
CALCIUM SERPL-MCNC: 8.7 MG/DL (ref 8.7–10.5)
CANCER AG19-9 SERPL-ACNC: 106 U/ML (ref 0–40)
CHLORIDE SERPL-SCNC: 109 MMOL/L (ref 95–110)
CO2 SERPL-SCNC: 23 MMOL/L (ref 23–29)
CREAT SERPL-MCNC: 0.8 MG/DL (ref 0.5–1.4)
DIFFERENTIAL METHOD BLD: ABNORMAL
EOSINOPHIL # BLD AUTO: 0.1 K/UL (ref 0–0.5)
EOSINOPHIL NFR BLD: 3.9 % (ref 0–8)
ERYTHROCYTE [DISTWIDTH] IN BLOOD BY AUTOMATED COUNT: 13.2 % (ref 11.5–14.5)
EST. GFR  (NO RACE VARIABLE): >60 ML/MIN/1.73 M^2
GLUCOSE SERPL-MCNC: 73 MG/DL (ref 70–110)
HCT VFR BLD AUTO: 38.1 % (ref 37–48.5)
HGB BLD-MCNC: 11.7 G/DL (ref 12–16)
IMM GRANULOCYTES # BLD AUTO: 0.01 K/UL (ref 0–0.04)
IMM GRANULOCYTES NFR BLD AUTO: 0.3 % (ref 0–0.5)
LYMPHOCYTES # BLD AUTO: 0.9 K/UL (ref 1–4.8)
LYMPHOCYTES NFR BLD: 26 % (ref 18–48)
MCH RBC QN AUTO: 29.5 PG (ref 27–31)
MCHC RBC AUTO-ENTMCNC: 30.7 G/DL (ref 32–36)
MCV RBC AUTO: 96 FL (ref 82–98)
MONOCYTES # BLD AUTO: 0.4 K/UL (ref 0.3–1)
MONOCYTES NFR BLD: 9.7 % (ref 4–15)
NEUTROPHILS # BLD AUTO: 2.1 K/UL (ref 1.8–7.7)
NEUTROPHILS NFR BLD: 58.4 % (ref 38–73)
NRBC BLD-RTO: 0 /100 WBC
PLATELET # BLD AUTO: 234 K/UL (ref 150–450)
PMV BLD AUTO: 10.1 FL (ref 9.2–12.9)
POTASSIUM SERPL-SCNC: 4.1 MMOL/L (ref 3.5–5.1)
PROT SERPL-MCNC: 7 G/DL (ref 6–8.4)
RBC # BLD AUTO: 3.96 M/UL (ref 4–5.4)
SODIUM SERPL-SCNC: 138 MMOL/L (ref 136–145)
TSH SERPL DL<=0.005 MIU/L-ACNC: 2.02 UIU/ML (ref 0.4–4)
WBC # BLD AUTO: 3.61 K/UL (ref 3.9–12.7)

## 2025-02-17 PROCEDURE — 36415 COLL VENOUS BLD VENIPUNCTURE: CPT | Performed by: INTERNAL MEDICINE

## 2025-02-17 PROCEDURE — 85025 COMPLETE CBC W/AUTO DIFF WBC: CPT | Performed by: INTERNAL MEDICINE

## 2025-02-17 PROCEDURE — 86301 IMMUNOASSAY TUMOR CA 19-9: CPT | Performed by: INTERNAL MEDICINE

## 2025-02-17 PROCEDURE — 82105 ALPHA-FETOPROTEIN SERUM: CPT | Performed by: INTERNAL MEDICINE

## 2025-02-17 PROCEDURE — 80053 COMPREHEN METABOLIC PANEL: CPT | Performed by: INTERNAL MEDICINE

## 2025-02-17 PROCEDURE — 84443 ASSAY THYROID STIM HORMONE: CPT | Performed by: INTERNAL MEDICINE

## 2025-02-17 NOTE — PROGRESS NOTES
MEDICAL ONCOLOGY - ESTABLISHED PATIENT VISIT    Reason for visit: Scirrhous HCC    Best Contact Phone Number(s): [phone] (home)      Cancer/Stage/TNM:    Cancer Staging   Hepatocellular carcinoma  Staging form: Liver, AJCC 8th Edition  - Clinical stage from 4/10/2023: Stage IVB (rcT1b, cN0, pM1) - Signed by Philippe Carrasco MD on 8/7/2023       Oncology History   Hepatocellular carcinoma   6/9/2021 Initial Diagnosis    Hepatocellular carcinoma     4/10/2023 Cancer Staged    Staging form: Liver, AJCC 8th Edition  - Clinical stage from 4/10/2023: Stage IVB (rcT1b, cN0, pM1)     7/11/2023 -  Chemotherapy    Treatment Summary   Plan Name: OP TREMELIMUMAB 300 MG (X 1) + DURVALUMAB 1500 MG Q4W  Treatment Goal: Control  Status: Active  Start Date: 7/11/2023  End Date: 1/24/2025 (Planned)  Provider: Philippe Carrasco MD  Chemotherapy: [No matching medication found in this treatment plan]     9/11/2023 Genetic Testing    Genetic counseling done. Hereditary syndrome unlikely. Patient offered testing, but declined due to cost.      10/23/2023 - 11/3/2023 Radiation Therapy    Treating physician: yamil villa    Course: C1 Chst/Abd 2023  Treatment Site Ref. ID Energy Dose/Fx (Gy) #Fx Dose Correction (Gy) Total Dose (Gy) Start Date End Date Elapsed Days   SB Lung_R PTV_Lung 6X 10 5 / 5 0 50 10/23/2023 11/1/2023 9   SB Abdomen PTV_PortalLN 6X 10 5 / 5 0 50 10/23/2023 11/3/2023 11           Interim History:   44 y.o. 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 Howell with the same pathology, negative margins, 0 of 8 lymph nodes positive and + for vascular and perineural invasion. We saw her in 2021 for evaluation for persistent CA 19-9 but there was no radiographic evidence of HCC disease recurrence or alternative reason for CA 19-9 elevation.  CT chest on 3/9/23 showed an enlarging RLL nodule measuring 1.1 cm, increased from 0.8 cm in size.  IR biopsy of this on  4/10/23 confirmed metastatic HCC.  Since then in mid-June she has also had an MRI abdomen that showed an enlarging portocaval lymph node that is consistent with metastatic disease.  She completed SBRT at the beginning of November to her lung metastasis and to her portocaval lymph node. She underwent Y-90 on 2/1/24.  She underwent Y-90 again to a left lobe hepatic tumor on 8/15/24.  She began a treatment break from systemic therapy in November 2024.    She is feeling well.  No new concerns physically.  She has been off durvalumab since November 2024.    Presents alone today.  ECOG PS 0.    ROS:   Review of Systems   Constitutional:  Negative for chills, fever, malaise/fatigue and weight loss.   HENT:  Negative for sore throat.    Eyes:  Negative for blurred vision and pain.   Respiratory:  Negative for cough and shortness of breath.    Cardiovascular:  Negative for chest pain and leg swelling.   Gastrointestinal:  Negative for abdominal pain, constipation, diarrhea, nausea and vomiting.   Genitourinary:  Negative for dysuria and frequency.   Musculoskeletal:  Negative for back pain, falls and myalgias.   Skin:  Negative for rash.   Neurological:  Negative for dizziness, weakness and headaches.   Endo/Heme/Allergies:  Does not bruise/bleed easily.   Psychiatric/Behavioral:  Negative for depression. The patient is not nervous/anxious.    All other systems reviewed and are negative.      Past Medical History:   Past Medical History:   Diagnosis Date    VENKATA positive 08/20/2020    COVID-19     Deviated septum 2011    Hepatocellular carcinoma 05/07/2021    Hypertrophy of inferior nasal turbinate     Pericardial effusion     Premature menopause         Past Surgical History:   Past Surgical History:   Procedure Laterality Date    COLONOSCOPY N/A 09/21/2020    Procedure: COLONOSCOPY;  Surgeon: GIOVANNI Crane MD;  Location: [facility] ([address] [place] [address]);  Service: Endoscopy;  Laterality: N/A;  + covid 4/22    EPIDURAL STEROID  INJECTION INTO CERVICAL SPINE N/A 2024    Procedure: FLORENTINO C7/T1;  Surgeon: Cezar Bailey MD;  Location: [facility] PAIN MANAGEMENT;  Service: Pain Management;  Laterality: N/A;  20mins-No ac    ESOPHAGOGASTRODUODENOSCOPY N/A 1/10/2024    Procedure: ESOPHAGOGASTRODUODENOSCOPY (EGD);  Surgeon: Reynaldo Lynch MD;  [facility]  Service: Endoscopy;  Laterality: N/A;  referred by daryl carrasco rocedure: EGD Diagnosis: Abnormal finding on GI tract imaging, Abdominal pain, and GERD  Procedure Timin-4 weeks Provider: Any GI provider Location: [facility], [facility], and [facility]  Additional Scheduling Informat    ESOPHAGOGASTRODUODENOSCOPY N/A 2024    Procedure: EGD (ESOPHAGOGASTRODUODENOSCOPY);  Surgeon: Reynaldo Lynch MD;  [facility]  Service: Endoscopy;  Laterality: N/A;  prep inst sent to pt via portal / referral a labat/   precall complete/instructions sent via portal cd  -moved pt to 730 procedure time. pt aware and notified of 630a arrival. did not want instructions resent, [facility]    HERNIA REPAIR      LIVER SURGERY N/A 2021    NASAL SEPTUM SURGERY      PERICARDIAL WINDOW N/A 2021    Procedure: CREATION, PERICARDIAL WINDOW;  Surgeon: Ajay Cantor MD;  Location: [facility] OR [address] [place] [address];  Service: Cardiovascular;  Laterality: N/A;    PERICARDIOCENTESIS N/A 2020    Procedure: Pericardiocentesis;  Surgeon: Dickson Dangelo MD;  Location: [facility] CATH LAB;  Service: Cardiology;  Laterality: N/A;    TONSILLECTOMY          Family History:   Family History   Problem Relation Name Age of Onset    Diabetes Mother      Liver disease Mother          fatty liver    Heart disease Father      Macular degeneration Father      Diabetes Father      Hypertension Father      Hyperlipidemia Father      Heart disease Sister      Adjustment disorder with mixed anxiety and depressed mood Sister      Cancer Maternal Aunt          lung    Lung cancer Maternal  Aunt          heavy smoker    Cerebral aneurysm Paternal Aunt      Cataracts Neg Hx      Glaucoma Neg Hx      Retinal detachment Neg Hx      Stroke Neg Hx      Thyroid disease Neg Hx      Melanoma Neg Hx      Heart attack Neg Hx          Social History:   Social History     Tobacco Use    Smoking status: Never    Smokeless tobacco: Never   Substance Use Topics    Alcohol use: Yes     Comment: rare        I have reviewed and updated the patient's past medical, surgical, family and social histories.    Allergies:   Review of patient's allergies indicates:   Allergen Reactions    No known drug allergies         Medications:   Current Outpatient Medications   Medication Sig Dispense Refill    ALPRAZolam (XANAX) 0.5 MG tablet Take 1 tablet (0.5 mg total) by mouth daily as needed for Anxiety. 30 tablet 1    apixaban (ELIQUIS) 5 mg Tab Take 1 tablet by mouth twice daily 60 tablet 3    cetirizine (ZYRTEC) 10 MG tablet Take 1 tablet (10 mg total) by mouth once daily. 90 tablet 3    cyanocobalamin (VITAMIN B-12) 1000 MCG tablet Take 1 tablet (1,000 mcg total) by mouth once daily. 30 tablet 12    cyclobenzaprine (FLEXERIL) 10 MG tablet TAKE 1 TABLET BY MOUTH EVERY DAY NIGHTLY AS NEEDED FOR MUSCLE SPASMS 30 tablet 1    EScitalopram oxalate (LEXAPRO) 10 MG tablet Take 1 tablet (10 mg total) by mouth once daily. 90 tablet 3    fluticasone propionate (FLONASE) 50 mcg/actuation nasal spray SPRAY 2 SPRAYS BY EACH NOSTRIL ROUTE ONCE DAILY. 48 mL 2    ketoconazole (NIZORAL) 2 % cream aaa on face and behind ears qd-bid prn flare 30 g 3    multivitamin capsule Take by mouth. 1 capsule Oral Every morning      naproxen (NAPROSYN) 500 MG tablet Take 1 tablet (500 mg total) by mouth 2 (two) times daily with meals. 60 tablet 0    norethindrone-ethinyl estradiol (MICROGESTIN 1/20) 1-20 mg-mcg per tablet Take 1 tablet by mouth once daily. 63 tablet 4    omeprazole (PRILOSEC) 20 MG capsule TAKE 1 CAPSULE BY MOUTH ONCE DAILY AS NEEDED FOR GERD  "90 capsule 3    ondansetron (ZOFRAN-ODT) 4 MG TbDL Take 1 tablet (4 mg total) by mouth every 12 (twelve) hours as needed (nausea). 20 tablet 0    pantoprazole (PROTONIX) 40 MG tablet TAKE 1 TABLET BY MOUTH EVERY DAY 90 tablet 1    prochlorperazine (COMPAZINE) 10 MG tablet Take 1 tablet (10 mg total) by mouth every 6 (six) hours as needed (nausea). 60 tablet 1    simethicone (MYLICON) 125 MG chewable tablet Take 1 tablet (125 mg total) by mouth every 6 (six) hours as needed for Flatulence (BURPING/BELCHING). 30 tablet 0    sucralfate (CARAFATE) 100 mg/mL suspension Take 10 mLs (1 g total) by mouth 4 (four) times daily. 414 mL 0     No current facility-administered medications for this visit.        Physical Exam:   BP (!) 145/83 (BP Location: Left arm, Patient Position: Sitting)   Pulse 75   Temp 98.6 °F (37 °C) (Temporal)   Resp 18   Ht 5' 11" (1.803 m)   Wt 70.9 kg (156 lb 6.7 oz)   LMP  (LMP Unknown)   SpO2 100%   BMI 21.82 kg/m²                Physical Exam  Vitals reviewed.   Constitutional:       General: She is not in acute distress.     Appearance: Normal appearance. She is normal weight.   Eyes:      General: No scleral icterus.     Extraocular Movements: Extraocular movements intact.      Conjunctiva/sclera: Conjunctivae normal.   Cardiovascular:      Rate and Rhythm: Normal rate.   Pulmonary:      Effort: Pulmonary effort is normal. No respiratory distress.   Abdominal:      Tenderness: There is no abdominal tenderness.   Musculoskeletal:         General: No swelling. Normal range of motion.   Skin:     General: Skin is warm.      Coloration: Skin is not jaundiced.      Findings: No erythema or rash.   Neurological:      General: No focal deficit present.      Mental Status: She is alert and oriented to person, place, and time. Mental status is at baseline.      Gait: Gait normal.   Psychiatric:         Mood and Affect: Mood normal.         Behavior: Behavior normal.           Labs:   Recent " Results (from the past 48 hours)   Cancer Antigen 19-9    Collection Time: 02/17/25 12:35 PM   Result Value Ref Range    CA 19-9 106.0 (H) 0.0 - 40.0 U/mL   CBC Auto Differential    Collection Time: 02/17/25 12:35 PM   Result Value Ref Range    WBC 3.61 (L) 3.90 - 12.70 K/uL    RBC 3.96 (L) 4.00 - 5.40 M/uL    Hemoglobin 11.7 (L) 12.0 - 16.0 g/dL    Hematocrit 38.1 37.0 - 48.5 %    MCV 96 82 - 98 fL    MCH 29.5 27.0 - 31.0 pg    MCHC 30.7 (L) 32.0 - 36.0 g/dL    RDW 13.2 11.5 - 14.5 %    Platelets 234 150 - 450 K/uL    MPV 10.1 9.2 - 12.9 fL    Immature Granulocytes 0.3 0.0 - 0.5 %    Gran # (ANC) 2.1 1.8 - 7.7 K/uL    Immature Grans (Abs) 0.01 0.00 - 0.04 K/uL    Lymph # 0.9 (L) 1.0 - 4.8 K/uL    Mono # 0.4 0.3 - 1.0 K/uL    Eos # 0.1 0.0 - 0.5 K/uL    Baso # 0.06 0.00 - 0.20 K/uL    nRBC 0 0 /100 WBC    Gran % 58.4 38.0 - 73.0 %    Lymph % 26.0 18.0 - 48.0 %    Mono % 9.7 4.0 - 15.0 %    Eosinophil % 3.9 0.0 - 8.0 %    Basophil % 1.7 0.0 - 1.9 %    Differential Method Automated    Comprehensive Metabolic Panel    Collection Time: 02/17/25 12:35 PM   Result Value Ref Range    Sodium 138 136 - 145 mmol/L    Potassium 4.1 3.5 - 5.1 mmol/L    Chloride 109 95 - 110 mmol/L    CO2 23 23 - 29 mmol/L    Glucose 73 70 - 110 mg/dL    BUN 11 6 - 20 mg/dL    Creatinine 0.8 0.5 - 1.4 mg/dL    Calcium 8.7 8.7 - 10.5 mg/dL    Total Protein 7.0 6.0 - 8.4 g/dL    Albumin 3.3 (L) 3.5 - 5.2 g/dL    Total Bilirubin 0.7 0.1 - 1.0 mg/dL    Alkaline Phosphatase 90 40 - 150 U/L    AST 21 10 - 40 U/L    ALT 19 10 - 44 U/L    eGFR >60.0 >60 mL/min/1.73 m^2    Anion Gap 6 (L) 8 - 16 mmol/L   AFP Tumor Marker    Collection Time: 02/17/25 12:35 PM   Result Value Ref Range    AFP 8.9 (H) 0.0 - 8.4 ng/mL   TSH    Collection Time: 02/17/25 12:35 PM   Result Value Ref Range    TSH 2.018 0.400 - 4.000 uIU/mL       I have reviewed the pertinent labs. AFP stable. CA 19-9 slightly increased.    Imaging:       CT CAP: 2/7/25:    Impression:     Post  treatment and postsurgical changes of the liver for hepatocellular carcinoma.  Two arterial enhancing foci in the left hepatic lobes, indeterminate.  This is not visualized on prior CT noting different imaging technique on that examination.  No washout or pseudo capsule formation.  Attention on follow-up.     Severe narrowing of the left hepatic lobe portal vein, worsened from prior examination and may be related to post treatment changes.     Slowly enlarging subcentimeter right and left upper lobe pulmonary nodules.  The part solid nodule in the right lower lobe is stable from prior.     Additional findings as described.    Path:   6/28/21: partial hepatectomy:    Final Pathologic Diagnosis 1.  Gallbladder (cholecystectomy):   - Benign gallbladder with cholecystitis   2. Right lobe (partial hepatectomy):   - Positive for malignancy, see synoptic report below   - Separate hemangioma, 7.3 cm   3. Lymph nodes, portal (regional resection):   - 8 lymph nodes, negative for tumor (0/8)   ______________________________________________________________________________   ______   Hepatocellular carcinoma synoptic report   - Procedure:  Partial hepatectomy, right lobe   - Histologic type:  Hepatocellular carcinoma, scirrhous   - Histologic grade:  Moderately differentiated, grade 2   - Tumor focality:  Solitary   - Tumor characteristics:   - Tumor site:  Right lobe   - Tumor size:  3.3 cm   - Treatment effect:  No known pre-surgical therapy   - Satellitosis:  Not identified   - Tumor extent:  Confined to liver   - Vascular invasion:  Present, Small vessel   - Perineural invasion:  Present   - Margins:   - All margins are negative for invasive carcinoma   - Closest margin to invasive carcinoma:  Parenchymal   - Distance from invasive  carcinoma to closest margin:  5 mm   - Regional lymph nodes:   - Number of lymph nodes with tumor:  0   - Number of lymph nodes examined:  8   - Pathology: pT2 N0 MX   - Additional findings:   -  No steatosis   - Trichrome stain:  Periportal fibrosis with early septa, stage 1-2   - Iron stain:  Negative   - Iron and trichrome stains with appropriate controls   Tumor blocks:  2A, 2B, 2 C    Comment: Interp By Madeline Carlos MD, Signed on 07/08/2021 at 16:47       Assessment:       1. HCC (hepatocellular carcinoma)    2. Malignant neoplasm metastatic to right lung    3. Regional lymph node metastasis present    4. Bilateral pulmonary embolism    5. Neutropenia, unspecified type    6. Cervical radiculopathy    7. Liver hemangioma    8. Gastroesophageal reflux disease, unspecified whether esophagitis present                Plan:             # HCC  Scirrhous subtype, which is very uncommon (~ 1% of all HCC); prognosis is likely similar to typical HCC.  No clear underlying liver pathology in this patient.  Had margin negative resection with negative lymph node eval on 6/28/21 with Dr. Howell.  Unfortunately she has biopsy proven recurrent metastatic disease to her RLL s/p IR biopsy 4/10/23.   She was discussed at thoracic tumor board with potential plan for surgical resection with Dr. Tadeo.  She had a concerning bone lesion on PET from 4/26 at T2.  This was biopsied and proven to be benign.  In the interim she had a repeat abdominal MRI on 6/13/23 which demonstrated growth of a portacaval lymph node that was very concerning for metastatic disease when we reviewed her imaging at liver conference.  Meanwhile her RLL met has grown slightly on 6/21/23 CT as well.  We discussed her case with Valerie Dhillon and Shwetha and we were all in agreement with proceeding with systemic therapy rather than surgery or radiation given multifocal progression.    We discussed this with her and she is agreeable with starting systemic therapy.  Reviewed possibilities of atezo + magaly or durva + treme.  She wished to proceed with STRIDE regimen: durvalumab + tremelimumab.    Received cycle 1 on 7/11/23.  Repeat CT CAP after cycle 3  demonstrates mild growth in albert hepatis lymph node.  Stable disease elsewhere.    Discussed with Dr. Mendiola and she was deemed eligible for SBRT to her abdominal lymph node and growing lung nodule.  She completed SBRT to both 11/3/23.    Meanwhile she developed a new concerning area of possible recurrence near her liver resection site.  Discussed with Dr. Lala and Dr. Dhillon.  Dr. Dhillon was unable to visualize it during radiation simulation.  We recommended to proceed with Y-90 which was administered 2/1/24.    MRI 2/29/24 shows good response to Y-90 treated lesion.  Indeterminate lesion reviewed by Dr. Alford with no concerning features.  Will continue current regimen.  Consider switching systemic therapy only if significant progression given likely side effects of TKI.    Saw Dr. Sabillon of cardiology who recommended troponin and ECG monitoring given her cardiac history.  She is asymptomatic at present.    MRI after cycle 12 showed mild growth in left hepatic lobe lesion.    She received Y90 to this 8/15/24 with Dr. Alford.  Meanwhile continued with durvalumab.    Gave additional one time re-priming dose of tremelimumab with cycle 15 that she tolerated well.     CT CAP after cycle 18 in Nov 2024 dose showed stable disease (stable small lung mets and no new liver lesions) but incidental PE. Long discussion about plan of care.  She wanted to proceed with observation off systemic therapy to assess disease status and trajectory.  Will prioritize locoregional therapies if at all possible if there is progression.    CT CAP 2/7/25 shows two indeterminate small liver lesions.  Will monitor these.  Very mild interval growth in RUL and CHRISTOPHER metastases.  Will reach out to Dr. Dhillon about potentially irradiating these.    Repeat CT chest and MRI abd/pelvis in 3 months.    # PE  Seen incidentally on restaging CT on 11/1/24 - involvement of bilateral lower lobe arteries and potentially RUL arteries.  Continues on apixaban.   Tolerating well.    # Neutropenia, cervical radiculopathy, HTN  Neutropenia intermittent.  Likely benign etiology.  Continue to f/u with Orthopedics.  FLORENTINO helped.  BP slightly elevated. Feeling well overall    # Liver hemangiomas, GERD  Continue monitoring as indicated with Dr. Rogers.  Continue to f/u with GI team for GERD symptoms. Continue with medication management with Protonix.    Follow up: 3 months.    Pte and family members displayed understanding of the above encounter and treatment plan. All thoughtful questions were answered to their satisfaction. Pte was advised to notify the care team or proceed to the ER if signs and symptoms worsen.      code applied: patient requires or will require a continuous, longitudinal, and active collaborative plan of care related to this patient's health condition, liver cancer --the management of which requires the direction of a practitioner with specialized clinical knowledge, skill, and expertise.       Philippe Carrasco MD  Hematology/Oncology  [facility]           Route Chart for Scheduling    Med Onc Chart Routing      Follow up with physician 3 months. with labs and CT chest and MRI abd/pel   Follow up with MELA    Infusion scheduling note    Injection scheduling note    Labs CBC, CMP and CA 19-9   Scheduling:  Preferred lab:  Lab interval:     Imaging MRI   & CT chest   Pharmacy appointment    Other referrals                Treatment Plan Information   OP TREMELIMUMAB 300 MG (X 1) + DURVALUMAB 1500 MG Q4W Philippe Carrasco MD   Associated diagnosis: Hepatocellular carcinoma Stage IVB rcT1b, cN0, pM1 noted on 6/9/2021   Line of treatment: First Line  Treatment Goal: Control     Upcoming Treatment Dates - OP TREMELIMUMAB 300 MG (X 1) + DURVALUMAB 1500 MG Q4W    11/29/2024       Chemotherapy       durvalumab (IMFINZI) 1,500 mg in 0.9% NaCl SolP 280 mL chemo infusion       Antiemetics       prochlorperazine injection Soln 5  mg  12/27/2024       Chemotherapy       durvalumab (IMFINZI) 1,500 mg in sodium chloride 0.9% SolP 280 mL chemo infusion       Antiemetics       prochlorperazine injection Soln 5 mg  1/24/2025       Chemotherapy       durvalumab (IMFINZI) 1,500 mg in sodium chloride 0.9% SolP 280 mL chemo infusion       Antiemetics       prochlorperazine injection Soln 5 mg

## 2025-02-17 NOTE — Clinical Note
Luis Bradley.  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  She's currently on a systemic treatment break and her active disease seems to be two small lung lesions - RUL & CHRISTOPHER.  Wanted to see if you thought SBRT to these could buy her some more time off systemic.  She is agreeable if you are. Thanks, Jose L

## 2025-02-18 ENCOUNTER — TELEPHONE (OUTPATIENT)
Dept: RADIATION ONCOLOGY | Facility: CLINIC | Age: 45
End: 2025-02-18
Payer: COMMERCIAL

## 2025-02-19 NOTE — PROGRESS NOTES
2/20/2025    Radiation Oncology Follow-Up Visit      Prior Radiation History:    Treatment Site  Energy  Dose/Fx (Gy)  #Fx  Total Dose (Gy)  Start Date  End Date  Elapsed Days    SB Lung_R  6X  10  5 / 5  50  10/23/2023  11/1/2023  9    SB Abdomen  6X  10  5 / 5  50  10/23/2023  11/3/2023  11        Is the patient female between ages 15-55:  Yes    Does the patient have a CIED:  No      Assessment   This is a 44 y.o. female with Stage IVB (rcT1b, cN0, pM1) HCC, s/p resection with R liver partial hepatectomy (6/28/2021 Dr. Howell), SM-, no LN involved, with biopsy proven (4/10/2023) recurrence in the RLL and enlarging portocaval LN, on durvalumab + tremelimumab (STRIDE regimen with Dr Carrasco) since 7/11/2023 with oligoprogression. She received SBRT 50 Gy in 5 fx to RLL lung and portocaval node by Dr. Mendiola completed 11/3/23. She continued on systemic therapy through 11/2024 and has been on break since. Most recent CT C/A/P 2/7/25 with interval growth of bilateral upper lobe lung mets. She returns for consideration of local therapy.     CT C/A/P 2/7/25 with interval growth of 2 bilateral upper lung nodules (0.9 cm in RUL, 0.7 cm in CHRISTOPHER) that around round and c/w with metastases. I recommend treating with ablative radiation (SBRT) to 50-54 Gy in 3-4 fractions. Potential side effects of radiotherapy to the chest including pneumonitis and chest wall pain were reviewed. At the end of our discussion, she wished to proceed with the recommended treatment.          Plan   1) CT Simulation today for SBRT lung treatment planning           CHIEF COMPLAINT: Lung metastases    HPI/Focused ROS: Has been of immunotherapy since Nov 2024. Overall doing well, continuing to work, no significant dyspnea. No chest pain.      Past Medical History:   Diagnosis Date    VENKATA positive 08/20/2020    COVID-19     Deviated septum 2011    Hepatocellular carcinoma 05/07/2021    Hypertrophy of inferior nasal turbinate     Pericardial effusion      Premature menopause        Past Surgical History:   Procedure Laterality Date    COLONOSCOPY N/A 2020    Procedure: COLONOSCOPY;  Surgeon: GIOVANNI Crane MD;  Location: [facility] ENDO (4TH FLR);  Service: Endoscopy;  Laterality: N/A;  + covid     EPIDURAL STEROID INJECTION INTO CERVICAL SPINE N/A 2024    Procedure: FLORENTINO C7/T1;  Surgeon: Cezar Bailey MD;  Location: [facility] PAIN MANAGEMENT;  Service: Pain Management;  Laterality: N/A;  20mins-No ac    ESOPHAGOGASTRODUODENOSCOPY N/A 1/10/2024    Procedure: ESOPHAGOGASTRODUODENOSCOPY (EGD);  Surgeon: Reynaldo Lynch MD;  Location: [facility] ENDO (2ND FLR);  Service: Endoscopy;  Laterality: N/A;  referred by daryl carrasco rocedure: EGD Diagnosis: Abnormal finding on GI tract imaging, Abdominal pain, and GERD  Procedure Timin-4 weeks Provider: Any GI provider Location: [facility], [facility] [place] 2-Endo, and [facility] [place] 4-Endo  Additional Scheduling Informat    ESOPHAGOGASTRODUODENOSCOPY N/A 2024    Procedure: EGD (ESOPHAGOGASTRODUODENOSCOPY);  Surgeon: Reynaldo Lynch MD;  Location: [facility] ENDO (2ND FLR);  Service: Endoscopy;  Laterality: N/A;  prep inst sent to pt via portal / referral a labat/   precall complete/instructions sent via portal cd  -moved pt to 730 procedure time. pt aware and notified of 630a arrival. did not want instructions resent, [facility] OC    HERNIA REPAIR      LIVER SURGERY N/A 2021    NASAL SEPTUM SURGERY      PERICARDIAL WINDOW N/A 2021    Procedure: CREATION, PERICARDIAL WINDOW;  Surgeon: Ajay Cantor MD;  Location: [facility] OR 2ND FLR;  Service: Cardiovascular;  Laterality: N/A;    PERICARDIOCENTESIS N/A 2020    Procedure: Pericardiocentesis;  Surgeon: Dickson Dangelo MD;  Location: [facility] CATH LAB;  Service: Cardiology;  Laterality: N/A;    TONSILLECTOMY         Social History     Tobacco Use    Smoking status: Never    Smokeless tobacco: Never   Substance Use Topics    Alcohol use: Yes     Comment: rare    Drug  use: No       Cancer-related family history includes Cancer in her maternal aunt; Lung cancer in her maternal aunt. There is no history of Melanoma.    Current Outpatient Medications on File Prior to Visit   Medication Sig Dispense Refill    ALPRAZolam (XANAX) 0.5 MG tablet Take 1 tablet (0.5 mg total) by mouth daily as needed for Anxiety. 30 tablet 1    apixaban (ELIQUIS) 5 mg Tab Take 1 tablet by mouth twice daily 60 tablet 3    cetirizine (ZYRTEC) 10 MG tablet Take 1 tablet (10 mg total) by mouth once daily. 90 tablet 3    cyanocobalamin (VITAMIN B-12) 1000 MCG tablet Take 1 tablet (1,000 mcg total) by mouth once daily. 30 tablet 12    cyclobenzaprine (FLEXERIL) 10 MG tablet TAKE 1 TABLET BY MOUTH EVERY DAY NIGHTLY AS NEEDED FOR MUSCLE SPASMS 30 tablet 1    EScitalopram oxalate (LEXAPRO) 10 MG tablet Take 1 tablet (10 mg total) by mouth once daily. 90 tablet 3    fluticasone propionate (FLONASE) 50 mcg/actuation nasal spray SPRAY 2 SPRAYS BY EACH NOSTRIL ROUTE ONCE DAILY. 48 mL 2    ketoconazole (NIZORAL) 2 % cream aaa on face and behind ears qd-bid prn flare 30 g 3    multivitamin capsule Take by mouth. 1 capsule Oral Every morning      naproxen (NAPROSYN) 500 MG tablet Take 1 tablet (500 mg total) by mouth 2 (two) times daily with meals. 60 tablet 0    norethindrone-ethinyl estradiol (MICROGESTIN 1/20) 1-20 mg-mcg per tablet Take 1 tablet by mouth once daily. 63 tablet 4    omeprazole (PRILOSEC) 20 MG capsule TAKE 1 CAPSULE BY MOUTH ONCE DAILY AS NEEDED FOR GERD 90 capsule 3    ondansetron (ZOFRAN-ODT) 4 MG TbDL Take 1 tablet (4 mg total) by mouth every 12 (twelve) hours as needed (nausea). 20 tablet 0    pantoprazole (PROTONIX) 40 MG tablet TAKE 1 TABLET BY MOUTH EVERY DAY 90 tablet 1    prochlorperazine (COMPAZINE) 10 MG tablet Take 1 tablet (10 mg total) by mouth every 6 (six) hours as needed (nausea). 60 tablet 1    simethicone (MYLICON) 125 MG chewable tablet Take 1 tablet (125 mg total) by mouth every 6  (six) hours as needed for Flatulence (BURPING/BELCHING). 30 tablet 0    sucralfate (CARAFATE) 100 mg/mL suspension Take 10 mLs (1 g total) by mouth 4 (four) times daily. 414 mL 0     No current facility-administered medications on file prior to visit.       Review of patient's allergies indicates:   Allergen Reactions    No known drug allergies          Vital Signs: BP (!) 149/87   Pulse 62   Temp 97.7 °F (36.5 °C)   Resp 16   LMP  (LMP Unknown)   SpO2 100%     ECOG Performance Status: 0 - Fully Active    Physical Exam  Constitutional:       Appearance: Normal appearance.   HENT:      Head: Normocephalic and atraumatic.   Eyes:      General: No scleral icterus.     Extraocular Movements: Extraocular movements intact.   Pulmonary:      Effort: No accessory muscle usage or respiratory distress.   Musculoskeletal:      Cervical back: Normal range of motion.   Neurological:      Mental Status: She is alert.   Psychiatric:         Mood and Affect: Mood and affect normal.         Judgment: Judgment normal.          Labs:    Imaging: I have personally reviewed the patient's available images and reports and summarized pertinent findings above in HPI.     Pathology: No new path

## 2025-02-20 ENCOUNTER — HOSPITAL ENCOUNTER (OUTPATIENT)
Dept: RADIATION THERAPY | Facility: HOSPITAL | Age: 45
Discharge: HOME | End: 2025-02-20
Payer: COMMERCIAL

## 2025-02-20 ENCOUNTER — OFFICE VISIT (OUTPATIENT)
Dept: RADIATION ONCOLOGY | Facility: CLINIC | Age: 45
End: 2025-02-20
Payer: COMMERCIAL

## 2025-02-20 VITALS
SYSTOLIC BLOOD PRESSURE: 149 MMHG | TEMPERATURE: 98 F | OXYGEN SATURATION: 100 % | DIASTOLIC BLOOD PRESSURE: 87 MMHG | RESPIRATION RATE: 16 BRPM | HEART RATE: 62 BPM

## 2025-02-20 DIAGNOSIS — C78.01 MALIGNANT NEOPLASM METASTATIC TO RIGHT LUNG: Primary | ICD-10-CM

## 2025-02-24 ENCOUNTER — OFFICE VISIT (OUTPATIENT)
Dept: ORTHOPEDICS | Facility: CLINIC | Age: 45
End: 2025-02-24
Payer: COMMERCIAL

## 2025-02-24 ENCOUNTER — PATIENT MESSAGE (OUTPATIENT)
Dept: SPINE | Facility: CLINIC | Age: 45
End: 2025-02-24
Payer: COMMERCIAL

## 2025-02-24 VITALS — WEIGHT: 156.31 LBS | HEIGHT: 71 IN | BODY MASS INDEX: 21.88 KG/M2

## 2025-02-24 DIAGNOSIS — M25.571 ACUTE RIGHT ANKLE PAIN: ICD-10-CM

## 2025-02-24 DIAGNOSIS — M54.12 CERVICAL RADICULOPATHY: Primary | ICD-10-CM

## 2025-02-24 PROCEDURE — 1159F MED LIST DOCD IN RCRD: CPT | Mod: CPTII,S$GLB,, | Performed by: ORTHOPAEDIC SURGERY

## 2025-02-24 PROCEDURE — 3008F BODY MASS INDEX DOCD: CPT | Mod: CPTII,S$GLB,, | Performed by: ORTHOPAEDIC SURGERY

## 2025-02-24 PROCEDURE — 99999 PR PBB SHADOW E&M-EST. PATIENT-LVL III: CPT | Mod: PBBFAC,,, | Performed by: ORTHOPAEDIC SURGERY

## 2025-02-24 PROCEDURE — 99213 OFFICE O/P EST LOW 20 MIN: CPT | Mod: S$GLB,,, | Performed by: ORTHOPAEDIC SURGERY

## 2025-02-24 RX ORDER — METHYLPREDNISOLONE 4 MG/1
TABLET ORAL
Qty: 21 TABLET | Refills: 0 | Status: SHIPPED | OUTPATIENT
Start: 2025-02-24 | End: 2025-03-17
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
## 2025-03-03 ENCOUNTER — HOSPITAL ENCOUNTER (OUTPATIENT)
Dept: RADIATION THERAPY | Facility: HOSPITAL | Age: 45
Discharge: HOME | End: 2025-03-03
Attending: RADIOLOGY
Payer: COMMERCIAL

## 2025-03-04 PROCEDURE — 77301 RADIOTHERAPY DOSE PLAN IMRT: CPT | Mod: TC | Performed by: RADIOLOGY

## 2025-03-04 PROCEDURE — 77293 RESPIRATOR MOTION MGMT SIMUL: CPT | Mod: TC | Performed by: RADIOLOGY

## 2025-03-04 PROCEDURE — 77301 RADIOTHERAPY DOSE PLAN IMRT: CPT | Mod: 26,,, | Performed by: RADIOLOGY

## 2025-03-04 PROCEDURE — 77293 RESPIRATOR MOTION MGMT SIMUL: CPT | Mod: 26,,, | Performed by: RADIOLOGY

## 2025-03-05 PROCEDURE — 77300 RADIATION THERAPY DOSE PLAN: CPT | Mod: TC | Performed by: RADIOLOGY

## 2025-03-05 PROCEDURE — 77300 RADIATION THERAPY DOSE PLAN: CPT | Mod: 26,,, | Performed by: RADIOLOGY

## 2025-03-05 PROCEDURE — 77338 DESIGN MLC DEVICE FOR IMRT: CPT | Mod: 26,,, | Performed by: RADIOLOGY

## 2025-03-05 PROCEDURE — 77338 DESIGN MLC DEVICE FOR IMRT: CPT | Mod: TC | Performed by: RADIOLOGY

## 2025-03-06 PROCEDURE — 77470 SPECIAL RADIATION TREATMENT: CPT | Mod: 26,59,, | Performed by: RADIOLOGY

## 2025-03-06 PROCEDURE — 77470 SPECIAL RADIATION TREATMENT: CPT | Mod: 59,TC | Performed by: RADIOLOGY

## 2025-03-07 ENCOUNTER — PATIENT MESSAGE (OUTPATIENT)
Dept: ORTHOPEDICS | Facility: CLINIC | Age: 45
End: 2025-03-07
Payer: COMMERCIAL

## 2025-03-07 DIAGNOSIS — M54.12 CERVICAL RADICULOPATHY: Primary | ICD-10-CM

## 2025-03-08 ENCOUNTER — HOSPITAL ENCOUNTER (OUTPATIENT)
Dept: RADIOLOGY | Facility: HOSPITAL | Age: 45
Discharge: HOME | End: 2025-03-08
Attending: ORTHOPAEDIC SURGERY
Payer: COMMERCIAL

## 2025-03-08 DIAGNOSIS — M54.12 CERVICAL RADICULOPATHY: ICD-10-CM

## 2025-03-08 PROCEDURE — 72050 X-RAY EXAM NECK SPINE 4/5VWS: CPT | Mod: 26,,, | Performed by: RADIOLOGY

## 2025-03-08 PROCEDURE — 72050 X-RAY EXAM NECK SPINE 4/5VWS: CPT | Mod: TC,FY

## 2025-03-11 ENCOUNTER — OFFICE VISIT (OUTPATIENT)
Dept: OBSTETRICS AND GYNECOLOGY | Facility: CLINIC | Age: 45
End: 2025-03-11
Attending: OBSTETRICS & GYNECOLOGY
Payer: COMMERCIAL

## 2025-03-11 VITALS
HEIGHT: 71 IN | SYSTOLIC BLOOD PRESSURE: 120 MMHG | WEIGHT: 151.25 LBS | DIASTOLIC BLOOD PRESSURE: 82 MMHG | BODY MASS INDEX: 21.18 KG/M2

## 2025-03-11 DIAGNOSIS — Z12.4 PAP SMEAR FOR CERVICAL CANCER SCREENING: ICD-10-CM

## 2025-03-11 DIAGNOSIS — Z01.419 WOMEN'S ANNUAL ROUTINE GYNECOLOGICAL EXAMINATION: Primary | ICD-10-CM

## 2025-03-11 DIAGNOSIS — Z86.711 HISTORY OF PULMONARY EMBOLISM: ICD-10-CM

## 2025-03-11 DIAGNOSIS — E28.39 PREMATURE OVARIAN FAILURE: ICD-10-CM

## 2025-03-11 DIAGNOSIS — Z12.31 VISIT FOR SCREENING MAMMOGRAM: ICD-10-CM

## 2025-03-11 DIAGNOSIS — C22.0 HEPATOCELLULAR CARCINOMA: ICD-10-CM

## 2025-03-11 PROCEDURE — 77373 STRTCTC BDY RAD THER TX DLVR: CPT | Performed by: RADIOLOGY

## 2025-03-11 PROCEDURE — 77014 PR  CT GUIDANCE PLACEMENT RAD THERAPY FIELDS: CPT | Mod: 26,,, | Performed by: RADIOLOGY

## 2025-03-11 PROCEDURE — 88175 CYTOPATH C/V AUTO FLUID REDO: CPT | Performed by: OBSTETRICS & GYNECOLOGY

## 2025-03-11 PROCEDURE — 99999 PR PBB SHADOW E&M-EST. PATIENT-LVL IV: CPT | Mod: PBBFAC,,, | Performed by: OBSTETRICS & GYNECOLOGY

## 2025-03-11 PROCEDURE — 87624 HPV HI-RISK TYP POOLED RSLT: CPT | Performed by: OBSTETRICS & GYNECOLOGY

## 2025-03-11 NOTE — PROGRESS NOTES
"Melly Hwang is a 44 y.o. female  who presents for annual exam.  She has a history of premature menopause and has been receiving hormonal supplementation with OCPs.  She is taking Microgestin  on a continual basis with very infrequent breakthrough bleeding.  Denies flashes / sweats.  She has hepatocellular carcinoma, followed by Dr. Carrasco.   She has a history of bilateral pulmonary emboli noted as an incidental finding on CT 24 - currently on Eliquis.  Request pap today.       Blood pressure 120/82, height 5' 11" (1.803 m), weight 68.6 kg (151 lb 3.8 oz).    24 Mammogram: Negative, TC 2.95%    3/8/24 Pap: Negative, HPV: Negative    Past Medical History:   Diagnosis Date    VENKATA positive 2020    COVID-19     Deviated septum     Hepatocellular carcinoma 2021    Hypertrophy of inferior nasal turbinate     Pericardial effusion     Premature menopause        Past Surgical History:   Procedure Laterality Date    COLONOSCOPY N/A 2020    Procedure: COLONOSCOPY;  Surgeon: GIOVANNI Crane MD;  Location: [facility] (4TH FLR);  Service: Endoscopy;  Laterality: N/A;  + covid     EPIDURAL STEROID INJECTION INTO CERVICAL SPINE N/A 2024    Procedure: FLORENTINO C7/T1;  Surgeon: Cezar Bailey MD;  Location: [facility] PAIN MANAGEMENT;  Service: Pain Management;  Laterality: N/A;  20mins-No ac    ESOPHAGOGASTRODUODENOSCOPY N/A 1/10/2024    Procedure: ESOPHAGOGASTRODUODENOSCOPY (EGD);  Surgeon: Reynaldo Lynch MD;  Location: [facility] (2ND FLR);  Service: Endoscopy;  Laterality: N/A;  referred by daryl davisure: EGD Diagnosis: Abnormal finding on GI tract imaging, Abdominal pain, and GERD  Procedure Timin-4 weeks Provider: Any GI provider Location: [facility], [facility] [place] 2-Endo, and [facility] [place] 4-Endo  Additional Scheduling Informat    ESOPHAGOGASTRODUODENOSCOPY N/A 2024    Procedure: EGD (ESOPHAGOGASTRODUODENOSCOPY);  Surgeon: Reynaldo Lynch MD;  Location: [facility] " (2ND FLR);  Service: Endoscopy;  Laterality: N/A;  prep inst sent to pt via portal / referral a labat/   precall complete/instructions sent via portal cd  -moved pt to 730 procedure time. pt aware and notified of 630a arrival. did not want instructions resent, [facility] OC    HERNIA REPAIR      LIVER SURGERY N/A 2021    NASAL SEPTUM SURGERY      PERICARDIAL WINDOW N/A 2021    Procedure: CREATION, PERICARDIAL WINDOW;  Surgeon: Ajay Cantor MD;  Location: [facility] OR 2ND FLR;  Service: Cardiovascular;  Laterality: N/A;    PERICARDIOCENTESIS N/A 2020    Procedure: Pericardiocentesis;  Surgeon: Dickson Dangelo MD;  Location: [facility] CATH LAB;  Service: Cardiology;  Laterality: N/A;    TONSILLECTOMY         OB History          2    Para   2    Term   1            AB        Living   1         SAB        IAB        Ectopic        Multiple        Live Births   1                 ROS:  GENERAL: Feeling well overall.   SKIN: Denies rash or lesions.   HEAD: Denies head injury or headache.   NODES: Denies enlarged lymph nodes.   CHEST: Denies chest pain or shortness of breath.   CARDIOVASCULAR: Denies palpitations or left sided chest pain.   ABDOMEN: No abdominal pain, nausea, vomiting or rectal bleeding.   URINARY: No dysuria or hematuria.  REPRODUCTIVE: See HPI.   BREASTS: Denies pain, lumps, or nipple discharge.   HEMATOLOGIC: No easy bruisability or excessive bleeding.   MUSCULOSKELETAL: Denies joint pain or swelling.   NEUROLOGIC: Denies syncope or weakness.   PSYCHIATRIC: Denies depression.    PE:   (chaperone present during entire exam)  APPEARANCE: Well nourished, well developed, in no acute distress.  BREASTS: Symmetrical, no skin changes or visible lesions. No palpable masses, nipple discharge or adenopathy bilaterally.  ABDOMEN: Soft. No tenderness or masses.   VULVA: No lesions. Normal female genitalia.  URETHRAL MEATUS: Normal size and location, no lesions, no  prolapse.  URETHRA: No masses, tenderness, prolapse or scarring.  VAGINA: No lesions, no abnormal discharge, no significant cystocele or rectocele.  CERVIX: No lesions and discharge. PAP done.  UTERUS: Normal size, regular shape, mobile, non-tender, bladder base nontender.  ADNEXA: No masses, tenderness or CDS nodularity.  ANUS PERINEUM: Normal.      Diagnosis:  1. Women's annual routine gynecological examination    2. Premature ovarian failure    3. Hepatocellular carcinoma    4. Pap smear for cervical cancer screening    5. Visit for screening mammogram          PLAN:    Orders Placed This Encounter    HPV High Risk Genotypes, PCR    Mammo Digital Screening Bilat w/ Josue (XPD)    Liquid-Based Pap Smear, Screening       Patient was counseled today on the need for annual gyn exams.  We discussed her usage of OCPs for the hormone replacement / prevention of menopausal symptoms secondary to her premature ovarian failure and her recent diagnosis of a pulmonary embolism.  We discussed that OCPs / estrogen hormonal therapy are contraindicated in patients with a history of PE.  I will discuss with her oncologist Dr. Carrasco     Follow-up in 1 year.    This note was created with voice recognition software.  Grammatical, syntax and spelling errors may be inevitable.

## 2025-03-14 ENCOUNTER — PATIENT MESSAGE (OUTPATIENT)
Dept: OBSTETRICS AND GYNECOLOGY | Facility: CLINIC | Age: 45
End: 2025-03-14
Payer: COMMERCIAL

## 2025-03-14 LAB
FINAL PATHOLOGIC DIAGNOSIS: NORMAL
HPV HR 12 DNA SPEC QL NAA+PROBE: NEGATIVE
HPV16 AG SPEC QL: NEGATIVE
HPV18 DNA SPEC QL NAA+PROBE: NEGATIVE
Lab: NORMAL

## 2025-03-24 ENCOUNTER — PATIENT MESSAGE (OUTPATIENT)
Dept: ORTHOPEDICS | Facility: CLINIC | Age: 45
End: 2025-03-24
Payer: COMMERCIAL

## 2025-03-24 DIAGNOSIS — M25.571 CHRONIC PAIN OF RIGHT ANKLE: Primary | ICD-10-CM

## 2025-03-24 DIAGNOSIS — G89.29 CHRONIC PAIN OF RIGHT ANKLE: Primary | ICD-10-CM

## 2025-04-03 ENCOUNTER — HOSPITAL ENCOUNTER (OUTPATIENT)
Dept: RADIOLOGY | Facility: HOSPITAL | Age: 45
Discharge: HOME | End: 2025-04-03
Attending: NURSE PRACTITIONER
Payer: COMMERCIAL

## 2025-04-03 ENCOUNTER — RESULTS FOLLOW-UP (OUTPATIENT)
Dept: INTERNAL MEDICINE | Facility: CLINIC | Age: 45
End: 2025-04-03

## 2025-04-03 ENCOUNTER — OFFICE VISIT (OUTPATIENT)
Dept: INTERNAL MEDICINE | Facility: CLINIC | Age: 45
End: 2025-04-03
Payer: COMMERCIAL

## 2025-04-03 ENCOUNTER — PATIENT MESSAGE (OUTPATIENT)
Dept: INTERNAL MEDICINE | Facility: CLINIC | Age: 45
End: 2025-04-03

## 2025-04-03 VITALS
HEIGHT: 71 IN | OXYGEN SATURATION: 98 % | SYSTOLIC BLOOD PRESSURE: 122 MMHG | HEART RATE: 71 BPM | DIASTOLIC BLOOD PRESSURE: 64 MMHG | WEIGHT: 155.44 LBS | BODY MASS INDEX: 21.76 KG/M2

## 2025-04-03 DIAGNOSIS — R22.1 NECK MASS: ICD-10-CM

## 2025-04-03 DIAGNOSIS — R22.1 NECK MASS: Primary | ICD-10-CM

## 2025-04-03 DIAGNOSIS — M79.642 HAND PAIN, LEFT: ICD-10-CM

## 2025-04-03 PROCEDURE — 3008F BODY MASS INDEX DOCD: CPT | Mod: CPTII,S$GLB,, | Performed by: NURSE PRACTITIONER

## 2025-04-03 PROCEDURE — 76536 US EXAM OF HEAD AND NECK: CPT | Mod: TC

## 2025-04-03 PROCEDURE — 3078F DIAST BP <80 MM HG: CPT | Mod: CPTII,S$GLB,, | Performed by: NURSE PRACTITIONER

## 2025-04-03 PROCEDURE — 1159F MED LIST DOCD IN RCRD: CPT | Mod: CPTII,S$GLB,, | Performed by: NURSE PRACTITIONER

## 2025-04-03 PROCEDURE — 3074F SYST BP LT 130 MM HG: CPT | Mod: CPTII,S$GLB,, | Performed by: NURSE PRACTITIONER

## 2025-04-03 PROCEDURE — 99213 OFFICE O/P EST LOW 20 MIN: CPT | Mod: S$GLB,,, | Performed by: NURSE PRACTITIONER

## 2025-04-03 PROCEDURE — 76536 US EXAM OF HEAD AND NECK: CPT | Mod: 26,,, | Performed by: RADIOLOGY

## 2025-04-03 PROCEDURE — 99999 PR PBB SHADOW E&M-EST. PATIENT-LVL V: CPT | Mod: PBBFAC,,, | Performed by: NURSE PRACTITIONER

## 2025-04-03 NOTE — PROGRESS NOTES
INTERNAL MEDICINE PROGRESS/URGENT CARE NOTE    CHIEF COMPLAINT     Chief Complaint   Patient presents with    left lymph node    middle finger pain      left       HPI     Melly Eduarda Hwang is a 44 y.o. female who presents for an urgent visit today.    PCP: Dr. Sahu    Pt with hx of HCC, mets to lung, bilat PE on eliquis, cervical radiculopathy c/o enlarged and painful mass to L submandibular area that began a few days ago. States waxes and wanes in size over the past few days. Always has sinus issues but no worse than normal. Does have a little pustule beneath her tongue that's a little sore. No other mouth lesions. No teeth pain.  No cats or cat exposures. No fevers, sore throat, recent illness. States in her usual state of health.     Left middle knuckle sore. No swelling or injury or trauma.       Problem List[1]     Past Medical History:  Past Medical History:   Diagnosis Date    VENKATA positive 2020    COVID-19     Deviated septum     Hepatocellular carcinoma 2021    Hypertrophy of inferior nasal turbinate     Pericardial effusion     Premature menopause         Past Surgical History:  Past Surgical History:   Procedure Laterality Date    COLONOSCOPY N/A 2020    Procedure: COLONOSCOPY;  Surgeon: GIOVANNI Crane MD;  Location: [facility] (4TH FLR);  Service: Endoscopy;  Laterality: N/A;  + covid     EPIDURAL STEROID INJECTION INTO CERVICAL SPINE N/A 2024    Procedure: FLORENTINO C7/T1;  Surgeon: Cezar Bailey MD;  Location: [facility] PAIN MANAGEMENT;  Service: Pain Management;  Laterality: N/A;  20mins-No ac    ESOPHAGOGASTRODUODENOSCOPY N/A 1/10/2024    Procedure: ESOPHAGOGASTRODUODENOSCOPY (EGD);  Surgeon: Reynaldo Lynch MD;  Location: [facility] (2ND FLR);  Service: Endoscopy;  Laterality: N/A;  referred by daryl davisure: EGD Diagnosis: Abnormal finding on GI tract imaging, Abdominal pain, and GERD  Procedure Timin-4 weeks Provider: Any GI provider Location: [place]  "Endo, [facility]Endo, and [facility]-Endo  Additional Scheduling Informat    ESOPHAGOGASTRODUODENOSCOPY N/A 9/30/2024    Procedure: EGD (ESOPHAGOGASTRODUODENOSCOPY);  Surgeon: Reynaldo Lynch MD;  Location: [facility] (2ND FLR);  Service: Endoscopy;  Laterality: N/A;  prep inst sent to pt via portal / referral a labat/  9/23 precall complete/instructions sent via portal cd  9/27-moved pt to 730 procedure time. pt aware and notified of 630a arrival. did not want instructions resent, KL OC    HERNIA REPAIR      LIVER SURGERY N/A 06/28/2021    NASAL SEPTUM SURGERY      PERICARDIAL WINDOW N/A 02/22/2021    Procedure: CREATION, PERICARDIAL WINDOW;  Surgeon: Ajay Cantor MD;  [facility];  Service: Cardiovascular;  Laterality: N/A;    PERICARDIOCENTESIS N/A 05/02/2020    Procedure: Pericardiocentesis;  Surgeon: Dickson Dangelo MD;  Location: [facility] CATH LAB;  Service: Cardiology;  Laterality: N/A;    TONSILLECTOMY          Allergies:  Review of patient's allergies indicates:   Allergen Reactions    No known drug allergies        Home Medications:  Current Medications[2]     Review of Systems:  Review of Systems   Constitutional:  Negative for fever.   HENT:  Negative for congestion, sinus pressure, sinus pain, sore throat and trouble swallowing.    Respiratory:  Negative for cough and shortness of breath.    Cardiovascular:  Negative for chest pain.   Neurological:  Negative for weakness and headaches.         PHYSICAL EXAM     Vitals:    04/03/25 0807   BP: 122/64   BP Location: Right arm   Patient Position: Sitting   Pulse: 71   SpO2: 98%   Weight: 70.5 kg (155 lb 6.8 oz)   Height: 5' 11" (1.803 m)      Body mass index is 21.68 kg/m².     Physical Exam  Vitals reviewed.   Constitutional:       Appearance: Normal appearance.   HENT:      Head: Normocephalic.      Comments: Trace clear fluid noted behind L TM. No erythema or bulding or purulence.      Right Ear: Tympanic membrane and ear canal normal.      " Left Ear: Ear canal normal.      Mouth/Throat:      Mouth: Mucous membranes are moist.      Dentition: No dental caries, dental abscesses or gum lesions.      Pharynx: Oropharynx is clear. Uvula midline. No posterior oropharyngeal erythema or uvula swelling.      Comments: Small pustule noted to lingual frenulum. No other mucosal lesions.   Eyes:      Conjunctiva/sclera: Conjunctivae normal.      Pupils: Pupils are equal, round, and reactive to light.   Neck:      Thyroid: No thyromegaly.      Trachea: Trachea normal.      Comments: Small mass ttp to L submandib area. No other masses appreciated. No erythema.   Cardiovascular:      Rate and Rhythm: Normal rate and regular rhythm.   Pulmonary:      Effort: Pulmonary effort is normal.      Breath sounds: Normal breath sounds.   Musculoskeletal:      Left hand: Tenderness present. No swelling or bony tenderness. Normal range of motion.      Comments: R 3rd MCP joint tenderness with movement. No swelling or erythema. ROM intact   Skin:     General: Skin is warm and dry.   Neurological:      Mental Status: She is alert and oriented to person, place, and time.   Psychiatric:         Mood and Affect: Mood normal.         Behavior: Behavior normal.         LABS     Lab Results   Component Value Date    HGBA1C 5.4 10/04/2024     CMP  Sodium   Date Value Ref Range Status   02/17/2025 138 136 - 145 mmol/L Final     Potassium   Date Value Ref Range Status   02/17/2025 4.1 3.5 - 5.1 mmol/L Final     Chloride   Date Value Ref Range Status   02/17/2025 109 95 - 110 mmol/L Final     CO2   Date Value Ref Range Status   02/17/2025 23 23 - 29 mmol/L Final     Glucose   Date Value Ref Range Status   02/17/2025 73 70 - 110 mg/dL Final     BUN   Date Value Ref Range Status   02/17/2025 11 6 - 20 mg/dL Final     Creatinine   Date Value Ref Range Status   02/17/2025 0.8 0.5 - 1.4 mg/dL Final     Calcium   Date Value Ref Range Status   02/17/2025 8.7 8.7 - 10.5 mg/dL Final     Total Protein    Date Value Ref Range Status   02/17/2025 7.0 6.0 - 8.4 g/dL Final     Albumin   Date Value Ref Range Status   02/17/2025 3.3 (L) 3.5 - 5.2 g/dL Final     Total Bilirubin   Date Value Ref Range Status   02/17/2025 0.7 0.1 - 1.0 mg/dL Final     Comment:     For infants and newborns, interpretation of results should be based  on gestational age, weight and in agreement with clinical  observations.    Premature Infant recommended reference ranges:  Up to 24 hours.............<8.0 mg/dL  Up to 48 hours............<12.0 mg/dL  3-5 days..................<15.0 mg/dL  6-29 days.................<15.0 mg/dL       Alkaline Phosphatase   Date Value Ref Range Status   02/17/2025 90 40 - 150 U/L Final     AST   Date Value Ref Range Status   02/17/2025 21 10 - 40 U/L Final     ALT   Date Value Ref Range Status   02/17/2025 19 10 - 44 U/L Final     Anion Gap   Date Value Ref Range Status   02/17/2025 6 (L) 8 - 16 mmol/L Final     eGFR if    Date Value Ref Range Status   04/18/2022 >60 >60 mL/min/1.73 m^2 Final     eGFR if non    Date Value Ref Range Status   04/18/2022 >60 >60 mL/min/1.73 m^2 Final     Comment:     Calculation used to obtain the estimated glomerular filtration  rate (eGFR) is the CKD-EPI equation.        Lab Results   Component Value Date    WBC 3.61 (L) 02/17/2025    HGB 11.7 (L) 02/17/2025    HCT 38.1 02/17/2025    MCV 96 02/17/2025     02/17/2025     Lab Results   Component Value Date    CHOL 175 10/04/2024    CHOL 195 09/18/2023    CHOL 218 (H) 03/18/2021     Lab Results   Component Value Date    HDL 61 10/04/2024    HDL 58 09/18/2023    HDL 67 03/18/2021     Lab Results   Component Value Date    LDLCALC 100.8 10/04/2024    LDLCALC 109.0 09/18/2023    LDLCALC 136.4 03/18/2021     Lab Results   Component Value Date    TRIG 66 10/04/2024    TRIG 140 09/18/2023    TRIG 73 03/18/2021     Lab Results   Component Value Date    CHOLHDL 34.9 10/04/2024    CHOLHDL 29.7 09/18/2023     CHOLHDL 30.7 03/18/2021     Lab Results   Component Value Date    TSH 2.018 02/17/2025       ASSESSMENT     1. Neck mass    2. Hand pain, left         PLAN  Will US neck to evaluate. Hydrate well. Tylenol prn.   No joint swelling or redness. Recommend tylenol prn for now. She will let us know if any other symptoms arise or anything worsens.     1. Neck mass  - US Soft Tissue Head Neck; Future    2. Hand pain, left       Patient's plan/treatment was discussed including medications and possible side effects. Verbalized understanding of all instructions.     This note was partly generated with You[employer]ribe voice recognition software. I apologize for any possible typographical errors.          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 pylori infection: tx 2014 stool negative    Liver hemangioma    Pericardial effusion    VENKATA positive    Elevated bilirubin    S/P pericardial window creation    Hepatocellular carcinoma    Hypophosphatemia    Elevated CA 19-9 level    Pleural effusion    Decreased ROM of intervertebral discs of cervical spine    Lung nodule 9/22; enlarged 7/23    Neutropenia, unspecified type    Low serum vitamin B12    Carpal tunnel syndrome of right wrist: mild, see EMG 10/23    Anxiety about health    Malignant neoplasm metastatic to right lung    Anxiety   [2]   Current Outpatient Medications:     ALPRAZolam (XANAX) 0.5 MG tablet, Take 1 tablet (0.5 mg total) by mouth daily as needed for Anxiety., Disp: 30 tablet, Rfl: 1    apixaban (ELIQUIS) 5 mg Tab, Take 1 tablet by mouth twice daily, Disp: 60 tablet, Rfl: 3    cetirizine (ZYRTEC) 10 MG tablet, Take 1 tablet (10 mg total)  by mouth once daily., Disp: 90 tablet, Rfl: 3    cyanocobalamin (VITAMIN B-12) 1000 MCG tablet, Take 1 tablet (1,000 mcg total) by mouth once daily., Disp: 30 tablet, Rfl: 12    cyclobenzaprine (FLEXERIL) 10 MG tablet, TAKE 1 TABLET BY MOUTH EVERY DAY NIGHTLY AS NEEDED FOR MUSCLE SPASMS, Disp: 30 tablet, Rfl: 1    EScitalopram oxalate (LEXAPRO) 10 MG tablet, Take 1 tablet (10 mg total) by mouth once daily., Disp: 90 tablet, Rfl: 3    fluticasone propionate (FLONASE) 50 mcg/actuation nasal spray, SPRAY 2 SPRAYS BY EACH NOSTRIL ROUTE ONCE DAILY., Disp: 48 mL, Rfl: 2    ketoconazole (NIZORAL) 2 % cream, aaa on face and behind ears qd-bid prn flare, Disp: 30 g, Rfl: 3    multivitamin capsule, Take by mouth. 1 capsule Oral Every morning, Disp: , Rfl:     naproxen (NAPROSYN) 500 MG tablet, Take 1 tablet (500 mg total) by mouth 2 (two) times daily with meals., Disp: 60 tablet, Rfl: 0    norethindrone-ethinyl estradiol (MICROGESTIN 1/20) 1-20 mg-mcg per tablet, Take 1 tablet by mouth once daily., Disp: 63 tablet, Rfl: 4    omeprazole (PRILOSEC) 20 MG capsule, TAKE 1 CAPSULE BY MOUTH ONCE DAILY AS NEEDED FOR GERD, Disp: 90 capsule, Rfl: 3    ondansetron (ZOFRAN-ODT) 4 MG TbDL, Take 1 tablet (4 mg total) by mouth every 12 (twelve) hours as needed (nausea)., Disp: 20 tablet, Rfl: 0    pantoprazole (PROTONIX) 40 MG tablet, TAKE 1 TABLET BY MOUTH EVERY DAY, Disp: 90 tablet, Rfl: 1    prochlorperazine (COMPAZINE) 10 MG tablet, Take 1 tablet (10 mg total) by mouth every 6 (six) hours as needed (nausea)., Disp: 60 tablet, Rfl: 1    simethicone (MYLICON) 125 MG chewable tablet, Take 1 tablet (125 mg total) by mouth every 6 (six) hours as needed for Flatulence (BURPING/BELCHING)., Disp: 30 tablet, Rfl: 0

## 2025-04-22 ENCOUNTER — TELEPHONE (OUTPATIENT)
Dept: OBSTETRICS AND GYNECOLOGY | Facility: CLINIC | Age: 45
End: 2025-04-22
Payer: COMMERCIAL

## 2025-04-22 DIAGNOSIS — E28.39 PREMATURE OVARIAN FAILURE: Primary | ICD-10-CM

## 2025-04-22 DIAGNOSIS — E28.319 EARLY ONSET MENOPAUSE: ICD-10-CM

## 2025-04-22 RX ORDER — NORETHINDRONE ACETATE AND ETHINYL ESTRADIOL .02; 1 MG/1; MG/1
1 TABLET ORAL
Qty: 63 TABLET | Refills: 3 | Status: SHIPPED | OUTPATIENT
Start: 2025-04-22

## 2025-04-22 NOTE — TELEPHONE ENCOUNTER
Refill Decision Note   Melly Casonrett  is requesting a refill authorization.  Brief Assessment and Rationale for Refill:  Approve     Medication Therapy Plan:         Comments:     Note composed:5:30 AM 04/22/2025

## 2025-04-22 NOTE — TELEPHONE ENCOUNTER
Called patient:    Again, discussed the recommendation to stop OCPs secondary to her history of prior PE.    We reviewed the increase risk of thrombosis with taking OCPs.    She is concerned about bone loss / development of osteoporosis from early menopause.    REC:  1) Stop OCPs  2) BMD to determine baseline bone status.

## 2025-04-25 ENCOUNTER — CLINICAL SUPPORT (OUTPATIENT)
Dept: REHABILITATION | Facility: HOSPITAL | Age: 45
End: 2025-04-25
Payer: COMMERCIAL

## 2025-04-25 ENCOUNTER — HOSPITAL ENCOUNTER (OUTPATIENT)
Dept: RADIOLOGY | Facility: CLINIC | Age: 45
Discharge: HOME | End: 2025-04-25
Attending: OBSTETRICS & GYNECOLOGY
Payer: COMMERCIAL

## 2025-04-25 DIAGNOSIS — G89.29 CHRONIC PAIN OF RIGHT ANKLE: ICD-10-CM

## 2025-04-25 DIAGNOSIS — M54.12 CERVICAL RADICULOPATHY: ICD-10-CM

## 2025-04-25 DIAGNOSIS — R68.89 DECREASED FUNCTIONAL ACTIVITY TOLERANCE: ICD-10-CM

## 2025-04-25 DIAGNOSIS — R53.1 WEAKNESS: Primary | ICD-10-CM

## 2025-04-25 DIAGNOSIS — E28.319 EARLY ONSET MENOPAUSE: ICD-10-CM

## 2025-04-25 DIAGNOSIS — M25.571 CHRONIC PAIN OF RIGHT ANKLE: ICD-10-CM

## 2025-04-25 PROCEDURE — 97110 THERAPEUTIC EXERCISES: CPT | Mod: PN

## 2025-04-25 PROCEDURE — 97161 PT EVAL LOW COMPLEX 20 MIN: CPT | Mod: PN

## 2025-04-25 PROCEDURE — 77091 TBS TECHL CALCULATION ONLY: CPT
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
## 2025-04-30 ENCOUNTER — PATIENT MESSAGE (OUTPATIENT)
Dept: OBSTETRICS AND GYNECOLOGY | Facility: CLINIC | Age: 45
End: 2025-04-30
Payer: COMMERCIAL

## 2025-04-30 ENCOUNTER — CLINICAL SUPPORT (OUTPATIENT)
Dept: OTHER | Facility: CLINIC | Age: 45
End: 2025-04-30

## 2025-04-30 DIAGNOSIS — Z00.8 ENCOUNTER FOR OTHER GENERAL EXAMINATION: ICD-10-CM

## 2025-05-01 VITALS
WEIGHT: 151 LBS | BODY MASS INDEX: 20.45 KG/M2 | HEIGHT: 72 IN | SYSTOLIC BLOOD PRESSURE: 127 MMHG | DIASTOLIC BLOOD PRESSURE: 77 MMHG

## 2025-05-01 LAB
GLUCOSE SERPL-MCNC: 80 MG/DL (ref 60–140)
HDLC SERPL-MCNC: 58 MG/DL
POC CHOLESTEROL, LDL (DOCK): 92 MG/DL
POC CHOLESTEROL, TOTAL: 164 MG/DL
TRIGL SERPL-MCNC: 76 MG/DL

## 2025-05-07 ENCOUNTER — RESULTS FOLLOW-UP (OUTPATIENT)
Dept: OBSTETRICS AND GYNECOLOGY | Facility: CLINIC | Age: 45
End: 2025-05-07

## 2025-05-07 ENCOUNTER — TELEPHONE (OUTPATIENT)
Dept: OBSTETRICS AND GYNECOLOGY | Facility: CLINIC | Age: 45
End: 2025-05-07
Payer: COMMERCIAL

## 2025-05-07 NOTE — TELEPHONE ENCOUNTER
Called patient:    Discussed results of BMD:    FINDINGS:  Lumbar spine (L1-L4):               T-score is -0.7, and Z-score is -1.1.  Femoral neck:                          T-score is -1.4, and Z-score is -1.6.  Total hip:                                T-score is -1.1, and Z-score is -1.4.  Trabecular Bone Score  The trabecular bone score (TBS) indicates normal bone quality.  Fracture Risk (FRAX adjusted for Trabecular Bone Score)  1.3% risk of a major osteoporotic fracture in the next 10 years.  0.1% risk of hip fracture in the next 10 years.  Impression:  *Low bone mass (Osteopenia); FRAX calculations do not support treatment as osteoporosis.  RECOMMENDATIONS:  *Daily calcium intake 1252-0948 mg, dietary sources preferred; Vitamin D 0010-7734 IU daily.  *Weight bearing exercise and fall precautions.  *No additional pharmacologic therapy recommended at this time.  *Repeat BMD in 2 years.    Discussed mild osteopenia and recommendation for adequate calcium and vitamin D with repeat BMD in 2 years.  She has again reminded of the recommendation to NOT take OCPs or hormone with her history of a PE.  She is concerned about bone loss  - discussed repeating BMD 1-2 years

## 2025-05-16 ENCOUNTER — HOSPITAL ENCOUNTER (OUTPATIENT)
Dept: RADIOLOGY | Facility: HOSPITAL | Age: 45
Discharge: HOME | End: 2025-05-16
Attending: INTERNAL MEDICINE
Payer: COMMERCIAL

## 2025-05-16 ENCOUNTER — CLINICAL SUPPORT (OUTPATIENT)
Dept: REHABILITATION | Facility: HOSPITAL | Age: 45
End: 2025-05-16
Payer: COMMERCIAL

## 2025-05-16 DIAGNOSIS — R53.1 WEAKNESS: Primary | ICD-10-CM

## 2025-05-16 DIAGNOSIS — C22.0 HCC (HEPATOCELLULAR CARCINOMA): ICD-10-CM

## 2025-05-16 DIAGNOSIS — R68.89 DECREASED FUNCTIONAL ACTIVITY TOLERANCE: ICD-10-CM

## 2025-05-16 PROCEDURE — 72197 MRI PELVIS W/O & W/DYE: CPT | Mod: 26,,, | Performed by: STUDENT IN AN ORGANIZED HEALTH CARE EDUCATION/TRAINING PROGRAM

## 2025-05-16 PROCEDURE — 71250 CT THORAX DX C-: CPT | Mod: TC

## 2025-05-16 PROCEDURE — 71250 CT THORAX DX C-: CPT | Mod: 26,,, | Performed by: RADIOLOGY

## 2025-05-16 PROCEDURE — 25500020 PHARM REV CODE 255: Performed by: INTERNAL MEDICINE

## 2025-05-16 PROCEDURE — A9585 GADOBUTROL INJECTION: HCPCS | Performed by: INTERNAL MEDICINE

## 2025-05-16 PROCEDURE — 74183 MRI ABD W/O CNTR FLWD CNTR: CPT | Mod: 26,,, | Performed by: STUDENT IN AN ORGANIZED HEALTH CARE EDUCATION/TRAINING PROGRAM

## 2025-05-16 PROCEDURE — 97110 THERAPEUTIC EXERCISES: CPT | Mod: PN

## 2025-05-16 PROCEDURE — 74183 MRI ABD W/O CNTR FLWD CNTR: CPT | Mod: TC

## 2025-05-16 RX ORDER — GADOBUTROL 604.72 MG/ML
10 INJECTION INTRAVENOUS
Status: COMPLETED | OUTPATIENT
Start: 2025-05-16 | End: 2025-05-16

## 2025-05-16 RX ADMIN — GADOBUTROL 10 ML: 604.72 INJECTION INTRAVENOUS at 09:05

## 2025-05-16 NOTE — PROGRESS NOTES
Outpatient Rehab    Physical Therapy Visit    Patient Name: Melly Hwang  MRN: [id number]  YOB: 1980  Encounter Date: 5/16/2025    Therapy Diagnosis:   Encounter Diagnoses   Name Primary?    Weakness Yes    Decreased functional activity tolerance      Physician: Ladi Roman,*    Physician Orders: Eval and Treat  Medical Diagnosis: Cervical radiculopathy    Visit # / Visits Authorized:  1 / 20  Insurance Authorization Period: 4/25/2025 to 12/31/2025  Date of Evaluation: 3/24/2025  4/25/2025  Plan of Care Certification: 4/25/2025 to 7/25/2025      PT/PTA:     Number of PTA visits since last PT visit:   Time In: 1100   Time Out: 1200  Total Time (in minutes): 60   Total Billable Time (in minutes): 55    FOTO:  Intake Score:  %  Survey Score 2:  %  Survey Score 3:  %    Precautions:       Subjective   Pt says neck is doing better. 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 She plans to see a podiatrist tomorrow for L foot pain that she has been experiencing..         Objective            Treatment:  Therapeutic Exercise  TE 1: Prone Y and T over Swissball 2 x 12 1# DB  TE 2: Rows BTB 3 x10  TE 3: Shoulder Extensions BTB 3 x10  TE 5: Cat camels x 15  TE 6: Thoracic Ext foam roll x 5 over 3-4 segments  TE 7: Matrix low and high row 2 x 10 each 25#  TE 8: open books x 12-15 ( elbow bent for shoulder comfort)  TE 9: UBE 5 fwd 5 back  TE 10: time for assessment      Time Entry(in minutes):  Therapeutic Exercise Time Entry: 55    Assessment & Plan   Assessment: Pt's first f/u since evaluation. Pt required cueing and modifications to improve performance and decrease discomfort during exercises. Pt complained of L foot pain , but is going see a podiatrist tomorrow. She was shown a couple of mobility exercises for the foot. Overall, her neck and upper back are doing okay with no adverse effects from exercises. Will continue  progressing as tolerated to PLOF.       Patient will continue to benefit from skilled outpatient physical therapy to address the deficits listed in the problem list box on initial evaluation, provide pt/family education and to maximize pt's level of independence in the home and community environment.     Patient's spiritual, cultural, and educational needs considered and patient agreeable to plan of care and goals.           Plan: cont POC    Goals:   Active       Functional outcome       Patient will show a significant change in NDI patient-reported outcome tool to demonstrate subjective improvement       Start:  04/25/25    Expected End:  06/25/25            Patient stated goal: 85-90% normal, better sleep        Start:  04/25/25    Expected End:  06/25/25            Patient will demonstrate independence in home program for support of progression       Start:  04/25/25    Expected End:  06/25/25               Lifting & carrying objects       Patient will lift overhead and exercise without any limitations.        Start:  04/25/25    Expected End:  06/25/25               Pain       Patient will report pain of 2/10 or less demonstrating a reduction of overall pain       Start:  04/25/25    Expected End:  06/25/25               Range of Motion       Patient will achieve right knee ROM of  0 degrees extension         Start:  04/25/25    Expected End:  06/25/25               Strength       Patient will achieve bilateral middle trap strength of 5/5       Start:  04/25/25    Expected End:  06/25/25            Patient will achieve bilateral low trap strength of 5/5       Start:  04/25/25    Expected End:  06/25/25            Patient will achieve L  strength of 50 lbs        Start:  04/25/25    Expected End:  06/25/25                Roger Medina Jr, PT

## 2025-05-19 ENCOUNTER — TELEPHONE (OUTPATIENT)
Dept: PODIATRY | Facility: CLINIC | Age: 45
End: 2025-05-19
Payer: COMMERCIAL

## 2025-05-19 ENCOUNTER — CLINICAL SUPPORT (OUTPATIENT)
Dept: REHABILITATION | Facility: HOSPITAL | Age: 45
End: 2025-05-19
Payer: COMMERCIAL

## 2025-05-19 DIAGNOSIS — R68.89 DECREASED FUNCTIONAL ACTIVITY TOLERANCE: ICD-10-CM

## 2025-05-19 DIAGNOSIS — R53.1 WEAKNESS: Primary | ICD-10-CM

## 2025-05-19 PROCEDURE — 97112 NEUROMUSCULAR REEDUCATION: CPT | Mod: PN

## 2025-05-19 PROCEDURE — 97110 THERAPEUTIC EXERCISES: CPT | Mod: PN

## 2025-05-19 NOTE — PROGRESS NOTES
"  Outpatient Rehab    Physical Therapy Visit    Patient Name: Melly Hwang  MRN: [id number]  YOB: 1980  Encounter Date: 5/19/2025    Therapy Diagnosis:   Encounter Diagnoses   Name Primary?    Weakness Yes    Decreased functional activity tolerance      Physician: Ladi Roman,*    Physician Orders: Eval and Treat  Medical Diagnosis: Cervical radiculopathy    Visit # / Visits Authorized:  2 / 20  Insurance Authorization Period: 4/25/2025 to 12/31/2025  Date of Evaluation: 3/24/2025  4/25/2025  Plan of Care Certification: 4/25/2025 to 7/25/2025      PT/PTA:     Number of PTA visits since last PT visit:   Time In: 1150   Time Out:    Total Time (in minutes):     Total Billable Time (in minutes):      FOTO:  Intake Score:  %  Survey Score 2:  %  Survey Score 3:  %    Precautions:       Subjective   Pt reports having neck flexor soreness after last session but no pain..         Objective            Treatment:  Therapeutic Exercise  TE 1: Prone Y and T over Swissball 2 x 12 1# DB    TE 3: .  TE 4: .  TE 5: Cat camels x 15  TE 6: Thoracic Ext foam roll x 5 over 3-4 segments  TE 7: Matrix low and high row 2 x 10 each 40# (low row), 25# ( high row )  TE 8: open books x 12-15 ( elbow bent for shoulder comfort)  TE 9: UBE 5 fwd 5 back  Balance/Neuromuscular Re-Education  NMR 1: Serratus slides YTB 3 x 8  NMR 2: Standing Y YTB 3 x 8  NMR 3: Chin tuck 20 x 5"      Time Entry(in minutes):  Neuromuscular Re-Education Time Entry: 15  Therapeutic Exercise Time Entry: 30    Assessment & Plan   Assessment: Added standing Y with no adverse effects as well as serratus slides YTB. Cues needed for form but no increased pain. Will cont as tolerated to PLOF.  Evaluation/Treatment Tolerance: Patient tolerated treatment well    Patient will continue to benefit from skilled outpatient physical therapy to address the deficits listed in the problem list box on initial evaluation, provide pt/family education and " to maximize pt's level of independence in the home and community environment.     Patient's spiritual, cultural, and educational needs considered and patient agreeable to plan of care and goals.           Plan: cont POC    Goals:   Active       Functional outcome       Patient will show a significant change in NDI patient-reported outcome tool to demonstrate subjective improvement       Start:  04/25/25    Expected End:  06/25/25            Patient stated goal: 85-90% normal, better sleep        Start:  04/25/25    Expected End:  06/25/25            Patient will demonstrate independence in home program for support of progression       Start:  04/25/25    Expected End:  06/25/25               Lifting & carrying objects       Patient will lift overhead and exercise without any limitations.        Start:  04/25/25    Expected End:  06/25/25               Pain       Patient will report pain of 2/10 or less demonstrating a reduction of overall pain       Start:  04/25/25    Expected End:  06/25/25               Range of Motion       Patient will achieve right knee ROM of  0 degrees extension         Start:  04/25/25    Expected End:  06/25/25               Strength       Patient will achieve bilateral middle trap strength of 5/5       Start:  04/25/25    Expected End:  06/25/25            Patient will achieve bilateral low trap strength of 5/5       Start:  04/25/25    Expected End:  06/25/25            Patient will achieve L  strength of 50 lbs        Start:  04/25/25    Expected End:  06/25/25                Roger Medina Jr, PT

## 2025-05-23 ENCOUNTER — OFFICE VISIT (OUTPATIENT)
Dept: HEMATOLOGY/ONCOLOGY | Facility: CLINIC | Age: 45
End: 2025-05-23
Payer: COMMERCIAL

## 2025-05-23 ENCOUNTER — CLINICAL SUPPORT (OUTPATIENT)
Dept: REHABILITATION | Facility: HOSPITAL | Age: 45
End: 2025-05-23
Payer: COMMERCIAL

## 2025-05-23 VITALS
WEIGHT: 153.44 LBS | DIASTOLIC BLOOD PRESSURE: 80 MMHG | OXYGEN SATURATION: 98 % | SYSTOLIC BLOOD PRESSURE: 121 MMHG | RESPIRATION RATE: 17 BRPM | HEIGHT: 71 IN | HEART RATE: 82 BPM | TEMPERATURE: 98 F | BODY MASS INDEX: 21.48 KG/M2

## 2025-05-23 DIAGNOSIS — R53.1 WEAKNESS: Primary | ICD-10-CM

## 2025-05-23 DIAGNOSIS — I26.99 BILATERAL PULMONARY EMBOLISM: ICD-10-CM

## 2025-05-23 DIAGNOSIS — M54.12 CERVICAL RADICULOPATHY: ICD-10-CM

## 2025-05-23 DIAGNOSIS — R68.89 DECREASED FUNCTIONAL ACTIVITY TOLERANCE: ICD-10-CM

## 2025-05-23 DIAGNOSIS — C22.0 HCC (HEPATOCELLULAR CARCINOMA): Primary | ICD-10-CM

## 2025-05-23 DIAGNOSIS — D70.9 NEUTROPENIA, UNSPECIFIED TYPE: ICD-10-CM

## 2025-05-23 DIAGNOSIS — C78.01 MALIGNANT NEOPLASM METASTATIC TO RIGHT LUNG: ICD-10-CM

## 2025-05-23 DIAGNOSIS — D18.03 LIVER HEMANGIOMA: ICD-10-CM

## 2025-05-23 DIAGNOSIS — C77.9 REGIONAL LYMPH NODE METASTASIS PRESENT: ICD-10-CM

## 2025-05-23 DIAGNOSIS — K21.9 GASTROESOPHAGEAL REFLUX DISEASE, UNSPECIFIED WHETHER ESOPHAGITIS PRESENT: ICD-10-CM

## 2025-05-23 DIAGNOSIS — R79.89 ELEVATED LFTS: ICD-10-CM

## 2025-05-23 PROCEDURE — 99999 PR PBB SHADOW E&M-EST. PATIENT-LVL V: CPT | Mod: PBBFAC,,, | Performed by: INTERNAL MEDICINE

## 2025-05-23 PROCEDURE — 97110 THERAPEUTIC EXERCISES: CPT | Mod: PN

## 2025-05-23 PROCEDURE — 97112 NEUROMUSCULAR REEDUCATION: CPT | Mod: PN

## 2025-05-23 NOTE — PROGRESS NOTES
MEDICAL ONCOLOGY - ESTABLISHED PATIENT VISIT    Reason for visit: Scirrhous HCC    Best Contact Phone Number(s): [phone] (home)      Cancer/Stage/TNM:    Cancer Staging   Hepatocellular carcinoma  Staging form: Liver, AJCC 8th Edition  - Clinical stage from 4/10/2023: Stage IVB (rcT1b, cN0, pM1) - Signed by Philippe Carrasco MD on 8/7/2023       Oncology History   Hepatocellular carcinoma   6/9/2021 Initial Diagnosis    Hepatocellular carcinoma     4/10/2023 Cancer Staged    Staging form: Liver, AJCC 8th Edition  - Clinical stage from 4/10/2023: Stage IVB (rcT1b, cN0, pM1)     7/11/2023 -  Chemotherapy    Treatment Summary   Plan Name: OP TREMELIMUMAB 300 MG (X 1) + DURVALUMAB 1500 MG Q4W  Treatment Goal: Control  Status: Active  Start Date: 7/11/2023  End Date: 1/24/2025 (Planned)  Provider: Philippe Carrasco MD  Chemotherapy: [No matching medication found in this treatment plan]     9/11/2023 Genetic Testing    Genetic counseling done. Hereditary syndrome unlikely. Patient offered testing, but declined due to cost.      10/23/2023 - 11/3/2023 Radiation Therapy    Treating physician: yamil villa    Course: C1 Chst/Abd 2023  Treatment Site Ref. ID Energy Dose/Fx (Gy) #Fx Dose Correction (Gy) Total Dose (Gy) Start Date End Date Elapsed Days   SB Lung_R PTV_Lung 6X 10 5 / 5 0 50 10/23/2023 11/1/2023 9   SB Abdomen PTV_PortalLN 6X 10 5 / 5 0 50 10/23/2023 11/3/2023 11        3/11/2025 - 3/20/2025 Radiation Therapy    Treating physician: Kirk Dhillon    Site Technique Energy Dose/Fx (Gy) #Fx Total Dose (Gy)   CHRISTOPHER Lung SBRT 6X 12.5 4 / 4 50   RUL Lung SBRT 6X 12.5 4 / 4 50           Interim History:   44 y.o. 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 Howell with the same pathology, negative margins, 0 of 8 lymph nodes positive and + for vascular and perineural invasion. We saw her in 2021 for evaluation for persistent CA 19-9 but there was no  radiographic evidence of HCC disease recurrence or alternative reason for CA 19-9 elevation.  CT chest on 3/9/23 showed an enlarging RLL nodule measuring 1.1 cm, increased from 0.8 cm in size.  IR biopsy of this on 4/10/23 confirmed metastatic HCC.  Since then in mid-June she has also had an MRI abdomen that showed an enlarging portocaval lymph node that is consistent with metastatic disease.  She completed SBRT at the beginning of November to her lung metastasis and to her portocaval lymph node. She underwent Y-90 on 2/1/24.  She underwent Y-90 again to a left lobe hepatic tumor on 8/15/24.  She began a treatment break from systemic therapy in November 2024.  She underwent SBRT with Dr. Dhillon to a RUL and CHRISTOPHER nodules in February.  Tolerated well.    She is feeling well.  Has some constipation but no consistent pain.  She has been off durvalumab since November 2024.    Presents alone today.  ECOG PS 0.    ROS:   Review of Systems   Constitutional:  Negative for chills, fever, malaise/fatigue and weight loss.   HENT:  Negative for sore throat.    Eyes:  Negative for blurred vision and pain.   Respiratory:  Negative for cough and shortness of breath.    Cardiovascular:  Negative for chest pain and leg swelling.   Gastrointestinal:  Negative for abdominal pain, constipation, diarrhea, nausea and vomiting.   Genitourinary:  Negative for dysuria and frequency.   Musculoskeletal:  Negative for back pain, falls and myalgias.   Skin:  Negative for rash.   Neurological:  Negative for dizziness, weakness and headaches.   Endo/Heme/Allergies:  Does not bruise/bleed easily.   Psychiatric/Behavioral:  Negative for depression. The patient is not nervous/anxious.    All other systems reviewed and are negative.      Past Medical History:   Past Medical History:   Diagnosis Date    VENKATA positive 08/20/2020    COVID-19     Deviated septum 2011    Hepatocellular carcinoma 05/07/2021    Hypertrophy of inferior nasal turbinate      Pericardial effusion     Premature menopause         Past Surgical History:   Past Surgical History:   Procedure Laterality Date    COLONOSCOPY N/A 2020    Procedure: COLONOSCOPY;  Surgeon: GIOVANNI Crane MD;  Location: [facility] ENDO (4TH FLR);  Service: Endoscopy;  Laterality: N/A;  + covid     EPIDURAL STEROID INJECTION INTO CERVICAL SPINE N/A 2024    Procedure: FLORENTINO C7/T1;  Surgeon: Cezar Bailey MD;  Location: [facility] PAIN MANAGEMENT;  Service: Pain Management;  Laterality: N/A;  20mins-No ac    ESOPHAGOGASTRODUODENOSCOPY N/A 1/10/2024    Procedure: ESOPHAGOGASTRODUODENOSCOPY (EGD);  Surgeon: Reynaldo Lynch MD;  Location: [facility] (2ND FLR);  Service: Endoscopy;  Laterality: N/A;  referred by daryl carrasco rocedure: EGD Diagnosis: Abnormal finding on GI tract imaging, Abdominal pain, and GERD  Procedure Timin-4 weeks Provider: Any GI provider Location: [facility], [facility] 2-Endo, and [facility] 4-Endo  Additional Scheduling Informat    ESOPHAGOGASTRODUODENOSCOPY N/A 2024    Procedure: EGD (ESOPHAGOGASTRODUODENOSCOPY);  Surgeon: Reynaldo Lynch MD;  Location: [facility] (2ND FLR);  Service: Endoscopy;  Laterality: N/A;  prep inst sent to pt via portal / referral a labat/   precall complete/instructions sent via portal cd  -moved pt to 730 procedure time. pt aware and notified of 630a arrival. did not want instructions resent, [facility]    HERNIA REPAIR      LIVER SURGERY N/A 2021    NASAL SEPTUM SURGERY      PERICARDIAL WINDOW N/A 2021    Procedure: CREATION, PERICARDIAL WINDOW;  Surgeon: Ajay Cantor MD;  Location: [facility] OR 2ND FLR;  Service: Cardiovascular;  Laterality: N/A;    PERICARDIOCENTESIS N/A 2020    Procedure: Pericardiocentesis;  Surgeon: Dickson Dangelo MD;  Location: [facility] CATH LAB;  Service: Cardiology;  Laterality: N/A;    TONSILLECTOMY          Family History:   Family History   Problem Relation Name Age of Onset    Heart disease Father       Macular degeneration Father      Diabetes Father      Hypertension Father      Hyperlipidemia Father      Diabetes Mother      Liver disease Mother          fatty liver    Heart disease Sister      Adjustment disorder with mixed anxiety and depressed mood Sister      Cancer Maternal Aunt          lung    Lung cancer Maternal Aunt          heavy smoker    Cerebral aneurysm Paternal Aunt      Cataracts Neg Hx      Glaucoma Neg Hx      Retinal detachment Neg Hx      Stroke Neg Hx      Thyroid disease Neg Hx      Melanoma Neg Hx      Heart attack Neg Hx      Uterine cancer Neg Hx      Cervical cancer Neg Hx          Social History:   Social History     Tobacco Use    Smoking status: Never    Smokeless tobacco: Never   Substance Use Topics    Alcohol use: Yes     Comment: rare        I have reviewed and updated the patient's past medical, surgical, family and social histories.    Allergies:   Review of patient's allergies indicates:   Allergen Reactions    No known drug allergies         Medications:   Current Outpatient Medications   Medication Sig Dispense Refill    ALPRAZolam (XANAX) 0.5 MG tablet Take 1 tablet (0.5 mg total) by mouth daily as needed for Anxiety. 30 tablet 1    apixaban (ELIQUIS) 5 mg Tab Take 1 tablet by mouth twice daily 60 tablet 3    cetirizine (ZYRTEC) 10 MG tablet Take 1 tablet (10 mg total) by mouth once daily. 90 tablet 3    cyanocobalamin (VITAMIN B-12) 1000 MCG tablet Take 1 tablet (1,000 mcg total) by mouth once daily. 30 tablet 12    cyclobenzaprine (FLEXERIL) 10 MG tablet TAKE 1 TABLET BY MOUTH EVERY DAY NIGHTLY AS NEEDED FOR MUSCLE SPASMS 30 tablet 1    EScitalopram oxalate (LEXAPRO) 10 MG tablet Take 1 tablet (10 mg total) by mouth once daily. 90 tablet 3    fluticasone propionate (FLONASE) 50 mcg/actuation nasal spray SPRAY 2 SPRAYS BY EACH NOSTRIL ROUTE ONCE DAILY. 48 mL 2    ketoconazole (NIZORAL) 2 % cream aaa on face and behind ears qd-bid prn flare 30 g 3    multivitamin capsule  "Take by mouth. 1 capsule Oral Every morning      naproxen (NAPROSYN) 500 MG tablet Take 1 tablet (500 mg total) by mouth 2 (two) times daily with meals. 60 tablet 0    norethindrone-ethinyl estradiol (MICROGESTIN 1/20) 1-20 mg-mcg per tablet TAKE 1 TABLET BY MOUTH EVERY DAY 63 tablet 3    omeprazole (PRILOSEC) 20 MG capsule TAKE 1 CAPSULE BY MOUTH ONCE DAILY AS NEEDED FOR GERD 90 capsule 3    ondansetron (ZOFRAN-ODT) 4 MG TbDL Take 1 tablet (4 mg total) by mouth every 12 (twelve) hours as needed (nausea). 20 tablet 0    pantoprazole (PROTONIX) 40 MG tablet TAKE 1 TABLET BY MOUTH EVERY DAY 90 tablet 1    prochlorperazine (COMPAZINE) 10 MG tablet Take 1 tablet (10 mg total) by mouth every 6 (six) hours as needed (nausea). 60 tablet 1    simethicone (MYLICON) 125 MG chewable tablet Take 1 tablet (125 mg total) by mouth every 6 (six) hours as needed for Flatulence (BURPING/BELCHING). 30 tablet 0     No current facility-administered medications for this visit.        Physical Exam:   /80 (BP Location: Left arm, Patient Position: Sitting)   Pulse 82   Temp 98.1 °F (36.7 °C) (Oral)   Resp 17   Ht 5' 11" (1.803 m)   Wt 69.6 kg (153 lb 7 oz)   LMP  (LMP Unknown)   SpO2 98%   BMI 21.40 kg/m²                Physical Exam  Vitals reviewed.   Constitutional:       General: She is not in acute distress.     Appearance: Normal appearance. She is normal weight.   Eyes:      General: No scleral icterus.     Extraocular Movements: Extraocular movements intact.      Conjunctiva/sclera: Conjunctivae normal.   Cardiovascular:      Rate and Rhythm: Normal rate.   Pulmonary:      Effort: Pulmonary effort is normal. No respiratory distress.   Abdominal:      Tenderness: There is no abdominal tenderness.   Musculoskeletal:         General: No swelling. Normal range of motion.   Skin:     General: Skin is warm.      Coloration: Skin is not jaundiced.      Findings: No erythema or rash.   Neurological:      General: No focal " deficit present.      Mental Status: She is alert and oriented to person, place, and time. Mental status is at baseline.      Gait: Gait normal.   Psychiatric:         Mood and Affect: Mood normal.         Behavior: Behavior normal.           Labs:   No results found for this or any previous visit (from the past 48 hours).    I have reviewed the pertinent labs. LFTs elevated. AFP stable.     Imaging:       MRI Abd/Pel - 5/16/25:    Impression:     Postsurgical change including partial right hepatectomy.  Multiple post ablation cavities are overall similar from comparison imaging.     Inferior left lobe 1.1 cm enhancing lesion corresponding to CT findings on 02/07/2025, with overall nonspecific imaging findings, noting no evidence for pseudo capsule or washout.  Findings may relate to an atypical hemangioma, focal nodular hyperplasia, or other benign lesion, noting that lesion remains indeterminate in this patient with history of hepatocellular carcinoma.  Attention on follow-up imaging is recommended.     Stable appearance of right hepatic lobe hemangiomas.     Severe narrowing about the left hepatic lobe portal vein, overall similar from comparison CT 02/07/2025.  No definite evidence for portal vein thrombosis.     No evidence for metastatic disease throughout the visualized pelvis.     Additional stable findings as above.    CT Chest:    Impression:     1. Interval decrease in the size of solid pulmonary nodules in both upper lobes when compared to 02/07/2025.  2. No change in the part solid right lower lobe nodule when compared to 02/07/2025.  3. Additional stable findings as per the body of the report.    Path:   6/28/21: partial hepatectomy:    Final Pathologic Diagnosis 1.  Gallbladder (cholecystectomy):   - Benign gallbladder with cholecystitis   2. Right lobe (partial hepatectomy):   - Positive for malignancy, see synoptic report below   - Separate hemangioma, 7.3 cm   3. Lymph nodes, portal (regional  resection):   - 8 lymph nodes, negative for tumor (0/8)   ______________________________________________________________________________   ______   Hepatocellular carcinoma synoptic report   - Procedure:  Partial hepatectomy, right lobe   - Histologic type:  Hepatocellular carcinoma, scirrhous   - Histologic grade:  Moderately differentiated, grade 2   - Tumor focality:  Solitary   - Tumor characteristics:   - Tumor site:  Right lobe   - Tumor size:  3.3 cm   - Treatment effect:  No known pre-surgical therapy   - Satellitosis:  Not identified   - Tumor extent:  Confined to liver   - Vascular invasion:  Present, Small vessel   - Perineural invasion:  Present   - Margins:   - All margins are negative for invasive carcinoma   - Closest margin to invasive carcinoma:  Parenchymal   - Distance from invasive  carcinoma to closest margin:  5 mm   - Regional lymph nodes:   - Number of lymph nodes with tumor:  0   - Number of lymph nodes examined:  8   - Pathology: pT2 N0 MX   - Additional findings:   - No steatosis   - Trichrome stain:  Periportal fibrosis with early septa, stage 1-2   - Iron stain:  Negative   - Iron and trichrome stains with appropriate controls   Tumor blocks:  2A, 2B, 2 C    Comment: Interp By Madeline Carlos MD, Signed on 07/08/2021 at 16:47       Assessment:       1. HCC (hepatocellular carcinoma)    2. Malignant neoplasm metastatic to right lung    3. Regional lymph node metastasis present    4. Elevated LFTs    5. Bilateral pulmonary embolism    6. Neutropenia, unspecified type    7. Cervical radiculopathy    8. Liver hemangioma    9. Gastroesophageal reflux disease, unspecified whether esophagitis present              Plan:             # HCC  Scirrhous subtype, which is very uncommon (~ 1% of all HCC); prognosis is likely similar to typical HCC.  No clear underlying liver pathology in this patient.  Had margin negative resection with negative lymph node eval on 6/28/21 with   [facility].  Unfortunately she has biopsy proven recurrent metastatic disease to her RLL s/p IR biopsy 4/10/23.   She was discussed at thoracic tumor board with potential plan for surgical resection with Dr. Tadeo.  She had a concerning bone lesion on PET from 4/26 at T2.  This was biopsied and proven to be benign.  In the interim she had a repeat abdominal MRI on 6/13/23 which demonstrated growth of a portacaval lymph node that was very concerning for metastatic disease when we reviewed her imaging at liver conference.  Meanwhile her RLL met has grown slightly on 6/21/23 CT as well.  We discussed her case with Valerie Dhillon and Shwetha and we were all in agreement with proceeding with systemic therapy rather than surgery or radiation given multifocal progression.    We discussed this with her and she is agreeable with starting systemic therapy.  Reviewed possibilities of atezo + magaly or durva + treme.  She wished to proceed with STRIDE regimen: durvalumab + tremelimumab.    Received cycle 1 on 7/11/23.  Repeat CT CAP after cycle 3 demonstrates mild growth in albert hepatis lymph node.  Stable disease elsewhere.    Discussed with Dr. Mendiola and she was deemed eligible for SBRT to her abdominal lymph node and growing lung nodule.  She completed SBRT to both 11/3/23.    Meanwhile she developed a new concerning area of possible recurrence near her liver resection site.  Discussed with Dr. Lala and Dr. Dhillon.  Dr. Dhillon was unable to visualize it during radiation simulation.  We recommended to proceed with Y-90 which was administered 2/1/24.    MRI 2/29/24 shows good response to Y-90 treated lesion.  Indeterminate lesion reviewed by Dr. Alford with no concerning features.  Will continue current regimen.  Consider switching systemic therapy only if significant progression given likely side effects of TKI.    Saw Dr. Sabillon of cardiology who recommended troponin and ECG monitoring given her cardiac history.  She is asymptomatic  at present.    MRI after cycle 12 showed mild growth in left hepatic lobe lesion.    She received Y90 to this 8/15/24 with Dr. Alford.  Meanwhile continued with durvalumab.    Gave additional one time re-priming dose of tremelimumab with cycle 15 that she tolerated well.     CT CAP after cycle 18 in Nov 2024 dose showed stable disease (stable small lung mets and no new liver lesions) but incidental PE. Long discussion about plan of care.  She wanted to proceed with observation off systemic therapy to assess disease status and trajectory.  Will prioritize locoregional therapies if at all possible if there is progression.    CT CAP 2/7/25 shows two indeterminate small liver lesions.  Will monitor these.  Very mild interval growth in RUL and CHRISTOPHER metastases.  Underwent SBRT to CHRISTOPHER and RUL metastases in February with Dr. Dhillon.    CT Chest and MRI Abd/Pelvis 5/16 shows improvement in lung metastases that were irradiated.  Stable findings in liver.    Repeat CT chest and MRI abd/pelvis in 3 months.    # Elevated LFTs  Etiology unclear.  May be related to sea faust gummies she is consuming.  Asked her to stop.  Will recheck labs in two weeks.    # PE  Seen incidentally on restaging CT on 11/1/24 - involvement of bilateral lower lobe arteries and potentially RUL arteries.  Continues on apixaban.  Tolerating well.    # Neutropenia, cervical radiculopathy, HTN  Neutropenia intermittent.  Likely benign etiology.  Continue to f/u with Orthopedics.  FLORENTINO helped.  BP slightly elevated. Feeling well overall    # Liver hemangiomas, GERD  Continue monitoring as indicated with Dr. Rogers.  Continue to f/u with GI team for GERD symptoms. Continue with medication management with Protonix.    Follow up: 3 months.    Pte and family members displayed understanding of the above encounter and treatment plan. All thoughtful questions were answered to their satisfaction. Pte was advised to notify the care team or proceed to the ER if signs and  symptoms worsen.      code applied: patient requires or will require a continuous, longitudinal, and active collaborative plan of care related to this patient's health condition, liver cancer --the management of which requires the direction of a practitioner with specialized clinical knowledge, skill, and expertise.       Philippe Carrasco MD  Hematology/Oncology  [facility]           Route Chart for Scheduling    Med Onc Chart Routing      Follow up with physician 3 months.   Follow up with MELA    Infusion scheduling note    Injection scheduling note    Labs CBC, CMP, CA 19-9 and TSH   Scheduling:  Preferred lab:  Lab interval:  & AFP   Imaging MRI   and CT chest   Pharmacy appointment    Other referrals              Treatment Plan Information   OP TREMELIMUMAB 300 MG (X 1) + DURVALUMAB 1500 MG Q4W Philippe Carrasco MD   Associated diagnosis: Hepatocellular carcinoma Stage IVB rcT1b, cN0, pM1 noted on 6/9/2021   Line of treatment: First Line  Treatment Goal: Control     Upcoming Treatment Dates - OP TREMELIMUMAB 300 MG (X 1) + DURVALUMAB 1500 MG Q4W    11/29/2024       Chemotherapy       durvalumab (IMFINZI) 1,500 mg in 0.9% NaCl SolP 280 mL chemo infusion       Antiemetics       prochlorperazine injection Soln 5 mg  12/27/2024       Chemotherapy       durvalumab (IMFINZI) 1,500 mg in sodium chloride 0.9% SolP 280 mL chemo infusion       Antiemetics       prochlorperazine injection Soln 5 mg  1/24/2025       Chemotherapy       durvalumab (IMFINZI) 1,500 mg in sodium chloride 0.9% SolP 280 mL chemo infusion       Antiemetics       prochlorperazine injection Soln 5 mg

## 2025-05-23 NOTE — PROGRESS NOTES
Outpatient Rehab    Physical Therapy Visit    Patient Name: Melly Hwang  MRN: [id number]  YOB: 1980  Encounter Date: 5/23/2025    Therapy Diagnosis:   Encounter Diagnoses   Name Primary?    Weakness Yes    Decreased functional activity tolerance      Physician: Ladi Roman,*    Physician Orders: Eval and Treat  Medical Diagnosis: Cervical radiculopathy    Visit # / Visits Authorized:  3 / 20  Insurance Authorization Period: 4/25/2025 to 12/31/2025  Date of Evaluation: 3/24/2025  4/25/2025  Plan of Care Certification: 4/25/2025 to 7/25/2025      PT/PTA:     Number of PTA visits since last PT visit:   Time In:     Time Out:    Total Time (in minutes):     Total Billable Time (in minutes):      FOTO:  Intake Score:  %  Survey Score 2:  %  Survey Score 3:  %    Precautions:       Subjective   Pt is doing well with no pain..         Objective            Treatment:  Therapeutic Exercise  TE 1: Prone Y and T over Swissball 2 x 12 1# DB  TE 2: .  TE 5: Cat camels x 15  TE 6: Thoracic Ext foam roll x 5 over 3-4 segments  TE 7: Matrix low and high row 3 x 10 each 40# (low row), 25# ( high row )  TE 8: open books x 12-15 ( elbow bent for shoulder comfort)  TE 9: UBE 5 fwd 5 back  Balance/Neuromuscular Re-Education  NMR 1: Serratus slides YTB 3 x 8  NMR 2: Standing Y YTB 3 x 8  NMR 3: Overhead carry Tidal Tube x 3 laps turf (cueing for technique)    Time Entry(in minutes):  Neuromuscular Re-Education Time Entry: 15  Therapeutic Exercise Time Entry: 38    Assessment & Plan   Assessment:    Evaluation/Treatment Tolerance: Patient tolerated treatment well    The patient will continue to benefit from skilled outpatient physical therapy in order to address the deficits listed in the problem list on the initial evaluation, provide patient and family education, and maximize the patients level of independence in the home and community environments.     The patient's spiritual, cultural, and  educational needs were considered, and the patient is agreeable to the plan of care and goals.           Plan: cont POC    Goals:   Active       Functional outcome       Patient will show a significant change in NDI patient-reported outcome tool to demonstrate subjective improvement       Start:  04/25/25    Expected End:  06/25/25            Patient stated goal: 85-90% normal, better sleep        Start:  04/25/25    Expected End:  06/25/25            Patient will demonstrate independence in home program for support of progression       Start:  04/25/25    Expected End:  06/25/25               Lifting & carrying objects       Patient will lift overhead and exercise without any limitations.        Start:  04/25/25    Expected End:  06/25/25               Pain       Patient will report pain of 2/10 or less demonstrating a reduction of overall pain       Start:  04/25/25    Expected End:  06/25/25               Range of Motion       Patient will achieve right knee ROM of  0 degrees extension         Start:  04/25/25    Expected End:  06/25/25               Strength       Patient will achieve bilateral middle trap strength of 5/5       Start:  04/25/25    Expected End:  06/25/25            Patient will achieve bilateral low trap strength of 5/5       Start:  04/25/25    Expected End:  06/25/25            Patient will achieve L  strength of 50 lbs        Start:  04/25/25    Expected End:  06/25/25                Roger Medina Jr, PT

## 2025-05-29 ENCOUNTER — TELEPHONE (OUTPATIENT)
Dept: ORTHOPEDICS | Facility: CLINIC | Age: 45
End: 2025-05-29
Payer: COMMERCIAL

## 2025-05-29 ENCOUNTER — PATIENT MESSAGE (OUTPATIENT)
Dept: ORTHOPEDICS | Facility: CLINIC | Age: 45
End: 2025-05-29
Payer: COMMERCIAL

## 2025-05-29 NOTE — TELEPHONE ENCOUNTER
Patient communication:    Notified patient to stop at [place] Location - 1st floor check in [address] [facility] [address] 30 minutes prior to your appointment time on 6/2/2025 with Rochelle Ricardo PA-C for x-rays.     Pt requested earlier appt time. Pt agreed to 8am appointment; I notified pt that upon her arrival at 8am, she will have xrays done / no need to arrive 30 mins prior as she is first patient of the day. Pt confirmed.    Ladi Anderson MA  [facility]  P: [phone]  F: [phone]

## 2025-06-02 ENCOUNTER — HOSPITAL ENCOUNTER (OUTPATIENT)
Dept: RADIOLOGY | Facility: HOSPITAL | Age: 45
Discharge: HOME | End: 2025-06-02
Payer: COMMERCIAL

## 2025-06-02 ENCOUNTER — CLINICAL SUPPORT (OUTPATIENT)
Dept: REHABILITATION | Facility: HOSPITAL | Age: 45
End: 2025-06-02
Payer: COMMERCIAL

## 2025-06-02 ENCOUNTER — HOSPITAL ENCOUNTER (OUTPATIENT)
Dept: RADIOLOGY | Facility: HOSPITAL | Age: 45
Discharge: HOME | End: 2025-06-02
Attending: OBSTETRICS & GYNECOLOGY
Payer: COMMERCIAL

## 2025-06-02 ENCOUNTER — OFFICE VISIT (OUTPATIENT)
Dept: ORTHOPEDICS | Facility: CLINIC | Age: 45
End: 2025-06-02
Payer: COMMERCIAL

## 2025-06-02 VITALS — BODY MASS INDEX: 21.7 KG/M2 | HEIGHT: 71 IN | WEIGHT: 155 LBS

## 2025-06-02 VITALS — WEIGHT: 155 LBS | BODY MASS INDEX: 21.62 KG/M2

## 2025-06-02 DIAGNOSIS — M79.642 LEFT HAND PAIN: ICD-10-CM

## 2025-06-02 DIAGNOSIS — M79.641 RIGHT HAND PAIN: ICD-10-CM

## 2025-06-02 DIAGNOSIS — M79.642 LEFT HAND PAIN: Primary | ICD-10-CM

## 2025-06-02 DIAGNOSIS — Z12.31 VISIT FOR SCREENING MAMMOGRAM: ICD-10-CM

## 2025-06-02 DIAGNOSIS — R68.89 DECREASED FUNCTIONAL ACTIVITY TOLERANCE: ICD-10-CM

## 2025-06-02 DIAGNOSIS — G56.03 CARPAL TUNNEL SYNDROME, BILATERAL: ICD-10-CM

## 2025-06-02 DIAGNOSIS — M79.641 BILATERAL HAND PAIN: ICD-10-CM

## 2025-06-02 DIAGNOSIS — M79.642 BILATERAL HAND PAIN: ICD-10-CM

## 2025-06-02 DIAGNOSIS — R53.1 WEAKNESS: Primary | ICD-10-CM

## 2025-06-02 PROCEDURE — 73130 X-RAY EXAM OF HAND: CPT | Mod: 26,50,, | Performed by: RADIOLOGY

## 2025-06-02 PROCEDURE — 97140 MANUAL THERAPY 1/> REGIONS: CPT | Mod: PN

## 2025-06-02 PROCEDURE — 99999 PR PBB SHADOW E&M-EST. PATIENT-LVL III: CPT | Mod: PBBFAC,,,

## 2025-06-02 PROCEDURE — 77063 BREAST TOMOSYNTHESIS BI: CPT | Mod: 26,,, | Performed by: RADIOLOGY

## 2025-06-02 PROCEDURE — 77067 SCR MAMMO BI INCL CAD: CPT | Mod: 26,,, | Performed by: RADIOLOGY

## 2025-06-02 PROCEDURE — 77067 SCR MAMMO BI INCL CAD: CPT | Mod: TC

## 2025-06-02 PROCEDURE — 97112 NEUROMUSCULAR REEDUCATION: CPT | Mod: PN

## 2025-06-02 PROCEDURE — 73130 X-RAY EXAM OF HAND: CPT | Mod: TC,50

## 2025-06-02 PROCEDURE — 97110 THERAPEUTIC EXERCISES: CPT | Mod: PN

## 2025-06-03 ENCOUNTER — PATIENT MESSAGE (OUTPATIENT)
Dept: OBSTETRICS AND GYNECOLOGY | Facility: CLINIC | Age: 45
End: 2025-06-03
Payer: COMMERCIAL

## 2025-06-05 ENCOUNTER — RESULTS FOLLOW-UP (OUTPATIENT)
Dept: HEMATOLOGY/ONCOLOGY | Facility: CLINIC | Age: 45
End: 2025-06-05

## 2025-06-05 ENCOUNTER — LAB VISIT (OUTPATIENT)
Dept: LAB | Facility: HOSPITAL | Age: 45
End: 2025-06-05
Attending: INTERNAL MEDICINE
Payer: COMMERCIAL

## 2025-06-05 DIAGNOSIS — C22.0 HCC (HEPATOCELLULAR CARCINOMA): ICD-10-CM

## 2025-06-05 LAB
ALBUMIN SERPL BCP-MCNC: 3.6 G/DL (ref 3.5–5.2)
ALP SERPL-CCNC: 233 UNIT/L (ref 40–150)
ALT SERPL W/O P-5'-P-CCNC: 60 UNIT/L (ref 10–44)
ANION GAP (OHS): 7 MMOL/L (ref 8–16)
AST SERPL-CCNC: 38 UNIT/L (ref 11–45)
BILIRUB SERPL-MCNC: 0.7 MG/DL (ref 0.1–1)
BUN SERPL-MCNC: 12 MG/DL (ref 6–20)
CALCIUM SERPL-MCNC: 9.5 MG/DL (ref 8.7–10.5)
CANCER AG19-9 SERPL-ACNC: 119.3 U/ML
CHLORIDE SERPL-SCNC: 105 MMOL/L (ref 95–110)
CO2 SERPL-SCNC: 28 MMOL/L (ref 23–29)
CREAT SERPL-MCNC: 0.8 MG/DL (ref 0.5–1.4)
GFR SERPLBLD CREATININE-BSD FMLA CKD-EPI: >60 ML/MIN/1.73/M2
GLUCOSE SERPL-MCNC: 90 MG/DL (ref 70–110)
POTASSIUM SERPL-SCNC: 4.5 MMOL/L (ref 3.5–5.1)
PROT SERPL-MCNC: 7.5 GM/DL (ref 6–8.4)
SODIUM SERPL-SCNC: 140 MMOL/L (ref 136–145)

## 2025-06-05 PROCEDURE — 84075 ASSAY ALKALINE PHOSPHATASE: CPT

## 2025-06-05 PROCEDURE — 86301 IMMUNOASSAY TUMOR CA 19-9: CPT

## 2025-06-05 PROCEDURE — 36415 COLL VENOUS BLD VENIPUNCTURE: CPT

## 2025-06-06 ENCOUNTER — CLINICAL SUPPORT (OUTPATIENT)
Dept: REHABILITATION | Facility: HOSPITAL | Age: 45
End: 2025-06-06
Payer: COMMERCIAL

## 2025-06-06 DIAGNOSIS — R68.89 DECREASED FUNCTIONAL ACTIVITY TOLERANCE: ICD-10-CM

## 2025-06-06 DIAGNOSIS — R53.1 WEAKNESS: Primary | ICD-10-CM

## 2025-06-06 PROCEDURE — 97110 THERAPEUTIC EXERCISES: CPT | Mod: PN

## 2025-06-06 PROCEDURE — 97112 NEUROMUSCULAR REEDUCATION: CPT | Mod: PN

## 2025-06-10 DIAGNOSIS — I26.99 BILATERAL PULMONARY EMBOLISM: ICD-10-CM

## 2025-06-12 ENCOUNTER — CLINICAL SUPPORT (OUTPATIENT)
Dept: REHABILITATION | Facility: HOSPITAL | Age: 45
End: 2025-06-12
Payer: COMMERCIAL

## 2025-06-12 DIAGNOSIS — R53.1 WEAKNESS: Primary | ICD-10-CM

## 2025-06-12 DIAGNOSIS — R68.89 DECREASED FUNCTIONAL ACTIVITY TOLERANCE: ICD-10-CM

## 2025-06-12 PROCEDURE — 97140 MANUAL THERAPY 1/> REGIONS: CPT | Mod: PN

## 2025-06-12 PROCEDURE — 97112 NEUROMUSCULAR REEDUCATION: CPT | Mod: PN

## 2025-06-12 PROCEDURE — 97110 THERAPEUTIC EXERCISES: CPT | Mod: PN

## 2025-06-12 NOTE — PROGRESS NOTES
"  Outpatient Rehab    Physical Therapy Visit    Patient Name: Melly Hwang  MRN: [id number]  YOB: 1980  Encounter Date: 6/12/2025    Therapy Diagnosis:   Encounter Diagnoses   Name Primary?    Weakness Yes    Decreased functional activity tolerance      Physician: Ladi Roman,*    Physician Orders: Eval and Treat  Medical Diagnosis: Cervical radiculopathy  Surgical Diagnosis: Not applicable for this Episode   Surgical Date: Not applicable for this Episode    Visit # / Visits Authorized:  6 / 20  Insurance Authorization Period: 4/25/2025 to 12/31/2025  Date of Evaluation: 3/24/2025  4/25/2025  Plan of Care Certification: 4/25/2025 to 7/25/2025      PT/PTA:     Number of PTA visits since last PT visit:   Time In: 0900   Time Out: 1005  Total Time (in minutes): 65   Total Billable Time (in minutes): 53    FOTO:  Intake Score:  %  Survey Score 2:  %  Survey Score 3:  %    Precautions:       Subjective   Pt has pain in one spot in her neck..         Objective            Treatment:  Therapeutic Exercise  TE 1: Prone Y and T over Swissball 2 x 12 1# DB  TE 2: upper trap stretch B 6 x 10" with gentle overpressure  TE 3: levator stretch B 6 x 10" with gentle overpressure  TE 4: Quadruped scapular protraction/ retraction x 15  TE 5: thoracic cat camel x 15  TE 6: Thoracic Ext foam roll x 5 over 3-4 segments (not today)  TE 7: Matrix low and high row 3 x 10 each 40# (low row), 25# ( high row ) not performed not today  TE 8: open books x 12-15 ( elbow bent for shoulder comfort) not today  TE 9: UBE 5 fwd 5 back (not today)  TE 10: chin tuck with rotation 2 min (ball behind head at wall)  Manual Therapy  MT 1: Cervical distraction  MT 2: manual stretching upper trap/ levator  MT 3: R upper cervical lateral glide  MT 4: R AA rotation MET  Balance/Neuromuscular Re-Education  NMR 1: Serratus slides YTB 3 x 8  NMR 2: Standing T and Y yellow cord at wall 2 x 10 (not today)  NMR 3: Overhead carry Tidal " Tube x 3 laps turf (cueing for technique); not today  NMR 4: Cervical isometric (ball on forehead at wall ) 30 x 5 sec ; longus colli activation    Time Entry(in minutes):  Manual Therapy Time Entry: 15  Neuromuscular Re-Education Time Entry: 8  Therapeutic Exercise Time Entry: 30    Assessment & Plan   Assessment: Pt was able to tolerate treatment well without any significant adverse effects. She currently complains of point tenderness at R C1/C2 at her suboccipital region which was the same after session completed. Will look to introduce cupping or STM to the area to induce an improvement in symptoms. Pt is unable to identify specific triggers of pain and was instructed to pay attention to her activities each day to figure out if there is something specific that might be aggravating her pain. Added SNAGS and progressed stretches with no adverse effects. Will cont as tolerated to PLOF.       The patient will continue to benefit from skilled outpatient physical therapy in order to address the deficits listed in the problem list on the initial evaluation, provide patient and family education, and maximize the patients level of independence in the home and community environments.     The patient's spiritual, cultural, and educational needs were considered, and the patient is agreeable to the plan of care and goals.           Plan: cont POC    Goals:   Active       Functional outcome       Patient will show a significant change in NDI patient-reported outcome tool to demonstrate subjective improvement       Start:  04/25/25    Expected End:  06/25/25            Patient stated goal: 85-90% normal, better sleep        Start:  04/25/25    Expected End:  06/25/25            Patient will demonstrate independence in home program for support of progression       Start:  04/25/25    Expected End:  06/25/25               Lifting & carrying objects       Patient will lift overhead and exercise without any limitations.         Start:  04/25/25    Expected End:  06/25/25               Pain       Patient will report pain of 2/10 or less demonstrating a reduction of overall pain       Start:  04/25/25    Expected End:  06/25/25               Range of Motion       Patient will achieve right knee ROM of  0 degrees extension         Start:  04/25/25    Expected End:  06/25/25               Strength       Patient will achieve bilateral middle trap strength of 5/5       Start:  04/25/25    Expected End:  06/25/25            Patient will achieve bilateral low trap strength of 5/5       Start:  04/25/25    Expected End:  06/25/25            Patient will achieve L  strength of 50 lbs        Start:  04/25/25    Expected End:  06/25/25                Roger Medina Jr, PT

## 2025-06-16 ENCOUNTER — CLINICAL SUPPORT (OUTPATIENT)
Dept: REHABILITATION | Facility: HOSPITAL | Age: 45
End: 2025-06-16
Payer: COMMERCIAL

## 2025-06-16 DIAGNOSIS — R53.1 WEAKNESS: Primary | ICD-10-CM

## 2025-06-16 DIAGNOSIS — R68.89 DECREASED FUNCTIONAL ACTIVITY TOLERANCE: ICD-10-CM

## 2025-06-16 PROCEDURE — 97140 MANUAL THERAPY 1/> REGIONS: CPT | Mod: PN

## 2025-06-16 PROCEDURE — 97110 THERAPEUTIC EXERCISES: CPT | Mod: PN

## 2025-06-16 PROCEDURE — 97112 NEUROMUSCULAR REEDUCATION: CPT | Mod: PN

## 2025-06-16 NOTE — PROGRESS NOTES
"  Outpatient Rehab    Physical Therapy Visit    Patient Name: Melly Hwang  MRN: [id number]  YOB: 1980  Encounter Date: 6/16/2025    Therapy Diagnosis:   Encounter Diagnoses   Name Primary?    Weakness Yes    Decreased functional activity tolerance      Physician: Ladi Roman,*    Physician Orders: Eval and Treat  Medical Diagnosis: Cervical radiculopathy  Surgical Diagnosis: Not applicable for this Episode   Surgical Date: Not applicable for this Episode  Days Since Last Surgery: Not applicable for this Episode    Visit # / Visits Authorized:  7 / 20  Insurance Authorization Period: 4/25/2025 to 12/31/2025  Date of Evaluation: 3/24/2025  4/25/2025  Plan of Care Certification: 4/25/2025 to 7/25/2025      PT/PTA:     Number of PTA visits since last PT visit:   Time In: 0900   Time Out: 1005  Total Time (in minutes): 65   Total Billable Time (in minutes): 55    FOTO:  Intake Score:  %  Survey Score 2:  %  Survey Score 3:  %    Precautions:       Subjective   Pt's pain in her neck has reduced to a 4-5 from a 10/10 last week..         Objective            Treatment:  Therapeutic Exercise  TE 1: Prone Y and T over Swissball 2 x 12 1# DB  TE 2: upper trap stretch B 6 x 10"  TE 3: levator stretch B 6 x 10"  TE 4: Quadruped scapular protraction/ retraction x 15  TE 5: thoracic cat camel x 15  TE 6: Thoracic Ext foam roll x 5 over 3-4 segments (not today)  TE 7: Matrix low and high row 3 x 10 each 40# (low row), 25# ( high row ) not performed not today  TE 8: open books x 12-15 ( elbow bent for shoulder comfort) not today  TE 9: UBE 5 fwd 5 back (not today)  TE 10: chin tuck with rotation  and head nods tennis ball behind neck supine 2 min each  Manual Therapy  MT 5: IASTM  Balance/Neuromuscular Re-Education  NMR 1: Serratus slides YTB 3 x 8  NMR 4: Cervical isometric (ball on forehead at wall ) 30 x 5 sec ; longus colli activation      Time Entry(in minutes):  Manual Therapy Time Entry: " 12  Neuromuscular Re-Education Time Entry: 8  Therapeutic Exercise Time Entry: 35    Assessment & Plan   Assessment: Pt has been having point tenderness in her neck, that has been getting better but it is still causing pain. Discussed dry needling with patient for next session and she agreed to receive treatment. No adverse effects observed overall from today's treatment. Will continue as tolerated to PLOF.        The patient will continue to benefit from skilled outpatient physical therapy in order to address the deficits listed in the problem list on the initial evaluation, provide patient and family education, and maximize the patients level of independence in the home and community environments.     The patient's spiritual, cultural, and educational needs were considered, and the patient is agreeable to the plan of care and goals.           Plan:      Goals:   Active       Functional outcome       Patient will show a significant change in NDI patient-reported outcome tool to demonstrate subjective improvement       Start:  04/25/25    Expected End:  06/25/25            Patient stated goal: 85-90% normal, better sleep        Start:  04/25/25    Expected End:  06/25/25            Patient will demonstrate independence in home program for support of progression       Start:  04/25/25    Expected End:  06/25/25               Lifting & carrying objects       Patient will lift overhead and exercise without any limitations.        Start:  04/25/25    Expected End:  06/25/25               Pain       Patient will report pain of 2/10 or less demonstrating a reduction of overall pain       Start:  04/25/25    Expected End:  06/25/25               Range of Motion       Patient will achieve right knee ROM of  0 degrees extension         Start:  04/25/25    Expected End:  06/25/25               Strength       Patient will achieve bilateral middle trap strength of 5/5       Start:  04/25/25    Expected End:  06/25/25             Patient will achieve bilateral low trap strength of 5/5       Start:  04/25/25    Expected End:  06/25/25            Patient will achieve L  strength of 50 lbs        Start:  04/25/25    Expected End:  06/25/25                Roger Medina Jr, PT

## 2025-06-17 ENCOUNTER — TELEPHONE (OUTPATIENT)
Dept: PODIATRY | Facility: CLINIC | Age: 45
End: 2025-06-17
Payer: COMMERCIAL

## 2025-06-17 NOTE — TELEPHONE ENCOUNTER
Call patient in regards to appointment with Dr. Gil on 06/19/2025 due to her being out of clinic due to Emergency. Patient answer and was made aware and stated we can just cancel her appointment because this the second time this happen. Patient has appointment on 07/02/2025 with Dr. Recinos and she will keep that one.

## 2025-06-27 ENCOUNTER — CLINICAL SUPPORT (OUTPATIENT)
Dept: REHABILITATION | Facility: HOSPITAL | Age: 45
End: 2025-06-27
Payer: COMMERCIAL

## 2025-06-27 DIAGNOSIS — R68.89 DECREASED FUNCTIONAL ACTIVITY TOLERANCE: ICD-10-CM

## 2025-06-27 DIAGNOSIS — R53.1 WEAKNESS: Primary | ICD-10-CM

## 2025-06-27 PROCEDURE — 97140 MANUAL THERAPY 1/> REGIONS: CPT | Mod: PN

## 2025-06-27 PROCEDURE — 97110 THERAPEUTIC EXERCISES: CPT | Mod: PN

## 2025-06-27 NOTE — PROGRESS NOTES
"  Outpatient Rehab    Physical Therapy Visit    Patient Name: Melly Hwang  MRN: [id number]  YOB: 1980  Encounter Date: 6/27/2025    Therapy Diagnosis:   Encounter Diagnoses   Name Primary?    Weakness Yes    Decreased functional activity tolerance        Physician: Ladi Roman,*    Physician Orders: Eval and Treat  Medical Diagnosis: Cervical radiculopathy  Surgical Diagnosis: Not applicable for this Episode   Surgical Date: Not applicable for this Episode  Days Since Last Surgery: Not applicable for this Episode    Visit # / Visits Authorized:  8 / 20  Insurance Authorization Period: 4/25/2025 to 12/31/2025  Date of Evaluation: 4/25/2025  Plan of Care Certification: 4/25/2025 to 7/25/2025      PT/PTA:     Number of PTA visits since last PT visit:   Time In: 0800   Time Out: 0900  Total Time (in minutes): 60   Total Billable Time (in minutes): 55    FOTO:  Intake Score:  %  Survey Score 2:  %  Survey Score 3:  %    Precautions:       Subjective   Pt feels that today will be her last day due an area on her neck that continues to be painful..         Objective            Treatment:  Therapeutic Exercise  TE 1: Prone Y and T over Swissball 2 x 12 1# DB  TE 2: upper trap stretch B 6 x 10"  TE 3: levator stretch B 6 x 10"  TE 9: SNAGS rotation R/L, Ext 10 x 5"  TE 10: chin tuck with rotation  and head nods tennis ball behind neck supine 2 min each  Manual Therapy  MT 1: Cervical distraction  MT 2: manual stretching upper trap/ levator  MT 3: R upper cervical lateral glide  MT 4: R AA rotation MET  MT 6: STM        Time Entry(in minutes):  Manual Therapy Time Entry: 15  Therapeutic Exercise Time Entry: 23    Assessment & Plan   Assessment: Pt has been having point tenderness in her neck, that has been getting better but it is still causing pain. Pt expressed that she wants today to be her last day. Had the discussion with her that POC will remain open if she chooses to come back. She was " given an updated HEP and educated on consistency with exercises, the presence of stress and how it can flare her symptoms at times, and to modify activities as needed.         The patient will continue to benefit from skilled outpatient physical therapy in order to address the deficits listed in the problem list on the initial evaluation, provide patient and family education, and maximize the patients level of independence in the home and community environments.     The patient's spiritual, cultural, and educational needs were considered, and the patient is agreeable to the plan of care and goals.           Plan:      Goals:   Active       Functional outcome       Patient will show a significant change in NDI patient-reported outcome tool to demonstrate subjective improvement       Start:  04/25/25    Expected End:  06/25/25            Patient stated goal: 85-90% normal, better sleep        Start:  04/25/25    Expected End:  06/25/25            Patient will demonstrate independence in home program for support of progression       Start:  04/25/25    Expected End:  06/25/25               Lifting & carrying objects       Patient will lift overhead and exercise without any limitations.        Start:  04/25/25    Expected End:  06/25/25               Pain       Patient will report pain of 2/10 or less demonstrating a reduction of overall pain       Start:  04/25/25    Expected End:  06/25/25               Range of Motion       Patient will achieve right knee ROM of  0 degrees extension         Start:  04/25/25    Expected End:  06/25/25               Strength       Patient will achieve bilateral middle trap strength of 5/5       Start:  04/25/25    Expected End:  06/25/25            Patient will achieve bilateral low trap strength of 5/5       Start:  04/25/25    Expected End:  06/25/25            Patient will achieve L  strength of 50 lbs        Start:  04/25/25    Expected End:  06/25/25                  Roger  P Carol Arnold, PT

## 2025-07-07 ENCOUNTER — PATIENT MESSAGE (OUTPATIENT)
Dept: NEUROLOGY | Facility: CLINIC | Age: 45
End: 2025-07-07
Payer: COMMERCIAL

## 2025-07-07 RX ORDER — FLUTICASONE PROPIONATE 50 MCG
2 SPRAY, SUSPENSION (ML) NASAL
Qty: 48 ML | Refills: 2 | Status: SHIPPED | OUTPATIENT
Start: 2025-07-07

## 2025-07-08 ENCOUNTER — PROCEDURE VISIT (OUTPATIENT)
Dept: NEUROLOGY | Facility: CLINIC | Age: 45
End: 2025-07-08
Payer: COMMERCIAL

## 2025-07-08 DIAGNOSIS — G56.03 CARPAL TUNNEL SYNDROME, BILATERAL: ICD-10-CM

## 2025-07-08 PROCEDURE — 95886 MUSC TEST DONE W/N TEST COMP: CPT | Mod: S$GLB,,, | Performed by: PHYSICAL MEDICINE & REHABILITATION

## 2025-07-08 PROCEDURE — 95911 NRV CNDJ TEST 9-10 STUDIES: CPT | Mod: S$GLB,,, | Performed by: PHYSICAL MEDICINE & REHABILITATION

## 2025-07-08 NOTE — PROCEDURES
Test Date:  2025    Patient: los baig : 1980 Physician: Philippe Tran D.O.   ID#: [id number] Sex: Female Ref. Phys: Rochelle Ricardo PA-C     HPI: Lso Baig is a 45 y.o.female who presents for NCS/EMG to evaluate for CTS bilaterally.      PROCEDURE:  Prior to the procedure, the procedure was discussed in detail with the patient.  All questions were answered, and verbal consent was obtained.  For nerve conduction studies, a combination of surface electrodes, bar electrodes, and/or ring electrodes were used as needed.  For needle EMG, each site was cleaned and prepped in usual fashion with an alcohol pad.  A monopolar needle (28G) was used.  There was no significant bleeding, and bandages were applied as needed.  The procedure was tolerated without adverse effect.  The patient was instructed on post-procedure care including ice if needed for 10-15 minutes up to 4 times/day for any sore muscles.  I discussed with the patient that the data would be reviewed and a report sent to the referring provider, where any follow up questions regarding next steps should be directed.        NCV & EMG Findings:  All nerve conduction studies (as indicated in the following tables) were within normal limits.  Needle evaluation of the left Abductor Pollicis Brevis muscle showed increased insertional activity, moderately increased spontaneous activity, increased motor unit amplitude, increased motor unit duration, and diminished recruitment.  The left First Dorsal Interosseous muscle showed increased motor unit amplitude and diminished recruitment.  All remaining muscles (as indicated in the following table) showed no evidence of electrical instability.      IMPRESSIONS:  There is relative motor axonal loss of the left median nerve with preservation of the sensory amplitude, along with active and chronic denervation in the left APB (and atrophy clinically).  There are also chronic denervation changes  in the left first dorsal interosseus.  This all is suggestive of a chronic left C8 and/or T1 radiculopathy with active/ongoing denervation.  The cervical paraspinals were normal on needle EMG, so this is suggestive of a radiculopathy, but not strongly so.  I recommend an MRI of the C spine to evaluate further.    No evidence of median mononeuropathy on either side.        ___________________________  Philippe Tran D.O.        NCS+  Motor Nerve Results      Latency Amplitude F-Lat Segment Distance CV Comment   Site (ms) Norm (mV) Norm (ms)  (cm) (m/s) Norm    Left Median (APB)   Palm 3.5 - 2.1 -         Wrist 4.0  < 4.4 4.5  > 4.2  Wrist-Palm 7 140 -    Elbow 9.1 - 3.6 -  Elbow-Wrist 26 51  > 51    Right Median (APB)   Wrist 3.8  < 4.4 14.3  > 4.2         Elbow 9.0 - 13.3 -  Elbow-Wrist 29 56  > 51    Left Ulnar (ADM)   Wrist 2.9  < 3.7 10.6  > 3.0         Bel Elbow 7.5 - 10.2 -  Bel Elbow-Wrist 27 59  > 52    Abv Elbow 9.7 - 7.8 -  Abv Elbow-Bel Elbow 12 55  > 43    Right Ulnar (ADM)   Wrist 3.0  < 3.7 13.4  > 3.0         Bel Elbow 7.7 - 11.8 -  Bel Elbow-Wrist 28 59  > 52    Abv Elbow 9.4 - 10.8 -  Abv Elbow-Bel Elbow 12 70  > 43      Sensory Sites      Latency (O) Latency (P) Amp (P-T) Segment Distance Velocity Comment   Site (ms) Norm (ms) Norm (µV) Norm  (cm) (m/s)    Left Median (Rec:Dig II)   Wrist 2.8  < 3.3 3.7  < 4.0 53  > 19 Wrist-Dig II 14 50    Right Median   Wrist-Dig I 2.7 - 3.4 - 23 - Wrist-Dig I 10 37    Wrist-Dig II 2.7  < 3.3 3.6  < 4.0 59  > 19 Wrist-Dig II 14 51    Palm-Wrist 1.48 - 2.0 - 42 - Palm-Wrist - -    Left Ulnar (Rec:Dig V)   Wrist 2.6  < 3.1 3.7  < 4.0 22  > 13 Wrist-Dig V 14 54    Right Ulnar (Rec:Wrist)   Palm 1.45 - 1.98 - 31 - Palm-Wrist - -    Right Radial (Rec:Dig I)   Wrist 2.6 - 3.5 - 13 - Wrist-Dig I 10 39      Inter-Nerve Comparisons     Nerve 1 Value 1 Nerve 2 Value 2 Parameter Result Normal   Sensory Sites   R Median Wrist-Dig I 3.4 ms R Radial Wrist-Dig I 3.5 ms Peak  Lat Diff 0.10 ms <0.40   R Median Palm-Wrist 2.0 ms R Ulnar Palm-Wrist 1.98 ms Peak Lat Diff 0.02 ms <0.30     EMG+     Side Muscle Nerve Root Ins Act Fibs Psw Amp Dur Poly Recrt Int Pat Comment   Left Deltoid Axillary C5-C6 Nml Nml Nml Nml Nml 0 Nml Nml    Left Pronator Teres Median C6-C7 Nml Nml Nml Nml Nml 0 Nml Nml    Left Triceps Radial C6-C8 Nml Nml Nml Nml Nml 0 Nml Nml    Left Cervical Parasp (Lower) Rami C7-C8 Nml Nml Nml         Left APB Median C8-T1 *Incr *2+ *2+ *Incr *>12ms 0 *Reduced Nml    Left ADM Ulnar C8-T1 Nml Nml Nml Nml Nml 0 Nml Nml    Left FDI Ulnar,  Radial C8-T1 Nml Nml Nml *Incr Nml 0 *Reduced Nml    Right Biceps Musculocut C5-C6 Nml Nml Nml Nml Nml 0 Nml Nml    Right Deltoid Axillary C5-C6 Nml Nml Nml Nml Nml 0 Nml Nml    Right Pronator Teres Median C6-C7 Nml Nml Nml Nml Nml 0 Nml Nml    Right Triceps Radial C6-C8 Nml Nml Nml Nml Nml 0 Nml Nml    Right FDI Ulnar,  Radial C8-T1 Nml Nml Nml Nml Nml 0 Nml Nml    Left EIP muscle normal (not in table above)        Waveforms:    Motor              Sensory

## 2025-07-14 ENCOUNTER — PATIENT MESSAGE (OUTPATIENT)
Dept: ORTHOPEDICS | Facility: CLINIC | Age: 45
End: 2025-07-14
Payer: COMMERCIAL

## 2025-07-14 ENCOUNTER — OFFICE VISIT (OUTPATIENT)
Facility: CLINIC | Age: 45
End: 2025-07-14
Payer: COMMERCIAL

## 2025-07-14 ENCOUNTER — TELEPHONE (OUTPATIENT)
Dept: ORTHOPEDICS | Facility: CLINIC | Age: 45
End: 2025-07-14
Payer: COMMERCIAL

## 2025-07-14 VITALS — HEIGHT: 71 IN | WEIGHT: 155 LBS | BODY MASS INDEX: 21.7 KG/M2

## 2025-07-14 DIAGNOSIS — M65.332 TRIGGER MIDDLE FINGER OF LEFT HAND: Primary | ICD-10-CM

## 2025-07-14 DIAGNOSIS — M54.12 CERVICAL RADICULOPATHY: ICD-10-CM

## 2025-07-14 DIAGNOSIS — M18.12 PRIMARY OSTEOARTHRITIS OF FIRST CARPOMETACARPAL JOINT OF LEFT HAND: ICD-10-CM

## 2025-07-14 DIAGNOSIS — M54.12 CERVICAL RADICULOPATHY: Primary | ICD-10-CM

## 2025-07-14 DIAGNOSIS — M79.642 CHRONIC PAIN OF LEFT HAND: ICD-10-CM

## 2025-07-14 DIAGNOSIS — G89.29 CHRONIC PAIN OF LEFT HAND: ICD-10-CM

## 2025-07-14 PROCEDURE — 1159F MED LIST DOCD IN RCRD: CPT | Mod: CPTII,S$GLB,, | Performed by: ORTHOPAEDIC SURGERY

## 2025-07-14 PROCEDURE — 1160F RVW MEDS BY RX/DR IN RCRD: CPT | Mod: CPTII,S$GLB,, | Performed by: ORTHOPAEDIC SURGERY

## 2025-07-14 PROCEDURE — 99999 PR PBB SHADOW E&M-EST. PATIENT-LVL III: CPT | Mod: PBBFAC,,, | Performed by: ORTHOPAEDIC SURGERY

## 2025-07-14 PROCEDURE — 3008F BODY MASS INDEX DOCD: CPT | Mod: CPTII,S$GLB,, | Performed by: ORTHOPAEDIC SURGERY

## 2025-07-14 PROCEDURE — 99214 OFFICE O/P EST MOD 30 MIN: CPT | Mod: S$GLB,,, | Performed by: ORTHOPAEDIC SURGERY

## 2025-07-14 RX ORDER — DICLOFENAC SODIUM 10 MG/G
2 GEL TOPICAL DAILY
Qty: 100 G | Refills: 0 | Status: SHIPPED | OUTPATIENT
Start: 2025-07-14

## 2025-07-20 RX ORDER — CETIRIZINE HYDROCHLORIDE 10 MG/1
10 TABLET ORAL
Qty: 90 TABLET | Refills: 0 | Status: SHIPPED | OUTPATIENT
Start: 2025-07-20

## 2025-07-20 NOTE — TELEPHONE ENCOUNTER
No care due was identified.  [facility] Embedded Care Due Messages. Reference number: [id number].   7/20/2025 7:00:39 AM CDT

## 2025-07-21 NOTE — TELEPHONE ENCOUNTER
Refill Decision Note   Melly Hwang  is requesting a refill authorization.  Brief Assessment and Rationale for Refill:  Approve     Medication Therapy Plan:         Comments:     Note composed:7:34 PM 07/20/2025

## 2025-07-25 ENCOUNTER — HOSPITAL ENCOUNTER (OUTPATIENT)
Dept: RADIOLOGY | Facility: HOSPITAL | Age: 45
Discharge: HOME | End: 2025-07-25
Attending: ORTHOPAEDIC SURGERY
Payer: COMMERCIAL

## 2025-07-25 DIAGNOSIS — M54.12 CERVICAL RADICULOPATHY: ICD-10-CM

## 2025-07-25 PROCEDURE — 72141 MRI NECK SPINE W/O DYE: CPT | Mod: TC

## 2025-07-25 PROCEDURE — 72141 MRI NECK SPINE W/O DYE: CPT | Mod: 26,,, | Performed by: RADIOLOGY

## 2025-07-27 ENCOUNTER — PATIENT MESSAGE (OUTPATIENT)
Dept: ORTHOPEDICS | Facility: CLINIC | Age: 45
End: 2025-07-27
Payer: COMMERCIAL

## 2025-08-04 ENCOUNTER — OFFICE VISIT (OUTPATIENT)
Dept: ORTHOPEDICS | Facility: CLINIC | Age: 45
End: 2025-08-04
Payer: COMMERCIAL

## 2025-08-04 VITALS — HEIGHT: 71 IN | BODY MASS INDEX: 21.7 KG/M2 | WEIGHT: 155 LBS

## 2025-08-04 DIAGNOSIS — M54.12 CERVICAL RADICULOPATHY: Primary | ICD-10-CM

## 2025-08-04 PROCEDURE — 3008F BODY MASS INDEX DOCD: CPT | Mod: CPTII,S$GLB,, | Performed by: ORTHOPAEDIC SURGERY

## 2025-08-04 PROCEDURE — 99999 PR PBB SHADOW E&M-EST. PATIENT-LVL III: CPT | Mod: PBBFAC,,, | Performed by: ORTHOPAEDIC SURGERY

## 2025-08-04 PROCEDURE — 1159F MED LIST DOCD IN RCRD: CPT | Mod: CPTII,S$GLB,, | Performed by: ORTHOPAEDIC SURGERY

## 2025-08-04 PROCEDURE — 99213 OFFICE O/P EST LOW 20 MIN: CPT | Mod: S$GLB,,, | Performed by: ORTHOPAEDIC SURGERY

## 2025-08-04 NOTE — PROGRESS NOTES
"DATE: 8/4/2025  PATIENT: Melly Hwang    Attending Physician: Kervin Storey M.D.    HISTORY:  Melly Hwang is a 45 y.o. female who returns to me today for MRI results.  She was last seen by me 2/24/2025.  Today she is doing well but notes she continues to have pain in her right shoulder/shoulder blade, numbness in her right upper arm and down her left arm to her left hand, and weakness in her left hand. She did PT with some relief.    The Patient DENIES myelopathic symptoms such as handwriting changes or difficulty with buttons/coins/keys. Denies perineal paresthesias, bowel/bladder dysfunction.      EXAM:  Ht 5' 11" (1.803 m)   Wt 70.3 kg (154 lb 15.7 oz)   LMP  (LMP Unknown)   BMI 21.62 kg/m²     My physical examination was notable for the following findings:     Musculoskeletal and neuro exam stable    IMAGING:    Today I personally re-reviewed AP, Lat and Flex/Ex  upright C-spine films that demonstrate mild degenerative changes, straightening of normal lordosis    MRI cervical demonstrates moderate stenosis at C6-7    Body mass index is 21.62 kg/m².    Hemoglobin A1C   Date Value Ref Range Status   10/04/2024 5.4 4.0 - 5.6 % Final     Comment:     ADA Screening Guidelines:  5.7-6.4%  Consistent with prediabetes  >or=6.5%  Consistent with diabetes    High levels of fetal hemoglobin interfere with the HbA1C  assay. Heterozygous hemoglobin variants (HbS, HgC, etc)do  not significantly interfere with this assay.   However, presence of multiple variants may affect accuracy.     09/18/2023 5.1 4.0 - 5.6 % Final     Comment:     ADA Screening Guidelines:  5.7-6.4%  Consistent with prediabetes  >or=6.5%  Consistent with diabetes    High levels of fetal hemoglobin interfere with the HbA1C  assay. Heterozygous hemoglobin variants (HbS, HgC, etc)do  not significantly interfere with this assay.   However, presence of multiple variants may affect accuracy.     03/22/2022 5.2 4.0 - 5.6 % Final     " Comment:     ADA Screening Guidelines:  5.7-6.4%  Consistent with prediabetes  >or=6.5%  Consistent with diabetes    High levels of fetal hemoglobin interfere with the HbA1C  assay. Heterozygous hemoglobin variants (HbS, HgC, etc)do  not significantly interfere with this assay.   However, presence of multiple variants may affect accuracy.           ASSESSMENT/PLAN:    Melly was seen today for neck pain and shoulder pain.    Diagnoses and all orders for this visit:    Cervical radiculopathy  -     Procedure Order to Pain Management; Future        Today we discussed at length all of the different treatment options including anti-inflammatories, acetaminophen, rest, ice, heat, physical therapy including strengthening and stretching exercises, home exercises, ROM, aerobic conditioning, aqua therapy, other modalities including ultrasound, massage, and dry needling, epidural steroid injections and finally surgical intervention.      Pt presents with chronic cervical radiculopathy. Failure of conservative rx. Will order bilateral C6-7 TFESI with pain mgmt. Pt will fu if pain persists. Instructed pt to be vigilant of any myelopathic symptoms. She is not open to spine surgery at this time

## 2025-08-05 ENCOUNTER — TELEPHONE (OUTPATIENT)
Dept: PAIN MEDICINE | Facility: CLINIC | Age: 45
End: 2025-08-05
Payer: COMMERCIAL

## 2025-08-05 NOTE — TELEPHONE ENCOUNTER
----- Message from Ladi Roman PA-C sent at 2025  2:16 PM CDT -----  Regarding: Order for MERLINE HIRSCH    Patient Name: MERLINE HIRSCH([id number])  Sex: Female  : 1980      PCP: MERLINE ALVAREZ    Center: [facility] CENTRAL BILLING OFFICE     Level of Service:74829     [place] OFFICE/OUTPT VISIT, EST, LEVL III, 20-29 MIN    Types of orders made on 2025: Procedure Request    Order Date:2025  Ordering User:LADI ROMAN [[id number]]  Encounter Provider:Ladi Roman PA-C [[id number]]  Authorizing Provider: Ladi Roman PA-C [[id number]]  Supervising Provider:STEVEN PATEL [[id number]]  Type of Supervision:Supervision Required  Department:[facility][[id number]]    Common Order Information  Procedure -> Transforaminal Injection (Specify level and laterality) Cmt: bilat             C6-7    Order Specific Information  Order: Procedure Order to Pain Management [Custom: [id number]]  Order #:          [id number]: 1 FUTURE    Priority: Routine  Class: Clinic Performed    Future Order Information      Expires on:2026            Expected by:2025                   Associated Diagnoses      M54.12 Cervical radiculopathy      Facility Name: -> [facility]           Priority: Routine  Class: Clinic Performed    Future Order Information      Expires on:2026            Expected by:2025                   Associated Diagnoses      M54.12 Cervical radiculopathy      Procedure -> Transforaminal Injection (Specify level and laterality) Cmt:                 bilat C6-7        Facility Name: -> [facility]

## 2025-08-06 NOTE — TELEPHONE ENCOUNTER
----- Message from Ladi Roman PA-C sent at 2025  2:16 PM CDT -----  Regarding: Order for MERLINE HIRSCH    Patient Name: MERLINE HIRSCH([id number])  Sex: Female  : 1980      PCP: MERLINE ALVAREZ    Center: [facility] CENTRAL BILLING OFFICE     Level of Service:31686     [place] OFFICE/OUTPT VISIT, EST, LEVL III, 20-29 MIN    Types of orders made on 2025: Procedure Request    Order Date:2025  Ordering User:LADI ROMAN [[id number]]  Encounter Provider:Ladi Roman PA-C [[id number]]  Authorizing Provider: Ladi Roman PA-C [[id number]]  Supervising Provider:STEVEN PATEL [[id number]]  Type of Supervision:Supervision Required  Department:[facility][[id number]]    Common Order Information  Procedure -> Transforaminal Injection (Specify level and laterality) Cmt: bilat             C6-7    Order Specific Information  Order: Procedure Order to Pain Management [Custom: [id number]]  Order #:          [id number]: 1 FUTURE    Priority: Routine  Class: Clinic Performed    Future Order Information      Expires on:2026            Expected by:2025                   Associated Diagnoses      M54.12 Cervical radiculopathy      Facility Name: -> [place]           Priority: Routine  Class: Clinic Performed    Future Order Information      Expires on:2026            Expected by:2025                   Associated Diagnoses      M54.12 Cervical radiculopathy      Procedure -> Transforaminal Injection (Specify level and laterality) Cmt:                 bilat C6-7        Facility Name: -> [facility]

## 2025-08-07 ENCOUNTER — PATIENT MESSAGE (OUTPATIENT)
Dept: ORTHOPEDICS | Facility: CLINIC | Age: 45
End: 2025-08-07
Payer: COMMERCIAL

## 2025-08-07 ENCOUNTER — TELEPHONE (OUTPATIENT)
Dept: PAIN MEDICINE | Facility: CLINIC | Age: 45
End: 2025-08-07
Payer: COMMERCIAL

## 2025-08-07 DIAGNOSIS — M54.12 CERVICAL RADICULOPATHY: Primary | ICD-10-CM

## 2025-08-07 NOTE — TELEPHONE ENCOUNTER
----- Message from Ladi Roman PA-C sent at 2025  2:16 PM CDT -----  Regarding: Order for MERLINE HIRSCH    Patient Name: MERLINE HIRSCH([id number])  Sex: Female  : 1980      PCP: MERLINE ALVAREZ    Center: [facility] CENTRAL BILLING OFFICE     Level of Service:13356     [place] OFFICE/OUTPT VISIT, EST, LEVL III, 20-29 MIN    Types of orders made on 2025: Procedure Request    Order Date:2025  Ordering User:LADI ROMAN [[id number]]  Encounter Provider:Ladi Roman PA-C [[id number]]  Authorizing Provider: Ladi Roman PA-C [[id number]]  Supervising Provider:STEVEN PATEL [[id number]]  Type of Supervision:Supervision Required  Department:[facility][[id number]]    Common Order Information  Procedure -> Transforaminal Injection (Specify level and laterality) Cmt: bilat             C6-7    Order Specific Information  Order: Procedure Order to Pain Management [Custom: [id number]]  Order #:          [id number]: 1 FUTURE    Priority: Routine  Class: Clinic Performed    Future Order Information      Expires on:2026            Expected by:2025                   Associated Diagnoses      M54.12 Cervical radiculopathy      Facility Name: -> [place]           Priority: Routine  Class: Clinic Performed    Future Order Information      Expires on:2026            Expected by:2025                   Associated Diagnoses      M54.12 Cervical radiculopathy      Procedure -> Transforaminal Injection (Specify level and laterality) Cmt:                 bilat C6-7        Facility Name: -> [place]

## 2025-08-07 NOTE — TELEPHONE ENCOUNTER
----- Message from Ladi Roman PA-C sent at 2025  2:16 PM CDT -----  Regarding: Order for MERLINE HIRSCH    Patient Name: MERLINE HIRSCH([id number])  Sex: Female  : 1980      PCP: MERLINE ALVAREZ    Center: [facility] CENTRAL BILLING OFFICE     Level of Service:17880     [place] OFFICE/OUTPT VISIT, EST, LEVL III, 20-29 MIN    Types of orders made on 2025: Procedure Request    Order Date:2025  Ordering User:LADI ROMAN [[id number]]  Encounter Provider:Ladi Roman PA-C [[id number]]  Authorizing Provider: Ladi Roman PA-C [[id number]]  Supervising Provider:STEVEN PATEL [[id number]]  Type of Supervision:Supervision Required  Department:[facility][[id number]]    Common Order Information  Procedure -> Transforaminal Injection (Specify level and laterality) Cmt: bilat             C6-7    Order Specific Information  Order: Procedure Order to Pain Management [Custom: [id number]]  Order #:          [id number]: 1 FUTURE    Priority: Routine  Class: Clinic Performed    Future Order Information      Expires on:2026            Expected by:2025                   Associated Diagnoses      M54.12 Cervical radiculopathy      Facility Name: -> [place]           Priority: Routine  Class: Clinic Performed    Future Order Information      Expires on:2026            Expected by:2025                   Associated Diagnoses      M54.12 Cervical radiculopathy      Procedure -> Transforaminal Injection (Specify level and laterality) Cmt:                 bilat C6-7        Facility Name: -> [place]

## 2025-08-07 NOTE — TELEPHONE ENCOUNTER
Copied from CRM #[id number]. Topic: General Inquiry - Return Call  >> Aug 7, 2025 12:40 PM Jenn wrote:  Returning a Missed Call    Caller:Pt    Returning call to: Lluvia Bedoya    Caller can be reached @:[phone]     Nature of the call:  schedule injection with Dr Rodriguez    Please Call to Advise,Thank you.

## 2025-08-08 ENCOUNTER — TELEPHONE (OUTPATIENT)
Dept: PAIN MEDICINE | Facility: CLINIC | Age: 45
End: 2025-08-08
Payer: COMMERCIAL

## 2025-08-08 DIAGNOSIS — M54.12 CERVICAL RADICULOPATHY: Primary | ICD-10-CM

## 2025-08-11 ENCOUNTER — OFFICE VISIT (OUTPATIENT)
Dept: OPTOMETRY | Facility: CLINIC | Age: 45
End: 2025-08-11
Payer: COMMERCIAL

## 2025-08-11 DIAGNOSIS — H52.13 MYOPIA OF BOTH EYES: Primary | ICD-10-CM

## 2025-08-11 DIAGNOSIS — Z98.890 HX OF LASIK: ICD-10-CM

## 2025-08-11 PROCEDURE — 99999 PR PBB SHADOW E&M-EST. PATIENT-LVL II: CPT | Mod: PBBFAC,,, | Performed by: OPTOMETRIST

## 2025-08-11 PROCEDURE — 92015 DETERMINE REFRACTIVE STATE: CPT | Mod: S$GLB,,, | Performed by: OPTOMETRIST

## 2025-08-11 PROCEDURE — 92014 COMPRE OPH EXAM EST PT 1/>: CPT | Mod: S$GLB,,, | Performed by: OPTOMETRIST

## 2025-08-14 ENCOUNTER — PATIENT MESSAGE (OUTPATIENT)
Dept: HEMATOLOGY/ONCOLOGY | Facility: CLINIC | Age: 45
End: 2025-08-14
Payer: COMMERCIAL

## 2025-08-14 DIAGNOSIS — M25.50 JOINT ACHE: Primary | ICD-10-CM

## 2025-08-14 DIAGNOSIS — I26.99 BILATERAL PULMONARY EMBOLISM: ICD-10-CM

## 2025-08-20 ENCOUNTER — HOSPITAL ENCOUNTER (OUTPATIENT)
Dept: RADIOLOGY | Facility: HOSPITAL | Age: 45
Discharge: HOME | End: 2025-08-20
Attending: INTERNAL MEDICINE
Payer: COMMERCIAL

## 2025-08-20 DIAGNOSIS — C22.0 HCC (HEPATOCELLULAR CARCINOMA): ICD-10-CM

## 2025-08-20 PROCEDURE — 74183 MRI ABD W/O CNTR FLWD CNTR: CPT | Mod: 26,,, | Performed by: RADIOLOGY

## 2025-08-20 PROCEDURE — 72197 MRI PELVIS W/O & W/DYE: CPT | Mod: 26,,, | Performed by: RADIOLOGY

## 2025-08-20 PROCEDURE — 25500020 PHARM REV CODE 255: Performed by: INTERNAL MEDICINE

## 2025-08-20 PROCEDURE — A9585 GADOBUTROL INJECTION: HCPCS | Performed by: INTERNAL MEDICINE

## 2025-08-20 PROCEDURE — 71250 CT THORAX DX C-: CPT | Mod: TC

## 2025-08-20 PROCEDURE — 72197 MRI PELVIS W/O & W/DYE: CPT | Mod: TC

## 2025-08-20 RX ORDER — GADOBUTROL 604.72 MG/ML
10 INJECTION INTRAVENOUS
Status: COMPLETED | OUTPATIENT
Start: 2025-08-20 | End: 2025-08-20

## 2025-08-20 RX ADMIN — GADOBUTROL 10 ML: 604.72 INJECTION INTRAVENOUS at 07:08

## 2025-08-22 ENCOUNTER — OFFICE VISIT (OUTPATIENT)
Dept: HEMATOLOGY/ONCOLOGY | Facility: CLINIC | Age: 45
End: 2025-08-22
Payer: COMMERCIAL

## 2025-08-22 ENCOUNTER — TELEPHONE (OUTPATIENT)
Dept: PAIN MEDICINE | Facility: CLINIC | Age: 45
End: 2025-08-22
Payer: COMMERCIAL

## 2025-08-22 ENCOUNTER — PATIENT MESSAGE (OUTPATIENT)
Dept: HEMATOLOGY/ONCOLOGY | Facility: CLINIC | Age: 45
End: 2025-08-22
Payer: COMMERCIAL

## 2025-08-22 VITALS
HEIGHT: 71 IN | BODY MASS INDEX: 20.8 KG/M2 | SYSTOLIC BLOOD PRESSURE: 137 MMHG | WEIGHT: 148.56 LBS | TEMPERATURE: 98 F | HEART RATE: 68 BPM | OXYGEN SATURATION: 100 % | DIASTOLIC BLOOD PRESSURE: 87 MMHG

## 2025-08-22 DIAGNOSIS — C78.01 MALIGNANT NEOPLASM METASTATIC TO RIGHT LUNG: ICD-10-CM

## 2025-08-22 DIAGNOSIS — D70.9 NEUTROPENIA, UNSPECIFIED TYPE: ICD-10-CM

## 2025-08-22 DIAGNOSIS — M54.12 CERVICAL RADICULOPATHY: ICD-10-CM

## 2025-08-22 DIAGNOSIS — I26.99 BILATERAL PULMONARY EMBOLISM: ICD-10-CM

## 2025-08-22 DIAGNOSIS — K76.9 LIVER DISEASE, UNSPECIFIED: ICD-10-CM

## 2025-08-22 DIAGNOSIS — R79.89 ELEVATED LFTS: ICD-10-CM

## 2025-08-22 DIAGNOSIS — D18.03 LIVER HEMANGIOMA: ICD-10-CM

## 2025-08-22 DIAGNOSIS — K21.9 GASTROESOPHAGEAL REFLUX DISEASE, UNSPECIFIED WHETHER ESOPHAGITIS PRESENT: ICD-10-CM

## 2025-08-22 DIAGNOSIS — C22.0 HCC (HEPATOCELLULAR CARCINOMA): Primary | ICD-10-CM

## 2025-08-22 PROCEDURE — 99999 PR PBB SHADOW E&M-EST. PATIENT-LVL IV: CPT | Mod: PBBFAC,,, | Performed by: INTERNAL MEDICINE

## 2025-08-25 DIAGNOSIS — C22.0 HCC (HEPATOCELLULAR CARCINOMA): Primary | ICD-10-CM

## 2025-08-25 DIAGNOSIS — C78.01 MALIGNANT NEOPLASM METASTATIC TO RIGHT LUNG: ICD-10-CM

## 2025-08-27 ENCOUNTER — HOSPITAL ENCOUNTER (OUTPATIENT)
Dept: RADIOLOGY | Facility: HOSPITAL | Age: 45
Discharge: HOME | End: 2025-08-27
Attending: PODIATRIST
Payer: COMMERCIAL

## 2025-08-27 ENCOUNTER — PATIENT MESSAGE (OUTPATIENT)
Dept: HEMATOLOGY/ONCOLOGY | Facility: CLINIC | Age: 45
End: 2025-08-27
Payer: COMMERCIAL

## 2025-08-27 ENCOUNTER — OFFICE VISIT (OUTPATIENT)
Dept: PODIATRY | Facility: CLINIC | Age: 45
End: 2025-08-27
Payer: COMMERCIAL

## 2025-08-27 VITALS
HEART RATE: 75 BPM | HEIGHT: 71 IN | BODY MASS INDEX: 20.8 KG/M2 | DIASTOLIC BLOOD PRESSURE: 78 MMHG | WEIGHT: 148.56 LBS | SYSTOLIC BLOOD PRESSURE: 125 MMHG

## 2025-08-27 DIAGNOSIS — R76.8 ANA POSITIVE: ICD-10-CM

## 2025-08-27 DIAGNOSIS — M79.672 LEFT FOOT PAIN: ICD-10-CM

## 2025-08-27 DIAGNOSIS — M84.375A STRESS FRACTURE OF LEFT FOOT, INITIAL ENCOUNTER: Primary | ICD-10-CM

## 2025-08-27 DIAGNOSIS — M84.375A STRESS FRACTURE OF LEFT FOOT, INITIAL ENCOUNTER: ICD-10-CM

## 2025-08-27 PROCEDURE — 73630 X-RAY EXAM OF FOOT: CPT | Mod: 26,50,, | Performed by: RADIOLOGY

## 2025-08-27 PROCEDURE — 73630 X-RAY EXAM OF FOOT: CPT | Mod: TC,50

## 2025-08-27 RX ORDER — METHYLPREDNISOLONE 4 MG/1
TABLET ORAL
Qty: 21 TABLET | Refills: 1 | Status: SHIPPED | OUTPATIENT
Start: 2025-08-27 | End: 2025-09-17

## 2025-08-28 ENCOUNTER — TELEPHONE (OUTPATIENT)
Dept: PAIN MEDICINE | Facility: CLINIC | Age: 45
End: 2025-08-28
Payer: COMMERCIAL

## 2025-08-29 ENCOUNTER — TELEPHONE (OUTPATIENT)
Dept: PAIN MEDICINE | Facility: CLINIC | Age: 45
End: 2025-08-29
Payer: COMMERCIAL

## 2025-08-29 ENCOUNTER — HOSPITAL ENCOUNTER (OUTPATIENT)
Facility: HOSPITAL | Age: 45
Discharge: HOME | End: 2025-08-29
Attending: STUDENT IN AN ORGANIZED HEALTH CARE EDUCATION/TRAINING PROGRAM | Admitting: STUDENT IN AN ORGANIZED HEALTH CARE EDUCATION/TRAINING PROGRAM
Payer: COMMERCIAL

## 2025-08-29 VITALS
DIASTOLIC BLOOD PRESSURE: 84 MMHG | WEIGHT: 150 LBS | OXYGEN SATURATION: 99 % | TEMPERATURE: 99 F | RESPIRATION RATE: 15 BRPM | HEART RATE: 68 BPM | HEIGHT: 71 IN | SYSTOLIC BLOOD PRESSURE: 134 MMHG | BODY MASS INDEX: 21 KG/M2

## 2025-08-29 DIAGNOSIS — G89.29 CHRONIC PAIN: ICD-10-CM

## 2025-08-29 DIAGNOSIS — C22.0 HCC (HEPATOCELLULAR CARCINOMA): Primary | ICD-10-CM

## 2025-08-29 DIAGNOSIS — R22.41 LEG MASS, RIGHT: ICD-10-CM

## 2025-08-29 DIAGNOSIS — M54.12 CERVICAL RADICULOPATHY: Primary | ICD-10-CM

## 2025-08-29 PROCEDURE — 62321 NJX INTERLAMINAR CRV/THRC: CPT | Performed by: STUDENT IN AN ORGANIZED HEALTH CARE EDUCATION/TRAINING PROGRAM

## 2025-08-29 PROCEDURE — 63600175 PHARM REV CODE 636 W HCPCS: Performed by: STUDENT IN AN ORGANIZED HEALTH CARE EDUCATION/TRAINING PROGRAM

## 2025-08-29 PROCEDURE — 25500020 PHARM REV CODE 255: Performed by: STUDENT IN AN ORGANIZED HEALTH CARE EDUCATION/TRAINING PROGRAM

## 2025-08-29 PROCEDURE — 62321 NJX INTERLAMINAR CRV/THRC: CPT | Mod: ,,, | Performed by: STUDENT IN AN ORGANIZED HEALTH CARE EDUCATION/TRAINING PROGRAM

## 2025-08-29 RX ORDER — LIDOCAINE HYDROCHLORIDE 20 MG/ML
INJECTION, SOLUTION EPIDURAL; INFILTRATION; INTRACAUDAL; PERINEURAL
Status: DISCONTINUED | OUTPATIENT
Start: 2025-08-29 | End: 2025-08-29 | Stop reason: HOSPADM

## 2025-08-29 RX ORDER — FENTANYL CITRATE 50 UG/ML
INJECTION, SOLUTION INTRAMUSCULAR; INTRAVENOUS
Status: DISCONTINUED | OUTPATIENT
Start: 2025-08-29 | End: 2025-08-29 | Stop reason: HOSPADM

## 2025-08-29 RX ORDER — LIDOCAINE HYDROCHLORIDE 10 MG/ML
1 INJECTION, SOLUTION EPIDURAL; INFILTRATION; INTRACAUDAL; PERINEURAL ONCE
Status: DISCONTINUED | OUTPATIENT
Start: 2025-08-29 | End: 2025-08-29 | Stop reason: HOSPADM

## 2025-08-29 RX ORDER — MIDAZOLAM HYDROCHLORIDE 1 MG/ML
INJECTION INTRAMUSCULAR; INTRAVENOUS
Status: DISCONTINUED | OUTPATIENT
Start: 2025-08-29 | End: 2025-08-29 | Stop reason: HOSPADM

## 2025-08-29 RX ORDER — LIDOCAINE HYDROCHLORIDE 10 MG/ML
INJECTION, SOLUTION EPIDURAL; INFILTRATION; INTRACAUDAL; PERINEURAL
Status: DISCONTINUED | OUTPATIENT
Start: 2025-08-29 | End: 2025-08-29 | Stop reason: HOSPADM

## 2025-08-29 RX ORDER — ONDANSETRON HYDROCHLORIDE 2 MG/ML
INJECTION, SOLUTION INTRAVENOUS
Status: DISCONTINUED | OUTPATIENT
Start: 2025-08-29 | End: 2025-08-29 | Stop reason: HOSPADM

## 2025-08-29 RX ORDER — DEXAMETHASONE SODIUM PHOSPHATE 10 MG/ML
INJECTION, SOLUTION INTRA-ARTICULAR; INTRALESIONAL; INTRAMUSCULAR; INTRAVENOUS; SOFT TISSUE
Status: DISCONTINUED | OUTPATIENT
Start: 2025-08-29 | End: 2025-08-29 | Stop reason: HOSPADM

## 2025-08-29 RX ORDER — SODIUM CHLORIDE 9 MG/ML
500 INJECTION, SOLUTION INTRAVENOUS CONTINUOUS
Status: DISCONTINUED | OUTPATIENT
Start: 2025-08-29 | End: 2025-08-29 | Stop reason: HOSPADM

## (undated) DEVICE — KIT PROBE COVER WITH GEL

## (undated) DEVICE — SUT PROLENE 4-0 SH BLU 36IN

## (undated) DEVICE — GOWN SURGICAL X-LARGE

## (undated) DEVICE — SUT SILK BLK BR. 2 2-60

## (undated) DEVICE — TRAY HEART

## (undated) DEVICE — KIT URINARY CATH URINE METER

## (undated) DEVICE — SUT CTD VICRYL 0 UND CR/CTX

## (undated) DEVICE — SEE MEDLINE ITEM 146313

## (undated) DEVICE — CLIPPER BLADE MOD 4406 (CAREF)

## (undated) DEVICE — SUT 4-0 12-30IN SILK

## (undated) DEVICE — STAPLER SKIN PROXIMATE WIDE

## (undated) DEVICE — CART STAPLE RELD 45MM WHT

## (undated) DEVICE — PAD K-THERMIA 24IN X 60IN

## (undated) DEVICE — APPLICATOR CHLORAPREP ORN 26ML

## (undated) DEVICE — SET SONAPET TUBING W/EXT

## (undated) DEVICE — TRAY FOLEY 16FR INFECTION CONT

## (undated) DEVICE — STOPCOCK 3-WAY

## (undated) DEVICE — SEE MEDLINE ITEM 156894

## (undated) DEVICE — ADHESIVE DERMABOND ADVANCED

## (undated) DEVICE — DRAPE STERI INSTRUMENT 1018

## (undated) DEVICE — DRESSING ABSRBNT ISLAND 3.6X8

## (undated) DEVICE — SEE MEDLINE ITEM 146417

## (undated) DEVICE — DRAPE SPLIT STERILE

## (undated) DEVICE — DRAPE OPTIMA MAJOR PEDIATRIC

## (undated) DEVICE — CLIP LIGATION MEDIUM CLIPS 1

## (undated) DEVICE — SET DECANTER MEDICHOICE

## (undated) DEVICE — SYR ONLY LUER LOCK 20CC

## (undated) DEVICE — WIPE ESENTA BARR STNG FREE 3ML

## (undated) DEVICE — KIT PERICARDIOCENTESIS

## (undated) DEVICE — SEE MEDLINE ITEM 146416

## (undated) DEVICE — LOOP VESSEL BLUE MAXI

## (undated) DEVICE — GLOVE BIOGEL PI MICRO SZ 7.5

## (undated) DEVICE — DRESSING ADH ISLAND 3.6 X 14

## (undated) DEVICE — BLADE ELECTRO EDGE INSULATED

## (undated) DEVICE — CLIP SPRING 12MM

## (undated) DEVICE — SYR 3CC LUER LOC

## (undated) DEVICE — DRAIN CHANNEL ROUND 19FR

## (undated) DEVICE — Device

## (undated) DEVICE — LINE 60IN PRESSURE MON.

## (undated) DEVICE — DRAPE INCISE IOBAN 2 23X17IN

## (undated) DEVICE — DRESSING AQUACEL SACRAL 9 X 9

## (undated) DEVICE — BLADE 4IN EDGE INSULATED

## (undated) DEVICE — TIP SONAPET STRAIGHT

## (undated) DEVICE — SUT SILK 3-0 STRANDS 30IN

## (undated) DEVICE — SUT MONOCRYL 4-0 PS-1 UND

## (undated) DEVICE — KIT MICROINTRO 4F .018X40X7CM

## (undated) DEVICE — GLOVE SURG BIOGEL LATEX SZ 7.5

## (undated) DEVICE — SHEATH INTRODUCER 6FR 11CM

## (undated) DEVICE — SUT 6 18IN STEEL MONO CCS

## (undated) DEVICE — NDL 18GA X1 1/2 REG BEVEL

## (undated) DEVICE — DRAPE CORETEMP FLD WRM 56X62IN

## (undated) DEVICE — SUT SILK 2-0 SH 18IN BLACK

## (undated) DEVICE — SEE MEDLINE ITEM 152622

## (undated) DEVICE — SYR 10CC LUER LOCK

## (undated) DEVICE — KIT EVACUATOR FULL PERF 100CC

## (undated) DEVICE — CANNULA VESSEL FREE FLOW

## (undated) DEVICE — SUT PROLENE 4-0 RB-1 BL MO

## (undated) DEVICE — BLADE SAW STERN .076MM 6.1MM L

## (undated) DEVICE — SUT PROLENE 3-0 30 RB-1 BL

## (undated) DEVICE — KIT SAHARA DRAPE DRAW/LIFT

## (undated) DEVICE — SEE MEDLINE ITEM 157117

## (undated) DEVICE — WIRE INTRAMYOCARDIAL TEMP

## (undated) DEVICE — PACK UNIVERSAL SPLIT II

## (undated) DEVICE — RETRACTOR OCTOBASE INSERT HOLD

## (undated) DEVICE — ELECTRODE REM PLYHSV RETURN 9

## (undated) DEVICE — SEALER VES BPLR AQUAMANTYS 9.5

## (undated) DEVICE — SUT PROLENE 5-0 24 C-1 BL

## (undated) DEVICE — SEE MEDLINE ITEM 156911

## (undated) DEVICE — DRAIN CHEST DRY SUCTION

## (undated) DEVICE — SUT PROLENE 3-0 SH DA 36 BL

## (undated) DEVICE — GUIDEWIRE EMERALD 150CM PTFE

## (undated) DEVICE — SUT 4/0 27IN PDS II VIO MO

## (undated) DEVICE — APPLIER LIGACLIP SM 9.38IN

## (undated) DEVICE — STAPLER INT LINEAR ARTC 3.5-45

## (undated) DEVICE — TUBING HF INSUFFLATION W/ FLTR

## (undated) DEVICE — SUT SILK 2-0 STRANDS 30IN

## (undated) DEVICE — SUT 1 36IN PDS II VIO MONO

## (undated) DEVICE — HEMOSTAT SURGICEL NU-KNIT 6X9

## (undated) DEVICE — GAUZE SPONGE 4X4 12PLY

## (undated) DEVICE — INSERTS STEALTH FIBRA SIZE 5

## (undated) DEVICE — SUT 2/0 30IN SILK BLK BRAI

## (undated) DEVICE — SUT PROLENE 6-0 BV-1 30IN

## (undated) DEVICE — DRAPE SLUSH WARMER WITH DISC